# Patient Record
Sex: MALE | Race: WHITE | Employment: OTHER | URBAN - METROPOLITAN AREA
[De-identification: names, ages, dates, MRNs, and addresses within clinical notes are randomized per-mention and may not be internally consistent; named-entity substitution may affect disease eponyms.]

---

## 2017-01-05 ENCOUNTER — TELEPHONE (OUTPATIENT)
Dept: FAMILY MEDICINE | Facility: CLINIC | Age: 62
End: 2017-01-05

## 2017-01-05 NOTE — TELEPHONE ENCOUNTER
Reason for Call:  Other prescription    Detailed comments: Patient is asking for an increase in his Trazodone or another medication that will get him to sleep   [facility]    Phone Number Patient can be reached at: Home number on file [phone] (home)    Best Time: any    Can we leave a detailed message on this number? YES    Call taken on 1/5/2017 at 10:26 AM by Marisel Leonardo

## 2017-01-05 NOTE — TELEPHONE ENCOUNTER
Patient reports he has an appt with [facility] 1-9-17 to review trazodone and tizanidine RXs.  He ran out of tizanidine RX which was helping him go to sleep at night - this RX was originally from the [facility] which he left and now going to [facility].  While on phone with RN, patient realized his tizanidine RX still had one refill.  He states he will get that refilled, continue his current RX regimen and then discuss with [facility] on 1-9-17.  Zara PETERS RN

## 2017-01-06 ENCOUNTER — OFFICE VISIT (OUTPATIENT)
Dept: PSYCHOLOGY | Facility: CLINIC | Age: 62
End: 2017-01-06
Payer: MEDICARE

## 2017-01-06 DIAGNOSIS — F41.1 GENERALIZED ANXIETY DISORDER: ICD-10-CM

## 2017-01-06 DIAGNOSIS — F33.1 MAJOR DEPRESSIVE DISORDER, RECURRENT EPISODE, MODERATE (H): Primary | ICD-10-CM

## 2017-01-06 PROCEDURE — 90832 PSYTX W PT 30 MINUTES: CPT | Performed by: SOCIAL WORKER
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
## 2017-01-13 DIAGNOSIS — R52 PAIN: Primary | ICD-10-CM

## 2017-01-13 RX ORDER — LIDOCAINE 50 MG/G
OINTMENT TOPICAL PRN
Qty: 744.24 G | Refills: 0 | Status: SHIPPED
Start: 2017-01-13 | End: 2018-09-08

## 2017-01-13 NOTE — TELEPHONE ENCOUNTER
Mail order Pharmacy in [place] called stating pt is asking for 5% Lidocaine oint.  This med is not on his med list.  Has had Lidocaine patches in the past.  Advise.  Wil

## 2017-01-17 ENCOUNTER — APPOINTMENT (OUTPATIENT)
Dept: CT IMAGING | Facility: CLINIC | Age: 62
DRG: 871 | End: 2017-01-17
Attending: FAMILY MEDICINE
Payer: MEDICARE

## 2017-01-17 ENCOUNTER — TELEPHONE (OUTPATIENT)
Dept: FAMILY MEDICINE | Facility: CLINIC | Age: 62
End: 2017-01-17

## 2017-01-17 ENCOUNTER — HOSPITAL ENCOUNTER (INPATIENT)
Facility: CLINIC | Age: 62
LOS: 4 days | Discharge: HOME HEALTH | DRG: 871 | End: 2017-01-21
Attending: EMERGENCY MEDICINE | Admitting: FAMILY MEDICINE
Payer: MEDICARE

## 2017-01-17 ENCOUNTER — APPOINTMENT (OUTPATIENT)
Dept: GENERAL RADIOLOGY | Facility: CLINIC | Age: 62
DRG: 871 | End: 2017-01-17
Attending: EMERGENCY MEDICINE
Payer: MEDICARE

## 2017-01-17 ENCOUNTER — APPOINTMENT (OUTPATIENT)
Dept: CT IMAGING | Facility: CLINIC | Age: 62
DRG: 871 | End: 2017-01-17
Attending: EMERGENCY MEDICINE
Payer: MEDICARE

## 2017-01-17 DIAGNOSIS — M48.02 SPINAL STENOSIS IN CERVICAL REGION: Primary | ICD-10-CM

## 2017-01-17 DIAGNOSIS — A41.9 BACTERIAL SEPSIS (H): ICD-10-CM

## 2017-01-17 DIAGNOSIS — J18.9 COMMUNITY ACQUIRED PNEUMONIA: ICD-10-CM

## 2017-01-17 LAB
ALBUMIN SERPL-MCNC: 3.6 G/DL (ref 3.4–5)
ALBUMIN UR-MCNC: 100 MG/DL
ALP SERPL-CCNC: 75 U/L (ref 40–150)
ALT SERPL W P-5'-P-CCNC: 46 U/L (ref 0–70)
AMPHETAMINES UR QL SCN: ABNORMAL
ANION GAP SERPL CALCULATED.3IONS-SCNC: 13 MMOL/L (ref 3–14)
APPEARANCE UR: CLEAR
AST SERPL W P-5'-P-CCNC: 25 U/L (ref 0–45)
BARBITURATES UR QL: ABNORMAL
BASE DEFICIT BLDV-SCNC: 1.8 MMOL/L
BASOPHILS # BLD AUTO: 0 10E9/L (ref 0–0.2)
BASOPHILS NFR BLD AUTO: 0.1 %
BENZODIAZ UR QL: ABNORMAL
BILIRUB SERPL-MCNC: 0.5 MG/DL (ref 0.2–1.3)
BILIRUB UR QL STRIP: NEGATIVE
BUN SERPL-MCNC: 13 MG/DL (ref 7–30)
CALCIUM SERPL-MCNC: 9.6 MG/DL (ref 8.5–10.1)
CANNABINOIDS UR QL SCN: ABNORMAL
CHLORIDE SERPL-SCNC: 109 MMOL/L (ref 94–109)
CO2 SERPL-SCNC: 17 MMOL/L (ref 20–32)
COCAINE UR QL: ABNORMAL
COLOR UR AUTO: YELLOW
CREAT SERPL-MCNC: 0.76 MG/DL (ref 0.66–1.25)
DIFFERENTIAL METHOD BLD: ABNORMAL
EOSINOPHIL # BLD AUTO: 0 10E9/L (ref 0–0.7)
EOSINOPHIL NFR BLD AUTO: 0 %
ERYTHROCYTE [DISTWIDTH] IN BLOOD BY AUTOMATED COUNT: 14.5 % (ref 10–15)
FLUAV+FLUBV AG SPEC QL: NORMAL
FLUAV+FLUBV AG SPEC QL: NORMAL
GFR SERPL CREATININE-BSD FRML MDRD: ABNORMAL ML/MIN/1.7M2
GLUCOSE SERPL-MCNC: 259 MG/DL (ref 70–99)
GLUCOSE UR STRIP-MCNC: 300 MG/DL
HCO3 BLDV-SCNC: 22 MMOL/L (ref 21–28)
HCT VFR BLD AUTO: 48.4 % (ref 40–53)
HGB BLD-MCNC: 16.3 G/DL (ref 13.3–17.7)
HGB UR QL STRIP: ABNORMAL
HYALINE CASTS #/AREA URNS LPF: 2 /LPF (ref 0–2)
IMM GRANULOCYTES # BLD: 0 10E9/L (ref 0–0.4)
IMM GRANULOCYTES NFR BLD: 0.2 %
KETONES UR STRIP-MCNC: 10 MG/DL
LACTATE BLD-SCNC: 2.3 MMOL/L (ref 0.7–2.1)
LACTATE BLD-SCNC: 3.8 MMOL/L (ref 0.7–2.1)
LEUKOCYTE ESTERASE UR QL STRIP: NEGATIVE
LIPASE SERPL-CCNC: 180 U/L (ref 73–393)
LYMPHOCYTES # BLD AUTO: 3.5 10E9/L (ref 0.8–5.3)
LYMPHOCYTES NFR BLD AUTO: 22.9 %
MCH RBC QN AUTO: 31.1 PG (ref 26.5–33)
MCHC RBC AUTO-ENTMCNC: 33.7 G/DL (ref 31.5–36.5)
MCV RBC AUTO: 92 FL (ref 78–100)
MONOCYTES # BLD AUTO: 0.8 10E9/L (ref 0–1.3)
MONOCYTES NFR BLD AUTO: 5.3 %
MUCOUS THREADS #/AREA URNS LPF: PRESENT /LPF
NEUTROPHILS # BLD AUTO: 10.8 10E9/L (ref 1.6–8.3)
NEUTROPHILS NFR BLD AUTO: 71.5 %
NITRATE UR QL: NEGATIVE
NT-PROBNP SERPL-MCNC: 220 PG/ML (ref 0–900)
OPIATES UR QL SCN: ABNORMAL
PCO2 BLDV: 32 MM HG (ref 40–50)
PCP UR QL SCN: ABNORMAL
PH BLDV: 7.45 PH (ref 7.32–7.43)
PH UR STRIP: 6 PH (ref 5–7)
PLATELET # BLD AUTO: 274 10E9/L (ref 150–450)
PO2 BLDV: 33 MM HG (ref 25–47)
POTASSIUM SERPL-SCNC: 3.1 MMOL/L (ref 3.4–5.3)
PROT SERPL-MCNC: 7.9 G/DL (ref 6.8–8.8)
RBC # BLD AUTO: 5.24 10E12/L (ref 4.4–5.9)
RBC #/AREA URNS AUTO: 3 /HPF (ref 0–2)
SODIUM SERPL-SCNC: 139 MMOL/L (ref 133–144)
SP GR UR STRIP: 1.04 (ref 1–1.03)
SPECIMEN SOURCE: NORMAL
TROPONIN I SERPL-MCNC: NORMAL UG/L (ref 0–0.04)
URN SPEC COLLECT METH UR: ABNORMAL
UROBILINOGEN UR STRIP-MCNC: NORMAL MG/DL (ref 0–2)
WBC # BLD AUTO: 15.1 10E9/L (ref 4–11)
WBC #/AREA URNS AUTO: 1 /HPF (ref 0–2)

## 2017-01-17 PROCEDURE — 12000010 ZZH R&B MS INTERMEDIATE OVERFLOW

## 2017-01-17 PROCEDURE — 82803 BLOOD GASES ANY COMBINATION: CPT | Performed by: FAMILY MEDICINE

## 2017-01-17 PROCEDURE — 25000132 ZZH RX MED GY IP 250 OP 250 PS 637: Mod: GY | Performed by: EMERGENCY MEDICINE

## 2017-01-17 PROCEDURE — 74176 CT ABD & PELVIS W/O CONTRAST: CPT

## 2017-01-17 PROCEDURE — 94640 AIRWAY INHALATION TREATMENT: CPT | Mod: 76

## 2017-01-17 PROCEDURE — A9270 NON-COVERED ITEM OR SERVICE: HCPCS | Mod: GY | Performed by: FAMILY MEDICINE

## 2017-01-17 PROCEDURE — 87040 BLOOD CULTURE FOR BACTERIA: CPT | Performed by: EMERGENCY MEDICINE

## 2017-01-17 PROCEDURE — 96372 THER/PROPH/DIAG INJ SC/IM: CPT

## 2017-01-17 PROCEDURE — 84484 ASSAY OF TROPONIN QUANT: CPT | Performed by: FAMILY MEDICINE

## 2017-01-17 PROCEDURE — 81001 URINALYSIS AUTO W/SCOPE: CPT | Performed by: EMERGENCY MEDICINE

## 2017-01-17 PROCEDURE — 25000125 ZZHC RX 250: Performed by: FAMILY MEDICINE

## 2017-01-17 PROCEDURE — 99223 1ST HOSP IP/OBS HIGH 75: CPT | Mod: AI | Performed by: FAMILY MEDICINE

## 2017-01-17 PROCEDURE — 96361 HYDRATE IV INFUSION ADD-ON: CPT

## 2017-01-17 PROCEDURE — 99285 EMERGENCY DEPT VISIT HI MDM: CPT | Mod: 25

## 2017-01-17 PROCEDURE — 40000275 ZZH STATISTIC RCP TIME EA 10 MIN

## 2017-01-17 PROCEDURE — 83690 ASSAY OF LIPASE: CPT | Performed by: EMERGENCY MEDICINE

## 2017-01-17 PROCEDURE — 80053 COMPREHEN METABOLIC PANEL: CPT | Performed by: EMERGENCY MEDICINE

## 2017-01-17 PROCEDURE — 71020 XR CHEST 2 VW: CPT

## 2017-01-17 PROCEDURE — 87804 INFLUENZA ASSAY W/OPTIC: CPT | Performed by: EMERGENCY MEDICINE

## 2017-01-17 PROCEDURE — 96366 THER/PROPH/DIAG IV INF ADDON: CPT

## 2017-01-17 PROCEDURE — A9270 NON-COVERED ITEM OR SERVICE: HCPCS | Mod: GY | Performed by: EMERGENCY MEDICINE

## 2017-01-17 PROCEDURE — 94640 AIRWAY INHALATION TREATMENT: CPT

## 2017-01-17 PROCEDURE — 85025 COMPLETE CBC W/AUTO DIFF WBC: CPT | Performed by: EMERGENCY MEDICINE

## 2017-01-17 PROCEDURE — 83605 ASSAY OF LACTIC ACID: CPT | Performed by: FAMILY MEDICINE

## 2017-01-17 PROCEDURE — 96375 TX/PRO/DX INJ NEW DRUG ADDON: CPT

## 2017-01-17 PROCEDURE — 83605 ASSAY OF LACTIC ACID: CPT | Performed by: EMERGENCY MEDICINE

## 2017-01-17 PROCEDURE — 70450 CT HEAD/BRAIN W/O DYE: CPT

## 2017-01-17 PROCEDURE — 25000132 ZZH RX MED GY IP 250 OP 250 PS 637: Mod: GY | Performed by: FAMILY MEDICINE

## 2017-01-17 PROCEDURE — 83880 ASSAY OF NATRIURETIC PEPTIDE: CPT | Performed by: EMERGENCY MEDICINE

## 2017-01-17 PROCEDURE — 25000125 ZZHC RX 250: Performed by: EMERGENCY MEDICINE

## 2017-01-17 PROCEDURE — 96365 THER/PROPH/DIAG IV INF INIT: CPT

## 2017-01-17 PROCEDURE — 80307 DRUG TEST PRSMV CHEM ANLYZR: CPT | Performed by: EMERGENCY MEDICINE

## 2017-01-17 PROCEDURE — 96367 TX/PROPH/DG ADDL SEQ IV INF: CPT

## 2017-01-17 PROCEDURE — 99285 EMERGENCY DEPT VISIT HI MDM: CPT | Performed by: EMERGENCY MEDICINE

## 2017-01-17 PROCEDURE — 25000128 H RX IP 250 OP 636: Performed by: EMERGENCY MEDICINE

## 2017-01-17 RX ORDER — IPRATROPIUM BROMIDE AND ALBUTEROL SULFATE 2.5; .5 MG/3ML; MG/3ML
SOLUTION RESPIRATORY (INHALATION)
Status: DISPENSED
Start: 2017-01-17 | End: 2017-01-18

## 2017-01-17 RX ORDER — POLYETHYLENE GLYCOL 3350 17 G/17G
17 POWDER, FOR SOLUTION ORAL DAILY PRN
Status: DISCONTINUED | OUTPATIENT
Start: 2017-01-17 | End: 2017-01-21 | Stop reason: HOSPADM

## 2017-01-17 RX ORDER — MORPHINE SULFATE 30 MG/1
30 TABLET, FILM COATED, EXTENDED RELEASE ORAL 3 TIMES DAILY
Status: DISCONTINUED | OUTPATIENT
Start: 2017-01-17 | End: 2017-01-21 | Stop reason: HOSPADM

## 2017-01-17 RX ORDER — IPRATROPIUM BROMIDE AND ALBUTEROL SULFATE 2.5; .5 MG/3ML; MG/3ML
3 SOLUTION RESPIRATORY (INHALATION) ONCE
Status: COMPLETED | OUTPATIENT
Start: 2017-01-17 | End: 2017-01-17

## 2017-01-17 RX ORDER — DULOXETIN HYDROCHLORIDE 30 MG/1
120 CAPSULE, DELAYED RELEASE ORAL DAILY
Status: DISCONTINUED | OUTPATIENT
Start: 2017-01-18 | End: 2017-01-21 | Stop reason: HOSPADM

## 2017-01-17 RX ORDER — CEFTRIAXONE 2 G/1
2 INJECTION, POWDER, FOR SOLUTION INTRAMUSCULAR; INTRAVENOUS ONCE
Status: COMPLETED | OUTPATIENT
Start: 2017-01-17 | End: 2017-01-17

## 2017-01-17 RX ORDER — OLANZAPINE 10 MG/2ML
5 INJECTION, POWDER, FOR SOLUTION INTRAMUSCULAR DAILY PRN
Status: DISCONTINUED | OUTPATIENT
Start: 2017-01-17 | End: 2017-01-21 | Stop reason: HOSPADM

## 2017-01-17 RX ORDER — TRAZODONE HYDROCHLORIDE 100 MG/1
200 TABLET ORAL
Status: DISCONTINUED | OUTPATIENT
Start: 2017-01-17 | End: 2017-01-21 | Stop reason: HOSPADM

## 2017-01-17 RX ORDER — METOCLOPRAMIDE 10 MG/1
10 TABLET ORAL EVERY 6 HOURS PRN
Status: DISCONTINUED | OUTPATIENT
Start: 2017-01-17 | End: 2017-01-21 | Stop reason: HOSPADM

## 2017-01-17 RX ORDER — PREDNISONE 20 MG/1
40 TABLET ORAL DAILY
Status: DISCONTINUED | OUTPATIENT
Start: 2017-01-18 | End: 2017-01-21 | Stop reason: HOSPADM

## 2017-01-17 RX ORDER — LABETALOL HYDROCHLORIDE 5 MG/ML
20 INJECTION, SOLUTION INTRAVENOUS EVERY 4 HOURS PRN
Status: DISCONTINUED | OUTPATIENT
Start: 2017-01-17 | End: 2017-01-21 | Stop reason: HOSPADM

## 2017-01-17 RX ORDER — OXYCODONE AND ACETAMINOPHEN 5; 325 MG/1; MG/1
1 TABLET ORAL EVERY 6 HOURS PRN
Status: DISCONTINUED | OUTPATIENT
Start: 2017-01-17 | End: 2017-01-21 | Stop reason: HOSPADM

## 2017-01-17 RX ORDER — METOCLOPRAMIDE HYDROCHLORIDE 5 MG/ML
10 INJECTION INTRAMUSCULAR; INTRAVENOUS EVERY 6 HOURS PRN
Status: DISCONTINUED | OUTPATIENT
Start: 2017-01-17 | End: 2017-01-21 | Stop reason: HOSPADM

## 2017-01-17 RX ORDER — PREGABALIN 100 MG/1
100 CAPSULE ORAL EVERY MORNING
Status: DISCONTINUED | OUTPATIENT
Start: 2017-01-18 | End: 2017-01-21 | Stop reason: HOSPADM

## 2017-01-17 RX ORDER — ONDANSETRON 2 MG/ML
4 INJECTION INTRAMUSCULAR; INTRAVENOUS EVERY 6 HOURS PRN
Status: DISCONTINUED | OUTPATIENT
Start: 2017-01-17 | End: 2017-01-21 | Stop reason: HOSPADM

## 2017-01-17 RX ORDER — IPRATROPIUM BROMIDE AND ALBUTEROL SULFATE 2.5; .5 MG/3ML; MG/3ML
3 SOLUTION RESPIRATORY (INHALATION)
Status: DISCONTINUED | OUTPATIENT
Start: 2017-01-17 | End: 2017-01-21 | Stop reason: HOSPADM

## 2017-01-17 RX ORDER — MORPHINE SULFATE 30 MG/1
30 TABLET, FILM COATED, EXTENDED RELEASE ORAL ONCE
Status: COMPLETED | OUTPATIENT
Start: 2017-01-17 | End: 2017-01-17

## 2017-01-17 RX ORDER — AZITHROMYCIN 250 MG/1
250 TABLET, FILM COATED ORAL EVERY EVENING
Status: DISCONTINUED | OUTPATIENT
Start: 2017-01-18 | End: 2017-01-21 | Stop reason: HOSPADM

## 2017-01-17 RX ORDER — SODIUM CHLORIDE 9 MG/ML
1000 INJECTION, SOLUTION INTRAVENOUS CONTINUOUS
Status: DISCONTINUED | OUTPATIENT
Start: 2017-01-17 | End: 2017-01-18

## 2017-01-17 RX ORDER — SIMVASTATIN 40 MG
80 TABLET ORAL AT BEDTIME
Status: DISCONTINUED | OUTPATIENT
Start: 2017-01-17 | End: 2017-01-21 | Stop reason: HOSPADM

## 2017-01-17 RX ORDER — PROCHLORPERAZINE MALEATE 5 MG
5-10 TABLET ORAL EVERY 6 HOURS PRN
Status: DISCONTINUED | OUTPATIENT
Start: 2017-01-17 | End: 2017-01-21 | Stop reason: HOSPADM

## 2017-01-17 RX ORDER — POTASSIUM CHLORIDE 1.5 G/1.58G
20 POWDER, FOR SOLUTION ORAL 2 TIMES DAILY
Status: DISCONTINUED | OUTPATIENT
Start: 2017-01-17 | End: 2017-01-17

## 2017-01-17 RX ORDER — PROCHLORPERAZINE 25 MG
25 SUPPOSITORY, RECTAL RECTAL EVERY 12 HOURS PRN
Status: DISCONTINUED | OUTPATIENT
Start: 2017-01-17 | End: 2017-01-21 | Stop reason: HOSPADM

## 2017-01-17 RX ORDER — OSELTAMIVIR PHOSPHATE 75 MG/1
75 CAPSULE ORAL 2 TIMES DAILY
Status: DISCONTINUED | OUTPATIENT
Start: 2017-01-17 | End: 2017-01-21 | Stop reason: HOSPADM

## 2017-01-17 RX ORDER — NALOXONE HYDROCHLORIDE 0.4 MG/ML
.1-.4 INJECTION, SOLUTION INTRAMUSCULAR; INTRAVENOUS; SUBCUTANEOUS
Status: DISCONTINUED | OUTPATIENT
Start: 2017-01-17 | End: 2017-01-21 | Stop reason: HOSPADM

## 2017-01-17 RX ORDER — NAPROXEN 500 MG/1
500 TABLET ORAL
Status: DISCONTINUED | OUTPATIENT
Start: 2017-01-17 | End: 2017-01-21 | Stop reason: HOSPADM

## 2017-01-17 RX ORDER — MORPHINE SULFATE 30 MG/1
30 TABLET, FILM COATED, EXTENDED RELEASE ORAL EVERY 8 HOURS
COMMUNITY
End: 2017-01-17

## 2017-01-17 RX ORDER — HYDROMORPHONE HYDROCHLORIDE 1 MG/ML
.3-.5 INJECTION, SOLUTION INTRAMUSCULAR; INTRAVENOUS; SUBCUTANEOUS
Status: DISPENSED | OUTPATIENT
Start: 2017-01-17 | End: 2017-01-18

## 2017-01-17 RX ORDER — NICOTINE 21 MG/24HR
1 PATCH, TRANSDERMAL 24 HOURS TRANSDERMAL DAILY
Status: DISCONTINUED | OUTPATIENT
Start: 2017-01-17 | End: 2017-01-21 | Stop reason: HOSPADM

## 2017-01-17 RX ORDER — CEFTRIAXONE 2 G/1
2 INJECTION, POWDER, FOR SOLUTION INTRAMUSCULAR; INTRAVENOUS EVERY 24 HOURS
Status: DISCONTINUED | OUTPATIENT
Start: 2017-01-18 | End: 2017-01-21 | Stop reason: HOSPADM

## 2017-01-17 RX ORDER — ONDANSETRON 4 MG/1
4 TABLET, ORALLY DISINTEGRATING ORAL EVERY 6 HOURS PRN
Status: DISCONTINUED | OUTPATIENT
Start: 2017-01-17 | End: 2017-01-21 | Stop reason: HOSPADM

## 2017-01-17 RX ORDER — PREDNISONE 20 MG/1
60 TABLET ORAL ONCE
Status: COMPLETED | OUTPATIENT
Start: 2017-01-17 | End: 2017-01-17

## 2017-01-17 RX ADMIN — SODIUM CHLORIDE 1000 ML: 9 INJECTION, SOLUTION INTRAVENOUS at 18:47

## 2017-01-17 RX ADMIN — HYDROMORPHONE HYDROCHLORIDE 0.5 MG: 1 INJECTION, SOLUTION INTRAMUSCULAR; INTRAVENOUS; SUBCUTANEOUS at 20:39

## 2017-01-17 RX ADMIN — PREDNISONE 60 MG: 20 TABLET ORAL at 14:48

## 2017-01-17 RX ADMIN — OLANZAPINE 5 MG: 10 INJECTION, POWDER, FOR SOLUTION INTRAMUSCULAR at 14:48

## 2017-01-17 RX ADMIN — ENOXAPARIN SODIUM 40 MG: 40 INJECTION SUBCUTANEOUS at 23:04

## 2017-01-17 RX ADMIN — IPRATROPIUM BROMIDE AND ALBUTEROL SULFATE 3 ML: .5; 3 SOLUTION RESPIRATORY (INHALATION) at 20:50

## 2017-01-17 RX ADMIN — SODIUM CHLORIDE, PRESERVATIVE FREE 1000 ML: 5 INJECTION INTRAVENOUS at 23:17

## 2017-01-17 RX ADMIN — SODIUM CHLORIDE 1000 ML: 9 INJECTION, SOLUTION INTRAVENOUS at 18:24

## 2017-01-17 RX ADMIN — SIMVASTATIN 80 MG: 40 TABLET, FILM COATED ORAL at 23:06

## 2017-01-17 RX ADMIN — IPRATROPIUM BROMIDE AND ALBUTEROL SULFATE 3 ML: .5; 3 SOLUTION RESPIRATORY (INHALATION) at 14:40

## 2017-01-17 RX ADMIN — MORPHINE SULFATE 30 MG: 30 TABLET, EXTENDED RELEASE ORAL at 14:48

## 2017-01-17 RX ADMIN — INSULIN HUMAN 5 UNITS: 100 INJECTION, SOLUTION PARENTERAL at 16:57

## 2017-01-17 RX ADMIN — SODIUM CHLORIDE, PRESERVATIVE FREE 1000 ML: 5 INJECTION INTRAVENOUS at 19:50

## 2017-01-17 RX ADMIN — POTASSIUM CHLORIDE 20 MEQ: 1.5 POWDER, FOR SOLUTION ORAL at 18:20

## 2017-01-17 RX ADMIN — TRAZODONE HYDROCHLORIDE 200 MG: 100 TABLET ORAL at 23:06

## 2017-01-17 RX ADMIN — PREGABALIN 150 MG: 100 CAPSULE ORAL at 23:11

## 2017-01-17 RX ADMIN — AZITHROMYCIN MONOHYDRATE 500 MG: 500 INJECTION, POWDER, LYOPHILIZED, FOR SOLUTION INTRAVENOUS at 17:33

## 2017-01-17 RX ADMIN — NICOTINE 1 PATCH: 21 PATCH, EXTENDED RELEASE TRANSDERMAL at 23:04

## 2017-01-17 RX ADMIN — MORPHINE SULFATE 30 MG: 30 TABLET, EXTENDED RELEASE ORAL at 23:06

## 2017-01-17 RX ADMIN — SODIUM CHLORIDE 1000 ML: 9 INJECTION, SOLUTION INTRAVENOUS at 14:48

## 2017-01-17 RX ADMIN — CEFTRIAXONE 2 G: 2 INJECTION, POWDER, FOR SOLUTION INTRAMUSCULAR; INTRAVENOUS at 16:55

## 2017-01-17 RX ADMIN — OSELTAMIVIR PHOSPHATE 75 MG: 75 CAPSULE ORAL at 23:04

## 2017-01-17 RX ADMIN — HYDROMORPHONE HYDROCHLORIDE 0.5 MG: 1 INJECTION, SOLUTION INTRAMUSCULAR; INTRAVENOUS; SUBCUTANEOUS at 18:48

## 2017-01-17 ASSESSMENT — ENCOUNTER SYMPTOMS
COUGH: 1
DIAPHORESIS: 1
CHILLS: 1
DIARRHEA: 1
ABDOMINAL PAIN: 1

## 2017-01-17 ASSESSMENT — ACTIVITIES OF DAILY LIVING (ADL)
FALL_HISTORY_WITHIN_LAST_SIX_MONTHS: NO
DRESS: 0-->INDEPENDENT
DRESS: 0-->INDEPENDENT
EATING: 0-->INDEPENDENT
SWALLOWING: 0-->SWALLOWS FOODS/LIQUIDS WITHOUT DIFFICULTY
SWALLOWING: 0-->SWALLOWS FOODS/LIQUIDS WITHOUT DIFFICULTY
TOILETING: 0-->INDEPENDENT
AMBULATION: 1-->ASSISTIVE EQUIPMENT
COMMUNICATION: 0-->UNDERSTANDS/COMMUNICATES WITHOUT DIFFICULTY
BATHING: 0-->INDEPENDENT
TOILETING: 0-->INDEPENDENT
TRANSFERRING: 0-->INDEPENDENT
COGNITION: 0 - NO COGNITION ISSUES REPORTED
RETIRED_EATING: 0-->INDEPENDENT
AMBULATION: 1-->ASSISTIVE EQUIPMENT
CHANGE_IN_FUNCTIONAL_STATUS_SINCE_ONSET_OF_CURRENT_ILLNESS/INJURY: NO
BATHING: 0-->INDEPENDENT
TRANSFERRING: 0-->INDEPENDENT
RETIRED_COMMUNICATION: 0-->UNDERSTANDS/COMMUNICATES WITHOUT DIFFICULTY

## 2017-01-17 NOTE — TELEPHONE ENCOUNTER
Reason for Call:  Other sick    Detailed comments: Patient's PCA is calling wanting a call back..... Jeffrey has a bad cold,getting worse, chest hurts, fever, chills,coughing up green gunk    Phone Number Patient can be reached at: Home number on file [phone] (home)    Best Time: any    Can we leave a detailed message on this number? YES    Call taken on 1/17/2017 at 12:54 PM by Marisel Leonardo

## 2017-01-17 NOTE — PROGRESS NOTES
BBS are coarse post RX. Requested to stop RX to lie flat. C/O freezing leg. Tolerated fair. Continue to follow.

## 2017-01-17 NOTE — IP AVS SNAPSHOT
MRN:[id number]                      After Visit Summary   1/17/2017    Melquiades Morales    MRN: [id number]           Thank you!     Thank you for choosing [facility] for your care. Our goal is always to provide you with excellent care. Hearing back from our patients is one way we can continue to improve our services. Please take a few minutes to complete the written survey that you may receive in the mail after you visit with us. Thank you!        Patient Information     Date Of Birth          1955        About your hospital stay     You were admitted on:  January 17, 2017 You last received care in the:  [facility]    You were discharged on:  January 21, 2017       Who to Call     For medical emergencies, please call 911.  For non-urgent questions about your medical care, please call your primary care provider or clinic, [phone]          Attending Provider     Provider    Kai Silva MD Dummer, MD Amber Cook John Patrick, MD       Primary Care Provider Office Phone # Fax #    Jignesh Travis [phone]       [facility] [address] [address] [facility] [address]        After Care Instructions     Declined Transitions 30-Day Tel-Assurance       If you would like to enroll in the 30-day Tel-Assurance program, please call [phone].                  Your next 10 appointments already scheduled     Jan 25, 2017  1:20 PM   Office Visit with Jignesh Travis MD   [facility] ([facility]    [address] [address] [facility] [id number] [phone]           Bring a current list of meds and any records pertaining to this visit.  For Physicals, please bring immunization records and any forms needing to be filled out.  Please arrive 10 minutes early to complete paperwork.            Feb 03, 2017  9:00 AM   Return Visit with Kleber Pollack [facility]  "[place])    [address]  [place] [address]   [phone]            Mar 03, 2017  9:00 AM   Return Visit with SHAVONNE Gallego   [facility]    [address]  [place] [address]   [phone]              Further instructions from your care team       Resume your regular meds.  Take levaquin 500 mg daily for seven days. Rx sent to [facility]'s pharmacy.  Follow up with your regular doctor late this next week.  Return to the ER if problems.    Pending Results     Date and Time Order Name Status Description    1/17/2017 1557 Blood culture Preliminary     1/17/2017 1557 Blood culture Preliminary             Statement of Approval     Ordered          01/21/17 0628  I have reviewed and agree with all the recommendations and orders detailed in this document.   EFFECTIVE NOW     Approved and electronically signed by:  Rey Clark MD             Admission Information        Provider Department Dept Phone    1/17/2017 Rey Clark MD, MD [facility] [phone]      Your Vitals Were     Blood Pressure Pulse Temperature    162/85 mmHg 70 97.8  F (36.6  C) (Oral)    Respirations Height Weight    16 1.803 m (5' 11\") 113.6 kg (250 lb 7.1 oz)    BMI (Body Mass Index) Pulse Oximetry       34.95 kg/m2 95%       MyChart Information     apprupthart lets you send messages to your doctor, view your test results, renew your prescriptions, schedule appointments and more. To sign up, go to [email or web address] . Click on \"Log in\" on the left side of the screen, which will take you to the Welcome page. Then click on \"Sign up Now\" on the right side of the page.     You will be asked to enter the access code listed below, as well as some personal information. Please follow the directions to create your username and password.     Your access code is: [email or web address]  Expires: 3/5/2017  9:50 AM     Your access code " will  in 90 days. If you need help or a new code, please call your [place] clinic or [phone].        Care EveryWhere ID     This is your Care EveryWhere ID. This could be used by other organizations to access your [place] medical records  [id number]           Review of your medicines      START taking        Dose / Directions    levofloxacin 500 MG tablet   Commonly known as:  LEVAQUIN   Used for:  Community acquired pneumonia        Dose:  500 mg   Take 1 tablet (500 mg) by mouth daily   Quantity:  7 tablet   Refills:  0         CONTINUE these medicines which may have CHANGED, or have new prescriptions. If we are uncertain of the size of tablets/capsules you have at home, strength may be listed as something that might have changed.        Dose / Directions    * MS CONTIN PO   This may have changed:  Another medication with the same name was added. Make sure you understand how and when to take each.        Dose:  30 mg   Take 30 mg by mouth 3 times daily 15 mg X 2 tabs.  AM, 1200, bedtime   Refills:  0       * morphine 30 MG 12 hr tablet   Commonly known as:  MS CONTIN   This may have changed:  You were already taking a medication with the same name, and this prescription was added. Make sure you understand how and when to take each.   Used for:  Spinal stenosis in cervical region        Dose:  30 mg   Take 1 tablet (30 mg) by mouth 3 times daily   Quantity:  6 tablet   Refills:  0       naproxen 500 MG tablet   Commonly known as:  NAPROSYN   This may have changed:    - when to take this  - reasons to take this   Used for:  Chronic pain syndrome        Dose:  500 mg   Take 1 tablet (500 mg) by mouth 2 times daily (with meals)   Quantity:  180 tablet   Refills:  3       * oxyCODONE-acetaminophen 5-325 MG per tablet   Commonly known as:  PERCOCET   This may have changed:  Another medication with the same name was added. Make sure you understand how and when to take each.        Dose:  1-2 tablet   Take  1-2 tablets by mouth every 6 hours as needed for moderate to severe pain   Quantity:  24 tablet   Refills:  0       * oxyCODONE-acetaminophen 5-325 MG per tablet   Commonly known as:  PERCOCET   This may have changed:  You were already taking a medication with the same name, and this prescription was added. Make sure you understand how and when to take each.   Used for:  Spinal stenosis in cervical region        Dose:  1 tablet   Take 1 tablet by mouth every 6 hours as needed for moderate to severe pain   Quantity:  8 tablet   Refills:  0       simvastatin 80 MG tablet   Commonly known as:  ZOCOR   This may have changed:  when to take this   Used for:  Hyperlipidemia LDL goal <130        Dose:  80 mg   Take 1 tablet (80 mg) by mouth every morning   Quantity:  90 tablet   Refills:  1       * traZODone 50 MG tablet   Commonly known as:  DESYREL   This may have changed:  additional instructions   Used for:  Depression        Dose:  25 mg   Take 0.5 tablets (25 mg) by mouth nightly as needed for sleep   Quantity:  45 tablet   Refills:  3       * traZODone 100 MG tablet   Commonly known as:  DESYREL   This may have changed:  additional instructions   Used for:  Depression        Dose:  200 mg   Take 2 tablets (200 mg) by mouth nightly as needed for sleep   Quantity:  180 tablet   Refills:  3       * Notice:  This list has 6 medication(s) that are the same as other medications prescribed for you. Read the directions carefully, and ask your doctor or other care provider to review them with you.      CONTINUE these medicines which have NOT CHANGED        Dose / Directions    albuterol 108 (90 BASE) MCG/ACT Inhaler   Commonly known as:  albuterol        Dose:  2 puff   Inhale 2 puffs into the lungs every 4 hours as needed for shortness of breath / dyspnea or wheezing   Quantity:  1 Inhaler   Refills:  0       DULoxetine 60 MG EC capsule   Commonly known as:  CYMBALTA   Used for:  Depression        Dose:  120 mg   Take 2  capsules (120 mg) by mouth daily ---Discuss further refills @ appt next week---   Quantity:  180 capsule   Refills:  3       lidocaine 5 % ointment   Commonly known as:  XYLOCAINE   Used for:  Pain        Apply topically as needed for moderate pain Apply 2 grams to affected PRN up to 4x daily. [phone] Pharmacy #.   Quantity:  744.24 g   Refills:  0       * LYRICA PO        Dose:  100 mg   Take 100 mg by mouth every morning   Refills:  0       * LYRICA PO        Dose:  150 mg   Take 150 mg by mouth At Bedtime   Refills:  0       order for DME   Used for:  Other unspecified back disorder, Obese, Lumbosacral radiculitis, COPD (chronic obstructive pulmonary disease) (H)        Semi electric hospital bed with side rails and mattress. Length of bed is for lifetime   Quantity:  1 Device   Refills:  0       * TIZANIDINE HCL PO        Dose:  8 mg   Take 8 mg by mouth 2 times daily Am and 1200   Refills:  0       * TIZANIDINE HCL PO        Dose:  16 mg   Take 16 mg by mouth At Bedtime   Refills:  0       * Notice:  This list has 4 medication(s) that are the same as other medications prescribed for you. Read the directions carefully, and ask your doctor or other care provider to review them with you.         Where to get your medicines      These medications were sent to [facility] - [place], [place] - [address] [place] [id number]     Phone:  [phone]    - levofloxacin 500 MG tablet      Some of these will need a paper prescription and others can be bought over the counter. Ask your nurse if you have questions.     Bring a paper prescription for each of these medications    - morphine 30 MG 12 hr tablet  - oxyCODONE-acetaminophen 5-325 MG per tablet             Protect others around you: Learn how to safely use, store and throw away your medicines at www.disposemymeds.org.             Medication List: This is a list of all your medications and when to take them. Check marks below indicate  your daily home schedule. Keep this list as a reference.      Medications           Morning Afternoon Evening Bedtime As Needed    albuterol 108 (90 BASE) MCG/ACT Inhaler   Commonly known as:  albuterol   Inhale 2 puffs into the lungs every 4 hours as needed for shortness of breath / dyspnea or wheezing                                DULoxetine 60 MG EC capsule   Commonly known as:  CYMBALTA   Take 2 capsules (120 mg) by mouth daily ---Discuss further refills @ appt next week---   Last time this was given:  120 mg on 1/21/2017  8:07 AM                                levofloxacin 500 MG tablet   Commonly known as:  LEVAQUIN   Take 1 tablet (500 mg) by mouth daily                                lidocaine 5 % ointment   Commonly known as:  XYLOCAINE   Apply topically as needed for moderate pain Apply 2 grams to affected PRN up to 4x daily. [phone] Pharmacy #.                                * LYRICA PO   Take 100 mg by mouth every morning   Last time this was given:  100 mg on 1/21/2017  8:05 AM                                * LYRICA PO   Take 150 mg by mouth At Bedtime   Last time this was given:  100 mg on 1/21/2017  8:05 AM                                * MS CONTIN PO   Take 30 mg by mouth 3 times daily 15 mg X 2 tabs.  AM, 1200, bedtime   Last time this was given:  30 mg on 1/21/2017  8:06 AM                                * morphine 30 MG 12 hr tablet   Commonly known as:  MS CONTIN   Take 1 tablet (30 mg) by mouth 3 times daily   Last time this was given:  30 mg on 1/21/2017  8:06 AM                                naproxen 500 MG tablet   Commonly known as:  NAPROSYN   Take 1 tablet (500 mg) by mouth 2 times daily (with meals)   Last time this was given:  500 mg on 1/20/2017  3:28 AM                                order for [facility] bed with side rails and mattress. Length of bed is for lifetime                                * oxyCODONE-acetaminophen 5-325 MG per tablet   Commonly known  as:  PERCOCET   Take 1-2 tablets by mouth every 6 hours as needed for moderate to severe pain   Last time this was given:  1 tablet on 1/21/2017  1:04 AM                                * oxyCODONE-acetaminophen 5-325 MG per tablet   Commonly known as:  PERCOCET   Take 1 tablet by mouth every 6 hours as needed for moderate to severe pain   Last time this was given:  1 tablet on 1/21/2017  1:04 AM                                simvastatin 80 MG tablet   Commonly known as:  ZOCOR   Take 1 tablet (80 mg) by mouth every morning   Last time this was given:  80 mg on 1/20/2017  8:26 PM                                * TIZANIDINE HCL PO   Take 8 mg by mouth 2 times daily Am and 1200   Last time this was given:  8 mg on 1/21/2017  8:10 AM                                * TIZANIDINE HCL PO   Take 16 mg by mouth At Bedtime   Last time this was given:  8 mg on 1/21/2017  8:10 AM                                * traZODone 50 MG tablet   Commonly known as:  DESYREL   Take 0.5 tablets (25 mg) by mouth nightly as needed for sleep   Last time this was given:  25 mg on 1/20/2017  8:37 PM                                * traZODone 100 MG tablet   Commonly known as:  DESYREL   Take 2 tablets (200 mg) by mouth nightly as needed for sleep   Last time this was given:  25 mg on 1/20/2017  8:37 PM                                * Notice:  This list has 10 medication(s) that are the same as other medications prescribed for you. Read the directions carefully, and ask your doctor or other care provider to review them with you.

## 2017-01-17 NOTE — ED PROVIDER NOTES
History     Chief Complaint   Patient presents with     Abdominal Pain     abd pain with diarrhea and uri sx x 2 days     The history is provided by the patient and a caregiver.     Melquiades Morales is a 61 year old male with a history of COPD, hyperlipidemia, hypokalemia, spinal stenosis, and chronic pain who presents with abdominal pain. The patient had a cold over the weekend with URI symptoms for the past several days with a worsening cough productive of green sputum. Chest is tight and wheezy. He also has hot and cold sweats. No fever. The patient developed 5-6 episodes of diarrhea last night and has diffuse abd pain. His caregiver/PCA is assisting with hx. Didn't take meds today and no MS contin today or 2nd dose yesterday. He denies using illicit drugs. The patient is a smoker. He lives alone and has not been around anyone else who is sick. No recent travel. No suspicious bad food intake.      Patient Active Problem List   Diagnosis     BACK DISORDER NOS     TOBACCO USE DISORDER     Major depressive disorder, single episode, severe (H)     Obese     Erectile dysfunction     COPD (chronic obstructive pulmonary disease) (H)     Migraine headache     Hyperlipidemia LDL goal <130     Lumbosacral radiculitis     Health Care Home     Advanced directives, counseling/discussion     Non-specific colitis     Hypokalemia     Anxiety     Dehydration     Chronic maxillary sinusitis     Nasal polyp     Benign neoplasm of colon     Encephalopathy     Chronic pain     Spinal stenosis in cervical region     Inverted papilloma of nasal cavity     Lumbar radiculopathy     CAP (community acquired pneumonia)       No current outpatient prescriptions on file.       Allergies   Allergen Reactions     Abilify [Aripiprazole] Other (See Comments)     Altered metal status.  Was admitted to hospital 3/17/2015.     Asa [Aspirin] GI Disturbance     Upset stomach       Social History   Substance Use Topics     Smoking status: Current  Every Day Smoker -- 0.75 packs/day for 35.0 years     Types: Cigarettes     Smokeless tobacco: Former User      Comment: e ciggs use     Alcohol Use: No     I have reviewed the Medications, Allergies, Past Medical and Surgical History, and Social History in the Epic system.    Review of Systems   Constitutional: Positive for chills and diaphoresis.   Respiratory: Positive for cough.    Gastrointestinal: Positive for abdominal pain and diarrhea.   As mentioned above in the history present illness. All other systems were reviewed and are negative.    Physical Exam   BP: (!) 184/102 mmHg  Heart Rate: 95  Temp: 97.6  F (36.4  C)  Resp: 20  Weight: 113.399 kg (250 lb)  SpO2: 96 %    Physical Exam   Constitutional: He is oriented to person, place, and time. He appears well-developed and well-nourished. He appears distressed.   Smells of marijuana, but denies use.   HENT:   Head: Normocephalic and atraumatic.   Mouth/Throat: Oropharynx is clear and moist. No oropharyngeal exudate.   Eyes: Conjunctivae and EOM are normal. No scleral icterus.   Neck: Normal range of motion. Neck supple. No tracheal deviation present. No thyromegaly present.   Cardiovascular: Normal rate, regular rhythm and normal heart sounds.  Exam reveals no gallop and no friction rub.    No murmur heard.  Pulmonary/Chest: Effort normal. No accessory muscle usage. No tachypnea. No respiratory distress. He has no decreased breath sounds. He has wheezes. He has rhonchi. He has no rales.   Abdominal: Soft. Bowel sounds are normal. He exhibits no distension, no abdominal bruit, no pulsatile midline mass and no mass. There is tenderness (non-localized tenderness). There is no rebound, no guarding and no CVA tenderness.   Musculoskeletal: Normal range of motion. He exhibits no edema or tenderness.   Neurological: He is alert and oriented to person, place, and time. He has normal strength. Gait normal.   Skin: Skin is warm and dry. No rash noted. He is not  diaphoretic. No erythema. No pallor.   Psychiatric: His behavior is normal.   Anxious affect.   Nursing note and vitals reviewed.      ED Course   Procedures          I independently reviewed the X-rays: I feel the patient has a right basilar infiltrate, otherwise are agree with the Radiologist's interpretation.    Results for orders placed or performed during the hospital encounter of 01/17/17   XR Chest 2 Views    Narrative    XR CHEST 2 VW 1/17/2017 4:16 PM    COMPARISON: 5/12/2016    HISTORY: Short of air and cough.      Impression    IMPRESSION: Cardiac silhouette and pulmonary vasculature are within  normal limits. No focal airspace disease, pleural effusion or  pneumothorax.    HONEYNATHAN MÁRQUEZALD   CT Head w/o Contrast    Narrative    CT SCAN OF THE HEAD WITHOUT CONTRAST   1/17/2017 6:40 PM     HISTORY: Agitation.    TECHNIQUE:  Axial images of the head and coronal reformations without  IV contrast material.  Radiation dose for this scan was reduced using  automated exposure control, adjustment of the mA and/or kV according  to patient size, or iterative reconstruction technique.    COMPARISON: 8/25/2015    FINDINGS: Ventricles and subarachnoid spaces are within normal limits.  There is no evidence for intracranial hemorrhage, mass effect, acute  infarct, or skull fracture. Previously seen left-sided paranasal sinus  disease has significantly improved with clearing of the left maxillary  sinus as visualized but there is some residual opacification of the  left ethmoid air cells and dominant left frontal sinus.      Impression    IMPRESSION:  1. No evidence for intracranial hemorrhage or any acute intracranial  process.  2. Interval improvement in left-sided paranasal sinus disease but some  residual sinus disease persists.    RADHA MEDINA MD   Abd/pelvis CT - no contrast - [facility] Protocol    Narrative    CT ABDOMEN AND PELVIS WITHOUT CONTRAST   1/17/2017 7:19 PM     HISTORY: Right-sided abdominal pain.    TECHNIQUE:  No IV contrast material. Radiation dose for this scan was  reduced using automated exposure control, adjustment of the mA and/or  kV according to patient size, or iterative reconstruction technique.    COMPARISON: None    FINDINGS:    Abdomen: Vaguely nodular infiltrates in the posterior and lateral  basal segments of the right lower lobe, consistent with pneumonia of  indeterminate age. Visualized heart appears normal. Diffuse fatty  infiltration of the liver. No focal lesions. Spleen, pancreas,  gallbladder, kidneys and adrenal glands appear normal. No adenopathy,  free air or free fluid. Normal bowel and appendix. No hernia.    Pelvis: Bladder, prostate and seminal vesicles appear normal. Lumbar  degenerative changes.      Impression    IMPRESSION:  1. Right lower lobe infiltrates with nodularity suggesting infectious  disease, although this is not seen on today's chest x-ray.  2. Diffuse fatty infiltration of the liver. This raises the  possibility of steatohepatitis as the etiology of the patient's  symptoms.  3. Otherwise normal CT scan of the abdomen.    DAVI SWAIN MD   Comprehensive metabolic panel   Result Value Ref Range    Sodium 139 133 - 144 mmol/L    Potassium 3.1 (L) 3.4 - 5.3 mmol/L    Chloride 109 94 - 109 mmol/L    Carbon Dioxide 17 (L) 20 - 32 mmol/L    Anion Gap 13 3 - 14 mmol/L    Glucose 259 (H) 70 - 99 mg/dL    Urea Nitrogen 13 7 - 30 mg/dL    Creatinine 0.76 0.66 - 1.25 mg/dL    GFR Estimate >90  Non  GFR Calc   >60 mL/min/1.7m2    GFR Estimate If Black >90   GFR Calc   >60 mL/min/1.7m2    Calcium 9.6 8.5 - 10.1 mg/dL    Bilirubin Total 0.5 0.2 - 1.3 mg/dL    Albumin 3.6 3.4 - 5.0 g/dL    Protein Total 7.9 6.8 - 8.8 g/dL    Alkaline Phosphatase 75 40 - 150 U/L    ALT 46 0 - 70 U/L    AST 25 0 - 45 U/L   CBC with platelets, differential   Result Value Ref Range    WBC 15.1 (H) 4.0 - 11.0 10e9/L    RBC Count 5.24 4.4 - 5.9 10e12/L    Hemoglobin 16.3 13.3 -  17.7 g/dL    Hematocrit 48.4 40.0 - 53.0 %    MCV 92 78 - 100 fl    MCH 31.1 26.5 - 33.0 pg    MCHC 33.7 31.5 - 36.5 g/dL    RDW 14.5 10.0 - 15.0 %    Platelet Count 274 150 - 450 10e9/L    Diff Method Automated Method     % Neutrophils 71.5 %    % Lymphocytes 22.9 %    % Monocytes 5.3 %    % Eosinophils 0.0 %    % Basophils 0.1 %    % Immature Granulocytes 0.2 %    Absolute Neutrophil 10.8 (H) 1.6 - 8.3 10e9/L    Absolute Lymphocytes 3.5 0.8 - 5.3 10e9/L    Absolute Monocytes 0.8 0.0 - 1.3 10e9/L    Absolute Eosinophils 0.0 0.0 - 0.7 10e9/L    Absolute Basophils 0.0 0.0 - 0.2 10e9/L    Abs Immature Granulocytes 0.0 0 - 0.4 10e9/L   Lipase   Result Value Ref Range    Lipase 180 73 - 393 U/L   NT pro BNP   Result Value Ref Range    N-Terminal Pro BNP Inpatient 220 0 - 900 pg/mL   Drug Screen Urine   Result Value Ref Range    Amphetamine Qual Urine  NEG     Negative   Cutoff for a negative amphetamine is 500 ng/mL or less.      Barbiturates Qual Urine  NEG     Negative   Cutoff for a negative barbiturate is 200 ng/mL or less.      Benzodiazepine Qual Urine  NEG     Negative   Cutoff for a negative benzodiazepine is 200 ng/mL or less.      Cannabinoids Qual Urine (A) NEG     Positive   Cutoff for a positive cannabinoid is greater than 50 ng/mL. This is an   unconfirmed screening result to be used for medical purposes only.      Cocaine Qual Urine  NEG     Negative   Cutoff for a negative cocaine is 300 ng/mL or less.      Opiates Qualitative Urine (A) NEG     Positive   Cutoff for a positive opiate is greater than 300 ng/mL. This is an unconfirmed   screening result to be used for medical purposes only.      PCP Qual Urine  NEG     Negative   Cutoff for a negative PCP is 25 ng/mL or less.     UA with Microscopic   Result Value Ref Range    Color Urine Yellow     Appearance Urine Clear     Glucose Urine 300 (A) NEG mg/dL    Bilirubin Urine Negative NEG    Ketones Urine 10 (A) NEG mg/dL    Specific Gravity Urine 1.036  (H) 1.003 - 1.035    Blood Urine Small (A) NEG    pH Urine 6.0 5.0 - 7.0 pH    Protein Albumin Urine 100 (A) NEG mg/dL    Urobilinogen mg/dL Normal 0.0 - 2.0 mg/dL    Nitrite Urine Negative NEG    Leukocyte Esterase Urine Negative NEG    Source Midstream Urine     WBC Urine 1 0 - 2 /HPF    RBC Urine 3 (H) 0 - 2 /HPF    Mucous Urine Present (A) NEG /LPF    Hyaline Casts 2 0 - 2 /LPF   Lactic acid whole blood   Result Value Ref Range    Lactic Acid 3.8 (H) 0.7 - 2.1 mmol/L   Blood gas venous   Result Value Ref Range    Ph Venous 7.45 (H) 7.32 - 7.43 pH    PCO2 Venous 32 (L) 40 - 50 mm Hg    PO2 Venous 33 25 - 47 mm Hg    Bicarbonate Venous 22 21 - 28 mmol/L    Base Deficit Venous 1.8 mmol/L   Troponin I   Result Value Ref Range    Troponin I ES  0.000 - 0.045 ug/L     <0.015  The 99th percentile for upper reference range is 0.045 ug/L.  Troponin values in   the range of 0.045 - 0.120 ug/L may be associated with risks of adverse   clinical events.     Lactic acid whole blood   Result Value Ref Range    Lactic Acid 2.3 (H) 0.7 - 2.1 mmol/L   Influenza A/B antigen   Result Value Ref Range    Influenza A/B Agn Specimen Nasopharyngeal     Influenza A  NEG     Negative   Test results must be correlated with clinical data. If necessary, results   should be confirmed by a molecular assay or viral culture.      Influenza B  NEG     Negative   Test results must be correlated with clinical data. If necessary, results   should be confirmed by a molecular assay or viral culture.          Medications   0.9% sodium chloride infusion (0 mLs Intravenous ED Infusing on Admission/transfer 1/17/17 2012)   OLANZapine (zyPREXA) injection 5 mg (5 mg Intramuscular Given 1/17/17 1448)   DULoxetine (CYMBALTA) EC capsule 120 mg (not administered)   morphine (MS CONTIN) 12 hr tablet 30 mg (not administered)   naproxen (NAPROSYN) tablet 500 mg (not administered)   oxyCODONE-acetaminophen (PERCOCET) 5-325 MG per tablet 1 tablet (not administered)    pregabalin (LYRICA) capsule 100 mg (not administered)   pregabalin (LYRICA) capsule 150 mg (not administered)   simvastatin (ZOCOR) tablet 80 mg (not administered)   tiZANidine (ZANAFLEX) tablet 8 mg (not administered)   tiZANidine (ZANAFLEX) tablet 16 mg (not administered)   traZODone (DESYREL) tablet 200 mg (not administered)   traZODone (DESYREL) half-tab 25 mg (not administered)   naloxone (NARCAN) injection 0.1-0.4 mg (not administered)   enoxaparin (LOVENOX) injection 40 mg (not administered)   HYDROmorphone (PF) (DILAUDID) injection 0.3-0.5 mg (0.5 mg Intravenous Given 1/17/17 2039)   polyethylene glycol (MIRALAX/GLYCOLAX) Packet 17 g (not administered)   metoclopramide (REGLAN) tablet 10 mg (not administered)     Or   metoclopramide (REGLAN) injection 10 mg (not administered)   prochlorperazine (COMPAZINE) injection 5-10 mg (not administered)     Or   prochlorperazine (COMPAZINE) tablet 5-10 mg (not administered)     Or   prochlorperazine (COMPAZINE) Suppository 25 mg (not administered)   ondansetron (ZOFRAN-ODT) ODT tab 4 mg (not administered)     Or   ondansetron (ZOFRAN) injection 4 mg (not administered)   cefTRIAXone (ROCEPHIN) 2 g vial to attach to  ml bag for ADULTS or NS 50 ml bag for PEDS (not administered)   azithromycin (ZITHROMAX) tablet 250 mg (not administered)   oseltamivir (TAMIFLU) capsule 75 mg (not administered)   predniSONE (DELTASONE) tablet 40 mg (not administered)   ipratropium - albuterol 0.5 mg/2.5 mg/3 mL (DUONEB) neb solution 3 mL ( Nebulization Canceled Entry 1/17/17 2200)   nicotine Patch in Place (not administered)   nicotine patch REMOVAL (not administered)   nicotine (NICODERM CQ) 21 MG/24HR 24 hr patch 1 patch (not administered)   labetalol (NORMODYNE/TRANDATE) injection 20 mg (not administered)   ipratropium - albuterol 0.5 mg/2.5 mg/3 mL (DUONEB) neb solution 3 mL (3 mLs Nebulization Given 1/17/17 1440)   0.9% sodium chloride BOLUS (0 mLs Intravenous Stopped 1/17/17  1824)   predniSONE (DELTASONE) tablet 60 mg (60 mg Oral Given 1/17/17 1448)   morphine (MS CONTIN) 12 hr tablet 30 mg (30 mg Oral Given 1/17/17 1448)   cefTRIAXone (ROCEPHIN) 2 g vial to attach to  ml bag for ADULTS or NS 50 ml bag for PEDS (0 g Intravenous Stopped 1/17/17 1733)   0.9% sodium chloride BOLUS (0 mLs Intravenous Stopped 1/17/17 1946)   insulin regular (HumuLIN R/NovoLIN R) injection 5 Units (5 Units Subcutaneous Given 1/17/17 1657)   azithromycin (ZITHROMAX) 500 mg in NaCl 0.9 % 250 mL intermittent infusion (0 mg Intravenous Stopped 1/17/17 1844)       The patient has signs of Severe Sepsis as evidenced by:  1. 2 SIRS criteria, AND  2. Suspected infection, AND   3. Organ dysfunction: Lactic Acid >2    Time severe sepsis present = present on arrival    3 Hour Severe Sepsis Bundle Completion:  1. Initial Lactic Acid Result: 3.8  Recent Labs   Lab Test  01/17/17   1813  01/17/17   1430  12/17/16   1510   LACT  2.3*  3.8*  1.5     2. Blood Cultures before Antibiotics: Yes  3. Broad Spectrum Antibiotics Administered: Yes     Anti-infectives     Ceftriaxone and Azithromycin IV        4. 30 ml/kg ml of IV fluids.    6 Hour Severe Sepsis Bundle Completion:  1. Vasopressors not indicated  2. Repeat Lactic Acid Level: 2.3  Initial lactic acid not >4 and no hypotension.     6:53 PM - Sleeping, awoke easily, abd re-examined and now abd pain is localized to the right mid abd. Will CT abd/pelvis. Pt was seen and eval byDr. Hernandez, Hospitalist who will admit him.    Assessments & Plan (with Medical Decision Making)     I have reviewed the nursing notes.    I have reviewed the findings, diagnosis, plan and need for follow up with the patient.    Current Discharge Medication List          Final diagnoses:   Community acquired pneumonia   Bacterial sepsis (H)     This document serves as a record of the services and decisions personally performed and made by Kai Silva MD. It was created on HIS/HER behalf by  Miladys Queen, a trained medical scribe. The creation of this document is based the provider's statements to the medical scribe.  Miladys Queen 2:26 PM 1/17/2017    Provider:   The information in this document, created by the medical scribe for me, accurately reflects the services I personally performed and the decisions made by me. I have reviewed and approved this document for accuracy prior to leaving the patient care area.  Kai Silva MD 2:26 PM 1/17/2017 1/17/2017   [facility] EMERGENCY DEPARTMENT      Kai Silva MD  01/17/17 3836

## 2017-01-18 LAB
ALBUMIN SERPL-MCNC: 3 G/DL (ref 3.4–5)
ALP SERPL-CCNC: 58 U/L (ref 40–150)
ALT SERPL W P-5'-P-CCNC: 39 U/L (ref 0–70)
ANION GAP SERPL CALCULATED.3IONS-SCNC: 10 MMOL/L (ref 3–14)
AST SERPL W P-5'-P-CCNC: 29 U/L (ref 0–45)
BILIRUB SERPL-MCNC: 0.6 MG/DL (ref 0.2–1.3)
BUN SERPL-MCNC: 8 MG/DL (ref 7–30)
CALCIUM SERPL-MCNC: 7.9 MG/DL (ref 8.5–10.1)
CHLORIDE SERPL-SCNC: 117 MMOL/L (ref 94–109)
CO2 SERPL-SCNC: 24 MMOL/L (ref 20–32)
CREAT SERPL-MCNC: 0.69 MG/DL (ref 0.66–1.25)
ERYTHROCYTE [DISTWIDTH] IN BLOOD BY AUTOMATED COUNT: 15.2 % (ref 10–15)
GFR SERPL CREATININE-BSD FRML MDRD: ABNORMAL ML/MIN/1.7M2
GLUCOSE SERPL-MCNC: 117 MG/DL (ref 70–99)
HCT VFR BLD AUTO: 43.7 % (ref 40–53)
HGB BLD-MCNC: 14.6 G/DL (ref 13.3–17.7)
MCH RBC QN AUTO: 31.3 PG (ref 26.5–33)
MCHC RBC AUTO-ENTMCNC: 33.4 G/DL (ref 31.5–36.5)
MCV RBC AUTO: 94 FL (ref 78–100)
PLATELET # BLD AUTO: 244 10E9/L (ref 150–450)
POTASSIUM SERPL-SCNC: 3.3 MMOL/L (ref 3.4–5.3)
POTASSIUM SERPL-SCNC: 4.3 MMOL/L (ref 3.4–5.3)
PROCALCITONIN SERPL-MCNC: 61.11 NG/ML
PROT SERPL-MCNC: 6.5 G/DL (ref 6.8–8.8)
RBC # BLD AUTO: 4.67 10E12/L (ref 4.4–5.9)
SODIUM SERPL-SCNC: 151 MMOL/L (ref 133–144)
WBC # BLD AUTO: 15.3 10E9/L (ref 4–11)

## 2017-01-18 PROCEDURE — 99233 SBSQ HOSP IP/OBS HIGH 50: CPT | Performed by: FAMILY MEDICINE

## 2017-01-18 PROCEDURE — 40000270 ZZH STATISTIC OXYGEN  O2DAILY TECH TIME

## 2017-01-18 PROCEDURE — 25000132 ZZH RX MED GY IP 250 OP 250 PS 637: Mod: GY | Performed by: FAMILY MEDICINE

## 2017-01-18 PROCEDURE — 84132 ASSAY OF SERUM POTASSIUM: CPT | Performed by: FAMILY MEDICINE

## 2017-01-18 PROCEDURE — 25000128 H RX IP 250 OP 636: Performed by: EMERGENCY MEDICINE

## 2017-01-18 PROCEDURE — A9270 NON-COVERED ITEM OR SERVICE: HCPCS | Mod: GY | Performed by: FAMILY MEDICINE

## 2017-01-18 PROCEDURE — 36415 COLL VENOUS BLD VENIPUNCTURE: CPT | Performed by: FAMILY MEDICINE

## 2017-01-18 PROCEDURE — 85027 COMPLETE CBC AUTOMATED: CPT | Performed by: FAMILY MEDICINE

## 2017-01-18 PROCEDURE — 94640 AIRWAY INHALATION TREATMENT: CPT | Mod: 76

## 2017-01-18 PROCEDURE — 12000010 ZZH R&B MS INTERMEDIATE OVERFLOW

## 2017-01-18 PROCEDURE — 84145 PROCALCITONIN (PCT): CPT | Performed by: FAMILY MEDICINE

## 2017-01-18 PROCEDURE — 80053 COMPREHEN METABOLIC PANEL: CPT | Performed by: FAMILY MEDICINE

## 2017-01-18 PROCEDURE — 40000275 ZZH STATISTIC RCP TIME EA 10 MIN

## 2017-01-18 PROCEDURE — 94640 AIRWAY INHALATION TREATMENT: CPT

## 2017-01-18 PROCEDURE — 25000125 ZZHC RX 250: Performed by: FAMILY MEDICINE

## 2017-01-18 RX ORDER — POTASSIUM CHLORIDE 7.45 MG/ML
10 INJECTION INTRAVENOUS
Status: DISCONTINUED | OUTPATIENT
Start: 2017-01-18 | End: 2017-01-21 | Stop reason: HOSPADM

## 2017-01-18 RX ORDER — POTASSIUM CHLORIDE 1500 MG/1
20-40 TABLET, EXTENDED RELEASE ORAL
Status: DISCONTINUED | OUTPATIENT
Start: 2017-01-18 | End: 2017-01-18 | Stop reason: ALTCHOICE

## 2017-01-18 RX ORDER — POTASSIUM CHLORIDE 1.5 G/1.58G
20-40 POWDER, FOR SOLUTION ORAL
Status: DISCONTINUED | OUTPATIENT
Start: 2017-01-18 | End: 2017-01-21 | Stop reason: HOSPADM

## 2017-01-18 RX ORDER — POTASSIUM CHLORIDE 29.8 MG/ML
20 INJECTION INTRAVENOUS
Status: DISCONTINUED | OUTPATIENT
Start: 2017-01-18 | End: 2017-01-18 | Stop reason: ALTCHOICE

## 2017-01-18 RX ADMIN — OXYCODONE HYDROCHLORIDE AND ACETAMINOPHEN 1 TABLET: 5; 325 TABLET ORAL at 20:17

## 2017-01-18 RX ADMIN — PREGABALIN 100 MG: 100 CAPSULE ORAL at 07:51

## 2017-01-18 RX ADMIN — IPRATROPIUM BROMIDE AND ALBUTEROL SULFATE 3 ML: .5; 3 SOLUTION RESPIRATORY (INHALATION) at 16:10

## 2017-01-18 RX ADMIN — POTASSIUM CHLORIDE 40 MEQ: 1.5 POWDER, FOR SOLUTION ORAL at 15:14

## 2017-01-18 RX ADMIN — OSELTAMIVIR PHOSPHATE 75 MG: 75 CAPSULE ORAL at 20:17

## 2017-01-18 RX ADMIN — AZITHROMYCIN 250 MG: 250 TABLET, FILM COATED ORAL at 17:03

## 2017-01-18 RX ADMIN — DULOXETINE HYDROCHLORIDE 120 MG: 30 CAPSULE, DELAYED RELEASE PELLETS ORAL at 07:50

## 2017-01-18 RX ADMIN — MORPHINE SULFATE 30 MG: 30 TABLET, EXTENDED RELEASE ORAL at 15:14

## 2017-01-18 RX ADMIN — MORPHINE SULFATE 30 MG: 30 TABLET, EXTENDED RELEASE ORAL at 20:17

## 2017-01-18 RX ADMIN — IPRATROPIUM BROMIDE AND ALBUTEROL SULFATE 3 ML: .5; 3 SOLUTION RESPIRATORY (INHALATION) at 20:25

## 2017-01-18 RX ADMIN — SIMVASTATIN 80 MG: 40 TABLET, FILM COATED ORAL at 20:17

## 2017-01-18 RX ADMIN — IPRATROPIUM BROMIDE AND ALBUTEROL SULFATE 3 ML: .5; 3 SOLUTION RESPIRATORY (INHALATION) at 06:37

## 2017-01-18 RX ADMIN — NICOTINE 1 PATCH: 21 PATCH, EXTENDED RELEASE TRANSDERMAL at 07:51

## 2017-01-18 RX ADMIN — HYDROMORPHONE HYDROCHLORIDE 0.5 MG: 1 INJECTION, SOLUTION INTRAMUSCULAR; INTRAVENOUS; SUBCUTANEOUS at 15:39

## 2017-01-18 RX ADMIN — OXYCODONE HYDROCHLORIDE AND ACETAMINOPHEN 1 TABLET: 5; 325 TABLET ORAL at 12:23

## 2017-01-18 RX ADMIN — TIZANIDINE 8 MG: 4 TABLET ORAL at 08:31

## 2017-01-18 RX ADMIN — IPRATROPIUM BROMIDE AND ALBUTEROL SULFATE 3 ML: .5; 3 SOLUTION RESPIRATORY (INHALATION) at 11:47

## 2017-01-18 RX ADMIN — ENOXAPARIN SODIUM 40 MG: 40 INJECTION SUBCUTANEOUS at 20:17

## 2017-01-18 RX ADMIN — TIZANIDINE 16 MG: 4 TABLET ORAL at 21:01

## 2017-01-18 RX ADMIN — CEFTRIAXONE 2 G: 2 INJECTION, POWDER, FOR SOLUTION INTRAMUSCULAR; INTRAVENOUS at 17:03

## 2017-01-18 RX ADMIN — OSELTAMIVIR PHOSPHATE 75 MG: 75 CAPSULE ORAL at 07:51

## 2017-01-18 RX ADMIN — NAPROXEN 500 MG: 500 TABLET ORAL at 17:34

## 2017-01-18 RX ADMIN — PREGABALIN 150 MG: 100 CAPSULE ORAL at 20:17

## 2017-01-18 RX ADMIN — SODIUM CHLORIDE, PRESERVATIVE FREE 1000 ML: 5 INJECTION INTRAVENOUS at 06:29

## 2017-01-18 RX ADMIN — PREDNISONE 40 MG: 20 TABLET ORAL at 07:51

## 2017-01-18 RX ADMIN — POTASSIUM CHLORIDE 20 MEQ: 1.5 POWDER, FOR SOLUTION ORAL at 17:03

## 2017-01-18 RX ADMIN — MORPHINE SULFATE 30 MG: 30 TABLET, EXTENDED RELEASE ORAL at 07:51

## 2017-01-18 ASSESSMENT — PAIN DESCRIPTION - DESCRIPTORS
DESCRIPTORS: ACHING;CONSTANT
DESCRIPTORS: CONSTANT;ACHING

## 2017-01-18 NOTE — PLAN OF CARE
Problem: Pneumonia (Adult)  Goal: Signs and Symptoms of Listed Potential Problems Will be Absent or Manageable (Pneumonia)  Signs and symptoms of listed potential problems will be absent or manageable by discharge/transition of care (reference Pneumonia (Adult) [facility]).pt will be at [facility] respiratory status, resolved N/V, tolerating oral medications and fluids.   Pt was complaining of chronic back pain late in the evening but after he received his usual medications he has been sleeping quietly.  No stools since admission tolerating water and gingerale

## 2017-01-18 NOTE — PROGRESS NOTES
Pt up to chair for lunch. Stayed in chair @ 1.5 hours. Assisted back to bed w/ SBA. Percocet given for c/o back pain

## 2017-01-18 NOTE — H&P
CHIEF COMPLAINT:  Cough, chills, shortness of breath, diarrhea.      HISTORY OF PRESENT ILLNESS:  Melquiades Morales presents with a 2-day history of cold symptoms, initially with nasal congestion, coryza, some sore throat, chills without rigors, and cough.  Cough has been productive at times, other times dry.  He has had increasing shortness of breath.      Today, he has also been more irritable, complains of abdominal pain and diarrhea and anorexia.  He says the diarrhea was 4-6 times today.      PAST MEDICAL HISTORY:   1.  Chronic low back pain.   2.  Chronic pain with narcotic use.  This was recently cut back from 60 mg of MS Contin to 30 mg of MS Contin three times a day, three weeks ago.  He is followed by the [facility].  He was also using one-half of a Percocet two times a day.  He admits he stopped taking the MS Contin two days ago because he was so sick, not because it was running out.   3.  Erectile dysfunction.   4.  History of major depressive disorder.   5.  Chronic obstructive pulmonary disease.   6.  Chronic tobacco abuse.   7.  Hyperlipidemia.   8.  L4 radiculopathy.   9.  History of migraine headaches.   10.  History of hypokalemia.   11.  Anxiety.      MEDICATIONS PRIOR TO ADMISSION:   1.  Cymbalta 120 mg daily.   2.  MS Contin 30 mg t.i.d.   3.  Percocet 1 tablet q. 6 hours p.r.n., though he takes one-half tablet b.i.d.   4.  Lyrica 100 mg a.m., and 150 mg p.m.   5.  Simvastatin 80 mg daily.   6.  Zanaflex 8 mg b.i.d., and 60 mg at bedtime.   7.  Trazodone 225 mg daily.   8.  Albuterol inhaler 2 puffs q. 4 hours p.r.n.   9.  Lidocaine ointment p.r.n.      SOCIAL HISTORY:  He lives alone.  He has a caregiver who comes in and checks on him.  He is on Social Security Disability from his back.  He smokes a pack per day.  No alcohol at all.  He states no street drugs, and that he has not been taking more of his narcotic than prescribed. Wife lives in foster care, has a small dog.  Drives.        FAMILY HISTORY:  He is adopted.      REVIEW OF SYSTEMS:  Somewhat difficult to do because he is quite distressed.  He has chronic low back pain from which he is miserable every day.  Tends to radiate down both legs, but the left greater than the right.  He has had some shaking tremors in the last day or so.  He has had diarrhea six times a day.  Some diffuse abdominal pain, but that is actually better this evening, and he is not really complaining of that as much as he is of his back pain.  He has had some anorexia, as noted.  Cough, some congestion and coryza, as noted.  No skin symptoms.    -sleeps a lot, on some days can't converse with caregiver, can get upset easily  -unsteady but falls rarely, can hardly stand long enough to cook.    Rest of 10-point review of systems is negative.      OBJECTIVE:   GENERAL:  He is awake, alert, seems distressed, seems to be rocking in pain, has a somewhat glazed look in his eyes, a distant stare, but answers me appropriately and is oriented x3.   VITAL SIGNS:  Temperature is 98.3.  Pulse is .  Blood pressure is somewhat hypertensive at 169/120 right now.  Respirations 22.  O2 sat with a low of 88% on room air while he was here; otherwise, it has been in the 90s.   HEENT:  Eyes show pupils equal and reactive, EOMs intact.  TMs are normal.  Nasal mucosa is normal.  Throat is red without exudates.   NECK:  Supple, without masses, nodes or thyromegaly.   CHEST:  Some rhonchi at both bases.  Some upper airway noises, expiratory only.   CARDIOVASCULAR:  Regular rate without murmur that I can hear.  Soft heart sounds.  No JVD or HJR.  No edema.   ABDOMEN:  Slightly obese, soft.  It is soft and nontender at this time without HSM or masses.   GENITOURINARY:  Normal male, descended.   RECTAL:  Deferred.   EXTREMITIES:  Grossly normal at this time.   NEUROLOGIC:  Finger-to-nose testing is symmetric and normal.  Rapid alternating movements and finger tapping are normal.  Strength  and sensation in the upper extremities is normal.  Strength, sensation, and DTRs in the lower extremities are normal, but he does have definite positive straight leg raising, particularly on the left, for pain at about 30 degrees, felt up in the buttock.      IMAGING:  Chest x-ray was reviewed.  It shows possible infiltrate in the right base.  This was not seen by Radiology.      LABS:  White count 15.0, potassium 3.1, carbon dioxide 17, glucose 259, lactic acid 3.8, .      ASSESSMENT:   1.  Sepsis secondary to community-acquired pneumonia:  He does have some tachycardia, lactic acid elevation, and elevated white count.  We will hydrate with 3 liters.  Start him on Rocephin and Zithromax.  Because of the sudden fever and cough at this season, we will start him on Tamiflu empirically for possible influenza-like illness.  CT confirmed the RLL pneumonia.      2.  Hypoxemic respiratory failure secondary to pneumonia:  There may be some chronic obstructive pulmonary disease as well, and therefore we will treat with steroid (he got 60 mg in the ER).     3.  Low back pain, with chronic narcotic use, possible narcotic withdrawal symptoms:  He had been on 60 mg of MS Contin t.i.d. up until three weeks ago, and then was switched to 30 mg t.i.d.  He states he has not taken any for the last two days because he was too sick, and insists that he has not run out or taken more than he was supposed to.  There is some concern about narcotic withdrawal with the general tremors, his mental status being slightly off, having a hard time concentrating, and these shakes.  We will increase his narcotic acutely with Dilaudid, but also get him back on his MS Contin.     4.  Metabolic acidosis:  He has a bicarbonate of 17.  No VBG was done in the ED, and we will get that now.  It appears to be a non-anion gap, with an anion gap of only 13, but he certainly has an elevated lactic acid.  We will recheck VBG and lactic acid in the next  half an hour.     5.  Severe hypertension:  Needs more evaluation at this point.  He is alert and oriented, so at this point I believe this is asymptomatic.  However, because of his underlying narcotic use and his abnormal affect, we will go forward with a CT scan.  We will also use labetalol acutely to get the blood pressure down while we continue further investigations.     6. Suspect narcotic withdrawal syndrome:   -he is moaning, shaky, slightly agitated.  Caregiver thinks she has seen some withdrawal in the past.  He stopped MS contin 2 days ago because he felt sick.  He insists he still has some at home and didn't use more than prescribed but history is questionable.  Will give additional doses of rapid acting dilaudid tonight and see if he improves.       Tobacco abuse:  We will start him on a Nicoderm patch. .      CODE STATUS:  FULL.    .  Prophylaxis:  Enoxaparin.   .  Disposition:  He needs inpatient care. Expect 2-3 days.  Caregiver is Jignesh [phone], [phone]. May need placement per Jignesh, may be unsafe alone.         TOBIN CHRISTIAN MD             D: 2017 18:04   T: 2017 04:01   MT: [id number]      Name:     DIOGO MCCLELLAN   MRN:      [id number]        Account:      [id number]   :      1955           Admitted:     [id number]      Document: [id number]       cc: Jignesh Travis MD

## 2017-01-18 NOTE — PROGRESS NOTES
"[facility]      Subjective:  Coughing  Mild difficulty breathing  Chronic back pain--can't sit or stand for long periods    Review of Systems:  CONSTITUTIONAL:feve4r  I: NEGATIVE for worrisome rashes, moles or lesions  E: NEGATIVE for vision changes or irritation  E/M: NEGATIVE for ear, mouth and throat problems  RESP:wheezy this am  B: NEGATIVE for masses, tenderness or discharge  CV: NEGATIVE for chest pain, palpitations or peripheral edema  GI: NEGATIVE for nausea, abdominal pain, heartburn, or change in bowel habits  : NEGATIVE for frequency, dysuria, or hematuria  MUSCULOSKELETAL:chronic back pain  N: NEGATIVE for weakness, dizziness or paresthesias  E: NEGATIVE for temperature intolerance, skin/hair changes  H: NEGATIVE for bleeding problems  P: NEGATIVE for changes in mood or affect    Physical Exam:  Vitals Were Reviewed    Patient Vitals for the past 16 hrs:   BP Temp Temp src Heart Rate Resp SpO2 Height Weight   01/18/17 0325 156/85 mmHg 98.9  F (37.2  C) Oral 92 18 98 % - -   01/17/17 2343 153/61 mmHg 100.2  F (37.9  C) Oral 90 18 92 % - -   01/17/17 2301 (!) 172/105 mmHg 98.5  F (36.9  C) Oral 84 20 94 % - -   01/17/17 2000 - - - - - 96 % - -   01/17/17 1939 158/73 mmHg 99.2  F (37.3  C) - 89 16 97 % 1.803 m (5' 11\") 113.6 kg (250 lb 7.1 oz)   01/17/17 1910 105/69 mmHg - - 101 18 96 % - -   01/17/17 1800 (!) 159/92 mmHg - - 102 18 97 % - -   01/17/17 1753 - 98.3  F (36.8  C) Oral 97 22 96 % - -   01/17/17 1730 (!) 169/120 mmHg - - 103 22 97 % - -   01/17/17 1711 123/59 mmHg - - 93 16 96 % - -   01/17/17 1545 - 98.5  F (36.9  C) Oral - - 97 % - -         Intake/Output Summary (Last 24 hours) at 01/18/17 0725  Last data filed at 01/18/17 0700   Gross per 24 hour   Intake   4515 ml   Output   1050 ml   Net   3465 ml       GENERAL APPEARANCE: healthy, alert and no distress  EYES: conjunctiva clear, eyes grossly normal  HENT: external ears and nose normal   NECK: supple, no masses or " adenopathy  RESP: wheezes, loose cough  CV: regular rate and rhythm, normal S1 S2, no S3 or S4 and no murmur, click or rub   ABDOMEN: soft, nontender, no HSM or masses and bowel sounds normal  MS: no clubbing, cyanosis;  no edema  SKIN: clear without significant rashes or lesions    Lab:  Recent Labs   Lab Test  01/17/17   1415  12/19/16   1409   NA  139  145*   POTASSIUM  3.1*  3.3*   CHLORIDE  109  111*   CO2  17*  28   ANIONGAP  13  6   GLC  259*  93   BUN  13  9   CR  0.76  0.66   JESSEE  9.6  7.9*     CBC RESULTS:   Recent Labs   Lab Test  01/18/17   0635  01/17/17   1415   WBC  15.3*  15.1*   RBC  4.67  5.24   HGB  14.6  16.3   HCT  43.7  48.4   PLT  244  274       Results for orders placed or performed during the hospital encounter of 01/17/17 (from the past 24 hour(s))   Comprehensive metabolic panel   Result Value Ref Range    Sodium 139 133 - 144 mmol/L    Potassium 3.1 (L) 3.4 - 5.3 mmol/L    Chloride 109 94 - 109 mmol/L    Carbon Dioxide 17 (L) 20 - 32 mmol/L    Anion Gap 13 3 - 14 mmol/L    Glucose 259 (H) 70 - 99 mg/dL    Urea Nitrogen 13 7 - 30 mg/dL    Creatinine 0.76 0.66 - 1.25 mg/dL    GFR Estimate >90  Non  GFR Calc   >60 mL/min/1.7m2    GFR Estimate If Black >90   GFR Calc   >60 mL/min/1.7m2    Calcium 9.6 8.5 - 10.1 mg/dL    Bilirubin Total 0.5 0.2 - 1.3 mg/dL    Albumin 3.6 3.4 - 5.0 g/dL    Protein Total 7.9 6.8 - 8.8 g/dL    Alkaline Phosphatase 75 40 - 150 U/L    ALT 46 0 - 70 U/L    AST 25 0 - 45 U/L   CBC with platelets, differential   Result Value Ref Range    WBC 15.1 (H) 4.0 - 11.0 10e9/L    RBC Count 5.24 4.4 - 5.9 10e12/L    Hemoglobin 16.3 13.3 - 17.7 g/dL    Hematocrit 48.4 40.0 - 53.0 %    MCV 92 78 - 100 fl    MCH 31.1 26.5 - 33.0 pg    MCHC 33.7 31.5 - 36.5 g/dL    RDW 14.5 10.0 - 15.0 %    Platelet Count 274 150 - 450 10e9/L    Diff Method Automated Method     % Neutrophils 71.5 %    % Lymphocytes 22.9 %    % Monocytes 5.3 %    % Eosinophils 0.0 %     % Basophils 0.1 %    % Immature Granulocytes 0.2 %    Absolute Neutrophil 10.8 (H) 1.6 - 8.3 10e9/L    Absolute Lymphocytes 3.5 0.8 - 5.3 10e9/L    Absolute Monocytes 0.8 0.0 - 1.3 10e9/L    Absolute Eosinophils 0.0 0.0 - 0.7 10e9/L    Absolute Basophils 0.0 0.0 - 0.2 10e9/L    Abs Immature Granulocytes 0.0 0 - 0.4 10e9/L   Lipase   Result Value Ref Range    Lipase 180 73 - 393 U/L   NT pro BNP   Result Value Ref Range    N-Terminal Pro BNP Inpatient 220 0 - 900 pg/mL   Lactic acid whole blood   Result Value Ref Range    Lactic Acid 3.8 (H) 0.7 - 2.1 mmol/L   Influenza A/B antigen   Result Value Ref Range    Influenza A/B Agn Specimen Nasopharyngeal     Influenza A  NEG     Negative   Test results must be correlated with clinical data. If necessary, results   should be confirmed by a molecular assay or viral culture.      Influenza B  NEG     Negative   Test results must be correlated with clinical data. If necessary, results   should be confirmed by a molecular assay or viral culture.     XR Chest 2 Views    Narrative    XR CHEST 2 VW 1/17/2017 4:16 PM    COMPARISON: 5/12/2016    HISTORY: Short of air and cough.      Impression    IMPRESSION: Cardiac silhouette and pulmonary vasculature are within  normal limits. No focal airspace disease, pleural effusion or  pneumothorax.    [place]   Drug Screen Urine   Result Value Ref Range    Amphetamine Qual Urine  NEG     Negative   Cutoff for a negative amphetamine is 500 ng/mL or less.      Barbiturates Qual Urine  NEG     Negative   Cutoff for a negative barbiturate is 200 ng/mL or less.      Benzodiazepine Qual Urine  NEG     Negative   Cutoff for a negative benzodiazepine is 200 ng/mL or less.      Cannabinoids Qual Urine (A) NEG     Positive   Cutoff for a positive cannabinoid is greater than 50 ng/mL. This is an   unconfirmed screening result to be used for medical purposes only.      Cocaine Qual Urine  NEG     Negative   Cutoff for a negative cocaine is 300  ng/mL or less.      Opiates Qualitative Urine (A) NEG     Positive   Cutoff for a positive opiate is greater than 300 ng/mL. This is an unconfirmed   screening result to be used for medical purposes only.      PCP Qual Urine  NEG     Negative   Cutoff for a negative PCP is 25 ng/mL or less.     UA with Microscopic   Result Value Ref Range    Color Urine Yellow     Appearance Urine Clear     Glucose Urine 300 (A) NEG mg/dL    Bilirubin Urine Negative NEG    Ketones Urine 10 (A) NEG mg/dL    Specific Gravity Urine 1.036 (H) 1.003 - 1.035    Blood Urine Small (A) NEG    pH Urine 6.0 5.0 - 7.0 pH    Protein Albumin Urine 100 (A) NEG mg/dL    Urobilinogen mg/dL Normal 0.0 - 2.0 mg/dL    Nitrite Urine Negative NEG    Leukocyte Esterase Urine Negative NEG    Source Midstream Urine     WBC Urine 1 0 - 2 /HPF    RBC Urine 3 (H) 0 - 2 /HPF    Mucous Urine Present (A) NEG /LPF    Hyaline Casts 2 0 - 2 /LPF   Blood gas venous   Result Value Ref Range    Ph Venous 7.45 (H) 7.32 - 7.43 pH    PCO2 Venous 32 (L) 40 - 50 mm Hg    PO2 Venous 33 25 - 47 mm Hg    Bicarbonate Venous 22 21 - 28 mmol/L    Base Deficit Venous 1.8 mmol/L   Troponin I   Result Value Ref Range    Troponin I ES  0.000 - 0.045 ug/L     <0.015  The 99th percentile for upper reference range is 0.045 ug/L.  Troponin values in   the range of 0.045 - 0.120 ug/L may be associated with risks of adverse   clinical events.     Lactic acid whole blood   Result Value Ref Range    Lactic Acid 2.3 (H) 0.7 - 2.1 mmol/L   CT Head w/o Contrast    Narrative    CT SCAN OF THE HEAD WITHOUT CONTRAST   1/17/2017 6:40 PM     HISTORY: Agitation.    TECHNIQUE:  Axial images of the head and coronal reformations without  IV contrast material.  Radiation dose for this scan was reduced using  automated exposure control, adjustment of the mA and/or kV according  to patient size, or iterative reconstruction technique.    COMPARISON: 8/25/2015    FINDINGS: Ventricles and subarachnoid spaces  are within normal limits.  There is no evidence for intracranial hemorrhage, mass effect, acute  infarct, or skull fracture. Previously seen left-sided paranasal sinus  disease has significantly improved with clearing of the left maxillary  sinus as visualized but there is some residual opacification of the  left ethmoid air cells and dominant left frontal sinus.      Impression    IMPRESSION:  1. No evidence for intracranial hemorrhage or any acute intracranial  process.  2. Interval improvement in left-sided paranasal sinus disease but some  residual sinus disease persists.    RADHA MEDINA MD   Abd/pelvis CT - no contrast - [facility] Protocol    Narrative    CT ABDOMEN AND PELVIS WITHOUT CONTRAST   1/17/2017 7:19 PM     HISTORY: Right-sided abdominal pain.    TECHNIQUE: No IV contrast material. Radiation dose for this scan was  reduced using automated exposure control, adjustment of the mA and/or  kV according to patient size, or iterative reconstruction technique.    COMPARISON: None    FINDINGS:    Abdomen: Vaguely nodular infiltrates in the posterior and lateral  basal segments of the right lower lobe, consistent with pneumonia of  indeterminate age. Visualized heart appears normal. Diffuse fatty  infiltration of the liver. No focal lesions. Spleen, pancreas,  gallbladder, kidneys and adrenal glands appear normal. No adenopathy,  free air or free fluid. Normal bowel and appendix. No hernia.    Pelvis: Bladder, prostate and seminal vesicles appear normal. Lumbar  degenerative changes.      Impression    IMPRESSION:  1. Right lower lobe infiltrates with nodularity suggesting infectious  disease, although this is not seen on today's chest x-ray.  2. Diffuse fatty infiltration of the liver. This raises the  possibility of steatohepatitis as the etiology of the patient's  symptoms.  3. Otherwise normal CT scan of the abdomen.    DAVI SWAIN MD   CBC with platelets   Result Value Ref Range    WBC 15.3 (H) 4.0 - 11.0  10e9/L    RBC Count 4.67 4.4 - 5.9 10e12/L    Hemoglobin 14.6 13.3 - 17.7 g/dL    Hematocrit 43.7 40.0 - 53.0 %    MCV 94 78 - 100 fl    MCH 31.3 26.5 - 33.0 pg    MCHC 33.4 31.5 - 36.5 g/dL    RDW 15.2 (H) 10.0 - 15.0 %    Platelet Count 244 150 - 450 10e9/L       Assessment and Plan:    Sepsis secondary to right lower lobe community-acquired pneumonia:  He does have some tachycardia, lactic acid elevation, and elevated white count.  We will hydrate with 3 liters.  Start him on Rocephin and Zithromax.  Because of the sudden fever and cough at this season, we will start him on Tamiflu empirically for possible influenza-like illness.  CT confirmed the RLL pneumonia.   January 18, 2017 no lactic ordered this am, last was 2.3, don't feel this mandates repeat, low grade temp, wbc on steroids is 15.3     Hypoxemic respiratory failure secondary to pneumonia:  There may be some chronic obstructive pulmonary disease as well, and therefore we will treat with steroid (he got 60 mg in the ER).   January 18, 2017 two liters nc sat 96%    Low back pain, with chronic narcotic use, possible narcotic withdrawal symptoms:  He had been on 60 mg of MS Contin t.i.d. up until three weeks ago, and then was switched to 30 mg t.i.d.  He states he has not taken any for the last two days because he was too sick, and insists that he has not run out or taken more than he was supposed to.  There is some concern about narcotic withdrawal with the general tremors, his mental status being slightly off, having a hard time concentrating, and these shakes.  We will increase his narcotic acutely with Dilaudid, but also get him back on his MS Contin.     Metabolic acidosis:  He has a bicarbonate of 17.  No VBG was done in the ED, and we will get that now.  It appears to be a non-anion gap, with an anion gap of only 13, but he certainly has an elevated lactic acid.  We will recheck VBG and lactic acid in the next half an hour.  January 18, 2017 co2 has  normalized    Severe hypertension:  Needs more evaluation at this point.  He is alert and oriented, so at this point I believe this is asymptomatic.  However, because of his underlying narcotic use and his abnormal affect, we will go forward with a CT scan.  We will also use labetalol acutely to get the blood pressure down while we continue further investigations.  January 18, 2017 bp better, ct normal except for improved sinus ds.      Suspect narcotic withdrawal syndrome:    -he is moaning, shaky, slightly agitated.  Caregiver thinks she has seen some withdrawal in the past.  He stopped MS contin 2 days ago because he felt sick.  He insists he still has some at home and didn't use more than prescribed but history is questionable.  Will give additional doses of rapid acting dilaudid tonight and see if he improves.   January 18, 2017 back on MS contin , no current sign of withdrawal       Tobacco abuse:  We will start him on a Nicoderm patch. .       CODE STATUS:  FULL.    .  Prophylaxis:  Enoxaparin.      Caregiver is Jignesh [phone], [phone]. May need placement per Jignesh, may be unsafe alone.    Pt is hypernatremic from fluids and appears to be adequately fluid resuscitated-stop iv fluids  Continue atb's , steroids, tamiflu  Social service consult        12:13 PM   procalcitonin 61

## 2017-01-18 NOTE — CONSULTS
Reason for Referral:  Discharge Planning     Diagnosis:   Patient Active Problem List   Diagnosis     BACK DISORDER NOS     TOBACCO USE DISORDER     Major depressive disorder, single episode, severe (H)     Obese     Erectile dysfunction     COPD (chronic obstructive pulmonary disease) (H)     Migraine headache     Hyperlipidemia LDL goal <130     Lumbosacral radiculitis     Health Care Home     Advanced directives, counseling/discussion     Non-specific colitis     Hypokalemia     Anxiety     Dehydration     Chronic maxillary sinusitis     Nasal polyp     Benign neoplasm of colon     Encephalopathy     Chronic pain     Spinal stenosis in cervical region     Inverted papilloma of nasal cavity     Lumbar radiculopathy     CAP (community acquired pneumonia)       Cognitive / Behavioral Status:  awake, alert and oriented    Support System:  Son     Current Living Situation:    Home Setting:  Mobile Home    Living Situation:  Alone                       Housing Concerns: No      Employment / Financial / Insurance Concerns: No Insurance issues identified    Assessment:  Chart reviewed and met with the patient for discharge planning.  Current home services include a PCA 4 days a week for 6 hours a day through [facility] (phone: [phone] fax: [phone]).  Discussed increasing home care services if he is homebound.  Care Transitions will monitor and assist with discharge planning.    Plan:  Home with resumption of PCA services      Anyi Mccullough RN Care Coordinator [phone]

## 2017-01-18 NOTE — PROGRESS NOTES
"Pt requesting pain medication for back pain. Moaning and repeating \"adam, adam, adam\". Pt stated home routine with MS Contin and Percocet use, stating he \"needs something more\" until his next available dose of Percocet. PRN dilaudid given  "

## 2017-01-19 LAB
ALBUMIN SERPL-MCNC: 3 G/DL (ref 3.4–5)
ALP SERPL-CCNC: 50 U/L (ref 40–150)
ALT SERPL W P-5'-P-CCNC: 35 U/L (ref 0–70)
ANION GAP SERPL CALCULATED.3IONS-SCNC: 11 MMOL/L (ref 3–14)
AST SERPL W P-5'-P-CCNC: 28 U/L (ref 0–45)
BILIRUB SERPL-MCNC: 0.4 MG/DL (ref 0.2–1.3)
BUN SERPL-MCNC: 12 MG/DL (ref 7–30)
CALCIUM SERPL-MCNC: 7.9 MG/DL (ref 8.5–10.1)
CHLORIDE SERPL-SCNC: 110 MMOL/L (ref 94–109)
CO2 SERPL-SCNC: 24 MMOL/L (ref 20–32)
CREAT SERPL-MCNC: 0.59 MG/DL (ref 0.66–1.25)
ERYTHROCYTE [DISTWIDTH] IN BLOOD BY AUTOMATED COUNT: 15.5 % (ref 10–15)
GFR SERPL CREATININE-BSD FRML MDRD: ABNORMAL ML/MIN/1.7M2
GLUCOSE SERPL-MCNC: 93 MG/DL (ref 70–99)
HCT VFR BLD AUTO: 40.2 % (ref 40–53)
HGB BLD-MCNC: 13.2 G/DL (ref 13.3–17.7)
HGB BLD-MCNC: 13.8 G/DL (ref 13.3–17.7)
LACTATE BLD-SCNC: 2 MMOL/L (ref 0.7–2.1)
LACTATE BLD-SCNC: 2.8 MMOL/L (ref 0.7–2.1)
MCH RBC QN AUTO: 31.1 PG (ref 26.5–33)
MCHC RBC AUTO-ENTMCNC: 32.8 G/DL (ref 31.5–36.5)
MCV RBC AUTO: 95 FL (ref 78–100)
PLATELET # BLD AUTO: 220 10E9/L (ref 150–450)
POTASSIUM SERPL-SCNC: 3.3 MMOL/L (ref 3.4–5.3)
POTASSIUM SERPL-SCNC: 4.3 MMOL/L (ref 3.4–5.3)
PROT SERPL-MCNC: 6.1 G/DL (ref 6.8–8.8)
RBC # BLD AUTO: 4.25 10E12/L (ref 4.4–5.9)
SODIUM SERPL-SCNC: 145 MMOL/L (ref 133–144)
WBC # BLD AUTO: 13.1 10E9/L (ref 4–11)

## 2017-01-19 PROCEDURE — 80053 COMPREHEN METABOLIC PANEL: CPT | Performed by: FAMILY MEDICINE

## 2017-01-19 PROCEDURE — 12000010 ZZH R&B MS INTERMEDIATE OVERFLOW

## 2017-01-19 PROCEDURE — 84132 ASSAY OF SERUM POTASSIUM: CPT | Performed by: FAMILY MEDICINE

## 2017-01-19 PROCEDURE — 25000125 ZZHC RX 250: Performed by: FAMILY MEDICINE

## 2017-01-19 PROCEDURE — 36415 COLL VENOUS BLD VENIPUNCTURE: CPT | Performed by: FAMILY MEDICINE

## 2017-01-19 PROCEDURE — 25800025 ZZH RX 258: Performed by: FAMILY MEDICINE

## 2017-01-19 PROCEDURE — 94640 AIRWAY INHALATION TREATMENT: CPT

## 2017-01-19 PROCEDURE — 25000128 H RX IP 250 OP 636: Performed by: FAMILY MEDICINE

## 2017-01-19 PROCEDURE — 25000132 ZZH RX MED GY IP 250 OP 250 PS 637: Mod: GY | Performed by: FAMILY MEDICINE

## 2017-01-19 PROCEDURE — 83605 ASSAY OF LACTIC ACID: CPT | Performed by: FAMILY MEDICINE

## 2017-01-19 PROCEDURE — 99233 SBSQ HOSP IP/OBS HIGH 50: CPT | Performed by: FAMILY MEDICINE

## 2017-01-19 PROCEDURE — A9270 NON-COVERED ITEM OR SERVICE: HCPCS | Mod: GY | Performed by: FAMILY MEDICINE

## 2017-01-19 PROCEDURE — 94640 AIRWAY INHALATION TREATMENT: CPT | Mod: 76

## 2017-01-19 PROCEDURE — 85027 COMPLETE CBC AUTOMATED: CPT | Performed by: FAMILY MEDICINE

## 2017-01-19 PROCEDURE — 40000275 ZZH STATISTIC RCP TIME EA 10 MIN

## 2017-01-19 PROCEDURE — 85018 HEMOGLOBIN: CPT | Performed by: FAMILY MEDICINE

## 2017-01-19 RX ORDER — SODIUM CHLORIDE 9 MG/ML
INJECTION, SOLUTION INTRAVENOUS CONTINUOUS
Status: DISCONTINUED | OUTPATIENT
Start: 2017-01-19 | End: 2017-01-20

## 2017-01-19 RX ADMIN — DULOXETINE HYDROCHLORIDE 120 MG: 30 CAPSULE, DELAYED RELEASE PELLETS ORAL at 07:54

## 2017-01-19 RX ADMIN — OXYCODONE HYDROCHLORIDE AND ACETAMINOPHEN 1 TABLET: 5; 325 TABLET ORAL at 11:07

## 2017-01-19 RX ADMIN — SIMVASTATIN 80 MG: 40 TABLET, FILM COATED ORAL at 21:52

## 2017-01-19 RX ADMIN — IPRATROPIUM BROMIDE AND ALBUTEROL SULFATE 3 ML: .5; 3 SOLUTION RESPIRATORY (INHALATION) at 07:52

## 2017-01-19 RX ADMIN — PANTOPRAZOLE SODIUM 40 MG: 40 INJECTION, POWDER, FOR SOLUTION INTRAVENOUS at 13:56

## 2017-01-19 RX ADMIN — SODIUM CHLORIDE, POTASSIUM CHLORIDE, SODIUM LACTATE AND CALCIUM CHLORIDE 1000 ML: 600; 310; 30; 20 INJECTION, SOLUTION INTRAVENOUS at 18:05

## 2017-01-19 RX ADMIN — MORPHINE SULFATE 30 MG: 30 TABLET, EXTENDED RELEASE ORAL at 20:31

## 2017-01-19 RX ADMIN — TIZANIDINE 8 MG: 4 TABLET ORAL at 07:56

## 2017-01-19 RX ADMIN — MORPHINE SULFATE 30 MG: 30 TABLET, EXTENDED RELEASE ORAL at 13:54

## 2017-01-19 RX ADMIN — IPRATROPIUM BROMIDE AND ALBUTEROL SULFATE 3 ML: .5; 3 SOLUTION RESPIRATORY (INHALATION) at 19:36

## 2017-01-19 RX ADMIN — POTASSIUM CHLORIDE 20 MEQ: 1.5 POWDER, FOR SOLUTION ORAL at 10:58

## 2017-01-19 RX ADMIN — CEFTRIAXONE 2 G: 2 INJECTION, POWDER, FOR SOLUTION INTRAMUSCULAR; INTRAVENOUS at 16:43

## 2017-01-19 RX ADMIN — PANTOPRAZOLE SODIUM 40 MG: 40 INJECTION, POWDER, FOR SOLUTION INTRAVENOUS at 20:31

## 2017-01-19 RX ADMIN — Medication 25 MG: at 21:52

## 2017-01-19 RX ADMIN — POTASSIUM CHLORIDE 40 MEQ: 1.5 POWDER, FOR SOLUTION ORAL at 08:40

## 2017-01-19 RX ADMIN — ENOXAPARIN SODIUM 40 MG: 40 INJECTION SUBCUTANEOUS at 20:31

## 2017-01-19 RX ADMIN — PREGABALIN 150 MG: 100 CAPSULE ORAL at 21:52

## 2017-01-19 RX ADMIN — IPRATROPIUM BROMIDE AND ALBUTEROL SULFATE 3 ML: .5; 3 SOLUTION RESPIRATORY (INHALATION) at 15:25

## 2017-01-19 RX ADMIN — OSELTAMIVIR PHOSPHATE 75 MG: 75 CAPSULE ORAL at 20:31

## 2017-01-19 RX ADMIN — OXYCODONE HYDROCHLORIDE AND ACETAMINOPHEN 1 TABLET: 5; 325 TABLET ORAL at 01:38

## 2017-01-19 RX ADMIN — MORPHINE SULFATE 30 MG: 30 TABLET, EXTENDED RELEASE ORAL at 07:55

## 2017-01-19 RX ADMIN — OSELTAMIVIR PHOSPHATE 75 MG: 75 CAPSULE ORAL at 07:55

## 2017-01-19 RX ADMIN — NICOTINE 1 PATCH: 21 PATCH, EXTENDED RELEASE TRANSDERMAL at 07:55

## 2017-01-19 RX ADMIN — TIZANIDINE 16 MG: 4 TABLET ORAL at 20:32

## 2017-01-19 RX ADMIN — OXYCODONE HYDROCHLORIDE AND ACETAMINOPHEN 1 TABLET: 5; 325 TABLET ORAL at 05:08

## 2017-01-19 RX ADMIN — AZITHROMYCIN 250 MG: 250 TABLET, FILM COATED ORAL at 16:42

## 2017-01-19 RX ADMIN — SODIUM CHLORIDE: 9 INJECTION, SOLUTION INTRAVENOUS at 20:46

## 2017-01-19 RX ADMIN — IPRATROPIUM BROMIDE AND ALBUTEROL SULFATE 3 ML: .5; 3 SOLUTION RESPIRATORY (INHALATION) at 12:41

## 2017-01-19 RX ADMIN — PREDNISONE 40 MG: 20 TABLET ORAL at 07:55

## 2017-01-19 RX ADMIN — OXYCODONE HYDROCHLORIDE AND ACETAMINOPHEN 1 TABLET: 5; 325 TABLET ORAL at 16:42

## 2017-01-19 RX ADMIN — TRAZODONE HYDROCHLORIDE 200 MG: 100 TABLET ORAL at 20:32

## 2017-01-19 RX ADMIN — PREGABALIN 100 MG: 100 CAPSULE ORAL at 07:55

## 2017-01-19 ASSESSMENT — PAIN DESCRIPTION - DESCRIPTORS: DESCRIPTORS: ACHING;CONSTANT

## 2017-01-19 NOTE — PROVIDER NOTIFICATION
Patient fired Sepsis protocol. Lactate obtained 2.8 . Results paged to  on call. Awaiting further orders.

## 2017-01-19 NOTE — PROGRESS NOTES
[facility]      Subjective:  Overall feeling better  Cough  Some wheeze  Off oxygen  Long history of tremors    Review of Systems:  C: NEGATIVE for fever, chills, change in weight  I: NEGATIVE for worrisome rashes, moles or lesions  E: NEGATIVE for vision changes or irritation  E/M: NEGATIVE for ear, mouth and throat problems  RESP:above  B: NEGATIVE for masses, tenderness or discharge  CV: NEGATIVE for chest pain, palpitations or peripheral edema  GI: NEGATIVE for nausea, abdominal pain, heartburn, or change in bowel habits  : NEGATIVE for frequency, dysuria, or hematuria  M: NEGATIVE for significant arthralgias or myalgia  N: chronic tremor  E: NEGATIVE for temperature intolerance, skin/hair changes  H: NEGATIVE for bleeding problems  P: NEGATIVE for changes in mood or affect    Physical Exam:  Vitals Were Reviewed    Patient Vitals for the past 16 hrs:   BP Temp Temp src Pulse Heart Rate Resp SpO2   01/19/17 0500 158/88 mmHg - - - 88 20 96 %   01/18/17 2348 128/71 mmHg 97.8  F (36.6  C) Oral - 93 20 96 %   01/18/17 2025 - - - - - - 96 %   01/18/17 2008 (!) 172/93 mmHg 97.8  F (36.6  C) Oral - 95 20 94 %   01/18/17 1830 - - - - - - 95 %   01/18/17 1715 - - - - - - 93 %   01/18/17 1610 - - - - - - 96 %   01/18/17 1527 143/89 mmHg 98.3  F (36.8  C) Oral 90 90 20 90 %         Intake/Output Summary (Last 24 hours) at 01/19/17 0645  Last data filed at 01/19/17 0500   Gross per 24 hour   Intake    875 ml   Output   1050 ml   Net   -175 ml       GENERAL APPEARANCE: healthy, alert and no distress  EYES: conjunctiva clear, eyes grossly normal  HENT: external ears and nose normal   NECK: supple, no masses or adenopathy  RESP: mild wheeze, some scattered rhonchi  CV: regular rate and rhythm, normal S1 S2, no S3 or S4 and no murmur, click or rub   ABDOMEN: soft, nontender, no HSM or masses and bowel sounds normal  MS: no clubbing, cyanosis; no edema  SKIN: clear without significant rashes or  lesions  NEURO: intermittent coarse tremor right arm    Lab:  Recent Labs   Lab Test  01/18/17   1915  01/18/17   0635  01/17/17   1415   NA   --   151*  139   POTASSIUM  4.3  3.3*  3.1*   CHLORIDE   --   117*  109   CO2   --   24  17*   ANIONGAP   --   10  13   GLC   --   117*  259*   BUN   --   8  13   CR   --   0.69  0.76   JESSEE   --   7.9*  9.6     CBC RESULTS:   Recent Labs   Lab Test  01/18/17   0635  01/17/17   1415   WBC  15.3*  15.1*   RBC  4.67  5.24   HGB  14.6  16.3   HCT  43.7  48.4   PLT  244  274       Results for orders placed or performed during the hospital encounter of 01/17/17 (from the past 24 hour(s))   Care Transition RN/SW IP Consult    Narrative    Anyi Mccullough RN     1/18/2017  2:26 PM      Reason for Referral:  Discharge Planning     Diagnosis:   Patient Active Problem List   Diagnosis     BACK DISORDER NOS     TOBACCO USE DISORDER     Major depressive disorder, single episode, severe (H)     Obese     Erectile dysfunction     COPD (chronic obstructive pulmonary disease) (H)     Migraine headache     Hyperlipidemia LDL goal <130     Lumbosacral radiculitis     Health Care Home     Advanced directives, counseling/discussion     Non-specific colitis     Hypokalemia     Anxiety     Dehydration     Chronic maxillary sinusitis     Nasal polyp     Benign neoplasm of colon     Encephalopathy     Chronic pain     Spinal stenosis in cervical region     Inverted papilloma of nasal cavity     Lumbar radiculopathy     CAP (community acquired pneumonia)       Cognitive / Behavioral Status:  awake, alert and oriented    Support System:  Son     Current Living Situation:    Home Setting:  Mobile Home    Living Situation:  Alone                       Housing Concerns: No      Employment / Financial / Insurance Concerns: No Insurance issues   identified    Assessment:  Chart reviewed and met with the patient for   discharge planning.  Current home services include a PCA 4 days a   week for 6 hours a day  through [facility] (phone:   [phone] fax: [phone]).  Discussed increasing home care   services if he is homebound.  Care Transitions will monitor and   assist with discharge planning.    Plan:  Home with resumption of PCA services      Anyi Mccullough, RN Care Coordinator [phone]   Potassium   Result Value Ref Range    Potassium 4.3 3.4 - 5.3 mmol/L       Assessment and Plan:    Sepsis secondary to right lower lobe community-acquired pneumonia:  He does have some tachycardia, lactic acid elevation, and elevated white count.  We will hydrate with 3 liters.  Start him on Rocephin and Zithromax.  Because of the sudden fever and cough at this season, we will start him on Tamiflu empirically for possible influenza-like illness.  CT confirmed the RLL pneumonia.   January 18, 2017 no lactic ordered this am, last was 2.3, don't feel this mandates repeat, low grade temp, wbc on steroids is 15.3   January 19, 2017 procalcitonin 61, afebrile, on room air,  wbc 13.1    Hypoxemic respiratory failure secondary to pneumonia:  There may be some chronic obstructive pulmonary disease as well, and therefore we will treat with steroid (he got 60 mg in the ER).    January 18, 2017 two liters nc sat 96%  January 19, 2017 on room air    Low back pain, with chronic narcotic use, possible narcotic withdrawal symptoms:  He had been on 60 mg of MS Contin t.i.d. up until three weeks ago, and then was switched to 30 mg t.i.d.  He states he has not taken any for the last two days because he was too sick, and insists that he has not run out or taken more than he was supposed to.  There is some concern about narcotic withdrawal with the general tremors, his mental status being slightly off, having a hard time concentrating, and these shakes.  We will increase his narcotic acutely with Dilaudid, but also get him back on his MS Contin.     Metabolic acidosis:  He has a bicarbonate of 17.  No VBG was done in the ED, and we will get  that now.  It appears to be a non-anion gap, with an anion gap of only 13, but he certainly has an elevated lactic acid.  We will recheck VBG and lactic acid in the next half an hour.  January 18, 2017 co2 has normalized    Severe hypertension:  Needs more evaluation at this point.  He is alert and oriented, so at this point I believe this is asymptomatic.  However, because of his underlying narcotic use and his abnormal affect, we will go forward with a CT scan.  We will also use labetalol acutely to get the blood pressure down while we continue further investigations.  January 18, 2017 bp better, ct normal except for improved sinus ds.  January 19, 2017 normal to mild elevation      Suspect narcotic withdrawal syndrome:    -he is moaning, shaky, slightly agitated.  Caregiver thinks she has seen some withdrawal in the past.  He stopped MS contin 2 days ago because he felt sick.  He insists he still has some at home and didn't use more than prescribed but history is questionable.  Will give additional doses of rapid acting dilaudid tonight and see if he improves.    January 18, 2017 back on MS contin , no current sign of withdrawal    Chronic tremor       Tobacco abuse:  We will start him on a Nicoderm patch. .       CODE STATUS:  FULL.    .  Prophylaxis:  Enoxaparin.      Caregiver is Jignesh [phone], [phone]. May need placement per Jignesh, may be unsafe alone.    Continue atb's , steroids, tamiflu  Social service consult  Very high procalcitonin speaks to bacterial etiology and sepsis  ? Two more days in [facility] k  Follow labs      1:00 PM   Black stool reported  Flushed down  guiac any new stool  Stat hgb  protonix iv

## 2017-01-19 NOTE — PLAN OF CARE
Problem: Goal Outcome Summary  Goal: Goal Outcome Summary  Outcome: Improving  Continues on room air with saturation 93%. Receiving nebulizer. Lung sounds coarse with exp wheezing. Shortness of breath with activity including trying to sit up in bed. Continues to be shaky but able to feed self this shift. Receiving pain medications for back. Declined ice pack. Changes own position in bed to help relieve pain. Patient has chronic back pain. Potassium this morning was 3.3. Orally replaced and increased to 4.4. Large watery black/brown stool today. Visualized by patient and nursing assistant only. Dr. Clark notified. Hgb checked and was 13.8. Started on Protonix. Need to guaiac next bowel movements. Patient aware and collection hat now in the toilet. Will continue IV antibiotics as ordered.

## 2017-01-19 NOTE — CONSULTS
Care Management Progress Note:    Reason for Follow up:  Per the discussion in Interdisciplinary Rounds, the patient is not safe to live on his own.  Spoke with Ilda Blanco RN, Clinical Manager at [facility] (phone: [phone] fax: [phone]).  Ilda stated, the patient is safe to live on his own, his PCA is available to assist when needed. He has PCA services 4 days a week for 6 hours a day.  The patient drives.  There are no issues at this time.    Discharge Plan:  Home with resumption of PCA services      Anyi Mccullough RN, Care Coordinator  [phone]

## 2017-01-19 NOTE — PROGRESS NOTES
[facility]    Sepsis Evaluation Progress Note    Date of Service: 01/19/2017    I was called to see Melquiades Morales due to abnormal vital signs triggering the Sepsis SIRS screening alert. He is known to have an infection.     Physical Exam    Vital Signs:  Temp: 97.8  F (36.6  C) Temp src: Oral BP: (!) 169/92 mmHg Pulse: 103 Heart Rate: 88 Resp: 16 SpO2: 93 % O2 Device: None (Room air)      Lab:  LACTIC ACID   Date Value Ref Range Status   01/19/2017 2.8* 0.7 - 2.1 mmol/L Final     Comment:     Consistent with previous result       The patient is at baseline mental status.    The rest of their physical exam is significant for unremarkable .    Assessment and Plan    The SIRS and exam findings are likely due to   sepsis.     ID: The patient is currently on the following antibiotics:  Anti-infectives (Future)    Start     Dose/Rate Route Frequency Ordered Stop    01/18/17 1730  azithromycin (ZITHROMAX) tablet 250 mg      250 mg Oral EVERY EVENING 01/17/17 2029 01/22/17 1729 01/18/17 1700  cefTRIAXone (ROCEPHIN) 2 g vial to attach to  ml bag for ADULTS or NS 50 ml bag for PEDS      2 g  over 30 Minutes Intravenous EVERY 24 HOURS 01/17/17 2029 01/17/17 2030  oseltamivir (TAMIFLU) capsule 75 mg      75 mg Oral 2 TIMES DAILY 01/17/17 2029 01/22/17 1959        Current antibiotic coverage is appropriate for source of infection.    Fluid: Fluid bolus ordered.    Lab: Repeat lactic acid ordered for 2 hours from now.    Disposition: The patient will remain on the current unit. We will continue to monitor this patient closely     Afebrile, doing well overall, giving bolus and then maintenance fluids overnight for mildly elevated lactic but no indication to change antibiotics or of acute worsening at present.     Gregorio Mendoza MD, MD

## 2017-01-20 LAB
ALBUMIN SERPL-MCNC: 2.9 G/DL (ref 3.4–5)
ALP SERPL-CCNC: 48 U/L (ref 40–150)
ALT SERPL W P-5'-P-CCNC: 37 U/L (ref 0–70)
ANION GAP SERPL CALCULATED.3IONS-SCNC: 7 MMOL/L (ref 3–14)
AST SERPL W P-5'-P-CCNC: 29 U/L (ref 0–45)
BILIRUB SERPL-MCNC: 0.4 MG/DL (ref 0.2–1.3)
BUN SERPL-MCNC: 9 MG/DL (ref 7–30)
CALCIUM SERPL-MCNC: 8.1 MG/DL (ref 8.5–10.1)
CHLORIDE SERPL-SCNC: 112 MMOL/L (ref 94–109)
CO2 SERPL-SCNC: 28 MMOL/L (ref 20–32)
CREAT SERPL-MCNC: 0.63 MG/DL (ref 0.66–1.25)
ERYTHROCYTE [DISTWIDTH] IN BLOOD BY AUTOMATED COUNT: 15.2 % (ref 10–15)
GFR SERPL CREATININE-BSD FRML MDRD: ABNORMAL ML/MIN/1.7M2
GLUCOSE SERPL-MCNC: 101 MG/DL (ref 70–99)
HCT VFR BLD AUTO: 38.3 % (ref 40–53)
HGB BLD-MCNC: 12.7 G/DL (ref 13.3–17.7)
MCH RBC QN AUTO: 31.3 PG (ref 26.5–33)
MCHC RBC AUTO-ENTMCNC: 33.2 G/DL (ref 31.5–36.5)
MCV RBC AUTO: 94 FL (ref 78–100)
PLATELET # BLD AUTO: 194 10E9/L (ref 150–450)
POTASSIUM SERPL-SCNC: 3.5 MMOL/L (ref 3.4–5.3)
PROT SERPL-MCNC: 5.8 G/DL (ref 6.8–8.8)
RBC # BLD AUTO: 4.06 10E12/L (ref 4.4–5.9)
SODIUM SERPL-SCNC: 147 MMOL/L (ref 133–144)
WBC # BLD AUTO: 12.6 10E9/L (ref 4–11)

## 2017-01-20 PROCEDURE — 99233 SBSQ HOSP IP/OBS HIGH 50: CPT | Performed by: FAMILY MEDICINE

## 2017-01-20 PROCEDURE — 25000132 ZZH RX MED GY IP 250 OP 250 PS 637: Mod: GY | Performed by: FAMILY MEDICINE

## 2017-01-20 PROCEDURE — 25000125 ZZHC RX 250: Performed by: FAMILY MEDICINE

## 2017-01-20 PROCEDURE — 36415 COLL VENOUS BLD VENIPUNCTURE: CPT | Performed by: FAMILY MEDICINE

## 2017-01-20 PROCEDURE — 27210995 ZZH RX 272: Performed by: FAMILY MEDICINE

## 2017-01-20 PROCEDURE — 85027 COMPLETE CBC AUTOMATED: CPT | Performed by: FAMILY MEDICINE

## 2017-01-20 PROCEDURE — 94640 AIRWAY INHALATION TREATMENT: CPT | Mod: 76

## 2017-01-20 PROCEDURE — 12000010 ZZH R&B MS INTERMEDIATE OVERFLOW

## 2017-01-20 PROCEDURE — 94640 AIRWAY INHALATION TREATMENT: CPT

## 2017-01-20 PROCEDURE — A9270 NON-COVERED ITEM OR SERVICE: HCPCS | Mod: GY | Performed by: FAMILY MEDICINE

## 2017-01-20 PROCEDURE — 80053 COMPREHEN METABOLIC PANEL: CPT | Performed by: FAMILY MEDICINE

## 2017-01-20 PROCEDURE — 40000275 ZZH STATISTIC RCP TIME EA 10 MIN

## 2017-01-20 RX ORDER — SODIUM CHLORIDE 450 MG/100ML
INJECTION, SOLUTION INTRAVENOUS CONTINUOUS
Status: DISCONTINUED | OUTPATIENT
Start: 2017-01-20 | End: 2017-01-21 | Stop reason: HOSPADM

## 2017-01-20 RX ORDER — PANTOPRAZOLE SODIUM 40 MG/1
40 TABLET, DELAYED RELEASE ORAL
Status: DISCONTINUED | OUTPATIENT
Start: 2017-01-20 | End: 2017-01-21 | Stop reason: HOSPADM

## 2017-01-20 RX ADMIN — MORPHINE SULFATE 30 MG: 30 TABLET, EXTENDED RELEASE ORAL at 08:19

## 2017-01-20 RX ADMIN — NAPROXEN 500 MG: 500 TABLET ORAL at 03:28

## 2017-01-20 RX ADMIN — TIZANIDINE 16 MG: 4 TABLET ORAL at 20:28

## 2017-01-20 RX ADMIN — IPRATROPIUM BROMIDE AND ALBUTEROL SULFATE 3 ML: .5; 3 SOLUTION RESPIRATORY (INHALATION) at 20:16

## 2017-01-20 RX ADMIN — TIZANIDINE 8 MG: 4 TABLET ORAL at 13:40

## 2017-01-20 RX ADMIN — IPRATROPIUM BROMIDE AND ALBUTEROL SULFATE 3 ML: .5; 3 SOLUTION RESPIRATORY (INHALATION) at 15:53

## 2017-01-20 RX ADMIN — PREDNISONE 40 MG: 20 TABLET ORAL at 08:33

## 2017-01-20 RX ADMIN — OSELTAMIVIR PHOSPHATE 75 MG: 75 CAPSULE ORAL at 20:13

## 2017-01-20 RX ADMIN — OXYCODONE HYDROCHLORIDE AND ACETAMINOPHEN 1 TABLET: 5; 325 TABLET ORAL at 16:38

## 2017-01-20 RX ADMIN — TRAZODONE HYDROCHLORIDE 200 MG: 100 TABLET ORAL at 20:26

## 2017-01-20 RX ADMIN — IPRATROPIUM BROMIDE AND ALBUTEROL SULFATE 3 ML: .5; 3 SOLUTION RESPIRATORY (INHALATION) at 07:53

## 2017-01-20 RX ADMIN — CEFTRIAXONE 2 G: 2 INJECTION, POWDER, FOR SOLUTION INTRAMUSCULAR; INTRAVENOUS at 16:37

## 2017-01-20 RX ADMIN — OXYCODONE HYDROCHLORIDE AND ACETAMINOPHEN 1 TABLET: 5; 325 TABLET ORAL at 01:43

## 2017-01-20 RX ADMIN — MORPHINE SULFATE 30 MG: 30 TABLET, EXTENDED RELEASE ORAL at 13:36

## 2017-01-20 RX ADMIN — AZITHROMYCIN 250 MG: 250 TABLET, FILM COATED ORAL at 18:00

## 2017-01-20 RX ADMIN — PANTOPRAZOLE SODIUM 40 MG: 40 TABLET, DELAYED RELEASE ORAL at 08:18

## 2017-01-20 RX ADMIN — OSELTAMIVIR PHOSPHATE 75 MG: 75 CAPSULE ORAL at 08:19

## 2017-01-20 RX ADMIN — PREGABALIN 100 MG: 100 CAPSULE ORAL at 08:19

## 2017-01-20 RX ADMIN — IPRATROPIUM BROMIDE AND ALBUTEROL SULFATE 3 ML: .5; 3 SOLUTION RESPIRATORY (INHALATION) at 11:51

## 2017-01-20 RX ADMIN — PREGABALIN 150 MG: 100 CAPSULE ORAL at 20:26

## 2017-01-20 RX ADMIN — TIZANIDINE 8 MG: 4 TABLET ORAL at 08:20

## 2017-01-20 RX ADMIN — SIMVASTATIN 80 MG: 40 TABLET, FILM COATED ORAL at 20:26

## 2017-01-20 RX ADMIN — PANTOPRAZOLE SODIUM 40 MG: 40 TABLET, DELAYED RELEASE ORAL at 16:31

## 2017-01-20 RX ADMIN — SODIUM CHLORIDE: 4.5 INJECTION, SOLUTION INTRAVENOUS at 08:36

## 2017-01-20 RX ADMIN — MORPHINE SULFATE 30 MG: 30 TABLET, EXTENDED RELEASE ORAL at 20:13

## 2017-01-20 RX ADMIN — NICOTINE 1 PATCH: 21 PATCH, EXTENDED RELEASE TRANSDERMAL at 08:19

## 2017-01-20 RX ADMIN — Medication 25 MG: at 20:37

## 2017-01-20 RX ADMIN — DULOXETINE HYDROCHLORIDE 120 MG: 30 CAPSULE, DELAYED RELEASE PELLETS ORAL at 08:18

## 2017-01-20 NOTE — PROVIDER NOTIFICATION
"Patient request percocet for his chronic back pain, pain medication given as ordered.  Was discussing pain control option and times with patient that next available percocet would be in 6 hours.  He became frustrated and upset stating \"at home my bottle says as needed.\" Empathized with patient but reaffirmed while in the hospital we have to follow orders.  He is accepting of this but frustrated, argumentative with RN,  and upset.    "

## 2017-01-20 NOTE — PLAN OF CARE
Problem: Pneumonia (Adult)  Goal: Signs and Symptoms of Listed Potential Problems Will be Absent or Manageable (Pneumonia)  Signs and symptoms of listed potential problems will be absent or manageable by discharge/transition of care (reference Pneumonia (Adult) CPG).pt will be at baesline respiratory status, resolved N/V, tolerating oral medications and fluids.   Outcome: Improving  Patient clear except for e wheezes in left lower lobe (this morning). He tolerates up in room activity but does not ambulate in halls (although encouraged to increase activity level). He has remained on room air with sats in the mid to low 90'S. Continue to evaluate.

## 2017-01-20 NOTE — PLAN OF CARE
Problem: Pneumonia (Adult)  Goal: Signs and Symptoms of Listed Potential Problems Will be Absent or Manageable (Pneumonia)  Signs and symptoms of listed potential problems will be absent or manageable by discharge/transition of care (reference Pneumonia (Adult) CPG).pt will be at baesline respiratory status, resolved N/V, tolerating oral medications and fluids.   Continues to have sob with activity though his sats remain good on RA. Non-productive cough at this time, pt has been sleeping intermittantly complaining of back pain when awake. Pt is able to be independent with positioning and ambulating.Pleasant and cooperative.

## 2017-01-20 NOTE — PROGRESS NOTES
[facility]      Subjective:  Breathing better  Dark stool yesterday--none since, thinks it was from \"imodium\"  Lots of back pain last night -slept poorly  Lactic elevated yesterday--fluid bolus resolved it      Review of Systems:  C: NEGATIVE for fever, chills, change in weight  I: NEGATIVE for worrisome rashes, moles or lesions  E: NEGATIVE for vision changes or irritation  E/M: NEGATIVE for ear, mouth and throat problems  RESP:breathing is better  B: NEGATIVE for masses, tenderness or discharge  CV: NEGATIVE for chest pain, palpitations or peripheral edema  GI: NEGATIVE for nausea, abdominal pain, heartburn, or change in bowel habits  : NEGATIVE for frequency, dysuria, or hematuria  MUSCULOSKELETAL:back pain  N: NEGATIVE for weakness, dizziness or paresthesias  E: NEGATIVE for temperature intolerance, skin/hair changes  H: NEGATIVE for bleeding problems  P: NEGATIVE for changes in mood or affect    Physical Exam:  Vitals Were Reviewed    Patient Vitals for the past 16 hrs:   BP Temp Temp src Pulse Heart Rate Resp SpO2   01/20/17 0146 (!) 177/97 mmHg 97.8  F (36.6  C) Oral - 90 22 96 %   01/20/17 0000 - - - - - - 93 %   01/19/17 2000 175/89 mmHg 97.9  F (36.6  C) Oral - 102 20 93 %   01/19/17 1936 - - - - - - 95 %   01/19/17 1700 - - - - 100 - -   01/19/17 1616 (!) 169/92 mmHg 97.8  F (36.6  C) Oral 103 - 16 93 %   01/19/17 1525 - - - - - - 95 %   01/19/17 1500 - - - - - - 93 %         Intake/Output Summary (Last 24 hours) at 01/20/17 0624  Last data filed at 01/20/17 0400   Gross per 24 hour   Intake   2660 ml   Output   2075 ml   Net    585 ml       GENERAL APPEARANCE: healthy, alert and no distress  EYES: conjunctiva clear, eyes grossly normal  RESP: mild diffuse exp wheezes mostly at lung bases  CV: regular rate and rhythm, normal S1 S2, no S3 or S4 and no murmur, click or rub   ABDOMEN: soft, nontender, no HSM or masses and bowel sounds normal  MS: no clubbing, cyanosis; no " edema  SKIN: clear without significant rashes or lesions    Lab:  Recent Labs   Lab Test  01/19/17   1325  01/19/17   0620   01/18/17   0635   NA   --   145*   --   151*   POTASSIUM  4.3  3.3*   < >  3.3*   CHLORIDE   --   110*   --   117*   CO2   --   24   --   24   ANIONGAP   --   11   --   10   GLC   --   93   --   117*   BUN   --   12   --   8   CR   --   0.59*   --   0.69   JESSEE   --   7.9*   --   7.9*    < > = values in this interval not displayed.     CBC RESULTS:   Recent Labs   Lab Test  01/19/17   1325  01/19/17   0620  01/18/17   0635   WBC   --   13.1*  15.3*   RBC   --   4.25*  4.67   HGB  13.8  13.2*  14.6   HCT   --   40.2  43.7   PLT   --   220  244       Results for orders placed or performed during the hospital encounter of 01/17/17 (from the past 24 hour(s))   Potassium   Result Value Ref Range    Potassium 4.3 3.4 - 5.3 mmol/L   Hemoglobin   Result Value Ref Range    Hemoglobin 13.8 13.3 - 17.7 g/dL   Lactic acid level STAT   Result Value Ref Range    Lactic Acid 2.8 (H) 0.7 - 2.1 mmol/L   Lactic acid whole blood   Result Value Ref Range    Lactic Acid 2.0 0.7 - 2.1 mmol/L       Assessment and Plan:    Sepsis secondary to right lower lobe community-acquired pneumonia:  He does have some tachycardia, lactic acid elevation, and elevated white count.  We will hydrate with 3 liters.  Start him on Rocephin and Zithromax.  Because of the sudden fever and cough at this season, we will start him on Tamiflu empirically for possible influenza-like illness.  CT confirmed the RLL pneumonia.   January 18, 2017 no lactic ordered this am, last was 2.3, don't feel this mandates repeat, low grade temp, wbc on steroids is 15.3    January 19, 2017 procalcitonin 61, afebrile, on room air,  wbc 13.1  January 20, 2017 mildly elevated lactic last evening corrected with fluid bolus, afebrile, wbc 12.6    Hypoxemic respiratory failure secondary to pneumonia:  There may be some chronic obstructive pulmonary disease as  "well, and therefore we will treat with steroid (he got 60 mg in the ER).    January 18, 2017 two liters nc sat 96%  January 20, 2017 on room air    Reports of dark stool  January 20, 2017 \"dark\" stool yesterday, hgb was stable yesterday, protonix started, but no stool kept for guiac, hgb 12.7 after fluid bolus, change to bid oral protonix and stop lovenox       Low back pain, with chronic narcotic use, possible narcotic withdrawal symptoms:  He had been on 60 mg of MS Contin t.i.d. up until three weeks ago, and then was switched to 30 mg t.i.d.  He states he has not taken any for the last two days because he was too sick, and insists that he has not run out or taken more than he was supposed to.  There is some concern about narcotic withdrawal with the general tremors, his mental status being slightly off, having a hard time concentrating, and these shakes.  We will increase his narcotic acutely with Dilaudid, but also get him back on his MS Contin.     Metabolic acidosis:  He has a bicarbonate of 17.  No VBG was done in the ED, and we will get that now.  It appears to be a non-anion gap, with an anion gap of only 13, but he certainly has an elevated lactic acid.  We will recheck VBG and lactic acid in the next half an hour.  January 18, 2017 co2 has normalized    Severe hypertension:  Needs more evaluation at this point.  He is alert and oriented, so at this point I believe this is asymptomatic.  However, because of his underlying narcotic use and his abnormal affect, we will go forward with a CT scan.  We will also use labetalol acutely to get the blood pressure down while we continue further investigations.  January 18, 2017 bp better, ct normal except for improved sinus ds.  January 19, 2017 normal to mild elevation      Suspect narcotic withdrawal syndrome:    -he is moaning, shaky, slightly agitated.  Caregiver thinks she has seen some withdrawal in the past.  He stopped MS contin 2 days ago because he felt " sick.  He insists he still has some at home and didn't use more than prescribed but history is questionable.  Will give additional doses of rapid acting dilaudid tonight and see if he improves.    January 18, 2017 back on MS contin , no current sign of withdrawal    Chronic tremor       Tobacco abuse:  We will start him on a Nicoderm patch. .       CODE STATUS:  FULL.    .  Prophylaxis:  Enoxaparin.      Caregiver is Jignesh [phone], [phone]. May need placement per Jignesh, may be unsafe alone.    Continue atb's , steroids, tamiflu  Social service consult  Very high procalcitonin speaks to bacterial etiology and sepsis  Change to oral protonix  Stop lovenox  Probably home tomorrow  Change maintenance iv to half ns

## 2017-01-21 VITALS
HEIGHT: 71 IN | SYSTOLIC BLOOD PRESSURE: 162 MMHG | RESPIRATION RATE: 16 BRPM | BODY MASS INDEX: 35.06 KG/M2 | WEIGHT: 250.44 LBS | TEMPERATURE: 97.8 F | DIASTOLIC BLOOD PRESSURE: 85 MMHG | OXYGEN SATURATION: 95 % | HEART RATE: 70 BPM

## 2017-01-21 LAB
ALBUMIN SERPL-MCNC: 2.9 G/DL (ref 3.4–5)
ALP SERPL-CCNC: 49 U/L (ref 40–150)
ALT SERPL W P-5'-P-CCNC: 39 U/L (ref 0–70)
ANION GAP SERPL CALCULATED.3IONS-SCNC: 10 MMOL/L (ref 3–14)
AST SERPL W P-5'-P-CCNC: 23 U/L (ref 0–45)
BILIRUB SERPL-MCNC: 0.4 MG/DL (ref 0.2–1.3)
BUN SERPL-MCNC: 8 MG/DL (ref 7–30)
CALCIUM SERPL-MCNC: 8.2 MG/DL (ref 8.5–10.1)
CHLORIDE SERPL-SCNC: 110 MMOL/L (ref 94–109)
CO2 SERPL-SCNC: 24 MMOL/L (ref 20–32)
CREAT SERPL-MCNC: 0.59 MG/DL (ref 0.66–1.25)
ERYTHROCYTE [DISTWIDTH] IN BLOOD BY AUTOMATED COUNT: 15.2 % (ref 10–15)
GFR SERPL CREATININE-BSD FRML MDRD: ABNORMAL ML/MIN/1.7M2
GLUCOSE SERPL-MCNC: 104 MG/DL (ref 70–99)
HCT VFR BLD AUTO: 38.1 % (ref 40–53)
HGB BLD-MCNC: 12.8 G/DL (ref 13.3–17.7)
MCH RBC QN AUTO: 31.7 PG (ref 26.5–33)
MCHC RBC AUTO-ENTMCNC: 33.6 G/DL (ref 31.5–36.5)
MCV RBC AUTO: 94 FL (ref 78–100)
PLATELET # BLD AUTO: 190 10E9/L (ref 150–450)
POTASSIUM SERPL-SCNC: 3.6 MMOL/L (ref 3.4–5.3)
PROT SERPL-MCNC: 6 G/DL (ref 6.8–8.8)
RBC # BLD AUTO: 4.04 10E12/L (ref 4.4–5.9)
SODIUM SERPL-SCNC: 144 MMOL/L (ref 133–144)
WBC # BLD AUTO: 9.9 10E9/L (ref 4–11)

## 2017-01-21 PROCEDURE — 99239 HOSP IP/OBS DSCHRG MGMT >30: CPT | Performed by: FAMILY MEDICINE

## 2017-01-21 PROCEDURE — 25000132 ZZH RX MED GY IP 250 OP 250 PS 637: Mod: GY | Performed by: FAMILY MEDICINE

## 2017-01-21 PROCEDURE — A9270 NON-COVERED ITEM OR SERVICE: HCPCS | Mod: GY | Performed by: FAMILY MEDICINE

## 2017-01-21 PROCEDURE — 80053 COMPREHEN METABOLIC PANEL: CPT | Performed by: FAMILY MEDICINE

## 2017-01-21 PROCEDURE — 36415 COLL VENOUS BLD VENIPUNCTURE: CPT | Performed by: FAMILY MEDICINE

## 2017-01-21 PROCEDURE — 25000125 ZZHC RX 250: Performed by: FAMILY MEDICINE

## 2017-01-21 PROCEDURE — 85027 COMPLETE CBC AUTOMATED: CPT | Performed by: FAMILY MEDICINE

## 2017-01-21 PROCEDURE — 40000275 ZZH STATISTIC RCP TIME EA 10 MIN

## 2017-01-21 RX ORDER — OXYCODONE AND ACETAMINOPHEN 5; 325 MG/1; MG/1
1 TABLET ORAL EVERY 6 HOURS PRN
Qty: 8 TABLET | Refills: 0 | Status: SHIPPED | OUTPATIENT
Start: 2017-01-21 | End: 2018-02-26

## 2017-01-21 RX ORDER — MORPHINE SULFATE 30 MG/1
30 TABLET, FILM COATED, EXTENDED RELEASE ORAL 3 TIMES DAILY
Qty: 6 TABLET | Refills: 0 | Status: SHIPPED | OUTPATIENT
Start: 2017-01-21 | End: 2018-02-26

## 2017-01-21 RX ORDER — LEVOFLOXACIN 500 MG/1
500 TABLET, FILM COATED ORAL DAILY
Qty: 7 TABLET | Refills: 0 | Status: SHIPPED | OUTPATIENT
Start: 2017-01-21 | End: 2017-01-30

## 2017-01-21 RX ADMIN — TIZANIDINE 8 MG: 4 TABLET ORAL at 08:10

## 2017-01-21 RX ADMIN — PREDNISONE 40 MG: 20 TABLET ORAL at 08:06

## 2017-01-21 RX ADMIN — DULOXETINE HYDROCHLORIDE 120 MG: 30 CAPSULE, DELAYED RELEASE PELLETS ORAL at 08:07

## 2017-01-21 RX ADMIN — MORPHINE SULFATE 30 MG: 30 TABLET, EXTENDED RELEASE ORAL at 08:06

## 2017-01-21 RX ADMIN — OSELTAMIVIR PHOSPHATE 75 MG: 75 CAPSULE ORAL at 08:05

## 2017-01-21 RX ADMIN — PREGABALIN 100 MG: 100 CAPSULE ORAL at 08:05

## 2017-01-21 RX ADMIN — PANTOPRAZOLE SODIUM 40 MG: 40 TABLET, DELAYED RELEASE ORAL at 07:10

## 2017-01-21 RX ADMIN — OXYCODONE HYDROCHLORIDE AND ACETAMINOPHEN 1 TABLET: 5; 325 TABLET ORAL at 01:04

## 2017-01-21 RX ADMIN — NICOTINE 1 PATCH: 21 PATCH, EXTENDED RELEASE TRANSDERMAL at 08:07

## 2017-01-21 ASSESSMENT — PAIN DESCRIPTION - DESCRIPTORS: DESCRIPTORS: DISCOMFORT

## 2017-01-21 NOTE — DISCHARGE INSTRUCTIONS
Resume your regular meds.  Take levaquin 500 mg daily for seven days. Rx sent to [facility].  Follow up with your regular doctor late this next week.  Return to the ER if problems.

## 2017-01-21 NOTE — PHARMACY - DISCHARGE MEDICATION RECONCILIATION
Discharge medication review for this patient is complete. Pharmacist assisted with medication reconciliation of discharge medications with prior to admission medications.     The following changes were made to the discharge medication list based on pharmacist review:  Added:  None  Discontinued: None  Changed: None.  Discussed use of levofloxacin in conjunction with Cymbalta and Trazodone with Dr. Clark.  We feel that the other options for treatment are not acceptable for this patient at this time and will continue levofloxacin as prescribed.      Patient's Discharge Medication List  - medications as listed on After Visit Summary (AVS)     Review of your medicines      START taking       Dose / Directions    levofloxacin 500 MG tablet   Commonly known as:  LEVAQUIN   Used for:  Community acquired pneumonia        Dose:  500 mg   Take 1 tablet (500 mg) by mouth daily   Quantity:  7 tablet   Refills:  0         CONTINUE these medicines which may have CHANGED, or have new prescriptions. If we are uncertain of the size of tablets/capsules you have at home, strength may be listed as something that might have changed.       Dose / Directions    * MS CONTIN PO   This may have changed:  Another medication with the same name was added. Make sure you understand how and when to take each.        Dose:  30 mg   Take 30 mg by mouth 3 times daily 15 mg X 2 tabs.  AM, 1200, bedtime   Refills:  0       * morphine 30 MG 12 hr tablet   Commonly known as:  MS CONTIN   This may have changed:  You were already taking a medication with the same name, and this prescription was added. Make sure you understand how and when to take each.   Used for:  Spinal stenosis in cervical region        Dose:  30 mg   Take 1 tablet (30 mg) by mouth 3 times daily   Quantity:  6 tablet   Refills:  0       naproxen 500 MG tablet   Commonly known as:  NAPROSYN   This may have changed:    - when to take this  - reasons to take this   Used for:  Chronic pain  syndrome        Dose:  500 mg   Take 1 tablet (500 mg) by mouth 2 times daily (with meals)   Quantity:  180 tablet   Refills:  3       * oxyCODONE-acetaminophen 5-325 MG per tablet   Commonly known as:  PERCOCET   This may have changed:  Another medication with the same name was added. Make sure you understand how and when to take each.        Dose:  1-2 tablet   Take 1-2 tablets by mouth every 6 hours as needed for moderate to severe pain   Quantity:  24 tablet   Refills:  0       * oxyCODONE-acetaminophen 5-325 MG per tablet   Commonly known as:  PERCOCET   This may have changed:  You were already taking a medication with the same name, and this prescription was added. Make sure you understand how and when to take each.   Used for:  Spinal stenosis in cervical region        Dose:  1 tablet   Take 1 tablet by mouth every 6 hours as needed for moderate to severe pain   Quantity:  8 tablet   Refills:  0       simvastatin 80 MG tablet   Commonly known as:  ZOCOR   This may have changed:  when to take this   Used for:  Hyperlipidemia LDL goal <130        Dose:  80 mg   Take 1 tablet (80 mg) by mouth every morning   Quantity:  90 tablet   Refills:  1       * traZODone 50 MG tablet   Commonly known as:  DESYREL   This may have changed:  additional instructions   Used for:  Depression        Dose:  25 mg   Take 0.5 tablets (25 mg) by mouth nightly as needed for sleep   Quantity:  45 tablet   Refills:  3       * traZODone 100 MG tablet   Commonly known as:  DESYREL   This may have changed:  additional instructions   Used for:  Depression        Dose:  200 mg   Take 2 tablets (200 mg) by mouth nightly as needed for sleep   Quantity:  180 tablet   Refills:  3       * Notice:  This list has 6 medication(s) that are the same as other medications prescribed for you. Read the directions carefully, and ask your doctor or other care provider to review them with you.      CONTINUE these medicines which have NOT CHANGED       Dose /  Directions    albuterol 108 (90 BASE) MCG/ACT Inhaler   Commonly known as:  albuterol        Dose:  2 puff   Inhale 2 puffs into the lungs every 4 hours as needed for shortness of breath / dyspnea or wheezing   Quantity:  1 Inhaler   Refills:  0       DULoxetine 60 MG EC capsule   Commonly known as:  CYMBALTA   Used for:  Depression        Dose:  120 mg   Take 2 capsules (120 mg) by mouth daily ---Discuss further refills @ appt next week---   Quantity:  180 capsule   Refills:  3       lidocaine 5 % ointment   Commonly known as:  XYLOCAINE   Used for:  Pain        Apply topically as needed for moderate pain Apply 2 grams to affected PRN up to 4x daily. [phone] Pharmacy #.   Quantity:  744.24 g   Refills:  0       * LYRICA PO        Dose:  100 mg   Take 100 mg by mouth every morning   Refills:  0       * LYRICA PO        Dose:  150 mg   Take 150 mg by mouth At Bedtime   Refills:  0       order for DME   Used for:  Other unspecified back disorder, Obese, Lumbosacral radiculitis, COPD (chronic obstructive pulmonary disease) (H)        Semi electric hospital bed with side rails and mattress. Length of bed is for lifetime   Quantity:  1 Device   Refills:  0       * TIZANIDINE HCL PO        Dose:  8 mg   Take 8 mg by mouth 2 times daily Am and 1200   Refills:  0       * TIZANIDINE HCL PO        Dose:  16 mg   Take 16 mg by mouth At Bedtime   Refills:  0       * Notice:  This list has 4 medication(s) that are the same as other medications prescribed for you. Read the directions carefully, and ask your doctor or other care provider to review them with you.         Where to get your medicines      These medications were sent to [facility] - [place], [place] - [address]  [address] [address]     Phone:  [phone]    - levofloxacin 500 MG tablet      Some of these will need a paper prescription and others can be bought over the counter. Ask your nurse if you have questions.     Bring a paper  prescription for each of these medications    - morphine 30 MG 12 hr tablet  - oxyCODONE-acetaminophen 5-325 MG per tablet

## 2017-01-21 NOTE — PROGRESS NOTES
"Melquiades feels ready to leave, ate 100 % of breakfast. We talked about smoking cessation, pt states\" I will try\". Lungs are clear, no wheezing. Refused Nebulizer. Pain is diminished. Has a rare non productive cough. VSS, afebrile. Pt has prescriptions and is aware of upcoming appointment.  "

## 2017-01-21 NOTE — PROGRESS NOTES
Patient refused receiving any more additional fluid via IV until he is seen by a doctor due to the cost of the IV fluids and his concern over the cost. He believes he will be discharged today and doesn't want extra charges until he talks with the MD.    Patient was advised because of his elevated lactic acid and infection it was best to continue to fluids. He expressed understanding but still is refusing any addition IV fluids at this time.

## 2017-01-21 NOTE — DISCHARGE SUMMARY
HISTORY OF PRESENT ILLNESS:  Melquiades Morales is a 61-year-old male with chronic low back pain, chronic narcotic usage, COPD and tobacco use.  He is followed by [facility] who prescribes his chronic narcotics.  He became sick 2 days prior to admission, initially with head cold symptoms with nasal congestion, coryza, sore throat and chills.  He became unable to take his narcotics.  He developed a productive cough and had increasing shortness of breath.  He developed some diarrhea and anorexia and had some loose bowel movements and was brought to the emergency room for evaluation.  Upon admission, the patient was evaluated and found to have a white count of 15,000.  His CO2 was 17, lactic acid was elevated at 3.8.  His glucose was 259.  He had a right basilar infiltrate.  It was thought that he had sepsis related to community-acquired pneumonia.  He was hydrated vigorously and put on Rocephin and Zithromax.  He had acute hypoxic respiratory failure and needed oxygen.      It was thought that he probably has some narcotic withdrawal since he had missed narcotic dosing.  He was quite tremulous.  He does have a chronic tremor but I believe it was somewhat exacerbated.      He had some initial metabolic acidosis related to his illness.  Initially he was quite hypertensive.  Because of the hypertension, a CT of his head was done which was normal.  His hypertension was treated with IV labetalol.      Eventually the patient's procalcitonin came back at 61 which is very high and I think this is somewhat suggestive of sepsis.  The patient slowly improved.  His oxygen need went away.  His blood pressure improved, his breathing improved.  His general status improved.      He did have 1 reported dark stool.  This was flushed down the toilet and I was unable to see you or test.  I did put him on Protonix briefly, but I am not convinced that this was significant.      In terms of his back pain and he is fairly debilitated  from this is narcotics were restarted.  He apparently has a PCA.  Social Service was involved and ultimately determined that he probably could go back to his previous living situation.      He initially had a lot of shakiness and tremor.  He has some chronic tremor.  This improved over time since his narcotics were restarted.      His blood sugar was elevated initially, but then normalized and his fasting blood sugars were running about 104 at the time of discharge, so I do not see any sign of ongoing problems.  I assume this was a reaction due to sepsis.      ASSESSMENT:   1.  Sepsis due to community-acquired pneumonia in the right lower lobe.   2.  Acute hypoxic respiratory failure secondary to pneumonia.   3.  Chronic low back pain with chronic narcotic use -- some narcotic withdrawal symptoms related to not taking narcotics.   4.  Metabolic acidosis, resolved.   5.  Severe hypertension initially.   6.  Initial hyperglycemia with subsequent normal blood sugars.      PLAN:  The patient is going to discharge on his regular meds plus Levaquin 500 mg daily for 7 days, Avastin to see his primary doctor with 1 week.     Discharge Medication List as of 1/21/2017  9:46 AM      START taking these medications    Details   levofloxacin (LEVAQUIN) 500 MG tablet Take 1 tablet (500 mg) by mouth daily, Disp-7 tablet, R-0, E-Prescribe         CONTINUE these medications which have CHANGED    Details   !! morphine (MS CONTIN) 30 MG 12 hr tablet Take 1 tablet (30 mg) by mouth 3 times daily, Disp-6 tablet, R-0, Local Print      !! oxyCODONE-acetaminophen (PERCOCET) 5-325 MG per tablet Take 1 tablet by mouth every 6 hours as needed for moderate to severe pain, Disp-8 tablet, R-0, Local Print       !! - Potential duplicate medications found. Please discuss with provider.      CONTINUE these medications which have NOT CHANGED    Details   !! Morphine Sulfate (MS CONTIN PO) Take 30 mg by mouth 3 times daily 15 mg X 2 tabs.  AM, 1200,  bedtime, Historical      !! Pregabalin (LYRICA PO) Take 150 mg by mouth At Bedtime, Historical      simvastatin (ZOCOR) 80 MG tablet Take 1 tablet (80 mg) by mouth every morning, Disp-90 tablet, R-1, E-Prescribe      !! oxyCODONE-acetaminophen (PERCOCET) 5-325 MG per tablet Take 1-2 tablets by mouth every 6 hours as needed for moderate to severe pain, Disp-24 tablet, R-0, Local Print      albuterol (ALBUTEROL) 108 (90 BASE) MCG/ACT inhaler Inhale 2 puffs into the lungs every 4 hours as needed for shortness of breath / dyspnea or wheezing, Disp-1 Inhaler, R-0, E-Prescribe      DULoxetine (CYMBALTA) 60 MG capsule Take 2 capsules (120 mg) by mouth daily ---Discuss further refills @ appt next week---, Disp-180 capsule, R-3, E-Prescribe      !! traZODone (DESYREL) 50 MG tablet Take 0.5 tablets (25 mg) by mouth nightly as needed for sleep, Disp-45 tablet, R-3, E-Prescribe      !! traZODone (DESYREL) 100 MG tablet Take 2 tablets (200 mg) by mouth nightly as needed for sleep, Disp-180 tablet, R-3, E-Prescribe      !! Pregabalin (LYRICA PO) Take 100 mg by mouth every morning , Historical      !! TIZANIDINE HCL PO Take 8 mg by mouth 2 times daily Am and 1200, Historical      !! TIZANIDINE HCL PO Take 16 mg by mouth At Bedtime, Historical      ORDER FOR [facility] bed with side rails and mattress. Length of bed is for lifetimeDisp-1 Device, R-0, Normal      lidocaine (XYLOCAINE) 5 % ointment Apply topically as needed for moderate pain Apply 2 grams to affected PRN up to 4x daily.  [phone] Pharmacy #.Disp-744.24 g, R-0Fax      naproxen (NAPROSYN) 500 MG tablet Take 1 tablet (500 mg) by mouth 2 times daily (with meals), Disp-180 tablet, R-3, E-Prescribe       !! - Potential duplicate medications found. Please discuss with provider.        Unresulted Labs Ordered in the Past 30 Days of this Admission     No orders found from 11/19/2016 to 1/18/2017.                Greater than 30 minutes was spent on this.          ADALBERTO EUBANKS MD             D: 2017 07:04   T: 2017 07:56   MT: [id number]      Name:     DIOGO MCCLELLAN   MRN:      [id number]        Account:        [id number]   :      1955           Admit Date:     [id number]                                  Discharge Date:       Document: [id number]

## 2017-01-21 NOTE — PROGRESS NOTES
[facility]ist Service      Subjective:  Feels much better  No sob    Review of Systems:  C: NEGATIVE for fever, chills, change in weight  E/M: NEGATIVE for ear, mouth and throat problems  R: NEGATIVE for significant cough or SOB  CV: NEGATIVE for chest pain, palpitations or peripheral edema    Physical Exam:  Vitals Were Reviewed    Patient Vitals for the past 16 hrs:   BP Temp Temp src Pulse Resp SpO2   01/21/17 0622 (!) 179/97 mmHg 97.8  F (36.6  C) Oral 70 18 95 %   01/21/17 0103 160/88 mmHg - - - - -   01/21/17 0100 160/88 mmHg 97.7  F (36.5  C) Oral 70 20 94 %   01/20/17 2030 178/82 mmHg 97.5  F (36.4  C) Oral 86 - -   01/20/17 2016 - - - - - 95 %   01/20/17 1900 - - - - - 94 %   01/20/17 1800 - - - - - 94 %   01/20/17 1730 - - - - - 95 %   01/20/17 1700 - - - - - 92 %   01/20/17 1553 - - - - - 92 %   01/20/17 1540 167/80 mmHg 98  F (36.7  C) Oral 78 20 91 %         Intake/Output Summary (Last 24 hours) at 01/21/17 0623  Last data filed at 01/21/17 0600   Gross per 24 hour   Intake   4320 ml   Output   3400 ml   Net    920 ml       GENERAL APPEARANCE: healthy, alert and no distress  EYES: conjunctiva clear, eyes grossly normal  RESP: lungs clear to auscultation - no rales, rhonchi or wheezes  CV: regular rate and rhythm, normal S1 S2, no S3 or S4 and no murmur, click or rub   ABDOMEN: soft, nontender, no HSM or masses and bowel sounds normal  MS: no clubbing, cyanosis; no edema  SKIN: clear without significant rashes or lesions    Lab:  Recent Labs   Lab Test  01/20/17   0620  01/19/17   1325  01/19/17   0620   NA  147*   --   145*   POTASSIUM  3.5  4.3  3.3*   CHLORIDE  112*   --   110*   CO2  28   --   24   ANIONGAP  7   --   11   GLC  101*   --   93   BUN  9   --   12   CR  0.63*   --   0.59*   JESSEE  8.1*   --   7.9*     CBC RESULTS:   Recent Labs   Lab Test  01/20/17   0620  01/19/17   1325  01/19/17   0620   WBC  12.6*   --   13.1*   RBC  4.06*   --   4.25*   HGB  12.7*  13.8  13.2*   HCT   "38.3*   --   40.2   PLT  194   --   220       No results found for this or any previous visit (from the past 24 hour(s)).    Assessment and Plan:    Sepsis secondary to right lower lobe community-acquired pneumonia:  He does have some tachycardia, lactic acid elevation, and elevated white count.  We will hydrate with 3 liters.  Start him on Rocephin and Zithromax.  Because of the sudden fever and cough at this season, we will start him on Tamiflu empirically for possible influenza-like illness.  CT confirmed the RLL pneumonia.   January 18, 2017 no lactic ordered this am, last was 2.3, don't feel this mandates repeat, low grade temp, wbc on steroids is 15.3    January 19, 2017 procalcitonin 61, afebrile, on room air,  wbc 13.1  January 20, 2017 mildly elevated lactic last evening corrected with fluid bolus, afebrile, wbc 12.6  January 21, 2017 afebrile    Hypoxemic respiratory failure secondary to pneumonia:  There may be some chronic obstructive pulmonary disease as well, and therefore we will treat with steroid (he got 60 mg in the ER).    January 18, 2017 two liters nc sat 96%  January 20, 2017 on room air    Reports of dark stool  January 20, 2017 \"dark\" stool yesterday, hgb was stable yesterday, protonix started, but no stool kept for guiac, hgb 12.7 after fluid bolus, change to bid oral protonix and stop lovenox   January 21, 2017 no real proof this was bloody       Low back pain, with chronic narcotic use, possible narcotic withdrawal symptoms:  He had been on 60 mg of MS Contin t.i.d. up until three weeks ago, and then was switched to 30 mg t.i.d.  He states he has not taken any for the last two days because he was too sick, and insists that he has not run out or taken more than he was supposed to.  There is some concern about narcotic withdrawal with the general tremors, his mental status being slightly off, having a hard time concentrating, and these shakes.  We will increase his narcotic acutely with " Dilaudid, but also get him back on his MS Contin.     Metabolic acidosis:  He has a bicarbonate of 17.  No VBG was done in the ED, and we will get that now.  It appears to be a non-anion gap, with an anion gap of only 13, but he certainly has an elevated lactic acid.  We will recheck VBG and lactic acid in the next half an hour.  January 18, 2017 co2 has normalized    Severe hypertension:  Needs more evaluation at this point.  He is alert and oriented, so at this point I believe this is asymptomatic.  However, because of his underlying narcotic use and his abnormal affect, we will go forward with a CT scan.  We will also use labetalol acutely to get the blood pressure down while we continue further investigations.  January 18, 2017 bp better, ct normal except for improved sinus ds.  January 19, 2017 normal to mild elevation      Suspect narcotic withdrawal syndrome:    -he is moaning, shaky, slightly agitated.  Caregiver thinks she has seen some withdrawal in the past.  He stopped MS contin 2 days ago because he felt sick.  He insists he still has some at home and didn't use more than prescribed but history is questionable.  Will give additional doses of rapid acting dilaudid tonight and see if he improves.    January 18, 2017 back on MS contin , no current sign of withdrawal    Chronic tremor       Tobacco abuse:  We will start him on a Nicoderm patch. .       CODE STATUS:  FULL.    .  Prophylaxis:  Enoxaparin.      Caregiver is Jignesh [phone], [phone].   Pt per social service eval thought to be appropriate for dc to home  Will dc

## 2017-01-23 ENCOUNTER — CARE COORDINATION (OUTPATIENT)
Dept: CARE COORDINATION | Facility: CLINIC | Age: 62
End: 2017-01-23

## 2017-01-23 LAB
BACTERIA SPEC CULT: NORMAL
BACTERIA SPEC CULT: NORMAL
Lab: NORMAL
Lab: NORMAL
MICRO REPORT STATUS: NORMAL
MICRO REPORT STATUS: NORMAL
SPECIMEN SOURCE: NORMAL
SPECIMEN SOURCE: NORMAL

## 2017-01-23 NOTE — Clinical Note
[facility]  [address]  [facility] [address]  Phone: [phone]    January 23, 2017      Melquiades Morales  [address] [place] [address]    Dear Melquiades,  I am the Clinic Care Coordinator that works with your primary care provider's clinic. I wanted to thank you for spending the time to talk with me.  Below is a description of what Clinic Care Coordination is and how I can further assist you.     The Clinic Care Coordinator role is a Registered Nurse and/or  who understands the health care system. The goal of Clinic Care Coordination is to help you manage your health and improve access to the [facility] in the most efficient manner.  The Registered Nurse can assist you in meeting your health care goals by providing education, coordinating services, and strengthening the communication among your providers. The  can assist you with financial, behavioral, psychosocial, and chemical dependency and counseling/psychiatric resources.    Please feel free to keep this letter and contact information to contact me at [phone], [phone] with any further questions or concerns that may arise. We at [facility] are focused on providing you with the highest-quality healthcare experience possible and that all starts with you.       Sincerely,     Girish Navarrete RN  Clinic Care Coordinator    Enclosed: I have enclosed a copy of a 24 Hour Access Plan. This has helpful phone numbers for you to call when needed. Please keep this in an easy to access place to use as needed.

## 2017-01-23 NOTE — Clinical Note
Health Care Home - Access Care Plan    About Me  Patient Name:  Melquiades Mcclellan    YOB: 1955  Age:                            61 year old   [place] MRN:         [id number] Telephone Information:     Home Phone [phone]   Mobile [phone]       Address:    [address] [place] [address] Email address:  No e-mail address on record      Emergency Contact(s)  Name Relationship Lgl Grd Work Phone Home Phone Mobile Phone   1. TABATHA HARDWICK Other    [phone]   2. STEFANIE MCCLELLAN* Spouse   [phone]    3. SHAN MCPHERSON Son   [phone]    4. AMRIT MCPHERSON Son   [phone]             Health Maintenance:      My Access Plan  Medical Emergency 911   Questions or concerns during clinic hours Primary Clinic Line, I will call the clinic directly: Primary Clinic: [facility]- [phone]   24 Hour Appointment Line [phone] or  [phone])  (toll free)   24 Hour Nurse Line [phone] (toll free)   Questions or concerns outside clinic hours 24 Hour Appointment Line, I will call the after-hours on-call line:   [facility] [phone] or [phone] ([phone]) (toll-free)   Preferred Urgent Care Preferred Urgent Care: [facility], [phone]   Preferred Hospital Preferred Hospital: [place], [place]  [phone]   Preferred Pharmacy [facility] - [address]     Behavioral Health Crisis Line [facility], [phone] or 911     My Care Team Members  Patient Care Team       Relationship Specialty Notifications Start End    Tabatha Travis MD PCP - General   3/16/06     Phone: [phone] Fax: [phone]         [facility] [address] [facility] [address]    Hubert Byrd MD Referring Physician ENT  9/17/15     Comment:  REFERRED TO ENT FOR NASAL POLYPS    Phone: [phone] Pager: [phone] Fax: [phone]        XXX  RESIGNED XXX [address] [facility] [address]    Jessie Smith MD MD Otolaryngology  9/17/15     Phone: [phone] Fax: [phone]          [facility] [address] [facility] [address] [facility] [address]        My Medical and Care Information  Problem List   Patient Active Problem List   Diagnosis     BACK DISORDER NOS     TOBACCO USE DISORDER     Major depressive disorder, single episode, severe (H)     Obese     Erectile dysfunction     COPD (chronic obstructive pulmonary disease) (H)     Migraine headache     Hyperlipidemia LDL goal <130     Lumbosacral radiculitis     Health Care Home     Advanced directives, counseling/discussion     Non-specific colitis     Hypokalemia     Anxiety     Dehydration     Chronic maxillary sinusitis     Nasal polyp     Benign neoplasm of colon     Encephalopathy     Chronic pain     Spinal stenosis in cervical region     Inverted papilloma of nasal cavity     Lumbar radiculopathy     CAP (community acquired pneumonia)      Current Medications and Allergies:  See printed Medication Report

## 2017-01-23 NOTE — PROGRESS NOTES
Clinic Care Coordination Contact  OUTREACH    Referral Information:  Referral Source: IP/TCU Report  Reason for Contact: post hospital     Clinical Concerns:  Current Medical Concerns: Patient reports watery stools since discharge to home.  Patient taking imodium currently. Patient describes stool as watery and black last bout this morning. Patient had follow up appointment with PCP scheduled, but will need to reschedule due to appointment with pain clinic same day. No other concerns at this time.   Current Behavioral Concerns: none    Education Provided to patient: increased fluid intake   Clinical Pathway Name: None    Medication Management:  Patient reported no medication questions or concerns at this time. Patient is adherent.        Functional Status:     Equipment Currently Used at Home: cane, straight, wheelchair        Psychosocial:  Current living arrangement:: I live in a private home     Plan: 1) Patient will continue to follow treatment plan as directed and follow up with PCP with concerns ongoing.   2) Care Coordination to remain available for future needs. Follow up planned for later this week.    Girish Navarrete RN  Clinic Care Coordinator  [phone] or [phone]

## 2017-01-25 ENCOUNTER — TELEPHONE (OUTPATIENT)
Dept: FAMILY MEDICINE | Facility: CLINIC | Age: 62
End: 2017-01-25

## 2017-01-25 NOTE — TELEPHONE ENCOUNTER
"Reason for Call:  Other prescription    Detailed comments: Pt just left the \"Pain Clinic\" and they took him off of the Zanaflex, so pt is worried he won't be able to sleep, so he wants Dr. Travis to prescribe another med for him to help him sleep - Cj's Phamracy    Phone Number Patient can be reached at: Home number on file [phone] (home)    Best Time: any    Can we leave a detailed message on this number? YES    Call taken on 1/25/2017 at 3:23 PM by Emily Padgett      "

## 2017-01-25 NOTE — TELEPHONE ENCOUNTER
Spoke with pt and informed him that Dr Travis would address his question Monday.  Pt will wait until Monday.KPavelRWOODY

## 2017-01-30 ENCOUNTER — TELEPHONE (OUTPATIENT)
Dept: FAMILY MEDICINE | Facility: CLINIC | Age: 62
End: 2017-01-30

## 2017-01-30 ENCOUNTER — OFFICE VISIT (OUTPATIENT)
Dept: FAMILY MEDICINE | Facility: CLINIC | Age: 62
End: 2017-01-30
Payer: MEDICARE

## 2017-01-30 VITALS
SYSTOLIC BLOOD PRESSURE: 136 MMHG | HEART RATE: 98 BPM | WEIGHT: 250 LBS | DIASTOLIC BLOOD PRESSURE: 90 MMHG | HEIGHT: 71 IN | TEMPERATURE: 98.8 F | BODY MASS INDEX: 35 KG/M2 | RESPIRATION RATE: 18 BRPM | OXYGEN SATURATION: 91 %

## 2017-01-30 DIAGNOSIS — J18.9 CAP (COMMUNITY ACQUIRED PNEUMONIA): Primary | ICD-10-CM

## 2017-01-30 DIAGNOSIS — Z13.9 SCREENING FOR CONDITION: ICD-10-CM

## 2017-01-30 PROCEDURE — 36415 COLL VENOUS BLD VENIPUNCTURE: CPT | Performed by: FAMILY MEDICINE

## 2017-01-30 PROCEDURE — 86704 HEP B CORE ANTIBODY TOTAL: CPT | Performed by: FAMILY MEDICINE

## 2017-01-30 PROCEDURE — 99214 OFFICE O/P EST MOD 30 MIN: CPT | Performed by: FAMILY MEDICINE

## 2017-01-30 NOTE — PROGRESS NOTES
SUBJECTIVE:                                                    Melquiades Morales is a 61 year old male who presents to clinic today for the following health issues:          Hospital Follow-up Visit:    Hospital/Nursing Home/IP Rehab Facility: [facility]  Date of Admission: 1/18/17  Date of Discharge: 1/21/17  Reason(s) for Admission: Pneumonia             Problems taking medications regularly:  None       Medication changes since discharge: None       Problems adhering to non-medication therapy:  None    Summary of hospitalization:  [facility] discharge summary reviewed  Diagnostic Tests/Treatments reviewed.  Follow up needed: none  Other Healthcare Providers Involved in Patient s Care:         None  Update since discharge: improved.     Post Discharge Medication Reconciliation: discharge medications reconciled, continue medications without change.  Plan of care communicated with patient     Coding guidelines for this visit:  Type of Medical   Decision Making Face-to-Face Visit       within 7 Days of discharge Face-to-Face Visit        within 14 days of discharge   Moderate Complexity 77997 [id number]   High Complexity 32166 [id number]                Problem list and histories reviewed & adjusted, as indicated.  Additional history:     OBJECTIVE:  LUNGS: clear to auscultation, normal breath sounds  CV: RRR without murmur  ABD: BS+, soft, nontender, no masses, no hepatosplenomegaly  EXTREMITIES: without joint tenderness, swelling or erythema.  No muscle tenderness or abnormality.  SKIN: No rashes or abnormalities  NEURO:non focal exam    ASSESSMENT:     CAP (community acquired pneumonia)  Screening for condition    PLAN:  Orders Placed This Encounter     Hepatitis B core antibody

## 2017-01-30 NOTE — MR AVS SNAPSHOT
After Visit Summary   1/30/2017    Melquiades Morales    MRN: [id number]           Patient Information     Date Of Birth          1955        Visit Information        Provider Department      1/30/2017 7:00 AM Jignesh Travis MD [facility]        Today's Diagnoses     CAP (community acquired pneumonia)    -  1     Screening for condition           Care Instructions          Thank you for choosing [facility].  You may be receiving a survey in the mail from [facility] regarding your visit today.  Please take a few minutes to complete and return the survey to let us know how we are doing.      Our Clinic hours are:  Mondays    7:20 am - 7 pm  Tues -  Fri  7:20 am - 5 pm    Clinic Phone: [phone]    The clinic lab opens at 7:30 am Mon - Fri and appointments are required.    [facility]  Ph. [phone]  Monday-Thursday 8 am - 7pm  Tues/Wed/Fri 8 am - 5:30 pm               Follow-ups after your visit        Your next 10 appointments already scheduled     Feb 03, 2017  9:00 AM   Return Visit with Kleber Pollack [facility] ([address] [id number] [phone]            Mar 03, 2017  9:00 AM   Return Visit with Kleber Pollack[address] [address] [phone]              Who to contact     If you have questions or need follow up information about today's clinic visit or your schedule please contact [facility] directly at [phone].  Normal or non-critical lab and imaging results will be communicated to you by MyChart, letter or phone within 4 business days after the clinic has received the results. If you do not hear from us within 7 days, please contact the clinic through MyChart or phone. If you have a critical or abnormal  "lab result, we will notify you by phone as soon as possible.  Submit refill requests through [employer] or call your pharmacy and they will forward the refill request to us. Please allow 3 business days for your refill to be completed.          Additional Information About Your Visit        [employer] Information     [employer] lets you send messages to your doctor, view your test results, renew your prescriptions, schedule appointments and more. To sign up, go to [email or web address] . Click on \"Log in\" on the left side of the screen, which will take you to the Welcome page. Then click on \"Sign up Now\" on the right side of the page.     You will be asked to enter the access code listed below, as well as some personal information. Please follow the directions to create your username and password.     Your access code is: [email or web address]  Expires: 3/5/2017  9:50 AM     Your access code will  in 90 days. If you need help or a new code, please call your [facility] clinic or [phone].        Care EveryWhere ID     This is your Care EveryWhere ID. This could be used by other organizations to access your [facility] medical records  [id number]        Your Vitals Were     Pulse Temperature Respirations Height BMI (Body Mass Index) Pulse Oximetry    98 98.8  F (37.1  C) 18 5' 11\" (1.803 m) 34.88 kg/m2 91%       Blood Pressure from Last 3 Encounters:   17 136/90   17 162/85   16 171/97    Weight from Last 3 Encounters:   17 250 lb (113.399 kg)   17 250 lb 7.1 oz (113.6 kg)   16 251 lb 5.2 oz (114 kg)              We Performed the Following     Hepatitis B core antibody          Today's Medication Changes          These changes are accurate as of: 17  8:55 AM.  If you have any questions, ask your nurse or doctor.               These medicines have changed or have updated prescriptions.        Dose/Directions    naproxen 500 MG tablet   Commonly known as:  NAPROSYN   This may have " changed:    - when to take this  - reasons to take this   Used for:  Chronic pain syndrome        Dose:  500 mg   Take 1 tablet (500 mg) by mouth 2 times daily (with meals)   Quantity:  180 tablet   Refills:  3       simvastatin 80 MG tablet   Commonly known as:  ZOCOR   This may have changed:  when to take this   Used for:  Hyperlipidemia LDL goal <130        Dose:  80 mg   Take 1 tablet (80 mg) by mouth every morning   Quantity:  90 tablet   Refills:  1       * traZODone 50 MG tablet   Commonly known as:  DESYREL   This may have changed:  additional instructions   Used for:  Depression        Dose:  25 mg   Take 0.5 tablets (25 mg) by mouth nightly as needed for sleep   Quantity:  45 tablet   Refills:  3       * traZODone 100 MG tablet   Commonly known as:  DESYREL   This may have changed:  additional instructions   Used for:  Depression        Dose:  200 mg   Take 2 tablets (200 mg) by mouth nightly as needed for sleep   Quantity:  180 tablet   Refills:  3       * Notice:  This list has 2 medication(s) that are the same as other medications prescribed for you. Read the directions carefully, and ask your doctor or other care provider to review them with you.             Primary Care Provider Office Phone # Fax #    Jignesh Travis [phone]       [facility] [id number] [place] [address]        Thank you!     Thank you for choosing [facility]  for your care. Our goal is always to provide you with excellent care. Hearing back from our patients is one way we can continue to improve our services. Please take a few minutes to complete the written survey that you may receive in the mail after your visit with us. Thank you!             Your Updated Medication List - Protect others around you: Learn how to safely use, store and throw away your medicines at www.disposemymeds.org.          This list is accurate as of: 1/30/17  8:55 AM.  Always use your most recent  med list.                   Brand Name Dispense Instructions for use    albuterol 108 (90 BASE) MCG/ACT Inhaler    albuterol    1 Inhaler    Inhale 2 puffs into the lungs every 4 hours as needed for shortness of breath / dyspnea or wheezing       DULoxetine 60 MG EC capsule    CYMBALTA    180 capsule    Take 2 capsules (120 mg) by mouth daily ---Discuss further refills @ appt next week---       lidocaine 5 % ointment    XYLOCAINE    744.24 g    Apply topically as needed for moderate pain Apply 2 grams to affected PRN up to 4x daily. [phone] Pharmacy #.       * LYRICA PO      Take 100 mg by mouth every morning       * LYRICA PO      Take 150 mg by mouth At Bedtime       * MS CONTIN PO      Take 30 mg by mouth 3 times daily 15 mg X 2 tabs.  AM, 1200, bedtime       * morphine 30 MG 12 hr tablet    MS CONTIN    6 tablet    Take 1 tablet (30 mg) by mouth 3 times daily       naproxen 500 MG tablet    NAPROSYN    180 tablet    Take 1 tablet (500 mg) by mouth 2 times daily (with meals)       order for DME     1 Device    Semi electric hospital bed with side rails and mattress. Length of bed is for lifetime       * oxyCODONE-acetaminophen 5-325 MG per tablet    PERCOCET    24 tablet    Take 1-2 tablets by mouth every 6 hours as needed for moderate to severe pain       * oxyCODONE-acetaminophen 5-325 MG per tablet    PERCOCET    8 tablet    Take 1 tablet by mouth every 6 hours as needed for moderate to severe pain       simvastatin 80 MG tablet    ZOCOR    90 tablet    Take 1 tablet (80 mg) by mouth every morning       * traZODone 50 MG tablet    DESYREL    45 tablet    Take 0.5 tablets (25 mg) by mouth nightly as needed for sleep       * traZODone 100 MG tablet    DESYREL    180 tablet    Take 2 tablets (200 mg) by mouth nightly as needed for sleep       * Notice:  This list has 8 medication(s) that are the same as other medications prescribed for you. Read the directions carefully, and ask your doctor or other care  provider to review them with you.

## 2017-01-30 NOTE — Clinical Note
[facility][note 3]  [address]  [facility] [address]  [phone]        January 30, 2017    Regarding:  Melquiades Morales  [address] [address] [address]              To Whom It May Concern;    Melquiades Morales requires a , his scooter and first class seating for air travel due to health conditions.      Sincerely,                Jignesh Travis MD

## 2017-01-30 NOTE — NURSING NOTE
"Initial /90 mmHg  Pulse 98  Temp(Src) 98.8  F (37.1  C)  Resp 18  Ht 5' 11\" (1.803 m)  Wt 250 lb (113.399 kg)  BMI 34.88 kg/m2  SpO2 91% Estimated body mass index is 34.88 kg/(m^2) as calculated from the following:    Height as of this encounter: 5' 11\" (1.803 m).    Weight as of this encounter: 250 lb (113.399 kg). .    Chief Complaint   Patient presents with     Hospital F/U     Joan Cabello CMA    "

## 2017-01-30 NOTE — PATIENT INSTRUCTIONS
Thank you for choosing [facility].  You may be receiving a survey in the mail from [facility] regarding your visit today.  Please take a few minutes to complete and return the survey to let us know how we are doing.      Our Clinic hours are:  Mondays    7:20 am - 7 pm  Tues -  Fri  7:20 am - 5 pm    Clinic Phone: [phone]    The clinic lab opens at 7:30 am Mon - Fri and appointments are required.    [facility]. [phone]  Monday-Thursday 8 am - 7pm  Tues/Wed/Fri 8 am - 5:30 pm

## 2017-01-31 LAB — HBV CORE AB SERPL QL IA: NONREACTIVE

## 2017-01-31 ASSESSMENT — PATIENT HEALTH QUESTIONNAIRE - PHQ9: SUM OF ALL RESPONSES TO PHQ QUESTIONS 1-9: 8

## 2017-02-02 NOTE — PROGRESS NOTES
Clinic Care Coordination Contact  OUTREACH    Referral Information:  Referral Source: IP/TCU Report  Reason for Contact: follow up outreach    Clinical Concerns:  Current Medical Concerns: Patient reports no more watery stools, but now has not had a BM for a few days. No abdominal pain reported. Patient reports this pattern is not unusual for him. Patient reports he is feeling down today, but it is something he just has to deal with. No other concerns reported at this time.          Plan: 1) Patient will continue to follow treatment plan as directed and follow up with PCP with concerns ongoing.   2) Care Coordination to remain available for future needs. Follow up planned for 2 weeks.    Girish Navarrete RN  Clinic Care Coordinator  [phone] or [phone]

## 2017-02-03 ENCOUNTER — OFFICE VISIT (OUTPATIENT)
Dept: PSYCHOLOGY | Facility: CLINIC | Age: 62
End: 2017-02-03
Payer: MEDICARE

## 2017-02-03 DIAGNOSIS — F33.1 MAJOR DEPRESSIVE DISORDER, RECURRENT EPISODE, MODERATE (H): Primary | ICD-10-CM

## 2017-02-03 PROCEDURE — 90834 PSYTX W PT 45 MINUTES: CPT | Performed by: SOCIAL WORKER

## 2017-02-03 ASSESSMENT — ANXIETY QUESTIONNAIRES
2. NOT BEING ABLE TO STOP OR CONTROL WORRYING: MORE THAN HALF THE DAYS
5. BEING SO RESTLESS THAT IT IS HARD TO SIT STILL: SEVERAL DAYS
IF YOU CHECKED OFF ANY PROBLEMS ON THIS QUESTIONNAIRE, HOW DIFFICULT HAVE THESE PROBLEMS MADE IT FOR YOU TO DO YOUR WORK, TAKE CARE OF THINGS AT HOME, OR GET ALONG WITH OTHER PEOPLE: VERY DIFFICULT
3. WORRYING TOO MUCH ABOUT DIFFERENT THINGS: MORE THAN HALF THE DAYS
6. BECOMING EASILY ANNOYED OR IRRITABLE: MORE THAN HALF THE DAYS
GAD7 TOTAL SCORE: 11
7. FEELING AFRAID AS IF SOMETHING AWFUL MIGHT HAPPEN: NOT AT ALL
1. FEELING NERVOUS, ANXIOUS, OR ON EDGE: MORE THAN HALF THE DAYS

## 2017-02-03 ASSESSMENT — PATIENT HEALTH QUESTIONNAIRE - PHQ9: 5. POOR APPETITE OR OVEREATING: MORE THAN HALF THE DAYS
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
## 2017-02-04 ASSESSMENT — ANXIETY QUESTIONNAIRES: GAD7 TOTAL SCORE: 11

## 2017-02-04 ASSESSMENT — PATIENT HEALTH QUESTIONNAIRE - PHQ9: SUM OF ALL RESPONSES TO PHQ QUESTIONS 1-9: 16

## 2017-02-09 ENCOUNTER — TELEPHONE (OUTPATIENT)
Dept: FAMILY MEDICINE | Facility: CLINIC | Age: 62
End: 2017-02-09

## 2017-02-09 NOTE — TELEPHONE ENCOUNTER
Reason for Call:  Form, our goal is to have forms completed with 72 hours, however, some forms may require a visit or additional information.    Type of letter, form or note:  worker's compensation    Who is the form from?: [employer] (if other please explain)    Where did the form come from: form was faxed in    What clinic location was the form placed at?: [facility]    Where the form was placed: [facility]    What number is listed as a contact on the form?: [phone]      Additional comments:  called prior to faxing form - They DO know that Dr. Travis will not address form until he returns to clinic 02/17/17    Call taken on 2/9/2017 at 2:06 PM by Emily Padgett

## 2017-02-22 ENCOUNTER — DOCUMENTATION ONLY (OUTPATIENT)
Dept: FAMILY MEDICINE | Facility: CLINIC | Age: 62
End: 2017-02-22

## 2017-02-22 PROCEDURE — 99080 SPECIAL REPORTS OR FORMS: CPT | Performed by: FAMILY MEDICINE
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
## 2017-03-03 ENCOUNTER — OFFICE VISIT (OUTPATIENT)
Dept: PSYCHOLOGY | Facility: CLINIC | Age: 62
End: 2017-03-03
Payer: MEDICARE

## 2017-03-03 DIAGNOSIS — F33.1 MAJOR DEPRESSIVE DISORDER, RECURRENT EPISODE, MODERATE (H): Primary | ICD-10-CM

## 2017-03-03 DIAGNOSIS — F41.1 GENERALIZED ANXIETY DISORDER: ICD-10-CM

## 2017-03-03 PROCEDURE — 90834 PSYTX W PT 45 MINUTES: CPT | Performed by: SOCIAL WORKER

## 2017-03-03 ASSESSMENT — ANXIETY QUESTIONNAIRES
1. FEELING NERVOUS, ANXIOUS, OR ON EDGE: SEVERAL DAYS
2. NOT BEING ABLE TO STOP OR CONTROL WORRYING: MORE THAN HALF THE DAYS
6. BECOMING EASILY ANNOYED OR IRRITABLE: MORE THAN HALF THE DAYS
7. FEELING AFRAID AS IF SOMETHING AWFUL MIGHT HAPPEN: NOT AT ALL
GAD7 TOTAL SCORE: 10
3. WORRYING TOO MUCH ABOUT DIFFERENT THINGS: MORE THAN HALF THE DAYS
5. BEING SO RESTLESS THAT IT IS HARD TO SIT STILL: SEVERAL DAYS

## 2017-03-03 ASSESSMENT — PATIENT HEALTH QUESTIONNAIRE - PHQ9: 5. POOR APPETITE OR OVEREATING: MORE THAN HALF THE DAYS
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
## 2017-03-03 NOTE — MR AVS SNAPSHOT
"                  MRN:[id number]                      After Visit Summary   3/3/2017    Melquiades Morales    MRN: [id number]           Visit Information        Provider Department      3/3/2017 9:00 AM Kleber Pollack, [facility]      Your next 10 appointments already scheduled     Mar 10, 2017  9:00 AM CST   Return Visit with Kleber Pollack [facility] ([facility])    [address] [place] [address]   [phone]            Mar 24, 2017  9:00 AM CDT   Return Visit with Kleber Pollack [facility] ([facility])    [address] [address]   [phone]            2017  9:00 AM CDT   Return Visit with Kleber Pollack [facility] ([facility])    [address] [place] [address]   [phone]              MyChart Information     [employer] lets you send messages to your doctor, view your test results, renew your prescriptions, schedule appointments and more. To sign up, go to [email or web address] . Click on \"Log in\" on the left side of the screen, which will take you to the Welcome page. Then click on \"Sign up Now\" on the right side of the page.     You will be asked to enter the access code listed below, as well as some personal information. Please follow the directions to create your username and password.     Your access code is: [email or web address]  Expires: 3/5/2017  9:50 AM     Your access code will  in 90 days. If you need help or a new code, please call your [place] clinic or [phone].        Care EveryWhere ID     This is your Care EveryWhere ID. This could be used by other organizations to access your [place] medical records  [id number]        "

## 2017-03-04 ASSESSMENT — PATIENT HEALTH QUESTIONNAIRE - PHQ9: SUM OF ALL RESPONSES TO PHQ QUESTIONS 1-9: 15

## 2017-03-04 ASSESSMENT — ANXIETY QUESTIONNAIRES: GAD7 TOTAL SCORE: 10

## 2017-03-10 ENCOUNTER — OFFICE VISIT (OUTPATIENT)
Dept: PSYCHOLOGY | Facility: CLINIC | Age: 62
End: 2017-03-10
Payer: MEDICARE

## 2017-03-10 DIAGNOSIS — F33.1 MAJOR DEPRESSIVE DISORDER, RECURRENT EPISODE, MODERATE (H): Primary | ICD-10-CM

## 2017-03-10 DIAGNOSIS — F41.1 GENERALIZED ANXIETY DISORDER: ICD-10-CM

## 2017-03-10 PROCEDURE — 90834 PSYTX W PT 45 MINUTES: CPT | Performed by: SOCIAL WORKER

## 2017-03-10 NOTE — MR AVS SNAPSHOT
"                  MRN:[id number]                      After Visit Summary   3/10/2017    Melquiades Morales    MRN: [id number]           Visit Information        Provider Department      3/10/2017 9:00 AM Kleber Pollack, [facility]      Your next 10 appointments already scheduled     Mar 24, 2017  9:00 AM CDT   Return Visit with Kleber Pollack [facility] ([facility])    [address] [place] [address]   [phone]            2017  9:00 AM CDT   Return Visit with Kleber Pollack [facility] ([facility])    [address] [place] [address]   [phone]            2017  9:00 AM CDT   Return Visit with Kleber Pollack [facility] ([address] [address]   [phone]              MyChart Information     [employer] lets you send messages to your doctor, view your test results, renew your prescriptions, schedule appointments and more. To sign up, go to [email or web address] . Click on \"Log in\" on the left side of the screen, which will take you to the Welcome page. Then click on \"Sign up Now\" on the right side of the page.     You will be asked to enter the access code listed below, as well as some personal information. Please follow the directions to create your username and password.     Your access code is: [email or web address]  Expires: 2017  9:53 AM     Your access code will  in 90 days. If you need help or a new code, please call your [place] clinic or [phone].        Care EveryWhere ID     This is your Care EveryWhere ID. This could be used by other organizations to access your [place] medical records  [id number]        "
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
## 2017-03-24 ENCOUNTER — OFFICE VISIT (OUTPATIENT)
Dept: PSYCHOLOGY | Facility: CLINIC | Age: 62
End: 2017-03-24
Payer: MEDICARE

## 2017-03-24 DIAGNOSIS — F33.1 MAJOR DEPRESSIVE DISORDER, RECURRENT EPISODE, MODERATE (H): Primary | ICD-10-CM

## 2017-03-24 DIAGNOSIS — F41.1 GENERALIZED ANXIETY DISORDER: ICD-10-CM

## 2017-03-24 PROCEDURE — 90834 PSYTX W PT 45 MINUTES: CPT | Performed by: SOCIAL WORKER

## 2017-03-24 ASSESSMENT — ANXIETY QUESTIONNAIRES
GAD7 TOTAL SCORE: 10
5. BEING SO RESTLESS THAT IT IS HARD TO SIT STILL: SEVERAL DAYS
1. FEELING NERVOUS, ANXIOUS, OR ON EDGE: MORE THAN HALF THE DAYS
6. BECOMING EASILY ANNOYED OR IRRITABLE: MORE THAN HALF THE DAYS
3. WORRYING TOO MUCH ABOUT DIFFERENT THINGS: MORE THAN HALF THE DAYS
2. NOT BEING ABLE TO STOP OR CONTROL WORRYING: MORE THAN HALF THE DAYS
7. FEELING AFRAID AS IF SOMETHING AWFUL MIGHT HAPPEN: NOT AT ALL

## 2017-03-24 ASSESSMENT — PATIENT HEALTH QUESTIONNAIRE - PHQ9: 5. POOR APPETITE OR OVEREATING: SEVERAL DAYS
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
## 2017-03-24 NOTE — MR AVS SNAPSHOT
"                  MRN:[id number]                      After Visit Summary   3/24/2017    Melquiades Morales    MRN: [id number]           Visit Information        Provider Department      3/24/2017 9:00 AM Kleber Pollack, [facility] Generic      Your next 10 appointments already scheduled     2017  9:00 AM CDT   Return Visit with Kleber Pollack [facility] ([facility])    [address] [place] [address]   [phone]            2017  9:00 AM CDT   Return Visit with Kleber Pollack [facility] ([facility])    [address] [place] [address]   [phone]            May 12, 2017  9:00 AM CDT   Return Visit with Kleber Pollack [facility] ([address] [address]   [phone]              MyChart Information     [employer] lets you send messages to your doctor, view your test results, renew your prescriptions, schedule appointments and more. To sign up, go to [email or web address] . Click on \"Log in\" on the left side of the screen, which will take you to the Welcome page. Then click on \"Sign up Now\" on the right side of the page.     You will be asked to enter the access code listed below, as well as some personal information. Please follow the directions to create your username and password.     Your access code is: [email or web address]  Expires: 2017 10:53 AM     Your access code will  in 90 days. If you need help or a new code, please call your [place] clinic or [phone].        Care EveryWhere ID     This is your Care EveryWhere ID. This could be used by other organizations to access your [place] medical records  [id number]        "

## 2017-03-25 ASSESSMENT — ANXIETY QUESTIONNAIRES: GAD7 TOTAL SCORE: 10

## 2017-03-25 ASSESSMENT — PATIENT HEALTH QUESTIONNAIRE - PHQ9: SUM OF ALL RESPONSES TO PHQ QUESTIONS 1-9: 16

## 2017-04-07 ENCOUNTER — OFFICE VISIT (OUTPATIENT)
Dept: PSYCHOLOGY | Facility: CLINIC | Age: 62
End: 2017-04-07
Payer: MEDICARE

## 2017-04-07 DIAGNOSIS — F33.1 MAJOR DEPRESSIVE DISORDER, RECURRENT EPISODE, MODERATE (H): Primary | ICD-10-CM

## 2017-04-07 DIAGNOSIS — F41.1 GENERALIZED ANXIETY DISORDER: ICD-10-CM

## 2017-04-07 PROCEDURE — 90834 PSYTX W PT 45 MINUTES: CPT | Performed by: SOCIAL WORKER

## 2017-04-07 ASSESSMENT — ANXIETY QUESTIONNAIRES
6. BECOMING EASILY ANNOYED OR IRRITABLE: MORE THAN HALF THE DAYS
7. FEELING AFRAID AS IF SOMETHING AWFUL MIGHT HAPPEN: NOT AT ALL
1. FEELING NERVOUS, ANXIOUS, OR ON EDGE: SEVERAL DAYS
2. NOT BEING ABLE TO STOP OR CONTROL WORRYING: MORE THAN HALF THE DAYS
GAD7 TOTAL SCORE: 9
5. BEING SO RESTLESS THAT IT IS HARD TO SIT STILL: SEVERAL DAYS
3. WORRYING TOO MUCH ABOUT DIFFERENT THINGS: MORE THAN HALF THE DAYS

## 2017-04-07 ASSESSMENT — PATIENT HEALTH QUESTIONNAIRE - PHQ9: 5. POOR APPETITE OR OVEREATING: SEVERAL DAYS
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
## 2017-04-07 NOTE — MR AVS SNAPSHOT
"                  MRN:[id number]                      After Visit Summary   2017    Melquiades Morales    MRN: [id number]           Visit Information        Provider Department      2017 9:00 AM Kleber Pollack, [facility] Generic      Your next 10 appointments already scheduled     2017  7:00 AM CDT   [facility]ignesh Travis [facility]    [address]  [facility] [address]   [phone]            2017  9:00 AM CDT   Return Visit with Kleber Pollack [facility] ([facility])    [address] [address]   [phone]            May 12, 2017  9:00 AM CDT   Return Visit with Kleber Pollack [facility] ([facility])    [address] [address]   [phone]            May 26, 2017  9:00 AM CDT   Return Visit with Kleber Pollack [facility] ([facility])    [address] [address]   [phone]              MyChart Information     [employer] lets you send messages to your doctor, view your test results, renew your prescriptions, schedule appointments and more. To sign up, go to [email or web address] . Click on \"Log in\" on the left side of the screen, which will take you to the Welcome page. Then click on \"Sign up Now\" on the right side of the page.     You will be asked to enter the access code listed below, as well as some personal information. Please follow the directions to create your username and password.     Your access code is: [email or web address]  Expires: 2017 10:53 AM     Your access code will  in 90 days. If you need help or a new code, please call your [place] clinic or [phone].        Care EveryWhere ID     This is your Care " EveryWhere ID. This could be used by other organizations to access your [place] medical records  [id number]
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
## 2017-04-08 ASSESSMENT — ANXIETY QUESTIONNAIRES: GAD7 TOTAL SCORE: 9

## 2017-04-08 ASSESSMENT — PATIENT HEALTH QUESTIONNAIRE - PHQ9: SUM OF ALL RESPONSES TO PHQ QUESTIONS 1-9: 14

## 2017-04-12 ENCOUNTER — OFFICE VISIT (OUTPATIENT)
Dept: FAMILY MEDICINE | Facility: CLINIC | Age: 62
End: 2017-04-12
Payer: MEDICARE

## 2017-04-12 VITALS
WEIGHT: 250 LBS | DIASTOLIC BLOOD PRESSURE: 91 MMHG | RESPIRATION RATE: 18 BRPM | HEART RATE: 77 BPM | TEMPERATURE: 97.8 F | HEIGHT: 71 IN | BODY MASS INDEX: 35 KG/M2 | OXYGEN SATURATION: 90 % | SYSTOLIC BLOOD PRESSURE: 177 MMHG

## 2017-04-12 DIAGNOSIS — Z00.00 ROUTINE HISTORY AND PHYSICAL EXAMINATION OF ADULT: Primary | ICD-10-CM

## 2017-04-12 DIAGNOSIS — J44.9 CHRONIC OBSTRUCTIVE PULMONARY DISEASE, UNSPECIFIED COPD TYPE (H): Chronic | ICD-10-CM

## 2017-04-12 DIAGNOSIS — E78.5 HYPERLIPIDEMIA LDL GOAL <130: ICD-10-CM

## 2017-04-12 DIAGNOSIS — F32.2 MAJOR DEPRESSIVE DISORDER, SINGLE EPISODE, SEVERE WITHOUT PSYCHOTIC FEATURES (H): Chronic | ICD-10-CM

## 2017-04-12 DIAGNOSIS — G89.4 CHRONIC PAIN SYNDROME: Chronic | ICD-10-CM

## 2017-04-12 DIAGNOSIS — F32.A DEPRESSION, UNSPECIFIED DEPRESSION TYPE: ICD-10-CM

## 2017-04-12 LAB
ANION GAP SERPL CALCULATED.3IONS-SCNC: 8 MMOL/L (ref 3–14)
BUN SERPL-MCNC: 11 MG/DL (ref 7–30)
CALCIUM SERPL-MCNC: 9.6 MG/DL (ref 8.5–10.1)
CHLORIDE SERPL-SCNC: 108 MMOL/L (ref 94–109)
CHOLEST SERPL-MCNC: 138 MG/DL
CO2 SERPL-SCNC: 30 MMOL/L (ref 20–32)
CREAT SERPL-MCNC: 0.7 MG/DL (ref 0.66–1.25)
GFR SERPL CREATININE-BSD FRML MDRD: ABNORMAL ML/MIN/1.7M2
GLUCOSE SERPL-MCNC: 115 MG/DL (ref 70–99)
HCV AB SERPL QL IA: NORMAL
HDLC SERPL-MCNC: 38 MG/DL
LDLC SERPL CALC-MCNC: 49 MG/DL
NONHDLC SERPL-MCNC: 100 MG/DL
POTASSIUM SERPL-SCNC: 3.8 MMOL/L (ref 3.4–5.3)
SODIUM SERPL-SCNC: 146 MMOL/L (ref 133–144)
TRIGL SERPL-MCNC: 255 MG/DL

## 2017-04-12 PROCEDURE — 80061 LIPID PANEL: CPT | Performed by: FAMILY MEDICINE

## 2017-04-12 PROCEDURE — 99396 PREV VISIT EST AGE 40-64: CPT | Performed by: FAMILY MEDICINE

## 2017-04-12 PROCEDURE — 80048 BASIC METABOLIC PNL TOTAL CA: CPT | Performed by: FAMILY MEDICINE

## 2017-04-12 PROCEDURE — 36415 COLL VENOUS BLD VENIPUNCTURE: CPT | Performed by: FAMILY MEDICINE

## 2017-04-12 PROCEDURE — 86803 HEPATITIS C AB TEST: CPT | Performed by: FAMILY MEDICINE

## 2017-04-12 RX ORDER — DULOXETIN HYDROCHLORIDE 60 MG/1
120 CAPSULE, DELAYED RELEASE ORAL DAILY
Qty: 180 CAPSULE | Refills: 3 | Status: SHIPPED | OUTPATIENT
Start: 2017-04-12 | End: 2017-10-02

## 2017-04-12 RX ORDER — TRAZODONE HYDROCHLORIDE 100 MG/1
200 TABLET ORAL
Qty: 180 TABLET | Refills: 3 | Status: SHIPPED | OUTPATIENT
Start: 2017-04-12 | End: 2018-04-02

## 2017-04-12 RX ORDER — TRAZODONE HYDROCHLORIDE 50 MG/1
25 TABLET, FILM COATED ORAL
Qty: 90 TABLET | Refills: 3 | Status: SHIPPED | OUTPATIENT
Start: 2017-04-12 | End: 2018-05-03

## 2017-04-12 RX ORDER — SIMVASTATIN 80 MG
80 TABLET ORAL EVERY MORNING
Qty: 90 TABLET | Refills: 3 | Status: SHIPPED | OUTPATIENT
Start: 2017-04-12 | End: 2018-04-02

## 2017-04-12 RX ORDER — NAPROXEN 500 MG/1
500 TABLET ORAL
Qty: 60 TABLET | Refills: 3 | Status: SHIPPED | OUTPATIENT
Start: 2017-04-12 | End: 2019-02-19

## 2017-04-12 NOTE — PROGRESS NOTES
SUBJECTIVE:     CC: Melquiades Morales is an 62 year old male who presents for preventative health visit.     Healthy Habits:    Do you get at least three servings of calcium containing foods daily (dairy, green leafy vegetables, etc.)? yes    Amount of exercise or daily activities, outside of work:  no    Problems taking medications regularly No    Medication side effects: No    Have you had an eye exam in the past two years? no    Do you see a dentist twice per year? no    Do you have sleep apnea, excessive snoring or daytime drowsiness?no        Hyperlipidemia Follow-Up      Rate your low fat/cholesterol diet?: good    Taking statin?  Yes, no muscle aches from statin    Other lipid medications/supplements?:  none       Today's PHQ-2 Score:   PHQ-2 ( 1999 [employer]) 4/15/2016 12/19/2014   Q1: Little interest or pleasure in doing things 1 1   Q2: Feeling down, depressed or hopeless 2 1   PHQ-2 Score 3 2       Abuse: Current or Past(Physical, Sexual or Emotional)- No  Do you feel safe in your environment - Yes    Social History   Substance Use Topics     Smoking status: Current Every Day Smoker     Packs/day: 0.75     Years: 35.00     Types: Cigarettes     Smokeless tobacco: Former User     Alcohol use No     The patient does not drink >3 drinks per day nor >7 drinks per week.    Last PSA:   PSA   Date Value Ref Range Status   07/13/2009 0.64 0 - 4 ug/L Final       Recent Labs   Lab Test  04/13/16   0844  12/29/14   0808  03/08/13   0810   CHOL  153  129  185   HDL  28*  32*  32*   LDL  Cannot estimate LDL when triglyceride exceeds 400 mg/dL  84  50  94   TRIG  409*  235*  296*   CHOLHDLRATIO   --   4.0  6.0*   NHDL  125   --    --        Reviewed orders with patient. Reviewed health maintenance and updated orders accordingly - Yes    Reviewed and updated as needed this visit by clinical staff  Tobacco  Allergies  Meds  Med Hx  Surg Hx  Fam Hx  Soc Hx        Reviewed and updated as needed this visit by  "Provider            ROS:  C: NEGATIVE for fever, chills, change in weight  I: NEGATIVE for worrisome rashes, moles or lesions  E: NEGATIVE for vision changes or irritation  ENT: NEGATIVE for ear, mouth and throat problems  R: NEGATIVE for significant cough or SOB  CV: NEGATIVE for chest pain, palpitations or peripheral edema  GI: NEGATIVE for nausea, abdominal pain, heartburn, or change in bowel habits   male: negative for dysuria, hematuria, decreased urinary stream, erectile dysfunction, urethral discharge  M: NEGATIVE for significant arthralgias or myalgia  N: NEGATIVE for weakness, dizziness or paresthesias  P: NEGATIVE for changes in mood or affect    Problem list, Medication list, Allergies, and Medical/Social/Surgical histories reviewed in [facility] and updated as appropriate.  OBJECTIVE:     BP (!) 177/91  Pulse 77  Temp 97.8  F (36.6  C)  Resp 18  Ht 5' 11\" (1.803 m)  Wt 250 lb (113.4 kg)  BMI 34.87 kg/m2  EXAM:  GENERAL: healthy, alert and no distress  EYES: Eyes grossly normal to inspection, PERRL and conjunctivae and sclerae normal  HENT: ear canals and TM's normal, nose and mouth without ulcers or lesions  NECK: no adenopathy, no asymmetry, masses, or scars and thyroid normal to palpation  RESP: lungs clear to auscultation - no rales, rhonchi or wheezes  CV: regular rate and rhythm, normal S1 S2, no S3 or S4, no murmur, click or rub, no peripheral edema and peripheral pulses strong  ABDOMEN: soft, nontender, no hepatosplenomegaly, no masses and bowel sounds normal  MS: no gross musculoskeletal defects noted, no edema  SKIN: no suspicious lesions or rashes  NEURO: weakness of LE, sensory exam grossly normal and mentation intact  PSYCH: mentation appears normal, affect normal/bright    ASSESSMENT/PLAN:     Melquiades was seen today for physical.    Diagnoses and all orders for this visit:    Routine history and physical examination of adult  -     Basic metabolic panel  -     Hepatitis C " "antibody    Depression, unspecified depression type  -     DULoxetine (CYMBALTA) 60 MG EC capsule; Take 2 capsules (120 mg) by mouth daily  -     traZODone (DESYREL) 50 MG tablet; Take 0.5 tablets (25 mg) by mouth nightly as needed for sleep Take along with the 100 mg tablets for total dose of 225 mg  -     traZODone (DESYREL) 100 MG tablet; Take 2 tablets (200 mg) by mouth nightly as needed for sleep Take along with the 25 mg tablet for total dose of 225 mg    Chronic pain syndrome  -     naproxen (NAPROSYN) 500 MG tablet; Take 1 tablet (500 mg) by mouth at onset of headache    Hyperlipidemia LDL goal <130  -     simvastatin (ZOCOR) 80 MG tablet; Take 1 tablet (80 mg) by mouth every morning  -     Lipid panel reflex to direct LDL    Chronic obstructive pulmonary disease, unspecified COPD type (H)    Major depressive disorder, single episode, severe without psychotic features (H)    Other orders  -     DEPRESSION ACTION PLAN (DAP)        COUNSELING:  Reviewed preventive health counseling, as reflected in patient instructions       Regular exercise       Healthy diet/nutrition         reports that he has been smoking Cigarettes.  He has a 26.25 pack-year smoking history. He has quit using smokeless tobacco.  Tobacco Cessation Action Plan: Information offered: Patient not interested at this time  Estimated body mass index is 34.87 kg/(m^2) as calculated from the following:    Height as of this encounter: 5' 11\" (1.803 m).    Weight as of this encounter: 250 lb (113.4 kg).   Weight management plan: Discussed healthy diet and exercise guidelines and patient will follow up in 12 months in clinic to re-evaluate.     Orders Placed This Encounter     DEPRESSION ACTION PLAN (DAP)     Lipid panel reflex to direct LDL     Basic metabolic panel     Hepatitis C antibody     DULoxetine (CYMBALTA) 60 MG EC capsule     traZODone (DESYREL) 50 MG tablet     traZODone (DESYREL) 100 MG tablet     naproxen (NAPROSYN) 500 MG tablet     " simvastatin (ZOCOR) 80 MG tablet     We discussed blood pressure. It really is only elevated when he is up and around traveling because his pain level goes up. Whenever he checks it at home and is within normal limits so I have recommended he continue to monitor this at home.    Counseling Resources:  ATP IV Guidelines  Pooled Cohorts Equation Calculator  FRAX Risk Assessment  ICSI Preventive Guidelines  Dietary Guidelines for Americans, 2010  USDA's MyPlate  ASA Prophylaxis  Lung CA Screening    Jignesh Travis MD  [facility]

## 2017-04-12 NOTE — LETTER
My Depression Action Plan  Name: Melquiades Morales   Date of Birth 1955  Date: 4/12/2017    My doctor: Jignesh Travis   My clinic: [facility]  [address]  [facility] [address]  [phone]          GREEN    ZONE   Good Control    What it looks like:     Things are going generally well. You have normal up s and down s. You may even feel depressed from time to time, but bad moods usually last less than a day.   What you need to do:  1. Continue to care for yourself (see self care plan)  2. Check your depression survival kit and update it as needed  3. Follow your physician s recommendations including any medication.  4. Do not stop taking medication unless you consult with your physician first.           YELLOW         ZONE Getting Worse    What it looks like:     Depression is starting to interfere with your life.     It may be hard to get out of bed; you may be starting to isolate yourself from others.    Symptoms of depression are starting to last most all day and this has happened for several days.     You may have suicidal thoughts but they are not constant.   What you need to do:     1. Call your care team, your response to treatment will improve if you keep your care team informed of your progress. Yellow periods are signs an adjustment may need to be made.     2. Continue your self-care, even if you have to fake it!    3. Talk to someone in your support network    4. Open up your depression survival kit           RED    ZONE Medical Alert - Get Help    What it looks like:     Depression is seriously interfering with your life.     You may experience these or other symptoms: You can t get out of bed most days, can t work or engage in other necessary activities, you have trouble taking care of basic hygiene, or basic responsibilities, thoughts of suicide or death that will not go away, self-injurious behavior.     What you need to do:  1. Call your care team  and request a same-day appointment. If they are not available (weekends or after hours) call your local crisis line, emergency room or 911.      Electronically signed by: Krupa Cabello, April 12, 2017    Depression Self Care Plan / Survival Kit    Self-Care for Depression  Here s the deal. Your body and mind are really not as separate as most people think.  What you do and think affects how you feel and how you feel influences what you do and think. This means if you do things that people who feel good do, it will help you feel better.  Sometimes this is all it takes.  There is also a place for medication and therapy depending on how severe your depression is, so be sure to consult with your medical provider and/ or Behavioral Health Consultant if your symptoms are worsening or not improving.     In order to better manage my stress, I will:    Exercise  Get some form of exercise, every day. This will help reduce pain and release endorphins, the  feel good  chemicals in your brain. This is almost as good as taking antidepressants!  This is not the same as joining a gym and then never going! (they count on that by the way ) It can be as simple as just going for a walk or doing some gardening, anything that will get you moving.      Hygiene   Maintain good hygiene (Get out of bed in the morning, Make your bed, Brush your teeth, Take a shower, and Get dressed like you were going to work, even if you are unemployed).  If your clothes don't fit try to get ones that do.    Diet  I will strive to eat foods that are good for me, drink plenty of water, and avoid excessive sugar, caffeine, alcohol, and other mood-altering substances.  Some foods that are helpful in depression are: complex carbohydrates, B vitamins, flaxseed, fish or fish oil, fresh fruits and vegetables.    Psychotherapy  I agree to participate in Individual Therapy (if recommended).    Medication  If prescribed medications, I agree to take them.  Missing doses  can result in serious side effects.  I understand that drinking alcohol, or other illicit drug use, may cause potential side effects.  I will not stop my medication abruptly without first discussing it with my provider.    Staying Connected With Others  I will stay in touch with my friends, family members, and my primary care provider/team.    Use your imagination  Be creative.  We all have a creative side; it doesn t matter if it s oil painting, sand castles, or mud pies! This will also kick up the endorphins.    Witness Beauty  (AKA stop and smell the roses) Take a look outside, even in mid-winter. Notice colors, textures. Watch the squirrels and birds.     Service to others  Be of service to others.  There is always someone else in need.  By helping others we can  get out of ourselves  and remember the really important things.  This also provides opportunities for practicing all the other parts of the program.    Humor  Laugh and be silly!  Adjust your TV habits for less news and crime-drama and more comedy.    Control your stress  Try breathing deep, massage therapy, biofeedback, and meditation. Find time to relax each day.     My support system    Clinic Contact:  Phone number:    Contact 1:  Phone number:    Contact 2:  Phone number:    [employer]/:  Phone number:    Therapist:  Phone number:    Local crisis center:    Phone number:    Other community support:  Phone number:

## 2017-04-12 NOTE — LETTER
[facility]  [address]  [place] [address]  Phone: [phone]    April 14, 2017    Melquiades Morales  [address] [place] [address]          Dear Melquiades,    The results of your recent lab tests were within normal/acceptable limits. Enclosed is a copy of these results.  If you have any further questions or problems, please contact our office.  Results for orders placed or performed in visit on 04/12/17   Lipid panel reflex to direct LDL   Result Value Ref Range    Cholesterol 138 <200 mg/dL    Triglycerides 255 (H) <150 mg/dL    HDL Cholesterol 38 (L) >39 mg/dL    LDL Cholesterol Calculated 49 <100 mg/dL    Non HDL Cholesterol 100 <130 mg/dL   Basic metabolic panel   Result Value Ref Range    Sodium 146 (H) 133 - 144 mmol/L    Potassium 3.8 3.4 - 5.3 mmol/L    Chloride 108 94 - 109 mmol/L    Carbon Dioxide 30 20 - 32 mmol/L    Anion Gap 8 3 - 14 mmol/L    Glucose 115 (H) 70 - 99 mg/dL    Urea Nitrogen 11 7 - 30 mg/dL    Creatinine 0.70 0.66 - 1.25 mg/dL    GFR Estimate >90  Non  GFR Calc   >60 mL/min/1.7m2    GFR Estimate If Black >90   GFR Calc   >60 mL/min/1.7m2    Calcium 9.6 8.5 - 10.1 mg/dL   Hepatitis C antibody   Result Value Ref Range    Hepatitis C Antibody  NR     Nonreactive   Assay performance characteristics have not been established for newborns,   infants, and children       Sincerely,      Jignesh Travis MD/ llc

## 2017-04-12 NOTE — NURSING NOTE
"Chief Complaint   Patient presents with     Physical       Initial BP (!) 177/91  Pulse 77  Temp 97.8  F (36.6  C)  Resp 18  Ht 5' 11\" (1.803 m)  Wt 250 lb (113.4 kg)  BMI 34.87 kg/m2 Estimated body mass index is 34.87 kg/(m^2) as calculated from the following:    Height as of this encounter: 5' 11\" (1.803 m).    Weight as of this encounter: 250 lb (113.4 kg).  Medication Reconciliation: complete   Joan Cabello CMA      "

## 2017-04-12 NOTE — MR AVS SNAPSHOT
After Visit Summary   4/12/2017    Melquiades Morales    MRN: [id number]           Patient Information     Date Of Birth          1955        Visit Information        Provider Department      4/12/2017 7:00 AM Jignesh Travis MD [facility]        Today's Diagnoses     Routine history and physical examination of adult    -  1    Depression, unspecified depression type        Chronic pain syndrome        Hyperlipidemia LDL goal <130        Chronic obstructive pulmonary disease, unspecified COPD type (H)        Major depressive disorder, single episode, severe without psychotic features (H)          Care Instructions      Preventive Health Recommendations  Male Ages 50 - 64    Yearly exam:             See your health care provider every year in order to  o   Review health changes.   o   Discuss preventive care.    o   Review your medicines if your doctor has prescribed any.     Have a cholesterol test every 5 years, or more frequently if you are at risk for high cholesterol/heart disease.     Have a diabetes test (fasting glucose) every three years. If you are at risk for diabetes, you should have this test more often.     Have a colonoscopy at age 50, or have a yearly FIT test (stool test). These exams will check for colon cancer.      Talk with your health care provider about whether or not a prostate cancer screening test (PSA) is right for you.    You should be tested each year for STDs (sexually transmitted diseases), if you re at risk.     Shots: Get a flu shot each year. Get a tetanus shot every 10 years.     Nutrition:    Eat at least 5 servings of fruits and vegetables daily.     Eat whole-grain bread, whole-wheat pasta and brown rice instead of white grains and rice.     Talk to your provider about Calcium and Vitamin D.     Lifestyle    Exercise for at least 150 minutes a week (30 minutes a day, 5 days a week). This will help you control your weight and prevent disease.  "    Limit alcohol to one drink per day.     No smoking.     Wear sunscreen to prevent skin cancer.     See your dentist every six months for an exam and cleaning.     See your eye doctor every 1 to 2 years.          Follow-ups after your visit        Your next 10 appointments already scheduled     Apr 28, 2017  9:00 AM CDT   Return Visit with Kleber Pollack, [facility] ([facility])    [address] [place] [address]   [phone]            May 12, 2017  9:00 AM CDT   Return Visit with Kleber Pollack, [facility] ([facility])    [address] [place] [address]   [phone]            May 26, 2017  9:00 AM CDT   Return Visit with Kleber Pollack, [facility] ([address] [address]   [phone]              Who to contact     If you have questions or need follow up information about today's clinic visit or your schedule please contact [facility] directly at [phone].  Normal or non-critical lab and imaging results will be communicated to you by MyChart, letter or phone within 4 business days after the clinic has received the results. If you do not hear from us within 7 days, please contact the clinic through [employer] or phone. If you have a critical or abnormal lab result, we will notify you by phone as soon as possible.  Submit refill requests through [employer] or call your pharmacy and they will forward the refill request to us. Please allow 3 business days for your refill to be completed.          Additional Information About Your Visit        [employer] Information     [employer] lets you send messages to your doctor, view your test results, renew your prescriptions, schedule appointments and more. To sign up, go to [email or web address] . Click on \"Log in\" " "on the left side of the screen, which will take you to the Welcome page. Then click on \"Sign up Now\" on the right side of the page.     You will be asked to enter the access code listed below, as well as some personal information. Please follow the directions to create your username and password.     Your access code is: [email or web address]  Expires: 2017 10:53 AM     Your access code will  in 90 days. If you need help or a new code, please call your [facility] or [phone].        Care EveryWhere ID     This is your Care EveryWhere ID. This could be used by other organizations to access your [place] medical records  [id number]        Your Vitals Were     Pulse Temperature Respirations Height Pulse Oximetry BMI (Body Mass Index)    77 97.8  F (36.6  C) 18 5' 11\" (1.803 m) 90% 34.87 kg/m2       Blood Pressure from Last 3 Encounters:   17 (!) 177/91   17 136/90   17 162/85    Weight from Last 3 Encounters:   17 250 lb (113.4 kg)   17 250 lb (113.4 kg)   17 250 lb 7.1 oz (113.6 kg)              We Performed the Following     Basic metabolic panel     DEPRESSION ACTION PLAN (DAP)     Hepatitis C antibody     Lipid panel reflex to direct LDL          Today's Medication Changes          These changes are accurate as of: 17  8:12 AM.  If you have any questions, ask your nurse or doctor.               These medicines have changed or have updated prescriptions.        Dose/Directions    DULoxetine 60 MG EC capsule   Commonly known as:  CYMBALTA   This may have changed:  additional instructions   Used for:  Depression, unspecified depression type   Changed by:  Jignesh Travis MD        Dose:  120 mg   Take 2 capsules (120 mg) by mouth daily   Quantity:  180 capsule   Refills:  3       simvastatin 80 MG tablet   Commonly known as:  ZOCOR   This may have changed:  when to take this   Used for:  Hyperlipidemia LDL goal <130        Dose:  80 mg   Take 1 tablet (80 mg) by " mouth every morning   Quantity:  90 tablet   Refills:  3       * traZODone 50 MG tablet   Commonly known as:  DESYREL   This may have changed:  additional instructions   Used for:  Depression, unspecified depression type   Changed by:  Jignesh Travis MD        Dose:  25 mg   Take 0.5 tablets (25 mg) by mouth nightly as needed for sleep Take along with the 100 mg tablets for total dose of 225 mg   Quantity:  90 tablet   Refills:  3       * traZODone 100 MG tablet   Commonly known as:  DESYREL   This may have changed:  additional instructions   Used for:  Depression, unspecified depression type   Changed by:  Jignesh Travis MD        Dose:  200 mg   Take 2 tablets (200 mg) by mouth nightly as needed for sleep Take along with the 25 mg tablet for total dose of 225 mg   Quantity:  180 tablet   Refills:  3       * Notice:  This list has 2 medication(s) that are the same as other medications prescribed for you. Read the directions carefully, and ask your doctor or other care provider to review them with you.         Where to get your medicines      These medications were sent to [address] [address]     Phone:  [phone]     DULoxetine 60 MG EC capsule    naproxen 500 MG tablet    simvastatin 80 MG tablet    traZODone 100 MG tablet    traZODone 50 MG tablet                Primary Care Provider Office Phone # Fax #    Jignesh Travis [phone]       [facility] [id number] [place] [address]        Thank you!     Thank you for choosing [facility]  for your care. Our goal is always to provide you with excellent care. Hearing back from our patients is one way we can continue to improve our services. Please take a few minutes to complete the written survey that you may receive in the mail after your visit with us. Thank you!             Your Updated Medication List - Protect others around you:  Learn how to safely use, store and throw away your medicines at www.disposemymeds.org.          This list is accurate as of: 4/12/17  8:12 AM.  Always use your most recent med list.                   Brand Name Dispense Instructions for use    albuterol 108 (90 BASE) MCG/ACT Inhaler    albuterol    1 Inhaler    Inhale 2 puffs into the lungs every 4 hours as needed for shortness of breath / dyspnea or wheezing       DULoxetine 60 MG EC capsule    CYMBALTA    180 capsule    Take 2 capsules (120 mg) by mouth daily       lidocaine 5 % ointment    XYLOCAINE    744.24 g    Apply topically as needed for moderate pain Apply 2 grams to affected PRN up to 4x daily. [phone] Pharmacy #.       * LYRICA PO      Take 100 mg by mouth every morning       * LYRICA PO      Take 150 mg by mouth At Bedtime       * MS CONTIN PO      Take 30 mg by mouth 3 times daily 15 mg X 2 tabs.  AM, 1200, bedtime       * morphine 30 MG 12 hr tablet    MS CONTIN    6 tablet    Take 1 tablet (30 mg) by mouth 3 times daily       naproxen 500 MG tablet    NAPROSYN    60 tablet    Take 1 tablet (500 mg) by mouth at onset of headache       order for DME     1 Device    Semi electric hospital bed with side rails and mattress. Length of bed is for lifetime       * oxyCODONE-acetaminophen 5-325 MG per tablet    PERCOCET    24 tablet    Take 1-2 tablets by mouth every 6 hours as needed for moderate to severe pain       * oxyCODONE-acetaminophen 5-325 MG per tablet    PERCOCET    8 tablet    Take 1 tablet by mouth every 6 hours as needed for moderate to severe pain       simvastatin 80 MG tablet    ZOCOR    90 tablet    Take 1 tablet (80 mg) by mouth every morning       * traZODone 50 MG tablet    DESYREL    90 tablet    Take 0.5 tablets (25 mg) by mouth nightly as needed for sleep Take along with the 100 mg tablets for total dose of 225 mg       * traZODone 100 MG tablet    DESYREL    180 tablet    Take 2 tablets (200 mg) by mouth nightly as needed for  sleep Take along with the 25 mg tablet for total dose of 225 mg       * Notice:  This list has 8 medication(s) that are the same as other medications prescribed for you. Read the directions carefully, and ask your doctor or other care provider to review them with you.

## 2017-04-13 ASSESSMENT — PATIENT HEALTH QUESTIONNAIRE - PHQ9: SUM OF ALL RESPONSES TO PHQ QUESTIONS 1-9: 12

## 2017-04-20 ENCOUNTER — MEDICAL CORRESPONDENCE (OUTPATIENT)
Dept: HEALTH INFORMATION MANAGEMENT | Facility: CLINIC | Age: 62
End: 2017-04-20

## 2017-04-28 ENCOUNTER — OFFICE VISIT (OUTPATIENT)
Dept: PSYCHOLOGY | Facility: CLINIC | Age: 62
End: 2017-04-28
Payer: MEDICARE

## 2017-04-28 DIAGNOSIS — F33.1 MAJOR DEPRESSIVE DISORDER, RECURRENT EPISODE, MODERATE (H): Primary | ICD-10-CM

## 2017-04-28 DIAGNOSIS — F41.1 GENERALIZED ANXIETY DISORDER: ICD-10-CM

## 2017-04-28 PROCEDURE — 90834 PSYTX W PT 45 MINUTES: CPT | Performed by: SOCIAL WORKER

## 2017-04-28 ASSESSMENT — ANXIETY QUESTIONNAIRES
1. FEELING NERVOUS, ANXIOUS, OR ON EDGE: MORE THAN HALF THE DAYS
7. FEELING AFRAID AS IF SOMETHING AWFUL MIGHT HAPPEN: MORE THAN HALF THE DAYS
3. WORRYING TOO MUCH ABOUT DIFFERENT THINGS: MORE THAN HALF THE DAYS
6. BECOMING EASILY ANNOYED OR IRRITABLE: MORE THAN HALF THE DAYS
5. BEING SO RESTLESS THAT IT IS HARD TO SIT STILL: SEVERAL DAYS
GAD7 TOTAL SCORE: 13
2. NOT BEING ABLE TO STOP OR CONTROL WORRYING: MORE THAN HALF THE DAYS

## 2017-04-28 ASSESSMENT — PATIENT HEALTH QUESTIONNAIRE - PHQ9: 5. POOR APPETITE OR OVEREATING: MORE THAN HALF THE DAYS

## 2017-04-28 NOTE — Clinical Note
Worsening scores-unable to see wife as much due to lack of funds. Still waiting to hear when workman's comp meeting to take place. Supposed to fly his to [place].
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
## 2017-04-28 NOTE — MR AVS SNAPSHOT
"                  MRN:[id number]                      After Visit Summary   2017    Melquiades Morales    MRN: [id number]           Visit Information        Provider Department      2017 9:00 AM Kleber Pollack, [facility] Generic      Your next 10 appointments already scheduled     May 12, 2017  9:00 AM CDT   Return Visit with Kleber Pollack [facility] ([facility])    [address] [place] [address]   [phone]            May 26, 2017  9:00 AM CDT   Return Visit with Kleber Pollack [facility] ([facility])    [address] [address]   [phone]            2017  9:00 AM CDT   Return Visit with Kleber Pollack [facility] ([address] [address]   [phone]              MyChart Information     [employer] lets you send messages to your doctor, view your test results, renew your prescriptions, schedule appointments and more. To sign up, go to [email or web address] . Click on \"Log in\" on the left side of the screen, which will take you to the Welcome page. Then click on \"Sign up Now\" on the right side of the page.     You will be asked to enter the access code listed below, as well as some personal information. Please follow the directions to create your username and password.     Your access code is: [email or web address]  Expires: 2017 10:53 AM     Your access code will  in 90 days. If you need help or a new code, please call your [place] clinic or [phone].        Care EveryWhere ID     This is your Care EveryWhere ID. This could be used by other organizations to access your [place] medical records  [id number]        "

## 2017-04-29 ASSESSMENT — ANXIETY QUESTIONNAIRES: GAD7 TOTAL SCORE: 13

## 2017-04-29 ASSESSMENT — PATIENT HEALTH QUESTIONNAIRE - PHQ9: SUM OF ALL RESPONSES TO PHQ QUESTIONS 1-9: 15

## 2017-05-12 ENCOUNTER — OFFICE VISIT (OUTPATIENT)
Dept: PSYCHOLOGY | Facility: CLINIC | Age: 62
End: 2017-05-12
Payer: MEDICARE

## 2017-05-12 DIAGNOSIS — F41.1 GENERALIZED ANXIETY DISORDER: ICD-10-CM

## 2017-05-12 DIAGNOSIS — F33.1 MAJOR DEPRESSIVE DISORDER, RECURRENT EPISODE, MODERATE (H): Primary | ICD-10-CM

## 2017-05-12 PROCEDURE — 90834 PSYTX W PT 45 MINUTES: CPT | Performed by: SOCIAL WORKER

## 2017-05-12 ASSESSMENT — ANXIETY QUESTIONNAIRES
2. NOT BEING ABLE TO STOP OR CONTROL WORRYING: MORE THAN HALF THE DAYS
1. FEELING NERVOUS, ANXIOUS, OR ON EDGE: MORE THAN HALF THE DAYS
5. BEING SO RESTLESS THAT IT IS HARD TO SIT STILL: SEVERAL DAYS
6. BECOMING EASILY ANNOYED OR IRRITABLE: MORE THAN HALF THE DAYS
GAD7 TOTAL SCORE: 11
7. FEELING AFRAID AS IF SOMETHING AWFUL MIGHT HAPPEN: SEVERAL DAYS
3. WORRYING TOO MUCH ABOUT DIFFERENT THINGS: MORE THAN HALF THE DAYS

## 2017-05-12 ASSESSMENT — PATIENT HEALTH QUESTIONNAIRE - PHQ9: 5. POOR APPETITE OR OVEREATING: SEVERAL DAYS

## 2017-05-12 NOTE — MR AVS SNAPSHOT
"                  MRN:[id number]                      After Visit Summary   2017    Melquiades Morales    MRN: [id number]           Visit Information        Provider Department      2017 9:00 AM Kleber Pollack, [facility] Generic      Your next 10 appointments already scheduled     May 26, 2017  9:00 AM CDT   Return Visit with Kleber Pollack [facility] ([facility])    [address] [place] [address]   [phone]            2017  9:00 AM CDT   Return Visit with Kleber Pollack [facility] ([facility])    [address] [address]   [phone]            2017  9:00 AM CDT   Return Visit with Kleber Pollack [facility] ([address] [address]   [phone]              MyChart Information     [employer] lets you send messages to your doctor, view your test results, renew your prescriptions, schedule appointments and more. To sign up, go to [email or web address] . Click on \"Log in\" on the left side of the screen, which will take you to the Welcome page. Then click on \"Sign up Now\" on the right side of the page.     You will be asked to enter the access code listed below, as well as some personal information. Please follow the directions to create your username and password.     Your access code is: [email or web address]  Expires: 2017 10:53 AM     Your access code will  in 90 days. If you need help or a new code, please call your [place] clinic or [phone].        Care EveryWhere ID     This is your Care EveryWhere ID. This could be used by other organizations to access your [place] medical records  [id number]        "

## 2017-05-13 ASSESSMENT — PATIENT HEALTH QUESTIONNAIRE - PHQ9: SUM OF ALL RESPONSES TO PHQ QUESTIONS 1-9: 15

## 2017-05-13 ASSESSMENT — ANXIETY QUESTIONNAIRES: GAD7 TOTAL SCORE: 11

## 2017-06-09 ENCOUNTER — OFFICE VISIT (OUTPATIENT)
Dept: PSYCHOLOGY | Facility: CLINIC | Age: 62
End: 2017-06-09
Payer: MEDICARE

## 2017-06-09 DIAGNOSIS — F33.1 MAJOR DEPRESSIVE DISORDER, RECURRENT EPISODE, MODERATE (H): Primary | ICD-10-CM

## 2017-06-09 DIAGNOSIS — F41.1 GENERALIZED ANXIETY DISORDER: ICD-10-CM

## 2017-06-09 PROCEDURE — 90834 PSYTX W PT 45 MINUTES: CPT | Performed by: SOCIAL WORKER

## 2017-06-09 ASSESSMENT — ANXIETY QUESTIONNAIRES
6. BECOMING EASILY ANNOYED OR IRRITABLE: MORE THAN HALF THE DAYS
7. FEELING AFRAID AS IF SOMETHING AWFUL MIGHT HAPPEN: NOT AT ALL
5. BEING SO RESTLESS THAT IT IS HARD TO SIT STILL: SEVERAL DAYS
3. WORRYING TOO MUCH ABOUT DIFFERENT THINGS: MORE THAN HALF THE DAYS
2. NOT BEING ABLE TO STOP OR CONTROL WORRYING: MORE THAN HALF THE DAYS
1. FEELING NERVOUS, ANXIOUS, OR ON EDGE: MORE THAN HALF THE DAYS
GAD7 TOTAL SCORE: 11

## 2017-06-09 ASSESSMENT — PATIENT HEALTH QUESTIONNAIRE - PHQ9: 5. POOR APPETITE OR OVEREATING: MORE THAN HALF THE DAYS

## 2017-06-09 NOTE — MR AVS SNAPSHOT
"                  MRN:[id number]                      After Visit Summary   2017    Melquiades Morales    MRN: [id number]           Visit Information        Provider Department      2017 9:00 AM Kleber Pollack, [facility] Generic      Your next 10 appointments already scheduled     2017  9:00 AM CDT   Return Visit with Kleber Pollack [facility] ([facility])    [address] [address]   [phone]            2017  9:00 AM CDT   Return Visit with Kleber Pollack [facility] ([facility])    [address] [address]   [phone]            Aug 04, 2017  9:00 AM CDT   Return Visit with Kleber Pollack [facility] ([address] [address]   [phone]              MyChart Information     [employer] lets you send messages to your doctor, view your test results, renew your prescriptions, schedule appointments and more. To sign up, go to [email or web address] . Click on \"Log in\" on the left side of the screen, which will take you to the Welcome page. Then click on \"Sign up Now\" on the right side of the page.     You will be asked to enter the access code listed below, as well as some personal information. Please follow the directions to create your username and password.     Your access code is: [email or web address]  Expires: 2017  9:41 AM     Your access code will  in 90 days. If you need help or a new code, please call your [place] clinic or [phone].        Care EveryWhere ID     This is your Care EveryWhere ID. This could be used by other organizations to access your [place] medical records  [id number]        "
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
## 2017-06-10 ASSESSMENT — PATIENT HEALTH QUESTIONNAIRE - PHQ9: SUM OF ALL RESPONSES TO PHQ QUESTIONS 1-9: 15

## 2017-06-10 ASSESSMENT — ANXIETY QUESTIONNAIRES: GAD7 TOTAL SCORE: 11

## 2017-06-23 ENCOUNTER — OFFICE VISIT (OUTPATIENT)
Dept: PSYCHOLOGY | Facility: CLINIC | Age: 62
End: 2017-06-23
Payer: MEDICARE

## 2017-06-23 DIAGNOSIS — F33.1 MAJOR DEPRESSIVE DISORDER, RECURRENT EPISODE, MODERATE (H): Primary | ICD-10-CM

## 2017-06-23 DIAGNOSIS — F41.1 GENERALIZED ANXIETY DISORDER: ICD-10-CM

## 2017-06-23 PROCEDURE — 90834 PSYTX W PT 45 MINUTES: CPT | Performed by: SOCIAL WORKER

## 2017-06-23 ASSESSMENT — ANXIETY QUESTIONNAIRES
6. BECOMING EASILY ANNOYED OR IRRITABLE: MORE THAN HALF THE DAYS
7. FEELING AFRAID AS IF SOMETHING AWFUL MIGHT HAPPEN: NOT AT ALL
3. WORRYING TOO MUCH ABOUT DIFFERENT THINGS: MORE THAN HALF THE DAYS
1. FEELING NERVOUS, ANXIOUS, OR ON EDGE: MORE THAN HALF THE DAYS
2. NOT BEING ABLE TO STOP OR CONTROL WORRYING: MORE THAN HALF THE DAYS
5. BEING SO RESTLESS THAT IT IS HARD TO SIT STILL: SEVERAL DAYS
GAD7 TOTAL SCORE: 10

## 2017-06-23 ASSESSMENT — PATIENT HEALTH QUESTIONNAIRE - PHQ9: 5. POOR APPETITE OR OVEREATING: SEVERAL DAYS

## 2017-06-23 NOTE — MR AVS SNAPSHOT
"                  MRN:[id number]                      After Visit Summary   2017    Melquiades Morales    MRN: [id number]           Visit Information        Provider Department      2017 9:00 AM Kleber Pollack, [facility] Generic      Your next 10 appointments already scheduled     2017  9:00 AM CDT   Return Visit with Kleber Pollack [facility] ([facility])    [address] [address]   [phone]            Aug 04, 2017  9:00 AM CDT   Return Visit with Kleber Pollack [facility] ([facility])    [address] [address]   [phone]            Aug 18, 2017  9:00 AM CDT   Return Visit with Kleber Pollack [facility] ([address] [address]   [phone]              MyChart Information     [employer] lets you send messages to your doctor, view your test results, renew your prescriptions, schedule appointments and more. To sign up, go to [email or web address] . Click on \"Log in\" on the left side of the screen, which will take you to the Welcome page. Then click on \"Sign up Now\" on the right side of the page.     You will be asked to enter the access code listed below, as well as some personal information. Please follow the directions to create your username and password.     Your access code is: [email or web address]  Expires: 2017  9:41 AM     Your access code will  in 90 days. If you need help or a new code, please call your [place] clinic or [phone].        Care EveryWhere ID     This is your Care EveryWhere ID. This could be used by other organizations to access your [place] medical records  [id number]        Equal Access to Services     AQUILES WALSH AH: winnie Gilman " marie ixe waxay idiin hayaan adeeg kharash la'aan ah. So [facility] [phone].    ATENCIÓN: Si habla español, tiene a tucker disposición servicios gratuitos de asistencia lingüística. Llame al [phone].    We comply with applicable federal civil rights laws and [place] laws. We do not discriminate on the basis of race, color, national origin, age, disability sex, sexual orientation or gender identity.
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
## 2017-06-24 ASSESSMENT — PATIENT HEALTH QUESTIONNAIRE - PHQ9: SUM OF ALL RESPONSES TO PHQ QUESTIONS 1-9: 15

## 2017-06-24 ASSESSMENT — ANXIETY QUESTIONNAIRES: GAD7 TOTAL SCORE: 10

## 2017-07-21 ENCOUNTER — OFFICE VISIT (OUTPATIENT)
Dept: PSYCHOLOGY | Facility: CLINIC | Age: 62
End: 2017-07-21
Payer: MEDICARE

## 2017-07-21 DIAGNOSIS — F43.10 POSTTRAUMATIC STRESS DISORDER: ICD-10-CM

## 2017-07-21 DIAGNOSIS — F41.1 GENERALIZED ANXIETY DISORDER: ICD-10-CM

## 2017-07-21 DIAGNOSIS — F33.1 MAJOR DEPRESSIVE DISORDER, RECURRENT EPISODE, MODERATE (H): Primary | ICD-10-CM

## 2017-07-21 PROCEDURE — 90834 PSYTX W PT 45 MINUTES: CPT | Performed by: SOCIAL WORKER

## 2017-07-21 ASSESSMENT — ANXIETY QUESTIONNAIRES
6. BECOMING EASILY ANNOYED OR IRRITABLE: MORE THAN HALF THE DAYS
3. WORRYING TOO MUCH ABOUT DIFFERENT THINGS: MORE THAN HALF THE DAYS
5. BEING SO RESTLESS THAT IT IS HARD TO SIT STILL: SEVERAL DAYS
7. FEELING AFRAID AS IF SOMETHING AWFUL MIGHT HAPPEN: NOT AT ALL
1. FEELING NERVOUS, ANXIOUS, OR ON EDGE: MORE THAN HALF THE DAYS
2. NOT BEING ABLE TO STOP OR CONTROL WORRYING: MORE THAN HALF THE DAYS
GAD7 TOTAL SCORE: 10

## 2017-07-21 ASSESSMENT — PATIENT HEALTH QUESTIONNAIRE - PHQ9: 5. POOR APPETITE OR OVEREATING: SEVERAL DAYS
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
## 2017-07-21 NOTE — MR AVS SNAPSHOT
"                  MRN:[id number]                      After Visit Summary   2017    Melquiades Morales    MRN: [id number]           Visit Information        Provider Department      2017 9:00 AM Kleber Pollack, [facility] Generic      Your next 10 appointments already scheduled     Aug 18, 2017  9:00 AM CDT   Return Visit with Kleber Pollack [facility] ([facility])    [address] [place] [address]   [phone]            Aug 25, 2017  9:00 AM CDT   Return Visit with Kleber Pollack [facility] ([facility])    [address] [address]   [phone]            Sep 01, 2017  9:00 AM CDT   Return Visit with Kleber Pollack [facility] ([address] [address]   [phone]              MyChart Information     [employer] lets you send messages to your doctor, view your test results, renew your prescriptions, schedule appointments and more. To sign up, go to [email or web address] . Click on \"Log in\" on the left side of the screen, which will take you to the Welcome page. Then click on \"Sign up Now\" on the right side of the page.     You will be asked to enter the access code listed below, as well as some personal information. Please follow the directions to create your username and password.     Your access code is: [email or web address]  Expires: 2017  9:41 AM     Your access code will  in 90 days. If you need help or a new code, please call your [place] clinic or [phone].        Care EveryWhere ID     This is your Care EveryWhere ID. This could be used by other organizations to access your [facility] medical records  [id number]        Equal Access to Services     AQUILES WALSH AH: winnie Gilman " marie xie waxay idiin hayaan adeeg kharash la'aan ah. So [facility] [phone].    ATENCIÓN: Si habla español, tiene a tucker disposición servicios gratuitos de asistencia lingüística. Llame al [phone].    We comply with applicable federal civil rights laws and [place] laws. We do not discriminate on the basis of race, color, national origin, age, disability sex, sexual orientation or gender identity.

## 2017-07-21 NOTE — Clinical Note
Jeffrey reported today that when he is reading that by the second line and half way through the sentence the letters become numbers. Told him to talk to you about this.

## 2017-07-22 ASSESSMENT — ANXIETY QUESTIONNAIRES: GAD7 TOTAL SCORE: 10

## 2017-07-22 ASSESSMENT — PATIENT HEALTH QUESTIONNAIRE - PHQ9: SUM OF ALL RESPONSES TO PHQ QUESTIONS 1-9: 17

## 2017-08-11 ENCOUNTER — TELEPHONE (OUTPATIENT)
Dept: FAMILY MEDICINE | Facility: CLINIC | Age: 62
End: 2017-08-11

## 2017-08-11 DIAGNOSIS — G89.4 CHRONIC PAIN SYNDROME: Primary | Chronic | ICD-10-CM

## 2017-08-11 DIAGNOSIS — F32.2 MAJOR DEPRESSIVE DISORDER, SINGLE EPISODE, SEVERE (H): Chronic | ICD-10-CM

## 2017-08-11 DIAGNOSIS — F41.9 ANXIETY: Chronic | ICD-10-CM

## 2017-08-14 ENCOUNTER — CARE COORDINATION (OUTPATIENT)
Dept: CARE COORDINATION | Facility: CLINIC | Age: 62
End: 2017-08-14

## 2017-08-14 NOTE — PROGRESS NOTES
Clinic Care Coordination Contact  [facility]/Voicemail    Referral Source: PCP  Clinical Data: Care Coordinator Outreach  Outreach attempted x 1.  Left message on voicemail with call back information and requested return call.  Plan: Care Coordinator will mail out care coordination introduction letter with care coordinator contact information and explanation of care coordination services. Care Coordinator will try to reach patient again in 1-2 business days.  Girish Navarrete RN  Clinic Care Coordinator  [phone] or [phone]          Call to Patient's county mental health worker. Patient looking to be able to use medical marijuana. Closest Certification Clinic is in [place] and would require $250 visit fee.  Care Coordinator will continue to try to reach Patient to follow up.

## 2017-08-15 NOTE — PROGRESS NOTES
Clinic Care Coordination Contact  OUTREACH    Referral Information:  Referral Source: PCP  Reason for Contact: follow up.     Clinical Concerns:  Current Medical Concerns: Patient reports that he has the paperwork and is working through the process to get medical marijuana. Patient reported that he had a person in home to help with getting things in order.     Education Provided to patient: Encouraged follow up appointment with PCP.        Plan: 1) Patient will continue to follow treatment plan as directed and follow up with PCP with concerns ongoing.   2) Care Coordination to remain available for future needs. Follow up planned for 2 weeks    Girish Navarrete RN  Clinic Care Coordinator  [phone] or [phone]

## 2017-08-18 ENCOUNTER — OFFICE VISIT (OUTPATIENT)
Dept: PSYCHOLOGY | Facility: CLINIC | Age: 62
End: 2017-08-18
Payer: MEDICARE

## 2017-08-18 DIAGNOSIS — F41.1 GENERALIZED ANXIETY DISORDER: ICD-10-CM

## 2017-08-18 DIAGNOSIS — F33.1 MAJOR DEPRESSIVE DISORDER, RECURRENT EPISODE, MODERATE (H): Primary | ICD-10-CM

## 2017-08-18 PROCEDURE — 90834 PSYTX W PT 45 MINUTES: CPT | Performed by: SOCIAL WORKER

## 2017-08-18 ASSESSMENT — ANXIETY QUESTIONNAIRES
7. FEELING AFRAID AS IF SOMETHING AWFUL MIGHT HAPPEN: NOT AT ALL
5. BEING SO RESTLESS THAT IT IS HARD TO SIT STILL: NOT AT ALL
GAD7 TOTAL SCORE: 9
3. WORRYING TOO MUCH ABOUT DIFFERENT THINGS: MORE THAN HALF THE DAYS
6. BECOMING EASILY ANNOYED OR IRRITABLE: SEVERAL DAYS
2. NOT BEING ABLE TO STOP OR CONTROL WORRYING: MORE THAN HALF THE DAYS
1. FEELING NERVOUS, ANXIOUS, OR ON EDGE: MORE THAN HALF THE DAYS

## 2017-08-18 ASSESSMENT — PATIENT HEALTH QUESTIONNAIRE - PHQ9
5. POOR APPETITE OR OVEREATING: MORE THAN HALF THE DAYS
SUM OF ALL RESPONSES TO PHQ QUESTIONS 1-9: 16

## 2017-08-18 NOTE — MR AVS SNAPSHOT
"                  MRN:[id number]                      After Visit Summary   2017    Melquiades Morales    MRN: [id number]           Visit Information        Provider Department      2017 9:00 AM Kleber Pollack, [facility] Generic      Your next 10 appointments already scheduled     Aug 25, 2017  9:00 AM CDT   Return Visit with Kleber Pollack [facility] ([facility])    [address] [place] [address]   [phone]            Sep 01, 2017  9:00 AM CDT   Return Visit with Kleber Pollack [facility] ([address] [address]   [phone]            Sep 29, 2017  9:00 AM CDT   Return Visit with Kleber Pollack [facility] ([address] [address]   [phone]              MyChart Information     [employer] lets you send messages to your doctor, view your test results, renew your prescriptions, schedule appointments and more. To sign up, go to [email or web address] . Click on \"Log in\" on the left side of the screen, which will take you to the Welcome page. Then click on \"Sign up Now\" on the right side of the page.     You will be asked to enter the access code listed below, as well as some personal information. Please follow the directions to create your username and password.     Your access code is: [email or web address]  Expires: 2017  9:41 AM     Your access code will  in 90 days. If you need help or a new code, please call your [facility] clinic or [phone].        Care EveryWhere ID     This is your Care EveryWhere ID. This could be used by other organizations to access your [facility] medical records  [id number]        Equal Access to Services     AQUILES WALSH AH: winnie Gilman " marie xie waxay idiin hayaan adeeg kharash la'aan ah. So [facility] [phone].    ATENCIÓN: Si habla español, tiene a tucker disposición servicios gratuitos de asistencia lingüística. Llame al [phone].    We comply with applicable federal civil rights laws and [place] laws. We do not discriminate on the basis of race, color, national origin, age, disability sex, sexual orientation or gender identity.
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
## 2017-08-19 ASSESSMENT — ANXIETY QUESTIONNAIRES: GAD7 TOTAL SCORE: 9

## 2017-08-25 ENCOUNTER — OFFICE VISIT (OUTPATIENT)
Dept: PSYCHOLOGY | Facility: CLINIC | Age: 62
End: 2017-08-25
Payer: MEDICARE

## 2017-08-25 DIAGNOSIS — F33.1 MAJOR DEPRESSIVE DISORDER, RECURRENT EPISODE, MODERATE (H): Primary | ICD-10-CM

## 2017-08-25 DIAGNOSIS — F41.1 GENERALIZED ANXIETY DISORDER: ICD-10-CM

## 2017-08-25 PROCEDURE — 90834 PSYTX W PT 45 MINUTES: CPT | Performed by: SOCIAL WORKER

## 2017-08-25 NOTE — MR AVS SNAPSHOT
"                  MRN:[id number]                      After Visit Summary   2017    Melquiades Morales    MRN: [id number]           Visit Information        Provider Department      2017 9:00 AM Kleber Pollack, [facility] Generic      Your next 10 appointments already scheduled     Sep 01, 2017  9:00 AM CDT   Return Visit with Kleber Pollack [facility] ([facility])    [address] [place] [address]   [phone]            Sep 29, 2017  9:00 AM CDT   Return Visit with Kleber Pollack [facility] ([facility])    [address] [address]   [phone]            Oct 20, 2017  9:00 AM CDT   Return Visit with Kleber Pollack [facility] ([address] [address]   [phone]              MyChart Information     [employer] lets you send messages to your doctor, view your test results, renew your prescriptions, schedule appointments and more. To sign up, go to [email or web address] . Click on \"Log in\" on the left side of the screen, which will take you to the Welcome page. Then click on \"Sign up Now\" on the right side of the page.     You will be asked to enter the access code listed below, as well as some personal information. Please follow the directions to create your username and password.     Your access code is: [email or web address]  Expires: 2017  9:41 AM     Your access code will  in 90 days. If you need help or a new code, please call your [place] clinic or [phone].        Care EveryWhere ID     This is your Care EveryWhere ID. This could be used by other organizations to access your [place] medical records  [id number]        Equal Access to Services     AQUILES WALSH AH: winnie Gilman " marie xie waxay idiin hayaan adeeg kharash la'aan ah. So [facility] [phone].    ATENCIÓN: Si habla español, tiene a tucker disposición servicios gratuitos de asistencia lingüística. Llame al [phone].    We comply with applicable federal civil rights laws and [place] laws. We do not discriminate on the basis of race, color, national origin, age, disability sex, sexual orientation or gender identity.

## 2017-09-01 ENCOUNTER — OFFICE VISIT (OUTPATIENT)
Dept: PSYCHOLOGY | Facility: CLINIC | Age: 62
End: 2017-09-01
Payer: MEDICARE

## 2017-09-01 ENCOUNTER — TELEPHONE (OUTPATIENT)
Dept: FAMILY MEDICINE | Facility: CLINIC | Age: 62
End: 2017-09-01

## 2017-09-01 DIAGNOSIS — F41.1 GENERALIZED ANXIETY DISORDER: ICD-10-CM

## 2017-09-01 DIAGNOSIS — F33.1 MAJOR DEPRESSIVE DISORDER, RECURRENT EPISODE, MODERATE (H): Primary | ICD-10-CM

## 2017-09-01 PROCEDURE — 90834 PSYTX W PT 45 MINUTES: CPT | Performed by: SOCIAL WORKER

## 2017-09-01 ASSESSMENT — ANXIETY QUESTIONNAIRES
6. BECOMING EASILY ANNOYED OR IRRITABLE: MORE THAN HALF THE DAYS
2. NOT BEING ABLE TO STOP OR CONTROL WORRYING: MORE THAN HALF THE DAYS
GAD7 TOTAL SCORE: 12
7. FEELING AFRAID AS IF SOMETHING AWFUL MIGHT HAPPEN: NOT AT ALL
3. WORRYING TOO MUCH ABOUT DIFFERENT THINGS: MORE THAN HALF THE DAYS
5. BEING SO RESTLESS THAT IT IS HARD TO SIT STILL: MORE THAN HALF THE DAYS
1. FEELING NERVOUS, ANXIOUS, OR ON EDGE: MORE THAN HALF THE DAYS

## 2017-09-01 ASSESSMENT — PATIENT HEALTH QUESTIONNAIRE - PHQ9
SUM OF ALL RESPONSES TO PHQ QUESTIONS 1-9: 16
5. POOR APPETITE OR OVEREATING: MORE THAN HALF THE DAYS

## 2017-09-01 NOTE — MR AVS SNAPSHOT
"                  MRN:[id number]                      After Visit Summary   2017    Melquiades Morales    MRN: [id number]           Visit Information        Provider Department      2017 9:00 AM Kleber Pollack, [facility] Generic      Your next 10 appointments already scheduled     Sep 29, 2017  9:00 AM CDT   Return Visit with Kleber Pollack [facility] ([facility])    [address] [place] [address]   [phone]            Oct 20, 2017  9:00 AM CDT   Return Visit with Kleber Pollack [facility] ([facility])    [address] [address]   [phone]            2017  9:00 AM CDT   Return Visit with Kleber Pollack [facility] ([address] [address]   [phone]              MyChart Information     [employer] lets you send messages to your doctor, view your test results, renew your prescriptions, schedule appointments and more. To sign up, go to [email or web address] . Click on \"Log in\" on the left side of the screen, which will take you to the Welcome page. Then click on \"Sign up Now\" on the right side of the page.     You will be asked to enter the access code listed below, as well as some personal information. Please follow the directions to create your username and password.     Your access code is: [email or web address]  Expires: 2017  9:41 AM     Your access code will  in 90 days. If you need help or a new code, please call your [place] clinic or [phone].        Care EveryWhere ID     This is your Care EveryWhere ID. This could be used by other organizations to access your [place] medical records  [id number]        Equal Access to Services     AQUILES WALSH AH: winnie Gilman " marie xie waxay idiin hayaan adeeg kharash la'aan ah. So [facility] [phone].    ATENCIÓN: Si habla español, tiene a tucker disposición servicios gratuitos de asistencia lingüística. Llame al [phone].    We comply with applicable federal civil rights laws and [place] laws. We do not discriminate on the basis of race, color, national origin, age, disability sex, sexual orientation or gender identity.
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
## 2017-09-02 ASSESSMENT — ANXIETY QUESTIONNAIRES: GAD7 TOTAL SCORE: 12

## 2017-09-29 ENCOUNTER — OFFICE VISIT (OUTPATIENT)
Dept: PSYCHOLOGY | Facility: CLINIC | Age: 62
End: 2017-09-29
Payer: MEDICARE

## 2017-09-29 DIAGNOSIS — F33.1 MAJOR DEPRESSIVE DISORDER, RECURRENT EPISODE, MODERATE (H): Primary | ICD-10-CM

## 2017-09-29 DIAGNOSIS — F41.1 GENERALIZED ANXIETY DISORDER: ICD-10-CM

## 2017-09-29 PROCEDURE — 90834 PSYTX W PT 45 MINUTES: CPT | Performed by: SOCIAL WORKER

## 2017-09-29 ASSESSMENT — ANXIETY QUESTIONNAIRES
GAD7 TOTAL SCORE: 14
3. WORRYING TOO MUCH ABOUT DIFFERENT THINGS: MORE THAN HALF THE DAYS
6. BECOMING EASILY ANNOYED OR IRRITABLE: NEARLY EVERY DAY
5. BEING SO RESTLESS THAT IT IS HARD TO SIT STILL: MORE THAN HALF THE DAYS
2. NOT BEING ABLE TO STOP OR CONTROL WORRYING: MORE THAN HALF THE DAYS
7. FEELING AFRAID AS IF SOMETHING AWFUL MIGHT HAPPEN: SEVERAL DAYS
1. FEELING NERVOUS, ANXIOUS, OR ON EDGE: MORE THAN HALF THE DAYS

## 2017-09-29 ASSESSMENT — PATIENT HEALTH QUESTIONNAIRE - PHQ9
5. POOR APPETITE OR OVEREATING: MORE THAN HALF THE DAYS
SUM OF ALL RESPONSES TO PHQ QUESTIONS 1-9: 20

## 2017-09-29 NOTE — MR AVS SNAPSHOT
"                  MRN:[id number]                      After Visit Summary   2017    Melquiades Morales    MRN: [id number]           Visit Information        Provider Department      2017 9:00 AM Kleber Pollack, [facility]      Your next 10 appointments already scheduled     Oct 20, 2017  9:00 AM CDT   Return Visit with Kleber Pollack [facility] ([facility])    [address] [place] [id number]   [phone]            2017  9:00 AM CDT   Return Visit with Kleber Pollack [facility] ([facility])    [address] [place] [address]   [phone]            2017  9:00 AM CST   Return Visit with Kleber Pollack [facility] ([address] [address]   [phone]              MyChart Information     [employer] lets you send messages to your doctor, view your test results, renew your prescriptions, schedule appointments and more. To sign up, go to [email or web address] . Click on \"Log in\" on the left side of the screen, which will take you to the Welcome page. Then click on \"Sign up Now\" on the right side of the page.     You will be asked to enter the access code listed below, as well as some personal information. Please follow the directions to create your username and password.     Your access code is: [email or web address]  Expires: 2017 10:01 AM     Your access code will  in 90 days. If you need help or a new code, please call your [facility] clinic or [phone].        Care EveryWhere ID     This is your Care EveryWhere ID. This could be used by other organizations to access your [facility] medical records  [id number]        Equal Access to Services     AQUILES WALSH AH: Shiva Naqvi, " winnie xie, rowanta roslyn parker, river castañeda ah. So [facility] [phone].    ATENCIÓN: Si habla español, tiene a tucker disposición servicios gratuitos de asistencia lingüística. Llame al [phone].    We comply with applicable federal civil rights laws and [place] laws. We do not discriminate on the basis of race, color, national origin, age, disability sex, sexual orientation or gender identity.

## 2017-09-29 NOTE — Clinical Note
Financial stress. Workman's comp meeting next month. Reports having suicidal thoughts this morning and not sure why. Denies plan or intent of harming self. Reported feeling his safety plan is adequate.
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
## 2017-09-30 ASSESSMENT — ANXIETY QUESTIONNAIRES: GAD7 TOTAL SCORE: 14

## 2017-10-02 ENCOUNTER — TELEPHONE (OUTPATIENT)
Dept: PSYCHOLOGY | Facility: CLINIC | Age: 62
End: 2017-10-02

## 2017-10-02 ENCOUNTER — TELEPHONE (OUTPATIENT)
Dept: FAMILY MEDICINE | Facility: CLINIC | Age: 62
End: 2017-10-02

## 2017-10-02 DIAGNOSIS — Z53.9 ERRONEOUS ENCOUNTER--DISREGARD: Primary | ICD-10-CM

## 2017-10-02 DIAGNOSIS — F32.A DEPRESSION, UNSPECIFIED DEPRESSION TYPE: ICD-10-CM

## 2017-10-02 RX ORDER — DULOXETIN HYDROCHLORIDE 60 MG/1
120 CAPSULE, DELAYED RELEASE ORAL DAILY
Qty: 180 CAPSULE | Refills: 1 | Status: SHIPPED | OUTPATIENT
Start: 2017-10-02 | End: 2018-04-02

## 2017-10-02 NOTE — TELEPHONE ENCOUNTER
Reason for Call:  Medication or medication refill:    Do you use a [facility]?  Name of the pharmacy and phone number for the current request:  [facility] [place] [phone]    Name of the medication requested: Cymbalta    Last Written Prescription Date: 4/12/2017  Last Fill Quantity: 180, # refills: 3  [place] [facility] [place] [facility] prescribing provider: 4/12/2017   Next 5 appointments (look out 90 days)     Oct 20, 2017  9:00 AM CDT   Return Visit with Kleber Pollack [facility] ([facility])    [address] [place] [id number]   [phone]            Nov 03, 2017  9:00 AM CDT   Return Visit with Kleber Pollack [facility] ([facility])    [address] [place] [address]   [phone]            Nov 17, 2017  9:00 AM CST   Return Visit with Kleber Pollack [facility] ([facility])    [address] [place] [address]   [phone]                   BP Readings from Last 3 Encounters:   04/12/17 (!) 177/91   01/30/17 136/90   01/21/17 162/85     Pulse: (for [facility])  Creatinine   Date Value Ref Range Status   04/12/2017 0.70 0.66 - 1.25 mg/dL Final   ]    Last PHQ-9 score on record=   PHQ-9 SCORE 9/29/2017   Total Score -   Total Score 20     Pt has been out of meds for 2 days now.    Can we leave a detailed message on this number? YES    Phone number patient can be reached at: Home number on file [phone] (home)    Best Time: any    Call taken on 10/2/2017 at 1:40 PM by Izzy Rios

## 2017-10-02 NOTE — TELEPHONE ENCOUNTER
Prescription approved per [facility].  Remaining Celexa refills sent to [facility] (from [facility])

## 2017-10-03 ENCOUNTER — TELEPHONE (OUTPATIENT)
Dept: PSYCHOLOGY | Facility: CLINIC | Age: 62
End: 2017-10-03

## 2017-10-04 ENCOUNTER — TELEPHONE (OUTPATIENT)
Dept: FAMILY MEDICINE | Facility: CLINIC | Age: 62
End: 2017-10-04

## 2017-10-04 ASSESSMENT — PATIENT HEALTH QUESTIONNAIRE - PHQ9: SUM OF ALL RESPONSES TO PHQ QUESTIONS 1-9: 12

## 2017-10-04 NOTE — TELEPHONE ENCOUNTER
Panel Management Review      Patient has the following on his problem list:     Depression / Dysthymia review    Measure:  Needs PHQ-9 score of 4 or less during index window.  Administer PHQ-9 and if score is 5 or more, send encounter to provider for next steps.    5   7 month window range:     PHQ-9 SCORE 8/18/2017 9/1/2017 9/29/2017   Total Score - - -   Total Score 16 16 20       If PHQ-9 recheck is 5 or more, route to provider for next steps.    Patient is due for:  PHQ9        Composite cancer screening  Chart review shows that this patient is due/due soon for the following None  Summary:    Patient is due/failing the following:   PHQ9    Action needed:   Patient needs to do PHQ9.    Type of outreach:    Phone, spoke to patient.  PHQ done will send results to Dr Travis     Questions for provider review:    None                                                                                                                                    Joan Cabello CMA       Chart routed to Provider .

## 2017-10-06 ENCOUNTER — TELEPHONE (OUTPATIENT)
Dept: PSYCHOLOGY | Facility: CLINIC | Age: 62
End: 2017-10-06

## 2017-10-07 ENCOUNTER — TELEPHONE (OUTPATIENT)
Dept: PSYCHOLOGY | Facility: CLINIC | Age: 62
End: 2017-10-07

## 2017-10-07 ENCOUNTER — FCC EXTENDED DOCUMENTATION (OUTPATIENT)
Dept: PSYCHOLOGY | Facility: CLINIC | Age: 62
End: 2017-10-07

## 2017-10-07 NOTE — TELEPHONE ENCOUNTER
Reason for Call:  Other call back    Detailed comments: patient is calling stating his pneumonia is back, can I get some medicine. He is sometimes SOA and has a lot of phlem, he cant get up. He would like a Z-anastacio. He uses [facility] in [place].    Phone Number Patient can be reached at: Home number on file [phone] (home)    Best Time: any    Can we leave a detailed message on this number? YES   Libby Peterson  [facility] [place] [facility]      Call taken on 9/1/2017 at 3:51 PM by Libby Peterson      
S-(situation): Patient would like an antibiotic for the weekend for possible pneumonia.    B-(background): Patient's reports he gets this every year.     A-(assessment): Patient has productive cough, shortness of air.  Patient states it gets worse as the day goes on.  Patient reports it has been going on for about one week  Patient denies any fevers.    R-(recommendations): Advised patient to go to ER/UC for treatment.  Patient was advised we did not have any openings at this time. Patient would need to be evaluated for further treatment. Patient agrees and understands.      Milly MCGOVERN RN    
5/5 normal strength.
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
## 2017-10-10 ENCOUNTER — OFFICE VISIT (OUTPATIENT)
Dept: FAMILY MEDICINE | Facility: CLINIC | Age: 62
End: 2017-10-10
Payer: MEDICARE

## 2017-10-10 VITALS
HEIGHT: 71 IN | BODY MASS INDEX: 35.7 KG/M2 | DIASTOLIC BLOOD PRESSURE: 83 MMHG | SYSTOLIC BLOOD PRESSURE: 133 MMHG | TEMPERATURE: 97.7 F | OXYGEN SATURATION: 90 % | HEART RATE: 74 BPM | RESPIRATION RATE: 18 BRPM | WEIGHT: 255 LBS

## 2017-10-10 DIAGNOSIS — J40 BRONCHITIS: ICD-10-CM

## 2017-10-10 DIAGNOSIS — F17.200 TOBACCO USE DISORDER: ICD-10-CM

## 2017-10-10 DIAGNOSIS — Z23 NEED FOR PROPHYLACTIC VACCINATION AND INOCULATION AGAINST INFLUENZA: Primary | ICD-10-CM

## 2017-10-10 PROCEDURE — 99214 OFFICE O/P EST MOD 30 MIN: CPT | Mod: 25 | Performed by: FAMILY MEDICINE

## 2017-10-10 PROCEDURE — G0008 ADMIN INFLUENZA VIRUS VAC: HCPCS | Performed by: FAMILY MEDICINE

## 2017-10-10 PROCEDURE — 90686 IIV4 VACC NO PRSV 0.5 ML IM: CPT | Performed by: FAMILY MEDICINE

## 2017-10-10 RX ORDER — AZITHROMYCIN 250 MG/1
TABLET, FILM COATED ORAL
Qty: 6 TABLET | Refills: 0 | Status: SHIPPED | OUTPATIENT
Start: 2017-10-10 | End: 2018-01-30

## 2017-10-10 RX ORDER — ALBUTEROL SULFATE 90 UG/1
2 AEROSOL, METERED RESPIRATORY (INHALATION) EVERY 4 HOURS PRN
Qty: 1 INHALER | Refills: 11 | Status: SHIPPED | OUTPATIENT
Start: 2017-10-10 | End: 2019-08-16

## 2017-10-10 NOTE — PATIENT INSTRUCTIONS
Thank you for choosing [facility].  You may be receiving a survey in the mail from [facility] regarding your visit today.  Please take a few minutes to complete and return the survey to let us know how we are doing.      Our Clinic hours are:  Mondays    7:20 am - 7 pm  Tues -  Fri  7:20 am - 5 pm    Clinic Phone: [phone]    The clinic lab opens at 7:30 am Mon - Fri and appointments are required.    [facility]. [phone]  Monday-Thursday 8 am - 7pm  Tues/Wed/Fri 8 am - 5:30 pm

## 2017-10-10 NOTE — PROGRESS NOTES
"  SUBJECTIVE:   Melquiades Morales is a 62 year old male who presents to clinic today for the following health issues:      Chief Complaint   Patient presents with     Breathing Problem             Problem list and histories reviewed & adjusted, as indicated.  Additional history: as documented        Reviewed and updated as needed this visit by clinical staff  Tobacco  Allergies  Meds       Reviewed and updated as needed this visit by Provider      OBJECTIVE:  /83  Pulse 74  Temp 97.7  F (36.5  C)  Resp 18  Ht 5' 11\" (1.803 m)  Wt 255 lb (115.7 kg)  SpO2 90%  BMI 35.57 kg/m2  LUNGS: Diffuse rhonchi without rales or wheezes  CV: RRR without murmur  ABD: BS+, soft, nontender, no masses, no hepatosplenomegaly  EXTREMITIES: without joint tenderness, swelling or erythema.  No muscle tenderness or abnormality.  SKIN: No rashes or abnormalities  NEURO:non focal exam    ASSESSMENT:     Bronchitis  Need for prophylactic vaccination and inoculation against influenza  Tobacco use disorder    PLAN:  I counseled him on quitting smoking something we've talked about in the past and we discussed in detail the possibility of worsening COPD and recurring infections.    Orders Placed This Encounter     Vaccine Administration, Initial [28546]     FLU VAC, SPLIT VIRUS IM > 3 YO (QUADRIVALENT) [97570]     azithromycin (ZITHROMAX) 250 MG tablet     albuterol (PROAIR HFA) 108 (90 BASE) MCG/ACT Inhaler     30 minutes face to face patient time, 25 minutes counseling regarding the above problems        Injectable Influenza Immunization Documentation    1.  Is the person to be vaccinated sick today?   No    2. Does the person to be vaccinated have an allergy to a component   of the vaccine?   No    3. Has the person to be vaccinated ever had a serious reaction   to influenza vaccine in the past?   No    4. Has the person to be vaccinated ever had Guillain-Barré syndrome?   No    Form completed by Joan Cabello CMA           "

## 2017-10-10 NOTE — NURSING NOTE
"Chief Complaint   Patient presents with     Breathing Problem       Initial /77  Pulse 74  Temp 97.7  F (36.5  C)  Resp 18  Ht 5' 11\" (1.803 m)  Wt 255 lb (115.7 kg)  SpO2 90%  BMI 35.57 kg/m2 Estimated body mass index is 35.57 kg/(m^2) as calculated from the following:    Height as of this encounter: 5' 11\" (1.803 m).    Weight as of this encounter: 255 lb (115.7 kg).  Medication Reconciliation: complete   Joan Cabello CMA      "

## 2017-10-10 NOTE — MR AVS SNAPSHOT
After Visit Summary   10/10/2017    Melquiades Morales    MRN: [id number]           Patient Information     Date Of Birth          1955        Visit Information        Provider Department      10/10/2017 9:40 AM Jignesh Travis MD [facility]        Today's Diagnoses     Need for prophylactic vaccination and inoculation against influenza    -  1    Bronchitis        Tobacco use disorder          Care Instructions          Thank you for choosing [facility].  You may be receiving a survey in the mail from [facility] regarding your visit today.  Please take a few minutes to complete and return the survey to let us know how we are doing.      Our Clinic hours are:  Mondays    7:20 am - 7 pm  Tues -  Fri  7:20 am - 5 pm    Clinic Phone: [phone]    The clinic lab opens at 7:30 am Mon - Fri and appointments are required.    [facility]. [phone]  Monday-Thursday 8 am - 7pm  Tues/Wed/Fri 8 am - 5:30 pm                 Follow-ups after your visit        Your next 10 appointments already scheduled     Oct 20, 2017  9:00 AM CDT   Return Visit with Kleber Pollack [address] [address]   [phone]            Nov 03, 2017  9:00 AM CDT   Return Visit with Kleber Pollack [facility] ([address] [address]   [phone]            Nov 17, 2017  9:00 AM CST   Return Visit with Kleber Pollack [facility] ([address] [address]   [phone]              Who to contact     If you have questions or need follow up information about today's clinic visit or your schedule please contact [facility] directly at [phone].  Normal or  "non-critical lab and imaging results will be communicated to you by MyChart, letter or phone within 4 business days after the clinic has received the results. If you do not hear from us within 7 days, please contact the clinic through B-Ob[employer] or phone. If you have a critical or abnormal lab result, we will notify you by phone as soon as possible.  Submit refill requests through Ve[employer]ium or call your pharmacy and they will forward the refill request to us. Please allow 3 business days for your refill to be completed.          Additional Information About Your Visit        Verenium Information     [employer]erenium lets you send messages to your doctor, view your test results, renew your prescriptions, schedule appointments and more. To sign up, go to [email or web address] . Click on \"Log in\" on the left side of the screen, which will take you to the Welcome page. Then click on \"Sign up Now\" on the right side of the page.     You will be asked to enter the access code listed below, as well as some personal information. Please follow the directions to create your username and password.     Your access code is: [email or web address]  Expires: 2017 10:01 AM     Your access code will  in 90 days. If you need help or a new code, please call your [facility] clinic or [phone].        Care EveryWhere ID     This is your Care EveryWhere ID. This could be used by other organizations to access your [facility] medical records  [id number]        Your Vitals Were     Pulse Temperature Respirations Height Pulse Oximetry BMI (Body Mass Index)    74 97.7  F (36.5  C) 18 5' 11\" (1.803 m) 90% 35.57 kg/m2       Blood Pressure from Last 3 Encounters:   10/10/17 133/83   17 (!) 177/91   17 136/90    Weight from Last 3 Encounters:   10/10/17 255 lb (115.7 kg)   17 250 lb (113.4 kg)   17 250 lb (113.4 kg)              We Performed the Following     FLU VAC, SPLIT VIRUS IM > 3 YO (QUADRIVALENT) [84932]     Vaccine " Administration, Initial [21080]          Today's Medication Changes          These changes are accurate as of: 10/10/17 11:12 AM.  If you have any questions, ask your nurse or doctor.               Start taking these medicines.        Dose/Directions    azithromycin 250 MG tablet   Commonly known as:  ZITHROMAX   Used for:  Bronchitis   Started by:  Jignesh Travis MD        2 pills on day 1 then one pill daily   Quantity:  6 tablet   Refills:  0            Where to get your medicines      These medications were sent to [facility] [place] - [address]  [address] [address]    Hours:  [facility] Phone:  [phone]     albuterol 108 (90 BASE) MCG/ACT Inhaler    azithromycin 250 MG tablet                Primary Care Provider Office Phone # Fax #    Jignesh Travis [phone] [address] [facility] [address]        Equal Access to Services     [facility] AH: Hadii aad ku hadasho Soomaali, waaxda luqadaha, qaybta kaalmada adeegyada, waxay idiin hayaan tracie castañeda . So [facility] [phone].    ATENCIÓN: Si habla español, tiene a tucker disposición servicios gratuitos de asistencia lingüística. Llame al [phone].    We comply with applicable federal civil rights laws and [place] laws. We do not discriminate on the basis of race, color, national origin, age, disability, sex, sexual orientation, or gender identity.            Thank you!     Thank you for choosing [facility]  for your care. Our goal is always to provide you with excellent care. Hearing back from our patients is one way we can continue to improve our services. Please take a few minutes to complete the written survey that you may receive in the mail after your visit with us. Thank you!             Your Updated Medication List - Protect others around you: Learn how to safely use, store and throw away your medicines at  www.disposemymeds.org.          This list is accurate as of: 10/10/17 11:12 AM.  Always use your most recent med list.                   Brand Name Dispense Instructions for use Diagnosis    albuterol 108 (90 BASE) MCG/ACT Inhaler    PROAIR HFA    1 Inhaler    Inhale 2 puffs into the lungs every 4 hours as needed for shortness of breath / dyspnea or wheezing    Bronchitis       azithromycin 250 MG tablet    ZITHROMAX    6 tablet    2 pills on day 1 then one pill daily    Bronchitis       DULoxetine 60 MG EC capsule    CYMBALTA    180 capsule    Take 2 capsules (120 mg) by mouth daily    Depression, unspecified depression type       lidocaine 5 % ointment    XYLOCAINE    744.24 g    Apply topically as needed for moderate pain Apply 2 grams to affected PRN up to 4x daily. [phone] Pharmacy #.    Pain       * LYRICA PO      Take 100 mg by mouth every morning        * LYRICA PO      Take 150 mg by mouth At Bedtime        * MS CONTIN PO      Take 30 mg by mouth 3 times daily 15 mg X 2 tabs.  AM, 1200, bedtime        * morphine 30 MG 12 hr tablet    MS CONTIN    6 tablet    Take 1 tablet (30 mg) by mouth 3 times daily    Spinal stenosis in cervical region       naproxen 500 MG tablet    NAPROSYN    60 tablet    Take 1 tablet (500 mg) by mouth at onset of headache    Chronic pain syndrome       order for DME     1 Device    Semi electric hospital bed with side rails and mattress. Length of bed is for lifetime    Other unspecified back disorder, Obese, Lumbosacral radiculitis, COPD (chronic obstructive pulmonary disease) (H)       * oxyCODONE-acetaminophen 5-325 MG per tablet    PERCOCET    24 tablet    Take 1-2 tablets by mouth every 6 hours as needed for moderate to severe pain        * oxyCODONE-acetaminophen 5-325 MG per tablet    PERCOCET    8 tablet    Take 1 tablet by mouth every 6 hours as needed for moderate to severe pain    Spinal stenosis in cervical region       simvastatin 80 MG tablet    ZOCOR    90  tablet    Take 1 tablet (80 mg) by mouth every morning    Hyperlipidemia LDL goal <130       * traZODone 50 MG tablet    DESYREL    90 tablet    Take 0.5 tablets (25 mg) by mouth nightly as needed for sleep Take along with the 100 mg tablets for total dose of 225 mg    Depression, unspecified depression type       * traZODone 100 MG tablet    DESYREL    180 tablet    Take 2 tablets (200 mg) by mouth nightly as needed for sleep Take along with the 25 mg tablet for total dose of 225 mg    Depression, unspecified depression type       * Notice:  This list has 8 medication(s) that are the same as other medications prescribed for you. Read the directions carefully, and ask your doctor or other care provider to review them with you.

## 2017-10-20 ENCOUNTER — OFFICE VISIT (OUTPATIENT)
Dept: PSYCHOLOGY | Facility: CLINIC | Age: 62
End: 2017-10-20
Payer: MEDICARE

## 2017-10-20 DIAGNOSIS — F33.1 MAJOR DEPRESSIVE DISORDER, RECURRENT EPISODE, MODERATE (H): Primary | ICD-10-CM

## 2017-10-20 DIAGNOSIS — F41.1 GENERALIZED ANXIETY DISORDER: ICD-10-CM

## 2017-10-20 PROCEDURE — 90834 PSYTX W PT 45 MINUTES: CPT | Performed by: SOCIAL WORKER

## 2017-10-20 ASSESSMENT — ANXIETY QUESTIONNAIRES
3. WORRYING TOO MUCH ABOUT DIFFERENT THINGS: MORE THAN HALF THE DAYS
2. NOT BEING ABLE TO STOP OR CONTROL WORRYING: MORE THAN HALF THE DAYS
6. BECOMING EASILY ANNOYED OR IRRITABLE: MORE THAN HALF THE DAYS
1. FEELING NERVOUS, ANXIOUS, OR ON EDGE: SEVERAL DAYS
7. FEELING AFRAID AS IF SOMETHING AWFUL MIGHT HAPPEN: NOT AT ALL
5. BEING SO RESTLESS THAT IT IS HARD TO SIT STILL: SEVERAL DAYS
GAD7 TOTAL SCORE: 10

## 2017-10-20 ASSESSMENT — PATIENT HEALTH QUESTIONNAIRE - PHQ9
SUM OF ALL RESPONSES TO PHQ QUESTIONS 1-9: 14
5. POOR APPETITE OR OVEREATING: MORE THAN HALF THE DAYS
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
## 2017-10-20 NOTE — MR AVS SNAPSHOT
"                  MRN:[id number]                      After Visit Summary   10/20/2017    Melquiades Morales    MRN: [id number]           Visit Information        Provider Department      10/20/2017 9:00 AM Kleber Pollack, [facility]      Your next 10 appointments already scheduled     2017  9:00 AM CDT   Return Visit with Kleber Pollack [facility])    [address] [place] [address]   [phone]            2017  9:00 AM CST   Return Visit with Kleber Pollack [facility] ([address] [address]   [phone]            Dec 01, 2017  9:00 AM CST   Return Visit with Kleber Pollack [facility] ([address] [address]   [phone]              MyChart Information     [facility] lets you send messages to your doctor, view your test results, renew your prescriptions, schedule appointments and more. To sign up, go to [email or web address] . Click on \"Log in\" on the left side of the screen, which will take you to the Welcome page. Then click on \"Sign up Now\" on the right side of the page.     You will be asked to enter the access code listed below, as well as some personal information. Please follow the directions to create your username and password.     Your access code is: [email or web address]  Expires: 2017 10:01 AM     Your access code will  in 90 days. If you need help or a new code, please call your [place] clinic or [phone].        Care EveryWhere ID     This is your Care EveryWhere ID. This could be used by other organizations to access your [place] medical records  [id number]        Equal Access to Services     AQUILES WALSH AH: Shiva Naqvi, " winnie xie, rowanta roslyn parker, river castañeda ah. So [facility] [phone].    ATENCIÓN: Si habla español, tiene a tucker disposición servicios gratuitos de asistencia lingüística. Llame al [phone].    We comply with applicable federal civil rights laws and [place] laws. We do not discriminate on the basis of race, color, national origin, age, disability, sex, sexual orientation, or gender identity.

## 2017-10-21 ASSESSMENT — ANXIETY QUESTIONNAIRES: GAD7 TOTAL SCORE: 10

## 2017-11-03 ENCOUNTER — OFFICE VISIT (OUTPATIENT)
Dept: PSYCHOLOGY | Facility: CLINIC | Age: 62
End: 2017-11-03
Payer: MEDICARE

## 2017-11-03 DIAGNOSIS — F41.1 GENERALIZED ANXIETY DISORDER: ICD-10-CM

## 2017-11-03 DIAGNOSIS — F33.1 MAJOR DEPRESSIVE DISORDER, RECURRENT EPISODE, MODERATE (H): Primary | ICD-10-CM

## 2017-11-03 DIAGNOSIS — F43.10 POSTTRAUMATIC STRESS DISORDER: ICD-10-CM

## 2017-11-03 PROCEDURE — 90834 PSYTX W PT 45 MINUTES: CPT | Performed by: SOCIAL WORKER

## 2017-11-03 ASSESSMENT — ANXIETY QUESTIONNAIRES
7. FEELING AFRAID AS IF SOMETHING AWFUL MIGHT HAPPEN: NOT AT ALL
5. BEING SO RESTLESS THAT IT IS HARD TO SIT STILL: MORE THAN HALF THE DAYS
3. WORRYING TOO MUCH ABOUT DIFFERENT THINGS: MORE THAN HALF THE DAYS
1. FEELING NERVOUS, ANXIOUS, OR ON EDGE: MORE THAN HALF THE DAYS
GAD7 TOTAL SCORE: 11
2. NOT BEING ABLE TO STOP OR CONTROL WORRYING: MORE THAN HALF THE DAYS
6. BECOMING EASILY ANNOYED OR IRRITABLE: MORE THAN HALF THE DAYS

## 2017-11-03 ASSESSMENT — PATIENT HEALTH QUESTIONNAIRE - PHQ9
5. POOR APPETITE OR OVEREATING: SEVERAL DAYS
SUM OF ALL RESPONSES TO PHQ QUESTIONS 1-9: 15
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
## 2017-11-03 NOTE — MR AVS SNAPSHOT
"                  MRN:[id number]                      After Visit Summary   11/3/2017    Melquiades Morales    MRN: [id number]           Visit Information        Provider Department      11/3/2017 9:00 AM Kleber Pollack, [facility] Generic      Your next 10 appointments already scheduled     2017  9:00 AM CST   Return Visit with Kleber Pollack [facility] ([facility])    [address] [address]   [phone]            Dec 01, 2017  9:00 AM CST   Return Visit with Kleber Pollack [facility] ([facility])    [address] [address]   [phone]            Dec 15, 2017  9:00 AM CST   Return Visit with Kleber Pollack [facility] ([facility])    [address] [place] [address]   [phone]              MyChart Information     [employer] lets you send messages to your doctor, view your test results, renew your prescriptions, schedule appointments and more. To sign up, go to [email or web address] . Click on \"Log in\" on the left side of the screen, which will take you to the Welcome page. Then click on \"Sign up Now\" on the right side of the page.     You will be asked to enter the access code listed below, as well as some personal information. Please follow the directions to create your username and password.     Your access code is: [email or web address]  Expires: 2017 10:01 AM     Your access code will  in 90 days. If you need help or a new code, please call your [place] clinic or [phone].        Care EveryWhere ID     This is your Care EveryWhere ID. This could be used by other organizations to access your [place] medical records  [id number]        Equal Access to Services     AQUILES WALSH AH: Shiva Naqvi, " winnie xie, rowanta roslyn parker, river castañeda ah. So [facility] [phone].    ATENCIÓN: Si habla español, tiene a tucker disposición servicios gratuitos de asistencia lingüística. Llame al [phone].    We comply with applicable federal civil rights laws and [place] laws. We do not discriminate on the basis of race, color, national origin, age, disability, sex, sexual orientation, or gender identity.

## 2017-11-04 ASSESSMENT — ANXIETY QUESTIONNAIRES: GAD7 TOTAL SCORE: 11

## 2017-11-17 ENCOUNTER — OFFICE VISIT (OUTPATIENT)
Dept: PSYCHOLOGY | Facility: CLINIC | Age: 62
End: 2017-11-17
Payer: MEDICARE

## 2017-11-17 DIAGNOSIS — F33.1 MAJOR DEPRESSIVE DISORDER, RECURRENT EPISODE, MODERATE (H): Primary | ICD-10-CM

## 2017-11-17 DIAGNOSIS — F41.1 GENERALIZED ANXIETY DISORDER: ICD-10-CM

## 2017-11-17 PROCEDURE — 90834 PSYTX W PT 45 MINUTES: CPT | Performed by: SOCIAL WORKER

## 2017-11-17 ASSESSMENT — ANXIETY QUESTIONNAIRES
1. FEELING NERVOUS, ANXIOUS, OR ON EDGE: MORE THAN HALF THE DAYS
6. BECOMING EASILY ANNOYED OR IRRITABLE: MORE THAN HALF THE DAYS
5. BEING SO RESTLESS THAT IT IS HARD TO SIT STILL: SEVERAL DAYS
2. NOT BEING ABLE TO STOP OR CONTROL WORRYING: MORE THAN HALF THE DAYS
GAD7 TOTAL SCORE: 11
7. FEELING AFRAID AS IF SOMETHING AWFUL MIGHT HAPPEN: NOT AT ALL
3. WORRYING TOO MUCH ABOUT DIFFERENT THINGS: MORE THAN HALF THE DAYS

## 2017-11-17 ASSESSMENT — PATIENT HEALTH QUESTIONNAIRE - PHQ9
5. POOR APPETITE OR OVEREATING: MORE THAN HALF THE DAYS
SUM OF ALL RESPONSES TO PHQ QUESTIONS 1-9: 15

## 2017-11-17 NOTE — MR AVS SNAPSHOT
"                  MRN:[id number]                      After Visit Summary   2017    Melquiades Morales    MRN: [id number]           Visit Information        Provider Department      2017 9:00 AM Kleber Pollack [facility] Generic      Your next 10 appointments already scheduled     Dec 01, 2017  9:00 AM CST   Return Visit with Kleber S [facility] ([facility])    [address] [address]   [phone]            Dec 22, 2017  9:00 AM CST   Return Visit with Kleber NELSON [facility] ([facility]    [address] [address]   [phone]              MyChart Information     [employer] lets you send messages to your doctor, view your test results, renew your prescriptions, schedule appointments and more. To sign up, go to [email or web address] . Click on \"Log in\" on the left side of the screen, which will take you to the Welcome page. Then click on \"Sign up Now\" on the right side of the page.     You will be asked to enter the access code listed below, as well as some personal information. Please follow the directions to create your username and password.     Your access code is: [email or web address]  Expires: 2017  9:01 AM     Your access code will  in 90 days. If you need help or a new code, please call your [place] clinic or [phone].        Care EveryWhere ID     This is your Care EveryWhere ID. This could be used by other organizations to access your [place] medical records  [id number]        Equal Access to Services     [facility] HospitalILDEFONSO : Hadii aad eugene hadyordan Sojannet, waaxda luqadaha, qaybta kaalmada adechuckyada, river [place]. So [facility] [phone].    ATENCIÓN: Si habla español, tiene a tucker disposición servicios gratuitos de asistencia lingüística. Llame al " [phone].    We comply with applicable federal civil rights laws and [place] laws. We do not discriminate on the basis of race, color, national origin, age, disability, sex, sexual orientation, or gender identity.
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
## 2017-11-18 ASSESSMENT — ANXIETY QUESTIONNAIRES: GAD7 TOTAL SCORE: 11

## 2017-12-22 ENCOUNTER — OFFICE VISIT (OUTPATIENT)
Dept: PSYCHOLOGY | Facility: CLINIC | Age: 62
End: 2017-12-22
Payer: MEDICARE

## 2017-12-22 DIAGNOSIS — F33.1 MAJOR DEPRESSIVE DISORDER, RECURRENT EPISODE, MODERATE (H): Primary | ICD-10-CM

## 2017-12-22 DIAGNOSIS — F41.1 GENERALIZED ANXIETY DISORDER: ICD-10-CM

## 2017-12-22 PROCEDURE — 90834 PSYTX W PT 45 MINUTES: CPT | Performed by: SOCIAL WORKER

## 2017-12-22 ASSESSMENT — ANXIETY QUESTIONNAIRES
2. NOT BEING ABLE TO STOP OR CONTROL WORRYING: MORE THAN HALF THE DAYS
3. WORRYING TOO MUCH ABOUT DIFFERENT THINGS: MORE THAN HALF THE DAYS
1. FEELING NERVOUS, ANXIOUS, OR ON EDGE: MORE THAN HALF THE DAYS
7. FEELING AFRAID AS IF SOMETHING AWFUL MIGHT HAPPEN: NOT AT ALL
5. BEING SO RESTLESS THAT IT IS HARD TO SIT STILL: SEVERAL DAYS
6. BECOMING EASILY ANNOYED OR IRRITABLE: MORE THAN HALF THE DAYS
GAD7 TOTAL SCORE: 10

## 2017-12-22 ASSESSMENT — PATIENT HEALTH QUESTIONNAIRE - PHQ9
SUM OF ALL RESPONSES TO PHQ QUESTIONS 1-9: 16
5. POOR APPETITE OR OVEREATING: SEVERAL DAYS

## 2017-12-22 NOTE — MR AVS SNAPSHOT
"                  MRN:[id number]                      After Visit Summary   2017    Melquiades Morales    MRN: [id number]           Visit Information        Provider Department      2017 9:00 AM Kleber Pollack, [facility] Generic      Your next 10 appointments already scheduled     2018  9:00 AM CST   Return Visit with Kleber Pollack [facility] ([facility])    [address] [place] [address]   [phone]            2018  9:00 AM CST   Return Visit with Kleber Pollack [facility] ([facility])    [address] [address]   [phone]            2018  9:00 AM CST   Return Visit with Kleber Pollack [facility] ([facility])    [address] [place] [address]   [phone]              MyChart Information     [employer] lets you send messages to your doctor, view your test results, renew your prescriptions, schedule appointments and more. To sign up, go to [email or web address] . Click on \"Log in\" on the left side of the screen, which will take you to the Welcome page. Then click on \"Sign up Now\" on the right side of the page.     You will be asked to enter the access code listed below, as well as some personal information. Please follow the directions to create your username and password.     Your access code is: [email or web address]  Expires: 2017  9:01 AM     Your access code will  in 90 days. If you need help or a new code, please call your [place] clinic or [phone].        Care EveryWhere ID     This is your Care EveryWhere ID. This could be used by other organizations to access your [place] medical records  [id number]        Equal Access to Services     AQUILES WALSH AH: Shiva Naqvi, " winnie xie, rwoanta roslyn parker, river castañeda ah. So [facility] [phone].    ATENCIÓN: Si habla español, tiene a tucker disposición servicios gratuitos de asistencia lingüística. Llame al [phone].    We comply with applicable federal civil rights laws and [place] laws. We do not discriminate on the basis of race, color, national origin, age, disability, sex, sexual orientation, or gender identity.

## 2017-12-22 NOTE — Clinical Note
Stable scores. 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 Hasn't heard back about workman comp.  Hope you are have a wonderful holiday season!
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
## 2017-12-23 ASSESSMENT — ANXIETY QUESTIONNAIRES: GAD7 TOTAL SCORE: 10

## 2017-12-27 ENCOUNTER — MEDICAL CORRESPONDENCE (OUTPATIENT)
Dept: HEALTH INFORMATION MANAGEMENT | Facility: CLINIC | Age: 62
End: 2017-12-27

## 2018-01-03 ENCOUNTER — TELEPHONE (OUTPATIENT)
Dept: FAMILY MEDICINE | Facility: CLINIC | Age: 63
End: 2018-01-03

## 2018-01-03 ASSESSMENT — PATIENT HEALTH QUESTIONNAIRE - PHQ9: SUM OF ALL RESPONSES TO PHQ QUESTIONS 1-9: 13

## 2018-01-03 NOTE — TELEPHONE ENCOUNTER
Panel Management Review      Patient has the following on his problem list:     Depression / Dysthymia review    Measure:  Needs PHQ-9 score of 4 or less during index window.  Administer PHQ-9 and if score is 5 or more, send encounter to provider for next steps.    5   7 month window range:     PHQ-9 SCORE 11/3/2017 11/17/2017 12/22/2017   Total Score - - -   Total Score 15 15 16       If PHQ-9 recheck is 5 or more, route to provider for next steps.    Patient is due for:  PHQ9        Composite cancer screening  Chart review shows that this patient is due/due soon for the following None  Summary:    Patient is due/failing the following:   PHQ9    Action needed:   Patient needs to do PHQ9.    Type of outreach:    Phone, spoke to patient.  did PHQ 9    Questions for provider review:    None                                                                                                                                    Joan Cabello CMA         Chart routed to  None  .

## 2018-01-19 ENCOUNTER — OFFICE VISIT (OUTPATIENT)
Dept: PSYCHOLOGY | Facility: CLINIC | Age: 63
End: 2018-01-19
Payer: MEDICARE

## 2018-01-19 DIAGNOSIS — F33.1 MAJOR DEPRESSIVE DISORDER, RECURRENT EPISODE, MODERATE (H): Primary | ICD-10-CM

## 2018-01-19 DIAGNOSIS — F41.1 GENERALIZED ANXIETY DISORDER: ICD-10-CM

## 2018-01-19 PROCEDURE — 90834 PSYTX W PT 45 MINUTES: CPT | Performed by: SOCIAL WORKER

## 2018-01-19 ASSESSMENT — ANXIETY QUESTIONNAIRES
5. BEING SO RESTLESS THAT IT IS HARD TO SIT STILL: SEVERAL DAYS
7. FEELING AFRAID AS IF SOMETHING AWFUL MIGHT HAPPEN: NOT AT ALL
GAD7 TOTAL SCORE: 10
3. WORRYING TOO MUCH ABOUT DIFFERENT THINGS: MORE THAN HALF THE DAYS
1. FEELING NERVOUS, ANXIOUS, OR ON EDGE: MORE THAN HALF THE DAYS
2. NOT BEING ABLE TO STOP OR CONTROL WORRYING: MORE THAN HALF THE DAYS
6. BECOMING EASILY ANNOYED OR IRRITABLE: MORE THAN HALF THE DAYS

## 2018-01-19 ASSESSMENT — PATIENT HEALTH QUESTIONNAIRE - PHQ9
SUM OF ALL RESPONSES TO PHQ QUESTIONS 1-9: 15
5. POOR APPETITE OR OVEREATING: SEVERAL DAYS
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
## 2018-01-19 NOTE — MR AVS SNAPSHOT
"                  MRN:[id number]                      After Visit Summary   2018    Melquiades Morales    MRN: [id number]           Visit Information        Provider Department      2018 9:00 AM Kleber Pollack [facility]      Your next 10 appointments already scheduled     2018  9:00 AM CST   Return Visit with Kleber S Jaki [facility] ([facility])    [address] [place] [id number]   [phone]            Mar 02, 2018  9:00 AM CST   Return Visit with Kleber S Jaki [facility] ([facility]    [address] [address]   [phone]              MyChart Information     [employer] lets you send messages to your doctor, view your test results, renew your prescriptions, schedule appointments and more. To sign up, go to [email or web address] . Click on \"Log in\" on the left side of the screen, which will take you to the Welcome page. Then click on \"Sign up Now\" on the right side of the page.     You will be asked to enter the access code listed below, as well as some personal information. Please follow the directions to create your username and password.     Your access code is: [email or web address]  Expires: 2018 10:27 AM     Your access code will  in 90 days. If you need help or a new code, please call your [facility] clinic or [phone].        Care EveryWhere ID     This is your Care EveryWhere ID. This could be used by other organizations to access your [facility] medical records  [id number]        Equal Access to Services     [facility]: Hadii aad eugene hadmargoto Soguillaumeali, waaxda luqadaha, qaybta kaalmada adeegyada, river castañeda . So [facility] [phone].    ATENCIÓN: Si habla español, tiene a tucker disposición servicios gratuitos de asistencia lingüística. Llame al " [phone].    We comply with applicable federal civil rights laws and [place] laws. We do not discriminate on the basis of race, color, national origin, age, disability, sex, sexual orientation, or gender identity.

## 2018-01-23 ASSESSMENT — ANXIETY QUESTIONNAIRES: GAD7 TOTAL SCORE: 10

## 2018-01-30 ENCOUNTER — OFFICE VISIT (OUTPATIENT)
Dept: FAMILY MEDICINE | Facility: CLINIC | Age: 63
End: 2018-01-30
Payer: MEDICARE

## 2018-01-30 VITALS
SYSTOLIC BLOOD PRESSURE: 150 MMHG | DIASTOLIC BLOOD PRESSURE: 86 MMHG | OXYGEN SATURATION: 94 % | BODY MASS INDEX: 34.31 KG/M2 | WEIGHT: 246 LBS | TEMPERATURE: 97.7 F | RESPIRATION RATE: 20 BRPM

## 2018-01-30 DIAGNOSIS — L72.9 SKIN CYST: Primary | ICD-10-CM

## 2018-01-30 PROCEDURE — 99213 OFFICE O/P EST LOW 20 MIN: CPT | Performed by: FAMILY MEDICINE

## 2018-01-30 NOTE — PROGRESS NOTES
SUBJECTIVE:   Melquiades Morales is a 62 year old male who presents to clinic today for the following health issues:    Chief Complaint   Patient presents with     Headache     Derm Problem     patient has some spots on the back of his neck he would like to have checked today he has had the skin spots for about 12 years .       Headache  Onset: about 1-2 mo     Description:   Location: back of head both sides    Character: sharp pain  Frequency:  All the time   Duration:  All the time     Intensity: moderate    Progression of Symptoms:  worsening    Accompanying Signs & Symptoms:  Stiff neck: YES  Neck or upper back pain: YES  Fever: no  Sinus pressure: no  Nausea or vomiting: no  Dizziness: no  Numbness: no  Weakness: no  Visual changes: no    History:   Head trauma: no  Family history of migraines: no  Previous tests for headaches: YES  Neurologist evaluations: YES  Able to do daily activities: no  Wake with a headaches: YES  Do headaches wake you up: no  Daily pain medication use: YES  Work/school stressors/changes: no    Precipitating factors:   Does light make it worse: no  Does sound make it worse: no    Alleviating factors:  Does sleep help: no    Therapies Tried and outcome: Naproxyn (Aleve)-  Some relief         Problem list and histories reviewed & adjusted, as indicated.  Additional history: as documented        Reviewed and updated as needed this visit by clinical staff  Tobacco  Allergies  Meds  Med Hx  Surg Hx  Fam Hx  Soc Hx      Reviewed and updated as needed this visit by Provider      Further history obtained, clarified or corrected by physician:  He has had a mass on the back of his neck for many years but has been growing lately and causing pain and he thinks contributing to his headaches.    OBJECTIVE:  /86  Temp 97.7  F (36.5  C)  Resp 20  Wt 246 lb (111.6 kg)  SpO2 94%  BMI 34.31 kg/m2  LUNGS: clear to auscultation, normal breath sounds  CV: RRR without murmur  ABD: BS+,  soft, nontender, no masses, no hepatosplenomegaly  EXTREMITIES: without joint tenderness, swelling or erythema.  No muscle tenderness or abnormality.  SKIN: No rashes or abnormalities, there is a soft tissue mass in the back of the neck feels like a skin cyst.  NEURO:non focal exam    ASSESSMENT:  Skin cyst    PLAN:  Orders Placed This Encounter     DERMATOLOGY REFERRAL

## 2018-01-30 NOTE — NURSING NOTE
"Chief Complaint   Patient presents with     Headache     Derm Problem     patient has some spots on the back of his neck he would like to have checked today he has had the skin spots for about 12 years .       Initial BP (!) 162/93 (BP Location: Right arm, Patient Position: Chair, Cuff Size: Adult Large)  Temp 97.7  F (36.5  C)  Resp 20  Wt 246 lb (111.6 kg)  SpO2 94%  BMI 34.31 kg/m2 Estimated body mass index is 34.31 kg/(m^2) as calculated from the following:    Height as of 10/10/17: 5' 11\" (1.803 m).    Weight as of this encounter: 246 lb (111.6 kg).  Medication Reconciliation: complete   Joan Cabello CMA      "

## 2018-01-30 NOTE — PATIENT INSTRUCTIONS
Thank you for choosing [facility].  You may be receiving a survey in the mail from [facility] regarding your visit today.  Please take a few minutes to complete and return the survey to let us know how we are doing.      Our Clinic hours are:  Mondays    7:20 am - 7 pm  Tues -  Fri  7:20 am - 5 pm    Clinic Phone: [phone]    The clinic lab opens at 7:30 am Mon - Fri and appointments are required.    [facility]. [phone]  Monday-Thursday 8 am - 7pm  Tues/Wed/Fri 8 am - 5:30 pm

## 2018-01-30 NOTE — MR AVS SNAPSHOT
After Visit Summary   1/30/2018    Melquiades Morales    MRN: [id number]           Patient Information     Date Of Birth          1955        Visit Information        Provider Department      1/30/2018 8:00 AM Jignesh Travis MD [facility]        Today's Diagnoses     Skin cyst    -  1      Care Instructions          Thank you for choosing [facility].  You may be receiving a survey in the mail from [facility] regarding your visit today.  Please take a few minutes to complete and return the survey to let us know how we are doing.      Our Clinic hours are:  Mondays    7:20 am - 7 pm  Tues -  Fri  7:20 am - 5 pm    Clinic Phone: [phone]    The clinic lab opens at 7:30 am Mon - Fri and appointments are required.    [facility]. [phone]  Monday-Thursday 8 am - 7pm  Tues/Wed/Fri 8 am - 5:30 pm                 Follow-ups after your visit        Additional Services     DERMATOLOGY REFERRAL       Your provider has referred you to: [facility]: [facility] [phone]   [email or web address]    Please be aware that coverage of these services is subject to the terms and limitations of your health insurance plan.  Call member services at your health plan with any benefit or coverage questions.      Please bring the following with you to your appointment:    (1) Any X-Rays, CTs or MRIs which have been performed.  Contact the facility where they were done to arrange for  prior to your scheduled appointment.    (2) List of current medications  (3) This referral request   (4) Any documents/labs given to you for this referral                  Your next 10 appointments already scheduled     Feb 02, 2018  9:00 AM CST   Return Visit with SHAVONNE Gallego   [facility]    [address]  [facility] [address] [phone]            Mar  "2018  9:00 AM CST   Return Visit with Kleber Pollack [facility]    [address]  [facility] [address] [phone]              Who to contact     If you have questions or need follow up information about today's clinic visit or your schedule please contact [facility] directly at [phone].  Normal or non-critical lab and imaging results will be communicated to you by [employer], letter or phone within 4 business days after the clinic has received the results. If you do not hear from us within 7 days, please contact the clinic through [employer] or phone. If you have a critical or abnormal lab result, we will notify you by phone as soon as possible.  Submit refill requests through [employer] or call your pharmacy and they will forward the refill request to us. Please allow 3 business days for your refill to be completed.          Additional Information About Your Visit        [employer]ENR[employer] Information     [employer] lets you send messages to your doctor, view your test results, renew your prescriptions, schedule appointments and more. To sign up, go to [email or web address] . Click on \"Log in\" on the left side of the screen, which will take you to the Welcome page. Then click on \"Sign up Now\" on the right side of the page.     You will be asked to enter the access code listed below, as well as some personal information. Please follow the directions to create your username and password.     Your access code is: [email or web address]  Expires: 2018 10:27 AM     Your access code will  in 90 days. If you need help or a new code, please call your [place] clinic or [phone].        Care EveryWhere ID     This is your Care EveryWhere ID. This could be used by other organizations to access your [place] medical records  [id number]        Your Vitals Were     Temperature Respirations Pulse Oximetry BMI (Body Mass Index)    "       97.7  F (36.5  C) 20 94% 34.31 kg/m2         Blood Pressure from Last 3 Encounters:   01/30/18 150/86   10/10/17 133/83   04/12/17 (!) 177/91    Weight from Last 3 Encounters:   01/30/18 246 lb (111.6 kg)   10/10/17 255 lb (115.7 kg)   04/12/17 250 lb (113.4 kg)              We Performed the Following     DERMATOLOGY REFERRAL          Today's Medication Changes          These changes are accurate as of 1/30/18  8:48 AM.  If you have any questions, ask your nurse or doctor.               Stop taking these medicines if you haven't already. Please contact your care team if you have questions.     azithromycin 250 MG tablet   Commonly known as:  ZITHROMAX   Stopped by:  Jignesh Travis MD                    Primary Care Provider Office Phone # Fax #    Jignesh Travis [phone] [address] [address]        Equal Access to Services     [facility]: Hadii aad ku hadasho Soomaali, waaxda luqadaha, qaybta kaalmada adeegyada, waxay idiin hayaan adeeg cassidyarakike la'shelli . So [facility] [phone].    ATENCIÓN: Si habla español, tiene a tucker disposición servicios gratuitos de asistencia lingüística. Llame al [phone].    We comply with applicable federal civil rights laws and [place] laws. We do not discriminate on the basis of race, color, national origin, age, disability, sex, sexual orientation, or gender identity.            Thank you!     Thank you for choosing [facility]  for your care. Our goal is always to provide you with excellent care. Hearing back from our patients is one way we can continue to improve our services. Please take a few minutes to complete the written survey that you may receive in the mail after your visit with us. Thank you!             Your Updated Medication List - Protect others around you: Learn how to safely use, store and throw away your medicines at www.disposemymeds.org.          This list is accurate as of 1/30/18  8:48 AM.   Always use your most recent med list.                   Brand Name Dispense Instructions for use Diagnosis    albuterol 108 (90 BASE) MCG/ACT Inhaler    PROAIR HFA    1 Inhaler    Inhale 2 puffs into the lungs every 4 hours as needed for shortness of breath / dyspnea or wheezing    Bronchitis       DULoxetine 60 MG EC capsule    CYMBALTA    180 capsule    Take 2 capsules (120 mg) by mouth daily    Depression, unspecified depression type       lidocaine 5 % ointment    XYLOCAINE    744.24 g    Apply topically as needed for moderate pain Apply 2 grams to affected PRN up to 4x daily. [phone] Pharmacy #.    Pain       * LYRICA PO      Take 100 mg by mouth every morning        * LYRICA PO      Take 150 mg by mouth At Bedtime        * MS CONTIN PO      Take 30 mg by mouth 3 times daily 15 mg X 2 tabs.  AM, 1200, bedtime        * morphine 30 MG 12 hr tablet    MS CONTIN    6 tablet    Take 1 tablet (30 mg) by mouth 3 times daily    Spinal stenosis in cervical region       naproxen 500 MG tablet    NAPROSYN    60 tablet    Take 1 tablet (500 mg) by mouth at onset of headache    Chronic pain syndrome       order for [facility]     1 Device    Semi electric hospital bed with side rails and mattress. Length of bed is for lifetime    Other unspecified back disorder, Obese, Lumbosacral radiculitis, COPD (chronic obstructive pulmonary disease) (H)       * oxyCODONE-acetaminophen 5-325 MG per tablet    PERCOCET    24 tablet    Take 1-2 tablets by mouth every 6 hours as needed for moderate to severe pain        * oxyCODONE-acetaminophen 5-325 MG per tablet    PERCOCET    8 tablet    Take 1 tablet by mouth every 6 hours as needed for moderate to severe pain    Spinal stenosis in cervical region       simvastatin 80 MG tablet    ZOCOR    90 tablet    Take 1 tablet (80 mg) by mouth every morning    Hyperlipidemia LDL goal <130       * traZODone 50 MG tablet    DESYREL    90 tablet    Take 0.5 tablets (25 mg) by mouth nightly as needed  for sleep Take along with the 100 mg tablets for total dose of 225 mg    Depression, unspecified depression type       * traZODone 100 MG tablet    DESYREL    180 tablet    Take 2 tablets (200 mg) by mouth nightly as needed for sleep Take along with the 25 mg tablet for total dose of 225 mg    Depression, unspecified depression type       * Notice:  This list has 8 medication(s) that are the same as other medications prescribed for you. Read the directions carefully, and ask your doctor or other care provider to review them with you.

## 2018-01-31 ASSESSMENT — PATIENT HEALTH QUESTIONNAIRE - PHQ9: SUM OF ALL RESPONSES TO PHQ QUESTIONS 1-9: 12

## 2018-02-06 ENCOUNTER — TELEPHONE (OUTPATIENT)
Dept: FAMILY MEDICINE | Facility: CLINIC | Age: 63
End: 2018-02-06

## 2018-02-06 NOTE — TELEPHONE ENCOUNTER
SOHAIL Wood-    The pain clinic called ([phone]) Savannah Roman.  She has just seen our patient Melquiades.  She feels he needs head CT.  Tongue deviation, BLAIR.  Savannah does note the cyst on the back of his neck also.  Jyoti LOAIZA RN

## 2018-02-09 ENCOUNTER — TELEPHONE (OUTPATIENT)
Dept: FAMILY MEDICINE | Facility: CLINIC | Age: 63
End: 2018-02-09

## 2018-02-09 DIAGNOSIS — F32.2 MAJOR DEPRESSIVE DISORDER, SINGLE EPISODE, SEVERE (H): Chronic | ICD-10-CM

## 2018-02-09 DIAGNOSIS — G89.4 CHRONIC PAIN SYNDROME: Primary | Chronic | ICD-10-CM

## 2018-02-09 NOTE — TELEPHONE ENCOUNTER
Reason for Call:  Orders for home health aid    Detailed comments: Orlando from [employer] is calling to see if Dr. Travis is willing to order a home health  Aid for this patient twice a week for long term. You can call Orlando.  Phone Number Patient can be reached at: Other phone number:  [phone]    Best Time: any    Can we leave a detailed message on this number? YES   Libby Peterson  [facility] [place] [facility]      Call taken on 2/9/2018 at 10:54 AM by Libby Peterson

## 2018-02-09 NOTE — TELEPHONE ENCOUNTER
Orlando notified. He will find a service that will [place] He will call us with the service so we can do a referral at that time.    Sharon Poon RN

## 2018-02-09 NOTE — TELEPHONE ENCOUNTER
For what diagnosis?  I do not see any obvious health issues on the problem list that would warrant this.  Is [facility] at home health care agency?  Are they currently providing services for him?

## 2018-02-09 NOTE — TELEPHONE ENCOUNTER
Writer contacted the the [place] services and they are currently helping the patient with homemaker services, which is: they help him with shopping, cleaning, preparing meals. They also help with community involvement for him.  They are using the major depressive and chronic pain diagnosis.  They would like an PCA for 2 x  Week for PCA to assist with ADL's.  Patient is eligable for those services.     Thank you  Milly MCGOVERN RN

## 2018-02-12 ENCOUNTER — HOSPITAL ENCOUNTER (OUTPATIENT)
Dept: CT IMAGING | Facility: CLINIC | Age: 63
Discharge: HOME | End: 2018-02-12
Attending: NURSE PRACTITIONER | Admitting: NURSE PRACTITIONER
Payer: MEDICARE

## 2018-02-12 DIAGNOSIS — K14.8 TONGUE DEVIATION: ICD-10-CM

## 2018-02-12 DIAGNOSIS — R51.9 HEADACHE: ICD-10-CM

## 2018-02-12 PROCEDURE — 70450 CT HEAD/BRAIN W/O DYE: CPT

## 2018-02-15 ENCOUNTER — OFFICE VISIT (OUTPATIENT)
Dept: DERMATOLOGY | Facility: CLINIC | Age: 63
End: 2018-02-15
Payer: MEDICARE

## 2018-02-15 VITALS — SYSTOLIC BLOOD PRESSURE: 147 MMHG | HEART RATE: 89 BPM | DIASTOLIC BLOOD PRESSURE: 85 MMHG | OXYGEN SATURATION: 92 %

## 2018-02-15 DIAGNOSIS — L72.9 SUBCUTANEOUS CYST: Primary | ICD-10-CM

## 2018-02-15 PROCEDURE — 99213 OFFICE O/P EST LOW 20 MIN: CPT | Performed by: PHYSICIAN ASSISTANT

## 2018-02-15 NOTE — NURSING NOTE
Chief Complaint   Patient presents with     Derm Problem     cyst of neck       Vitals:    02/15/18 0945   BP: 147/85   BP Location: Left arm   Patient Position: Sitting   Cuff Size: Adult Large   Pulse: 89   SpO2: 92%     Wt Readings from Last 1 Encounters:   01/30/18 111.6 kg (246 lb)         Elda Adair LPN.................2/15/2018

## 2018-02-15 NOTE — PROGRESS NOTES
HPI:   Melquiades Morales is a 62 year old male who presents for evaluation of a cyst on the neck  chief complaint  Location: back of the neck   Condition present for:  Many years.   Previous treatments include: Had area excised previously, but area grew back. Is painful and he thinks it may be causing headaches.     Review Of Systems  Eyes: negative  Ears/Nose/Throat: negative  Respiratory: No shortness of breath, dyspnea on exertion, cough, or hemoptysis  Cardiovascular: negative  Gastrointestinal: negative  Genitourinary: negative  Musculoskeletal: negative  Neurologic: negative  Psychiatric: negative        PHYSICAL EXAM:      Skin exam performed as follows: Type 2 skin. Mood appropriate  Alert and Oriented X 3. Well developed, well nourished in no distress.  General appearance: Normal  Head including face: Normal  Eyes: conjunctiva and lids: Normal  Mouth: Lips, teeth, gums: Normal  Neck: Normal  Chest-breast/axillae: Normal  Back: Normal  Spleen and liver: Normal  Cardiovascular: Exam of peripheral vascular system by observation for swelling, varicosities, edema: Normal  Genitalia: groin, buttocks: Normal  Extremities: digits/nails (clubbing): Normal  Eccrine and Apocrine glands: Normal  Right upper extremity: Normal  Left upper extremity: Normal  Right lower extremity: Normal  Left lower extremity: Normal  Skin: Scalp and body hair: See below    1. 4.5 x 4.5 cm smooth mobile nodule on the mid posterior neck    ASSESSMENT/PLAN:     1. Cyst vs other on the mid posterior neck - advised. Bothersome to patient and painful; he would like the area removed. Due to the location of the cyst, recommend an US to r/o any communication with the spinal cord. Order placed. If no communication seen, then advised to schedule excision with Dr Aguilar.         Follow-up: pending US/excision/PRN sooner  CC:   Scribed By: Savannah Michaels, MS, PA-C

## 2018-02-15 NOTE — MR AVS SNAPSHOT
"              After Visit Summary   2/15/2018    Melquiades Morales    MRN: [id number]           Patient Information     Date Of Birth          1955        Visit Information        Provider Department      2/15/2018 10:00 AM Savannah Michaels PA-C [facility]        Today's Diagnoses     Subcutaneous cyst    -  1       Follow-ups after your visit        Your next 10 appointments already scheduled     Mar 02, 2018  9:00 AM CST   Return Visit with SHAVONNE Gallego   [facility]    [address]  [facility] [id number] [phone]              Future tests that were ordered for you today     Open Future Orders        Priority Expected Expires Ordered    US Head Neck Soft Tissue Routine  2/15/2019 2/15/2018            Who to contact     If you have questions or need follow up information about today's clinic visit or your schedule please contact [facility] directly at [phone].  Normal or non-critical lab and imaging results will be communicated to you by MyChart, letter or phone within 4 business days after the clinic has received the results. If you do not hear from us within 7 days, please contact the clinic through [employer] or phone. If you have a critical or abnormal lab result, we will notify you by phone as soon as possible.  Submit refill requests through [employer] or call your pharmacy and they will forward the refill request to us. Please allow 3 business days for your refill to be completed.          Additional Information About Your Visit        [employer] Information     [employer] lets you send messages to your doctor, view your test results, renew your prescriptions, schedule appointments and more. To sign up, go to [email or web address] . Click on \"Log in\" on the left side of the screen, which will take you to the Welcome page. Then click on \"Sign up Now\" on the right side of the page.     You will " be asked to enter the access code listed below, as well as some personal information. Please follow the directions to create your username and password.     Your access code is: [email or web address]  Expires: 2018 10:27 AM     Your access code will  in 90 days. If you need help or a new code, please call your [place] clinic or [phone].        Care EveryWhere ID     This is your Care EveryWhere ID. This could be used by other organizations to access your [place] medical records  [id number]        Your Vitals Were     Pulse Pulse Oximetry                89 92%           Blood Pressure from Last 3 Encounters:   02/15/18 147/85   18 150/86   10/10/17 133/83    Weight from Last 3 Encounters:   18 111.6 kg (246 lb)   10/10/17 115.7 kg (255 lb)   17 113.4 kg (250 lb)               Primary Care Provider Office Phone # Fax #    Jignesh Travis [phone] [address] [facility] [address]        Equal Access to Services     [facility] : Hadii aad ku hadasho Soomaali, waaxda luqadaha, qaybta kaalmada adeegyada, river castañeda . So [facility] [phone].    ATENCIÓN: Si habla español, tiene a tucker disposición servicios gratuitos de asistencia lingüística. [facility] [phone].    We comply with applicable federal civil rights laws and [place] laws. We do not discriminate on the basis of race, color, national origin, age, disability, sex, sexual orientation, or gender identity.            Thank you!     Thank you for choosing [facility]  for your care. Our goal is always to provide you with excellent care. Hearing back from our patients is one way we can continue to improve our services. Please take a few minutes to complete the written survey that you may receive in the mail after your visit with us. Thank you!             Your Updated Medication List - Protect others around you: Learn how to safely use, store and throw away your  medicines at www.disposemymeds.org.          This list is accurate as of 2/15/18 10:22 AM.  Always use your most recent med list.                   Brand Name Dispense Instructions for use Diagnosis    albuterol 108 (90 BASE) MCG/ACT Inhaler    PROAIR HFA    1 Inhaler    Inhale 2 puffs into the lungs every 4 hours as needed for shortness of breath / dyspnea or wheezing    Bronchitis       DULoxetine 60 MG EC capsule    CYMBALTA    180 capsule    Take 2 capsules (120 mg) by mouth daily    Depression, unspecified depression type       lidocaine 5 % ointment    XYLOCAINE    744.24 g    Apply topically as needed for moderate pain Apply 2 grams to affected PRN up to 4x daily. [phone] Pharmacy #.    Pain       * LYRICA PO      Take 100 mg by mouth every morning        * LYRICA PO      Take 150 mg by mouth At Bedtime        * MS CONTIN PO      Take 30 mg by mouth 3 times daily 15 mg X 2 tabs.  AM, 1200, bedtime        * morphine 30 MG 12 hr tablet    MS CONTIN    6 tablet    Take 1 tablet (30 mg) by mouth 3 times daily    Spinal stenosis in cervical region       naproxen 500 MG tablet    NAPROSYN    60 tablet    Take 1 tablet (500 mg) by mouth at onset of headache    Chronic pain syndrome       order for DME     1 Device    Semi electric hospital bed with side rails and mattress. Length of bed is for lifetime    Other unspecified back disorder, Obese, Lumbosacral radiculitis, COPD (chronic obstructive pulmonary disease) (H)       * oxyCODONE-acetaminophen 5-325 MG per tablet    PERCOCET    24 tablet    Take 1-2 tablets by mouth every 6 hours as needed for moderate to severe pain        * oxyCODONE-acetaminophen 5-325 MG per tablet    PERCOCET    8 tablet    Take 1 tablet by mouth every 6 hours as needed for moderate to severe pain    Spinal stenosis in cervical region       simvastatin 80 MG tablet    ZOCOR    90 tablet    Take 1 tablet (80 mg) by mouth every morning    Hyperlipidemia LDL goal <130       * traZODone  50 MG tablet    DESYREL    90 tablet    Take 0.5 tablets (25 mg) by mouth nightly as needed for sleep Take along with the 100 mg tablets for total dose of 225 mg    Depression, unspecified depression type       * traZODone 100 MG tablet    DESYREL    180 tablet    Take 2 tablets (200 mg) by mouth nightly as needed for sleep Take along with the 25 mg tablet for total dose of 225 mg    Depression, unspecified depression type       * Notice:  This list has 8 medication(s) that are the same as other medications prescribed for you. Read the directions carefully, and ask your doctor or other care provider to review them with you.

## 2018-02-15 NOTE — LETTER
2/15/2018         RE: Melquiades Morales  [address]  [place] [address]        Dear Colleague,    Thank you for referring your patient, Melquiades Morales, to the [facility]. Please see a copy of my visit note below.    HPI:   Melquiades Morales is a 62 year old male who presents for evaluation of a cyst on the neck  chief complaint  Location: back of the neck   Condition present for:  Many years.   Previous treatments include: Had area excised previously, but area grew back. Is painful and he thinks it may be causing headaches.     Review Of Systems  Eyes: negative  Ears/Nose/Throat: negative  Respiratory: No shortness of breath, dyspnea on exertion, cough, or hemoptysis  Cardiovascular: negative  Gastrointestinal: negative  Genitourinary: negative  Musculoskeletal: negative  Neurologic: negative  Psychiatric: negative        PHYSICAL EXAM:      Skin exam performed as follows: Type 2 skin. Mood appropriate  Alert and Oriented X 3. Well developed, well nourished in no distress.  General appearance: Normal  Head including face: Normal  Eyes: conjunctiva and lids: Normal  Mouth: Lips, teeth, gums: Normal  Neck: Normal  Chest-breast/axillae: Normal  Back: Normal  Spleen and liver: Normal  Cardiovascular: Exam of peripheral vascular system by observation for swelling, varicosities, edema: Normal  Genitalia: groin, buttocks: Normal  Extremities: digits/nails (clubbing): Normal  Eccrine and Apocrine glands: Normal  Right upper extremity: Normal  Left upper extremity: Normal  Right lower extremity: Normal  Left lower extremity: Normal  Skin: Scalp and body hair: See below    1. 4.5 x 4.5 cm smooth mobile nodule on the mid posterior neck    ASSESSMENT/PLAN:     1. Cyst vs other on the mid posterior neck - advised. Bothersome to patient and painful; he would like the area removed. Due to the location of the cyst, recommend an US to r/o any communication with the spinal cord. Order placed. If no  communication seen, then advised to schedule excision with Dr Aguilar.         Follow-up: pending US/excision/PRN sooner  CC:   Scribed By: Savannah Michaels, MS, PAEDIE      Again, thank you for allowing me to participate in the care of your patient.        Sincerely,        Savannah Michaels PA-C

## 2018-02-20 ENCOUNTER — TELEPHONE (OUTPATIENT)
Dept: FAMILY MEDICINE | Facility: CLINIC | Age: 63
End: 2018-02-20

## 2018-02-20 ENCOUNTER — MEDICAL CORRESPONDENCE (OUTPATIENT)
Dept: FAMILY MEDICINE | Facility: CLINIC | Age: 63
End: 2018-02-20

## 2018-02-20 NOTE — TELEPHONE ENCOUNTER
Orlando called back he states that he is having problems finding agencies to staff to help with patient needs.      3 different ones  [facility]-- orlando was told that only taking referrals from [facility]-- unsure if would take from clinic-- possible retry?  [place]  [facility]    Orlando, met with [employer], he reports patient is in need of a new power scooter-explained to orlando patient would need to set up appt and have [employer] [employer] fax forms to clinic. Patient has been going to physical therapy and he requesting for Bio[employer]--susan ALBERTO  Station

## 2018-02-20 NOTE — TELEPHONE ENCOUNTER
Spoke with Orlando, pts Care Coordinator.  No Home Care HHA available @ this time he had checked with the 3 Companies listed below.  Pt will schedule appt to discuss Power Scooter and Biofreeze.  Gave Orlando # for [facility] to check on their availability.  KpavleRN

## 2018-02-20 NOTE — TELEPHONE ENCOUNTER
Reason for Call:  Form, our goal is to have forms completed with 72 hours, however, some forms may require a visit or additional information.    Type of letter, form or note:  medical    Who is the form from?: [facility]     Where did the form come from: form was faxed in    What clinic location was the form placed at?: [facility]    Where the form was placed: 's Box    What number is listed as a contact on the form?: [phone]       Additional comments: form placed in forms box    Call taken on 2/20/2018 at 10:24 AM by Izzy Rios

## 2018-02-26 ENCOUNTER — OFFICE VISIT (OUTPATIENT)
Dept: FAMILY MEDICINE | Facility: CLINIC | Age: 63
End: 2018-02-26

## 2018-02-26 VITALS
DIASTOLIC BLOOD PRESSURE: 82 MMHG | RESPIRATION RATE: 18 BRPM | HEIGHT: 71 IN | WEIGHT: 244 LBS | BODY MASS INDEX: 34.16 KG/M2 | OXYGEN SATURATION: 91 % | SYSTOLIC BLOOD PRESSURE: 149 MMHG | TEMPERATURE: 98.3 F

## 2018-02-26 DIAGNOSIS — G89.4 CHRONIC PAIN SYNDROME: Primary | Chronic | ICD-10-CM

## 2018-02-26 DIAGNOSIS — M54.17 LUMBOSACRAL RADICULITIS: Chronic | ICD-10-CM

## 2018-02-26 PROCEDURE — 99213 OFFICE O/P EST LOW 20 MIN: CPT | Performed by: FAMILY MEDICINE

## 2018-02-26 NOTE — MR AVS SNAPSHOT
After Visit Summary   2/26/2018    Melquiades Morales    MRN: [id number]           Patient Information     Date Of Birth          1955        Visit Information        Provider Department      2/26/2018 7:00 AM Jignesh Travis MD [facility]        Today's Diagnoses     Chronic pain syndrome    -  1    Lumbosacral radiculitis          Care Instructions          Thank you for choosing [facility].  You may be receiving a survey in the mail from [facility] regarding your visit today.  Please take a few minutes to complete and return the survey to let us know how we are doing.      Our Clinic hours are:  Mondays    7:20 am - 7 pm  Tues -  Fri  7:20 am - 5 pm    Clinic Phone: [phone]    The clinic lab opens at 7:30 am Mon - Fri and appointments are required.    [facility]. [phone]  Monday-Thursday 8 am - 7pm  Tues/Wed/Fri 8 am - 5:30 pm                 Follow-ups after your visit        Your next 10 appointments already scheduled     Feb 27, 2018  8:20 AM CST   (Arrive by 8:05 AM)   US HEAD NECK SOFT TISSUE with [address] [place] [address] Ultrasound ([facility])    [address] [facility] [address] [phone]           Please bring a list of your medicines (including vitamins, minerals and over-the-counter drugs). Also, tell your doctor about any allergies you may have. Wear comfortable clothes and leave your valuables at home.  You do not need to do anything special to prepare for your exam.  Please call the Imaging Department at your exam site with any questions.            Mar 02, 2018  9:00 AM CST   Return Visit with SHAVONNE Gallego   [facility]    [address] [facility] [address]  [facility] [address] [phone]              Who to contact     If you have questions or need follow up information about today's clinic visit or your schedule please  "contact [facility] directly at [phone].  Normal or non-critical lab and imaging results will be communicated to you by MyChart, letter or phone within 4 business days after the clinic has received the results. If you do not hear from us within 7 days, please contact the clinic through MyChart or phone. If you have a critical or abnormal lab result, we will notify you by phone as soon as possible.  Submit refill requests through [employer] or call your pharmacy and they will forward the refill request to us. Please allow 3 business days for your refill to be completed.          Additional Information About Your Visit        Health [email or web address] Information     [employer] lets you send messages to your doctor, view your test results, renew your prescriptions, schedule appointments and more. To sign up, go to [email or web address] . Click on \"Log in\" on the left side of the screen, which will take you to the Welcome page. Then click on \"Sign up Now\" on the right side of the page.     You will be asked to enter the access code listed below, as well as some personal information. Please follow the directions to create your username and password.     Your access code is: [email or web address]  Expires: 2018 10:27 AM     Your access code will  in 90 days. If you need help or a new code, please call your [place] clinic or [phone].        Care EveryWhere ID     This is your Care EveryWhere ID. This could be used by other organizations to access your [place] medical records  [id number]        Your Vitals Were     Temperature Respirations Height Pulse Oximetry BMI (Body Mass Index)       98.3  F (36.8  C) 18 5' 11\" (1.803 m) 91% 34.03 kg/m2        Blood Pressure from Last 3 Encounters:   18 149/82   02/15/18 147/85   18 150/86    Weight from Last 3 Encounters:   18 244 lb (110.7 kg)   18 246 lb (111.6 kg)   10/10/17 255 lb (115.7 kg)              Today, you had the following     No " orders found for display         Today's Medication Changes          These changes are accurate as of 2/26/18  7:49 AM.  If you have any questions, ask your nurse or doctor.               Start taking these medicines.        Dose/Directions    order for DME   Used for:  Chronic pain syndrome, Lumbosacral radiculitis   Started by:  Jignesh Travis MD        Equipment being ordered: Wheelchair. Electric, including adjustable back rest sitting to resting, leg elevation adjustment, approved for outdoor use   Quantity:  1 Units   Refills:  0         These medicines have changed or have updated prescriptions.        Dose/Directions    MS CONTIN PO   This may have changed:  Another medication with the same name was removed. Continue taking this medication, and follow the directions you see here.   Changed by:  Jignesh Travis MD        Dose:  30 mg   Take 30 mg by mouth 3 times daily 15 mg X 2 tabs.  AM, 1200, bedtime   Refills:  0       oxyCODONE-acetaminophen 5-325 MG per tablet   Commonly known as:  PERCOCET   This may have changed:  Another medication with the same name was removed. Continue taking this medication, and follow the directions you see here.   Changed by:  Jignesh Travis MD        Dose:  1-2 tablet   Take 1-2 tablets by mouth every 6 hours as needed for moderate to severe pain   Quantity:  24 tablet   Refills:  0            Where to get your medicines      Some of these will need a paper prescription and others can be bought over the counter.  Ask your nurse if you have questions.     Bring a paper prescription for each of these medications     order for DME                Primary Care Provider Office Phone # Fax #    Jignesh Travis [phone]       [address] [facility] [address]        Equal Access to Services     [facility]: Shiva segovia Sojannet, waantonioda luqadaha, qaybta kaalriver seay . So [facility]  [phone].    ATENCIÓN: Si priscila beatty, tiene a tucker disposición servicios gratuitos de asistencia lingüística. Krystina danielson [phone].    We comply with applicable federal civil rights laws and [place] laws. We do not discriminate on the basis of race, color, national origin, age, disability, sex, sexual orientation, or gender identity.            Thank you!     Thank you for choosing [facility]  for your care. Our goal is always to provide you with excellent care. Hearing back from our patients is one way we can continue to improve our services. Please take a few minutes to complete the written survey that you may receive in the mail after your visit with us. Thank you!             Your Updated Medication List - Protect others around you: Learn how to safely use, store and throw away your medicines at www.disposemymeds.org.          This list is accurate as of 2/26/18  7:49 AM.  Always use your most recent med list.                   Brand Name Dispense Instructions for use Diagnosis    albuterol 108 (90 BASE) MCG/ACT Inhaler    PROAIR HFA    1 Inhaler    Inhale 2 puffs into the lungs every 4 hours as needed for shortness of breath / dyspnea or wheezing    Bronchitis       DULoxetine 60 MG EC capsule    CYMBALTA    180 capsule    Take 2 capsules (120 mg) by mouth daily    Depression, unspecified depression type       lidocaine 5 % ointment    XYLOCAINE    744.24 g    Apply topically as needed for moderate pain Apply 2 grams to affected PRN up to 4x daily. [phone] Pharmacy #.    Pain       * LYRICA PO      Take 100 mg by mouth every morning        * LYRICA PO      Take 150 mg by mouth At Bedtime        MS CONTIN PO      Take 30 mg by mouth 3 times daily 15 mg X 2 tabs.  AM, 1200, bedtime        naproxen 500 MG tablet    NAPROSYN    60 tablet    Take 1 tablet (500 mg) by mouth at onset of headache    Chronic pain syndrome       order for DME     1 Device    Semi electric hospital bed with  side rails and mattress. Length of bed is for lifetime    Other unspecified back disorder, Obese, Lumbosacral radiculitis, COPD (chronic obstructive pulmonary disease) (H)       order for DME     1 Units    Equipment being ordered: Wheelchair. Electric, including adjustable back rest sitting to resting, leg elevation adjustment, approved for outdoor use    Chronic pain syndrome, Lumbosacral radiculitis       oxyCODONE-acetaminophen 5-325 MG per tablet    PERCOCET    24 tablet    Take 1-2 tablets by mouth every 6 hours as needed for moderate to severe pain        simvastatin 80 MG tablet    ZOCOR    90 tablet    Take 1 tablet (80 mg) by mouth every morning    Hyperlipidemia LDL goal <130       * traZODone 50 MG tablet    DESYREL    90 tablet    Take 0.5 tablets (25 mg) by mouth nightly as needed for sleep Take along with the 100 mg tablets for total dose of 225 mg    Depression, unspecified depression type       * traZODone 100 MG tablet    DESYREL    180 tablet    Take 2 tablets (200 mg) by mouth nightly as needed for sleep Take along with the 25 mg tablet for total dose of 225 mg    Depression, unspecified depression type       * Notice:  This list has 4 medication(s) that are the same as other medications prescribed for you. Read the directions carefully, and ask your doctor or other care provider to review them with you.

## 2018-02-26 NOTE — PATIENT INSTRUCTIONS
Thank you for choosing [facility].  You may be receiving a survey in the mail from [facility] regarding your visit today.  Please take a few minutes to complete and return the survey to let us know how we are doing.      Our Clinic hours are:  Mondays    7:20 am - 7 pm  Tues -  Fri  7:20 am - 5 pm    Clinic Phone: [phone]    The clinic lab opens at 7:30 am Mon - Fri and appointments are required.    [facility]. [phone]  Monday-Thursday 8 am - 7pm  Tues/Wed/Fri 8 am - 5:30 pm

## 2018-02-26 NOTE — PROGRESS NOTES
"  SUBJECTIVE:   Melquiades Morales is a 62 year old male who presents to clinic today for the following health issues:      Chief Complaint   Patient presents with     Forms     patient will need a letter for a new wheel chair              Problem list and histories reviewed & adjusted, as indicated.  Additional history:         Reviewed and updated as needed this visit by clinical staff  Tobacco  Allergies  Meds       Reviewed and updated as needed this visit by Provider       OBJECTIVE:  /82  Temp 98.3  F (36.8  C)  Resp 18  Ht 5' 11\" (1.803 m)  Wt 244 lb (110.7 kg)  SpO2 91%  BMI 34.03 kg/m2  LUNGS: clear to auscultation, normal breath sounds  CV: RRR without murmur  ABD: BS+, soft, nontender, no masses, no hepatosplenomegaly  EXTREMITIES: without joint tenderness, swelling or erythema.  No muscle tenderness or abnormality.  SKIN: No rashes or abnormalities  NEURO:non focal exam    ASSESSMENT:     Chronic pain syndrome  Lumbosacral radiculitis    PLAN:  His electric wheelchair is broken down and he needs prescription for documentation for a new one.  He clearly needs a motorized wheelchair to get around as he is unable to ambulate more than just a few feet because of his significant back discomfort and disease.    Orders Placed This Encounter     order for DME       "

## 2018-02-26 NOTE — NURSING NOTE
"Chief Complaint   Patient presents with     Forms     patient will need a letter for a new wheel chair        Initial BP (!) 156/94 (BP Location: Right arm, Patient Position: Chair, Cuff Size: Adult Large)  Temp 98.3  F (36.8  C)  Resp 18  Ht 5' 11\" (1.803 m)  Wt 244 lb (110.7 kg)  SpO2 90%  BMI 34.03 kg/m2 Estimated body mass index is 34.03 kg/(m^2) as calculated from the following:    Height as of this encounter: 5' 11\" (1.803 m).    Weight as of this encounter: 244 lb (110.7 kg).  Medication Reconciliation: complete   Joan Cabello CMA      "

## 2018-02-27 ENCOUNTER — HOSPITAL ENCOUNTER (OUTPATIENT)
Dept: ULTRASOUND IMAGING | Facility: CLINIC | Age: 63
Discharge: HOME | End: 2018-02-27
Attending: PHYSICIAN ASSISTANT | Admitting: PHYSICIAN ASSISTANT
Payer: MEDICARE

## 2018-02-27 DIAGNOSIS — L72.9 SUBCUTANEOUS CYST: ICD-10-CM

## 2018-02-27 PROCEDURE — 76536 US EXAM OF HEAD AND NECK: CPT | Mod: AT,57

## 2018-03-02 ENCOUNTER — OFFICE VISIT (OUTPATIENT)
Dept: PSYCHOLOGY | Facility: CLINIC | Age: 63
End: 2018-03-02
Payer: MEDICARE

## 2018-03-02 DIAGNOSIS — F41.1 GENERALIZED ANXIETY DISORDER: ICD-10-CM

## 2018-03-02 DIAGNOSIS — F43.10 POSTTRAUMATIC STRESS DISORDER: ICD-10-CM

## 2018-03-02 DIAGNOSIS — F33.1 MAJOR DEPRESSIVE DISORDER, RECURRENT EPISODE, MODERATE (H): Primary | ICD-10-CM

## 2018-03-02 PROCEDURE — 90834 PSYTX W PT 45 MINUTES: CPT | Performed by: SOCIAL WORKER

## 2018-03-02 ASSESSMENT — ANXIETY QUESTIONNAIRES
7. FEELING AFRAID AS IF SOMETHING AWFUL MIGHT HAPPEN: NOT AT ALL
GAD7 TOTAL SCORE: 11
1. FEELING NERVOUS, ANXIOUS, OR ON EDGE: SEVERAL DAYS
3. WORRYING TOO MUCH ABOUT DIFFERENT THINGS: NEARLY EVERY DAY
2. NOT BEING ABLE TO STOP OR CONTROL WORRYING: SEVERAL DAYS
6. BECOMING EASILY ANNOYED OR IRRITABLE: NEARLY EVERY DAY
5. BEING SO RESTLESS THAT IT IS HARD TO SIT STILL: SEVERAL DAYS

## 2018-03-02 ASSESSMENT — PATIENT HEALTH QUESTIONNAIRE - PHQ9: 5. POOR APPETITE OR OVEREATING: MORE THAN HALF THE DAYS
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
## 2018-03-02 NOTE — MR AVS SNAPSHOT
"                  MRN:[id number]                      After Visit Summary   3/2/2018    Melquiades Morales    MRN: [id number]           Visit Information        Provider Department      3/2/2018 9:00 AM Kleber Pollack, [facility]      Your next 10 appointments already scheduled     Mar 16, 2018  9:00 AM CDT   Return Visit with Kleber Pollack [facility] ([facility])    [address] [address]   [phone]            2018 10:30 AM CDT   New Visit with Bravo Aguilar MD   [facility]    [address] [facility] [address]   [phone]            2018  9:00 AM CDT   Return Visit with Kleber Pollack [facility] ([facility])    [address] [address]   [phone]            2018  9:00 AM CDT   Return Visit with Kleber Pollack [facility] ([facility])    [address] [place] [address]   [phone]              MyChart Information     [employer] lets you send messages to your doctor, view your test results, renew your prescriptions, schedule appointments and more. To sign up, go to [email or web address] . Click on \"Log in\" on the left side of the screen, which will take you to the Welcome page. Then click on \"Sign up Now\" on the right side of the page.     You will be asked to enter the access code listed below, as well as some personal information. Please follow the directions to create your username and password.     Your access code is: [email or web address]  Expires: 2018 10:27 AM     Your access code will  in 90 days. If you need help or a new code, please call your [facility] or [phone].        Care EveryWhere ID     This is your Care " EveryWhere ID. This could be used by other organizations to access your [place] medical records  [id number]        Equal Access to Services     AQUILES WALSH : Shiva Naqvi, winnie xie, marie parker, river [place]. So [facility] [phone].    ATENCIÓN: Si habla español, tiene a tucker disposición servicios gratuitos de asistencia lingüística. Llame al [phone].    We comply with applicable federal civil rights laws and [place] laws. We do not discriminate on the basis of race, color, national origin, age, disability, sex, sexual orientation, or gender identity.

## 2018-03-02 NOTE — Clinical Note
His motorized wheel chair no longer working; may be getting new one soon. Since turning 63 recently he has been having a premonition that either something good or bad is just around the corner.

## 2018-03-03 ASSESSMENT — ANXIETY QUESTIONNAIRES: GAD7 TOTAL SCORE: 11

## 2018-03-03 ASSESSMENT — PATIENT HEALTH QUESTIONNAIRE - PHQ9: SUM OF ALL RESPONSES TO PHQ QUESTIONS 1-9: 18

## 2018-03-07 ENCOUNTER — TELEPHONE (OUTPATIENT)
Dept: FAMILY MEDICINE | Facility: CLINIC | Age: 63
End: 2018-03-07

## 2018-03-07 DIAGNOSIS — R26.2 UNABLE TO AMBULATE: ICD-10-CM

## 2018-03-07 DIAGNOSIS — E66.9 OBESE: Chronic | ICD-10-CM

## 2018-03-07 DIAGNOSIS — G89.4 CHRONIC PAIN SYNDROME: Primary | Chronic | ICD-10-CM

## 2018-03-07 DIAGNOSIS — G93.40 ENCEPHALOPATHY: ICD-10-CM

## 2018-03-07 DIAGNOSIS — M54.16 LUMBAR RADICULOPATHY: ICD-10-CM

## 2018-03-07 DIAGNOSIS — M54.17 LUMBOSACRAL RADICULITIS: Chronic | ICD-10-CM

## 2018-03-07 DIAGNOSIS — J44.9 CHRONIC OBSTRUCTIVE PULMONARY DISEASE, UNSPECIFIED COPD TYPE (H): Chronic | ICD-10-CM

## 2018-03-07 DIAGNOSIS — M48.02 SPINAL STENOSIS IN CERVICAL REGION: Chronic | ICD-10-CM

## 2018-03-07 NOTE — TELEPHONE ENCOUNTER
Reason for Call:  Other prescription    Detailed comments: [facility]  is calling for a Rx for a lift chair and a hospital bed and mattress.  He will need chart notes too (printed).  This can be faxed to [phone].      Phone Number Patient can be reached at: Other phone number:  [place] ()  [phone]    Best Time: any    Can we leave a detailed message on this number? YES    Call taken on 3/7/2018 at 10:43 AM by Izzy Rios

## 2018-04-02 DIAGNOSIS — F32.A DEPRESSION, UNSPECIFIED DEPRESSION TYPE: ICD-10-CM

## 2018-04-02 DIAGNOSIS — E78.5 HYPERLIPIDEMIA LDL GOAL <130: ICD-10-CM

## 2018-04-02 RX ORDER — DULOXETIN HYDROCHLORIDE 60 MG/1
120 CAPSULE, DELAYED RELEASE ORAL DAILY
Qty: 180 CAPSULE | Refills: 1 | Status: SHIPPED | OUTPATIENT
Start: 2018-04-02 | End: 2018-10-08

## 2018-04-02 RX ORDER — SIMVASTATIN 80 MG
80 TABLET ORAL EVERY MORNING
Qty: 90 TABLET | Refills: 3 | Status: SHIPPED | OUTPATIENT
Start: 2018-04-02 | End: 2019-04-26

## 2018-04-02 RX ORDER — TRAZODONE HYDROCHLORIDE 100 MG/1
200 TABLET ORAL
Qty: 180 TABLET | Refills: 3 | Status: SHIPPED | OUTPATIENT
Start: 2018-04-02 | End: 2019-04-26

## 2018-04-02 NOTE — TELEPHONE ENCOUNTER
"Requested Prescriptions   Pending Prescriptions Disp Refills     traZODone (DESYREL) 100 MG tablet  Last Written Prescription Date:  04/12/2017  Last Fill Quantity: 180,  # refills: 3   Last office visit: 2/26/2018 with prescribing provider:  roxanna   Future Office Visit:   Next 5 appointments (look out 90 days)     Apr 06, 2018  9:00 AM CDT   Return Visit with Kleber Pollack [facility] ([address] [address]   [phone]            Apr 20, 2018  9:00 AM CDT   [facility] [address]   [phone]                  180 tablet 3     Sig: Take 2 tablets (200 mg) by mouth nightly as needed for sleep Take along with the 25 mg tablet for total dose of 225 mg    Serotonin Modulators Passed    4/2/2018  1:20 PM       Passed - Recent (12 mo) or future (30 days) visit within the authorizing provider's specialty    Patient had office visit in the last 12 months or has a visit in the next 30 days with authorizing provider or within the authorizing provider's specialty.  See \"Patient Info\" tab in inbasket, or \"Choose Columns\" in Meds & Orders section of the refill encounter.           Passed - Patient is age 18 or older        simvastatin (ZOCOR) 80 MG tablet  Last Written Prescription Date:  04/12/2017  Last Fill Quantity: 90,  # refills: 3   Last office visit: 2/26/2018 with prescribing provider:  roxanna   Future Office Visit:   Next 5 appointments (look out 90 days)     Apr 06, 2018  9:00 AM CDT   Return Visit with Kleber Pollack [facility] ([address] [address]   [phone]            Apr 20, 2018  9:00 AM CDT   Return Visit with Kleber Pollack [facility]" "[address] [address]   [phone]                  90 tablet 3     Sig: Take 1 tablet (80 mg) by mouth every morning    Statins Protocol Passed    4/2/2018  1:20 PM       Passed - LDL on file in past 12 months    Recent Labs   Lab Test  04/12/17   0745   LDL  49            Passed - No abnormal creatine kinase in past 12 months    Recent Labs   Lab Test  09/06/15   1635   CKT  77               Passed - Recent (12 mo) or future (30 days) visit within the authorizing provider's specialty    Patient had office visit in the last 12 months or has a visit in the next 30 days with authorizing provider or within the authorizing provider's specialty.  See \"Patient Info\" tab in inbasket, or \"Choose Columns\" in Meds & Orders section of the refill encounter.           Passed - Patient is age 18 or older        DULoxetine (CYMBALTA) 60 MG EC capsule  Last Written Prescription Date:  10/02/2017  Last Fill Quantity: 180,  # refills: 1   Last office visit: 2/26/2018 with prescribing provider:  roxanna   Future Office Visit:   Next 5 appointments (look out 90 days)     Apr 06, 2018  9:00 AM CDT   Return Visit with Kleber Pollack, [address] [address]   [phone]            Apr 20, 2018  9:00 AM CDT   Return Visit with Kleber Pollack, [address] [address]   [phone]                  180 capsule 1     Sig: Take 2 capsules (120 mg) by mouth daily    Serotonin-Norepinephrine Reuptake Inhibitors  Failed    4/2/2018  1:20 PM       Failed - Blood pressure under 140/90 in past 12 months    BP Readings from Last 3 Encounters:   02/26/18 149/82   02/15/18 147/85   01/30/18 150/86                Failed - PHQ-9 score of less than 5 in past 6 months    Please " "review last PHQ-9 score.   PHQ-9 SCORE 1/19/2018 1/30/2018 3/2/2018   Total Score - - -   Total Score 15 12 18     EVGENY-7 SCORE 12/22/2017 1/19/2018 3/2/2018   Total Score - - -   Total Score 10 10 11              Passed - Patient is age 18 or older       Passed - Recent (6 mo) or future (30 days) visit within the authorizing provider's specialty    Patient had office visit in the last 6 months or has a visit in the next 30 days with authorizing provider or within the authorizing provider's specialty.  See \"Patient Info\" tab in inbasket, or \"Choose Columns\" in Meds & Orders section of the refill encounter.              "

## 2018-04-02 NOTE — TELEPHONE ENCOUNTER
**This refill requires provider completion and is not appropriate for RN review per RN refill guidelines.**  PH-Q9 needs to be less than 5 to approve medication on RN Refill protocol pt's score is 18.  Sharon Poon RN

## 2018-04-04 ENCOUNTER — OFFICE VISIT (OUTPATIENT)
Dept: DERMATOLOGY | Facility: CLINIC | Age: 63
End: 2018-04-04
Payer: MEDICARE

## 2018-04-04 VITALS — SYSTOLIC BLOOD PRESSURE: 148 MMHG | HEART RATE: 92 BPM | OXYGEN SATURATION: 92 % | DIASTOLIC BLOOD PRESSURE: 84 MMHG

## 2018-04-04 DIAGNOSIS — L72.0 EIC (EPIDERMAL INCLUSION CYST): Primary | ICD-10-CM

## 2018-04-04 PROCEDURE — 11426 EXC H-F-NK-SP B9+MARG >4 CM: CPT | Mod: 51 | Performed by: DERMATOLOGY

## 2018-04-04 PROCEDURE — 13132 CMPLX RPR F/C/C/M/N/AX/G/H/F: CPT | Performed by: DERMATOLOGY

## 2018-04-04 PROCEDURE — 88305 TISSUE EXAM BY PATHOLOGIST: CPT | Performed by: DERMATOLOGY

## 2018-04-04 NOTE — PATIENT INSTRUCTIONS
Sutured Wound Care     [facility]: [phone]    [facility]: [phone]          ? No strenuous activity for 48 hours. Resume moderate activity in 48 hours. No heavy exercising until you are seen for follow up in one week.     ? Take Tylenol as needed for discomfort.                         ? Do not drink alcoholic beverages for 48 hours.     ? Keep the pressure bandage in place for 24 hours. If the bandage becomes blood tinged or loose, reinforce it with gauze and tape.        (Refer to the reverse side of this page for management of bleeding).    ? Remove pressure bandage in 24 hours     ? Leave the flat bandage in place until your follow up appointment.    ? Keep the bandage dry. Wash around it carefully.    ? If the tape becomes soiled or starts to come off, reinforce it with additional paper tape.    ? Do not smoke for 3 weeks; smoking is detrimental to wound healing.    ? It is normal to have swelling and bruising around the surgical site. The bruising will fade in approximately 10-14 days. Elevate the area to reduce swelling.    ? Numbness, itchiness and sensitivity to temperature changes can occur after surgery and may take up to 18 months to normalize.      POSSIBLE COMPLICATIONS    BLEEDIN. Leave the bandage in place.  2. Use tightly rolled up gauze or a cloth to apply direct pressure over the bandage for 20   minutes.  3. Reapply pressure for an additional 20 minutes if necessary  4. Call the office or go to the nearest emergency room if pressure fails to stop the bleeding.  5. Use additional gauze and tape to maintain pressure once the bleeding has stopped.        PAIN:    1. Post operative pain should slowly get better, never worse.  2. A severe increase in pain may indicate a problem. Call the office if this occurs.    In case of emergency phone:Dr Aguilar [phone]

## 2018-04-04 NOTE — NURSING NOTE
"Initial /84  Pulse 92  SpO2 92% Estimated body mass index is 34.03 kg/(m^2) as calculated from the following:    Height as of 2/26/18: 1.803 m (5' 11\").    Weight as of 2/26/18: 110.7 kg (244 lb). .      "
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
## 2018-04-04 NOTE — MR AVS SNAPSHOT
After Visit Summary   2018    Melquiades Morales    MRN: [id number]           Patient Information     Date Of Birth          1955        Visit Information        Provider Department      2018 10:30 AM Bravo Aguilar MD [facility]        Care Instructions      Sutured Wound Care     [facility]: [phone]    [facility]: [phone]          ? No strenuous activity for 48 hours. Resume moderate activity in 48 hours. No heavy exercising until you are seen for follow up in one week.     ? Take Tylenol as needed for discomfort.                         ? Do not drink alcoholic beverages for 48 hours.     ? Keep the pressure bandage in place for 24 hours. If the bandage becomes blood tinged or loose, reinforce it with gauze and tape.        (Refer to the reverse side of this page for management of bleeding).    ? Remove pressure bandage in 24 hours     ? Leave the flat bandage in place until your follow up appointment.    ? Keep the bandage dry. Wash around it carefully.    ? If the tape becomes soiled or starts to come off, reinforce it with additional paper tape.    ? Do not smoke for 3 weeks; smoking is detrimental to wound healing.    ? It is normal to have swelling and bruising around the surgical site. The bruising will fade in approximately 10-14 days. Elevate the area to reduce swelling.    ? Numbness, itchiness and sensitivity to temperature changes can occur after surgery and may take up to 18 months to normalize.      POSSIBLE COMPLICATIONS    BLEEDIN. Leave the bandage in place.  2. Use tightly rolled up gauze or a cloth to apply direct pressure over the bandage for 20   minutes.  3. Reapply pressure for an additional 20 minutes if necessary  4. Call the office or go to the nearest emergency room if pressure fails to stop the bleeding.  5. Use additional gauze and tape to maintain pressure once the bleeding has  "stopped.        PAIN:    1. Post operative pain should slowly get better, never worse.  2. A severe increase in pain may indicate a problem. Call the office if this occurs.    In case of emergency phone:Dr Aguilar [phone]              Follow-ups after your visit        Your next 10 appointments already scheduled     Apr 06, 2018  9:00 AM CDT   Return Visit with Kleber Pollack, [facility] ([facility])    [address]  [facility] [address] [phone]            Apr 20, 2018  9:00 AM CDT   Return Visit with Kleber Pollack, [facility] ([facility]    [address]  [facility] [address] [phone]              Who to contact     If you have questions or need follow up information about today's clinic visit or your schedule please contact [facility] directly at [phone].  Normal or non-critical lab and imaging results will be communicated to you by opendorsehart, letter or phone within 4 business days after the clinic has received the results. If you do not hear from us within 7 days, please contact the clinic through [employer] or phone. If you have a critical or abnormal lab result, we will notify you by phone as soon as possible.  Submit refill requests through [employer] or call your pharmacy and they will forward the refill request to us. Please allow 3 business days for your refill to be completed.          Additional Information About Your Visit        [employer] Information     [employer] lets you send messages to your doctor, view your test results, renew your prescriptions, schedule appointments and more. To sign up, go to [email or web address] . Click on \"Log in\" on the left side of the screen, which will take you to the Welcome page. Then click on \"Sign up Now\" on the right side of the page.     You will be asked to enter the access code listed below, as well " as some personal information. Please follow the directions to create your username and password.     Your access code is: [email or web address]  Expires: 2018 11:27 AM     Your access code will  in 90 days. If you need help or a new code, please call your [place] clinic or [phone].        Care EveryWhere ID     This is your Care EveryWhere ID. This could be used by other organizations to access your [place] medical records  [id number]        Your Vitals Were     Pulse Pulse Oximetry                92 92%           Blood Pressure from Last 3 Encounters:   18 148/84   18 149/82   02/15/18 147/85    Weight from Last 3 Encounters:   18 110.7 kg (244 lb)   18 111.6 kg (246 lb)   10/10/17 115.7 kg (255 lb)              Today, you had the following     No orders found for display       Primary Care Provider Office Phone # Fax #    Jignesh Travsi [phone] [address] [address] [address]        Equal Access to Services     [facility]DEFONSO : Hadii aad ku hadasho Soguilalumeali, waaxda luqadaha, qaybta kaalmada adekaz, river castañeda . So [facility] [phone].    ATENCIÓN: Si habla español, tiene a tucker disposición servicios gratuitos de asistencia lingüística. [facility] [phone].    We comply with applicable federal civil rights laws and [place] laws. We do not discriminate on the basis of race, color, national origin, age, disability, sex, sexual orientation, or gender identity.            Thank you!     Thank you for choosing [facility]  for your care. Our goal is always to provide you with excellent care. Hearing back from our patients is one way we can continue to improve our services. Please take a few minutes to complete the written survey that you may receive in the mail after your visit with us. Thank you!             Your Updated Medication List - Protect others around you: Learn how to safely use, store and throw  away your medicines at www.disposemymeds.org.          This list is accurate as of 4/4/18 11:14 AM.  Always use your most recent med list.                   Brand Name Dispense Instructions for use Diagnosis    albuterol 108 (90 BASE) MCG/ACT Inhaler    PROAIR HFA    1 Inhaler    Inhale 2 puffs into the lungs every 4 hours as needed for shortness of breath / dyspnea or wheezing    Bronchitis       DULoxetine 60 MG EC capsule    CYMBALTA    180 capsule    Take 2 capsules (120 mg) by mouth daily    Depression, unspecified depression type       lidocaine 5 % ointment    XYLOCAINE    744.24 g    Apply topically as needed for moderate pain Apply 2 grams to affected PRN up to 4x daily. [phone] Pharmacy #.    Pain       * LYRICA PO      Take 100 mg by mouth every morning        * LYRICA PO      Take 150 mg by mouth At Bedtime        MS CONTIN PO      Take 30 mg by mouth 3 times daily 15 mg X 2 tabs.  AM, 1200, bedtime        naproxen 500 MG tablet    NAPROSYN    60 tablet    Take 1 tablet (500 mg) by mouth at onset of headache    Chronic pain syndrome       order for DME     1 Device    Semi electric hospital bed with side rails and mattress. Length of bed is for lifetime    Other unspecified back disorder, Obese, Lumbosacral radiculitis, COPD (chronic obstructive pulmonary disease) (H)       * order for DME     1 Units    Equipment being ordered: Wheelchair. Electric, including adjustable back rest sitting to resting, leg elevation adjustment, approved for outdoor use    Chronic pain syndrome, Lumbosacral radiculitis       * order for DME     1 each    Equipment being ordered: Hospital Bed and mattress.    Chronic pain syndrome, Lumbosacral radiculitis, Chronic obstructive pulmonary disease, unspecified COPD type (H), Obese, Lumbar radiculopathy, Encephalopathy, Spinal stenosis in cervical region       * order for DME     1 each    Equipment being ordered: Lift Chair    Chronic pain syndrome, Lumbosacral radiculitis,  Chronic obstructive pulmonary disease, unspecified COPD type (H), Obese, Lumbar radiculopathy, Encephalopathy, Spinal stenosis in cervical region, Unable to ambulate       oxyCODONE-acetaminophen 5-325 MG per tablet    PERCOCET    24 tablet    Take 1-2 tablets by mouth every 6 hours as needed for moderate to severe pain        simvastatin 80 MG tablet    ZOCOR    90 tablet    Take 1 tablet (80 mg) by mouth every morning    Hyperlipidemia LDL goal <130       * traZODone 50 MG tablet    DESYREL    90 tablet    Take 0.5 tablets (25 mg) by mouth nightly as needed for sleep Take along with the 100 mg tablets for total dose of 225 mg    Depression, unspecified depression type       * traZODone 100 MG tablet    DESYREL    180 tablet    Take 2 tablets (200 mg) by mouth nightly as needed for sleep Take along with the 25 mg tablet for total dose of 225 mg    Depression, unspecified depression type       * Notice:  This list has 7 medication(s) that are the same as other medications prescribed for you. Read the directions carefully, and ask your doctor or other care provider to review them with you.

## 2018-04-06 ENCOUNTER — OFFICE VISIT (OUTPATIENT)
Dept: PSYCHOLOGY | Facility: CLINIC | Age: 63
End: 2018-04-06
Payer: MEDICARE

## 2018-04-06 DIAGNOSIS — F43.10 POSTTRAUMATIC STRESS DISORDER: ICD-10-CM

## 2018-04-06 DIAGNOSIS — F33.1 MAJOR DEPRESSIVE DISORDER, RECURRENT EPISODE, MODERATE (H): Primary | ICD-10-CM

## 2018-04-06 DIAGNOSIS — F41.1 GENERALIZED ANXIETY DISORDER: ICD-10-CM

## 2018-04-06 PROCEDURE — 90834 PSYTX W PT 45 MINUTES: CPT | Performed by: SOCIAL WORKER

## 2018-04-06 ASSESSMENT — ANXIETY QUESTIONNAIRES
1. FEELING NERVOUS, ANXIOUS, OR ON EDGE: MORE THAN HALF THE DAYS
6. BECOMING EASILY ANNOYED OR IRRITABLE: SEVERAL DAYS
GAD7 TOTAL SCORE: 8
3. WORRYING TOO MUCH ABOUT DIFFERENT THINGS: MORE THAN HALF THE DAYS
7. FEELING AFRAID AS IF SOMETHING AWFUL MIGHT HAPPEN: NOT AT ALL
2. NOT BEING ABLE TO STOP OR CONTROL WORRYING: MORE THAN HALF THE DAYS
5. BEING SO RESTLESS THAT IT IS HARD TO SIT STILL: SEVERAL DAYS

## 2018-04-06 ASSESSMENT — PATIENT HEALTH QUESTIONNAIRE - PHQ9: 5. POOR APPETITE OR OVEREATING: NOT AT ALL
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
## 2018-04-06 NOTE — MR AVS SNAPSHOT
"                  MRN:[id number]                      After Visit Summary   2018    Melquiades Morales    MRN: [id number]           Visit Information        Provider Department      2018 9:00 AM Kleber Pollack, [facility] Generic      Your next 10 appointments already scheduled     2018  9:00 AM CDT   Nurse Only with [facility]   [facility]    [address] [facility] [facility] [address]   [phone]            2018  9:00 AM CDT   Return Visit with Kleber Pollack [facility] ([facility])    [address] [address]   [phone]            May 11, 2018  9:00 AM CDT   Return Visit with Kleber Pollack [facility] ([facility])    [address]  [facility] [address]   [phone]            May 25, 2018  9:00 AM CDT   Return Visit with Kleber Pollack [facility] ([facility])    [address]  [facility] [address]   [phone]              MyChart Information     [employer] lets you send messages to your doctor, view your test results, renew your prescriptions, schedule appointments and more. To sign up, go to [email or web address] . Click on \"Log in\" on the left side of the screen, which will take you to the Welcome page. Then click on \"Sign up Now\" on the right side of the page.     You will be asked to enter the access code listed below, as well as some personal information. Please follow the directions to create your username and password.     Your access code is: [email or web address]  Expires: 2018 11:27 AM     Your access code will  in 90 days. If you need help or a new code, please call your [place] clinic or [phone].        Care EveryWhere ID     This is your Care EveryWhere " ID. This could be used by other organizations to access your [place] medical records  [id number]        Equal Access to Services     AQUILES WALSH : Shiva Naqvi, winnie xie, river black. So [facility] [phone].    ATENCIÓN: Si habla español, tiene a tucker disposición servicios gratuitos de asistencia lingüística. Llame al [phone].    We comply with applicable federal civil rights laws and [place] laws. We do not discriminate on the basis of race, color, national origin, age, disability, sex, sexual orientation, or gender identity.

## 2018-04-07 ASSESSMENT — PATIENT HEALTH QUESTIONNAIRE - PHQ9: SUM OF ALL RESPONSES TO PHQ QUESTIONS 1-9: 14

## 2018-04-07 ASSESSMENT — ANXIETY QUESTIONNAIRES: GAD7 TOTAL SCORE: 8

## 2018-04-08 LAB — COPATH REPORT: NORMAL

## 2018-04-12 ENCOUNTER — ALLIED HEALTH/NURSE VISIT (OUTPATIENT)
Dept: DERMATOLOGY | Facility: CLINIC | Age: 63
End: 2018-04-12
Payer: MEDICARE

## 2018-04-12 DIAGNOSIS — Z48.01 ENCOUNTER FOR CHANGE OR REMOVAL OF SURGICAL WOUND DRESSING: Primary | ICD-10-CM

## 2018-04-12 PROCEDURE — 99207 ZZC NO CHARGE NURSE ONLY: CPT

## 2018-04-12 NOTE — PROGRESS NOTES
Pt returned to clinic for post surgery 1 week follow up bandage change. Pt has no complaints, denies pain. Bandage removed from posterior neck, area cleansed with normal saline. Site is healing and wound edges approximating well. Reapplied new steri strips and paper tape.    Advised to watch for signs/sx of infection; spreading redness, drainage, odor, fever. Call or report promptly to clinic. Pt given written instructions and informed to rtc as needed. Patient verbalized understanding.     Mitzy Fermin, CMA

## 2018-04-12 NOTE — MR AVS SNAPSHOT
After Visit Summary   4/12/2018    Melquiades Morales    MRN: [id number]           Patient Information     Date Of Birth          1955        Visit Information        Provider Department      4/12/2018 9:00 [facility]        Care Instructions    WOUND CARE INSTRUCTIONS  for  ONE WEEK AFTER SURGERY          1) Leave flat bandage on your skin for one week after today s bandage change.  2) In one week when you remove the bandage, you may resume your regular skin care routine, including washing with mild soap and water, applying moisturizer, make-up and sunscreen.    3) If there are any open or bleeding areas at the incision/graft site you should begin to cover the area with a bandage daily as follows:    1) Clean and dry the area with plain tap water using a Q-tip or sterile gauze pad.  2) Apply Polysporin or Bacitracin ointment to the open area.  3) Cover the wound with a band-aid or a sterile non-stick gauze pad and micropore paper tape.         SIGNS OF INFECTION  - If you notice any of these signs of infection, call your doctor right away: expanding redness around the wound.  - Yellow or greenish-colored pus or cloudy wound drainage.    - Red streaking spreading from the wound.  - Increased swelling, tenderness, or pain around the wound.   - Fever.    Please remember that yellow and clear drainage from a wound can be normal and related to normal wound healing.  Isolated drainage from a wound without a combination of the above features does not indicate infection.       *Once the bandages are removed, the scar will be red and firm (especially in the lip/chin area). This is normal and will fade in time. It might take 6-12 months for this to happen.     *Massaging the area will help the scar soften and fade quicker. Begin to massage the area one month after the bandages have been removed. To massage apply pressure directly and firmly over the scar with the fingertips  and move in a circular motion. Massage the area for a few minutes several times a day. Continue to massage the site for several months.    *Approximately 6-8 weeks after surgery it is not uncommon to see the formation of  tender pimple-like  bump along the scar. This is normal. As the scar continues to mature and the stitches underneath the skin begin to dissolve, this might occur. Do not pick or squeeze, this will resolve on it s own. Should one break open producing a small amount of drainage, apply Polysporin or Bacitracin ointment a few times a day until the wound is completely healed.    *Numbness in the surgical area is expected. It might take 12-18 months for the feeling to return to normal. During this time sensations of itchiness, tingling and occasional sharp pains might be noted. These feelings are normal and will subside once the nerves have completely healed.         IN CASE OF EMERGENCY: Dr Aguilar [phone]     If you were seen in [place] call: [phone]    If you were seen in [place] call: [phone]              Follow-ups after your visit        Your next 10 appointments already scheduled     Apr 20, 2018  9:00 AM CDT   Return Visit with Kleber Pollack, [facility] ([address] [address]   [phone]            May 11, 2018  9:00 AM CDT   Return Visit with Kleber Pollack [facility] ([facility])    [address] [address]   [phone]            May 25, 2018  9:00 AM CDT   Return Visit with Kleber Pollack [facility] ([address] [address]   [phone]              Who to contact     If you have questions or need follow up information about today's clinic visit or your schedule please contact [facility]  "[place] directly at [phone].  Normal or non-critical lab and imaging results will be communicated to you by MyChart, letter or phone within 4 business days after the clinic has received the results. If you do not hear from us within 7 days, please contact the clinic through MyChart or phone. If you have a critical or abnormal lab result, we will notify you by phone as soon as possible.  Submit refill requests through [email or web address] or call your pharmacy and they will forward the refill request to us. Please allow 3 business days for your refill to be completed.          Additional Information About Your Visit        [employer] Information     [email or web address] lets you send messages to your doctor, view your test results, renew your prescriptions, schedule appointments and more. To sign up, go to [email or web address] . Click on \"Log in\" on the left side of the screen, which will take you to the Welcome page. Then click on \"Sign up Now\" on the right side of the page.     You will be asked to enter the access code listed below, as well as some personal information. Please follow the directions to create your username and password.     Your access code is: [email or web address]  Expires: 2018 11:27 AM     Your access code will  in 90 days. If you need help or a new code, please call your [place] clinic or [phone].        Care EveryWhere ID     This is your Care EveryWhere ID. This could be used by other organizations to access your [place] medical records  [id number]         Blood Pressure from Last 3 Encounters:   18 148/84   18 149/82   02/15/18 147/85    Weight from Last 3 Encounters:   18 110.7 kg (244 lb)   18 111.6 kg (246 lb)   10/10/17 115.7 kg (255 lb)              Today, you had the following     No orders found for display       Primary Care Provider Office Phone # Fax #    Jignesh Travis [phone] [address] [facility] [address]        Equal Access to " Services     [facility]: Hadii nicolas knight juliette Alfaroali, waaxda luqadaha, qaybta kaalmada adebenitaheather, river benjaminharriskike castañeda . So [facility] [phone].    ATENCIÓN: Si priscila beatty, tiene a tucker disposición servicios gratuitos de asistencia lingüística. Kimame al [phone].    We comply with applicable federal civil rights laws and [place] laws. We do not discriminate on the basis of race, color, national origin, age, disability, sex, sexual orientation, or gender identity.            Thank you!     Thank you for choosing [facility]  for your care. Our goal is always to provide you with excellent care. Hearing back from our patients is one way we can continue to improve our services. Please take a few minutes to complete the written survey that you may receive in the mail after your visit with us. Thank you!             Your Updated Medication List - Protect others around you: Learn how to safely use, store and throw away your medicines at www.disposemymeds.org.          This list is accurate as of 4/12/18  9:02 AM.  Always use your most recent med list.                   Brand Name Dispense Instructions for use Diagnosis    albuterol 108 (90 Base) MCG/ACT Inhaler    PROAIR HFA    1 Inhaler    Inhale 2 puffs into the lungs every 4 hours as needed for shortness of breath / dyspnea or wheezing    Bronchitis       DULoxetine 60 MG EC capsule    CYMBALTA    180 capsule    Take 2 capsules (120 mg) by mouth daily    Depression, unspecified depression type       lidocaine 5 % ointment    XYLOCAINE    744.24 g    Apply topically as needed for moderate pain Apply 2 grams to affected PRN up to 4x daily. [phone] Pharmacy #.    Pain       * LYRICA PO      Take 100 mg by mouth every morning        * LYRICA PO      Take 150 mg by mouth At Bedtime        MS CONTIN PO      Take 30 mg by mouth 3 times daily 15 mg X 2 tabs.  AM, 1200, bedtime        naproxen 500 MG tablet    NAPROSYN    60 tablet     Take 1 tablet (500 mg) by mouth at onset of headache    Chronic pain syndrome       order for DME     1 Device    Semi electric hospital bed with side rails and mattress. Length of bed is for lifetime    Other unspecified back disorder, Obese, Lumbosacral radiculitis, COPD (chronic obstructive pulmonary disease) (H)       * order for DME     1 Units    Equipment being ordered: Wheelchair. Electric, including adjustable back rest sitting to resting, leg elevation adjustment, approved for outdoor use    Chronic pain syndrome, Lumbosacral radiculitis       * order for DME     1 each    Equipment being ordered: Hospital Bed and mattress.    Chronic pain syndrome, Lumbosacral radiculitis, Chronic obstructive pulmonary disease, unspecified COPD type (H), Obese, Lumbar radiculopathy, Encephalopathy, Spinal stenosis in cervical region       * order for DME     1 each    Equipment being ordered: Lift Chair    Chronic pain syndrome, Lumbosacral radiculitis, Chronic obstructive pulmonary disease, unspecified COPD type (H), Obese, Lumbar radiculopathy, Encephalopathy, Spinal stenosis in cervical region, Unable to ambulate       oxyCODONE-acetaminophen 5-325 MG per tablet    PERCOCET    24 tablet    Take 1-2 tablets by mouth every 6 hours as needed for moderate to severe pain        simvastatin 80 MG tablet    ZOCOR    90 tablet    Take 1 tablet (80 mg) by mouth every morning    Hyperlipidemia LDL goal <130       * traZODone 50 MG tablet    DESYREL    90 tablet    Take 0.5 tablets (25 mg) by mouth nightly as needed for sleep Take along with the 100 mg tablets for total dose of 225 mg    Depression, unspecified depression type       * traZODone 100 MG tablet    DESYREL    180 tablet    Take 2 tablets (200 mg) by mouth nightly as needed for sleep Take along with the 25 mg tablet for total dose of 225 mg    Depression, unspecified depression type       * Notice:  This list has 7 medication(s) that are the same as other  medications prescribed for you. Read the directions carefully, and ask your doctor or other care provider to review them with you.

## 2018-04-12 NOTE — LETTER
To Whom It May Concern:          Melquiades Morales, had a procedure done on April, 4th 2018. Please excuse him from his water therapy until April, 18th 2018.           Sincerely,        Ami Kraus PA-C/PARTH

## 2018-04-12 NOTE — PATIENT INSTRUCTIONS

## 2018-04-20 ENCOUNTER — OFFICE VISIT (OUTPATIENT)
Dept: PSYCHOLOGY | Facility: CLINIC | Age: 63
End: 2018-04-20
Payer: MEDICARE

## 2018-04-20 DIAGNOSIS — F41.1 GENERALIZED ANXIETY DISORDER: ICD-10-CM

## 2018-04-20 DIAGNOSIS — F33.1 MAJOR DEPRESSIVE DISORDER, RECURRENT EPISODE, MODERATE (H): Primary | ICD-10-CM

## 2018-04-20 PROCEDURE — 90834 PSYTX W PT 45 MINUTES: CPT | Performed by: SOCIAL WORKER

## 2018-04-20 ASSESSMENT — ANXIETY QUESTIONNAIRES
3. WORRYING TOO MUCH ABOUT DIFFERENT THINGS: MORE THAN HALF THE DAYS
7. FEELING AFRAID AS IF SOMETHING AWFUL MIGHT HAPPEN: NOT AT ALL
5. BEING SO RESTLESS THAT IT IS HARD TO SIT STILL: SEVERAL DAYS
2. NOT BEING ABLE TO STOP OR CONTROL WORRYING: MORE THAN HALF THE DAYS
GAD7 TOTAL SCORE: 10
6. BECOMING EASILY ANNOYED OR IRRITABLE: MORE THAN HALF THE DAYS
1. FEELING NERVOUS, ANXIOUS, OR ON EDGE: MORE THAN HALF THE DAYS

## 2018-04-20 ASSESSMENT — PATIENT HEALTH QUESTIONNAIRE - PHQ9: 5. POOR APPETITE OR OVEREATING: SEVERAL DAYS
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
## 2018-04-20 NOTE — MR AVS SNAPSHOT
"                  MRN:[id number]                      After Visit Summary   2018    Melquiades Morales    MRN: [id number]           Visit Information        Provider Department      2018 9:00 AM Kleber Pollack, [facility] Generic      Your next 10 appointments already scheduled     May 11, 2018  9:00 AM CDT   Return Visit with Kleber Pollack [facility]    [address] [address]   [phone]            May 25, 2018  9:00 AM CDT   Return Visit with Kleber Pollack, [facility] ([facility])    [address] [address]   [phone]            2018  9:00 AM CDT   Return Visit with Kleber Pollack, [facility])    [address] [address]   [phone]            2018  9:00 AM CDT   Return Visit with Kleber Pollack, [facility])    [address] [address]   [phone]              MyChart Information     [employer] lets you send messages to your doctor, view your test results, renew your prescriptions, schedule appointments and more. To sign up, go to [email or web address] . Click on \"Log in\" on the left side of the screen, which will take you to the Welcome page. Then click on \"Sign up Now\" on the right side of the page.     You will be asked to enter the access code listed below, as well as some personal information. Please follow the directions to create your username and password.     Your access code is: [email or web address]  Expires: 2018  9:47 AM     Your access code will  in 90 days. If you need help or a new code, please call your [facility] or [phone].        Care EveryWhere ID  "    This is your Care EveryWhere ID. This could be used by other organizations to access your [place] medical records  [id number]        Equal Access to Services     AQUILES WALSH : Shiva Naqvi, winnie xie, marie parker, river pennington. So [facility] [phone].    ATENCIÓN: Si habla español, tiene a tucker disposición servicios gratuitos de asistencia lingüística. Llame al [phone].    We comply with applicable federal civil rights laws and [place] laws. We do not discriminate on the basis of race, color, national origin, age, disability, sex, sexual orientation, or gender identity.

## 2018-04-21 ASSESSMENT — ANXIETY QUESTIONNAIRES: GAD7 TOTAL SCORE: 10

## 2018-04-21 ASSESSMENT — PATIENT HEALTH QUESTIONNAIRE - PHQ9: SUM OF ALL RESPONSES TO PHQ QUESTIONS 1-9: 16

## 2018-05-03 DIAGNOSIS — F32.A DEPRESSION, UNSPECIFIED DEPRESSION TYPE: ICD-10-CM

## 2018-05-03 NOTE — TELEPHONE ENCOUNTER
Pt received a refill on his Trazodone 100 mgs but is needing the Trazodone 50 mg order as well.  Advise.  KparubinaRN

## 2018-05-04 RX ORDER — TRAZODONE HYDROCHLORIDE 50 MG/1
25 TABLET, FILM COATED ORAL
Qty: 90 TABLET | Refills: 3 | Status: SHIPPED | OUTPATIENT
Start: 2018-05-04 | End: 2018-12-17

## 2018-05-11 ENCOUNTER — OFFICE VISIT (OUTPATIENT)
Dept: PSYCHOLOGY | Facility: CLINIC | Age: 63
End: 2018-05-11
Payer: MEDICARE

## 2018-05-11 DIAGNOSIS — F33.1 MAJOR DEPRESSIVE DISORDER, RECURRENT EPISODE, MODERATE (H): Primary | ICD-10-CM

## 2018-05-11 DIAGNOSIS — F41.1 GENERALIZED ANXIETY DISORDER: ICD-10-CM

## 2018-05-11 PROCEDURE — 90834 PSYTX W PT 45 MINUTES: CPT | Performed by: SOCIAL WORKER

## 2018-05-11 ASSESSMENT — ANXIETY QUESTIONNAIRES
3. WORRYING TOO MUCH ABOUT DIFFERENT THINGS: MORE THAN HALF THE DAYS
GAD7 TOTAL SCORE: 12
2. NOT BEING ABLE TO STOP OR CONTROL WORRYING: MORE THAN HALF THE DAYS
1. FEELING NERVOUS, ANXIOUS, OR ON EDGE: MORE THAN HALF THE DAYS
7. FEELING AFRAID AS IF SOMETHING AWFUL MIGHT HAPPEN: NOT AT ALL
5. BEING SO RESTLESS THAT IT IS HARD TO SIT STILL: MORE THAN HALF THE DAYS
6. BECOMING EASILY ANNOYED OR IRRITABLE: MORE THAN HALF THE DAYS

## 2018-05-11 ASSESSMENT — PATIENT HEALTH QUESTIONNAIRE - PHQ9: 5. POOR APPETITE OR OVEREATING: MORE THAN HALF THE DAYS

## 2018-05-11 NOTE — MR AVS SNAPSHOT
"                  MRN:[id number]                      After Visit Summary   2018    Melquiades Morales    MRN: [id number]           Visit Information        Provider Department      2018 9:00 AM Kleber Pollack, [facility] Generic      Your next 10 appointments already scheduled     May 25, 2018  9:00 AM CDT   Return Visit with Kleber Pollack [facility] ([facility]    [address] [place] [address]   [phone]            2018  9:00 AM CDT   Return Visit with Kleber Pollack [facility] ([facility])    [address] [place] [address]   [phone]            2018  9:00 AM CDT   Return Visit with Kleber Pollack [facility] ([address] [address]   [phone]              MyChart Information     [employer] lets you send messages to your doctor, view your test results, renew your prescriptions, schedule appointments and more. To sign up, go to [email or web address] . Click on \"Log in\" on the left side of the screen, which will take you to the Welcome page. Then click on \"Sign up Now\" on the right side of the page.     You will be asked to enter the access code listed below, as well as some personal information. Please follow the directions to create your username and password.     Your access code is: [email or web address]  Expires: 2018  9:47 AM     Your access code will  in 90 days. If you need help or a new code, please call your [place] clinic or [phone].        Care EveryWhere ID     This is your Care EveryWhere ID. This could be used by other organizations to access your [place] medical records  [id number]        Equal Access to Services     AQUILES WALSH AH: Hadii winnie Lazaro " marie xie waxay idiin hayaan adeeg kharash la'aan ah. So [facility] [phone].    ATENCIÓN: Si habla español, tiene a tucker disposición servicios gratuitos de asistencia lingüística. Llame al [phone].    We comply with applicable federal civil rights laws and [place] laws. We do not discriminate on the basis of race, color, national origin, age, disability, sex, sexual orientation, or gender identity.
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
## 2018-05-11 NOTE — Clinical Note
He seems to be ok with idea of he and wife getting a divorce. She possibly has the paperwork for it.

## 2018-05-12 ASSESSMENT — ANXIETY QUESTIONNAIRES: GAD7 TOTAL SCORE: 12

## 2018-05-12 ASSESSMENT — PATIENT HEALTH QUESTIONNAIRE - PHQ9: SUM OF ALL RESPONSES TO PHQ QUESTIONS 1-9: 16

## 2018-05-25 ENCOUNTER — OFFICE VISIT (OUTPATIENT)
Dept: PSYCHOLOGY | Facility: CLINIC | Age: 63
End: 2018-05-25
Payer: MEDICARE

## 2018-05-25 DIAGNOSIS — F41.1 GENERALIZED ANXIETY DISORDER: ICD-10-CM

## 2018-05-25 DIAGNOSIS — F33.1 MAJOR DEPRESSIVE DISORDER, RECURRENT EPISODE, MODERATE (H): Primary | ICD-10-CM

## 2018-05-25 PROCEDURE — 90834 PSYTX W PT 45 MINUTES: CPT | Performed by: SOCIAL WORKER

## 2018-05-25 ASSESSMENT — ANXIETY QUESTIONNAIRES
5. BEING SO RESTLESS THAT IT IS HARD TO SIT STILL: SEVERAL DAYS
3. WORRYING TOO MUCH ABOUT DIFFERENT THINGS: MORE THAN HALF THE DAYS
2. NOT BEING ABLE TO STOP OR CONTROL WORRYING: MORE THAN HALF THE DAYS
GAD7 TOTAL SCORE: 10
6. BECOMING EASILY ANNOYED OR IRRITABLE: MORE THAN HALF THE DAYS
7. FEELING AFRAID AS IF SOMETHING AWFUL MIGHT HAPPEN: NOT AT ALL
1. FEELING NERVOUS, ANXIOUS, OR ON EDGE: MORE THAN HALF THE DAYS

## 2018-05-25 ASSESSMENT — PATIENT HEALTH QUESTIONNAIRE - PHQ9: 5. POOR APPETITE OR OVEREATING: SEVERAL DAYS

## 2018-05-25 NOTE — MR AVS SNAPSHOT
"                  MRN:[id number]                      After Visit Summary   2018    Melquiades Morales    MRN: [id number]           Visit Information        Provider Department      2018 9:00 AM Kleber Pollack, [facility] Generic      Your next 10 appointments already scheduled     2018  9:00 AM CDT   Return Visit with Kleber Pollack [facility] ([facility])    [address] [address]   [phone]            2018  9:00 AM CDT   Return Visit with Kleber Pollack, [facility] ([facility])    [address] [address]   [phone]            2018  9:00 AM CDT   Return Visit with Kleber Pollack, [facility])    [address] [address]   [phone]            2018  9:00 AM CDT   Return Visit with Kleber Pollack, [facility] ([facility])    [address] [address]   [phone]              MyChart Information     [employer] lets you send messages to your doctor, view your test results, renew your prescriptions, schedule appointments and more. To sign up, go to [email or web address] . Click on \"Log in\" on the left side of the screen, which will take you to the Welcome page. Then click on \"Sign up Now\" on the right side of the page.     You will be asked to enter the access code listed below, as well as some personal information. Please follow the directions to create your username and password.     Your access code is: [email or web address]  Expires: 2018  9:47 AM     Your access code will  in 90 days. If you need help or a new code, please call your [facility] or [phone].        Care EveryWhere ID  "    This is your Care EveryWhere ID. This could be used by other organizations to access your [place] medical records  [id number]        Equal Access to Services     AQUILES WALSH : Shiva Naqvi, winnie xie, marie parker, river pennington. So [facility] [phone].    ATENCIÓN: Si habla español, tiene a tucker disposición servicios gratuitos de asistencia lingüística. Llame al [phone].    We comply with applicable federal civil rights laws and [place] laws. We do not discriminate on the basis of race, color, national origin, age, disability, sex, sexual orientation, or gender identity.
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
## 2018-05-26 ASSESSMENT — ANXIETY QUESTIONNAIRES: GAD7 TOTAL SCORE: 10

## 2018-05-26 ASSESSMENT — PATIENT HEALTH QUESTIONNAIRE - PHQ9: SUM OF ALL RESPONSES TO PHQ QUESTIONS 1-9: 14

## 2018-06-05 ENCOUNTER — TELEPHONE (OUTPATIENT)
Dept: FAMILY MEDICINE | Facility: CLINIC | Age: 63
End: 2018-06-05

## 2018-06-05 DIAGNOSIS — E78.5 HYPERLIPIDEMIA LDL GOAL <130: ICD-10-CM

## 2018-06-05 DIAGNOSIS — F32.A DEPRESSION, UNSPECIFIED DEPRESSION TYPE: ICD-10-CM

## 2018-06-05 RX ORDER — TRAZODONE HYDROCHLORIDE 100 MG/1
200 TABLET ORAL
Qty: 180 TABLET | Refills: 3 | Status: CANCELLED | OUTPATIENT
Start: 2018-06-05

## 2018-06-05 RX ORDER — TRAZODONE HYDROCHLORIDE 50 MG/1
25 TABLET, FILM COATED ORAL
Qty: 90 TABLET | Refills: 3 | Status: CANCELLED | OUTPATIENT
Start: 2018-06-05

## 2018-06-05 RX ORDER — SIMVASTATIN 80 MG
80 TABLET ORAL EVERY MORNING
Qty: 90 TABLET | Refills: 3 | Status: CANCELLED | OUTPATIENT
Start: 2018-06-05

## 2018-06-05 RX ORDER — DULOXETIN HYDROCHLORIDE 60 MG/1
120 CAPSULE, DELAYED RELEASE ORAL DAILY
Qty: 180 CAPSULE | Refills: 1 | Status: CANCELLED | OUTPATIENT
Start: 2018-06-05

## 2018-06-05 NOTE — TELEPHONE ENCOUNTER
Writer contacted the pharmacy and the patient does have all refills available for pickup.  Patient was notified.    Milly MCGOVERN RN

## 2018-06-05 NOTE — TELEPHONE ENCOUNTER
Reason for Call:  Medication or medication refill:    Do you use a [facility]?  Name of the pharmacy and phone number for the current request:  [facility] [phone]    Name of the medication requested: He is needing Simvastatin, Cymbalta and Trazodone 50 mg and 100 mg.     Other request: He is out of his simvastatin and cymbalta and almost out of trazodone. He is wondering if we can send these in today.    Can we leave a detailed message on this number? YES    Phone number patient can be reached at: Home number on file [phone] (home)    Best Time: any    Call taken on 6/5/2018 at 4:46 PM by Sierra Blankenship

## 2018-06-08 ENCOUNTER — OFFICE VISIT (OUTPATIENT)
Dept: PSYCHOLOGY | Facility: CLINIC | Age: 63
End: 2018-06-08
Payer: MEDICARE

## 2018-06-08 DIAGNOSIS — F33.1 MAJOR DEPRESSIVE DISORDER, RECURRENT EPISODE, MODERATE (H): Primary | ICD-10-CM

## 2018-06-08 DIAGNOSIS — F41.1 GENERALIZED ANXIETY DISORDER: ICD-10-CM

## 2018-06-08 PROCEDURE — 90834 PSYTX W PT 45 MINUTES: CPT | Performed by: SOCIAL WORKER

## 2018-06-08 ASSESSMENT — ANXIETY QUESTIONNAIRES
2. NOT BEING ABLE TO STOP OR CONTROL WORRYING: MORE THAN HALF THE DAYS
6. BECOMING EASILY ANNOYED OR IRRITABLE: MORE THAN HALF THE DAYS
7. FEELING AFRAID AS IF SOMETHING AWFUL MIGHT HAPPEN: NOT AT ALL
3. WORRYING TOO MUCH ABOUT DIFFERENT THINGS: MORE THAN HALF THE DAYS
1. FEELING NERVOUS, ANXIOUS, OR ON EDGE: SEVERAL DAYS
5. BEING SO RESTLESS THAT IT IS HARD TO SIT STILL: SEVERAL DAYS
GAD7 TOTAL SCORE: 9

## 2018-06-08 ASSESSMENT — PATIENT HEALTH QUESTIONNAIRE - PHQ9: 5. POOR APPETITE OR OVEREATING: SEVERAL DAYS
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
## 2018-06-08 NOTE — MR AVS SNAPSHOT
"                  MRN:[id number]                      After Visit Summary   6/8/2018    Melquiades Morales    MRN: [id number]           Visit Information        Provider Department      6/8/2018 9:00 AM Kleber Pollack, [facility]      Your next 10 appointments already scheduled     Jun 29, 2018  9:00 AM CDT   Return Visit with Kleber Pollack [facility]    [address]  [facility] [address]   [phone]            Jul 13, 2018  9:00 AM CDT   Return Visit with Kleber Pollack, [facility] ([facility])    [address]  [facility] [address]   [phone]            Jul 27, 2018  9:00 AM CDT   Return Visit with Kleber Pollack, [facility] ([facility])    [address]  [facility] [address]   [phone]            Aug 10, 2018  9:00 AM CDT   Return Visit with Kleber Pollack, [facility] ([facility])    [address]  [facility] [address]   [phone]            Aug 24, 2018  9:00 AM CDT   Return Visit with Kleber Pollack, [facility] ([facility])    [address]  [facility] [address]   [phone]              MyChart Information     [facility] lets you send messages to your doctor, view your test results, renew your prescriptions, schedule appointments and more. To sign up, go to [email or web address] . Click on \"Log in\" on the left side of the screen, which will take you to the Welcome page. Then click on \"Sign up Now\" on the right side of the page.     You will be asked to enter the access code listed below, as well as some personal information. Please follow the directions to create your username and password.   "   Your access code is: [email or web address]  Expires: 2018  9:47 AM     Your access code will  in 90 days. If you need help or a new code, please call your [facility] clinic or [phone].        Care EveryWhere ID     This is your Care EveryWhere ID. This could be used by other organizations to access your [facility] medical records  [id number]        Equal Access to Services     AQUILES WALSH : Hadii nicolas segovia Sojannet, waaxda lupatt, qaybta kaalmada adekaz, [place]. So [facility] [phone].    ATENCIÓN: Si habla jaspalañol, tiene a tucker disposición servicios gratuitos de asistencia lingüística. Llame al [phone].    We comply with applicable federal civil rights laws and [place] laws. We do not discriminate on the basis of race, color, national origin, age, disability, sex, sexual orientation, or gender identity.

## 2018-06-09 ASSESSMENT — ANXIETY QUESTIONNAIRES: GAD7 TOTAL SCORE: 9

## 2018-06-09 ASSESSMENT — PATIENT HEALTH QUESTIONNAIRE - PHQ9: SUM OF ALL RESPONSES TO PHQ QUESTIONS 1-9: 14

## 2018-06-29 ENCOUNTER — OFFICE VISIT (OUTPATIENT)
Dept: PSYCHOLOGY | Facility: CLINIC | Age: 63
End: 2018-06-29
Payer: MEDICARE

## 2018-06-29 DIAGNOSIS — F43.10 POSTTRAUMATIC STRESS DISORDER: ICD-10-CM

## 2018-06-29 DIAGNOSIS — F33.1 MAJOR DEPRESSIVE DISORDER, RECURRENT EPISODE, MODERATE (H): Primary | ICD-10-CM

## 2018-06-29 DIAGNOSIS — F41.1 GENERALIZED ANXIETY DISORDER: ICD-10-CM

## 2018-06-29 PROCEDURE — 90834 PSYTX W PT 45 MINUTES: CPT | Performed by: SOCIAL WORKER

## 2018-06-29 ASSESSMENT — ANXIETY QUESTIONNAIRES
GAD7 TOTAL SCORE: 10
1. FEELING NERVOUS, ANXIOUS, OR ON EDGE: MORE THAN HALF THE DAYS
7. FEELING AFRAID AS IF SOMETHING AWFUL MIGHT HAPPEN: NOT AT ALL
2. NOT BEING ABLE TO STOP OR CONTROL WORRYING: MORE THAN HALF THE DAYS
3. WORRYING TOO MUCH ABOUT DIFFERENT THINGS: MORE THAN HALF THE DAYS
5. BEING SO RESTLESS THAT IT IS HARD TO SIT STILL: SEVERAL DAYS
6. BECOMING EASILY ANNOYED OR IRRITABLE: MORE THAN HALF THE DAYS

## 2018-06-29 ASSESSMENT — PATIENT HEALTH QUESTIONNAIRE - PHQ9: 5. POOR APPETITE OR OVEREATING: SEVERAL DAYS
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
## 2018-06-29 NOTE — MR AVS SNAPSHOT
"                  MRN:[id number]                      After Visit Summary   6/29/2018    Melquiades Morales    MRN: [id number]           Visit Information        Provider Department      6/29/2018 9:00 AM Kleber Pollack, [facility] Generic      Your next 10 appointments already scheduled     Jul 13, 2018  9:00 AM CDT   Return Visit with Kleber Pollack [facility] ([facility])    [address]  [facility] [address]   [phone]            Jul 27, 2018  9:00 AM CDT   Return Visit with Kleber Pollack, [facility] ([facility])    [address]  [facility] [address]   [phone]            Aug 10, 2018  9:00 AM CDT   Return Visit with Kleber Pollack, [facility] ([facility])    [address]  [facility] [address]   [phone]            Aug 24, 2018  9:00 AM CDT   Return Visit with Kleber Pollack, [facility] ([facility])    [address]  [facility] [address]   [phone]            Sep 07, 2018  9:00 AM CDT   Return Visit with Kleber Pollack, [facility] ([facility])    [address]  [facility] [address]   [phone]              MyChart Information     Solidarium lets you send messages to your doctor, view your test results, renew your prescriptions, schedule appointments and more. To sign up, go to [email or web address] . Click on \"Log in\" on the left side of the screen, which will take you to the Welcome page. Then click on \"Sign up Now\" on the right side of the page.     You will be asked to enter the access code listed below, as well as some personal information. Please follow the directions to create your username and password.   "   Your access code is: [email or web address]  Expires: 2018  9:47 AM     Your access code will  in 90 days. If you need help or a new code, please call your [place] clinic or [phone].        Care EveryWhere ID     This is your Care EveryWhere ID. This could be used by other organizations to access your [place] medical records  [id number]        Equal Access to Services     AQUILES WALSH : Hadii nicolas segovia Sojannet, waaxda lupatt, qaybta kaalmada adekaz, river pennington. So [facility] [phone].    ATENCIÓN: Si habla jaspalañol, tiene a tucker disposición servicios gratuitos de asistencia lingüística. Llame al [phone].    We comply with applicable federal civil rights laws and [place] laws. We do not discriminate on the basis of race, color, national origin, age, disability, sex, sexual orientation, or gender identity.

## 2018-06-30 ASSESSMENT — PATIENT HEALTH QUESTIONNAIRE - PHQ9: SUM OF ALL RESPONSES TO PHQ QUESTIONS 1-9: 15

## 2018-06-30 ASSESSMENT — ANXIETY QUESTIONNAIRES: GAD7 TOTAL SCORE: 10

## 2018-07-06 ENCOUNTER — TELEPHONE (OUTPATIENT)
Dept: FAMILY MEDICINE | Facility: CLINIC | Age: 63
End: 2018-07-06

## 2018-07-06 DIAGNOSIS — F32.A DEPRESSION, UNSPECIFIED DEPRESSION TYPE: ICD-10-CM

## 2018-07-06 RX ORDER — TRAZODONE HYDROCHLORIDE 50 MG/1
TABLET, FILM COATED ORAL
Qty: 15 TABLET | OUTPATIENT
Start: 2018-07-06

## 2018-07-09 NOTE — TELEPHONE ENCOUNTER
Reason for Call:  Prescription Issue    Detailed comments: Pt called and is upset that we refused his Trazodone refill - both doses.  [facility] should have refills on both doses of Trzodone.  Pt would guillermo us to call pharmacy and make sure they have Rxs so he can get a refill asap.    Phone Number Patient can be reached at: Home number on file [phone] (home)    Best Time: any    Can we leave a detailed message on this number? YES    Call taken on 7/9/2018 at 4:02 PM by Emily Padgett

## 2018-07-27 ENCOUNTER — OFFICE VISIT (OUTPATIENT)
Dept: PSYCHOLOGY | Facility: CLINIC | Age: 63
End: 2018-07-27
Payer: MEDICARE

## 2018-07-27 DIAGNOSIS — F33.1 MAJOR DEPRESSIVE DISORDER, RECURRENT EPISODE, MODERATE (H): Primary | ICD-10-CM

## 2018-07-27 DIAGNOSIS — F41.1 GENERALIZED ANXIETY DISORDER: ICD-10-CM

## 2018-07-27 PROCEDURE — 90834 PSYTX W PT 45 MINUTES: CPT | Performed by: SOCIAL WORKER

## 2018-07-27 ASSESSMENT — ANXIETY QUESTIONNAIRES
2. NOT BEING ABLE TO STOP OR CONTROL WORRYING: MORE THAN HALF THE DAYS
6. BECOMING EASILY ANNOYED OR IRRITABLE: MORE THAN HALF THE DAYS
7. FEELING AFRAID AS IF SOMETHING AWFUL MIGHT HAPPEN: NOT AT ALL
5. BEING SO RESTLESS THAT IT IS HARD TO SIT STILL: SEVERAL DAYS
GAD7 TOTAL SCORE: 10
1. FEELING NERVOUS, ANXIOUS, OR ON EDGE: MORE THAN HALF THE DAYS
3. WORRYING TOO MUCH ABOUT DIFFERENT THINGS: MORE THAN HALF THE DAYS

## 2018-07-27 ASSESSMENT — PATIENT HEALTH QUESTIONNAIRE - PHQ9: 5. POOR APPETITE OR OVEREATING: SEVERAL DAYS

## 2018-07-27 NOTE — Clinical Note
Jeffrey told me about a buzzing sensation/sound in the top of his head that began about a week ago. He said he wakes up with it and it lasts about 3 hours.

## 2018-07-27 NOTE — MR AVS SNAPSHOT
"                  MRN:[id number]                      After Visit Summary   2018    Melquiades Morales    MRN: [id number]           Visit Information        Provider Department      2018 9:00 AM Kleber Pollack, [facility] Generic      Your next 10 appointments already scheduled     Aug 10, 2018  9:00 AM CDT   Return Visit with Kleber Pollack [facility] ([facility])    [address] [address]   [phone]            Aug 24, 2018  9:00 AM CDT   Return Visit with Kleber Pollack, [facility] ([facility])    [address] [address]   [phone]            Sep 07, 2018  9:00 AM CDT   Return Visit with Kleber Pollack, [facility] ([facility])    [address] [address]   [phone]            Sep 21, 2018  9:00 AM CDT   Return Visit with Kleber Pollack, [facility] ([facility])    [address] [address]   [phone]              MyChart Information     [employer] lets you send messages to your doctor, view your test results, renew your prescriptions, schedule appointments and more. To sign up, go to [email or web address] . Click on \"Log in\" on the left side of the screen, which will take you to the Welcome page. Then click on \"Sign up Now\" on the right side of the page.     You will be asked to enter the access code listed below, as well as some personal information. Please follow the directions to create your username and password.     Your access code is: [email or web address]  Expires: 10/25/2018  9:51 AM     Your access code will  in 90 days. If you need help or a new code, please call your [facility]3] or [phone].        Care EveryWhere ID  "    This is your Care EveryWhere ID. This could be used by other organizations to access your [place] medical records  [id number]        Equal Access to Services     AQUILES WALSH : Shiva Naqvi, winnie xie, marie parker, river pennington. So [facility] [phone].    ATENCIÓN: Si habla español, tiene a tucker disposición servicios gratuitos de asistencia lingüística. Llame al [phone].    We comply with applicable federal civil rights laws and [place] laws. We do not discriminate on the basis of race, color, national origin, age, disability, sex, sexual orientation, or gender identity.
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
## 2018-07-28 ASSESSMENT — PATIENT HEALTH QUESTIONNAIRE - PHQ9: SUM OF ALL RESPONSES TO PHQ QUESTIONS 1-9: 13

## 2018-07-28 ASSESSMENT — ANXIETY QUESTIONNAIRES: GAD7 TOTAL SCORE: 10

## 2018-08-07 ENCOUNTER — TELEPHONE (OUTPATIENT)
Dept: FAMILY MEDICINE | Facility: CLINIC | Age: 63
End: 2018-08-07

## 2018-08-07 NOTE — TELEPHONE ENCOUNTER
Per fax received from [facility] - Duloxetine is not covered by patient's Insurance Company  Dr. Travis - Please choose:  1.  Change medication that is not covered to a different medication and send new prescription to patient's pharmacy?  2.  Patient will need to pay for the non-covered medication out-of-pocket?   3.  Try for Prior Authorization with Insurance Company to get medication covered?       Key# [id number] - Cover my meds

## 2018-08-07 NOTE — TELEPHONE ENCOUNTER
Patient called back to notify us that he contacted the pharmacy and they have a refill ready for him to .  Emily López on 8/7/2018 at 2:25 PM

## 2018-08-09 NOTE — TELEPHONE ENCOUNTER
Received 2nd request from pharmacy via fax today regarding PA for patient's cymbalta med.  Cymbalta RX dated 4-2-18 is signed by Dr. Travis.  RN spoke with pharmacy.  Patient just picked up #90.  Pharmacy states that this med is paid under workers comp insurance so this med was covered.  Pharmacy reporting this is an error.  Zara PETERS RN

## 2018-08-10 ENCOUNTER — OFFICE VISIT (OUTPATIENT)
Dept: PSYCHOLOGY | Facility: CLINIC | Age: 63
End: 2018-08-10
Payer: MEDICARE

## 2018-08-10 DIAGNOSIS — F33.1 MAJOR DEPRESSIVE DISORDER, RECURRENT EPISODE, MODERATE (H): Primary | ICD-10-CM

## 2018-08-10 DIAGNOSIS — F41.1 GENERALIZED ANXIETY DISORDER: ICD-10-CM

## 2018-08-10 PROCEDURE — 90834 PSYTX W PT 45 MINUTES: CPT | Performed by: SOCIAL WORKER

## 2018-08-10 ASSESSMENT — ANXIETY QUESTIONNAIRES
GAD7 TOTAL SCORE: 10
2. NOT BEING ABLE TO STOP OR CONTROL WORRYING: MORE THAN HALF THE DAYS
5. BEING SO RESTLESS THAT IT IS HARD TO SIT STILL: SEVERAL DAYS
1. FEELING NERVOUS, ANXIOUS, OR ON EDGE: MORE THAN HALF THE DAYS
6. BECOMING EASILY ANNOYED OR IRRITABLE: MORE THAN HALF THE DAYS
7. FEELING AFRAID AS IF SOMETHING AWFUL MIGHT HAPPEN: NOT AT ALL
3. WORRYING TOO MUCH ABOUT DIFFERENT THINGS: MORE THAN HALF THE DAYS

## 2018-08-10 ASSESSMENT — PATIENT HEALTH QUESTIONNAIRE - PHQ9: 5. POOR APPETITE OR OVEREATING: SEVERAL DAYS

## 2018-08-10 NOTE — MR AVS SNAPSHOT
"                  MRN:[id number]                      After Visit Summary   8/10/2018    Melquiades Morales    MRN: [id number]           Visit Information        Provider Department      8/10/2018 9:00 AM Kleber Pollack, [facility] Generic      Your next 10 appointments already scheduled     Aug 24, 2018  9:00 AM CDT   Return Visit with Kleber Pollack [facility] ([facility])    [address]  [facility] [address]   [phone]            Sep 07, 2018  9:00 AM CDT   Return Visit with Kleber Pollack, [facility] ([facility])    [address]  [facility] [address]   [phone]            Sep 21, 2018  9:00 AM CDT   Return Visit with Kleber Pollack, [facility] ([facility])    [address]  [facility] [address]   [phone]            Oct 05, 2018  9:00 AM CDT   Return Visit with Kleber Pollack, [facility] ([facility])    [address]  [facility] [address]   [phone]            Oct 26, 2018  9:00 AM CDT   Return Visit with Kleber Pollack, [facility] ([facility])    [address]  [facility] [address]   [phone]              MyChart Information     [email or web address] lets you send messages to your doctor, view your test results, renew your prescriptions, schedule appointments and more. To sign up, go to [email or web address] . Click on \"Log in\" on the left side of the screen, which will take you to the Welcome page. Then click on \"Sign up Now\" on the right side of the page.     You will be asked to enter the access code listed below, as well as some personal information. Please follow the directions to create your username and password.   "   Your access code is: [email or web address]  Expires: 10/25/2018  9:51 AM     Your access code will  in 90 days. If you need help or a new code, please call your [place] clinic or [phone].        Care EveryWhere ID     This is your Care EveryWhere ID. This could be used by other organizations to access your [place] medical records  [id number]        Equal Access to Services     AQUILES WALSH : Hadii nicolas Naqvi, wachey xie, marie readalmaheather parker, rivre pennington. So [facility] [phone].    ATENCIÓN: Si habla español, tiene a tucker disposición servicios gratuitos de asistencia lingüística. Llame al [phone].    We comply with applicable federal civil rights laws and [place] laws. We do not discriminate on the basis of race, color, national origin, age, disability, sex, sexual orientation, or gender identity.
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
## 2018-08-11 ASSESSMENT — ANXIETY QUESTIONNAIRES: GAD7 TOTAL SCORE: 10

## 2018-08-11 ASSESSMENT — PATIENT HEALTH QUESTIONNAIRE - PHQ9: SUM OF ALL RESPONSES TO PHQ QUESTIONS 1-9: 13

## 2018-08-24 ENCOUNTER — OFFICE VISIT (OUTPATIENT)
Dept: PSYCHOLOGY | Facility: CLINIC | Age: 63
End: 2018-08-24
Payer: MEDICARE

## 2018-08-24 DIAGNOSIS — F33.1 MAJOR DEPRESSIVE DISORDER, RECURRENT EPISODE, MODERATE (H): Primary | ICD-10-CM

## 2018-08-24 DIAGNOSIS — F41.1 GENERALIZED ANXIETY DISORDER: ICD-10-CM

## 2018-08-24 PROCEDURE — 90834 PSYTX W PT 45 MINUTES: CPT | Performed by: SOCIAL WORKER

## 2018-08-24 ASSESSMENT — ANXIETY QUESTIONNAIRES
7. FEELING AFRAID AS IF SOMETHING AWFUL MIGHT HAPPEN: NOT AT ALL
5. BEING SO RESTLESS THAT IT IS HARD TO SIT STILL: SEVERAL DAYS
2. NOT BEING ABLE TO STOP OR CONTROL WORRYING: MORE THAN HALF THE DAYS
6. BECOMING EASILY ANNOYED OR IRRITABLE: NEARLY EVERY DAY
3. WORRYING TOO MUCH ABOUT DIFFERENT THINGS: MORE THAN HALF THE DAYS
1. FEELING NERVOUS, ANXIOUS, OR ON EDGE: MORE THAN HALF THE DAYS
GAD7 TOTAL SCORE: 12

## 2018-08-24 ASSESSMENT — PATIENT HEALTH QUESTIONNAIRE - PHQ9: 5. POOR APPETITE OR OVEREATING: MORE THAN HALF THE DAYS

## 2018-08-24 NOTE — MR AVS SNAPSHOT
"                  MRN:[id number]                      After Visit Summary   8/24/2018    Melquiades Morales    MRN: [id number]           Visit Information        Provider Department      8/24/2018 9:00 AM Kleber Pollack, [facility]      Your next 10 appointments already scheduled     Sep 07, 2018  9:00 AM CDT   Return Visit with Kleber Pollack [facility] ([facility])    [address]  [facility] [address]   [phone]            Sep 21, 2018  9:00 AM CDT   Return Visit with Kleber Pollack, [facility] ([facility])    [address]  [facility] [address]   [phone]            Oct 05, 2018  9:00 AM CDT   Return Visit with Kleber Pollack, [facility] ([facility])    [address]  [facility] [address]   [phone]            Oct 26, 2018  9:00 AM CDT   Return Visit with Kleber Pollack, [facility] ([facility])    [address]  [facility] [address]   [phone]            Nov 09, 2018  9:00 AM CST   Return Visit with Kleber Pollack, [facility] ([facility])    [address]  [facility] [address]   [phone]              MyChart Information     [employer] lets you send messages to your doctor, view your test results, renew your prescriptions, schedule appointments and more. To sign up, go to [email or web address] . Click on \"Log in\" on the left side of the screen, which will take you to the Welcome page. Then click on \"Sign up Now\" on the right side of the page.     You will be asked to enter the access code listed below, as well as some personal information. Please follow the directions to create your username and password.   "   Your access code is: [email or web address]  Expires: 10/25/2018  9:51 AM     Your access code will  in 90 days. If you need help or a new code, please call your [facility] clinic or [phone].        Care EveryWhere ID     This is your Care EveryWhere ID. This could be used by other organizations to access your [facility] medical records  [id number]        Equal Access to Services     AQUILES WALSH : Hadii nicolas Naqvi, wachey xie, marie readalmaheather parker, river pennington. So [facility] [phone].    ATENCIÓN: Si habla español, tiene a tucker disposición servicios gratuitos de asistencia lingüística. Llame al [phone].    We comply with applicable federal civil rights laws and [place] laws. We do not discriminate on the basis of race, color, national origin, age, disability, sex, sexual orientation, or gender identity.
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
## 2018-08-25 ASSESSMENT — ANXIETY QUESTIONNAIRES: GAD7 TOTAL SCORE: 12

## 2018-08-25 ASSESSMENT — PATIENT HEALTH QUESTIONNAIRE - PHQ9: SUM OF ALL RESPONSES TO PHQ QUESTIONS 1-9: 18

## 2018-08-27 ENCOUNTER — TELEPHONE (OUTPATIENT)
Dept: FAMILY MEDICINE | Facility: CLINIC | Age: 63
End: 2018-08-27

## 2018-08-27 NOTE — TELEPHONE ENCOUNTER
Patient is calling stating that approx 1:30 am he started with diarrhea and has gone about 8 times. Took 1 immodium, some pepto bismol. Just went a few min ago. Feeling weak.  Libby Peterson  [facility]  [facility]

## 2018-08-27 NOTE — TELEPHONE ENCOUNTER
Pt started diarrhea today @ 1 AM.  Stools are watery with some cramping.  No fever.  No N/V.  Pt will do gut rest, no foods.  Will start with small sips of water and increase as tolerated.  If tolerating water advance to gateraide and then soda crackers.  Pt to f/u if not improvement over the next 24 hours.  KpavelRN

## 2018-09-07 ENCOUNTER — APPOINTMENT (OUTPATIENT)
Dept: CT IMAGING | Facility: CLINIC | Age: 63
End: 2018-09-07
Attending: EMERGENCY MEDICINE
Payer: MEDICARE

## 2018-09-07 ENCOUNTER — HOSPITAL ENCOUNTER (EMERGENCY)
Facility: CLINIC | Age: 63
Discharge: HOME | End: 2018-09-07
Attending: EMERGENCY MEDICINE | Admitting: EMERGENCY MEDICINE
Payer: MEDICARE

## 2018-09-07 VITALS
OXYGEN SATURATION: 96 % | DIASTOLIC BLOOD PRESSURE: 105 MMHG | TEMPERATURE: 98.1 F | RESPIRATION RATE: 20 BRPM | SYSTOLIC BLOOD PRESSURE: 166 MMHG

## 2018-09-07 DIAGNOSIS — G89.4 CHRONIC PAIN SYNDROME: Chronic | ICD-10-CM

## 2018-09-07 DIAGNOSIS — F11.93 NARCOTIC WITHDRAWAL (H): ICD-10-CM

## 2018-09-07 LAB
ALBUMIN SERPL-MCNC: 3.7 G/DL (ref 3.4–5)
ALP SERPL-CCNC: 73 U/L (ref 40–150)
ALT SERPL W P-5'-P-CCNC: 25 U/L (ref 0–70)
ANION GAP SERPL CALCULATED.3IONS-SCNC: 3 MMOL/L (ref 3–14)
APAP SERPL-MCNC: <2 MG/L (ref 10–20)
AST SERPL W P-5'-P-CCNC: 17 U/L (ref 0–45)
BASOPHILS # BLD AUTO: 0 10E9/L (ref 0–0.2)
BASOPHILS NFR BLD AUTO: 0.2 %
BILIRUB SERPL-MCNC: 0.6 MG/DL (ref 0.2–1.3)
BUN SERPL-MCNC: 7 MG/DL (ref 7–30)
CALCIUM SERPL-MCNC: 9.8 MG/DL (ref 8.5–10.1)
CHLORIDE SERPL-SCNC: 111 MMOL/L (ref 94–109)
CO2 SERPL-SCNC: 30 MMOL/L (ref 20–32)
CREAT SERPL-MCNC: 0.73 MG/DL (ref 0.66–1.25)
DIFFERENTIAL METHOD BLD: ABNORMAL
EOSINOPHIL # BLD AUTO: 0.1 10E9/L (ref 0–0.7)
EOSINOPHIL NFR BLD AUTO: 0.7 %
ERYTHROCYTE [DISTWIDTH] IN BLOOD BY AUTOMATED COUNT: 13.6 % (ref 10–15)
GFR SERPL CREATININE-BSD FRML MDRD: >90 ML/MIN/1.7M2
GLUCOSE SERPL-MCNC: 112 MG/DL (ref 70–99)
HCT VFR BLD AUTO: 50.6 % (ref 40–53)
HGB BLD-MCNC: 17.2 G/DL (ref 13.3–17.7)
IMM GRANULOCYTES # BLD: 0.1 10E9/L (ref 0–0.4)
IMM GRANULOCYTES NFR BLD: 0.4 %
LIPASE SERPL-CCNC: 113 U/L (ref 73–393)
LYMPHOCYTES # BLD AUTO: 3.3 10E9/L (ref 0.8–5.3)
LYMPHOCYTES NFR BLD AUTO: 24.1 %
MCH RBC QN AUTO: 32.3 PG (ref 26.5–33)
MCHC RBC AUTO-ENTMCNC: 34 G/DL (ref 31.5–36.5)
MCV RBC AUTO: 95 FL (ref 78–100)
MONOCYTES # BLD AUTO: 0.7 10E9/L (ref 0–1.3)
MONOCYTES NFR BLD AUTO: 5 %
NEUTROPHILS # BLD AUTO: 9.4 10E9/L (ref 1.6–8.3)
NEUTROPHILS NFR BLD AUTO: 69.6 %
NRBC # BLD AUTO: 0 10*3/UL
NRBC BLD AUTO-RTO: 0 /100
PLATELET # BLD AUTO: 314 10E9/L (ref 150–450)
POTASSIUM SERPL-SCNC: 3.7 MMOL/L (ref 3.4–5.3)
PROT SERPL-MCNC: 7.6 G/DL (ref 6.8–8.8)
RBC # BLD AUTO: 5.32 10E12/L (ref 4.4–5.9)
SODIUM SERPL-SCNC: 144 MMOL/L (ref 133–144)
WBC # BLD AUTO: 13.5 10E9/L (ref 4–11)

## 2018-09-07 PROCEDURE — 25000128 H RX IP 250 OP 636: Performed by: EMERGENCY MEDICINE

## 2018-09-07 PROCEDURE — 99284 EMERGENCY DEPT VISIT MOD MDM: CPT | Mod: Z6 | Performed by: EMERGENCY MEDICINE

## 2018-09-07 PROCEDURE — 25000125 ZZHC RX 250: Performed by: EMERGENCY MEDICINE

## 2018-09-07 PROCEDURE — 85025 COMPLETE CBC W/AUTO DIFF WBC: CPT | Performed by: EMERGENCY MEDICINE

## 2018-09-07 PROCEDURE — 83690 ASSAY OF LIPASE: CPT | Performed by: EMERGENCY MEDICINE

## 2018-09-07 PROCEDURE — 80053 COMPREHEN METABOLIC PANEL: CPT | Performed by: EMERGENCY MEDICINE

## 2018-09-07 PROCEDURE — 80329 ANALGESICS NON-OPIOID 1 OR 2: CPT | Performed by: EMERGENCY MEDICINE

## 2018-09-07 PROCEDURE — 74177 CT ABD & PELVIS W/CONTRAST: CPT

## 2018-09-07 PROCEDURE — 99285 EMERGENCY DEPT VISIT HI MDM: CPT | Performed by: EMERGENCY MEDICINE

## 2018-09-07 RX ORDER — PHENOL 1.4 %
10 AEROSOL, SPRAY (ML) MUCOUS MEMBRANE
COMMUNITY
End: 2018-09-14

## 2018-09-07 RX ORDER — OXYCODONE HYDROCHLORIDE 10 MG/1
10 TABLET ORAL
Status: ON HOLD | COMMUNITY
End: 2019-09-27

## 2018-09-07 RX ORDER — IOPAMIDOL 755 MG/ML
100 INJECTION, SOLUTION INTRAVASCULAR ONCE
Status: COMPLETED | OUTPATIENT
Start: 2018-09-07 | End: 2018-09-07

## 2018-09-07 RX ADMIN — SODIUM CHLORIDE 70 ML: 9 INJECTION, SOLUTION INTRAVENOUS at 18:26

## 2018-09-07 RX ADMIN — IOPAMIDOL 100 ML: 755 INJECTION, SOLUTION INTRAVENOUS at 18:26

## 2018-09-07 ASSESSMENT — ENCOUNTER SYMPTOMS
SHORTNESS OF BREATH: 0
CHILLS: 1
WEAKNESS: 1
WHEEZING: 0
APPETITE CHANGE: 1
TREMORS: 1
DIAPHORESIS: 1

## 2018-09-07 NOTE — ED AVS SNAPSHOT
[facility]    [address] [facility] [address]    Phone:  [phone]    Fax:  [phone]                                       Melquiades Morales   MRN: [id number]    Department:  [facility]   Date of Visit:  9/7/2018           After Visit Summary Signature Page     I have received my discharge instructions, and my questions have been answered. I have discussed any challenges I see with this plan with the nurse or doctor.    ..........................................................................................................................................  Patient/Patient Representative Signature      ..........................................................................................................................................  Patient Representative Print Name and Relationship to Patient    ..................................................               ................................................  Date                                            Time    ..........................................................................................................................................  Reviewed by Signature/Title    ...................................................              ..............................................  Date                                                            Time          22EPIC Rev 08/18

## 2018-09-07 NOTE — ED TRIAGE NOTES
Patient arrives via EMS with complaints of abdominal pain, EMS states that he has chronic back pain, and has a pain contract, is on oxy, trazadone and MS contin. EMS states that he had a bottle of oxycodone at home that was filled on the 5th of September and there were 56 tabs prescribed and now there are only 4 left. Patient is most likely constipated. VSS

## 2018-09-07 NOTE — ED AVS SNAPSHOT
[facility]    [address]    [facility] [address]    Phone:  [phone]    Fax:  [phone]                                       Melquiades Morales   MRN: [id number]    Department:  [facility]   Date of Visit:  9/7/2018           Patient Information     Date Of Birth          1955        Your diagnoses for this visit were:     Chronic pain syndrome     Narcotic withdrawal (H)        You were seen by Jaziel Roman DO.      Follow-up Information     Follow up with Jignesh Travis MD.    Specialty:  Family Practice    Contact information:    [address]  [facility] [address]  [phone]          Discharge Instructions       No acute medical process to account for your chills, sweats, abdominal pain other than narcotic withdrawal.  Complete blood count, comprehensive metabolic blood work and CT imaging of the abdomen was all normal.  With having consumed your monthly prescription for pain management over the last 2 days she will have to contact your chronic pain specialist to discuss if they are willing to refill your medication.    Your next 10 appointments already scheduled     Sep 21, 2018  9:00 AM CDT   Return Visit with Kleber Pollack, [facility] ([facility])    [address] [place] [address]   [phone]            Oct 05, 2018  9:00 AM CDT   Return Visit with Kleber Pollack [facility] ([facility])    [address] [place] [address]   [phone]            Oct 26, 2018  9:00 AM CDT   Return Visit with Kleber Pollack [facility] ([facility])    [address] [place] [address]   [phone]            Nov 09, 2018  9:00 AM CST   Return Visit with Kleber Pollack [facility])    [address]  [facility] [address] [phone]              24 Hour Appointment Hotline       To make an appointment at any [facility], call [phone] ([phone]). If you don't have a family doctor or clinic, we will help you find one. [facility] clinics are conveniently located to serve the needs of you and your family.             Review of your medicines      Our records show that you are taking the medicines listed below. If these are incorrect, please call your family doctor or clinic.        Dose / Directions Last dose taken    albuterol 108 (90 Base) MCG/ACT inhaler   Commonly known as:  PROAIR HFA   Dose:  2 puff   Quantity:  1 Inhaler        Inhale 2 puffs into the lungs every 4 hours as needed for shortness of breath / dyspnea or wheezing   Refills:  11        DULoxetine 60 MG EC capsule   Commonly known as:  CYMBALTA   Dose:  120 mg   Quantity:  180 capsule        Take 2 capsules (120 mg) by mouth daily   Refills:  1        lidocaine 5 % ointment   Commonly known as:  XYLOCAINE   Quantity:  744.24 g        Apply topically as needed for moderate pain Apply 2 grams to affected PRN up to 4x daily. [phone] Pharmacy #.   Refills:  0        LYRICA PO   Dose:  150 mg        Take 150 mg by mouth 2 times daily   Refills:  0        Melatonin 10 MG Tabs tablet   Dose:  10 mg        Take 10 mg by mouth nightly as needed for sleep   Refills:  0        MS CONTIN PO   Dose:  30 mg        Take 30 mg by mouth 3 times daily 15 mg X 2 tabs.  AM, 1200, bedtime   Refills:  0        naproxen 500 MG tablet   Commonly known as:  NAPROSYN   Dose:  500 mg   Quantity:  60 tablet        Take 1 tablet (500 mg) by mouth at onset of headache   Refills:  3        order for DME   Quantity:  1 Device        Semi electric hospital bed with side rails and mattress. Length of bed is for lifetime   Refills:  0        * order for DME   Quantity:  1 Units        Equipment  being ordered: Wheelchair. Electric, including adjustable back rest sitting to resting, leg elevation adjustment, approved for outdoor use   Refills:  0        * order for DME   Quantity:  1 each        Equipment being ordered: Hospital Bed and mattress.   Refills:  0        * order for DME   Quantity:  1 each        Equipment being ordered: Lift Chair   Refills:  0        oxyCODONE IR 10 MG tablet   Commonly known as:  ROXICODONE   Dose:  10 mg        Take 10 mg by mouth Take 1 tablet every 4-6 hours. Max 2 tablets daily   Refills:  0        simvastatin 80 MG tablet   Commonly known as:  ZOCOR   Dose:  80 mg   Quantity:  90 tablet        Take 1 tablet (80 mg) by mouth every morning   Refills:  3        * traZODone 100 MG tablet   Commonly known as:  DESYREL   Dose:  200 mg   Quantity:  180 tablet        Take 2 tablets (200 mg) by mouth nightly as needed for sleep Take along with the 25 mg tablet for total dose of 225 mg   Refills:  3        * traZODone 50 MG tablet   Commonly known as:  DESYREL   Dose:  25 mg   Quantity:  90 tablet        Take 0.5 tablets (25 mg) by mouth nightly as needed for sleep Take along with the 100 mg tablets for total dose of 225 mg   Refills:  3        * Notice:  This list has 5 medication(s) that are the same as other medications prescribed for you. Read the directions carefully, and ask your doctor or other care provider to review them with you.            Information about OPIOIDS     PRESCRIPTION OPIOIDS: WHAT YOU NEED TO KNOW   We gave you an opioid (narcotic) pain medicine. It is important to manage your pain, but opioids are not always the best choice. You should first try all the other options your care team gave you. Take this medicine for as short a time (and as few doses) as possible.    Some activities can increase your pain, such as bandage changes or therapy sessions. It may help to take your pain medicine 30 to 60 minutes before these activities. Reduce your stress by  getting enough sleep, working on hobbies you enjoy and practicing relaxation or meditation. Talk to your care team about ways to manage your pain beyond prescription opioids.    These medicines have risks:    DO NOT drive when on new or higher doses of pain medicine. These medicines can affect your alertness and reaction times, and you could be arrested for driving under the influence (DUI). If you need to use opioids long-term, talk to your care team about driving.    DO NOT operate heavy machinery    DO NOT do any other dangerous activities while taking these medicines.    DO NOT drink any alcohol while taking these medicines.     If the opioid prescribed includes acetaminophen, DO NOT take with any other medicines that contain acetaminophen. Read all labels carefully. Look for the word  acetaminophen  or  Tylenol.  Ask your pharmacist if you have questions or are unsure.    You can get addicted to pain medicines, especially if you have a history of addiction (chemical, alcohol or substance dependence). Talk to your care team about ways to reduce this risk.    All opioids tend to cause constipation. Drink plenty of water and eat foods that have a lot of fiber, such as fruits, vegetables, prune juice, apple juice and high-fiber cereal. Take a laxative (Miralax, milk of magnesia, Colace, Senna) if you don t move your bowels at least every other day. Other side effects include upset stomach, sleepiness, dizziness, throwing up, tolerance (needing more of the medicine to have the same effect), physical dependence and slowed breathing.    Store your pills in a secure place, locked if possible. We will not replace any lost or stolen medicine. If you don t finish your medicine, please throw away (dispose) as directed by your pharmacist. The [place] [employer] has more information about safe disposal: https://www.pca.[facility].[place].us/living-green/managing-unwanted-medications        Procedures and tests  performed during your visit     Acetaminophen level    CBC with platelets differential    CT Abdomen Pelvis w Contrast    Comprehensive metabolic panel    Give 20 ounces of water 15 minutes before CT of abdomen    Lipase      Orders Needing Specimen Collection     Ordered          09/07/18 1717  UA reflex to Microscopic - STAT, [facility]: STAT, Needs to be Collected     Scheduled Task Status   09/07/18 1718 Collect UA reflex to Microscopic Open   Order Class:  PCU Collect                09/07/18 1717  Drug abuse screen urine - STAT, [facility]: STAT, Needs to be Collected     Scheduled Task Status   09/07/18 1718 Collect Drug abuse screen urine Open   Order Class:  PCU Collect                  Pending Results     No orders found from 9/5/2018 to 9/8/2018.            Pending Culture Results     No orders found from 9/5/2018 to 9/8/2018.            Pending Results Instructions     If you had any lab results that were not finalized at the time of your Discharge, you can call the ED Lab Result RN at [phone]. You will be contacted by this team for any positive Lab results or changes in treatment. The nurses are available 7 days a week from 10A to 6:30P.  You can leave a message 24 hours per day and they will return your call.        Test Results From Your Hospital Stay        9/7/2018  5:05 PM      Component Results     Component Value Ref Range & Units Status    WBC 13.5 (H) 4.0 - 11.0 10e9/L Final    RBC Count 5.32 4.4 - 5.9 10e12/L Final    Hemoglobin 17.2 13.3 - 17.7 g/dL Final    Hematocrit 50.6 40.0 - 53.0 % Final    MCV 95 78 - 100 fl Final    MCH 32.3 26.5 - 33.0 pg Final    MCHC 34.0 31.5 - 36.5 g/dL Final    RDW 13.6 10.0 - 15.0 % Final    Platelet Count 314 150 - 450 10e9/L Final    Diff Method Automated Method  Final    % Neutrophils 69.6 % Final    % Lymphocytes 24.1 % Final    % Monocytes 5.0 % Final    % Eosinophils 0.7 % Final    % Basophils 0.2 % Final    % Immature Granulocytes 0.4 % Final    Nucleated RBCs 0  0 /100 Final    Absolute Neutrophil 9.4 (H) 1.6 - 8.3 10e9/L Final    Absolute Lymphocytes 3.3 0.8 - 5.3 10e9/L Final    Absolute Monocytes 0.7 0.0 - 1.3 10e9/L Final    Absolute Eosinophils 0.1 0.0 - 0.7 10e9/L Final    Absolute Basophils 0.0 0.0 - 0.2 10e9/L Final    Abs Immature Granulocytes 0.1 0 - 0.4 10e9/L Final    Absolute Nucleated RBC 0.0  Final         9/7/2018  5:28 PM      Component Results     Component Value Ref Range & Units Status    Sodium 144 133 - 144 mmol/L Final    Potassium 3.7 3.4 - 5.3 mmol/L Final    Chloride 111 (H) 94 - 109 mmol/L Final    Carbon Dioxide 30 20 - 32 mmol/L Final    Anion Gap 3 3 - 14 mmol/L Final    Glucose 112 (H) 70 - 99 mg/dL Final    Urea Nitrogen 7 7 - 30 mg/dL Final    Creatinine 0.73 0.66 - 1.25 mg/dL Final    GFR Estimate >90 >60 mL/min/1.7m2 Final    Non  GFR Calc    GFR Estimate If Black >90 >60 mL/min/1.7m2 Final    African American GFR Calc    Calcium 9.8 8.5 - 10.1 mg/dL Final    Bilirubin Total 0.6 0.2 - 1.3 mg/dL Final    Albumin 3.7 3.4 - 5.0 g/dL Final    Protein Total 7.6 6.8 - 8.8 g/dL Final    Alkaline Phosphatase 73 40 - 150 U/L Final    ALT 25 0 - 70 U/L Final    AST 17 0 - 45 U/L Final         9/7/2018  5:26 PM      Component Results     Component Value Ref Range & Units Status    Lipase 113 73 - 393 U/L Final         9/7/2018  6:50 PM      Narrative     CT ABDOMEN PELVIS W CONTRAST 9/7/2018 6:39 PM    TECHNIQUE: Images from diaphragm to pubic symphysis 100 mL Isovue -370  IV contrast  Radiation dose for this scan was reduced using automated exposure  control, adjustment of the mA and/or kV according to patient size, or  iterative reconstruction technique.    HISTORY: Generalized abdominal pain    COMPARISON: 1/17/2017 CT abdomen pelvis    FINDINGS:   Abdomen and Pelvis: Minimal lingular opacity, linear on coronal  imaging favoring atelectasis normal-appearing liver, gallbladder,  spleen, pancreas, and adrenal glands. Low-attenuation  "subcentimeter  bilateral renal lesions, too small to definitely characterize but  would be compatible with small benign cysts and no specific imaging  evaluation or follow-up is recommended. No periaortic aortic or pelvic  adenopathy. No free fluid. Diffuse mild bladder wall thickening may be  underdistention artifact. No free fluid. No acute appearing bowel  abnormality. Normal appendix. Mild degenerative changes in the spine.        Impression     IMPRESSION: No acute abnormality or specific cause for pain  demonstrated.                 FRANSICO MANNING MD         2018  6:44 PM      Component Results     Component Value Ref Range & Units Status    Acetaminophen Level <2 mg/L Final    Therapeutic range: 10-20 mg/L                Thank you for choosing [facility]       Thank you for choosing [facility] for your care. Our goal is always to provide you with excellent care. Hearing back from our patients is one way we can continue to improve our services. Please take a few minutes to complete the written survey that you may receive in the mail after you visit with us. Thank you!        [employer] Information     [employer] lets you send messages to your doctor, view your test results, renew your prescriptions, schedule appointments and more. To sign up, go to [email or web address] . Click on \"Log in\" on the left side of the screen, which will take you to the Welcome page. Then click on \"Sign up Now\" on the right side of the page.     You will be asked to enter the access code listed below, as well as some personal information. Please follow the directions to create your username and password.     Your access code is: [email or web address]  Expires: 10/25/2018  9:51 AM     Your access code will  in 90 days. If you need help or a new code, please call your [facility] clinic or [phone].        Care EveryWhere ID     This is your Care EveryWhere ID. This could be used by other organizations to access your [facility] medical " records  [id number]        Equal Access to Services     AQUILES WALSH : Shiva Naqvi, winnie xie, river black. So [facility] [phone].    ATENCIÓN: Si habla español, tiene a tucker disposición servicios gratuitos de asistencia lingüística. Llame al [phone].    We comply with applicable federal civil rights laws and [place] laws. We do not discriminate on the basis of race, color, national origin, age, disability, sex, sexual orientation, or gender identity.            After Visit Summary       This is your record. Keep this with you and show to your community pharmacist(s) and doctor(s) at your next visit.

## 2018-09-07 NOTE — ED NOTES
Pt starting to drink water for CT. MD notified of sepsis firing. MD assessed pt's status and does not want any further orders.

## 2018-09-08 ENCOUNTER — APPOINTMENT (OUTPATIENT)
Dept: GENERAL RADIOLOGY | Facility: CLINIC | Age: 63
DRG: 177 | End: 2018-09-08
Attending: EMERGENCY MEDICINE
Payer: MEDICARE

## 2018-09-08 ENCOUNTER — HOSPITAL ENCOUNTER (INPATIENT)
Facility: CLINIC | Age: 63
LOS: 2 days | Discharge: HOME | DRG: 177 | End: 2018-09-10
Attending: FAMILY MEDICINE | Admitting: FAMILY MEDICINE
Payer: MEDICARE

## 2018-09-08 DIAGNOSIS — Z56.0 UNEMPLOYED: ICD-10-CM

## 2018-09-08 DIAGNOSIS — J44.0 CHRONIC OBSTRUCTIVE PULMONARY DISEASE WITH ACUTE LOWER RESPIRATORY INFECTION (H): ICD-10-CM

## 2018-09-08 DIAGNOSIS — E87.6 HYPOKALEMIA: Primary | ICD-10-CM

## 2018-09-08 DIAGNOSIS — J69.0 ASPIRATION PNEUMONIA OF RIGHT LOWER LOBE, UNSPECIFIED ASPIRATION PNEUMONIA TYPE (H): ICD-10-CM

## 2018-09-08 DIAGNOSIS — R40.4 TRANSIENT ALTERATION OF AWARENESS: ICD-10-CM

## 2018-09-08 DIAGNOSIS — G89.4 CHRONIC PAIN SYNDROME: Chronic | ICD-10-CM

## 2018-09-08 DIAGNOSIS — F11.10: ICD-10-CM

## 2018-09-08 DIAGNOSIS — F17.200 TOBACCO USE DISORDER: ICD-10-CM

## 2018-09-08 PROBLEM — J18.9 CAP (COMMUNITY ACQUIRED PNEUMONIA): Status: RESOLVED | Noted: 2017-01-17 | Resolved: 2018-09-08

## 2018-09-08 LAB
ALBUMIN SERPL-MCNC: 3.4 G/DL (ref 3.4–5)
ALBUMIN UR-MCNC: 30 MG/DL
ALP SERPL-CCNC: 62 U/L (ref 40–150)
ALT SERPL W P-5'-P-CCNC: 23 U/L (ref 0–70)
AMPHETAMINES UR QL SCN: NEGATIVE
ANION GAP SERPL CALCULATED.3IONS-SCNC: 5 MMOL/L (ref 3–14)
APPEARANCE UR: ABNORMAL
AST SERPL W P-5'-P-CCNC: 35 U/L (ref 0–45)
BARBITURATES UR QL: NEGATIVE
BASE EXCESS BLDV CALC-SCNC: 4.2 MMOL/L
BASOPHILS # BLD AUTO: 0 10E9/L (ref 0–0.2)
BASOPHILS NFR BLD AUTO: 0.3 %
BENZODIAZ UR QL: NEGATIVE
BILIRUB SERPL-MCNC: 0.5 MG/DL (ref 0.2–1.3)
BILIRUB UR QL STRIP: NEGATIVE
BUN SERPL-MCNC: 9 MG/DL (ref 7–30)
CALCIUM SERPL-MCNC: 8.5 MG/DL (ref 8.5–10.1)
CANNABINOIDS UR QL SCN: POSITIVE
CHLORIDE SERPL-SCNC: 113 MMOL/L (ref 94–109)
CO2 SERPL-SCNC: 29 MMOL/L (ref 20–32)
COCAINE UR QL: NEGATIVE
COLOR UR AUTO: ABNORMAL
CREAT SERPL-MCNC: 0.71 MG/DL (ref 0.66–1.25)
CRP SERPL-MCNC: 4.3 MG/L (ref 0–8)
DIFFERENTIAL METHOD BLD: ABNORMAL
EOSINOPHIL # BLD AUTO: 0 10E9/L (ref 0–0.7)
EOSINOPHIL NFR BLD AUTO: 0.2 %
ERYTHROCYTE [DISTWIDTH] IN BLOOD BY AUTOMATED COUNT: 14 % (ref 10–15)
GFR SERPL CREATININE-BSD FRML MDRD: >90 ML/MIN/1.7M2
GLUCOSE SERPL-MCNC: 94 MG/DL (ref 70–99)
GLUCOSE UR STRIP-MCNC: NEGATIVE MG/DL
HCO3 BLDV-SCNC: 30 MMOL/L (ref 21–28)
HCT VFR BLD AUTO: 50.5 % (ref 40–53)
HGB BLD-MCNC: 17 G/DL (ref 13.3–17.7)
HGB UR QL STRIP: NEGATIVE
IMM GRANULOCYTES # BLD: 0.1 10E9/L (ref 0–0.4)
IMM GRANULOCYTES NFR BLD: 0.3 %
KETONES UR STRIP-MCNC: 5 MG/DL
LACTATE BLD-SCNC: 1 MMOL/L (ref 0.7–2)
LACTATE BLD-SCNC: 2.4 MMOL/L (ref 0.7–2)
LEUKOCYTE ESTERASE UR QL STRIP: NEGATIVE
LYMPHOCYTES # BLD AUTO: 4.6 10E9/L (ref 0.8–5.3)
LYMPHOCYTES NFR BLD AUTO: 29.9 %
MCH RBC QN AUTO: 32.6 PG (ref 26.5–33)
MCHC RBC AUTO-ENTMCNC: 33.7 G/DL (ref 31.5–36.5)
MCV RBC AUTO: 97 FL (ref 78–100)
MONOCYTES # BLD AUTO: 1.3 10E9/L (ref 0–1.3)
MONOCYTES NFR BLD AUTO: 8.4 %
MRSA DNA SPEC QL NAA+PROBE: NEGATIVE
MUCOUS THREADS #/AREA URNS LPF: PRESENT /LPF
NEUTROPHILS # BLD AUTO: 9.3 10E9/L (ref 1.6–8.3)
NEUTROPHILS NFR BLD AUTO: 60.9 %
NITRATE UR QL: NEGATIVE
NRBC # BLD AUTO: 0 10*3/UL
NRBC BLD AUTO-RTO: 0 /100
O2/TOTAL GAS SETTING VFR VENT: 32 %
OPIATES UR QL SCN: POSITIVE
PCO2 BLDV: 47 MM HG (ref 40–50)
PCP UR QL SCN: NEGATIVE
PH BLDV: 7.41 PH (ref 7.32–7.43)
PH UR STRIP: 5 PH (ref 5–7)
PLATELET # BLD AUTO: 282 10E9/L (ref 150–450)
PO2 BLDV: 50 MM HG (ref 25–47)
POTASSIUM SERPL-SCNC: 3.2 MMOL/L (ref 3.4–5.3)
PROCALCITONIN SERPL-MCNC: <0.05 NG/ML
PROT SERPL-MCNC: 6.5 G/DL (ref 6.8–8.8)
RBC # BLD AUTO: 5.22 10E12/L (ref 4.4–5.9)
RBC #/AREA URNS AUTO: 2 /HPF (ref 0–2)
SODIUM SERPL-SCNC: 147 MMOL/L (ref 133–144)
SOURCE: ABNORMAL
SP GR UR STRIP: 1.03 (ref 1–1.03)
SPECIMEN SOURCE: NORMAL
SQUAMOUS #/AREA URNS AUTO: <1 /HPF (ref 0–1)
TROPONIN I SERPL-MCNC: <0.015 UG/L (ref 0–0.04)
UROBILINOGEN UR STRIP-MCNC: 2 MG/DL (ref 0–2)
WBC # BLD AUTO: 15.2 10E9/L (ref 4–11)
WBC #/AREA URNS AUTO: 3 /HPF (ref 0–5)

## 2018-09-08 PROCEDURE — 96365 THER/PROPH/DIAG IV INF INIT: CPT

## 2018-09-08 PROCEDURE — 25000128 H RX IP 250 OP 636: Performed by: EMERGENCY MEDICINE

## 2018-09-08 PROCEDURE — 25000132 ZZH RX MED GY IP 250 OP 250 PS 637: Mod: GY | Performed by: PHYSICIAN ASSISTANT

## 2018-09-08 PROCEDURE — A9270 NON-COVERED ITEM OR SERVICE: HCPCS | Mod: GY | Performed by: PHYSICIAN ASSISTANT

## 2018-09-08 PROCEDURE — 80053 COMPREHEN METABOLIC PANEL: CPT | Performed by: EMERGENCY MEDICINE

## 2018-09-08 PROCEDURE — A9270 NON-COVERED ITEM OR SERVICE: HCPCS | Mod: GY | Performed by: FAMILY MEDICINE

## 2018-09-08 PROCEDURE — 99285 EMERGENCY DEPT VISIT HI MDM: CPT | Mod: 25 | Performed by: EMERGENCY MEDICINE

## 2018-09-08 PROCEDURE — 96361 HYDRATE IV INFUSION ADD-ON: CPT

## 2018-09-08 PROCEDURE — 80307 DRUG TEST PRSMV CHEM ANLYZR: CPT | Performed by: EMERGENCY MEDICINE

## 2018-09-08 PROCEDURE — 99223 1ST HOSP IP/OBS HIGH 75: CPT | Mod: AI | Performed by: PHYSICIAN ASSISTANT

## 2018-09-08 PROCEDURE — 12000011 ZZH R&B MS OVERFLOW

## 2018-09-08 PROCEDURE — 84145 PROCALCITONIN (PCT): CPT | Performed by: PHYSICIAN ASSISTANT

## 2018-09-08 PROCEDURE — 84484 ASSAY OF TROPONIN QUANT: CPT | Performed by: EMERGENCY MEDICINE

## 2018-09-08 PROCEDURE — 25000132 ZZH RX MED GY IP 250 OP 250 PS 637: Mod: GY | Performed by: FAMILY MEDICINE

## 2018-09-08 PROCEDURE — 87641 MR-STAPH DNA AMP PROBE: CPT | Performed by: FAMILY MEDICINE

## 2018-09-08 PROCEDURE — 93005 ELECTROCARDIOGRAM TRACING: CPT

## 2018-09-08 PROCEDURE — 87040 BLOOD CULTURE FOR BACTERIA: CPT | Performed by: EMERGENCY MEDICINE

## 2018-09-08 PROCEDURE — 99285 EMERGENCY DEPT VISIT HI MDM: CPT | Mod: 25

## 2018-09-08 PROCEDURE — 81001 URINALYSIS AUTO W/SCOPE: CPT | Performed by: EMERGENCY MEDICINE

## 2018-09-08 PROCEDURE — 82803 BLOOD GASES ANY COMBINATION: CPT | Performed by: EMERGENCY MEDICINE

## 2018-09-08 PROCEDURE — 87640 STAPH A DNA AMP PROBE: CPT | Performed by: FAMILY MEDICINE

## 2018-09-08 PROCEDURE — 83605 ASSAY OF LACTIC ACID: CPT | Performed by: EMERGENCY MEDICINE

## 2018-09-08 PROCEDURE — 36415 COLL VENOUS BLD VENIPUNCTURE: CPT | Performed by: EMERGENCY MEDICINE

## 2018-09-08 PROCEDURE — 71045 X-RAY EXAM CHEST 1 VIEW: CPT

## 2018-09-08 PROCEDURE — 25000128 H RX IP 250 OP 636: Performed by: PHYSICIAN ASSISTANT

## 2018-09-08 PROCEDURE — 85025 COMPLETE CBC W/AUTO DIFF WBC: CPT | Performed by: EMERGENCY MEDICINE

## 2018-09-08 PROCEDURE — 86140 C-REACTIVE PROTEIN: CPT | Performed by: EMERGENCY MEDICINE

## 2018-09-08 RX ORDER — AMOXICILLIN 250 MG
1 CAPSULE ORAL 2 TIMES DAILY PRN
Status: DISCONTINUED | OUTPATIENT
Start: 2018-09-08 | End: 2018-09-10 | Stop reason: HOSPADM

## 2018-09-08 RX ORDER — LORAZEPAM 1 MG/1
1 TABLET ORAL EVERY 4 HOURS PRN
Status: CANCELLED | OUTPATIENT
Start: 2018-09-08

## 2018-09-08 RX ORDER — FAMOTIDINE 20 MG/1
20 TABLET, FILM COATED ORAL 2 TIMES DAILY
Status: DISCONTINUED | OUTPATIENT
Start: 2018-09-08 | End: 2018-09-08 | Stop reason: CLARIF

## 2018-09-08 RX ORDER — CEFTRIAXONE SODIUM 2 G/50ML
2 INJECTION, SOLUTION INTRAVENOUS EVERY 24 HOURS
Status: DISCONTINUED | OUTPATIENT
Start: 2018-09-08 | End: 2018-09-08

## 2018-09-08 RX ORDER — OXYCODONE HYDROCHLORIDE 5 MG/1
10 TABLET ORAL EVERY 4 HOURS PRN
Status: DISCONTINUED | OUTPATIENT
Start: 2018-09-08 | End: 2018-09-09

## 2018-09-08 RX ORDER — SODIUM CHLORIDE 9 MG/ML
1000 INJECTION, SOLUTION INTRAVENOUS CONTINUOUS
Status: DISCONTINUED | OUTPATIENT
Start: 2018-09-08 | End: 2018-09-08

## 2018-09-08 RX ORDER — ONDANSETRON 2 MG/ML
4 INJECTION INTRAMUSCULAR; INTRAVENOUS EVERY 6 HOURS PRN
Status: DISCONTINUED | OUTPATIENT
Start: 2018-09-08 | End: 2018-09-10 | Stop reason: HOSPADM

## 2018-09-08 RX ORDER — ALBUTEROL SULFATE 0.83 MG/ML
3 SOLUTION RESPIRATORY (INHALATION)
Status: DISCONTINUED | OUTPATIENT
Start: 2018-09-08 | End: 2018-09-10 | Stop reason: HOSPADM

## 2018-09-08 RX ORDER — LORAZEPAM 2 MG/ML
1 INJECTION INTRAMUSCULAR EVERY 4 HOURS PRN
Status: CANCELLED | OUTPATIENT
Start: 2018-09-08

## 2018-09-08 RX ORDER — MORPHINE SULFATE 30 MG/1
30 TABLET, FILM COATED, EXTENDED RELEASE ORAL 3 TIMES DAILY
Status: DISCONTINUED | OUTPATIENT
Start: 2018-09-08 | End: 2018-09-10 | Stop reason: HOSPADM

## 2018-09-08 RX ORDER — AMOXICILLIN 250 MG
2 CAPSULE ORAL 2 TIMES DAILY PRN
Status: DISCONTINUED | OUTPATIENT
Start: 2018-09-08 | End: 2018-09-10 | Stop reason: HOSPADM

## 2018-09-08 RX ORDER — NALOXONE HYDROCHLORIDE 0.4 MG/ML
.1-.4 INJECTION, SOLUTION INTRAMUSCULAR; INTRAVENOUS; SUBCUTANEOUS
Status: DISCONTINUED | OUTPATIENT
Start: 2018-09-08 | End: 2018-09-08

## 2018-09-08 RX ORDER — HYDROMORPHONE HYDROCHLORIDE 1 MG/ML
0.5 INJECTION, SOLUTION INTRAMUSCULAR; INTRAVENOUS; SUBCUTANEOUS
Status: DISCONTINUED | OUTPATIENT
Start: 2018-09-08 | End: 2018-09-09

## 2018-09-08 RX ORDER — NALOXONE HYDROCHLORIDE 0.4 MG/ML
.1-.4 INJECTION, SOLUTION INTRAMUSCULAR; INTRAVENOUS; SUBCUTANEOUS
Status: DISCONTINUED | OUTPATIENT
Start: 2018-09-08 | End: 2018-09-10 | Stop reason: HOSPADM

## 2018-09-08 RX ORDER — NAPROXEN 250 MG/1
500 TABLET ORAL
Status: DISCONTINUED | OUTPATIENT
Start: 2018-09-08 | End: 2018-09-10 | Stop reason: HOSPADM

## 2018-09-08 RX ORDER — POLYETHYLENE GLYCOL 3350 17 G/17G
17 POWDER, FOR SOLUTION ORAL DAILY PRN
Status: DISCONTINUED | OUTPATIENT
Start: 2018-09-08 | End: 2018-09-10 | Stop reason: HOSPADM

## 2018-09-08 RX ORDER — PROCHLORPERAZINE 25 MG
25 SUPPOSITORY, RECTAL RECTAL EVERY 12 HOURS PRN
Status: DISCONTINUED | OUTPATIENT
Start: 2018-09-08 | End: 2018-09-10 | Stop reason: HOSPADM

## 2018-09-08 RX ORDER — ONDANSETRON 2 MG/ML
4 INJECTION INTRAMUSCULAR; INTRAVENOUS EVERY 6 HOURS PRN
Status: DISCONTINUED | OUTPATIENT
Start: 2018-09-08 | End: 2018-09-08

## 2018-09-08 RX ORDER — ACETAMINOPHEN 325 MG/1
650 TABLET ORAL EVERY 4 HOURS PRN
Status: DISCONTINUED | OUTPATIENT
Start: 2018-09-08 | End: 2018-09-08

## 2018-09-08 RX ORDER — ONDANSETRON 4 MG/1
4 TABLET, ORALLY DISINTEGRATING ORAL EVERY 6 HOURS PRN
Status: DISCONTINUED | OUTPATIENT
Start: 2018-09-08 | End: 2018-09-08

## 2018-09-08 RX ORDER — SODIUM CHLORIDE, SODIUM LACTATE, POTASSIUM CHLORIDE, CALCIUM CHLORIDE 600; 310; 30; 20 MG/100ML; MG/100ML; MG/100ML; MG/100ML
INJECTION, SOLUTION INTRAVENOUS CONTINUOUS
Status: DISCONTINUED | OUTPATIENT
Start: 2018-09-08 | End: 2018-09-09

## 2018-09-08 RX ORDER — SIMVASTATIN 40 MG
80 TABLET ORAL AT BEDTIME
Status: DISCONTINUED | OUTPATIENT
Start: 2018-09-08 | End: 2018-09-10 | Stop reason: HOSPADM

## 2018-09-08 RX ORDER — PROCHLORPERAZINE MALEATE 10 MG
10 TABLET ORAL EVERY 6 HOURS PRN
Status: DISCONTINUED | OUTPATIENT
Start: 2018-09-08 | End: 2018-09-10 | Stop reason: HOSPADM

## 2018-09-08 RX ORDER — ONDANSETRON 4 MG/1
4 TABLET, ORALLY DISINTEGRATING ORAL EVERY 6 HOURS PRN
Status: DISCONTINUED | OUTPATIENT
Start: 2018-09-08 | End: 2018-09-10 | Stop reason: HOSPADM

## 2018-09-08 RX ORDER — ACETAMINOPHEN 325 MG/1
650 TABLET ORAL EVERY 4 HOURS PRN
Status: DISCONTINUED | OUTPATIENT
Start: 2018-09-08 | End: 2018-09-10 | Stop reason: HOSPADM

## 2018-09-08 RX ORDER — DULOXETIN HYDROCHLORIDE 30 MG/1
120 CAPSULE, DELAYED RELEASE ORAL DAILY
Status: DISCONTINUED | OUTPATIENT
Start: 2018-09-09 | End: 2018-09-10 | Stop reason: HOSPADM

## 2018-09-08 RX ADMIN — SIMVASTATIN 80 MG: 40 TABLET, FILM COATED ORAL at 21:12

## 2018-09-08 RX ADMIN — TAZOBACTAM SODIUM AND PIPERACILLIN SODIUM 4.5 G: 500; 4 INJECTION, SOLUTION INTRAVENOUS at 14:46

## 2018-09-08 RX ADMIN — PREGABALIN 150 MG: 100 CAPSULE ORAL at 18:53

## 2018-09-08 RX ADMIN — SODIUM CHLORIDE, POTASSIUM CHLORIDE, SODIUM LACTATE AND CALCIUM CHLORIDE: 600; 310; 30; 20 INJECTION, SOLUTION INTRAVENOUS at 16:09

## 2018-09-08 RX ADMIN — SODIUM CHLORIDE 1000 ML: 9 INJECTION, SOLUTION INTRAVENOUS at 12:38

## 2018-09-08 RX ADMIN — MORPHINE SULFATE 30 MG: 30 TABLET, EXTENDED RELEASE ORAL at 21:12

## 2018-09-08 RX ADMIN — TAZOBACTAM SODIUM AND PIPERACILLIN SODIUM 4.5 G: 500; 4 INJECTION, SOLUTION INTRAVENOUS at 20:18

## 2018-09-08 RX ADMIN — SODIUM CHLORIDE, POTASSIUM CHLORIDE, SODIUM LACTATE AND CALCIUM CHLORIDE 1000 ML: 600; 310; 30; 20 INJECTION, SOLUTION INTRAVENOUS at 13:30

## 2018-09-08 RX ADMIN — OXYCODONE HYDROCHLORIDE 10 MG: 5 TABLET ORAL at 18:52

## 2018-09-08 RX ADMIN — RANITIDINE 150 MG: 150 TABLET ORAL at 18:52

## 2018-09-08 RX ADMIN — SODIUM CHLORIDE, POTASSIUM CHLORIDE, SODIUM LACTATE AND CALCIUM CHLORIDE 1000 ML: 600; 310; 30; 20 INJECTION, SOLUTION INTRAVENOUS at 14:17

## 2018-09-08 SDOH — ECONOMIC STABILITY - INCOME SECURITY: UNEMPLOYMENT, UNSPECIFIED: Z56.0

## 2018-09-08 ASSESSMENT — ENCOUNTER SYMPTOMS
SHORTNESS OF BREATH: 1
MYALGIAS: 1
ABDOMINAL PAIN: 1
NERVOUS/ANXIOUS: 1
AGITATION: 1
CONFUSION: 1
FATIGUE: 1
DIZZINESS: 1
WEAKNESS: 1
ACTIVITY CHANGE: 1
SINUS PAIN: 1
DECREASED CONCENTRATION: 1
RHINORRHEA: 1
DIAPHORESIS: 1
HEADACHES: 1
SLEEP DISTURBANCE: 1
DIARRHEA: 1
FEVER: 1
ARTHRALGIAS: 1
TREMORS: 1
APPETITE CHANGE: 1
CHILLS: 1
LIGHT-HEADEDNESS: 1
VOMITING: 1
PHOTOPHOBIA: 1
SEIZURES: 0
HALLUCINATIONS: 0
NAUSEA: 1
SINUS PRESSURE: 1

## 2018-09-08 ASSESSMENT — ACTIVITIES OF DAILY LIVING (ADL): ADLS_ACUITY_SCORE: 13
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
## 2018-09-08 NOTE — ED TRIAGE NOTES
Patient arrives by ambulance for the 2nd day in a row, states today that he has abdominal pain and back pain and is short of breath. Had a workup yesterday for abdominal pain. He states that he did not take any of his prescribed pain medications today. Patient is on morphine and oxycodone for chronic back pain and sees a pain clinic and has a pain contract and is apparently being weaned off of the narcotics.
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
## 2018-09-08 NOTE — ED NOTES
sats dropped to 78% on room air, patient placed of 02, and placed on end tidal monitoring. Patient states that he has not had any pain meds at all today.

## 2018-09-08 NOTE — DISCHARGE INSTRUCTIONS
No acute medical process to account for your chills, sweats, abdominal pain other than narcotic withdrawal.  Complete blood count, comprehensive metabolic blood work and CT imaging of the abdomen was all normal.  With having consumed your monthly prescription for pain management over the last 2 days she will have to contact your chronic pain specialist to discuss if they are willing to refill your medication.

## 2018-09-08 NOTE — PROGRESS NOTES
Patient admitted to ICU as med/surg overflow. Patient presented to the E D with change of  LOC.Patient reportedly on chronic pain meds for back pain ,and had almost  run out of a recent prescription of oxycodone. Patient presents to ICU very lethargic.,wakes to repeated voice and commands. Able to stand with two staff to void. Patient falls risk at this time.Alarms on.Skin checked by two staff. No problems identified.

## 2018-09-08 NOTE — PROGRESS NOTES
This writer has reviewed admission/initial skin assessment and Jeremiah assessment and agrees with documentation and assessment findings.

## 2018-09-08 NOTE — IP AVS SNAPSHOT
[facility]    [address] [place] [address]    Phone:  [phone]    Fax:  [phone]                                       After Visit Summary   9/8/2018    Melquiades Morales    MRN: [id number]           After Visit Summary Signature Page     I have received my discharge instructions, and my questions have been answered. I have discussed any challenges I see with this plan with the nurse or doctor.    ..........................................................................................................................................  Patient/Patient Representative Signature      ..........................................................................................................................................  Patient Representative Print Name and Relationship to Patient    ..................................................               ................................................  Date                                            Time    ..........................................................................................................................................  Reviewed by Signature/Title    ...................................................              ..............................................  Date                                                            Time          22EPIC Rev 08/18

## 2018-09-08 NOTE — IP AVS SNAPSHOT
MRN:[id number]                      After Visit Summary   9/8/2018    Melquiades Morales    MRN: [id number]           Thank you!     Thank you for choosing [facility] for your care. Our goal is always to provide you with excellent care. Hearing back from our patients is one way we can continue to improve our services. Please take a few minutes to complete the written survey that you may receive in the mail after you visit with us. Thank you!        Patient Information     Date Of Birth          1955        Designated Caregiver       Most Recent Value    Caregiver    Will someone help with your care after discharge? yes    Name of designated caregiver Michoacano Morales-Son    Phone number of caregiver [phone]    Caregiver address n/a      About your hospital stay     You were admitted on:  September 8, 2018 You last received care in the:  [facility]    You were discharged on:  September 10, 2018       Who to Call     For medical emergencies, please call 911.  For non-urgent questions about your medical care, please call your primary care provider or clinic, [phone]          Attending Provider     Provider Specialty    Ramsey Leiva MD [facility]    Jaziel Roman DO Emergency Medicine    Rey Clark MD [facility]    Pastor Rodriguez MD Internal Medicine       Primary Care Provider Office Phone # Fax #    Jignesh Travis [phone]      After Care Instructions     Activity       Your activity upon discharge: activity as tolerated            Diet       Follow this diet upon discharge: Orders Placed This Encounter      Advance Diet as Tolerated: Regular Diet Adult                  Follow-up Appointments     Follow-up and recommended labs and tests        Follow up with primary care provider, Jignesh Travis, within 1-2 weeks, for hospital follow- up.                  Your next 10 appointments already scheduled     Sep 17, 2018 10:00  AM CDT   Office Visit with Jignesh Travis MD   [facility] ([facility])    [address]  [facility] [id number] [phone]           Bring a current list of meds and any records pertaining to this visit. For Physicals, please bring immunization records and any forms needing to be filled out. Please arrive 10 minutes early to complete paperwork.            Sep 21, 2018  9:00 AM CDT   Return Visit with [facility] ([facility])    [address] [place] [id number]   [phone]            Oct 05, 2018  9:00 AM CDT   Return Visit with Kleber Pollack [facility] ([facility])    [address] [place] [id number]   [phone]            Oct 26, 2018  9:00 AM CDT   Return Visit with Kleber Pollack [facility] ([facility])    [address] [place] [address]   [phone]            Nov 09, 2018  9:00 AM CST   Return Visit with Kleber Pollack [facility] ([address] [address]   [phone]              Pending Results     Date and Time Order Name Status Description    9/8/2018 1726 EKG 12-lead, tracing only In process     9/8/2018 1345 Blood culture Preliminary     9/8/2018 1300 Blood culture Preliminary             Statement of Approval     Ordered          09/10/18 1351  I have reviewed and agree with all the recommendations and orders detailed in this document.  EFFECTIVE NOW     Approved and electronically signed by:  Pastor Rodriguez MD             Admission Information     Date & Time Provider Department Dept. Phone    9/8/2018 Pastor Rodriguez MD [facility] Medical Surgical [phone]      Your Vitals Were     Blood Pressure Pulse Temperature  "Respirations Height Weight    162/73 65 98.1  F (36.7  C) (Oral) 18 1.829 m (6') 111.3 kg (245 lb 6 oz)    Pulse Oximetry BMI (Body Mass Index)                94% 33.28 kg/m2          [email or web address] Information     [employer] lets you send messages to your doctor, view your test results, renew your prescriptions, schedule appointments and more. To sign up, go to [email or web address] . Click on \"Log in\" on the left side of the screen, which will take you to the Welcome page. Then click on \"Sign up Now\" on the right side of the page.     You will be asked to enter the access code listed below, as well as some personal information. Please follow the directions to create your username and password.     Your access code is: [email or web address]  Expires: 10/25/2018  9:51 AM     Your access code will  in 90 days. If you need help or a new code, please call your [facility] clinic or [phone].        Care EveryWhere ID     This is your Care EveryWhere ID. This could be used by other organizations to access your [facility] medical records  [id number]        Equal Access to Services     AQUILES WALSH AH: Shiva Naqvi, winnie xie, qajustin kaalmada elena, [place]. So [facility] [phone].    ATENCIÓN: Si habla español, tiene a tucker disposición servicios gratuitos de asistencia lingüística. Llame al [phone].    We comply with applicable federal civil rights laws and [place] laws. We do not discriminate on the basis of race, color, national origin, age, disability, sex, sexual orientation, or gender identity.               Review of your medicines      START taking        Dose / Directions    amoxicillin-clavulanate 875-125 MG per tablet   Commonly known as:  AUGMENTIN        Dose:  1 tablet   Take 1 tablet by mouth 2 times daily for 7 days   Quantity:  14 tablet   Refills:  0       levofloxacin 500 MG tablet   Commonly known as:  LEVAQUIN        Dose:  500 mg   Take 1 tablet " (500 mg) by mouth daily for 7 days   Quantity:  7 tablet   Refills:  0       potassium chloride SA 20 MEQ CR tablet   Commonly known as:  KLOR-CON   Used for:  Hypokalemia        Dose:  40 mEq   Take 2 tablets (40 mEq) by mouth daily for 5 days   Quantity:  10 tablet   Refills:  0         CONTINUE these medicines which have NOT CHANGED        Dose / Directions    albuterol 108 (90 Base) MCG/ACT inhaler   Commonly known as:  PROAIR HFA   Used for:  Bronchitis        Dose:  2 puff   Inhale 2 puffs into the lungs every 4 hours as needed for shortness of breath / dyspnea or wheezing   Quantity:  1 Inhaler   Refills:  11       DULoxetine 60 MG EC capsule   Commonly known as:  CYMBALTA   Used for:  Depression, unspecified depression type        Dose:  120 mg   Take 2 capsules (120 mg) by mouth daily   Quantity:  180 capsule   Refills:  1       LYRICA PO        Dose:  150 mg   Take 150 mg by mouth 2 times daily   Refills:  0       Melatonin 10 MG Tabs tablet        Dose:  10 mg   Take 10 mg by mouth nightly as needed for sleep   Refills:  0       MS CONTIN PO        Dose:  30 mg   Take 30 mg by mouth 3 times daily 15 mg X 2 tabs.  AM, 1200, bedtime   Refills:  0       naproxen 500 MG tablet   Commonly known as:  NAPROSYN        Dose:  500 mg   Take 1 tablet (500 mg) by mouth at onset of headache   Quantity:  60 tablet   Refills:  3       order for DME   Used for:  Other unspecified back disorder, Obese, Lumbosacral radiculitis, COPD (chronic obstructive pulmonary disease) (H)        Semi electric hospital bed with side rails and mattress. Length of bed is for lifetime   Quantity:  1 Device   Refills:  0       * order for DME   Used for:  Lumbosacral radiculitis        Equipment being ordered: Wheelchair. Electric, including adjustable back rest sitting to resting, leg elevation adjustment, approved for outdoor use   Quantity:  1 Units   Refills:  0       * order for DME   Used for:  Lumbosacral radiculitis, Chronic  obstructive pulmonary disease, unspecified COPD type (H), Obese, Lumbar radiculopathy, Encephalopathy, Spinal stenosis in cervical region        Equipment being ordered: Hospital Bed and mattress.   Quantity:  1 each   Refills:  0       * order for DME   Used for:  Lumbosacral radiculitis, Chronic obstructive pulmonary disease, unspecified COPD type (H), Obese, Lumbar radiculopathy, Encephalopathy, Spinal stenosis in cervical region, Unable to ambulate        Equipment being ordered: Lift Chair   Quantity:  1 each   Refills:  0       oxyCODONE IR 10 MG tablet   Commonly known as:  ROXICODONE        Dose:  10 mg   Take 10 mg by mouth Take 1 tablet every 4-6 hours. Max 2 tablets daily   Refills:  0       simvastatin 80 MG tablet   Commonly known as:  ZOCOR   Used for:  Hyperlipidemia LDL goal <130        Dose:  80 mg   Take 1 tablet (80 mg) by mouth every morning   Quantity:  90 tablet   Refills:  3       * traZODone 100 MG tablet   Commonly known as:  DESYREL   Used for:  Depression, unspecified depression type        Dose:  200 mg   Take 2 tablets (200 mg) by mouth nightly as needed for sleep Take along with the 25 mg tablet for total dose of 225 mg   Quantity:  180 tablet   Refills:  3       * traZODone 50 MG tablet   Commonly known as:  DESYREL   Used for:  Depression, unspecified depression type        Dose:  25 mg   Take 0.5 tablets (25 mg) by mouth nightly as needed for sleep Take along with the 100 mg tablets for total dose of 225 mg   Quantity:  90 tablet   Refills:  3       * Notice:  This list has 5 medication(s) that are the same as other medications prescribed for you. Read the directions carefully, and ask your doctor or other care provider to review them with you.         Where to get your medicines      These medications were sent to [facility] - - [place] - [id number] [address]    Hours:  [facility] Phone:   [phone]     amoxicillin-clavulanate 875-125 MG per tablet    levofloxacin 500 MG tablet    potassium chloride SA 20 MEQ CR tablet                Protect others around you: Learn how to safely use, store and throw away your medicines at www.disposemymeds.org.        ANTIBIOTIC INSTRUCTION     You've Been Prescribed an Antibiotic - Now What?  Your healthcare team thinks that you or your loved one might have an infection. Some infections can be treated with antibiotics, which are powerful, life-saving drugs. Like all medications, antibiotics have side effects and should only be used when necessary. There are some important things you should know about your antibiotic treatment.      Your healthcare team may run tests before you start taking an antibiotic.    Your team may take samples (e.g., from your blood, urine or other areas) to run tests to look for bacteria. These test can be important to determine if you need an antibiotic at all and, if you do, which antibiotic will work best.      Within a few days, your healthcare team might change or even stop your antibiotic.    Your team may start you on an antibiotic while they are working to find out what is making you sick.    Your team might change your antibiotic because test results show that a different antibiotic would be better to treat your infection.    In some cases, once your team has more information, they learn that you do not need an antibiotic at all. They may find out that you don't have an infection, or that the antibiotic you're taking won't work against your infection. For example, an infection caused by a virus can't be treated with antibiotics. Staying on an antibiotic when you don't need it is more likely to be harmful than helpful.      You may experience side effects from your antibiotic.    Like all medications, antibiotics have side effects. Some of these can be serious.    Let you healthcare team know if you have any known allergies when  you are admitted to the hospital.    One significant side effect of nearly all antibiotics is the risk of severe and sometimes deadly diarrhea caused by Clostridium difficile (C. Difficile). This occurs when a person takes antibiotics because some good germs are destroyed. Antibiotic use allows C. diificile to take over, putting patients at high risk for this serious infection.    As a patient or caregiver, it is important to understand your or your loved one's antibiotic treatment. It is especially important for caregivers to speak up when patients can't speak for themselves. Here are some important questions to ask your healthcare team.    What infection is this antibiotic treating and how do you know I have that infection?    What side effects might occur from this antibiotic?    How long will I need to take this antibiotic?    Is it safe to take this antibiotic with other medications or supplements (e.g., vitamins) that I am taking?     Are there any special directions I need to know about taking this antibiotic? For example, should I take it with food?    How will I be monitored to know whether my infection is responding to the antibiotic?    What tests may help to make sure the right antibiotic is prescribed for me?      Information provided by:  www.cdc.gov/getsmart  U.S. Department of Health and Human Services  Centers for disease Control and Prevention  National Center for Emerging and Zoonotic Infectious Diseases  Division of Healthcare Quality Promotion             Medication List: This is a list of all your medications and when to take them. Check marks below indicate your daily home schedule. Keep this list as a reference.      Medications           Morning Afternoon Evening Bedtime As Needed    albuterol 108 (90 Base) MCG/ACT inhaler   Commonly known as:  PROAIR HFA   Inhale 2 puffs into the lungs every 4 hours as needed for shortness of breath / dyspnea or wheezing                                    amoxicillin-clavulanate 875-125 MG per tablet   Commonly known as:  AUGMENTIN   Take 1 tablet by mouth 2 times daily for 7 days                                      DULoxetine 60 MG EC capsule   Commonly known as:  CYMBALTA   Take 2 capsules (120 mg) by mouth daily   Last time this was given:  120 mg on 9/10/2018  9:21 AM                                   levofloxacin 500 MG tablet   Commonly known as:  LEVAQUIN   Take 1 tablet (500 mg) by mouth daily for 7 days                                   LYRICA PO   Take 150 mg by mouth 2 times daily   Last time this was given:  150 mg on 9/10/2018  9:22 AM                                      Melatonin 10 MG Tabs tablet   Take 10 mg by mouth nightly as needed for sleep                                   MS CONTIN PO   Take 30 mg by mouth 3 times daily 15 mg X 2 tabs.  AM, 1200, bedtime   Last time this was given:  30 mg on 9/10/2018 12:10 PM                                         naproxen 500 MG tablet   Commonly known as:  NAPROSYN   Take 1 tablet (500 mg) by mouth at onset of headache                                   order for DME   Semi electric hospital bed with side rails and mattress. Length of bed is for lifetime                                * order for DME   Equipment being ordered: Wheelchair. Electric, including adjustable back rest sitting to resting, leg elevation adjustment, approved for outdoor use                                * order for DME   Equipment being ordered: Hospital Bed and mattress.                                * order for DME   Equipment being ordered: Lift Chair                                oxyCODONE IR 10 MG tablet   Commonly known as:  ROXICODONE   Take 10 mg by mouth Take 1 tablet every 4-6 hours. Max 2 tablets daily   Last time this was given:  5 mg on 9/10/2018 11:29 AM                                potassium chloride SA 20 MEQ CR tablet   Commonly known as:  KLOR-CON   Take 2 tablets (40 mEq) by mouth daily for 5 days   Last time  this was given:  20 mEq on 9/10/2018  1:38 PM                                   simvastatin 80 MG tablet   Commonly known as:  ZOCOR   Take 1 tablet (80 mg) by mouth every morning   Last time this was given:  80 mg on 9/9/2018  7:54 PM                                   * traZODone 100 MG tablet   Commonly known as:  DESYREL   Take 2 tablets (200 mg) by mouth nightly as needed for sleep Take along with the 25 mg tablet for total dose of 225 mg   Last time this was given:  225 mg on 9/9/2018 10:43 PM                                * traZODone 50 MG tablet   Commonly known as:  DESYREL   Take 0.5 tablets (25 mg) by mouth nightly as needed for sleep Take along with the 100 mg tablets for total dose of 225 mg   Last time this was given:  225 mg on 9/9/2018 10:43 PM                                   * Notice:  This list has 5 medication(s) that are the same as other medications prescribed for you. Read the directions carefully, and ask your doctor or other care provider to review them with you.
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
## 2018-09-09 LAB
ALBUMIN SERPL-MCNC: 2.7 G/DL (ref 3.4–5)
ALP SERPL-CCNC: 50 U/L (ref 40–150)
ALT SERPL W P-5'-P-CCNC: 22 U/L (ref 0–70)
ANION GAP SERPL CALCULATED.3IONS-SCNC: 4 MMOL/L (ref 3–14)
AST SERPL W P-5'-P-CCNC: 47 U/L (ref 0–45)
BILIRUB DIRECT SERPL-MCNC: 0.2 MG/DL (ref 0–0.2)
BILIRUB SERPL-MCNC: 0.8 MG/DL (ref 0.2–1.3)
BUN SERPL-MCNC: 8 MG/DL (ref 7–30)
CALCIUM SERPL-MCNC: 8 MG/DL (ref 8.5–10.1)
CHLORIDE SERPL-SCNC: 111 MMOL/L (ref 94–109)
CO2 SERPL-SCNC: 32 MMOL/L (ref 20–32)
CREAT SERPL-MCNC: 0.84 MG/DL (ref 0.66–1.25)
ERYTHROCYTE [DISTWIDTH] IN BLOOD BY AUTOMATED COUNT: 14.4 % (ref 10–15)
GFR SERPL CREATININE-BSD FRML MDRD: ABNORMAL ML/MIN/1.7M2
GLUCOSE SERPL-MCNC: 92 MG/DL (ref 70–99)
HCT VFR BLD AUTO: 42.1 % (ref 40–53)
HGB BLD-MCNC: 13.7 G/DL (ref 13.3–17.7)
MCH RBC QN AUTO: 32 PG (ref 26.5–33)
MCHC RBC AUTO-ENTMCNC: 32.5 G/DL (ref 31.5–36.5)
MCV RBC AUTO: 98 FL (ref 78–100)
PLATELET # BLD AUTO: 211 10E9/L (ref 150–450)
POTASSIUM SERPL-SCNC: 3.5 MMOL/L (ref 3.4–5.3)
PROT SERPL-MCNC: 5.5 G/DL (ref 6.8–8.8)
RBC # BLD AUTO: 4.28 10E12/L (ref 4.4–5.9)
SODIUM SERPL-SCNC: 147 MMOL/L (ref 133–144)
WBC # BLD AUTO: 9.6 10E9/L (ref 4–11)

## 2018-09-09 PROCEDURE — 80076 HEPATIC FUNCTION PANEL: CPT | Performed by: FAMILY MEDICINE

## 2018-09-09 PROCEDURE — 25000128 H RX IP 250 OP 636: Performed by: FAMILY MEDICINE

## 2018-09-09 PROCEDURE — 25000132 ZZH RX MED GY IP 250 OP 250 PS 637: Mod: GY | Performed by: PHYSICIAN ASSISTANT

## 2018-09-09 PROCEDURE — 25000128 H RX IP 250 OP 636: Performed by: EMERGENCY MEDICINE

## 2018-09-09 PROCEDURE — 12000000 ZZH R&B MED SURG/OB

## 2018-09-09 PROCEDURE — 85027 COMPLETE CBC AUTOMATED: CPT | Performed by: FAMILY MEDICINE

## 2018-09-09 PROCEDURE — 99233 SBSQ HOSP IP/OBS HIGH 50: CPT | Performed by: FAMILY MEDICINE

## 2018-09-09 PROCEDURE — 36415 COLL VENOUS BLD VENIPUNCTURE: CPT | Performed by: FAMILY MEDICINE

## 2018-09-09 PROCEDURE — A9270 NON-COVERED ITEM OR SERVICE: HCPCS | Mod: GY | Performed by: FAMILY MEDICINE

## 2018-09-09 PROCEDURE — A9270 NON-COVERED ITEM OR SERVICE: HCPCS | Mod: GY | Performed by: PHYSICIAN ASSISTANT

## 2018-09-09 PROCEDURE — 25000132 ZZH RX MED GY IP 250 OP 250 PS 637: Mod: GY | Performed by: FAMILY MEDICINE

## 2018-09-09 PROCEDURE — 80048 BASIC METABOLIC PNL TOTAL CA: CPT | Performed by: FAMILY MEDICINE

## 2018-09-09 RX ORDER — POTASSIUM CL/LIDO/0.9 % NACL 10MEQ/0.1L
10 INTRAVENOUS SOLUTION, PIGGYBACK (ML) INTRAVENOUS
Status: DISCONTINUED | OUTPATIENT
Start: 2018-09-09 | End: 2018-09-10 | Stop reason: HOSPADM

## 2018-09-09 RX ORDER — POTASSIUM CHLORIDE 1500 MG/1
20-40 TABLET, EXTENDED RELEASE ORAL
Status: DISCONTINUED | OUTPATIENT
Start: 2018-09-09 | End: 2018-09-10 | Stop reason: HOSPADM

## 2018-09-09 RX ORDER — POTASSIUM CHLORIDE 29.8 MG/ML
20 INJECTION INTRAVENOUS
Status: DISCONTINUED | OUTPATIENT
Start: 2018-09-09 | End: 2018-09-09

## 2018-09-09 RX ORDER — SODIUM CHLORIDE, SODIUM LACTATE, POTASSIUM CHLORIDE, CALCIUM CHLORIDE 600; 310; 30; 20 MG/100ML; MG/100ML; MG/100ML; MG/100ML
INJECTION, SOLUTION INTRAVENOUS CONTINUOUS
Status: DISCONTINUED | OUTPATIENT
Start: 2018-09-09 | End: 2018-09-09

## 2018-09-09 RX ORDER — NICOTINE 21 MG/24HR
1 PATCH, TRANSDERMAL 24 HOURS TRANSDERMAL DAILY
Status: DISCONTINUED | OUTPATIENT
Start: 2018-09-10 | End: 2018-09-09

## 2018-09-09 RX ORDER — POTASSIUM CHLORIDE 7.45 MG/ML
10 INJECTION INTRAVENOUS
Status: DISCONTINUED | OUTPATIENT
Start: 2018-09-09 | End: 2018-09-10 | Stop reason: HOSPADM

## 2018-09-09 RX ORDER — POTASSIUM CHLORIDE 1.5 G/1.58G
20-40 POWDER, FOR SOLUTION ORAL
Status: DISCONTINUED | OUTPATIENT
Start: 2018-09-09 | End: 2018-09-10 | Stop reason: HOSPADM

## 2018-09-09 RX ORDER — NICOTINE 21 MG/24HR
1 PATCH, TRANSDERMAL 24 HOURS TRANSDERMAL DAILY
Status: DISCONTINUED | OUTPATIENT
Start: 2018-09-09 | End: 2018-09-10 | Stop reason: HOSPADM

## 2018-09-09 RX ORDER — SENNOSIDES 8.6 MG
2 TABLET ORAL 2 TIMES DAILY PRN
Status: DISCONTINUED | OUTPATIENT
Start: 2018-09-09 | End: 2018-09-10 | Stop reason: HOSPADM

## 2018-09-09 RX ORDER — OXYCODONE HYDROCHLORIDE 5 MG/1
5 TABLET ORAL EVERY 6 HOURS PRN
Status: DISCONTINUED | OUTPATIENT
Start: 2018-09-09 | End: 2018-09-10 | Stop reason: HOSPADM

## 2018-09-09 RX ADMIN — MORPHINE SULFATE 30 MG: 30 TABLET, EXTENDED RELEASE ORAL at 19:54

## 2018-09-09 RX ADMIN — OXYCODONE HYDROCHLORIDE 10 MG: 5 TABLET ORAL at 00:52

## 2018-09-09 RX ADMIN — OXYCODONE HYDROCHLORIDE 5 MG: 5 TABLET ORAL at 11:15

## 2018-09-09 RX ADMIN — SODIUM CHLORIDE, POTASSIUM CHLORIDE, SODIUM LACTATE AND CALCIUM CHLORIDE: 600; 310; 30; 20 INJECTION, SOLUTION INTRAVENOUS at 06:35

## 2018-09-09 RX ADMIN — MORPHINE SULFATE 30 MG: 30 TABLET, EXTENDED RELEASE ORAL at 15:10

## 2018-09-09 RX ADMIN — TAZOBACTAM SODIUM AND PIPERACILLIN SODIUM 4.5 G: 500; 4 INJECTION, SOLUTION INTRAVENOUS at 19:57

## 2018-09-09 RX ADMIN — RANITIDINE 150 MG: 150 TABLET ORAL at 19:53

## 2018-09-09 RX ADMIN — TAZOBACTAM SODIUM AND PIPERACILLIN SODIUM 4.5 G: 500; 4 INJECTION, SOLUTION INTRAVENOUS at 08:07

## 2018-09-09 RX ADMIN — Medication 225 MG: at 22:43

## 2018-09-09 RX ADMIN — PREGABALIN 150 MG: 100 CAPSULE ORAL at 19:53

## 2018-09-09 RX ADMIN — RANITIDINE 150 MG: 150 TABLET ORAL at 07:49

## 2018-09-09 RX ADMIN — TAZOBACTAM SODIUM AND PIPERACILLIN SODIUM 4.5 G: 500; 4 INJECTION, SOLUTION INTRAVENOUS at 02:24

## 2018-09-09 RX ADMIN — SENNOSIDES AND DOCUSATE SODIUM 2 TABLET: 8.6; 5 TABLET ORAL at 11:15

## 2018-09-09 RX ADMIN — ACETAMINOPHEN 650 MG: 325 TABLET, FILM COATED ORAL at 11:15

## 2018-09-09 RX ADMIN — TAZOBACTAM SODIUM AND PIPERACILLIN SODIUM 4.5 G: 500; 4 INJECTION, SOLUTION INTRAVENOUS at 15:12

## 2018-09-09 RX ADMIN — MORPHINE SULFATE 30 MG: 30 TABLET, EXTENDED RELEASE ORAL at 07:49

## 2018-09-09 RX ADMIN — NICOTINE 1 PATCH: 21 PATCH, EXTENDED RELEASE TRANSDERMAL at 23:33

## 2018-09-09 RX ADMIN — SIMVASTATIN 80 MG: 40 TABLET, FILM COATED ORAL at 19:54

## 2018-09-09 RX ADMIN — OXYCODONE HYDROCHLORIDE 5 MG: 5 TABLET ORAL at 22:43

## 2018-09-09 RX ADMIN — PREGABALIN 150 MG: 100 CAPSULE ORAL at 07:50

## 2018-09-09 RX ADMIN — DULOXETINE HYDROCHLORIDE 120 MG: 30 CAPSULE, DELAYED RELEASE PELLETS ORAL at 07:50

## 2018-09-09 ASSESSMENT — PAIN DESCRIPTION - DESCRIPTORS
DESCRIPTORS: ACHING
DESCRIPTORS: ACHING;CONSTANT;DISCOMFORT
DESCRIPTORS: ACHING;CONSTANT;DISCOMFORT

## 2018-09-09 ASSESSMENT — ACTIVITIES OF DAILY LIVING (ADL)
ADLS_ACUITY_SCORE: 13
ADLS_ACUITY_SCORE: 12
ADLS_ACUITY_SCORE: 13
ADLS_ACUITY_SCORE: 13
ADLS_ACUITY_SCORE: 12
ADLS_ACUITY_SCORE: 14

## 2018-09-09 NOTE — PROGRESS NOTES
Patient up and ambulatory in room this morning. To bathroom with  stand by assist. Patient mentation: clear this am, oriented. C /O moderate low back pain. States is continuous. Slept most of morning. No c/o nausea this am, ate sm amount for brkfst.

## 2018-09-09 NOTE — PROGRESS NOTES
[facility]      Subjective:  Apparently more alert per nurses  Complains of back  Pain  Reports he is better  Reports that he did have vomiting and possible choing episode sometime prior to admit    Review of Systems:  CONSTITUTIONAL: more alert per RN's  INTEGUMENTARY/SKIN: NEGATIVE for worrisome rashes, moles or lesions  EYES: NEGATIVE for vision changes or irritation  ENT/MOUTH: NEGATIVE for ear, mouth and throat problems  RESP: cough, no sob  BREAST: NEGATIVE for masses, tenderness or discharge  CV: NEGATIVE for chest pain, palpitations or peripheral edema  GI: some vague diffuse abd pain-apparently better  : NEGATIVE for frequency, dysuria, or hematuria  MUSCULOSKELETAL: back pain  NEURO: NEGATIVE for weakness, dizziness or paresthesias  ENDOCRINE: NEGATIVE for temperature intolerance, skin/hair changes  HEME: NEGATIVE for bleeding problems      Physical Exam:  Vitals Were Reviewed    Patient Vitals for the past 16 hrs:   BP Temp Temp src Heart Rate Resp SpO2 Height Weight   09/09/18 0000 129/77 - - - - - - -   09/08/18 2023 128/72 - - - - - - -   09/08/18 1700 149/79 98.8  F (37.1  C) Oral 98 20 - 1.829 m (6') 110.5 kg (243 lb 9.7 oz)   09/08/18 1645 145/86 - - - - 97 % - -   09/08/18 1630 133/75 - - - - 96 % - -   09/08/18 1615 - - - - - 98 % - -   09/08/18 1600 131/85 - - - - 96 % - -   09/08/18 1545 119/85 - - - - 95 % - -   09/08/18 1530 139/87 - - - - 94 % - -   09/08/18 1515 144/83 - - - - - - -   09/08/18 1500 147/81 - - - - 93 % - -   09/08/18 1445 - - - - - 92 % - -   09/08/18 1430 - - - - - 94 % - -         Intake/Output Summary (Last 24 hours) at 09/09/18 0617  Last data filed at 09/09/18 0600   Gross per 24 hour   Intake          1344.58 ml   Output              300 ml   Net          1044.58 ml       GENERAL APPEARANCE:  Alert, sleepy, moans a lot when being examined  EYES: conjunctiva clear, eyes grossly normal  RESP: lungs clear to auscultation - no rales, rhonchi or  wheezes  CV: regular rate and rhythm, normal S1 S2, no S3 or S4 and no murmur, click or rub   ABDOMEN: soft, nontender, no HSM or masses and bowel sounds normal  MS: tender diffusely lumbar area  SKIN: clear without significant rashes or lesions  NEURO: Normal strength and tone, sensory exam grossly normal, mentation intact and speech normal    Lab:  Recent Labs   Lab Test  09/08/18   1330  09/07/18   1630   NA  147*  144   POTASSIUM  3.2*  3.7   CHLORIDE  113*  111*   CO2  29  30   ANIONGAP  5  3   GLC  94  112*   BUN  9  7   CR  0.71  0.73   JESSEE  8.5  9.8     CBC RESULTS:   Recent Labs   Lab Test  09/08/18   1234  09/07/18   1630   WBC  15.2*  13.5*   RBC  5.22  5.32   HGB  17.0  17.2   HCT  50.5  50.6   PLT  282  314       Results for orders placed or performed during the hospital encounter of 09/08/18 (from the past 24 hour(s))   CBC with platelets differential   Result Value Ref Range    WBC 15.2 (H) 4.0 - 11.0 10e9/L    RBC Count 5.22 4.4 - 5.9 10e12/L    Hemoglobin 17.0 13.3 - 17.7 g/dL    Hematocrit 50.5 40.0 - 53.0 %    MCV 97 78 - 100 fl    MCH 32.6 26.5 - 33.0 pg    MCHC 33.7 31.5 - 36.5 g/dL    RDW 14.0 10.0 - 15.0 %    Platelet Count 282 150 - 450 10e9/L    Diff Method Automated Method     % Neutrophils 60.9 %    % Lymphocytes 29.9 %    % Monocytes 8.4 %    % Eosinophils 0.2 %    % Basophils 0.3 %    % Immature Granulocytes 0.3 %    Nucleated RBCs 0 0 /100    Absolute Neutrophil 9.3 (H) 1.6 - 8.3 10e9/L    Absolute Lymphocytes 4.6 0.8 - 5.3 10e9/L    Absolute Monocytes 1.3 0.0 - 1.3 10e9/L    Absolute Eosinophils 0.0 0.0 - 0.7 10e9/L    Absolute Basophils 0.0 0.0 - 0.2 10e9/L    Abs Immature Granulocytes 0.1 0 - 0.4 10e9/L    Absolute Nucleated RBC 0.0    Lactic acid whole blood   Result Value Ref Range    Lactic Acid 2.4 (H) 0.7 - 2.0 mmol/L   Blood gas venous   Result Value Ref Range    Ph Venous 7.41 7.32 - 7.43 pH    PCO2 Venous 47 40 - 50 mm Hg    PO2 Venous 50 (H) 25 - 47 mm Hg    Bicarbonate  Venous 30 (H) 21 - 28 mmol/L    Base Excess Venous 4.2 mmol/L    FIO2 32    Blood culture   Result Value Ref Range    Specimen Description Blood Left Hand     Special Requests Aerobic and anaerobic bottles received     Culture Micro No growth after 7 hours    CRP Inflammation   Result Value Ref Range    CRP Inflammation 4.3 0.0 - 8.0 mg/L   Comprehensive metabolic panel   Result Value Ref Range    Sodium 147 (H) 133 - 144 mmol/L    Potassium 3.2 (L) 3.4 - 5.3 mmol/L    Chloride 113 (H) 94 - 109 mmol/L    Carbon Dioxide 29 20 - 32 mmol/L    Anion Gap 5 3 - 14 mmol/L    Glucose 94 70 - 99 mg/dL    Urea Nitrogen 9 7 - 30 mg/dL    Creatinine 0.71 0.66 - 1.25 mg/dL    GFR Estimate >90 >60 mL/min/1.7m2    GFR Estimate If Black >90 >60 mL/min/1.7m2    Calcium 8.5 8.5 - 10.1 mg/dL    Bilirubin Total 0.5 0.2 - 1.3 mg/dL    Albumin 3.4 3.4 - 5.0 g/dL    Protein Total 6.5 (L) 6.8 - 8.8 g/dL    Alkaline Phosphatase 62 40 - 150 U/L    ALT 23 0 - 70 U/L    AST 35 0 - 45 U/L   Troponin I   Result Value Ref Range    Troponin I ES <0.015 0.000 - 0.045 ug/L   Blood culture   Result Value Ref Range    Specimen Description Blood Left Hand     Special Requests Aerobic and anaerobic bottles received     Culture Micro No growth after 7 hours    XR Chest Port 1 View    Narrative    PORTABLE CHEST ONE VIEW  9/8/2018 2:18 PM     HISTORY: Septic workup.    COMPARISON: 1/17/2017.      Impression    IMPRESSION: Patchy opacity at the right lung base particularly along  the lateral aspect concerning for pneumonia. There is likely a small  right pleural effusion. Hazy left basilar opacity could represent  infiltrate or pulmonary edema. No significant pneumothorax visualized.  Cardiac silhouette borderline enlarged.    KATHERIN THOMPSON MD   Drug abuse screen urine   Result Value Ref Range    Amphetamine Qual Urine Negative NEG^Negative    Barbiturates Qual Urine Negative NEG^Negative    Benzodiazepine Qual Urine Negative NEG^Negative    Cannabinoids  Qual Urine Positive (A) NEG^Negative    Cocaine Qual Urine Negative NEG^Negative    Opiates Qualitative Urine Positive (A) NEG^Negative    PCP Qual Urine Negative NEG^Negative   UA with Microscopic   Result Value Ref Range    Color Urine Suze     Appearance Urine Slightly Cloudy     Glucose Urine Negative NEG^Negative mg/dL    Bilirubin Urine Negative NEG^Negative    Ketones Urine 5 (A) NEG^Negative mg/dL    Specific Gravity Urine 1.030 1.003 - 1.035    Blood Urine Negative NEG^Negative    pH Urine 5.0 5.0 - 7.0 pH    Protein Albumin Urine 30 (A) NEG^Negative mg/dL    Urobilinogen mg/dL 2.0 0.0 - 2.0 mg/dL    Nitrite Urine Negative NEG^Negative    Leukocyte Esterase Urine Negative NEG^Negative    Source Midstream Urine     WBC Urine 3 0 - 5 /HPF    RBC Urine 2 0 - 2 /HPF    Squamous Epithelial /HPF Urine <1 0 - 1 /HPF    Mucous Urine Present (A) NEG^Negative /LPF   Lactic acid whole blood   Result Value Ref Range    Lactic Acid 1.0 0.7 - 2.0 mmol/L   Procalcitonin   Result Value Ref Range    Procalcitonin <0.05 ng/ml   Methicillin Resistant Staph Aureus PCR   Result Value Ref Range    Specimen Description Nares     Methicillin Resist/Sens S. aureus PCR Negative NEG^Negative       Assessment and Plan:    Melquiades Morales is a 63 year old male with past medical history of chronic back pain, opioid dependence, depression, insomnia and COPD who presents with multiple complaints.     Pneumonia, suspect aspiration with sepsis (note significant right sided Pneumonia treated here 1/18)   Upon admission-Afebrile.WBC 15.2, normal VBG, elevated lactic, mild hypernatremia/hyperchloremia and hypokalemia. CRP normal. HR 100s and intermittent hypoxemia to 87%. CXR shows opacity in the right lung base concerning for pneumonia with likely right pleural effusion. Suspect aspiration pneumonia in the setting of withdrawal syndrome given location of opacity, lung bases appeared largely clear on CT abdomen 9/7/18. HPI difficult as  "patient answers yes to all review of systems and is unable to give further details on symptoms, suspect due to narcotic withdrawal as below.  Currently on zosyn. WBC is down to 9.6.       Acute Encephalopathy/ Narcotic Withdrawal Syndrome  Restless, moaning, pan-positive review of systems. \"I don't feel good\". Mild tachycardia, restlessness, abdominal pain. Patient on chronic narcotics as below. Recently filled new 28 day prescription of narcotics and per ED note yesterday had taken all but 4 in one bottle. Prescription monitoring program reviewed, receives monthly supply of oxycodone and morphine ER as below from Yin Womack NP. Suspect narcotic withdrawal syndrome in the setting of recent increase in use. Unsure of when he last took his prescription. Suspect current mental status change is associate with withdrawal syndrome. Drug screen positive for opiates and cannabis for which he uses medicinally. Patient with admission in 2017 with similar presentation, concern for withdrawal at that time, improved with re-starting home regimen and increased IV narcotics x 1 night.   Currently on  morphine ER 30 mg TID and  as needed oxycodone 5mg Q 6 hours-typically allowed oxycodone 10 mg q 6 hrs prn twice daily       Chronic back pain, Lumbosacral Radiculopathy/ Opioid dependence  Long standing back pain. Prescription monitoring program reviewed, receives monthly supply of oxycodone and morphine ER from Yin Womack NP through the [facility]. Morphine ER prescribed as 30 mg three time daily. Oxycodone 10 mg prescribed as every 4-6 hours max of 2 tablets daily. Also prescribed pregabalin two times daily. He regularly see a counselor, per last visit notes that he is taking Percocet 15 mg and Morphine was increased to 60 mg TID however, these changes were not appreciated on the prescription monitoring report. Patient unable to confirm dosing schedule or amounts recently taken.  - continue  morphine ER 30 mg TID  - " "continue home pregabalin  - continue as needed oxycodone 5 mg Q 6 hours prn     Abdominal Pain-? Withdrawal related  Reporting abdominal pain, \"I just don't feel well\". Unable to describe any further. Abdomen soft, reports diffuse tenderness without localization. No rebound or guarding. CT abdomen/pelvis on 9/7/2018 without acute findings. Suspect associated with withdrawal.     Back pain  Chronic back pain for years. Reporting back pain in ED. On exam, reports diffuse tenderness with palpation. Unable to describe any localization. Aside from moaning, no reaction physical reaction to examination. States it is similar to the back pain he has been dealing with for years. Suspect baseline chronic back pain with possible increase in symptoms with withdrawal. Low suspicion for infectious etiology with negative CRP. Recent CT is negative.     Insomnia   trazodone 225 mg as needed for sleep and  as needed for sleep     Depression   Cymbalta 120 mg     Hyperlipidemia   simvastatin     COPD  Noted in history, no PFTs on file.  - nebulizers as above     FEN: lock       DVT Prophylaxis: Low Risk/Ambulatory with no VTE prophylaxis indicated  Code Status: Full Code     Plan- looks like he had narcotic overusage then possible withdrawal. I suspect he aspirated at some point. He had a right sided pneumonia in the past also.  Continue zosyn. He typically get oxycodone 10 mg q 4-6 prn (max two doses per day). Will allow oxycodone 5 q 6 hrs prn. Stop iv fluids. Probably 1-2 more days in patient.  "

## 2018-09-09 NOTE — PROGRESS NOTES
Patient more alert this shift. Reports pain in bach which has been chronic for some time. Scheduled and PRN pain medication given.

## 2018-09-10 VITALS
TEMPERATURE: 98.1 F | SYSTOLIC BLOOD PRESSURE: 162 MMHG | HEART RATE: 65 BPM | RESPIRATION RATE: 18 BRPM | HEIGHT: 72 IN | WEIGHT: 245.37 LBS | DIASTOLIC BLOOD PRESSURE: 73 MMHG | BODY MASS INDEX: 33.23 KG/M2 | OXYGEN SATURATION: 94 %

## 2018-09-10 LAB
ANION GAP SERPL CALCULATED.3IONS-SCNC: 4 MMOL/L (ref 3–14)
BUN SERPL-MCNC: 8 MG/DL (ref 7–30)
CALCIUM SERPL-MCNC: 8.4 MG/DL (ref 8.5–10.1)
CHLORIDE SERPL-SCNC: 108 MMOL/L (ref 94–109)
CO2 SERPL-SCNC: 31 MMOL/L (ref 20–32)
CREAT SERPL-MCNC: 0.74 MG/DL (ref 0.66–1.25)
ERYTHROCYTE [DISTWIDTH] IN BLOOD BY AUTOMATED COUNT: 13.6 % (ref 10–15)
GFR SERPL CREATININE-BSD FRML MDRD: >90 ML/MIN/1.7M2
GLUCOSE SERPL-MCNC: 90 MG/DL (ref 70–99)
HCT VFR BLD AUTO: 43.2 % (ref 40–53)
HGB BLD-MCNC: 14.4 G/DL (ref 13.3–17.7)
MCH RBC QN AUTO: 32 PG (ref 26.5–33)
MCHC RBC AUTO-ENTMCNC: 33.3 G/DL (ref 31.5–36.5)
MCV RBC AUTO: 96 FL (ref 78–100)
PLATELET # BLD AUTO: 211 10E9/L (ref 150–450)
POTASSIUM SERPL-SCNC: 3.2 MMOL/L (ref 3.4–5.3)
RBC # BLD AUTO: 4.5 10E12/L (ref 4.4–5.9)
SODIUM SERPL-SCNC: 143 MMOL/L (ref 133–144)
WBC # BLD AUTO: 9 10E9/L (ref 4–11)

## 2018-09-10 PROCEDURE — 80048 BASIC METABOLIC PNL TOTAL CA: CPT | Performed by: FAMILY MEDICINE

## 2018-09-10 PROCEDURE — 25000132 ZZH RX MED GY IP 250 OP 250 PS 637: Mod: GY | Performed by: FAMILY MEDICINE

## 2018-09-10 PROCEDURE — 99239 HOSP IP/OBS DSCHRG MGMT >30: CPT | Performed by: INTERNAL MEDICINE

## 2018-09-10 PROCEDURE — 85027 COMPLETE CBC AUTOMATED: CPT | Performed by: FAMILY MEDICINE

## 2018-09-10 PROCEDURE — 36415 COLL VENOUS BLD VENIPUNCTURE: CPT | Performed by: FAMILY MEDICINE

## 2018-09-10 PROCEDURE — 40000275 ZZH STATISTIC RCP TIME EA 10 MIN

## 2018-09-10 PROCEDURE — 25000128 H RX IP 250 OP 636: Performed by: EMERGENCY MEDICINE

## 2018-09-10 PROCEDURE — A9270 NON-COVERED ITEM OR SERVICE: HCPCS | Mod: GY | Performed by: FAMILY MEDICINE

## 2018-09-10 PROCEDURE — A9270 NON-COVERED ITEM OR SERVICE: HCPCS | Mod: GY | Performed by: PHYSICIAN ASSISTANT

## 2018-09-10 PROCEDURE — 25000132 ZZH RX MED GY IP 250 OP 250 PS 637: Mod: GY | Performed by: PHYSICIAN ASSISTANT

## 2018-09-10 RX ORDER — LEVOFLOXACIN 500 MG/1
500 TABLET, FILM COATED ORAL DAILY
Qty: 7 TABLET | Refills: 0 | Status: SHIPPED | OUTPATIENT
Start: 2018-09-10 | End: 2018-09-17

## 2018-09-10 RX ORDER — POTASSIUM CHLORIDE 1500 MG/1
40 TABLET, EXTENDED RELEASE ORAL DAILY
Qty: 10 TABLET | Refills: 0 | Status: SHIPPED | OUTPATIENT
Start: 2018-09-10 | End: 2018-09-15

## 2018-09-10 RX ADMIN — POTASSIUM CHLORIDE 40 MEQ: 1500 TABLET, EXTENDED RELEASE ORAL at 11:29

## 2018-09-10 RX ADMIN — DULOXETINE HYDROCHLORIDE 120 MG: 30 CAPSULE, DELAYED RELEASE PELLETS ORAL at 09:21

## 2018-09-10 RX ADMIN — RANITIDINE 150 MG: 150 TABLET ORAL at 09:21

## 2018-09-10 RX ADMIN — OXYCODONE HYDROCHLORIDE 5 MG: 5 TABLET ORAL at 05:02

## 2018-09-10 RX ADMIN — NICOTINE 1 PATCH: 21 PATCH, EXTENDED RELEASE TRANSDERMAL at 09:21

## 2018-09-10 RX ADMIN — OXYCODONE HYDROCHLORIDE 5 MG: 5 TABLET ORAL at 11:29

## 2018-09-10 RX ADMIN — MORPHINE SULFATE 30 MG: 30 TABLET, EXTENDED RELEASE ORAL at 06:15

## 2018-09-10 RX ADMIN — POTASSIUM CHLORIDE 20 MEQ: 1500 TABLET, EXTENDED RELEASE ORAL at 13:38

## 2018-09-10 RX ADMIN — MORPHINE SULFATE 30 MG: 30 TABLET, EXTENDED RELEASE ORAL at 12:10

## 2018-09-10 RX ADMIN — PREGABALIN 150 MG: 100 CAPSULE ORAL at 09:22

## 2018-09-10 RX ADMIN — TAZOBACTAM SODIUM AND PIPERACILLIN SODIUM 4.5 G: 500; 4 INJECTION, SOLUTION INTRAVENOUS at 02:42

## 2018-09-10 RX ADMIN — TAZOBACTAM SODIUM AND PIPERACILLIN SODIUM 4.5 G: 500; 4 INJECTION, SOLUTION INTRAVENOUS at 09:30

## 2018-09-10 ASSESSMENT — ACTIVITIES OF DAILY LIVING (ADL)
ADLS_ACUITY_SCORE: 11
ADLS_ACUITY_SCORE: 12

## 2018-09-10 NOTE — PROGRESS NOTES
[facility] DISCHARGE NOTE    Patient discharged to home at 4:27 PM via wheel chair. Accompanied by other:friend and staff. Discharge instructions reviewed with patient, opportunity offered to ask questions. Prescriptions - None ordered for discharge. All belongings sent with patient.    Waleska Carrera

## 2018-09-10 NOTE — PROGRESS NOTES
Skin affirmation note    Admitting nurse completed full skin assessment, Jeremiah score and Jeremiah interventions. This writer agrees with the initial skin assessment findings.    JUS GOLDBERG

## 2018-09-10 NOTE — PLAN OF CARE
Problem: Patient Care Overview  Goal: Plan of Care/Patient Progress Review  Outcome: Improving  Patient continues to c/o low back pain; medicated with oxycodone every 6 hrs as ordered.  Patient denies shortenss of air, room air saturations stable.  Up with stand by assist to bathroom.

## 2018-09-10 NOTE — PROGRESS NOTES
Per Dr. Rodriguez, patient does not have to have potassium rechecked at 1730 prior to discharge.  Patient may discharge home on some oral potassium.  His ride was supposed to be here at 1545, he is waiting in his room.  Home medications reviewed and returned to patient per policy.

## 2018-09-10 NOTE — PHARMACY - DISCHARGE MEDICATION RECONCILIATION
Discharge medication review for this patient is complete. Pharmacist assisted with medication reconciliation of discharge medications with prior to admission medications.     The following changes were made to the discharge medication list based on pharmacist review:  Added:  none  Discontinued: none  Changed: none      Patient's Discharge Medication List  - medications as listed on After Visit Summary (AVS)     Review of your medicines      START taking       Dose / Directions    amoxicillin-clavulanate 875-125 MG per tablet   Commonly known as:  AUGMENTIN        Dose:  1 tablet   Take 1 tablet by mouth 2 times daily for 7 days   Quantity:  14 tablet   Refills:  0       levofloxacin 500 MG tablet   Commonly known as:  LEVAQUIN        Dose:  500 mg   Take 1 tablet (500 mg) by mouth daily for 7 days   Quantity:  7 tablet   Refills:  0       potassium chloride SA 20 MEQ CR tablet   Commonly known as:  KLOR-CON   Used for:  Hypokalemia        Dose:  40 mEq   Take 2 tablets (40 mEq) by mouth daily for 5 days   Quantity:  10 tablet   Refills:  0         CONTINUE these medicines which have NOT CHANGED       Dose / Directions    albuterol 108 (90 Base) MCG/ACT inhaler   Commonly known as:  PROAIR HFA   Used for:  Bronchitis        Dose:  2 puff   Inhale 2 puffs into the lungs every 4 hours as needed for shortness of breath / dyspnea or wheezing   Quantity:  1 Inhaler   Refills:  11       DULoxetine 60 MG EC capsule   Commonly known as:  CYMBALTA   Used for:  Depression, unspecified depression type        Dose:  120 mg   Take 2 capsules (120 mg) by mouth daily   Quantity:  180 capsule   Refills:  1       LYRICA PO        Dose:  150 mg   Take 150 mg by mouth 2 times daily   Refills:  0       Melatonin 10 MG Tabs tablet        Dose:  10 mg   Take 10 mg by mouth nightly as needed for sleep   Refills:  0       MS CONTIN PO        Dose:  30 mg   Take 30 mg by mouth 3 times daily 15 mg X 2 tabs.  AM, 1200, bedtime   Refills:  0        naproxen 500 MG tablet   Commonly known as:  NAPROSYN        Dose:  500 mg   Take 1 tablet (500 mg) by mouth at onset of headache   Quantity:  60 tablet   Refills:  3       order for DME   Used for:  Other unspecified back disorder, Obese, Lumbosacral radiculitis, COPD (chronic obstructive pulmonary disease) (H)        Semi electric hospital bed with side rails and mattress. Length of bed is for lifetime   Quantity:  1 Device   Refills:  0       * order for DME   Used for:  Lumbosacral radiculitis        Equipment being ordered: Wheelchair. Electric, including adjustable back rest sitting to resting, leg elevation adjustment, approved for outdoor use   Quantity:  1 Units   Refills:  0       * order for DME   Used for:  Lumbosacral radiculitis, Chronic obstructive pulmonary disease, unspecified COPD type (H), Obese, Lumbar radiculopathy, Encephalopathy, Spinal stenosis in cervical region        Equipment being ordered: Hospital Bed and mattress.   Quantity:  1 each   Refills:  0       * order for DME   Used for:  Lumbosacral radiculitis, Chronic obstructive pulmonary disease, unspecified COPD type (H), Obese, Lumbar radiculopathy, Encephalopathy, Spinal stenosis in cervical region, Unable to ambulate        Equipment being ordered: Lift Chair   Quantity:  1 each   Refills:  0       oxyCODONE IR 10 MG tablet   Commonly known as:  ROXICODONE        Dose:  10 mg   Take 10 mg by mouth Take 1 tablet every 4-6 hours. Max 2 tablets daily   Refills:  0       simvastatin 80 MG tablet   Commonly known as:  ZOCOR   Used for:  Hyperlipidemia LDL goal <130        Dose:  80 mg   Take 1 tablet (80 mg) by mouth every morning   Quantity:  90 tablet   Refills:  3       * traZODone 100 MG tablet   Commonly known as:  DESYREL   Used for:  Depression, unspecified depression type        Dose:  200 mg   Take 2 tablets (200 mg) by mouth nightly as needed for sleep Take along with the 25 mg tablet for total dose of 225 mg   Quantity:   180 tablet   Refills:  3       * traZODone 50 MG tablet   Commonly known as:  DESYREL   Used for:  Depression, unspecified depression type        Dose:  25 mg   Take 0.5 tablets (25 mg) by mouth nightly as needed for sleep Take along with the 100 mg tablets for total dose of 225 mg   Quantity:  90 tablet   Refills:  3       * Notice:  This list has 5 medication(s) that are the same as other medications prescribed for you. Read the directions carefully, and ask your doctor or other care provider to review them with you.         Where to get your medicines      These medications were sent to [facility] - - [place] - [address] [address]    Hours:  [facility] Phone:  [phone]      amoxicillin-clavulanate 875-125 MG per tablet     levofloxacin 500 MG tablet     potassium chloride SA 20 MEQ CR tablet           Darby Roman, PharmD

## 2018-09-10 NOTE — DISCHARGE SUMMARY
"[facility] Discharge Summary    Melquiades Morales MRN# [id number]   Age: 63 year old YOB: 1955     Date of Admission:  9/8/2018  Date of Discharge::  9/10/2018  Admitting Physician:  Rey Clark MD  Discharge Physician:  Pastor Rodriguez MD  Primary Physician: Jignesh Travis  Transferring Facility: N/A     Home clinic: [facility]          Admission Diagnoses:   Tobacco use disorder [F17.200]  Transient alteration of awareness [R40.4]  Chronic pain syndrome [G89.4]  Unemployed [Z56.0]  Chronic obstructive pulmonary disease with acute lower respiratory infection (H) [J44.0]  Narcotic abuse, continuous [F11.10]  Aspiration pneumonia of right lower lobe, unspecified aspiration pneumonia type (H) [J69.0]          Discharge Diagnosis:   Principle diagnosis: narcotic withdrawal/ pneumonia  Secondary diagnoses:  Principal Problem:    Aspiration pneumonia (H)  Active Problems:    Chronic pain    Major depressive disorder, single episode, severe (H)    COPD (chronic obstructive pulmonary disease) (H)    Hyperlipidemia LDL goal <130    Lumbar radiculopathy         Brief History of Presenting Illness:   As per admit hx     Patient reports that he has had chills and did not feel good starting around noon today. Continues to say \"I didn't feel good at all\" and \"I felt sick\". Unable to give any details surrounding specific questions. When asked what temperature he had on the thermometer he states \"I felt crappy\". When asked what the sputum looked like that he reported coughing up he states \"I was sick\". When asked what is bothering him the most currently he states \"I don't know, I felt sickness and I don't feel well all over\".      When asked review of systems, patient answers affirmatively to all questions including fever, chills, myalgias, lightheadedness, dizziness, cough, congestion, rhinorrhea, sore throat, shortness of breath, palpitations, chest pain, abdominal pain, nausea, " diarrhea, changes in urination (dysuria, changes in frequency/urgency), rash or wounds. No emesis.      Seen in the ED 9/7/2018 due to severe abdominal pain with intermittent cramping, shaking, feeling cold and diaphoresis. CT abdomen was negative at that time. Oxycodone was filled on 9/5/2018 for 56 tablets and it was noted that only 4 tablet remained. He admitted that most of the tablets were consumed on the first day. There was concern for narcotic withdrawal. He was discharged home.       No results found for this or any previous visit (from the past 24 hour(s)).         Hospital Course:   Pneumonia, suspect aspiration with sepsis (note significant right sided Pneumonia treated here 1/18)   Upon admission-Afebrile.WBC 15.2, normal VBG, elevated lactic,HR 100s and intermittent hypoxemia to 87%. CXR shows opacity in the right lung base concerning for pneumonia with likely right pleural effusion. Suspect aspiration pneumonia in the setting of withdrawal syndrome given location of opacity, lung bases appeared largely clear on CT abdomen 9/7/18.   CAP cannot be ruled out as  HPI difficult as patient answered yes to all review of systems and was unable to give further details on symptoms. Has been on zosyn. WBC improved. Has remained afebrile with good o2 sats  -will discharge on augmentin/ levaquin x 7 days.    SEPSIS  Ruled out          Acute Encephalopathy/ Narcotic Withdrawal Syndrome  Resolved.    Currently on  morphine ER 30 mg TID and  as needed oxycodone 5mg Q 6 hours-typically allowed oxycodone 10 mg q 6 hrs prn twice daily         Chronic back pain, Lumbosacral Radiculopathy/ Opioid dependence  Long standing back pain. Prescription monitoring program reviewed, receives monthly supply of oxycodone and morphine ER from Yin Womack NP through the [facility]. Morphine ER prescribed as 30 mg three time daily. Oxycodone 10 mg prescribed as every 4-6 hours max of 2 tablets daily. Also prescribed  pregabalin two times daily. Apparently also has medical marijuana prescribed.   Continue home regimen. F/u OP.       Abdominal Pain-? Withdrawal related  Resolved. Tolerating regular diet.. CT abdomen/pelvis on 9/7/2018 without acute findings. Suspect associated with withdrawal.      Insomnia  Continue meds      Depression   cont Cymbalta 120 mg      Hyperlipidemia   cont simvastatin      COPD  Noted in history, no PFTs on file.              Procedures:   No procedures performed during this admission         Allergies:      Allergies   Allergen Reactions     Abilify [Aripiprazole] Other (See Comments)     Altered metal status.  Was admitted to hospital 3/17/2015.     Asa [Aspirin] GI Disturbance     Upset stomach     Robitussin Cough-Cold D Other (See Comments)     Bad dreams about killing people              Medications Prior to Admission:     Prescriptions Prior to Admission   Medication Sig Dispense Refill Last Dose     DULoxetine (CYMBALTA) 60 MG EC capsule Take 2 capsules (120 mg) by mouth daily 180 capsule 1 9/7/2018 at Unknown time     Melatonin 10 MG TABS tablet Take 10 mg by mouth nightly as needed for sleep   9/7/2018 at hs     Morphine Sulfate (MS CONTIN PO) Take 30 mg by mouth 3 times daily 15 mg X 2 tabs.  AM, 1200, bedtime   9/7/2018 at Unknown time     oxyCODONE IR (ROXICODONE) 10 MG tablet Take 10 mg by mouth Take 1 tablet every 4-6 hours. Max 2 tablets daily   9/7/2018 at Unknown time     Pregabalin (LYRICA PO) Take 150 mg by mouth 2 times daily    9/7/2018 at Unknown time     simvastatin (ZOCOR) 80 MG tablet Take 1 tablet (80 mg) by mouth every morning 90 tablet 3 9/7/2018 at Unknown time     traZODone (DESYREL) 100 MG tablet Take 2 tablets (200 mg) by mouth nightly as needed for sleep Take along with the 25 mg tablet for total dose of 225 mg 180 tablet 3 9/7/2018 at hs     traZODone (DESYREL) 50 MG tablet Take 0.5 tablets (25 mg) by mouth nightly as needed for sleep Take along with the 100 mg  tablets for total dose of 225 mg 90 tablet 3 9/7/2018 at hs     albuterol (PROAIR HFA) 108 (90 BASE) MCG/ACT Inhaler Inhale 2 puffs into the lungs every 4 hours as needed for shortness of breath / dyspnea or wheezing 1 Inhaler 11 More than a month at Unknown time     naproxen (NAPROSYN) 500 MG tablet Take 1 tablet (500 mg) by mouth at onset of headache 60 tablet 3 Unknown at Unknown time     order for DME Equipment being ordered: Hospital Bed and mattress. 1 each 0 Taking     order for DME Equipment being ordered: Lift Chair 1 each 0 Taking     order for DME Equipment being ordered: Wheelchair. Electric, including adjustable back rest sitting to resting, leg elevation adjustment, approved for outdoor use 1 Units 0 Taking     ORDER FOR DME Semi electric hospital bed with side rails and mattress. Length of bed is for lifetime 1 Device 0 Taking             Discharge Medications:     Current Discharge Medication List      START taking these medications    Details   amoxicillin-clavulanate (AUGMENTIN) 875-125 MG per tablet Take 1 tablet by mouth 2 times daily for 7 days  Qty: 14 tablet, Refills: 0    Associated Diagnoses: Aspiration pneumonia of right lower lobe, unspecified aspiration pneumonia type (H)      levofloxacin (LEVAQUIN) 500 MG tablet Take 1 tablet (500 mg) by mouth daily for 7 days  Qty: 7 tablet, Refills: 0    Associated Diagnoses: Aspiration pneumonia of right lower lobe, unspecified aspiration pneumonia type (H)      potassium chloride SA (KLOR-CON) 20 MEQ CR tablet Take 2 tablets (40 mEq) by mouth daily for 5 days  Qty: 10 tablet, Refills: 0    Associated Diagnoses: Hypokalemia         CONTINUE these medications which have NOT CHANGED    Details   DULoxetine (CYMBALTA) 60 MG EC capsule Take 2 capsules (120 mg) by mouth daily  Qty: 180 capsule, Refills: 1    Associated Diagnoses: Depression, unspecified depression type      Melatonin 10 MG TABS tablet Take 10 mg by mouth nightly as needed for sleep       Morphine Sulfate (MS CONTIN PO) Take 30 mg by mouth 3 times daily 15 mg X 2 tabs.  AM, 1200, bedtime      oxyCODONE IR (ROXICODONE) 10 MG tablet Take 10 mg by mouth Take 1 tablet every 4-6 hours. Max 2 tablets daily      Pregabalin (LYRICA PO) Take 150 mg by mouth 2 times daily       simvastatin (ZOCOR) 80 MG tablet Take 1 tablet (80 mg) by mouth every morning  Qty: 90 tablet, Refills: 3    Associated Diagnoses: Hyperlipidemia LDL goal <130      !! traZODone (DESYREL) 100 MG tablet Take 2 tablets (200 mg) by mouth nightly as needed for sleep Take along with the 25 mg tablet for total dose of 225 mg  Qty: 180 tablet, Refills: 3    Associated Diagnoses: Depression, unspecified depression type      !! traZODone (DESYREL) 50 MG tablet Take 0.5 tablets (25 mg) by mouth nightly as needed for sleep Take along with the 100 mg tablets for total dose of 225 mg  Qty: 90 tablet, Refills: 3    Associated Diagnoses: Depression, unspecified depression type      albuterol (PROAIR HFA) 108 (90 BASE) MCG/ACT Inhaler Inhale 2 puffs into the lungs every 4 hours as needed for shortness of breath / dyspnea or wheezing  Qty: 1 Inhaler, Refills: 11    Associated Diagnoses: Bronchitis      naproxen (NAPROSYN) 500 MG tablet Take 1 tablet (500 mg) by mouth at onset of headache  Qty: 60 tablet, Refills: 3    Associated Diagnoses: Chronic pain syndrome      !! order for DME Equipment being ordered: Hospital Bed and mattress.  Qty: 1 each, Refills: 0    Associated Diagnoses: Chronic pain syndrome; Lumbosacral radiculitis; Chronic obstructive pulmonary disease, unspecified COPD type (H); Obese; Lumbar radiculopathy; Encephalopathy; Spinal stenosis in cervical region      !! order for DME Equipment being ordered: Lift Chair  Qty: 1 each, Refills: 0    Associated Diagnoses: Chronic pain syndrome; Lumbosacral radiculitis; Chronic obstructive pulmonary disease, unspecified COPD type (H); Obese; Lumbar radiculopathy; Encephalopathy; Spinal stenosis  in cervical region; Unable to ambulate      !! order for DME Equipment being ordered: Wheelchair. Electric, including adjustable back rest sitting to resting, leg elevation adjustment, approved for outdoor use  Qty: 1 Units, Refills: 0    Associated Diagnoses: Chronic pain syndrome; Lumbosacral radiculitis      !! ORDER FOR DME Semi electric hospital bed with side rails and mattress. Length of bed is for lifetime  Qty: 1 Device, Refills: 0    Associated Diagnoses: Other unspecified back disorder; Obese; Lumbosacral radiculitis; COPD (chronic obstructive pulmonary disease) (H)       !! - Potential duplicate medications found. Please discuss with provider.                Consultations:   No consultations were requested during this admission            Discharge Exam:   Blood pressure 162/73, pulse 65, temperature 98.1  F (36.7  C), temperature source Oral, resp. rate 18, height 1.829 m (6'), weight 111.3 kg (245 lb 6 oz), SpO2 94 %.  GENERAL APPEARANCE: healthy, alert and no distress  EYES: conjunctiva clear, eyes grossly normal  HENT: external ears and nose normal   NECK: supple, no masses or adenopathy  RESP: lungs clear to auscultation - no rales, rhonchi or wheezes  CV: regular rate and rhythm, normal S1 S2, no S3 or S4 and no murmur, click or rub   ABDOMEN: soft, nontender, no HSM or masses and bowel sounds normal  MS: no clubbing, cyanosis; no edema  SKIN: clear without significant rashes or lesions  NEURO: Normal strength and tone, sensory exam grossly normal, mentation intact and speech normal    Unresulted Labs Ordered in the Past 30 Days of this Admission     Date and Time Order Name Status Description    9/8/2018 1345 Blood culture Preliminary     9/8/2018 1300 Blood culture Preliminary           No results found for this or any previous visit (from the past 24 hour(s)).         Pending Tests at Discharge:   None         Discharge Instructions and Follow-Up:   Discharge diet: Regular   Discharge activity:  Activity as tolerated   Discharge follow-up: Follow up with primary care provider in 1-2 weeks           Discharge Disposition:   Discharged to home      Attestation:  I have reviewed today's vital signs, notes, medications, labs and imaging.    Time Spent on this Encounter   I, Pastor Rodriguez, personally saw the patient today and spent greater than 30 minutes discharging this patient.    Pastor Rodriguez MD

## 2018-09-10 NOTE — PROGRESS NOTES
[facility] TRANSPORT NOTE  Data:   Reason for Transport:  Bed availability    Melquiades Morales was transported to 2308 via wheel chair at 2230.  Patient was accompanied by Nursing Assistant. Equipment used for transport: None. Family was aware of reason for transport: yes    Action:  Report: received from Romaine    Response:  Patient's condition stable up on transfer.     Double RN skin check completed by Kai Kowalski

## 2018-09-14 ENCOUNTER — HOSPITAL ENCOUNTER (EMERGENCY)
Facility: CLINIC | Age: 63
Discharge: HOME | End: 2018-09-14
Attending: EMERGENCY MEDICINE | Admitting: EMERGENCY MEDICINE
Payer: MEDICARE

## 2018-09-14 ENCOUNTER — TELEPHONE (OUTPATIENT)
Dept: FAMILY MEDICINE | Facility: CLINIC | Age: 63
End: 2018-09-14

## 2018-09-14 VITALS
DIASTOLIC BLOOD PRESSURE: 94 MMHG | RESPIRATION RATE: 18 BRPM | OXYGEN SATURATION: 92 % | TEMPERATURE: 99.4 F | SYSTOLIC BLOOD PRESSURE: 140 MMHG

## 2018-09-14 DIAGNOSIS — E86.0 DEHYDRATION: ICD-10-CM

## 2018-09-14 DIAGNOSIS — R19.7 DIARRHEA, UNSPECIFIED TYPE: ICD-10-CM

## 2018-09-14 LAB
ALBUMIN SERPL-MCNC: 3.3 G/DL (ref 3.4–5)
ALP SERPL-CCNC: 59 U/L (ref 40–150)
ALT SERPL W P-5'-P-CCNC: 34 U/L (ref 0–70)
ANION GAP SERPL CALCULATED.3IONS-SCNC: 6 MMOL/L (ref 3–14)
AST SERPL W P-5'-P-CCNC: 19 U/L (ref 0–45)
BACTERIA SPEC CULT: NO GROWTH
BACTERIA SPEC CULT: NO GROWTH
BASOPHILS # BLD AUTO: 0.1 10E9/L (ref 0–0.2)
BASOPHILS NFR BLD AUTO: 0.4 %
BILIRUB SERPL-MCNC: 0.4 MG/DL (ref 0.2–1.3)
BUN SERPL-MCNC: 4 MG/DL (ref 7–30)
CALCIUM SERPL-MCNC: 9.2 MG/DL (ref 8.5–10.1)
CHLORIDE SERPL-SCNC: 109 MMOL/L (ref 94–109)
CO2 SERPL-SCNC: 28 MMOL/L (ref 20–32)
CREAT SERPL-MCNC: 0.7 MG/DL (ref 0.66–1.25)
DIFFERENTIAL METHOD BLD: ABNORMAL
EOSINOPHIL # BLD AUTO: 0.3 10E9/L (ref 0–0.7)
EOSINOPHIL NFR BLD AUTO: 2.6 %
ERYTHROCYTE [DISTWIDTH] IN BLOOD BY AUTOMATED COUNT: 14.1 % (ref 10–15)
GFR SERPL CREATININE-BSD FRML MDRD: >90 ML/MIN/1.7M2
GLUCOSE SERPL-MCNC: 93 MG/DL (ref 70–99)
HCT VFR BLD AUTO: 49.4 % (ref 40–53)
HGB BLD-MCNC: 16.6 G/DL (ref 13.3–17.7)
IMM GRANULOCYTES # BLD: 0 10E9/L (ref 0–0.4)
IMM GRANULOCYTES NFR BLD: 0.3 %
LIPASE SERPL-CCNC: 208 U/L (ref 73–393)
LYMPHOCYTES # BLD AUTO: 5.6 10E9/L (ref 0.8–5.3)
LYMPHOCYTES NFR BLD AUTO: 41.9 %
Lab: NORMAL
Lab: NORMAL
MCH RBC QN AUTO: 32.5 PG (ref 26.5–33)
MCHC RBC AUTO-ENTMCNC: 33.6 G/DL (ref 31.5–36.5)
MCV RBC AUTO: 97 FL (ref 78–100)
MONOCYTES # BLD AUTO: 1 10E9/L (ref 0–1.3)
MONOCYTES NFR BLD AUTO: 7.5 %
NEUTROPHILS # BLD AUTO: 6.3 10E9/L (ref 1.6–8.3)
NEUTROPHILS NFR BLD AUTO: 47.3 %
NRBC # BLD AUTO: 0 10*3/UL
NRBC BLD AUTO-RTO: 0 /100
PLATELET # BLD AUTO: 250 10E9/L (ref 150–450)
POTASSIUM SERPL-SCNC: 4.2 MMOL/L (ref 3.4–5.3)
PROT SERPL-MCNC: 6.8 G/DL (ref 6.8–8.8)
RBC # BLD AUTO: 5.1 10E12/L (ref 4.4–5.9)
SODIUM SERPL-SCNC: 143 MMOL/L (ref 133–144)
SPECIMEN SOURCE: NORMAL
SPECIMEN SOURCE: NORMAL
WBC # BLD AUTO: 13.3 10E9/L (ref 4–11)

## 2018-09-14 PROCEDURE — 99283 EMERGENCY DEPT VISIT LOW MDM: CPT | Mod: 25 | Performed by: EMERGENCY MEDICINE

## 2018-09-14 PROCEDURE — 99284 EMERGENCY DEPT VISIT MOD MDM: CPT | Mod: Z6 | Performed by: EMERGENCY MEDICINE

## 2018-09-14 PROCEDURE — 85025 COMPLETE CBC W/AUTO DIFF WBC: CPT | Performed by: EMERGENCY MEDICINE

## 2018-09-14 PROCEDURE — 83690 ASSAY OF LIPASE: CPT | Performed by: EMERGENCY MEDICINE

## 2018-09-14 PROCEDURE — 96360 HYDRATION IV INFUSION INIT: CPT | Performed by: EMERGENCY MEDICINE

## 2018-09-14 PROCEDURE — 80053 COMPREHEN METABOLIC PANEL: CPT | Performed by: EMERGENCY MEDICINE

## 2018-09-14 PROCEDURE — 25000128 H RX IP 250 OP 636: Performed by: EMERGENCY MEDICINE

## 2018-09-14 PROCEDURE — 96361 HYDRATE IV INFUSION ADD-ON: CPT | Performed by: EMERGENCY MEDICINE

## 2018-09-14 RX ORDER — SODIUM CHLORIDE 9 MG/ML
1000 INJECTION, SOLUTION INTRAVENOUS CONTINUOUS
Status: DISCONTINUED | OUTPATIENT
Start: 2018-09-14 | End: 2018-09-14 | Stop reason: HOSPADM

## 2018-09-14 RX ADMIN — SODIUM CHLORIDE 1000 ML: 9 INJECTION, SOLUTION INTRAVENOUS at 15:16

## 2018-09-14 NOTE — ED AVS SNAPSHOT
[facility] Emergency Department    [address] [address]    Phone:  [phone]    Fax:  [phone]                                       Melquiades Morales   MRN: [id number]    Department:  [facility] Emergency Department   Date of Visit:  9/14/2018           After Visit Summary Signature Page     I have received my discharge instructions, and my questions have been answered. I have discussed any challenges I see with this plan with the nurse or doctor.    ..........................................................................................................................................  Patient/Patient Representative Signature      ..........................................................................................................................................  Patient Representative Print Name and Relationship to Patient    ..................................................               ................................................  Date                                   Time    ..........................................................................................................................................  Reviewed by Signature/Title    ...................................................              ..............................................  Date                                               Time          22EPIC Rev 08/18

## 2018-09-14 NOTE — DISCHARGE INSTRUCTIONS
Coping with Diarrhea  What is diarrhea?  If you have loose, watery bowel movements at least three times a day, you have diarrhea. You may also have gas and stomach cramps.  Emotional upset, infection and bad reactions to food may make diarrhea worse.  How is it treated?  Severe diarrhea can be treated with medicine, such as Imodium and Lomotil. These slow the digestive tract and reduce the amount of fluid lost in bowel movements. Your care team can tell you if medicine is right for you.  What else can I do to treat or prevent diarrhea?    Avoid:  ? Fried, fatty, greasy, spicy or very sweet foods  ? Caffeine and alcohol  ? High-fiber foods such as whole grains, raw vegetables, unpeeled fresh fruits, dried fruits, dried beans and peas, nuts, seeds and popcorn  ? Chewing gum and sugar-free candies (these often contain a sugar alcohol that may increase diarrhea)  ? Gas-forming foods such as broccoli, cauliflower, brussels sprouts and carbonated (fizzy) drinks.    Eat smaller amounts of food, but eat more often. Serve foods cold or at room temperature.    Drink six to eight 8-ounce glasses of fluid each day, such as:  ? Fruit juice, watered-down (half water)  ? Broth or gelatin  ? Popsicles  ? Sports drinks or flat decaf soda pop (leave it open for at least 10 minutes before drinking).    Limit milk products to two low-fat servings daily.    Increase your potassium with watered-down orange juice, sports drinks, potatoes without skin, bananas or tomato products.    Increase your sodium with soups and broths, sports drinks, crackers and pretzels.    Rest and avoid stress.    Weigh yourself daily. Keep a record of your weight and how often you pass loose stools.    Take warm baths to keep yourself clean. Tell your doctor if your rectum is red, painful or swollen.    Think about buying a skin barrier at the drug store. This can help protect the skin around your rectum from irritation and skin breakdown. Use it after each  bowel movement.    If diarrhea is severe, have only clear liquids (liquids that you can see through) until it stops. Once it stops, slowly return to your normal diet.  When should I call my care team?  Call your care team if:    You follow the steps listed here, but your diarrhea does not improve within 24 hours.    You have more than six loose stools in one day.    You have sudden, severe belly pain.    You have been unable to eat for over two days.    You have fever or dizziness along with diarrhea.    You notice blood in your stool.  Food group Recommended foods Foods to avoid   Meats, eggs and cheese Broiled or baked lean meat, fish, chicken or turkey (no skin). Eggs, well-cooked. Beans. Chicken or turkey with the skin.   Breads and starches Breads, rolls and pastas made from white flour. Instant white rice, refined cereals (Cream of Wheat, Cream of Rice, Cornflakes, Rice Krispies), pancakes, waffles, muffins, cornbread, jared crackers. Whole grain breads and cereals, bran, Shredded Wheat, wild rice, granola.   Fruits and vegetables Canned, frozen or peeled fresh fruits such as bananas or applesauce. Tomato paste, tomato sauce, tomato puree, cooked vegetables (acorn squash, asparagus, beets, carrots, celery, green beans, mushrooms, baked potato without skin, peeled zucchini). Unpeeled fruits, melons, grapefruit juice, all vegetables not listed on the left.   Milk products Skim or 1% milk, low-fat yogurt, low-fat cheese. Whole or 2% milk, high-fat ice cream, cream.   Drinks Sports drinks, watered-down fruit juices. (No caffeine.) Prune juice, caffeine.   Desserts Cookies, cake, gelatin, sherbet, fruit pies (avoid pies made with unpeeled fruit). Nuts, coconut, chocolate, licorice.   Other Broth, butter, margarine, mayonnaise, salad dressing, vegetable oil. Hot sauce, pepper, chili powder, taco seasoning.    Comments:  __________________________________________  __________________________________________  __________________________________________  __________________________________________  __________________________________________  __________________________________________  __________________________________________  __________________________________________  __________________________________________  __________________________________________  For informational purposes only. Not to replace the advice of your health care provider.  Copyright   2006 [facility]. All rights reserved. [employer] 138794 - REV 12/15.      Treating Diarrhea    Diarrhea happens when you have loose, watery, or frequent bowel movements. It is a common problem with many causes. Most cases of diarrhea clear up on their own. But certain cases may need treatment. Be sure to see your healthcare provider if your symptoms do not improve within a few days.  Getting relief  Treatment of diarrhea depends on its cause. Diarrhea caused by bacterial or parasite infection is often treated with antibiotics. Diarrhea caused by other factors, such as a stomach virus, often improves with simple home treatment. The tips below may also help relieve your symptoms.    Drink plenty of fluids. This helps prevent too much fluid loss (dehydration). Water, clear soups, and electrolyte solutions are good choices. Avoid alcohol, coffee, tea, and milk. These can irritate your intestines and make symptoms worse.    Suck on ice chips if drinking makes you queasy.    Return to your normal diet slowly. You may want to eat bland foods at first, such as rice and toast. Also, you may need to avoid certain foods for a while, such as dairy products. These can make symptoms worse. Ask your healthcare provider if there are any other foods you should avoid.    If you were prescribed antibiotics, take them as directed.    Do not take anti-diarrhea medicines without  asking your healthcare provider first.  Call your healthcare provider   Call your healthcare provider if you have any of the following:     A fever of 100.4 F (38.0 C) or higher, or as directed by your healthcare provider    Severe pain    Worsening diarrhea or diarrhea for more than 2 days    Bloody vomit or stool    Signs of dehydration (dizziness, dry mouth and tongue, rapid pulse, dark urine)  Date Last Reviewed: 7/1/2016 2000-2017 [employer]. [address] [place] [address]. All rights reserved. This information is not intended as a substitute for professional medical care. Always follow your healthcare professional's instructions.

## 2018-09-14 NOTE — ED NOTES
Discharge instructions reviewed in detail, and sent home with stool sample supplies.  Pt verbalized understanding.  No further questions or concerns.
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
## 2018-09-14 NOTE — TELEPHONE ENCOUNTER
Reason for Call:  Other call back    Detailed comments: Pt was in hospital for pneumonia. Since then he can't keep anything in his system for greater than 12 hours.The he has black, tarry diarrhea. He is still on antibiotics and potassium. Wife googled symptoms and told him it sounds like crohns    Phone Number Patient can be reached at: Home number on file [phone] (home)    Best Time: any    Can we leave a detailed message on this number?     Call taken on 9/14/2018 at 10:11 AM by Sharon Galloway

## 2018-09-14 NOTE — ED NOTES
Pt presents to the ED with complaints of LLQ abdominal pain that wraps from the rectum around the left flank to the low left quad that he describes as a numbing pain before having a bowel movement for 1 year. Pt has had pain like this before. Pt has not been nauseated and has not vomited. Last BM today, which brought about relief of his symptoms. No change in bowel or bladder. Denies blood in urine or stool; however he has had 2 black/dark stools over the past few days.

## 2018-09-14 NOTE — ED AVS SNAPSHOT
[facility] Emergency Department    [address]    [facility] [address]    Phone:  [phone]    Fax:  [phone]                                       Melquiades Morales   MRN: [id number]    Department:  [facility] Emergency Department   Date of Visit:  9/14/2018           Patient Information     Date Of Birth          1955        Your diagnoses for this visit were:     Diarrhea, unspecified type     Dehydration        You were seen by Kai Silva MD.      Follow-up Information     Follow up with Jignesh Travis MD In 3 days.    Specialty:  Family Practice    Contact information:    [address]  [facility] [id number] [phone]          Discharge Instructions       Coping with Diarrhea  What is diarrhea?  If you have loose, watery bowel movements at least three times a day, you have diarrhea. You may also have gas and stomach cramps.  Emotional upset, infection and bad reactions to food may make diarrhea worse.  How is it treated?  Severe diarrhea can be treated with medicine, such as Imodium and Lomotil. These slow the digestive tract and reduce the amount of fluid lost in bowel movements. Your care team can tell you if medicine is right for you.  What else can I do to treat or prevent diarrhea?    Avoid:  ? Fried, fatty, greasy, spicy or very sweet foods  ? Caffeine and alcohol  ? High-fiber foods such as whole grains, raw vegetables, unpeeled fresh fruits, dried fruits, dried beans and peas, nuts, seeds and popcorn  ? Chewing gum and sugar-free candies (these often contain a sugar alcohol that may increase diarrhea)  ? Gas-forming foods such as broccoli, cauliflower, brussels sprouts and carbonated (fizzy) drinks.    Eat smaller amounts of food, but eat more often. Serve foods cold or at room temperature.    Drink six to eight 8-ounce glasses of fluid each day, such as:  ? Fruit juice, watered-down (half water)  ? Broth or gelatin  ? Popsicles  ? Sports drinks or flat decaf  soda pop (leave it open for at least 10 minutes before drinking).    Limit milk products to two low-fat servings daily.    Increase your potassium with watered-down orange juice, sports drinks, potatoes without skin, bananas or tomato products.    Increase your sodium with soups and broths, sports drinks, crackers and pretzels.    Rest and avoid stress.    Weigh yourself daily. Keep a record of your weight and how often you pass loose stools.    Take warm baths to keep yourself clean. Tell your doctor if your rectum is red, painful or swollen.    Think about buying a skin barrier at the drug store. This can help protect the skin around your rectum from irritation and skin breakdown. Use it after each bowel movement.    If diarrhea is severe, have only clear liquids (liquids that you can see through) until it stops. Once it stops, slowly return to your normal diet.  When should I call my care team?  Call your care team if:    You follow the steps listed here, but your diarrhea does not improve within 24 hours.    You have more than six loose stools in one day.    You have sudden, severe belly pain.    You have been unable to eat for over two days.    You have fever or dizziness along with diarrhea.    You notice blood in your stool.  Food group Recommended foods Foods to avoid   Meats, eggs and cheese Broiled or baked lean meat, fish, chicken or turkey (no skin). Eggs, well-cooked. Beans. Chicken or turkey with the skin.   Breads and starches Breads, rolls and pastas made from white flour. Instant white rice, refined cereals (Cream of Wheat, Cream of Rice, Cornflakes, Rice Krispies), pancakes, waffles, muffins, cornbread, jared crackers. Whole grain breads and cereals, bran, Shredded Wheat, wild rice, granola.   Fruits and vegetables Canned, frozen or peeled fresh fruits such as bananas or applesauce. Tomato paste, tomato sauce, tomato puree, cooked vegetables (acorn squash, asparagus, beets, carrots, celery, green  beans, mushrooms, baked potato without skin, peeled zucchini). Unpeeled fruits, melons, grapefruit juice, all vegetables not listed on the left.   Milk products Skim or 1% milk, low-fat yogurt, low-fat cheese. Whole or 2% milk, high-fat ice cream, cream.   Drinks Sports drinks, watered-down fruit juices. (No caffeine.) Prune juice, caffeine.   Desserts Cookies, cake, gelatin, sherbet, fruit pies (avoid pies made with unpeeled fruit). Nuts, coconut, chocolate, licorice.   Other Broth, butter, margarine, mayonnaise, salad dressing, vegetable oil. Hot sauce, pepper, chili powder, taco seasoning.   Comments:  __________________________________________  __________________________________________  __________________________________________  __________________________________________  __________________________________________  __________________________________________  __________________________________________  __________________________________________  __________________________________________  __________________________________________  For informational purposes only. Not to replace the advice of your health care provider.  Copyright   2006 [facility]. All rights reserved. [employer] 792469 - REV 12/15.      Treating Diarrhea    Diarrhea happens when you have loose, watery, or frequent bowel movements. It is a common problem with many causes. Most cases of diarrhea clear up on their own. But certain cases may need treatment. Be sure to see your healthcare provider if your symptoms do not improve within a few days.  Getting relief  Treatment of diarrhea depends on its cause. Diarrhea caused by bacterial or parasite infection is often treated with antibiotics. Diarrhea caused by other factors, such as a stomach virus, often improves with simple home treatment. The tips below may also help relieve your symptoms.    Drink plenty of fluids. This helps prevent too much fluid loss (dehydration). Water, clear  soups, and electrolyte solutions are good choices. Avoid alcohol, coffee, tea, and milk. These can irritate your intestines and make symptoms worse.    Suck on ice chips if drinking makes you queasy.    Return to your normal diet slowly. You may want to eat bland foods at first, such as rice and toast. Also, you may need to avoid certain foods for a while, such as dairy products. These can make symptoms worse. Ask your healthcare provider if there are any other foods you should avoid.    If you were prescribed antibiotics, take them as directed.    Do not take anti-diarrhea medicines without asking your healthcare provider first.  Call your healthcare provider   Call your healthcare provider if you have any of the following:     A fever of 100.4 F (38.0 C) or higher, or as directed by your healthcare provider    Severe pain    Worsening diarrhea or diarrhea for more than 2 days    Bloody vomit or stool    Signs of dehydration (dizziness, dry mouth and tongue, rapid pulse, dark urine)  Date Last Reviewed: 7/1/2016 2000-2017 [employer]. [address] [place] [address]. All rights reserved. This information is not intended as a substitute for professional medical care. Always follow your healthcare professional's instructions.          Your next 10 appointments already scheduled     Sep 17, 2018 10:00 AM CDT   Office Visit with Jignesh Travis MD   [facility] ([facility])    [address] [place] [address] [phone]           Bring a current list of meds and any records pertaining to this visit. For Physicals, please bring immunization records and any forms needing to be filled out. Please arrive 10 minutes early to complete paperwork.            Sep 21, 2018  9:00 AM CDT   [facility] Kleber Poll[facility]    [address]  [facility] [address]    [phone]            Oct 05, 2018  9:00 AM CDT   Return Visit with Kleber Pollack [facility] ([facility])    [address] [address]  [facility] [address]   [phone]            Oct 26, 2018  9:00 AM CDT   Return Visit with Kleber Pollack [facility] ([facility])    [address] [address]   [phone]            Nov 09, 2018  9:00 AM CST   Return Visit with Kleber Pollack [facility] ([facility])    [address] [address]   [phone]              24 Hour Appointment Hotline       To make an appointment at any [facility], call [phone] ([phone]). If you don't have a family doctor or clinic, we will help you find one. [facility] are conveniently located to serve the needs of you and your family.          ED Discharge Orders     Clostridium difficile toxin B PCR       Stool Specimen            Enteric Bacteria and Virus Panel by MARIANGEL Stool           Ova and Parasite Exam Routine                    Review of your medicines      Our records show that you are taking the medicines listed below. If these are incorrect, please call your family doctor or clinic.        Dose / Directions Last dose taken    albuterol 108 (90 Base) MCG/ACT inhaler   Commonly known as:  PROAIR HFA   Dose:  2 puff   Quantity:  1 Inhaler        Inhale 2 puffs into the lungs every 4 hours as needed for shortness of breath / dyspnea or wheezing   Refills:  11        amoxicillin-clavulanate 875-125 MG per tablet   Commonly known as:  AUGMENTIN   Dose:  1 tablet   Quantity:  14 tablet        Take 1 tablet by mouth 2 times daily for 7 days   Refills:  0        DULoxetine 60 MG EC capsule   Commonly known as:  CYMBALTA   Dose:  120 mg   Quantity:  180 capsule        Take 2 capsules (120 mg) by mouth  daily   Refills:  1        levofloxacin 500 MG tablet   Commonly known as:  LEVAQUIN   Dose:  500 mg   Quantity:  7 tablet        Take 1 tablet (500 mg) by mouth daily for 7 days   Refills:  0        LYRICA PO   Dose:  150 mg        Take 150 mg by mouth 2 times daily   Refills:  0        MS CONTIN PO   Dose:  30 mg        Take 30 mg by mouth 3 times daily 15 mg X 2 tabs.  AM, 1200, bedtime   Refills:  0        naproxen 500 MG tablet   Commonly known as:  NAPROSYN   Dose:  500 mg   Quantity:  60 tablet        Take 1 tablet (500 mg) by mouth at onset of headache   Refills:  3        order for DME   Quantity:  1 Device        Semi electric hospital bed with side rails and mattress. Length of bed is for lifetime   Refills:  0        * order for DME   Quantity:  1 Units        Equipment being ordered: Wheelchair. Electric, including adjustable back rest sitting to resting, leg elevation adjustment, approved for outdoor use   Refills:  0        * order for DME   Quantity:  1 each        Equipment being ordered: Hospital Bed and mattress.   Refills:  0        * order for DME   Quantity:  1 each        Equipment being ordered: Lift Chair   Refills:  0        oxyCODONE IR 10 MG tablet   Commonly known as:  ROXICODONE   Dose:  10 mg        Take 10 mg by mouth Take 1 tablet every 4-6 hours. Max 2 tablets daily   Refills:  0        potassium chloride SA 20 MEQ CR tablet   Commonly known as:  KLOR-CON   Dose:  40 mEq   Quantity:  10 tablet        Take 2 tablets (40 mEq) by mouth daily for 5 days   Refills:  0        simvastatin 80 MG tablet   Commonly known as:  ZOCOR   Dose:  80 mg   Quantity:  90 tablet        Take 1 tablet (80 mg) by mouth every morning   Refills:  3        * traZODone 100 MG tablet   Commonly known as:  DESYREL   Dose:  200 mg   Quantity:  180 tablet        Take 2 tablets (200 mg) by mouth nightly as needed for sleep Take along with the 25 mg tablet for total dose of 225 mg   Refills:  3        * traZODone  50 MG tablet   Commonly known as:  DESYREL   Dose:  25 mg   Quantity:  90 tablet        Take 0.5 tablets (25 mg) by mouth nightly as needed for sleep Take along with the 100 mg tablets for total dose of 225 mg   Refills:  3        * Notice:  This list has 5 medication(s) that are the same as other medications prescribed for you. Read the directions carefully, and ask your doctor or other care provider to review them with you.            Procedures and tests performed during your visit     CBC with platelets, differential    Comprehensive metabolic panel    Lipase      Orders Needing Specimen Collection     None      Pending Results     Date and Time Order Name Status Description    9/14/2018 1451 CBC with platelets, differential In process             Pending Culture Results     No orders found from 9/12/2018 to 9/15/2018.            Pending Results Instructions     If you had any lab results that were not finalized at the time of your Discharge, you can call the ED Lab Result RN at [phone]. You will be contacted by this team for any positive Lab results or changes in treatment. The nurses are available 7 days a week from 10A to 6:30P.  You can leave a message 24 hours per day and they will return your call.        Test Results From Your Hospital Stay        9/14/2018  3:42 PM      Component Results     Component Value Ref Range & Units Status    WBC 13.3 (H) 4.0 - 11.0 10e9/L Final    RBC Count 5.10 4.4 - 5.9 10e12/L Final    Hemoglobin 16.6 13.3 - 17.7 g/dL Final    Hematocrit 49.4 40.0 - 53.0 % Final    MCV 97 78 - 100 fl Final    MCH 32.5 26.5 - 33.0 pg Final    MCHC 33.6 31.5 - 36.5 g/dL Final    RDW 14.1 10.0 - 15.0 % Final    Platelet Count 250 150 - 450 10e9/L Final    Diff Method PENDING  Incomplete         9/14/2018  3:52 PM      Component Results     Component Value Ref Range & Units Status    Sodium 143 133 - 144 mmol/L Final    Potassium 4.2 3.4 - 5.3 mmol/L Final    Chloride 109 94 - 109 mmol/L  "Final    Carbon Dioxide 28 20 - 32 mmol/L Final    Anion Gap 6 3 - 14 mmol/L Final    Glucose 93 70 - 99 mg/dL Final    Urea Nitrogen 4 (L) 7 - 30 mg/dL Final    Creatinine 0.70 0.66 - 1.25 mg/dL Final    GFR Estimate >90 >60 mL/min/1.7m2 Final    Non  GFR Calc    GFR Estimate If Black >90 >60 mL/min/1.7m2 Final    African American GFR Calc    Calcium 9.2 8.5 - 10.1 mg/dL Final    Bilirubin Total 0.4 0.2 - 1.3 mg/dL Final    Albumin 3.3 (L) 3.4 - 5.0 g/dL Final    Protein Total 6.8 6.8 - 8.8 g/dL Final    Alkaline Phosphatase 59 40 - 150 U/L Final    ALT 34 0 - 70 U/L Final    AST 19 0 - 45 U/L Final         2018  3:50 PM      Component Results     Component Value Ref Range & Units Status    Lipase 208 73 - 393 U/L Final                Thank you for choosing [facility]       Thank you for choosing [facility] for your care. Our goal is always to provide you with excellent care. Hearing back from our patients is one way we can continue to improve our services. Please take a few minutes to complete the written survey that you may receive in the mail after you visit with us. Thank you!        [employer] Information     [facility] lets you send messages to your doctor, view your test results, renew your prescriptions, schedule appointments and more. To sign up, go to [email or web address] . Click on \"Log in\" on the left side of the screen, which will take you to the Welcome page. Then click on \"Sign up Now\" on the right side of the page.     You will be asked to enter the access code listed below, as well as some personal information. Please follow the directions to create your username and password.     Your access code is: [email or web address]  Expires: 10/25/2018  9:51 AM     Your access code will  in 90 days. If you need help or a new code, please call your [facility] clinic or [phone].        Care EveryWhere ID     This is your Care EveryWhere ID. This could be used by other organizations to access " your [facility] medical records  [id number]        Equal Access to Services     AQUILES WALSH : Hadii nicolas Naqvi, winnie xie, river black. So [facility] [phone].    ATENCIÓN: Si habla español, tiene a tucker disposición servicios gratuitos de asistencia lingüística. Llame al [phone].    We comply with applicable federal civil rights laws and [place] laws. We do not discriminate on the basis of race, color, national origin, age, disability, sex, sexual orientation, or gender identity.            After Visit Summary       This is your record. Keep this with you and show to your community pharmacist(s) and doctor(s) at your next visit.

## 2018-09-14 NOTE — TELEPHONE ENCOUNTER
S-(situation): Patient states diarrhea since he left the hospital.    B-(background): Patient states he was in the hospital for pneumonia.    A-(assessment): Patient states he only has had about 2 episodes of diarrhea.  Patient has not been eating well. Patient states he has black tarry stools.  Patient is not sure if he has fevers but does have th chills and sweating at times.  Patient reports abdominal pain but he is also on morphine so it is hard to tell how much and after he has abdominal pain.        R-(recommendations): Advised patient to be seen in the ER with his recent illness and symptoms he is having now. Patient agrees with the plan and will find a ride to the ER.    Milly MCGOVERN RN

## 2018-09-17 ENCOUNTER — OFFICE VISIT (OUTPATIENT)
Dept: FAMILY MEDICINE | Facility: CLINIC | Age: 63
End: 2018-09-17
Payer: MEDICARE

## 2018-09-17 VITALS
SYSTOLIC BLOOD PRESSURE: 140 MMHG | RESPIRATION RATE: 12 BRPM | TEMPERATURE: 96.6 F | HEART RATE: 114 BPM | BODY MASS INDEX: 32.91 KG/M2 | OXYGEN SATURATION: 96 % | HEIGHT: 72 IN | DIASTOLIC BLOOD PRESSURE: 90 MMHG | WEIGHT: 243 LBS

## 2018-09-17 DIAGNOSIS — J69.0 ASPIRATION PNEUMONIA, UNSPECIFIED ASPIRATION PNEUMONIA TYPE, UNSPECIFIED LATERALITY, UNSPECIFIED PART OF LUNG (H): ICD-10-CM

## 2018-09-17 DIAGNOSIS — R19.7 DIARRHEA, UNSPECIFIED TYPE: Primary | ICD-10-CM

## 2018-09-17 PROCEDURE — 99213 OFFICE O/P EST LOW 20 MIN: CPT | Performed by: FAMILY MEDICINE

## 2018-09-17 ASSESSMENT — ANXIETY QUESTIONNAIRES
5. BEING SO RESTLESS THAT IT IS HARD TO SIT STILL: MORE THAN HALF THE DAYS
2. NOT BEING ABLE TO STOP OR CONTROL WORRYING: MORE THAN HALF THE DAYS
6. BECOMING EASILY ANNOYED OR IRRITABLE: NOT AT ALL
3. WORRYING TOO MUCH ABOUT DIFFERENT THINGS: MORE THAN HALF THE DAYS
GAD7 TOTAL SCORE: 10
1. FEELING NERVOUS, ANXIOUS, OR ON EDGE: MORE THAN HALF THE DAYS
7. FEELING AFRAID AS IF SOMETHING AWFUL MIGHT HAPPEN: NOT AT ALL

## 2018-09-17 ASSESSMENT — PATIENT HEALTH QUESTIONNAIRE - PHQ9: 5. POOR APPETITE OR OVEREATING: MORE THAN HALF THE DAYS

## 2018-09-17 NOTE — MR AVS SNAPSHOT
After Visit Summary   9/17/2018    Melquiades Morales    MRN: [id number]           Patient Information     Date Of Birth          1955        Visit Information        Provider Department      9/17/2018 10:00 AM Jignesh Travis MD [facility]        Today's Diagnoses     Diarrhea, unspecified type    -  1    Aspiration pneumonia, unspecified aspiration pneumonia type, unspecified laterality, unspecified part of lung (H)           Follow-ups after your visit        Your next 10 appointments already scheduled     Sep 21, 2018  9:00 AM CDT   Return Visit with Kleber Pollack, [facility] ([facility])    [address] [place] [address]   [phone]            Oct 05, 2018  9:00 AM CDT   Return Visit with Kleber Pollack [facility] ([facility])    [address] [place] [address]   [phone]            Oct 26, 2018  9:00 AM CDT   Return Visit with Kleber Pollack, [facility] ([facility])    [address] [place] [address]   [phone]            Nov 09, 2018  9:00 AM CST   Return Visit with Kleber Pollack [facility] ([facility])    [address] [place] [phone]   [phone]              Who to contact     If you have questions or need follow up information about today's clinic visit or your schedule please contact [facility] directly at [phone].  Normal or non-critical lab and imaging results will be communicated to you by MyChart, letter or phone within 4 business days after the clinic has received the results. If you do not hear from us within 7 days, please contact the clinic through MyChart or phone. If you have a critical or abnormal lab result, we will notify you by  phone as soon as possible.  Submit refill requests through [employer] or call your pharmacy and they will forward the refill request to us. Please allow 3 business days for your refill to be completed.          Additional Information About Your Visit        Care EveryWhere ID     This is your Care EveryWhere ID. This could be used by other organizations to access your [place] medical records  [id number]        Your Vitals Were     Pulse Temperature Respirations Height Pulse Oximetry BMI (Body Mass Index)    114 96.6  F (35.9  C) (Tympanic) 12 6' (1.829 m) 96% 32.96 kg/m2       Blood Pressure from Last 3 Encounters:   09/17/18 140/90   09/14/18 (!) 140/94   09/10/18 162/73    Weight from Last 3 Encounters:   09/17/18 243 lb (110.2 kg)   09/10/18 245 lb 6 oz (111.3 kg)   02/26/18 244 lb (110.7 kg)              Today, you had the following     No orders found for display         Today's Medication Changes          These changes are accurate as of 9/17/18 10:48 AM.  If you have any questions, ask your nurse or doctor.               These medicines have changed or have updated prescriptions.        Dose/Directions    simvastatin 80 MG tablet   Commonly known as:  ZOCOR   This may have changed:  when to take this   Used for:  Hyperlipidemia LDL goal <130        Dose:  80 mg   Take 1 tablet (80 mg) by mouth every morning   Quantity:  90 tablet   Refills:  3       * traZODone 100 MG tablet   Commonly known as:  DESYREL   This may have changed:  Another medication with the same name was changed. Make sure you understand how and when to take each.   Used for:  Depression, unspecified depression type        Dose:  200 mg   Take 2 tablets (200 mg) by mouth nightly as needed for sleep Take along with the 25 mg tablet for total dose of 225 mg   Quantity:  180 tablet   Refills:  3       * traZODone 50 MG tablet   Commonly known as:  DESYREL   This may have changed:  additional instructions   Used for:  Depression, unspecified  depression type        Dose:  25 mg   Take 0.5 tablets (25 mg) by mouth nightly as needed for sleep Take along with the 100 mg tablets for total dose of 225 mg   Quantity:  90 tablet   Refills:  3       * Notice:  This list has 2 medication(s) that are the same as other medications prescribed for you. Read the directions carefully, and ask your doctor or other care provider to review them with you.             Primary Care Provider Office Phone # Fax #    Jignesh Travis [phone] [address] [facility] [address]        Equal Access to Services     [facility]: Hadii nicolas ku hadasho Soomaali, waaxda luqadaha, qaybta kaalmada adekaz, river castañeda . So [facility] [phone].    ATENCIÓN: Si habla español, tiene a tucker disposición servicios gratuitos de asistencia lingüística. [facility] [phone].    We comply with applicable federal civil rights laws and [place] laws. We do not discriminate on the basis of race, color, national origin, age, disability, sex, sexual orientation, or gender identity.            Thank you!     Thank you for choosing [facility]  for your care. Our goal is always to provide you with excellent care. Hearing back from our patients is one way we can continue to improve our services. Please take a few minutes to complete the written survey that you may receive in the mail after your visit with us. Thank you!             Your Updated Medication List - Protect others around you: Learn how to safely use, store and throw away your medicines at www.disposemymeds.org.          This list is accurate as of 9/17/18 10:48 AM.  Always use your most recent med list.                   Brand Name Dispense Instructions for use Diagnosis    albuterol 108 (90 Base) MCG/ACT inhaler    PROAIR HFA    1 Inhaler    Inhale 2 puffs into the lungs every 4 hours as needed for shortness of breath / dyspnea or wheezing    Bronchitis        amoxicillin-clavulanate 875-125 MG per tablet    AUGMENTIN    14 tablet    Take 1 tablet by mouth 2 times daily for 7 days    Aspiration pneumonia of right lower lobe, unspecified aspiration pneumonia type (H)       DULoxetine 60 MG EC capsule    CYMBALTA    180 capsule    Take 2 capsules (120 mg) by mouth daily    Depression, unspecified depression type       levofloxacin 500 MG tablet    LEVAQUIN    7 tablet    Take 1 tablet (500 mg) by mouth daily for 7 days    Aspiration pneumonia of right lower lobe, unspecified aspiration pneumonia type (H)       LYRICA PO      Take 150 mg by mouth 2 times daily        MS CONTIN PO      Take 30 mg by mouth 3 times daily 15 mg X 2 tabs.  AM, 1200, bedtime        naproxen 500 MG tablet    NAPROSYN    60 tablet    Take 1 tablet (500 mg) by mouth at onset of headache    Chronic pain syndrome       order for DME     1 Device    Semi electric hospital bed with side rails and mattress. Length of bed is for lifetime    Other unspecified back disorder, Obese, Lumbosacral radiculitis, COPD (chronic obstructive pulmonary disease) (H)       * order for DME     1 Units    Equipment being ordered: Wheelchair. Electric, including adjustable back rest sitting to resting, leg elevation adjustment, approved for outdoor use    Chronic pain syndrome, Lumbosacral radiculitis       * order for DME     1 each    Equipment being ordered: Hospital Bed and mattress.    Chronic pain syndrome, Lumbosacral radiculitis, Chronic obstructive pulmonary disease, unspecified COPD type (H), Obese, Lumbar radiculopathy, Encephalopathy, Spinal stenosis in cervical region       * order for DME     1 each    Equipment being ordered: Lift Chair    Chronic pain syndrome, Lumbosacral radiculitis, Chronic obstructive pulmonary disease, unspecified COPD type (H), Obese, Lumbar radiculopathy, Encephalopathy, Spinal stenosis in cervical region, Unable to ambulate       oxyCODONE IR 10 MG tablet    ROXICODONE     Take 10 mg  by mouth Take 1 tablet every 4-6 hours. Max 2 tablets daily        simvastatin 80 MG tablet    ZOCOR    90 tablet    Take 1 tablet (80 mg) by mouth every morning    Hyperlipidemia LDL goal <130       * traZODone 100 MG tablet    DESYREL    180 tablet    Take 2 tablets (200 mg) by mouth nightly as needed for sleep Take along with the 25 mg tablet for total dose of 225 mg    Depression, unspecified depression type       * traZODone 50 MG tablet    DESYREL    90 tablet    Take 0.5 tablets (25 mg) by mouth nightly as needed for sleep Take along with the 100 mg tablets for total dose of 225 mg    Depression, unspecified depression type       * Notice:  This list has 5 medication(s) that are the same as other medications prescribed for you. Read the directions carefully, and ask your doctor or other care provider to review them with you.

## 2018-09-17 NOTE — PROGRESS NOTES
SUBJECTIVE:   Melquiades Morales is a 63 year old male who presents to clinic today for the following health issues:          Hospital Follow-up Visit:    Hospital/Nursing Home/IP Rehab Facility: [facility]  Date of Admission: 09/14  Date of Discharge: 9/14  Reason(s) for Admission: diarrhea             Problems taking medications regularly:  None       Medication changes since discharge: amoxicillan       Problems adhering to non-medication therapy:  None    Summary of hospitalization:  [facility] reviewed  Diagnostic Tests/Treatments reviewed.  Follow up needed: none  Other Healthcare Providers Involved in Patient s Care:         None  Update since discharge: improved.     Post Discharge Medication Reconciliation: .  Plan of care communicated with      Coding guidelines for this visit:  Type of Medical   Decision Making Face-to-Face Visit       within 7 Days of discharge Face-to-Face Visit        within 14 days of discharge   Moderate Complexity [id number]   High Complexity [id number]                  Problem list and histories reviewed & adjusted, as indicated.  Additional history:         Reviewed and updated as needed this visit by clinical staff  Tobacco  Allergies  Meds  Med Hx  Surg Hx  Fam Hx  Soc Hx      Reviewed and updated as needed this visit by Provider       OBJECTIVE:  /90 (BP Location: Right arm, Patient Position: Chair, Cuff Size: Adult Large)  Pulse 114  Temp 96.6  F (35.9  C) (Tympanic)  Resp 12  Ht 6' (1.829 m)  Wt 243 lb (110.2 kg)  SpO2 96%  BMI 32.96 kg/m2  LUNGS: clear to auscultation, normal breath sounds  CV: RRR without murmur  ABD: BS+, soft, nontender, no masses, no hepatosplenomegaly  EXTREMITIES: without joint tenderness, swelling or erythema.  No muscle tenderness or abnormality.  SKIN: No rashes or abnormalities  NEURO:non focal exam    ASSESSMENT:     Diarrhea, unspecified type  Aspiration pneumonia, unspecified aspiration  pneumonia type, unspecified laterality, unspecified part of lung (H)    PLAN:  Colonoscopy

## 2018-09-18 ASSESSMENT — ANXIETY QUESTIONNAIRES: GAD7 TOTAL SCORE: 10

## 2018-09-18 ASSESSMENT — PATIENT HEALTH QUESTIONNAIRE - PHQ9: SUM OF ALL RESPONSES TO PHQ QUESTIONS 1-9: 9

## 2018-09-20 ENCOUNTER — TELEPHONE (OUTPATIENT)
Dept: FAMILY MEDICINE | Facility: CLINIC | Age: 63
End: 2018-09-20

## 2018-09-20 DIAGNOSIS — Z12.11 SPECIAL SCREENING FOR MALIGNANT NEOPLASMS, COLON: Primary | ICD-10-CM

## 2018-09-20 NOTE — TELEPHONE ENCOUNTER
Reason for Call:  Patient has called and scheduled colonoscopy    Detailed comments: please place orders     Phone Number Patient can be reached at: Home number on file [phone] (home)    Best Time: NA    Can we leave a detailed message on this number? Not Applicable    Call taken on 9/20/2018 at 9:06 AM by Herlinda Acosta

## 2018-09-21 ENCOUNTER — OFFICE VISIT (OUTPATIENT)
Dept: PSYCHOLOGY | Facility: CLINIC | Age: 63
End: 2018-09-21
Payer: MEDICARE

## 2018-09-21 DIAGNOSIS — F33.1 MAJOR DEPRESSIVE DISORDER, RECURRENT EPISODE, MODERATE (H): Primary | ICD-10-CM

## 2018-09-21 DIAGNOSIS — F41.1 GENERALIZED ANXIETY DISORDER: ICD-10-CM

## 2018-09-21 PROCEDURE — 90834 PSYTX W PT 45 MINUTES: CPT | Performed by: SOCIAL WORKER

## 2018-09-21 ASSESSMENT — ANXIETY QUESTIONNAIRES
1. FEELING NERVOUS, ANXIOUS, OR ON EDGE: MORE THAN HALF THE DAYS
GAD7 TOTAL SCORE: 11
7. FEELING AFRAID AS IF SOMETHING AWFUL MIGHT HAPPEN: NOT AT ALL
2. NOT BEING ABLE TO STOP OR CONTROL WORRYING: MORE THAN HALF THE DAYS
3. WORRYING TOO MUCH ABOUT DIFFERENT THINGS: MORE THAN HALF THE DAYS
6. BECOMING EASILY ANNOYED OR IRRITABLE: MORE THAN HALF THE DAYS
5. BEING SO RESTLESS THAT IT IS HARD TO SIT STILL: SEVERAL DAYS

## 2018-09-21 ASSESSMENT — PATIENT HEALTH QUESTIONNAIRE - PHQ9: 5. POOR APPETITE OR OVEREATING: MORE THAN HALF THE DAYS

## 2018-09-21 NOTE — MR AVS SNAPSHOT
MRN:[id number]                      After Visit Summary   9/21/2018    Melquiades Morales    MRN: [id number]           Visit Information        Provider Department      9/21/2018 9:00 AM Kleber Pollack [facility] Generic      Your next 10 appointments already scheduled     Oct 05, 2018  9:00 AM CDT   Return Visit with Kleber Pollack [facility] ([facility])    [address] [address]   [phone]            Oct 26, 2018  9:00 AM CDT   Return Visit with Kleber Pollack [facility] ([facility])    [address] [address]   [phone]            Nov 01, 2018   Procedure with Donte Ahuja MD   [facility] ([facility])    [address] [place] [address]   [phone]           The medical center is located at [address] [place] [address]. (between [address] in [place], [place] north of [place]).            Nov 09, 2018  9:00 AM CST   Return Visit with Kleber Pollack [facility] ([facility])    [address] [address]   [phone]            Nov 23, 2018  9:00 AM CST   Return Visit with Kleber Pollack [facility] ([facility])    [address] [place] [address]   [phone]            Dec 07, 2018  9:00 AM CST   Return Visit with Kleber Pollack [facility] ([facility])    [address] [address]   [phone]              Care EveryWhere ID     This is your Care EveryWhere ID. This could be used by other organizations to access your [place] medical records  [id number]        Equal Access to Services     AQUILES WALSH AH:  Hadii nicolas Naqvi, waantonioda ludamariadaha, qaaspenta kaalmada elena, river [place]. So [facility] [phone].    ATENCIÓN: Si habla español, tiene a tucker disposición servicios gratuitos de asistencia lingüística. Llame al [phone].    We comply with applicable federal civil rights laws and [place] laws. We do not discriminate on the basis of race, color, national origin, age, disability, sex, sexual orientation, or gender identity.
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
## 2018-09-22 ASSESSMENT — PATIENT HEALTH QUESTIONNAIRE - PHQ9: SUM OF ALL RESPONSES TO PHQ QUESTIONS 1-9: 14

## 2018-09-22 ASSESSMENT — ANXIETY QUESTIONNAIRES: GAD7 TOTAL SCORE: 11

## 2018-09-26 ENCOUNTER — TELEPHONE (OUTPATIENT)
Dept: FAMILY MEDICINE | Facility: CLINIC | Age: 63
End: 2018-09-26

## 2018-09-26 NOTE — TELEPHONE ENCOUNTER
Reason for call:  Patient reporting a symptom    Symptom or request: pt states he is having directly in the heart pain, the muscle itself, it is not the chest.    Duration (how long have symptoms been present): x3 days     Have you been treated for this before? No    Additional comments: was horsing around with his son and wonders if his heart is bruised    Phone Number patient can be reached at:  Home number on file [phone] (home)    Best Time:  any    Can we leave a detailed message on this number:  YES    Call taken on 9/26/2018 at 10:18 AM by Emily López
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
## 2018-09-27 ENCOUNTER — TELEPHONE (OUTPATIENT)
Dept: FAMILY MEDICINE | Facility: CLINIC | Age: 63
End: 2018-09-27

## 2018-09-27 NOTE — TELEPHONE ENCOUNTER
Form needs completion/signature for  - [facility] with back   Paperwork placed in Dr. Deal box.    [facility]  Clinic Station  Flex

## 2018-09-28 ENCOUNTER — MEDICAL CORRESPONDENCE (OUTPATIENT)
Dept: HEALTH INFORMATION MANAGEMENT | Facility: CLINIC | Age: 63
End: 2018-09-28

## 2018-10-05 ENCOUNTER — OFFICE VISIT (OUTPATIENT)
Dept: PSYCHOLOGY | Facility: CLINIC | Age: 63
End: 2018-10-05
Payer: MEDICARE

## 2018-10-05 DIAGNOSIS — F33.1 MAJOR DEPRESSIVE DISORDER, RECURRENT EPISODE, MODERATE (H): Primary | ICD-10-CM

## 2018-10-05 DIAGNOSIS — F41.1 GENERALIZED ANXIETY DISORDER: ICD-10-CM

## 2018-10-05 PROCEDURE — 90834 PSYTX W PT 45 MINUTES: CPT | Performed by: SOCIAL WORKER

## 2018-10-05 ASSESSMENT — PATIENT HEALTH QUESTIONNAIRE - PHQ9: 5. POOR APPETITE OR OVEREATING: MORE THAN HALF THE DAYS

## 2018-10-05 ASSESSMENT — ANXIETY QUESTIONNAIRES
7. FEELING AFRAID AS IF SOMETHING AWFUL MIGHT HAPPEN: NOT AT ALL
1. FEELING NERVOUS, ANXIOUS, OR ON EDGE: MORE THAN HALF THE DAYS
5. BEING SO RESTLESS THAT IT IS HARD TO SIT STILL: SEVERAL DAYS
GAD7 TOTAL SCORE: 11
3. WORRYING TOO MUCH ABOUT DIFFERENT THINGS: MORE THAN HALF THE DAYS
2. NOT BEING ABLE TO STOP OR CONTROL WORRYING: MORE THAN HALF THE DAYS
6. BECOMING EASILY ANNOYED OR IRRITABLE: MORE THAN HALF THE DAYS

## 2018-10-05 NOTE — MR AVS SNAPSHOT
MRN:[id number]                      After Visit Summary   10/5/2018    Melquiades Morales    MRN: [id number]           Visit Information        Provider Department      10/5/2018 9:00 AM Kleber Pollack [facility] Generic      Your next 10 appointments already scheduled     Oct 26, 2018  9:00 AM CDT   Return Visit with Kleber Pollack [facility] ([facility])    [address] [place] [address]   [phone]            Nov 01, 2018   Procedure with Donte Ahuja MD   [facility] ([facility])    [address] [place] [address]   [phone]           The medical center is located at [address] [place] [address]. (between [address] in [place], four miles north of [place]).            Nov 09, 2018  9:00 AM CST   Return Visit with Kleber Pollack [facility] ([facility])    [address] [place] [address]   [phone]            Nov 16, 2018  9:00 AM CST   Return Visit with Kleber Pollack [facility] ([facility])    [address] [place] [address]   [phone]            Dec 07, 2018  9:00 AM CST   Return Visit with Kleber Pollack [facility] ([facility])    [address] [place] [address]   [phone]              Care EveryWhere ID     This is your Care EveryWhere ID. This could be used by other organizations to access your [place] medical records  [id number]        Equal Access to Services     AQUILES WALSH AH: Hadii nicolas Naqvi, waaxda luqadaha, qaybta kaalmada adechuckyada, river [facility] [phone].    ATENCIÓN: Si habla español, tiene a tucker disposición servicios gratuitos de  asistencia lingüística. Krystina al [phone].    We comply with applicable federal civil rights laws and [place] laws. We do not discriminate on the basis of race, color, national origin, age, disability, sex, sexual orientation, or gender identity.
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
## 2018-10-06 ASSESSMENT — PATIENT HEALTH QUESTIONNAIRE - PHQ9: SUM OF ALL RESPONSES TO PHQ QUESTIONS 1-9: 14

## 2018-10-06 ASSESSMENT — ANXIETY QUESTIONNAIRES: GAD7 TOTAL SCORE: 11

## 2018-10-08 DIAGNOSIS — F32.A DEPRESSION, UNSPECIFIED DEPRESSION TYPE: ICD-10-CM

## 2018-10-08 RX ORDER — DULOXETIN HYDROCHLORIDE 60 MG/1
CAPSULE, DELAYED RELEASE ORAL
Qty: 60 CAPSULE | Refills: 3 | Status: SHIPPED | OUTPATIENT
Start: 2018-10-08 | End: 2019-02-09

## 2018-10-08 NOTE — TELEPHONE ENCOUNTER
Requested Prescriptions   Pending Prescriptions Disp Refills     DULoxetine (CYMBALTA) 60 MG EC capsule [Pharmacy Med Name: DULOXETINE HCL 60 MG CAPSULE DR]  Last Written Prescription Date:  04/02/18  Last Fill Quantity: 180,  # refills: 1   Last office visit: 9/17/2018 with prescribing provider:  09/17/18   Future Office Visit:   Next 5 appointments (look out 90 days)     Oct 26, 2018  9:00 AM CDT   Return Visit with Kleber Pollack, [facility] ([address] [address]   [phone]            Nov 09, 2018  9:00 AM CST   [facility] [address]   [phone]            Nov 16, 2018  9:00 AM CST   Return Visit with Kleber Pollack [facility] ([address] [address]   [phone]            Dec 07, 2018  9:00 AM CST   Return Visit with Kleber Pollack [facility] ([address] [address]   [phone]               60 capsule      Sig: TAKE 2 CAPSULES BY MOUTH DAILY    Serotonin-Norepinephrine Reuptake Inhibitors  Failed    10/8/2018  8:00 AM       Failed - Blood pressure under 140/90 in past 12 months    BP Readings from Last 3 Encounters:   09/17/18 140/90   09/14/18 (!) 140/94   09/10/18 162/73          Failed - PHQ-9 score of less than 5 in past 6 months    Please review last PHQ-9 score.   PHQ-9 SCORE 9/17/2018 9/21/2018 10/5/2018   Total Score - - -   Total Score 9 14 14          Passed - Patient is age 18 or older       Passed - Recent (6 mo) or future (30 days) visit within the authorizing provider's specialty    Patient had office visit in the last 6 months or has a visit in the next 30  "days with authorizing provider or within the authorizing provider's specialty.  See \"Patient Info\" tab in inbasket, or \"Choose Columns\" in Meds & Orders section of the refill encounter.              "

## 2018-10-13 ENCOUNTER — HOSPITAL ENCOUNTER (EMERGENCY)
Facility: CLINIC | Age: 63
Discharge: HOME | End: 2018-10-13
Attending: EMERGENCY MEDICINE | Admitting: EMERGENCY MEDICINE
Payer: MEDICARE

## 2018-10-13 ENCOUNTER — APPOINTMENT (OUTPATIENT)
Dept: CT IMAGING | Facility: CLINIC | Age: 63
End: 2018-10-13
Attending: EMERGENCY MEDICINE
Payer: MEDICARE

## 2018-10-13 ENCOUNTER — NURSE TRIAGE (OUTPATIENT)
Dept: NURSING | Facility: CLINIC | Age: 63
End: 2018-10-13

## 2018-10-13 VITALS
OXYGEN SATURATION: 92 % | BODY MASS INDEX: 33.09 KG/M2 | WEIGHT: 244 LBS | DIASTOLIC BLOOD PRESSURE: 81 MMHG | TEMPERATURE: 98 F | HEART RATE: 86 BPM | SYSTOLIC BLOOD PRESSURE: 146 MMHG | RESPIRATION RATE: 20 BRPM

## 2018-10-13 DIAGNOSIS — R05.9 COUGH: ICD-10-CM

## 2018-10-13 DIAGNOSIS — R06.02 SOB (SHORTNESS OF BREATH): ICD-10-CM

## 2018-10-13 DIAGNOSIS — G89.4 CHRONIC PAIN SYNDROME: ICD-10-CM

## 2018-10-13 DIAGNOSIS — F11.20 UNCOMPLICATED OPIOID DEPENDENCE (H): ICD-10-CM

## 2018-10-13 LAB
ANION GAP SERPL CALCULATED.3IONS-SCNC: 5 MMOL/L (ref 3–14)
BASE EXCESS BLDV CALC-SCNC: 5.1 MMOL/L
BASOPHILS # BLD AUTO: 0 10E9/L (ref 0–0.2)
BASOPHILS NFR BLD AUTO: 0.4 %
BUN SERPL-MCNC: 5 MG/DL (ref 7–30)
CALCIUM SERPL-MCNC: 8.3 MG/DL (ref 8.5–10.1)
CHLORIDE SERPL-SCNC: 106 MMOL/L (ref 94–109)
CO2 SERPL-SCNC: 31 MMOL/L (ref 20–32)
CREAT SERPL-MCNC: 0.66 MG/DL (ref 0.66–1.25)
DIFFERENTIAL METHOD BLD: NORMAL
EOSINOPHIL # BLD AUTO: 0.1 10E9/L (ref 0–0.7)
EOSINOPHIL NFR BLD AUTO: 2.9 %
ERYTHROCYTE [DISTWIDTH] IN BLOOD BY AUTOMATED COUNT: 14.7 % (ref 10–15)
GFR SERPL CREATININE-BSD FRML MDRD: >90 ML/MIN/1.7M2
GLUCOSE SERPL-MCNC: 148 MG/DL (ref 70–99)
GRAM STN SPEC: ABNORMAL
HCO3 BLDV-SCNC: 32 MMOL/L (ref 21–28)
HCT VFR BLD AUTO: 49.9 % (ref 40–53)
HGB BLD-MCNC: 16.6 G/DL (ref 13.3–17.7)
IMM GRANULOCYTES # BLD: 0 10E9/L (ref 0–0.4)
IMM GRANULOCYTES NFR BLD: 0 %
LACTATE BLD-SCNC: 1.9 MMOL/L (ref 0.7–2)
LYMPHOCYTES # BLD AUTO: 1.9 10E9/L (ref 0.8–5.3)
LYMPHOCYTES NFR BLD AUTO: 39.6 %
Lab: ABNORMAL
MCH RBC QN AUTO: 32.5 PG (ref 26.5–33)
MCHC RBC AUTO-ENTMCNC: 33.3 G/DL (ref 31.5–36.5)
MCV RBC AUTO: 98 FL (ref 78–100)
MONOCYTES # BLD AUTO: 0.3 10E9/L (ref 0–1.3)
MONOCYTES NFR BLD AUTO: 7 %
NEUTROPHILS # BLD AUTO: 2.4 10E9/L (ref 1.6–8.3)
NEUTROPHILS NFR BLD AUTO: 50.1 %
NRBC # BLD AUTO: 0 10*3/UL
NRBC BLD AUTO-RTO: 0 /100
O2/TOTAL GAS SETTING VFR VENT: ABNORMAL %
PCO2 BLDV: 55 MM HG (ref 40–50)
PH BLDV: 7.38 PH (ref 7.32–7.43)
PLATELET # BLD AUTO: 206 10E9/L (ref 150–450)
PO2 BLDV: 38 MM HG (ref 25–47)
POTASSIUM SERPL-SCNC: 3.7 MMOL/L (ref 3.4–5.3)
RBC # BLD AUTO: 5.1 10E12/L (ref 4.4–5.9)
SODIUM SERPL-SCNC: 142 MMOL/L (ref 133–144)
SPECIMEN SOURCE: ABNORMAL
WBC # BLD AUTO: 4.9 10E9/L (ref 4–11)

## 2018-10-13 PROCEDURE — 99284 EMERGENCY DEPT VISIT MOD MDM: CPT | Mod: Z6 | Performed by: EMERGENCY MEDICINE

## 2018-10-13 PROCEDURE — 83605 ASSAY OF LACTIC ACID: CPT | Performed by: EMERGENCY MEDICINE

## 2018-10-13 PROCEDURE — 82803 BLOOD GASES ANY COMBINATION: CPT | Performed by: EMERGENCY MEDICINE

## 2018-10-13 PROCEDURE — 71250 CT THORAX DX C-: CPT

## 2018-10-13 PROCEDURE — 85025 COMPLETE CBC W/AUTO DIFF WBC: CPT | Performed by: EMERGENCY MEDICINE

## 2018-10-13 PROCEDURE — 80048 BASIC METABOLIC PNL TOTAL CA: CPT | Performed by: EMERGENCY MEDICINE

## 2018-10-13 PROCEDURE — 87205 SMEAR GRAM STAIN: CPT | Performed by: EMERGENCY MEDICINE

## 2018-10-13 PROCEDURE — 99284 EMERGENCY DEPT VISIT MOD MDM: CPT | Mod: 25

## 2018-10-13 PROCEDURE — 87106 FUNGI IDENTIFICATION YEAST: CPT | Performed by: EMERGENCY MEDICINE

## 2018-10-13 PROCEDURE — 87070 CULTURE OTHR SPECIMN AEROBIC: CPT | Mod: XS | Performed by: EMERGENCY MEDICINE

## 2018-10-13 PROCEDURE — 40000274 ZZH STATISTIC RCP CONSULT EA 30 MIN

## 2018-10-13 PROCEDURE — 87040 BLOOD CULTURE FOR BACTERIA: CPT | Performed by: EMERGENCY MEDICINE

## 2018-10-13 RX ORDER — MELATONIN 10 MG
1 CAPSULE ORAL AT BEDTIME
Refills: 0 | Status: ON HOLD | COMMUNITY
Start: 2018-10-03 | End: 2020-03-13

## 2018-10-13 RX ORDER — LEVOFLOXACIN 750 MG/1
750 TABLET, FILM COATED ORAL DAILY
Qty: 5 TABLET | Refills: 0 | Status: SHIPPED | OUTPATIENT
Start: 2018-10-13 | End: 2018-12-17

## 2018-10-13 ASSESSMENT — ENCOUNTER SYMPTOMS
MUSCULOSKELETAL NEGATIVE: 1
FEVER: 1
COUGH: 1
ENDOCRINE NEGATIVE: 1
CARDIOVASCULAR NEGATIVE: 1
EYES NEGATIVE: 1
HEMATOLOGIC/LYMPHATIC NEGATIVE: 1
GASTROINTESTINAL NEGATIVE: 1
DIZZINESS: 1
PSYCHIATRIC NEGATIVE: 1
ALLERGIC/IMMUNOLOGIC NEGATIVE: 1

## 2018-10-13 NOTE — ED AVS SNAPSHOT
[facility] Emergency Department    [address] [address]    Phone:  [phone]    Fax:  [phone]                                       Melquiades Morales   MRN: [id number]    Department:  [facility] Emergency Department   Date of Visit:  10/13/2018           Patient Information     Date Of Birth          1955        Your diagnoses for this visit were:     Cough     SOB (shortness of breath) Likely multifactorial with underlying history of COPD with persistent nicotine dependence    Chronic pain syndrome     Uncomplicated opioid dependence (H)        You were seen by Umberto Knapp MD.      Follow-up Information     Follow up with [facility] Emergency Department.    Specialty:  EMERGENCY MEDICINE    Why:  The exact cause of your cough and symptoms is not clear.  It may be related to underlying history of COPD, ongoing smoking.  You have requested antibiotics given your history of aspiration pneumonia and hospitalization    Contact information:    [address] [place] [id number] [phone]    Additional information:    The medical center is located at   [address] [place] [address] (between [facility] and   [address], four miles north   [facility]).        Follow up with [facility] Emergency Department In 3 days.    Specialty:  EMERGENCY MEDICINE    Why:  It is always helpful and beneficial to reduce the amount of smoking or to quit if possible.  Follow-up with your doctor about your symptoms if not better in 3 days    Contact information:    [address] [place] [phone]    Additional information:    The medical center is located at   [address] [place] [address] (between [facility] and   [address], four miles north   [facility]).        Follow up with [facility] Emergency Department.    Specialty:  EMERGENCY MEDICINE    Why:  Medication refills including trazodone can be managed by her primary care  provider.  Please be sure to  your prescription from [facility]    Contact information:    [address] [facility] [address] [phone]    Additional information:    The medical center is located at   [address] [place] [address] (between [address]   [address] in [place], four miles north   of [place]).      Discharge References/Attachments     CHRONIC OBSTRUCTIVE PULMONARY DISEASE (COPD), DISCHARGE INSTRUCTIONS (ENGLISH)    LEVOFLOXACIN ORAL TABLET (ENGLISH)      Your next 10 appointments already scheduled     Oct 26, 2018  9:00 AM CDT   Return Visit with Kleber [facility]ack [facility])    [address] [place] [address]   [phone]            Nov 01, 2018   Procedure with Donte Ahuja MD   [facility] ([facility])    [address] [place] [address]   [phone]           The medical center is located at [address] [place] [address] (between [address] in [place], four miles north [facility]).            Nov 09, 2018  9:00 AM CST   Return Visit with Kleber Pollack [facility] ([facility])    [address] [place] [address]   [phone]            Nov 16, 2018  9:00 AM CST   Return Visit with Kleber Pollack [facility])    [address] [place] [address]   [phone]            Dec 07, 2018  9:00 AM CST   Return Visit with Kleber Pollack [facility])    [address] [place] [address]   [phone]              24 Hour Appointment Hotline       To make an appointment at any [place] clinic, call [phone] ([phone]). If you don't have a family doctor or clinic, we will help you find one. [facility] are  conveniently located to serve the needs of you and your family.             Review of your medicines      START taking        Dose / Directions Last dose taken    levofloxacin 750 MG tablet   Commonly known as:  LEVAQUIN   Dose:  750 mg   Quantity:  5 tablet        Take 1 tablet (750 mg) by mouth daily   Refills:  0          Our records show that you are taking the medicines listed below. If these are incorrect, please call your family doctor or clinic.        Dose / Directions Last dose taken    albuterol 108 (90 Base) MCG/ACT inhaler   Commonly known as:  PROAIR HFA   Dose:  2 puff   Quantity:  1 Inhaler        Inhale 2 puffs into the lungs every 4 hours as needed for shortness of breath / dyspnea or wheezing   Refills:  11        DULoxetine 60 MG EC capsule   Commonly known as:  CYMBALTA   Quantity:  60 capsule        TAKE 2 CAPSULES BY MOUTH DAILY   Refills:  3        LYRICA PO   Dose:  150 mg        Take 150 mg by mouth 2 times daily   Refills:  0        Melatonin 10 MG Caps   Dose:  1 capsule        Take 1 capsule by mouth At Bedtime   Refills:  0        MS CONTIN PO   Dose:  30 mg        Take 30 mg by mouth 3 times daily 15 mg X 2 tabs.  AM, 1200, bedtime   Refills:  0        naproxen 500 MG tablet   Commonly known as:  NAPROSYN   Dose:  500 mg   Quantity:  60 tablet        Take 1 tablet (500 mg) by mouth at onset of headache   Refills:  3        order for DME   Quantity:  1 Device        Semi electric hospital bed with side rails and mattress. Length of bed is for lifetime   Refills:  0        * order for DME   Quantity:  1 Units        Equipment being ordered: Wheelchair. Electric, including adjustable back rest sitting to resting, leg elevation adjustment, approved for outdoor use   Refills:  0        * order for DME   Quantity:  1 each        Equipment being ordered: Hospital Bed and mattress.   Refills:  0        * order for DME   Quantity:  1 each        Equipment being ordered: Lift Chair   Refills:  0         oxyCODONE IR 10 MG tablet   Commonly known as:  ROXICODONE   Dose:  10 mg        Take 10 mg by mouth Take 1 tablet every 4-6 hours. Max 2 tablets daily   Refills:  0        simvastatin 80 MG tablet   Commonly known as:  ZOCOR   Dose:  80 mg   Quantity:  90 tablet        Take 1 tablet (80 mg) by mouth every morning   Refills:  3        * traZODone 100 MG tablet   Commonly known as:  DESYREL   Dose:  200 mg   Quantity:  180 tablet        Take 2 tablets (200 mg) by mouth nightly as needed for sleep Take along with the 25 mg tablet for total dose of 225 mg   Refills:  3        * traZODone 50 MG tablet   Commonly known as:  DESYREL   Dose:  25 mg   Quantity:  90 tablet        Take 0.5 tablets (25 mg) by mouth nightly as needed for sleep Take along with the 100 mg tablets for total dose of 225 mg   Refills:  3        * Notice:  This list has 5 medication(s) that are the same as other medications prescribed for you. Read the directions carefully, and ask your doctor or other care provider to review them with you.            Prescriptions were sent or printed at these locations (1 Prescription)                   [facility] [place], [place] - [address]   [address] [facility] [address]    Telephone:  [phone]   Fax:  [phone]   Hours:                  E-Prescribed (1 of 1)         levofloxacin (LEVAQUIN) 750 MG tablet                Procedures and tests performed during your visit     Procedure/Test Number of Times Performed    Basic metabolic panel 1    Blood culture 2    Blood gas venous 1    CBC with platelets differential 1    Chest CT w/o contrast 1    Lactic acid whole blood 1    Sputum Culture Aerobic Bacterial 1      Orders Needing Specimen Collection     None      Pending Results     Date and Time Order Name Status Description    10/13/2018 1242 Sputum Culture Aerobic Bacterial In process     10/13/2018 1241 Blood culture In process     10/13/2018 1241 Blood culture In  process             Pending Culture Results     Date and Time Order Name Status Description    10/13/2018 1242 Sputum Culture Aerobic Bacterial In process     10/13/2018 1241 Blood culture In process     10/13/2018 1241 Blood culture In process             Pending Results Instructions     If you had any lab results that were not finalized at the time of your Discharge, you can call the ED Lab Result RN at [phone]. You will be contacted by this team for any positive Lab results or changes in treatment. The nurses are available 7 days a week from 10A to 6:30P.  You can leave a message 24 hours per day and they will return your call.        Test Results From Your Hospital Stay        10/13/2018  1:15 PM      Component Results     Component Value Ref Range & Units Status    WBC 4.9 4.0 - 11.0 10e9/L Final    RBC Count 5.10 4.4 - 5.9 10e12/L Final    Hemoglobin 16.6 13.3 - 17.7 g/dL Final    Hematocrit 49.9 40.0 - 53.0 % Final    MCV 98 78 - 100 fl Final    MCH 32.5 26.5 - 33.0 pg Final    MCHC 33.3 31.5 - 36.5 g/dL Final    RDW 14.7 10.0 - 15.0 % Final    Platelet Count 206 150 - 450 10e9/L Final    Diff Method Automated Method  Final    % Neutrophils 50.1 % Final    % Lymphocytes 39.6 % Final    % Monocytes 7.0 % Final    % Eosinophils 2.9 % Final    % Basophils 0.4 % Final    % Immature Granulocytes 0.0 % Final    Nucleated RBCs 0 0 /100 Final    Absolute Neutrophil 2.4 1.6 - 8.3 10e9/L Final    Absolute Lymphocytes 1.9 0.8 - 5.3 10e9/L Final    Absolute Monocytes 0.3 0.0 - 1.3 10e9/L Final    Absolute Eosinophils 0.1 0.0 - 0.7 10e9/L Final    Absolute Basophils 0.0 0.0 - 0.2 10e9/L Final    Abs Immature Granulocytes 0.0 0 - 0.4 10e9/L Final    Absolute Nucleated RBC 0.0  Final         10/13/2018  1:29 PM      Component Results     Component Value Ref Range & Units Status    Sodium 142 133 - 144 mmol/L Final    Potassium 3.7 3.4 - 5.3 mmol/L Final    Chloride 106 94 - 109 mmol/L Final    Carbon Dioxide 31 20 - 32  mmol/L Final    Anion Gap 5 3 - 14 mmol/L Final    Glucose 148 (H) 70 - 99 mg/dL Final    Urea Nitrogen 5 (L) 7 - 30 mg/dL Final    Creatinine 0.66 0.66 - 1.25 mg/dL Final    GFR Estimate >90 >60 mL/min/1.7m2 Final    Non  GFR Calc    GFR Estimate If Black >90 >60 mL/min/1.7m2 Final    African American GFR Calc    Calcium 8.3 (L) 8.5 - 10.1 mg/dL Final         10/13/2018  1:14 PM      Component Results     Component Value Ref Range & Units Status    Lactic Acid 1.9 0.7 - 2.0 mmol/L Final         10/13/2018  1:14 PM      Component Results     Component Value Ref Range & Units Status    Ph Venous 7.38 7.32 - 7.43 pH Final    PCO2 Venous 55 (H) 40 - 50 mm Hg Final    PO2 Venous 38 25 - 47 mm Hg Final    Bicarbonate Venous 32 (H) 21 - 28 mmol/L Final    Base Excess Venous 5.1 mmol/L Final    Abnormal Result, Ref range: -7.7 to 1.9    FIO2 ROOM AIR  Final         10/13/2018  3:46 PM      Narrative     CT CHEST WITHOUT CONTRAST 10/13/2018 2:16 PM     HISTORY: History of chronic pain, chronic opiate use and dependence,  COPD, recent hospitalization for aspiration pneumonia in the right  lower lobe. Persistent cough and fever evaluate for empyema versus  other acute process.     COMPARISON: December 8, 2016    TECHNIQUE: Volumetric helical acquisition of CT images of the chest  from the clavicles to the kidneys were acquired without IV contrast.  Radiation dose for this scan was reduced using automated exposure  control, adjustment of the mA and/or kV according to patient size, or  iterative reconstruction technique.    FINDINGS:  Stable minimal fibrosis on the left. No acute infiltrates.  Stable minimal fibrosis posteriorly on the right. Stable small  mediastinal lymph nodes. Subcutaneous nodule minimally increased in  size on the right compared to previous. No pneumothorax. No displaced  rib fracture. No pleural or pericardial effusion. There are minimal  atherosclerotic changes of the visualized aorta and  its branches.  There is no evidence of aortic aneurysm. There are minimal coronary  vascular calcifications consistent with coronary artery disease. No  acute findings in the visualized upper abdomen.        Impression     IMPRESSION: No acute intrathoracic process demonstrated.    ADALBERTO PARRA MD         10/13/2018  1:11 PM         10/13/2018  1:34 PM         10/13/2018  1:10 PM                Thank you for choosing [facility]       Thank you for choosing [facility] for your care. Our goal is always to provide you with excellent care. Hearing back from our patients is one way we can continue to improve our services. Please take a few minutes to complete the written survey that you may receive in the mail after you visit with us. Thank you!        Care EveryWhere ID     This is your Care EveryWhere ID. This could be used by other organizations to access your [facility] medical records  [id number]        Equal Access to Services     AQUILES WALSH : Shiva Naqvi, winnie xie, marie parker, river [place]ning[place]. So [facility] [phone].    ATENCIÓN: Si habla español, tiene a tucker disposición servicios gratuitos de asistencia lingüística. Llame al [phone].    We comply with applicable federal civil rights laws and [place] laws. We do not discriminate on the basis of race, color, national origin, age, disability, sex, sexual orientation, or gender identity.            After Visit Summary       This is your record. Keep this with you and show to your community pharmacist(s) and doctor(s) at your next visit.
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
## 2018-10-13 NOTE — TELEPHONE ENCOUNTER
"Patient calling stating \"my pneumonia is back\". Patient states he had the chills for two day. Has a cough and \"yellowish phlem\". Denies difficulty of breathing. Has not checked temperature but reporting feeling \"feverish with chills\". Advised patient to be seen within 4 hours. Caller verbalized understanding. Denies further questions.      Leo Dominguez RN  [facility]     Reason for Disposition    [1] Fever > 101 F (38.3 C) AND [2] age > 60    Additional Information    Negative: Severe difficulty breathing (e.g., struggling for each breath, speaks in single words)    Negative: Sounds like a life-threatening emergency to the triager    Negative: Runny nose is caused by pollen or other allergies    Negative: Cough is main symptom    Negative: Severe sore throat    Negative: Fever > 104 F (40 C)    Negative: [1] Difficulty breathing AND [2] not severe AND [3] not from stuffy nose (e.g., not relieved by cleaning out the nose)    Negative: Patient sounds very sick or weak to the triager    Protocols used: COMMON COLD-ADULT-AH      "

## 2018-10-13 NOTE — ED AVS SNAPSHOT
[facility] Emergency Department    [address] [facility] [address]    Phone:  [phone]    Fax:  [phone]                                       Melquiades Morales   MRN: [id number]    Department:  [facility] Emergency Department   Date of Visit:  10/13/2018           After Visit Summary Signature Page     I have received my discharge instructions, and my questions have been answered. I have discussed any challenges I see with this plan with the nurse or doctor.    ..........................................................................................................................................  Patient/Patient Representative Signature      ..........................................................................................................................................  Patient Representative Print Name and Relationship to Patient    ..................................................               ................................................  Date                                   Time    ..........................................................................................................................................  Reviewed by Signature/Title    ...................................................              ..............................................  Date                                               Time          22EPIC Rev 08/18

## 2018-10-13 NOTE — ED NOTES
Pt states was diagnosed with pneumonia 10-29 and was improving however now worsening cough with fever on/off . Pt has expiratory wheezes. sats low 90's

## 2018-10-15 ENCOUNTER — TELEPHONE (OUTPATIENT)
Dept: FAMILY MEDICINE | Facility: CLINIC | Age: 63
End: 2018-10-15

## 2018-10-15 LAB
BACTERIA SPEC CULT: ABNORMAL
SPECIMEN SOURCE: ABNORMAL

## 2018-10-15 NOTE — TELEPHONE ENCOUNTER
Reason for call:  Patient reporting a symptom    Symptom or request: Pt's spouse Milly calling.  She states that pt was seen in ER over weekend for a URI and he is needing Dr. Travis to prescribe a nebulizer machine for pt.  She is asking that the Rx be sent to [facility], but I do not think that they carry neb machines?  Please call Milly and advise.      Duration (how long have symptoms been present): ongoing    Have you been treated for this before? Yes    Additional comments:     Phone Number patient can be reached at:  Cell number on file:    Telephone Information:    [phone]     Best Time:  any    Can we leave a detailed message on this number:  YES    Call taken on 10/15/2018 at 8:56 AM by Emily Padgett

## 2018-10-15 NOTE — TELEPHONE ENCOUNTER
Pt was seen in the ER on 10/13/18 and was given Levaquin for cough.  Pt has COPD.  Pts wife calling for pt, he doesn't have a phone, requesting a Neb machine and medication.  Explained pt needs to be seen for this to be ordered.  She will try to reach him, they don't live together, to schedule an appt with  to evaluate.  Juan

## 2018-10-16 ENCOUNTER — OFFICE VISIT (OUTPATIENT)
Dept: FAMILY MEDICINE | Facility: CLINIC | Age: 63
End: 2018-10-16
Payer: MEDICARE

## 2018-10-16 VITALS
WEIGHT: 244 LBS | HEIGHT: 72 IN | RESPIRATION RATE: 18 BRPM | DIASTOLIC BLOOD PRESSURE: 80 MMHG | TEMPERATURE: 98.7 F | SYSTOLIC BLOOD PRESSURE: 130 MMHG | HEART RATE: 100 BPM | OXYGEN SATURATION: 95 % | BODY MASS INDEX: 33.05 KG/M2

## 2018-10-16 DIAGNOSIS — B37.1: Primary | ICD-10-CM

## 2018-10-16 PROCEDURE — 99213 OFFICE O/P EST LOW 20 MIN: CPT | Performed by: FAMILY MEDICINE

## 2018-10-16 NOTE — PROGRESS NOTES
"  SUBJECTIVE:   Melquiades Morales is a 63 year old male who presents to clinic today for the following health issues:      ENT Symptoms             Symptoms: cc Present Absent Comment   Fever/Chills  x     Fatigue x x     Muscle Aches  x     Eye Irritation   xx    Sneezing  x     Nasal Noe/Drg   x    Sinus Pressure/Pain   x    Loss of smell   x    Dental pain   x    Sore Throat   x    Swollen Glands   x    Ear Pain/Fullness   x    Cough x x     Wheeze x x     Chest Pain x x     Shortness of breath x x     Rash       Other         Symptom duration:  10/8/18   Symptom severity:  cough and SOB    Treatments tried:  Antibiotic    Contacts:  smoker              Problem list and histories reviewed & adjusted, as indicated.  Additional history:         Reviewed and updated as needed this visit by clinical staff  Tobacco  Allergies  Meds       Reviewed and updated as needed this visit by Provider      Further history obtained, clarified or corrected by physician:  He has had antibiotic treatment but then went back to the emergency room for continued symptoms.  His symptoms are shortness of breath with exertion and mildly productive cough but no fever.  In the ER they did blood cultures and sputum cultures.  The blood cultures are negative but the sputum cultures grew Candida glabrata and Candida tropicalis.    OBJECTIVE:  /80  Pulse 100  Temp 98.7  F (37.1  C)  Resp 18  Ht 5' 11.5\" (1.816 m)  Wt 244 lb (110.7 kg)  SpO2 95%  BMI 33.56 kg/m2  LUNGS: clear to auscultation, normal breath sounds  CV: RRR without murmur  ABD: BS+, soft, nontender, no masses, no hepatosplenomegaly  EXTREMITIES: without joint tenderness, swelling or erythema.  No muscle tenderness or abnormality.  SKIN: No rashes or abnormalities  NEURO:non focal exam    ASSESSMENT:  Candidal pneumonia (H)    PLAN:  I have given him a referral to [facility] infectious disease, he will call to get set up for an appointment which I " told him he needs to have been fairly soon, likely in the next 2 weeks.    Orders Placed This Encounter     DEPRESSION ACTION PLAN (DAP)     INFECTIOUS DISEASE REFERRAL

## 2018-10-16 NOTE — MR AVS SNAPSHOT
After Visit Summary   10/16/2018    Melquiades Morales    MRN: [id number]           Patient Information     Date Of Birth          1955        Visit Information        Provider Department      10/16/2018 11:20 AM Jignesh Travis MD [facility]        Today's Diagnoses     Candidal pneumonia (H)    -  1      Care Instructions          Thank you for choosing [facility].  You may be receiving a survey in the mail from [facility] regarding your visit today.  Please take a few minutes to complete and return the survey to let us know how we are doing.      Our Clinic hours are:  Mondays    7:20 am - 7 pm  Tues -  Fri  7:20 am - 5 pm    Clinic Phone: [phone]    The clinic lab opens at 7:30 am Mon - Fri and appointments are required.    [facility]. [phone]  Monday  8 am - 7pm  Tues - Fri 8 am - 5:30 pm                 Follow-ups after your visit        Additional Services     INFECTIOUS DISEASE REFERRAL       Your provider [facility] you [facility]  [phone]    Please be aware that coverage of these services is subject to the terms and limitations of your health insurance plan.  Call member services at your health plan with any benefit or coverage questions.      Please bring the following with you to your appointment:    (1) Any X-Rays, CTs or MRIs which have been performed.  Contact the facility where they were done to arrange for  prior to your scheduled appointment.    (2) List of current medications   (3) This referral request   (4) Any documents/labs given to you for this referral                  Your next 10 appointments already scheduled     Oct 26, 2018  9:00 AM CDT   Return Visit with SHAVONNE Gallego   [facility] ([facility])    [address]  [place] [address]   [phone]            Nov 01, 2018   Procedure with  Donte Ahuja MD   [facility] ([facility])    [address] [address]   [phone]           The medical center is located at [address] (between [address] in [place], [place] north of [place]).            Nov 09, 2018  9:00 AM CST   Return Visit with Kleber Pollack [facility] ([facility])    [address] [address]   [phone]            Nov 16, 2018  9:00 AM CST   Return Visit with Kleber Pollack [facility] ([facility])    [address] [address]   [phone]            Dec 07, 2018  9:00 AM CST   Return Visit with Kleber Pollack, [facility] ([facility])    [address] [id number] [phone]              Who to contact     If you have questions or need follow up information about today's clinic visit or your schedule please contact [facility] directly at [phone].  Normal or non-critical lab and imaging results will be communicated to you by MyChart, letter or phone within 4 business days after the clinic has received the results. If you do not hear from us within 7 days, please contact the clinic through MyChart or phone. If you have a critical or abnormal lab result, we will notify you by phone as soon as possible.  Submit refill requests through [employer] or call your pharmacy and they will forward the refill request to us. Please allow 3 business days for your refill to be completed.          Additional Information About Your Visit        Care EveryWhere ID     This is your Care EveryWhere ID. This could be used by other organizations to access your [place] medical records  [id number]        Your Vitals Were     Pulse Temperature Respirations Height Pulse Oximetry  "BMI (Body Mass Index)    100 98.7  F (37.1  C) 18 5' 11.5\" (1.816 m) 95% 33.56 kg/m2       Blood Pressure from Last 3 Encounters:   10/16/18 130/80   10/13/18 146/81   09/17/18 140/90    Weight from Last 3 Encounters:   10/16/18 244 lb (110.7 kg)   10/13/18 244 lb (110.7 kg)   09/17/18 243 lb (110.2 kg)              We Performed the Following     DEPRESSION ACTION PLAN (DAP)     INFECTIOUS DISEASE REFERRAL          Today's Medication Changes          These changes are accurate as of 10/16/18 11:36 AM.  If you have any questions, ask your nurse or doctor.               These medicines have changed or have updated prescriptions.        Dose/Directions    simvastatin 80 MG tablet   Commonly known as:  ZOCOR   This may have changed:  when to take this   Used for:  Hyperlipidemia LDL goal <130        Dose:  80 mg   Take 1 tablet (80 mg) by mouth every morning   Quantity:  90 tablet   Refills:  3       * traZODone 100 MG tablet   Commonly known as:  DESYREL   This may have changed:  Another medication with the same name was changed. Make sure you understand how and when to take each.   Used for:  Depression, unspecified depression type        Dose:  200 mg   Take 2 tablets (200 mg) by mouth nightly as needed for sleep Take along with the 25 mg tablet for total dose of 225 mg   Quantity:  180 tablet   Refills:  3       * traZODone 50 MG tablet   Commonly known as:  DESYREL   This may have changed:  additional instructions   Used for:  Depression, unspecified depression type        Dose:  25 mg   Take 0.5 tablets (25 mg) by mouth nightly as needed for sleep Take along with the 100 mg tablets for total dose of 225 mg   Quantity:  90 tablet   Refills:  3       * Notice:  This list has 2 medication(s) that are the same as other medications prescribed for you. Read the directions carefully, and ask your doctor or other care provider to review them with you.             Primary Care Provider Office Phone # Fax #    Jignesh KUMARI " MD [facility] [phone]       [address]  [facility] [address]        Equal Access to Services     AQUILES WALSH : Hadii aad ku hadmargotvalerie Angelinaali, vinodda frederickmicheleha, marie jacekronda parker, river beckmanparmjit gorge. So [facility] [phone].    ATENCIÓN: Si habla español, tiene a tucker disposición servicios gratuitos de asistencia lingüística. Llame al [phone].    We comply with applicable federal civil rights laws and [place] laws. We do not discriminate on the basis of race, color, national origin, age, disability, sex, sexual orientation, or gender identity.            Thank you!     Thank you for choosing [facility] [place]  for your care. Our goal is always to provide you with excellent care. Hearing back from our patients is one way we can continue to improve our services. Please take a few minutes to complete the written survey that you may receive in the mail after your visit with us. Thank you!             Your Updated Medication List - Protect others around you: Learn how to safely use, store and throw away your medicines at www.disposemymeds.org.          This list is accurate as of 10/16/18 11:36 AM.  Always use your most recent med list.                   Brand Name Dispense Instructions for use Diagnosis    albuterol 108 (90 Base) MCG/ACT inhaler    PROAIR HFA    1 Inhaler    Inhale 2 puffs into the lungs every 4 hours as needed for shortness of breath / dyspnea or wheezing    Bronchitis       DULoxetine 60 MG EC capsule    CYMBALTA    60 capsule    TAKE 2 CAPSULES BY MOUTH DAILY    Depression, unspecified depression type       levofloxacin 750 MG tablet    LEVAQUIN    5 tablet    Take 1 tablet (750 mg) by mouth daily        LYRICA PO      Take 150 mg by mouth 2 times daily        Melatonin 10 MG Caps      Take 1 capsule by mouth At Bedtime        MS CONTIN PO      Take 30 mg by mouth 3 times daily 15 mg X 2 tabs.  AM, 1200, bedtime        naproxen 500 MG  tablet    NAPROSYN    60 tablet    Take 1 tablet (500 mg) by mouth at onset of headache    Chronic pain syndrome       order for DME     1 Device    Semi electric hospital bed with side rails and mattress. Length of bed is for lifetime    Other unspecified back disorder, Obese, Lumbosacral radiculitis, COPD (chronic obstructive pulmonary disease) (H)       * order for DME     1 Units    Equipment being ordered: Wheelchair. Electric, including adjustable back rest sitting to resting, leg elevation adjustment, approved for outdoor use    Chronic pain syndrome, Lumbosacral radiculitis       * order for DME     1 each    Equipment being ordered: Hospital Bed and mattress.    Chronic pain syndrome, Lumbosacral radiculitis, Chronic obstructive pulmonary disease, unspecified COPD type (H), Obese, Lumbar radiculopathy, Encephalopathy, Spinal stenosis in cervical region       * order for DME     1 each    Equipment being ordered: Lift Chair    Chronic pain syndrome, Lumbosacral radiculitis, Chronic obstructive pulmonary disease, unspecified COPD type (H), Obese, Lumbar radiculopathy, Encephalopathy, Spinal stenosis in cervical region, Unable to ambulate       oxyCODONE IR 10 MG tablet    ROXICODONE     Take 10 mg by mouth Take 1 tablet every 4-6 hours. Max 2 tablets daily        simvastatin 80 MG tablet    ZOCOR    90 tablet    Take 1 tablet (80 mg) by mouth every morning    Hyperlipidemia LDL goal <130       * traZODone 100 MG tablet    DESYREL    180 tablet    Take 2 tablets (200 mg) by mouth nightly as needed for sleep Take along with the 25 mg tablet for total dose of 225 mg    Depression, unspecified depression type       * traZODone 50 MG tablet    DESYREL    90 tablet    Take 0.5 tablets (25 mg) by mouth nightly as needed for sleep Take along with the 100 mg tablets for total dose of 225 mg    Depression, unspecified depression type       * Notice:  This list has 5 medication(s) that are the same as other medications  prescribed for you. Read the directions carefully, and ask your doctor or other care provider to review them with you.

## 2018-10-16 NOTE — LETTER
My Depression Action Plan  Name: Melquiades Morales   Date of Birth 1955  Date: 10/16/2018    My doctor: Jignesh Travis   My clinic: [facility]  [address]  [facility] [address]  [phone]          GREEN    ZONE   Good Control    What it looks like:     Things are going generally well. You have normal up s and down s. You may even feel depressed from time to time, but bad moods usually last less than a day.   What you need to do:  1. Continue to care for yourself (see self care plan)  2. Check your depression survival kit and update it as needed  3. Follow your physician s recommendations including any medication.  4. Do not stop taking medication unless you consult with your physician first.           YELLOW         ZONE Getting Worse    What it looks like:     Depression is starting to interfere with your life.     It may be hard to get out of bed; you may be starting to isolate yourself from others.    Symptoms of depression are starting to last most all day and this has happened for several days.     You may have suicidal thoughts but they are not constant.   What you need to do:     1. Call your care team, your response to treatment will improve if you keep your care team informed of your progress. Yellow periods are signs an adjustment may need to be made.     2. Continue your self-care, even if you have to fake it!    3. Talk to someone in your support network    4. Open up your depression survival kit           RED    ZONE Medical Alert - Get Help    What it looks like:     Depression is seriously interfering with your life.     You may experience these or other symptoms: You can t get out of bed most days, can t work or engage in other necessary activities, you have trouble taking care of basic hygiene, or basic responsibilities, thoughts of suicide or death that will not go away, self-injurious behavior.     What you need to do:  1. Call your care team  and request a same-day appointment. If they are not available (weekends or after hours) call your local crisis line, emergency room or 911.            Depression Self Care Plan / Survival Kit    Self-Care for Depression  Here s the deal. Your body and mind are really not as separate as most people think.  What you do and think affects how you feel and how you feel influences what you do and think. This means if you do things that people who feel good do, it will help you feel better.  Sometimes this is all it takes.  There is also a place for medication and therapy depending on how severe your depression is, so be sure to consult with your medical provider and/ or Behavioral Health Consultant if your symptoms are worsening or not improving.     In order to better manage my stress, I will:    Exercise  Get some form of exercise, every day. This will help reduce pain and release endorphins, the  feel good  chemicals in your brain. This is almost as good as taking antidepressants!  This is not the same as joining a gym and then never going! (they count on that by the way ) It can be as simple as just going for a walk or doing some gardening, anything that will get you moving.      Hygiene   Maintain good hygiene (Get out of bed in the morning, Make your bed, Brush your teeth, Take a shower, and Get dressed like you were going to work, even if you are unemployed).  If your clothes don't fit try to get ones that do.    Diet  I will strive to eat foods that are good for me, drink plenty of water, and avoid excessive sugar, caffeine, alcohol, and other mood-altering substances.  Some foods that are helpful in depression are: complex carbohydrates, B vitamins, flaxseed, fish or fish oil, fresh fruits and vegetables.    Psychotherapy  I agree to participate in Individual Therapy (if recommended).    Medication  If prescribed medications, I agree to take them.  Missing doses can result in serious side effects.  I understand  that drinking alcohol, or other illicit drug use, may cause potential side effects.  I will not stop my medication abruptly without first discussing it with my provider.    Staying Connected With Others  I will stay in touch with my friends, family members, and my primary care provider/team.    Use your imagination  Be creative.  We all have a creative side; it doesn t matter if it s oil painting, sand castles, or mud pies! This will also kick up the endorphins.    Witness Beauty  (AKA stop and smell the roses) Take a look outside, even in mid-winter. Notice colors, textures. Watch the squirrels and birds.     Service to others  Be of service to others.  There is always someone else in need.  By helping others we can  get out of ourselves  and remember the really important things.  This also provides opportunities for practicing all the other parts of the program.    Humor  Laugh and be silly!  Adjust your TV habits for less news and crime-drama and more comedy.    Control your stress  Try breathing deep, massage therapy, biofeedback, and meditation. Find time to relax each day.     My support system    Clinic Contact:  Phone number:    Contact 1:  Phone number:    Contact 2:  Phone number:    Orthodox/:  Phone number:    Therapist:  Phone number:    Local crisis center:    Phone number:    Other community support:  Phone number:

## 2018-10-16 NOTE — PATIENT INSTRUCTIONS
Thank you for choosing [facility].  You may be receiving a survey in the mail from [facility] regarding your visit today.  Please take a few minutes to complete and return the survey to let us know how we are doing.      Our Clinic hours are:  Mondays    7:20 am - 7 pm  Tues - Fri  7:20 am - 5 pm    Clinic Phone: [phone]    The clinic lab opens at 7:30 am Mon - Fri and appointments are required.    [facility]. [phone]  Monday  8 am - 7pm  Tues - Fri 8 am - 5:30 pm

## 2018-10-19 LAB
BACTERIA SPEC CULT: NO GROWTH
BACTERIA SPEC CULT: NO GROWTH
Lab: NORMAL
Lab: NORMAL
SPECIMEN SOURCE: NORMAL
SPECIMEN SOURCE: NORMAL

## 2018-10-24 ENCOUNTER — TELEPHONE (OUTPATIENT)
Dept: PEDIATRICS | Facility: CLINIC | Age: 63
End: 2018-10-24

## 2018-10-24 ENCOUNTER — HOSPITAL ENCOUNTER (EMERGENCY)
Facility: CLINIC | Age: 63
Discharge: HOME | End: 2018-10-24
Attending: STUDENT IN AN ORGANIZED HEALTH CARE EDUCATION/TRAINING PROGRAM | Admitting: STUDENT IN AN ORGANIZED HEALTH CARE EDUCATION/TRAINING PROGRAM
Payer: MEDICARE

## 2018-10-24 VITALS
DIASTOLIC BLOOD PRESSURE: 94 MMHG | TEMPERATURE: 98.7 F | RESPIRATION RATE: 20 BRPM | HEART RATE: 85 BPM | OXYGEN SATURATION: 92 % | SYSTOLIC BLOOD PRESSURE: 133 MMHG

## 2018-10-24 DIAGNOSIS — R19.7 DIARRHEA, UNSPECIFIED TYPE: ICD-10-CM

## 2018-10-24 LAB
ALBUMIN SERPL-MCNC: 3.5 G/DL (ref 3.4–5)
ALP SERPL-CCNC: 62 U/L (ref 40–150)
ALT SERPL W P-5'-P-CCNC: 24 U/L (ref 0–70)
ANION GAP SERPL CALCULATED.3IONS-SCNC: 7 MMOL/L (ref 3–14)
AST SERPL W P-5'-P-CCNC: 20 U/L (ref 0–45)
BASOPHILS # BLD AUTO: 0 10E9/L (ref 0–0.2)
BASOPHILS NFR BLD AUTO: 0.3 %
BILIRUB SERPL-MCNC: 0.8 MG/DL (ref 0.2–1.3)
BUN SERPL-MCNC: 6 MG/DL (ref 7–30)
CALCIUM SERPL-MCNC: 8.7 MG/DL (ref 8.5–10.1)
CHLORIDE SERPL-SCNC: 110 MMOL/L (ref 94–109)
CO2 SERPL-SCNC: 29 MMOL/L (ref 20–32)
CREAT SERPL-MCNC: 0.68 MG/DL (ref 0.66–1.25)
DIFFERENTIAL METHOD BLD: ABNORMAL
EOSINOPHIL # BLD AUTO: 0.1 10E9/L (ref 0–0.7)
EOSINOPHIL NFR BLD AUTO: 0.7 %
ERYTHROCYTE [DISTWIDTH] IN BLOOD BY AUTOMATED COUNT: 14.5 % (ref 10–15)
GFR SERPL CREATININE-BSD FRML MDRD: >90 ML/MIN/1.7M2
GLUCOSE SERPL-MCNC: 107 MG/DL (ref 70–99)
HCT VFR BLD AUTO: 46.9 % (ref 40–53)
HGB BLD-MCNC: 15.9 G/DL (ref 13.3–17.7)
IMM GRANULOCYTES # BLD: 0.1 10E9/L (ref 0–0.4)
IMM GRANULOCYTES NFR BLD: 0.3 %
LIPASE SERPL-CCNC: 392 U/L (ref 73–393)
LYMPHOCYTES # BLD AUTO: 3.6 10E9/L (ref 0.8–5.3)
LYMPHOCYTES NFR BLD AUTO: 23.8 %
MCH RBC QN AUTO: 32.8 PG (ref 26.5–33)
MCHC RBC AUTO-ENTMCNC: 33.9 G/DL (ref 31.5–36.5)
MCV RBC AUTO: 97 FL (ref 78–100)
MONOCYTES # BLD AUTO: 1 10E9/L (ref 0–1.3)
MONOCYTES NFR BLD AUTO: 6.6 %
NEUTROPHILS # BLD AUTO: 10.3 10E9/L (ref 1.6–8.3)
NEUTROPHILS NFR BLD AUTO: 68.3 %
NRBC # BLD AUTO: 0 10*3/UL
NRBC BLD AUTO-RTO: 0 /100
PLATELET # BLD AUTO: 248 10E9/L (ref 150–450)
POTASSIUM SERPL-SCNC: 3.7 MMOL/L (ref 3.4–5.3)
PROT SERPL-MCNC: 7.2 G/DL (ref 6.8–8.8)
RBC # BLD AUTO: 4.85 10E12/L (ref 4.4–5.9)
SODIUM SERPL-SCNC: 146 MMOL/L (ref 133–144)
WBC # BLD AUTO: 15 10E9/L (ref 4–11)

## 2018-10-24 PROCEDURE — 83690 ASSAY OF LIPASE: CPT | Performed by: STUDENT IN AN ORGANIZED HEALTH CARE EDUCATION/TRAINING PROGRAM

## 2018-10-24 PROCEDURE — 99283 EMERGENCY DEPT VISIT LOW MDM: CPT | Mod: Z6 | Performed by: STUDENT IN AN ORGANIZED HEALTH CARE EDUCATION/TRAINING PROGRAM

## 2018-10-24 PROCEDURE — 85025 COMPLETE CBC W/AUTO DIFF WBC: CPT | Performed by: STUDENT IN AN ORGANIZED HEALTH CARE EDUCATION/TRAINING PROGRAM

## 2018-10-24 PROCEDURE — 80053 COMPREHEN METABOLIC PANEL: CPT | Performed by: STUDENT IN AN ORGANIZED HEALTH CARE EDUCATION/TRAINING PROGRAM

## 2018-10-24 PROCEDURE — 99283 EMERGENCY DEPT VISIT LOW MDM: CPT | Performed by: STUDENT IN AN ORGANIZED HEALTH CARE EDUCATION/TRAINING PROGRAM

## 2018-10-24 NOTE — DISCHARGE INSTRUCTIONS
Diarrhea with Uncertain Cause (Adult)    Diarrhea is when stools are loose and watery. This can be caused by:    Viral infections    Bacterial infections    Food poisoning    Parasites    Irritable bowel syndrome (IBS)    Inflammatory bowel diseases such as ulcerative colitis, Crohn's disease, and celiac disease    Food intolerance, such as to lactose, the sugar found in milk and milk products    Reaction to medicines like antibiotics, laxatives, cancer drugs, and antacids  Along with diarrhea, you may also have:    Abdominal pain and cramping    Nausea and vomiting    Loss of bowel control    Fever and chills    Bloody stools  In some cases, antibiotics may help to treat diarrhea. You may have a stool sample test. This is done to see what is causing your diarrhea, and if antibiotics will help treat it. The results of a stool sample test may take up to 2 days. The healthcare provider may not give you antibiotics until he or she has the stool test results.  Diarrhea can cause dehydration. This is the loss of too much water and other fluids from the body. When this occurs, body fluid must be replaced. This can be done with oral rehydration solutions. Oral rehydration solutions are available at drugstores and grocery stores without a prescription.  Home care  Follow all instructions given by your healthcare provider. Rest at home for the next 24 hours, or until you feel better. Avoid caffeine, tobacco, and alcohol. These can make diarrhea, cramping, and pain worse.  If taking medicines:    Don t take over-the-counter diarrhea or nausea medicines unless your healthcare provider tells you to.    You may use acetaminophen or NSAID medicines like ibuprofen or naproxen to reduce pain and fever. Don t use these if you have chronic liver or kidney disease, or ever had a stomach ulcer or gastrointestinal bleeding. Don't use NSAID medicines if you are already taking one for another condition (like arthritis) or are on daily  aspirin therapy (such as for heart disease or after a stroke). Talk with your healthcare provider first.    If antibiotics were prescribed, be sure you take them until they are finished. Don t stop taking them even when you feel better. Antibiotics must be taken as a full course.  To prevent the spread of illness:    Remember that washing with soap and water and using alcohol-based  is the best way to prevent the spread of infection.    Clean the toilet after each use.    Wash your hands before eating.    Wash your hands before and after preparing food. Keep in mind that people with diarrhea or vomiting should not prepare food for others.    Wash your hands after using cutting boards, countertops, and knives that have been in contact with raw foods.    Wash and then peel fruits and vegetables.    Keep uncooked meats away from cooked and ready-to-eat foods.    Use a food thermometer when cooking. Cook poultry to at least 165 F (74 C). Cook ground meat (beef, veal, pork, lamb) to at least 160 F (71 C). Cook fresh beef, veal, lamb, and pork to at least 145 F (63 C).    Don t eat raw or undercooked eggs (poached or karla side up), poultry, meat, or unpasteurized milk and juices.  Food and drinks  The main goal while treating vomiting or diarrhea is to prevent dehydration. This is done by taking small amounts of liquids often.    Keep in mind that liquids are more important than food right now.    Drink only small amounts of liquids at a time.    Don t force yourself to eat, especially if you are having cramping, vomiting, or diarrhea. Don t eat large amounts at a time, even if you are hungry.    If you eat, avoid fatty, greasy, spicy, or fried foods.    Don t eat dairy foods or drink milk if you have diarrhea. These can make diarrhea worse.  During the first 24 hours you can try:    Oral rehydration solutions. Do not use sports drinks. They have too much sugar and not enough electrolytes.    Soft drinks without  caffeine    Ginger ale    Water (plain or flavored)    Decaf tea or coffee    Clear broth, consommé, or bouillon    Gelatin, popsicles, or frozen fruit juice bars  The second 24 hours, if you are feeling better, you can add:    Hot cereal, plain toast, bread, rolls, or crackers    Plain noodles, rice, mashed potatoes, chicken noodle soup, or rice soup    Unsweetened canned fruit (no pineapple)    Bananas  As you recover:    Limit fat intake to less than 15 grams per day. Don t eat margarine, butter, oils, mayonnaise, sauces, gravies, fried foods, peanut butter, meat, poultry, or fish.    Limit fiber. Don t eat raw or cooked vegetables, fresh fruits except bananas, or bran cereals.    Limit caffeine and chocolate.    Limit dairy.    Don t use spices or seasonings except salt.    Go back to your normal diet over time, as you feel better and your symptoms improve.    If the symptoms come back, go back to a simple diet or clear liquids.  Follow-up care  Follow up with your healthcare provider, or as advised. If a stool sample was taken or cultures were done, call the healthcare provider for the results as instructed.  Call 911  Call 911 if you have any of these symptoms:    Trouble breathing    Confusion    Extreme drowsiness or trouble walking    Loss of consciousness    Rapid heart rate    Chest pain    Stiff neck    Seizure  When to seek medical advice  Call your healthcare provider right away if any of these occur:    Abdominal pain that gets worse    Constant lower right abdominal pain    Continued vomiting and inability to keep liquids down    Diarrhea more than 5 times a day    Blood in vomit or stool    Dark urine or no urine for 8 hours, dry mouth and tongue, tiredness, weakness, or dizziness    Drowsiness    New rash    You don t get better in 2 to 3 days    Fever of 100.4 F (38 C) or higher, or as directed by your healthcare provider  Date Last Reviewed: 1/3/2016    9349-6249 [employer] [address]  [place] [address]. All rights reserved. This information is not intended as a substitute for professional medical care. Always follow your healthcare professional's instructions.

## 2018-10-24 NOTE — TELEPHONE ENCOUNTER
"S-(situation): severe diarrhea, fever, dizziness, lightheaded, feels faint, abdominal pain    B-(background): ongoing diarrhea over the past month    A-(assessment): patient called and reporting severe diarrhea over the past month, sounds very ill on the phone, says no blood in the stool, says it would clear up and then start all over again and when asked how often the patient did not clarify, says is dizzy, lightheaded, feels like fainting, experiencing \"light\" abdominal pain and some swelling, says has fever, chills. Sounds very weak on the phone.    R-(recommendations): Advised to be seen in the ER now, patient says can not get someone to drive him till 3pm but has a emergency button to call an ambulance, patient says he will get dressed and have an ambulance come to take him in to ER for an evaluation now.    AUNDREA Schaeffer          "
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
## 2018-10-24 NOTE — ED NOTES
Discharge instructions reviewed with pt states understanding. Sent with stool collection kit. Pt's son coming to get pt. Pt wishes to wait in ED room for ride.

## 2018-10-24 NOTE — TELEPHONE ENCOUNTER
Patient called stating he has had diarrhea 3 days, fever-did not know-, chills and slight abdominal, [facility]ERTO  Station

## 2018-10-24 NOTE — ED AVS SNAPSHOT
[facility]    [address]    [facility] [address]    Phone:  [phone]    Fax:  [phone]                                       Melquiades Morales   MRN: [id number]    Department:  [facility]   Date of Visit:  10/24/2018           Patient Information     Date Of Birth          1955        Your diagnoses for this visit were:     Diarrhea, unspecified type        You were seen by Valeriano Yoder DO.      Follow-up Information     Follow up with Jignesh Travis MD. Schedule an appointment as soon as possible for a visit in 3 days.    Specialty:  Family Practice    Why:  Followup for reevaluation if symptoms persist.    Contact information:    [address]  [facility] [id number] [phone]          Discharge Instructions         Diarrhea with Uncertain Cause (Adult)    Diarrhea is when stools are loose and watery. This can be caused by:    Viral infections    Bacterial infections    Food poisoning    Parasites    Irritable bowel syndrome (IBS)    Inflammatory bowel diseases such as ulcerative colitis, Crohn's disease, and celiac disease    Food intolerance, such as to lactose, the sugar found in milk and milk products    Reaction to medicines like antibiotics, laxatives, cancer drugs, and antacids  Along with diarrhea, you may also have:    Abdominal pain and cramping    Nausea and vomiting    Loss of bowel control    Fever and chills    Bloody stools  In some cases, antibiotics may help to treat diarrhea. You may have a stool sample test. This is done to see what is causing your diarrhea, and if antibiotics will help treat it. The results of a stool sample test may take up to 2 days. The healthcare provider may not give you antibiotics until he or she has the stool test results.  Diarrhea can cause dehydration. This is the loss of too much water and other fluids from the body. When this occurs, body fluid must be replaced. This can be done  with oral rehydration solutions. Oral rehydration solutions are available at drugstores and grocery stores without a prescription.  Home care  Follow all instructions given by your healthcare provider. Rest at home for the next 24 hours, or until you feel better. Avoid caffeine, tobacco, and alcohol. These can make diarrhea, cramping, and pain worse.  If taking medicines:    Don t take over-the-counter diarrhea or nausea medicines unless your healthcare provider tells you to.    You may use acetaminophen or NSAID medicines like ibuprofen or naproxen to reduce pain and fever. Don t use these if you have chronic liver or kidney disease, or ever had a stomach ulcer or gastrointestinal bleeding. Don't use NSAID medicines if you are already taking one for another condition (like arthritis) or are on daily aspirin therapy (such as for heart disease or after a stroke). Talk with your healthcare provider first.    If antibiotics were prescribed, be sure you take them until they are finished. Don t stop taking them even when you feel better. Antibiotics must be taken as a full course.  To prevent the spread of illness:    Remember that washing with soap and water and using alcohol-based  is the best way to prevent the spread of infection.    Clean the toilet after each use.    Wash your hands before eating.    Wash your hands before and after preparing food. Keep in mind that people with diarrhea or vomiting should not prepare food for others.    Wash your hands after using cutting boards, countertops, and knives that have been in contact with raw foods.    Wash and then peel fruits and vegetables.    Keep uncooked meats away from cooked and ready-to-eat foods.    Use a food thermometer when cooking. Cook poultry to at least 165 F (74 C). Cook ground meat (beef, veal, pork, lamb) to at least 160 F (71 C). Cook fresh beef, veal, lamb, and pork to at least 145 F (63 C).    Don t eat raw or undercooked eggs (poached or  karla side up), poultry, meat, or unpasteurized milk and juices.  Food and drinks  The main goal while treating vomiting or diarrhea is to prevent dehydration. This is done by taking small amounts of liquids often.    Keep in mind that liquids are more important than food right now.    Drink only small amounts of liquids at a time.    Don t force yourself to eat, especially if you are having cramping, vomiting, or diarrhea. Don t eat large amounts at a time, even if you are hungry.    If you eat, avoid fatty, greasy, spicy, or fried foods.    Don t eat dairy foods or drink milk if you have diarrhea. These can make diarrhea worse.  During the first 24 hours you can try:    Oral rehydration solutions. Do not use sports drinks. They have too much sugar and not enough electrolytes.    Soft drinks without caffeine    Ginger ale    Water (plain or flavored)    Decaf tea or coffee    Clear broth, consommé, or bouillon    Gelatin, popsicles, or frozen fruit juice bars  The second 24 hours, if you are feeling better, you can add:    Hot cereal, plain toast, bread, rolls, or crackers    Plain noodles, rice, mashed potatoes, chicken noodle soup, or rice soup    Unsweetened canned fruit (no pineapple)    Bananas  As you recover:    Limit fat intake to less than 15 grams per day. Don t eat margarine, butter, oils, mayonnaise, sauces, gravies, fried foods, peanut butter, meat, poultry, or fish.    Limit fiber. Don t eat raw or cooked vegetables, fresh fruits except bananas, or bran cereals.    Limit caffeine and chocolate.    Limit dairy.    Don t use spices or seasonings except salt.    Go back to your normal diet over time, as you feel better and your symptoms improve.    If the symptoms come back, go back to a simple diet or clear liquids.  Follow-up care  Follow up with your healthcare provider, or as advised. If a stool sample was taken or cultures were done, call the healthcare provider for the results as instructed.  Call  911  Call 911 if you have any of these symptoms:    Trouble breathing    Confusion    Extreme drowsiness or trouble walking    Loss of consciousness    Rapid heart rate    Chest pain    Stiff neck    Seizure  When to seek medical advice  Call your healthcare provider right away if any of these occur:    Abdominal pain that gets worse    Constant lower right abdominal pain    Continued vomiting and inability to keep liquids down    Diarrhea more than 5 times a day    Blood in vomit or stool    Dark urine or no urine for 8 hours, dry mouth and tongue, tiredness, weakness, or dizziness    Drowsiness    New rash    You don t get better in 2 to 3 days    Fever of 100.4 F (38 C) or higher, or as directed by your healthcare provider  Date Last Reviewed: 1/3/2016    0491-1152 [employer]. [address] [place] [address], [place] [address]. All rights reserved. This information is not intended as a substitute for professional medical care. Always follow your healthcare professional's instructions.            Discharge References/Attachments     STOOL CULTURE (ENGLISH)      Your next 10 appointments already scheduled     Oct 26, 2018  9:00 AM CDT   Return Visit with SHAVONNE Gallego   [facility] ([address] [address]   [phone]            Nov 01, 2018   Procedure with Donte Ahuja MD   [facility] ([facility])    [address] [place] [address]   [phone]           The medical center is located at [address] [place] [address]. (between [address] in [place], [place] miles north of [place]).            Nov 09, 2018  9:00 AM CST   Return Visit with SHAVONNE Gallego   [facility] ([address] [address]   [phone]            Nov 16, 2018  9:00 AM CST   Return Visit with Kleber Pollack  [facility] ([facility])    [address]  [facility] [address]   [phone]            Dec 07, 2018  9:00 AM CST   Return Visit with Kleber Pollack, [facility] ([facility])    [address]  [facility] [address]   [phone]              24 Hour Appointment Hotline       To make an appointment at any [facility] clinic, call [phone] ([phone]). If you don't have a family doctor or clinic, we will help you find one. [facility] clinics are conveniently located to serve the needs of you and your family.          ED Discharge Orders     Clostridium difficile toxin B PCR           Enteric Bacteria and Virus Panel by MARIANGEL Stool           Ova and Parasite Exam Routine                    Review of your medicines      Our records show that you are taking the medicines listed below. If these are incorrect, please call your family doctor or clinic.        Dose / Directions Last dose taken    albuterol 108 (90 Base) MCG/ACT inhaler   Commonly known as:  PROAIR HFA   Dose:  2 puff   Quantity:  1 Inhaler        Inhale 2 puffs into the lungs every 4 hours as needed for shortness of breath / dyspnea or wheezing   Refills:  11        DULoxetine 60 MG EC capsule   Commonly known as:  CYMBALTA   Quantity:  60 capsule        TAKE 2 CAPSULES BY MOUTH DAILY   Refills:  3        levofloxacin 750 MG tablet   Commonly known as:  LEVAQUIN   Dose:  750 mg   Quantity:  5 tablet        Take 1 tablet (750 mg) by mouth daily   Refills:  0        LYRICA PO   Dose:  150 mg        Take 150 mg by mouth 2 times daily   Refills:  0        Melatonin 10 MG Caps   Dose:  1 capsule        Take 1 capsule by mouth At Bedtime   Refills:  0        MS CONTIN PO   Dose:  30 mg        Take 30 mg by mouth 3 times daily 15 mg X 2 tabs.  AM, 1200, bedtime   Refills:  0        naproxen 500 MG tablet   Commonly known as:   NAPROSYN   Dose:  500 mg   Quantity:  60 tablet        Take 1 tablet (500 mg) by mouth at onset of headache   Refills:  3        order for DME   Quantity:  1 Device        Semi electric hospital bed with side rails and mattress. Length of bed is for lifetime   Refills:  0        * order for DME   Quantity:  1 Units        Equipment being ordered: Wheelchair. Electric, including adjustable back rest sitting to resting, leg elevation adjustment, approved for outdoor use   Refills:  0        * order for DME   Quantity:  1 each        Equipment being ordered: Hospital Bed and mattress.   Refills:  0        * order for DME   Quantity:  1 each        Equipment being ordered: Lift Chair   Refills:  0        oxyCODONE IR 10 MG tablet   Commonly known as:  ROXICODONE   Dose:  10 mg        Take 10 mg by mouth Take 1 tablet every 4-6 hours. Max 2 tablets daily   Refills:  0        simvastatin 80 MG tablet   Commonly known as:  ZOCOR   Dose:  80 mg   Quantity:  90 tablet        Take 1 tablet (80 mg) by mouth every morning   Refills:  3        * traZODone 100 MG tablet   Commonly known as:  DESYREL   Dose:  200 mg   Quantity:  180 tablet        Take 2 tablets (200 mg) by mouth nightly as needed for sleep Take along with the 25 mg tablet for total dose of 225 mg   Refills:  3        * traZODone 50 MG tablet   Commonly known as:  DESYREL   Dose:  25 mg   Quantity:  90 tablet        Take 0.5 tablets (25 mg) by mouth nightly as needed for sleep Take along with the 100 mg tablets for total dose of 225 mg   Refills:  3        * Notice:  This list has 5 medication(s) that are the same as other medications prescribed for you. Read the directions carefully, and ask your doctor or other care provider to review them with you.            Procedures and tests performed during your visit     CBC with platelets differential    Comprehensive metabolic panel    Lipase      Orders Needing Specimen Collection     Ordered          10/24/18 5944   Enteric Bacteria and Virus Panel by MARIANGEL Stool - STAT, [facility]: STAT, Status: Sent     Scheduled Task Status   10/24/18 1344 [employer] [facility] Reminder: Open   Order Class:  PCU Collect                10/24/18 1344  Ova and Parasite Exam Routine - STAT, Prio: STAT, Status: Sent     Scheduled Task Status   10/24/18 1344 [employer] [facility] Reminder: Open   Order Class:  PCU Collect                10/24/18 1357  Clostridium difficile toxin B PCR - STAT, Prio: STAT, Status: Sent     Scheduled Task Status   10/24/18 1358 [employer] Reminder: Open   Order Class:  PCU Collect                  Pending Results     No orders found from 10/22/2018 to 10/25/2018.            Pending Culture Results     No orders found from 10/22/2018 to 10/25/2018.            Pending Results Instructions     If you had any lab results that were not finalized at the time of your Discharge, you can call the ED Lab Result RN at [phone]. You will be contacted by this team for any positive Lab results or changes in treatment. The nurses are available 7 days a week from 10A to 6:30P.  You can leave a message 24 hours per day and they will return your call.        Test Results From Your Hospital Stay        10/24/2018  2:05 PM      Component Results     Component Value Ref Range & Units Status    WBC 15.0 (H) 4.0 - 11.0 10e9/L Final    RBC Count 4.85 4.4 - 5.9 10e12/L Final    Hemoglobin 15.9 13.3 - 17.7 g/dL Final    Hematocrit 46.9 40.0 - 53.0 % Final    MCV 97 78 - 100 fl Final    MCH 32.8 26.5 - 33.0 pg Final    MCHC 33.9 31.5 - 36.5 g/dL Final    RDW 14.5 10.0 - 15.0 % Final    Platelet Count 248 150 - 450 10e9/L Final    Diff Method Automated Method  Final    % Neutrophils 68.3 % Final    % Lymphocytes 23.8 % Final    % Monocytes 6.6 % Final    % Eosinophils 0.7 % Final    % Basophils 0.3 % Final    % Immature Granulocytes 0.3 % Final    Nucleated RBCs 0 0 /100 Final    Absolute Neutrophil 10.3 (H) 1.6 - 8.3 10e9/L Final    Absolute Lymphocytes 3.6 0.8 -  5.3 10e9/L Final    Absolute Monocytes 1.0 0.0 - 1.3 10e9/L Final    Absolute Eosinophils 0.1 0.0 - 0.7 10e9/L Final    Absolute Basophils 0.0 0.0 - 0.2 10e9/L Final    Abs Immature Granulocytes 0.1 0 - 0.4 10e9/L Final    Absolute Nucleated RBC 0.0  Final         10/24/2018  2:23 PM      Component Results     Component Value Ref Range & Units Status    Sodium 146 (H) 133 - 144 mmol/L Final    Potassium 3.7 3.4 - 5.3 mmol/L Final    Chloride 110 (H) 94 - 109 mmol/L Final    Carbon Dioxide 29 20 - 32 mmol/L Final    Anion Gap 7 3 - 14 mmol/L Final    Glucose 107 (H) 70 - 99 mg/dL Final    Urea Nitrogen 6 (L) 7 - 30 mg/dL Final    Creatinine 0.68 0.66 - 1.25 mg/dL Final    GFR Estimate >90 >60 mL/min/1.7m2 Final    Non  GFR Calc    GFR Estimate If Black >90 >60 mL/min/1.7m2 Final    African American GFR Calc    Calcium 8.7 8.5 - 10.1 mg/dL Final    Bilirubin Total 0.8 0.2 - 1.3 mg/dL Final    Albumin 3.5 3.4 - 5.0 g/dL Final    Protein Total 7.2 6.8 - 8.8 g/dL Final    Alkaline Phosphatase 62 40 - 150 U/L Final    ALT 24 0 - 70 U/L Final    AST 20 0 - 45 U/L Final         10/24/2018  2:21 PM      Component Results     Component Value Ref Range & Units Status    Lipase 392 73 - 393 U/L Final                Thank you for choosing [facility]       Thank you for choosing [facility] for your care. Our goal is always to provide you with excellent care. Hearing back from our patients is one way we can continue to improve our services. Please take a few minutes to complete the written survey that you may receive in the mail after you visit with us. Thank you!        Care EveryWhere ID     This is your Care EveryWhere ID. This could be used by other organizations to access your [facility] medical records  [id number]        Equal Access to Services     AQUILES WALSH : Shiva Naqvi, winnie xie, qaybta kaalmada river parker. [facility]  [phone].    ATENCIÓN: Si habla español, tiene a tucker disposición servicios gratuitos de asistencia lingüística. Llame al [phone].    We comply with applicable federal civil rights laws and [place] laws. We do not discriminate on the basis of race, color, national origin, age, disability, sex, sexual orientation, or gender identity.            After Visit Summary       This is your record. Keep this with you and show to your community pharmacist(s) and doctor(s) at your next visit.

## 2018-10-24 NOTE — ED AVS SNAPSHOT
[facility]    [address] [address]    Phone:  [phone]    Fax:  [phone]                                       Melquiades Morales   MRN: [id number]    Department:  [facility]   Date of Visit:  10/24/2018           After Visit Summary Signature Page     I have received my discharge instructions, and my questions have been answered. I have discussed any challenges I see with this plan with the nurse or doctor.    ..........................................................................................................................................  Patient/Patient Representative Signature      ..........................................................................................................................................  Patient Representative Print Name and Relationship to Patient    ..................................................               ................................................  Date                                   Time    ..........................................................................................................................................  Reviewed by Signature/Title    ...................................................              ..............................................  Date                                               Time          22EPIC Rev 08/18

## 2018-10-24 NOTE — ED NOTES
Pt here from home with complaints of several days of diarrhea. The patient currently has a fungal pneumonia, for which he is on levaquin. The patient has had incontinent stools over the past 2 days.

## 2018-10-25 NOTE — PLAN OF CARE
Problem: Pneumonia (Adult)  Goal: Signs and Symptoms of Listed Potential Problems Will be Absent or Manageable (Pneumonia)  Signs and symptoms of listed potential problems will be absent or manageable by discharge/transition of care (reference Pneumonia (Adult) CPG).pt will be at baesline respiratory status, resolved N/V, tolerating oral medications and fluids.   Did put 2L NC as pt  did have some ocassional sats at 86-88% at times on RA. No new complaints.         no

## 2018-10-26 ENCOUNTER — OFFICE VISIT (OUTPATIENT)
Dept: PSYCHOLOGY | Facility: CLINIC | Age: 63
End: 2018-10-26
Payer: MEDICARE

## 2018-10-26 DIAGNOSIS — F43.10 POSTTRAUMATIC STRESS DISORDER: ICD-10-CM

## 2018-10-26 DIAGNOSIS — F33.1 MAJOR DEPRESSIVE DISORDER, RECURRENT EPISODE, MODERATE (H): Primary | ICD-10-CM

## 2018-10-26 DIAGNOSIS — F41.1 GENERALIZED ANXIETY DISORDER: ICD-10-CM

## 2018-10-26 PROCEDURE — 90834 PSYTX W PT 45 MINUTES: CPT | Performed by: SOCIAL WORKER

## 2018-10-26 ASSESSMENT — ANXIETY QUESTIONNAIRES
7. FEELING AFRAID AS IF SOMETHING AWFUL MIGHT HAPPEN: NOT AT ALL
1. FEELING NERVOUS, ANXIOUS, OR ON EDGE: MORE THAN HALF THE DAYS
6. BECOMING EASILY ANNOYED OR IRRITABLE: NEARLY EVERY DAY
3. WORRYING TOO MUCH ABOUT DIFFERENT THINGS: MORE THAN HALF THE DAYS
5. BEING SO RESTLESS THAT IT IS HARD TO SIT STILL: MORE THAN HALF THE DAYS
GAD7 TOTAL SCORE: 14
2. NOT BEING ABLE TO STOP OR CONTROL WORRYING: MORE THAN HALF THE DAYS

## 2018-10-26 ASSESSMENT — PATIENT HEALTH QUESTIONNAIRE - PHQ9
SUM OF ALL RESPONSES TO PHQ QUESTIONS 1-9: 15
5. POOR APPETITE OR OVEREATING: NEARLY EVERY DAY

## 2018-10-26 NOTE — MR AVS SNAPSHOT
MRN:[id number]                      After Visit Summary   10/26/2018    Melquiades Morales    MRN: [id number]           Visit Information        Provider Department      10/26/2018 9:00 AM Kleber Pollack [facility] Generic      Your next 10 appointments already scheduled     Nov 01, 2018   Procedure with Donte Ahuja MD   [facility] ([facility])    [address] [address]   [phone]           The medical center is located at [address]. (between [address] in [place], four miles north of [place]).            Nov 09, 2018  9:00 AM CST   Return Visit with Kleber Pollack [facility] ([facility])    [address] [place] [address]   [phone]            Nov 16, 2018  9:00 AM CST   Return Visit with Kleber Pollack [facility] ([facility])    [address] [place] [address]   [phone]            Dec 07, 2018  9:00 AM CST   Return Visit with Kleber Pollack [facility] ([facility])    [address] [place] [address]   [phone]            Dec 21, 2018  9:00 AM CST   Return Visit with Kleber Pollack [facility] ([facility])    [address] [place] [address]   [phone]              Care EveryWhere ID     This is your Care EveryWhere ID. This could be used by other organizations to access your [facility] medical records  [id number]        Equal Access to Services     AQUILES WALSH : Hadii nicolas Naqvi, waaxda luqadaha, qaybta kaalmada adechuckyaheather, [place]. So [facility] [phone].    ATENCIÓN: Si habla español, tiene a tucker disposición servicios gratuitos de  asistencia lingüística. Krystina al [phone].    We comply with applicable federal civil rights laws and [place] laws. We do not discriminate on the basis of race, color, national origin, age, disability, sex, sexual orientation, or gender identity.

## 2018-10-26 NOTE — Clinical Note
Will add ptsd based on impact of past trauma on his current functioning/experience. He reports increased irritabiltiy and may call you about medication regimen. I think he has a lot of emotional pain and is feeling it as anger.
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
## 2018-10-27 ASSESSMENT — ANXIETY QUESTIONNAIRES: GAD7 TOTAL SCORE: 14

## 2018-10-31 ENCOUNTER — ANESTHESIA EVENT (OUTPATIENT)
Dept: GASTROENTEROLOGY | Facility: CLINIC | Age: 63
End: 2018-10-31
Payer: MEDICARE

## 2018-10-31 ASSESSMENT — ENCOUNTER SYMPTOMS: SEIZURES: 1

## 2018-10-31 ASSESSMENT — COPD QUESTIONNAIRES: COPD: 1

## 2018-10-31 NOTE — ANESTHESIA PREPROCEDURE EVALUATION
Anesthesia Evaluation     .             ROS/MED HX    ENT/Pulmonary:     (+)COPD, , recent URI . .    Neurologic:     (+)migraines, seizures other neuro     Cardiovascular:     (+) Dyslipidemia, ----. : . . . :. .       METS/Exercise Tolerance:     Hematologic:         Musculoskeletal:         GI/Hepatic:         Renal/Genitourinary:         Endo:     (+) Obesity, .      Psychiatric:     (+) psychiatric history depression      Infectious Disease:         Malignancy:         Other:    (+) H/O Chronic Pain,                   Physical Exam  Normal systems: cardiovascular, pulmonary and dental    Airway   Mallampati: II  TM distance: >3 FB  Neck ROM: full    Dental     Cardiovascular       Pulmonary                     Anesthesia Plan      History & Physical Review  History and physical reviewed and following examination; no interval change.    ASA Status:  3 .    NPO Status:  > 6 hours    Plan for MAC Reason for MAC:  Deep or markedly invasive procedure (G8)         Postoperative Care      Consents  Anesthetic plan, risks, benefits and alternatives discussed with:  Patient..                          .

## 2018-11-01 ENCOUNTER — HOSPITAL ENCOUNTER (OUTPATIENT)
Facility: CLINIC | Age: 63
Discharge: HOME | End: 2018-11-01
Attending: SURGERY | Admitting: SURGERY
Payer: MEDICARE

## 2018-11-01 ENCOUNTER — ANESTHESIA (OUTPATIENT)
Dept: GASTROENTEROLOGY | Facility: CLINIC | Age: 63
End: 2018-11-01
Payer: MEDICARE

## 2018-11-01 ENCOUNTER — SURGERY (OUTPATIENT)
Age: 63
End: 2018-11-01

## 2018-11-01 VITALS
BODY MASS INDEX: 33.05 KG/M2 | RESPIRATION RATE: 15 BRPM | OXYGEN SATURATION: 97 % | HEIGHT: 72 IN | DIASTOLIC BLOOD PRESSURE: 86 MMHG | WEIGHT: 244 LBS | TEMPERATURE: 97.8 F | SYSTOLIC BLOOD PRESSURE: 154 MMHG

## 2018-11-01 LAB — COLONOSCOPY: NORMAL

## 2018-11-01 PROCEDURE — 88305 TISSUE EXAM BY PATHOLOGIST: CPT | Performed by: SURGERY

## 2018-11-01 PROCEDURE — 45380 COLONOSCOPY AND BIOPSY: CPT | Performed by: SURGERY

## 2018-11-01 PROCEDURE — 45384 COLONOSCOPY W/LESION REMOVAL: CPT | Mod: 59 | Performed by: SURGERY

## 2018-11-01 PROCEDURE — 45385 COLONOSCOPY W/LESION REMOVAL: CPT | Mod: PT | Performed by: SURGERY

## 2018-11-01 PROCEDURE — 45384 COLONOSCOPY W/LESION REMOVAL: CPT | Mod: PT,XU

## 2018-11-01 PROCEDURE — 45385 COLONOSCOPY W/LESION REMOVAL: CPT | Performed by: SURGERY

## 2018-11-01 PROCEDURE — 25000128 H RX IP 250 OP 636: Performed by: NURSE ANESTHETIST, CERTIFIED REGISTERED

## 2018-11-01 PROCEDURE — 25000125 ZZHC RX 250: Performed by: SURGERY

## 2018-11-01 PROCEDURE — 88305 TISSUE EXAM BY PATHOLOGIST: CPT | Mod: 26 | Performed by: SURGERY

## 2018-11-01 PROCEDURE — 25000128 H RX IP 250 OP 636: Performed by: SURGERY

## 2018-11-01 PROCEDURE — 37000008 ZZH ANESTHESIA TECHNICAL FEE, 1ST 30 MIN: Performed by: SURGERY

## 2018-11-01 RX ORDER — PROPOFOL 10 MG/ML
INJECTION, EMULSION INTRAVENOUS PRN
Status: DISCONTINUED | OUTPATIENT
Start: 2018-11-01 | End: 2018-11-01

## 2018-11-01 RX ORDER — PROPOFOL 10 MG/ML
INJECTION, EMULSION INTRAVENOUS CONTINUOUS PRN
Status: DISCONTINUED | OUTPATIENT
Start: 2018-11-01 | End: 2018-11-01

## 2018-11-01 RX ORDER — ONDANSETRON 2 MG/ML
4 INJECTION INTRAMUSCULAR; INTRAVENOUS
Status: DISCONTINUED | OUTPATIENT
Start: 2018-11-01 | End: 2018-11-01 | Stop reason: HOSPADM

## 2018-11-01 RX ORDER — SODIUM CHLORIDE, SODIUM LACTATE, POTASSIUM CHLORIDE, CALCIUM CHLORIDE 600; 310; 30; 20 MG/100ML; MG/100ML; MG/100ML; MG/100ML
INJECTION, SOLUTION INTRAVENOUS CONTINUOUS
Status: DISCONTINUED | OUTPATIENT
Start: 2018-11-01 | End: 2018-11-01 | Stop reason: HOSPADM

## 2018-11-01 RX ORDER — LIDOCAINE 40 MG/G
CREAM TOPICAL
Status: DISCONTINUED | OUTPATIENT
Start: 2018-11-01 | End: 2018-11-01 | Stop reason: HOSPADM

## 2018-11-01 RX ADMIN — LIDOCAINE HYDROCHLORIDE 1 ML: 10 INJECTION, SOLUTION EPIDURAL; INFILTRATION; INTRACAUDAL; PERINEURAL at 06:39

## 2018-11-01 RX ADMIN — PROPOFOL 50 MG: 10 INJECTION, EMULSION INTRAVENOUS at 07:29

## 2018-11-01 RX ADMIN — PROPOFOL 200 MCG/KG/MIN: 10 INJECTION, EMULSION INTRAVENOUS at 07:29

## 2018-11-01 RX ADMIN — SODIUM CHLORIDE, POTASSIUM CHLORIDE, SODIUM LACTATE AND CALCIUM CHLORIDE: 600; 310; 30; 20 INJECTION, SOLUTION INTRAVENOUS at 06:38

## 2018-11-01 NOTE — ANESTHESIA CARE TRANSFER NOTE
Patient: Melquiades Morales    Procedure(s):  colonoscopy    Diagnosis: 5 yr follow up and diarrhea    screening  Diagnosis Additional Information: No value filed.    Anesthesia Type:   MAC     Note:    Patient transferred to:Phase II  Handoff Report: Identifed the Patient, Identified the Reponsible Provider, Reviewed the pertinent medical history, Discussed the surgical course, Reviewed Intra-OP anesthesia mangement and issues during anesthesia, Set expectations for post-procedure period and Allowed opportunity for questions and acknowledgement of understanding      Vitals: (Last set prior to Anesthesia Care Transfer)    CRNA VITALS  11/1/2018 0720 - 11/1/2018 0751      11/1/2018             Pulse: 83    SpO2: 91 %                Electronically Signed By: Kvng Rehman CRNA, KONSTANTIN LIMON  November 1, 2018  7:51 AM
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
## 2018-11-01 NOTE — ANESTHESIA POSTPROCEDURE EVALUATION
Patient: Melquiades Morales    Procedure(s):  COMBINED COLONOSCOPY, SINGLE OR MULTIPLE BIOPSY/POLYPECTOMY BY BIOPSY    Diagnosis:5 yr follow up and diarrhea    screening  Diagnosis Additional Information: No value filed.    Anesthesia Type:  MAC    Note:  Anesthesia Post Evaluation    Patient location during evaluation: Bedside  Patient participation: Able to fully participate in evaluation  Level of consciousness: awake and alert  Pain management: adequate  Airway patency: patent  Cardiovascular status: acceptable  Respiratory status: acceptable  Hydration status: acceptable  PONV: none     Anesthetic complications: None          Last vitals:  Vitals:    11/01/18 0618   BP: 125/66   Resp: 22   Temp: 36.6  C (97.8  F)   SpO2: 97%         Electronically Signed By: Kvng Rehman CRNA, APRN BRA[place]  November 1, 2018  7:51 AM

## 2018-11-06 LAB — COPATH REPORT: NORMAL

## 2018-11-07 PROBLEM — D12.6 ADENOMATOUS COLON POLYP: Status: ACTIVE | Noted: 2018-11-07

## 2018-11-09 ENCOUNTER — OFFICE VISIT (OUTPATIENT)
Dept: PSYCHOLOGY | Facility: CLINIC | Age: 63
End: 2018-11-09
Payer: MEDICARE

## 2018-11-09 DIAGNOSIS — F43.10 POSTTRAUMATIC STRESS DISORDER: ICD-10-CM

## 2018-11-09 DIAGNOSIS — F33.1 MAJOR DEPRESSIVE DISORDER, RECURRENT EPISODE, MODERATE (H): Primary | ICD-10-CM

## 2018-11-09 DIAGNOSIS — F41.1 GENERALIZED ANXIETY DISORDER: ICD-10-CM

## 2018-11-09 PROCEDURE — 90834 PSYTX W PT 45 MINUTES: CPT | Performed by: SOCIAL WORKER

## 2018-11-09 NOTE — Clinical Note
Today he said he will not complete anymore phq/shailesh's. We'll see maybe by next visit he will have changed his mind. He looked better today despite some physical pain; improved mood.
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
## 2018-11-09 NOTE — MR AVS SNAPSHOT
MRN:[id number]                      After Visit Summary   11/9/2018    Melquiades Morales    MRN: [id number]           Visit Information        Provider Department      11/9/2018 9:00 AM Kleber Pollack, [facility]      Your next 10 appointments already scheduled     Nov 16, 2018  9:00 AM CST   Return Visit with Kleber Pollack [facility] ([facility])    [address] [address]   [phone]            Dec 07, 2018  9:00 AM CST   [facility]    [address] [facility] [address]  [facility] [address]   [phone]            Dec 21, 2018  9:00 AM CST   Return Visit with Kleber Pollack [facility] ([facility])    [address] [address]   [phone]            Jan 04, 2019  9:00 AM CST   Return Visit with Kleber Pollack [facility] ([facility])    [address] [facility] [address]  [facility] [address]   [phone]              Care EveryWhere ID     This is your Care EveryWhere ID. This could be used by other organizations to access your [place] medical records  [id number]        Equal Access to Services     AQUILES WALSH AH: Hadii nicolas knight hadasho Soguillaumeali, waaxda luqadaha, qaybta kaalmada tracieyaheather, [place]. So [facility] [phone].    ATENCIÓN: Si habla español, tiene a tucker disposición servicios gratuitos de asistencia lingüística. Krystina al [phone].    We comply with applicable federal civil rights laws and [place] laws. We do not discriminate on the basis of race, color, national origin, age, disability, sex, sexual orientation, or gender identity.

## 2018-12-07 ENCOUNTER — OFFICE VISIT (OUTPATIENT)
Dept: PSYCHOLOGY | Facility: CLINIC | Age: 63
End: 2018-12-07
Payer: MEDICARE

## 2018-12-07 DIAGNOSIS — F43.10 POSTTRAUMATIC STRESS DISORDER: ICD-10-CM

## 2018-12-07 DIAGNOSIS — F33.1 MAJOR DEPRESSIVE DISORDER, RECURRENT EPISODE, MODERATE (H): Primary | ICD-10-CM

## 2018-12-07 DIAGNOSIS — F41.1 GENERALIZED ANXIETY DISORDER: ICD-10-CM

## 2018-12-07 PROCEDURE — 90834 PSYTX W PT 45 MINUTES: CPT | Performed by: SOCIAL WORKER

## 2018-12-07 ASSESSMENT — ANXIETY QUESTIONNAIRES
2. NOT BEING ABLE TO STOP OR CONTROL WORRYING: MORE THAN HALF THE DAYS
7. FEELING AFRAID AS IF SOMETHING AWFUL MIGHT HAPPEN: NOT AT ALL
IF YOU CHECKED OFF ANY PROBLEMS ON THIS QUESTIONNAIRE, HOW DIFFICULT HAVE THESE PROBLEMS MADE IT FOR YOU TO DO YOUR WORK, TAKE CARE OF THINGS AT HOME, OR GET ALONG WITH OTHER PEOPLE: VERY DIFFICULT
5. BEING SO RESTLESS THAT IT IS HARD TO SIT STILL: SEVERAL DAYS
GAD7 TOTAL SCORE: 11
1. FEELING NERVOUS, ANXIOUS, OR ON EDGE: MORE THAN HALF THE DAYS
6. BECOMING EASILY ANNOYED OR IRRITABLE: NEARLY EVERY DAY
3. WORRYING TOO MUCH ABOUT DIFFERENT THINGS: MORE THAN HALF THE DAYS

## 2018-12-07 ASSESSMENT — PATIENT HEALTH QUESTIONNAIRE - PHQ9
5. POOR APPETITE OR OVEREATING: SEVERAL DAYS
SUM OF ALL RESPONSES TO PHQ QUESTIONS 1-9: 15
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
## 2018-12-07 NOTE — Clinical Note
Working towards using emdr for chronic pain. He's now open to it. Workman's comp to make decision soon.

## 2018-12-07 NOTE — MR AVS SNAPSHOT
MRN:[id number]                      After Visit Summary   12/7/2018    Melquiades Morales    MRN: [id number]           Visit Information        Provider Department      12/7/2018 9:00 AM Kleber Pollack, [facility]      Your next 10 appointments already scheduled     Dec 21, 2018  9:00 AM CST   Return Visit with Kleber Pollack [facility] ([facility])    [address]  [facility] [address]   [phone]            Jan 04, 2019  9:00 AM CST   Return Visit with Kleber Pollack [facility] ([facility])    [address]  [facility] [facility] [address]   [phone]            Jan 18, 2019  9:00 AM CST   Return Visit with Kleber Pollack [facility] ([facility])    [address]  [place] [facility] [address]   [phone]            Feb 08, 2019  9:00 AM CST   Return Visit with Kleber Pollack [facility] ([facility])    [address]  [place] [facility] [address]   [phone]            Mar 01, 2019  9:00 AM CST   Return Visit with Kleber Pollack [facility] ([facility])    [address]  [facility] [address]   [phone]              Care EveryWhere ID     This is your Care EveryWhere ID. This could be used by other organizations to access your [facility] medical records  [id number]        Equal Access to Services     PRISCILLA WALSH : Hadii aad ku hadasho Soomaali, waaxda luqadaha, qaybta kaalmada adeegyada, [place]. So [facility] [phone].    ATENCIÓN: Si habla español, tiene a tucker disposición servicios gratuitos de asistencia lingüística. Llame al [phone].    We comply with applicable federal civil rights  laws and [place] laws. We do not discriminate on the basis of race, color, national origin, age, disability, sex, sexual orientation, or gender identity.

## 2018-12-08 ASSESSMENT — ANXIETY QUESTIONNAIRES: GAD7 TOTAL SCORE: 11

## 2018-12-17 ENCOUNTER — OFFICE VISIT (OUTPATIENT)
Dept: FAMILY MEDICINE | Facility: CLINIC | Age: 63
End: 2018-12-17

## 2018-12-17 VITALS
HEART RATE: 84 BPM | WEIGHT: 244 LBS | OXYGEN SATURATION: 93 % | BODY MASS INDEX: 33.05 KG/M2 | RESPIRATION RATE: 18 BRPM | SYSTOLIC BLOOD PRESSURE: 138 MMHG | DIASTOLIC BLOOD PRESSURE: 80 MMHG | HEIGHT: 72 IN

## 2018-12-17 DIAGNOSIS — G89.4 CHRONIC PAIN SYNDROME: Primary | Chronic | ICD-10-CM

## 2018-12-17 DIAGNOSIS — F32.A DEPRESSION, UNSPECIFIED DEPRESSION TYPE: ICD-10-CM

## 2018-12-17 PROCEDURE — 99213 OFFICE O/P EST LOW 20 MIN: CPT | Performed by: FAMILY MEDICINE

## 2018-12-17 RX ORDER — TRAZODONE HYDROCHLORIDE 50 MG/1
50 TABLET, FILM COATED ORAL
Qty: 90 TABLET | Refills: 3 | Status: SHIPPED | OUTPATIENT
Start: 2018-12-17 | End: 2019-04-26

## 2018-12-17 ASSESSMENT — MIFFLIN-ST. JEOR: SCORE: 1941.84

## 2018-12-17 NOTE — PROGRESS NOTES
"  SUBJECTIVE:   Melquiades Morales is a 63 year old male who presents to clinic today for the following health issues:      Chief Complaint   Patient presents with     Work Comp     recheck back pain from 7/17/16  patient would like a letter regaurding his son taking care of his personal needs at home .             Problem list and histories reviewed & adjusted, as indicated.  Additional history:         Reviewed and updated as needed this visit by clinical staff  Tobacco  Allergies       Reviewed and updated as needed this visit by Provider      Further history obtained, clarified or corrected by physician:  He needs a letter indicating that he needs daily assistance for his safety and health care and currently that he is getting that from his son.  Apparently there is some difference of opinion on whether his son should be allowed to live with him at his current trailer [place] and he just wants a letter stating that he is providing support for him.  He continues to have chronic back pain and he is scheduled to see an interventional pain specialist to consider radiofrequency ablation.    OBJECTIVE:  /80 (BP Location: Right arm, Patient Position: Chair, Cuff Size: Adult Large)   Pulse 84   Resp 18   Ht 1.816 m (5' 11.5\")   Wt 110.7 kg (244 lb)   SpO2 93%   BMI 33.56 kg/m    LUNGS: clear to auscultation, normal breath sounds  CV: RRR without murmur  ABD: BS+, soft, nontender, no masses, no hepatosplenomegaly    ASSESSMENT:     Depression, unspecified depression type  Chronic pain syndrome    PLAN:  He is given a letter stating that he has chronic health issues and does need assistance on a daily basis.    Orders Placed This Encounter     traZODone (DESYREL) 50 MG tablet       "

## 2018-12-17 NOTE — PATIENT INSTRUCTIONS
Thank you for choosing [facility].  You may be receiving a survey in the mail from [facility] regarding your visit today.  Please take a few minutes to complete and return the survey to let us know how we are doing.      Our Clinic hours are:  Mondays    7:20 am - 7 pm  Tues - Fri  7:20 am - 5 pm    Clinic Phone: [phone]    The clinic lab opens at 7:30 am Mon - Fri and appointments are required.    [facility]. [phone]  Monday  8 am - 7pm  Tues - Fri 8 am - 5:30 pm

## 2018-12-17 NOTE — LETTER
[facility]  [address]  [facility] [address]  [phone]        December 17, 2018    Regarding:  Melquiades Morales  [address]  [place] [address]              To Whom It May Concern;    Melquiades Morales is a long term patient at the [facility]. He has significant healthcare needs which require assistance on a daily basis for continued safety and stable health. Currently this assistance is provided by his son, Michoacano Huerta, and Jeffrey does not have another option for assistance at this time.        Sincerely,              Jignesh Travis MD

## 2018-12-21 ENCOUNTER — OFFICE VISIT (OUTPATIENT)
Dept: PSYCHOLOGY | Facility: CLINIC | Age: 63
End: 2018-12-21
Payer: MEDICARE

## 2018-12-21 DIAGNOSIS — F33.1 MAJOR DEPRESSIVE DISORDER, RECURRENT EPISODE, MODERATE (H): Primary | ICD-10-CM

## 2018-12-21 DIAGNOSIS — F41.1 GENERALIZED ANXIETY DISORDER: ICD-10-CM

## 2018-12-21 DIAGNOSIS — F43.10 POSTTRAUMATIC STRESS DISORDER: ICD-10-CM

## 2018-12-21 PROCEDURE — 90834 PSYTX W PT 45 MINUTES: CPT | Performed by: SOCIAL WORKER
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
## 2019-01-04 ENCOUNTER — OFFICE VISIT (OUTPATIENT)
Dept: PSYCHOLOGY | Facility: CLINIC | Age: 64
End: 2019-01-04
Payer: MEDICARE

## 2019-01-04 DIAGNOSIS — F43.10 POSTTRAUMATIC STRESS DISORDER: Primary | ICD-10-CM

## 2019-01-04 DIAGNOSIS — F41.1 GENERALIZED ANXIETY DISORDER: ICD-10-CM

## 2019-01-04 DIAGNOSIS — F33.1 MAJOR DEPRESSIVE DISORDER, RECURRENT EPISODE, MODERATE (H): ICD-10-CM

## 2019-01-04 PROCEDURE — 90834 PSYTX W PT 45 MINUTES: CPT | Performed by: SOCIAL WORKER

## 2019-01-04 ASSESSMENT — PATIENT HEALTH QUESTIONNAIRE - PHQ9
5. POOR APPETITE OR OVEREATING: SEVERAL DAYS
SUM OF ALL RESPONSES TO PHQ QUESTIONS 1-9: 14

## 2019-01-04 ASSESSMENT — ANXIETY QUESTIONNAIRES
1. FEELING NERVOUS, ANXIOUS, OR ON EDGE: MORE THAN HALF THE DAYS
GAD7 TOTAL SCORE: 10
7. FEELING AFRAID AS IF SOMETHING AWFUL MIGHT HAPPEN: NOT AT ALL
IF YOU CHECKED OFF ANY PROBLEMS ON THIS QUESTIONNAIRE, HOW DIFFICULT HAVE THESE PROBLEMS MADE IT FOR YOU TO DO YOUR WORK, TAKE CARE OF THINGS AT HOME, OR GET ALONG WITH OTHER PEOPLE: VERY DIFFICULT
5. BEING SO RESTLESS THAT IT IS HARD TO SIT STILL: SEVERAL DAYS
2. NOT BEING ABLE TO STOP OR CONTROL WORRYING: MORE THAN HALF THE DAYS
3. WORRYING TOO MUCH ABOUT DIFFERENT THINGS: MORE THAN HALF THE DAYS
6. BECOMING EASILY ANNOYED OR IRRITABLE: MORE THAN HALF THE DAYS

## 2019-01-04 NOTE — Clinical Note
Jeffrey and I are contemplating trying emdr for one of his issues. Since he has elevated blood pressure I should consult with you whether or not it might be contraindicated. My thoughts are that we could start with an issue that is not highly emotionally charged. What do you think?

## 2019-01-05 ASSESSMENT — ANXIETY QUESTIONNAIRES: GAD7 TOTAL SCORE: 10

## 2019-02-09 DIAGNOSIS — F32.A DEPRESSION, UNSPECIFIED DEPRESSION TYPE: ICD-10-CM

## 2019-02-11 NOTE — TELEPHONE ENCOUNTER
LM to call clinic/RN concerning Duloxetine refill.  Last written 10/8/18.  Last seen 12/17/18.  Last PHQ-9 was 14 on 1/4/19.  Wil

## 2019-02-13 RX ORDER — DULOXETIN HYDROCHLORIDE 60 MG/1
CAPSULE, DELAYED RELEASE ORAL
Qty: 60 CAPSULE | Refills: 3 | Status: SHIPPED | OUTPATIENT
Start: 2019-02-13 | End: 2019-06-15

## 2019-02-13 ASSESSMENT — PATIENT HEALTH QUESTIONNAIRE - PHQ9: SUM OF ALL RESPONSES TO PHQ QUESTIONS 1-9: 13

## 2019-02-13 NOTE — TELEPHONE ENCOUNTER
"Requested Prescriptions   Pending Prescriptions Disp Refills     DULoxetine (CYMBALTA) 60 MG capsule [Pharmacy Med Name: DULOXETINE HCL 60 MG CAPSULE DR] 60 capsule 3     Sig: TAKE 2 CAPSULES BY MOUTH DAILY    Serotonin-Norepinephrine Reuptake Inhibitors  Failed - 2/11/2019 10:06 AM       Failed - PHQ-9 score of less than 5 in past 6 months    Please review last PHQ-9 score.          Passed - Blood pressure under 140/90 in past 12 months    BP Readings from Last 3 Encounters:   12/17/18 138/80   11/01/18 154/86   10/24/18 (!) 133/94                Passed - Medication is active on med list       Passed - Patient is age 18 or older       Passed - Recent (6 mo) or future (30 days) visit within the authorizing provider's specialty    Patient had office visit in the last 6 months or has a visit in the next 30 days with authorizing provider or within the authorizing provider's specialty.  See \"Patient Info\" tab in inbasket, or \"Choose Columns\" in Meds & Orders section of the refill encounter.            We do need PHQ9 again.     I called him, did Phq9 on the phone.   PHQ-9 score:    PHQ-9 SCORE 2/13/2019   PHQ-9 Total Score -   PHQ-9 Total Score 13         Dr Travis,     I advised I can refill for one month and to have him come in to see Dr Travis about depression, per protocol. \"I just talked to him about this and my meds in December when I was in and he didn't want to change anything. You can ask him, if he wants to see me I will come in, otherwise, I think he wants to leave my meds the same. \"    I did not send one month refill in per protocol, because he thinks Dr Travis will refill longer and didn't want to see him again now?     Jojo Garcia RNC          "

## 2019-02-19 DIAGNOSIS — G89.4 CHRONIC PAIN SYNDROME: Chronic | ICD-10-CM

## 2019-02-19 RX ORDER — NAPROXEN 500 MG/1
TABLET ORAL
Qty: 60 TABLET | Refills: 3 | Status: SHIPPED | OUTPATIENT
Start: 2019-02-19 | End: 2019-07-12

## 2019-02-19 NOTE — TELEPHONE ENCOUNTER
"Requested Prescriptions   Pending Prescriptions Disp Refills     naproxen (NAPROSYN) 500 MG tablet [Pharmacy Med Name: NAPROXEN 500 MG TABLET]  Last Written Prescription Date:  4/12/2017  Last Fill Quantity: 60,  # refills: 3   Last office visit: 12/17/2018 with prescribing provider: Thaddeus  Future Office Visit:   Next 5 appointments (look out 90 days)    Mar 01, 2019  9:00 AM CST  Return Visit with Kleber Pollack, [address] [address]  [phone]   Mar 15, 2019  9:00 AM CDT  Return Visit with Kleber Pollack, [address] [address]  [phone]          60 tablet 3     Sig: TAKE 1 TABLET BY MOUTH AT ONSET OF HEADACHE    NSAID Medications Passed - 2/19/2019  2:03 PM       Passed - Blood pressure under 140/90 in past 12 months    BP Readings from Last 3 Encounters:   12/17/18 138/80   11/01/18 154/86   10/24/18 (!) 133/94                Passed - Normal ALT on file in past 12 months    Recent Labs   Lab Test 10/24/18  1355   ALT 24            Passed - Normal AST on file in past 12 months    Recent Labs   Lab Test 10/24/18  1355   AST 20            Passed - Recent (12 mo) or future (30 days) visit within the authorizing provider's specialty    Patient had office visit in the last 12 months or has a visit in the next 30 days with authorizing provider or within the authorizing provider's specialty.  See \"Patient Info\" tab in inbasket, or \"Choose Columns\" in Meds & Orders section of the refill encounter.             Passed - Patient is age 6-64 years       Passed - Normal CBC on file in past 12 months    Recent Labs   Lab Test 10/24/18  1355   WBC 15.0*   RBC 4.85   HGB 15.9   HCT 46.9                   Passed - Medication is active on med list       Passed - Normal serum creatinine on file in past 12 months "    Recent Labs   Lab Test 10/24/18  1355   CR 0.68

## 2019-02-20 ENCOUNTER — TELEPHONE (OUTPATIENT)
Dept: FAMILY MEDICINE | Facility: CLINIC | Age: 64
End: 2019-02-20

## 2019-02-20 NOTE — TELEPHONE ENCOUNTER
Incoming Records for Narcotic Treatment decision for this patients Injury.Placed on Dr. Travis desk.  [facility]  Clinic Station  Flex

## 2019-03-15 ENCOUNTER — OFFICE VISIT (OUTPATIENT)
Dept: PSYCHOLOGY | Facility: CLINIC | Age: 64
End: 2019-03-15
Payer: MEDICARE

## 2019-03-15 DIAGNOSIS — F33.1 MAJOR DEPRESSIVE DISORDER, RECURRENT EPISODE, MODERATE (H): ICD-10-CM

## 2019-03-15 DIAGNOSIS — F43.10 POSTTRAUMATIC STRESS DISORDER: Primary | ICD-10-CM

## 2019-03-15 DIAGNOSIS — F41.1 GENERALIZED ANXIETY DISORDER: ICD-10-CM

## 2019-03-15 PROCEDURE — 90834 PSYTX W PT 45 MINUTES: CPT | Performed by: SOCIAL WORKER

## 2019-03-15 ASSESSMENT — PATIENT HEALTH QUESTIONNAIRE - PHQ9
5. POOR APPETITE OR OVEREATING: NOT AT ALL
SUM OF ALL RESPONSES TO PHQ QUESTIONS 1-9: 13

## 2019-03-15 ASSESSMENT — ANXIETY QUESTIONNAIRES
5. BEING SO RESTLESS THAT IT IS HARD TO SIT STILL: MORE THAN HALF THE DAYS
1. FEELING NERVOUS, ANXIOUS, OR ON EDGE: MORE THAN HALF THE DAYS
GAD7 TOTAL SCORE: 12
2. NOT BEING ABLE TO STOP OR CONTROL WORRYING: NEARLY EVERY DAY
7. FEELING AFRAID AS IF SOMETHING AWFUL MIGHT HAPPEN: NOT AT ALL
3. WORRYING TOO MUCH ABOUT DIFFERENT THINGS: MORE THAN HALF THE DAYS
6. BECOMING EASILY ANNOYED OR IRRITABLE: NEARLY EVERY DAY
IF YOU CHECKED OFF ANY PROBLEMS ON THIS QUESTIONNAIRE, HOW DIFFICULT HAVE THESE PROBLEMS MADE IT FOR YOU TO DO YOUR WORK, TAKE CARE OF THINGS AT HOME, OR GET ALONG WITH OTHER PEOPLE: SOMEWHAT DIFFICULT
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
## 2019-03-16 ASSESSMENT — ANXIETY QUESTIONNAIRES: GAD7 TOTAL SCORE: 12

## 2019-03-29 ENCOUNTER — OFFICE VISIT (OUTPATIENT)
Dept: PSYCHOLOGY | Facility: CLINIC | Age: 64
End: 2019-03-29
Payer: MEDICARE

## 2019-03-29 DIAGNOSIS — F43.10 POSTTRAUMATIC STRESS DISORDER: ICD-10-CM

## 2019-03-29 DIAGNOSIS — F41.1 GENERALIZED ANXIETY DISORDER: ICD-10-CM

## 2019-03-29 DIAGNOSIS — F33.1 MAJOR DEPRESSIVE DISORDER, RECURRENT EPISODE, MODERATE (H): Primary | ICD-10-CM

## 2019-03-29 PROCEDURE — 90834 PSYTX W PT 45 MINUTES: CPT | Performed by: SOCIAL WORKER

## 2019-03-29 ASSESSMENT — ANXIETY QUESTIONNAIRES
GAD7 TOTAL SCORE: 14
1. FEELING NERVOUS, ANXIOUS, OR ON EDGE: NEARLY EVERY DAY
7. FEELING AFRAID AS IF SOMETHING AWFUL MIGHT HAPPEN: MORE THAN HALF THE DAYS
3. WORRYING TOO MUCH ABOUT DIFFERENT THINGS: MORE THAN HALF THE DAYS
5. BEING SO RESTLESS THAT IT IS HARD TO SIT STILL: SEVERAL DAYS
6. BECOMING EASILY ANNOYED OR IRRITABLE: NEARLY EVERY DAY
IF YOU CHECKED OFF ANY PROBLEMS ON THIS QUESTIONNAIRE, HOW DIFFICULT HAVE THESE PROBLEMS MADE IT FOR YOU TO DO YOUR WORK, TAKE CARE OF THINGS AT HOME, OR GET ALONG WITH OTHER PEOPLE: VERY DIFFICULT
2. NOT BEING ABLE TO STOP OR CONTROL WORRYING: MORE THAN HALF THE DAYS

## 2019-03-29 ASSESSMENT — PATIENT HEALTH QUESTIONNAIRE - PHQ9
5. POOR APPETITE OR OVEREATING: SEVERAL DAYS
SUM OF ALL RESPONSES TO PHQ QUESTIONS 1-9: 14
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
## 2019-03-30 ASSESSMENT — ANXIETY QUESTIONNAIRES: GAD7 TOTAL SCORE: 14

## 2019-04-08 ENCOUNTER — MEDICAL CORRESPONDENCE (OUTPATIENT)
Dept: HEALTH INFORMATION MANAGEMENT | Facility: CLINIC | Age: 64
End: 2019-04-08

## 2019-04-26 ENCOUNTER — OFFICE VISIT (OUTPATIENT)
Dept: PSYCHOLOGY | Facility: CLINIC | Age: 64
End: 2019-04-26
Payer: MEDICARE

## 2019-04-26 DIAGNOSIS — F41.1 GENERALIZED ANXIETY DISORDER: ICD-10-CM

## 2019-04-26 DIAGNOSIS — E78.5 HYPERLIPIDEMIA LDL GOAL <130: ICD-10-CM

## 2019-04-26 DIAGNOSIS — F43.10 POSTTRAUMATIC STRESS DISORDER: Primary | ICD-10-CM

## 2019-04-26 DIAGNOSIS — F33.1 MAJOR DEPRESSIVE DISORDER, RECURRENT EPISODE, MODERATE (H): ICD-10-CM

## 2019-04-26 DIAGNOSIS — F32.A DEPRESSION, UNSPECIFIED DEPRESSION TYPE: ICD-10-CM

## 2019-04-26 PROCEDURE — 90834 PSYTX W PT 45 MINUTES: CPT | Performed by: SOCIAL WORKER

## 2019-04-26 RX ORDER — TRAZODONE HYDROCHLORIDE 100 MG/1
TABLET ORAL
Qty: 180 TABLET | Refills: 1 | Status: SHIPPED | OUTPATIENT
Start: 2019-04-26 | End: 2019-10-23

## 2019-04-26 RX ORDER — SIMVASTATIN 80 MG
80 TABLET ORAL DAILY
Qty: 30 TABLET | Refills: 0 | Status: SHIPPED | OUTPATIENT
Start: 2019-04-26 | End: 2019-05-17

## 2019-04-26 RX ORDER — TRAZODONE HYDROCHLORIDE 50 MG/1
50 TABLET, FILM COATED ORAL
Qty: 90 TABLET | Refills: 1 | Status: SHIPPED | OUTPATIENT
Start: 2019-04-26 | End: 2019-12-05

## 2019-04-26 ASSESSMENT — ANXIETY QUESTIONNAIRES
IF YOU CHECKED OFF ANY PROBLEMS ON THIS QUESTIONNAIRE, HOW DIFFICULT HAVE THESE PROBLEMS MADE IT FOR YOU TO DO YOUR WORK, TAKE CARE OF THINGS AT HOME, OR GET ALONG WITH OTHER PEOPLE: SOMEWHAT DIFFICULT
6. BECOMING EASILY ANNOYED OR IRRITABLE: MORE THAN HALF THE DAYS
2. NOT BEING ABLE TO STOP OR CONTROL WORRYING: MORE THAN HALF THE DAYS
5. BEING SO RESTLESS THAT IT IS HARD TO SIT STILL: SEVERAL DAYS
GAD7 TOTAL SCORE: 10
1. FEELING NERVOUS, ANXIOUS, OR ON EDGE: MORE THAN HALF THE DAYS
7. FEELING AFRAID AS IF SOMETHING AWFUL MIGHT HAPPEN: NOT AT ALL
3. WORRYING TOO MUCH ABOUT DIFFERENT THINGS: MORE THAN HALF THE DAYS

## 2019-04-26 ASSESSMENT — PATIENT HEALTH QUESTIONNAIRE - PHQ9
SUM OF ALL RESPONSES TO PHQ QUESTIONS 1-9: 18
5. POOR APPETITE OR OVEREATING: SEVERAL DAYS

## 2019-04-26 NOTE — TELEPHONE ENCOUNTER
Patient notified and understands plan. 30 day supply of simvastatin ordered  He is due for fasting labs. He states he will call back and come in for labs.  Chasidy CASTILLO RN

## 2019-04-26 NOTE — TELEPHONE ENCOUNTER
"Requested Prescriptions   Pending Prescriptions Disp Refills     traZODone (DESYREL) 100 MG tablet [Pharmacy Med Name: TRAZODONE  MG TABLET] 60 tablet      Sig: TAKE 2 TABLETS BY MOUTH AT BEDTIME AS NEEDED FOR SLEEP       Serotonin Modulators Passed - 4/26/2019 12:53 PM        Passed - Recent (12 mo) or future (30 days) visit within the authorizing provider's specialty     Patient had office visit in the last 12 months or has a visit in the next 30 days with authorizing provider or within the authorizing provider's specialty.  See \"Patient Info\" tab in inbasket, or \"Choose Columns\" in Meds & Orders section of the refill encounter.              Passed - Medication is active on med list        Passed - Patient is age 18 or older        simvastatin (ZOCOR) 80 MG tablet [Pharmacy Med Name: SIMVASTATIN 80 MG TABLET] 30 tablet      Sig: TAKE 1 TABLET BY MOUTH DAILY       Statins Protocol Failed - 4/26/2019 12:53 PM        Failed - LDL on file in past 12 months     Recent Labs   Lab Test 04/12/17  0745   LDL 49             Passed - No abnormal creatine kinase in past 12 months     Recent Labs   Lab Test 09/06/15  1635   CKT 77                Passed - Recent (12 mo) or future (30 days) visit within the authorizing provider's specialty     Patient had office visit in the last 12 months or has a visit in the next 30 days with authorizing provider or within the authorizing provider's specialty.  See \"Patient Info\" tab in inbasket, or \"Choose Columns\" in Meds & Orders section of the refill encounter.              Passed - Medication is active on med list        Passed - Patient is age 18 or older          "
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
## 2019-04-27 ASSESSMENT — ANXIETY QUESTIONNAIRES: GAD7 TOTAL SCORE: 10

## 2019-05-17 DIAGNOSIS — E78.5 HYPERLIPIDEMIA LDL GOAL <130: ICD-10-CM

## 2019-05-17 NOTE — TELEPHONE ENCOUNTER
"Requested Prescriptions   Pending Prescriptions Disp Refills     simvastatin (ZOCOR) 80 MG tablet [Pharmacy Med Name: SIMVASTATIN 80 MG TABLET] 15 tablet      Sig: Take 1 tablet (80 mg) by mouth daily DUE FOR LAB WORK FOR FURTHER REFILLS       Statins Protocol Failed - 5/17/2019  8:00 AM        Failed - LDL on file in past 12 months     Recent Labs   Lab Test 04/12/17  0745   LDL 49             Passed - No abnormal creatine kinase in past 12 months     Recent Labs   Lab Test 09/06/15  1635   CKT 77                Passed - Recent (12 mo) or future (30 days) visit within the authorizing provider's specialty     Patient had office visit in the last 12 months or has a visit in the next 30 days with authorizing provider or within the authorizing provider's specialty.  See \"Patient Info\" tab in inbasket, or \"Choose Columns\" in Meds & Orders section of the refill encounter.              Passed - Medication is active on med list        Passed - Patient is age 18 or older        Last Written Prescription Date:  4/26/19  Last Fill Quantity: 30,  # refills: 0   Last office visit: 12/17/2018 with prescribing provider:  Thaddeus   Future Office Visit:   Next 5 appointments (look out 90 days)    May 24, 2019  9:00 AM CDT  Return Visit with Kleber Pollack, [address] [address]  [phone]   Jun 21, 2019  9:00 AM CDT  Return Visit with Kleber Pollack, [address] [address]  [phone]           "

## 2019-05-17 NOTE — TELEPHONE ENCOUNTER
Patient was given 2 week refill 4-26-19 and reminded he is due for labs.  He is reminded of this again today.  He states he has 1 week of meds left and will complete the fasting labs.  RN will postpone this until mid next week to see if he has completed.  Zara PETERS RN

## 2019-05-20 DIAGNOSIS — E78.5 HYPERLIPIDEMIA LDL GOAL <130: ICD-10-CM

## 2019-05-20 LAB
CHOLEST SERPL-MCNC: 121 MG/DL
HDLC SERPL-MCNC: 31 MG/DL
LDLC SERPL CALC-MCNC: 49 MG/DL
NONHDLC SERPL-MCNC: 90 MG/DL
TRIGL SERPL-MCNC: 203 MG/DL

## 2019-05-20 PROCEDURE — 36415 COLL VENOUS BLD VENIPUNCTURE: CPT | Performed by: FAMILY MEDICINE

## 2019-05-20 PROCEDURE — 80061 LIPID PANEL: CPT | Performed by: FAMILY MEDICINE

## 2019-05-22 RX ORDER — SIMVASTATIN 80 MG
80 TABLET ORAL DAILY
Qty: 90 TABLET | Refills: 3 | Status: SHIPPED | OUTPATIENT
Start: 2019-05-22 | End: 2019-08-16

## 2019-05-22 NOTE — TELEPHONE ENCOUNTER
Recent Labs   Lab Test 05/20/19  1004 04/12/17  0745  12/29/14  0808 03/08/13  0810   CHOL 121 138   < > 129 185   HDL 31* 38*   < > 32* 32*   LDL 49 49   < > 50 94   TRIG 203* 255*   < > 235* 296*   CHOLHDLRATIO  --   --   --  4.0 6.0*    < > = values in this interval not displayed.     Provider covering Dr Travis, patient had labs 5/20 that haven't been resulted yet.   Ok to continue same simvastatin 80 mg daily? There is a dose warning on this preventing RN from refilling, thanks,       Jojo Garcia RNC

## 2019-05-24 ENCOUNTER — OFFICE VISIT (OUTPATIENT)
Dept: PSYCHOLOGY | Facility: CLINIC | Age: 64
End: 2019-05-24
Payer: MEDICARE

## 2019-05-24 DIAGNOSIS — F41.1 GENERALIZED ANXIETY DISORDER: ICD-10-CM

## 2019-05-24 DIAGNOSIS — F43.10 POSTTRAUMATIC STRESS DISORDER: ICD-10-CM

## 2019-05-24 DIAGNOSIS — F33.1 MAJOR DEPRESSIVE DISORDER, RECURRENT EPISODE, MODERATE (H): Primary | ICD-10-CM

## 2019-05-24 PROCEDURE — 90834 PSYTX W PT 45 MINUTES: CPT | Performed by: SOCIAL WORKER

## 2019-05-24 ASSESSMENT — ANXIETY QUESTIONNAIRES
GAD7 TOTAL SCORE: 11
6. BECOMING EASILY ANNOYED OR IRRITABLE: MORE THAN HALF THE DAYS
5. BEING SO RESTLESS THAT IT IS HARD TO SIT STILL: SEVERAL DAYS
3. WORRYING TOO MUCH ABOUT DIFFERENT THINGS: MORE THAN HALF THE DAYS
IF YOU CHECKED OFF ANY PROBLEMS ON THIS QUESTIONNAIRE, HOW DIFFICULT HAVE THESE PROBLEMS MADE IT FOR YOU TO DO YOUR WORK, TAKE CARE OF THINGS AT HOME, OR GET ALONG WITH OTHER PEOPLE: VERY DIFFICULT
7. FEELING AFRAID AS IF SOMETHING AWFUL MIGHT HAPPEN: NOT AT ALL
1. FEELING NERVOUS, ANXIOUS, OR ON EDGE: MORE THAN HALF THE DAYS
2. NOT BEING ABLE TO STOP OR CONTROL WORRYING: MORE THAN HALF THE DAYS
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
## 2019-05-25 ASSESSMENT — ANXIETY QUESTIONNAIRES: GAD7 TOTAL SCORE: 11

## 2019-06-13 ENCOUNTER — TELEPHONE (OUTPATIENT)
Dept: FAMILY MEDICINE | Facility: CLINIC | Age: 64
End: 2019-06-13

## 2019-06-13 NOTE — TELEPHONE ENCOUNTER
Form needs completion/signature for [facility]  [employer]   - Paperwork placed in forms box.    Libby Peterson  [facility]  Flex

## 2019-06-15 DIAGNOSIS — F32.A DEPRESSION, UNSPECIFIED DEPRESSION TYPE: ICD-10-CM

## 2019-06-17 RX ORDER — DULOXETIN HYDROCHLORIDE 60 MG/1
CAPSULE, DELAYED RELEASE ORAL
Qty: 60 CAPSULE | Refills: 0 | Status: SHIPPED | OUTPATIENT
Start: 2019-06-17 | End: 2019-07-08

## 2019-06-17 NOTE — TELEPHONE ENCOUNTER
"Requested Prescriptions   Pending Prescriptions Disp Refills     DULoxetine (CYMBALTA) 60 MG capsule [Pharmacy Med Name: DULOXETINE  Last Written Prescription Date:  02/13/19  Last Fill Quantity: 60,  # refills: 3   Last office visit: 12/17/2018 with prescribing provider:  Jignesh Travis   Future Office Visit:   Next 5 appointments (look out 90 days)    Jun 21, 2019  9:00 AM CDT  Return Visit with Kleber Pollack, [facility] [address] [address]  [phone]   Jul 19, 2019  9:00 AM CDT  Return Visit with Kleber Pollack [facility] ([facility] [address] [address]  [phone]          HCL 60 MG CAPSULE DR] 60 capsule 3     Sig: TAKE 2 CAPSULES BY MOUTH DAILY       Serotonin-Norepinephrine Reuptake Inhibitors  Failed - 6/15/2019  8:00 AM        Failed - PHQ-9 score of less than 5 in past 6 months     Please review last PHQ-9 score.           Passed - Blood pressure under 140/90 in past 12 months     BP Readings from Last 3 Encounters:   12/17/18 138/80   11/01/18 154/86   10/24/18 (!) 133/94           Passed - Medication is active on med list        Passed - Patient is age 18 or older        Passed - Recent (6 mo) or future (30 days) visit within the authorizing provider's specialty     Patient had office visit in the last 6 months or has a visit in the next 30 days with authorizing provider or within the authorizing provider's specialty.  See \"Patient Info\" tab in inbasket, or \"Choose Columns\" in Meds & Orders section of the refill encounter.              "

## 2019-06-21 ENCOUNTER — OFFICE VISIT (OUTPATIENT)
Dept: PSYCHOLOGY | Facility: CLINIC | Age: 64
End: 2019-06-21
Payer: MEDICARE

## 2019-06-21 DIAGNOSIS — F33.1 MAJOR DEPRESSIVE DISORDER, RECURRENT EPISODE, MODERATE (H): ICD-10-CM

## 2019-06-21 DIAGNOSIS — F43.10 POSTTRAUMATIC STRESS DISORDER: Primary | ICD-10-CM

## 2019-06-21 DIAGNOSIS — F41.1 GENERALIZED ANXIETY DISORDER: ICD-10-CM

## 2019-06-21 PROCEDURE — 90834 PSYTX W PT 45 MINUTES: CPT | Performed by: SOCIAL WORKER

## 2019-06-21 ASSESSMENT — ANXIETY QUESTIONNAIRES
IF YOU CHECKED OFF ANY PROBLEMS ON THIS QUESTIONNAIRE, HOW DIFFICULT HAVE THESE PROBLEMS MADE IT FOR YOU TO DO YOUR WORK, TAKE CARE OF THINGS AT HOME, OR GET ALONG WITH OTHER PEOPLE: SOMEWHAT DIFFICULT
5. BEING SO RESTLESS THAT IT IS HARD TO SIT STILL: SEVERAL DAYS
GAD7 TOTAL SCORE: 11
2. NOT BEING ABLE TO STOP OR CONTROL WORRYING: MORE THAN HALF THE DAYS
6. BECOMING EASILY ANNOYED OR IRRITABLE: NEARLY EVERY DAY
7. FEELING AFRAID AS IF SOMETHING AWFUL MIGHT HAPPEN: NOT AT ALL
1. FEELING NERVOUS, ANXIOUS, OR ON EDGE: MORE THAN HALF THE DAYS
3. WORRYING TOO MUCH ABOUT DIFFERENT THINGS: MORE THAN HALF THE DAYS

## 2019-06-21 ASSESSMENT — PATIENT HEALTH QUESTIONNAIRE - PHQ9
5. POOR APPETITE OR OVEREATING: SEVERAL DAYS
SUM OF ALL RESPONSES TO PHQ QUESTIONS 1-9: 15
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
## 2019-06-22 ASSESSMENT — ANXIETY QUESTIONNAIRES: GAD7 TOTAL SCORE: 11

## 2019-07-08 ENCOUNTER — TELEPHONE (OUTPATIENT)
Dept: FAMILY MEDICINE | Facility: CLINIC | Age: 64
End: 2019-07-08

## 2019-07-08 DIAGNOSIS — F32.A DEPRESSION, UNSPECIFIED DEPRESSION TYPE: ICD-10-CM

## 2019-07-08 NOTE — TELEPHONE ENCOUNTER
Requested Prescriptions   Pending Prescriptions Disp Refills     DULoxetine (CYMBALTA) 60 MG capsule [Pharmacy Med Name: DULOXETINE HCL 60 MG CAPSULE DR] 30 capsule      Sig: TAKE 2 CAPSULES BY MOUTH DAILY  Last Written Prescription Date:  6/17/2019  Last Fill Quantity: 60,  # refills: 0   Last office visit: 12/17/2018 with prescribing provider:  Thaddeus  Future Office Visit:   Next 5 appointments (look out 90 days)    Jul 09, 2019 10:20 AM CDT  SHORT with Jignesh Travis MD  [facility] ([facility] [address] [facility] [address]  [phone]   Jul 19, 2019  9:00 AM CDT  Return Visit with Kleber Pollack [facility] ([address] [address]  [phone]   Aug 02, 2019  9:00 AM CDT  Return Visit with Kleber Pollack [facility] ([facility]) [address] [place] [address]  [phone]   Aug 23, 2019  9:00 AM CDT  Return Visit with Kleber Pollack [facility] ([facility]) [address] [place] [address]  [phone]   Sep 13, 2019  9:00 AM CDT  Return Visit with Kleber Pollack [facility] ([address] [address]  [phone]                Serotonin-Norepinephrine Reuptake Inhibitors  Failed - 7/8/2019  8:00 AM        Failed - PHQ-9 score of less than 5 in past 6 months     Please review last PHQ-9 score.     PHQ-9 SCORE 4/26/2019 5/24/2019 6/21/2019   PHQ-9 Total Score - - -   PHQ-9 Total Score 18 16 15     EVGENY-7 SCORE 4/26/2019 5/24/2019 6/21/2019   Total Score - - -   Total Score 10 11 11             Passed - Blood pressure under 140/90 in past 12 months     BP Readings from Last 3 Encounters:   12/17/18 138/80   11/01/18 154/86   10/24/18 (!)  "133/94                 Passed - Medication is active on med list        Passed - Patient is age 18 or older        Passed - Recent (6 mo) or future (30 days) visit within the authorizing provider's specialty     Patient had office visit in the last 6 months or has a visit in the next 30 days with authorizing provider or within the authorizing provider's specialty.  See \"Patient Info\" tab in inbasket, or \"Choose Columns\" in Meds & Orders section of the refill encounter.              "

## 2019-07-09 NOTE — TELEPHONE ENCOUNTER
LM to call clinic/RN to schedule an appt and address refill.  Last seen 12/17/18.  Last written 6/17/19 #60 + 0 refills.  Wil

## 2019-07-10 RX ORDER — DULOXETIN HYDROCHLORIDE 60 MG/1
CAPSULE, DELAYED RELEASE ORAL
Qty: 60 CAPSULE | Refills: 0 | Status: SHIPPED | OUTPATIENT
Start: 2019-07-10 | End: 2019-07-31

## 2019-07-11 ENCOUNTER — TELEPHONE (OUTPATIENT)
Dept: FAMILY MEDICINE | Facility: CLINIC | Age: 64
End: 2019-07-11

## 2019-07-11 DIAGNOSIS — G89.4 CHRONIC PAIN SYNDROME: Chronic | ICD-10-CM

## 2019-07-11 NOTE — TELEPHONE ENCOUNTER
Per fax received from ESTELLA Espinoza - Naproxen is not covered by patient's Insurance Company  Dr. Travis - Please choose:  1.  Change medication that is not covered to a different medication and send new prescription to patient's pharmacy?  2.  Patient will need to pay for the non-covered medication out-of-pocket?   3.  Try for Prior Authorization with Insurance Company to get medication covered?        P.A. Phone #:[phone]       P.A.  ID#:  [id number]  [facility]  Flex

## 2019-07-12 RX ORDER — NAPROXEN 250 MG/1
TABLET ORAL
Qty: 60 TABLET | Refills: 3 | Status: SHIPPED | OUTPATIENT
Start: 2019-07-12 | End: 2021-03-22

## 2019-07-12 NOTE — TELEPHONE ENCOUNTER
Dr. Travis,  Patient's [place] will not cover Naproxen 500 mg.  It appears they will cover 220 mg.    NSAIDS section updated 4- Preferred  Nonpreferred    CELECOXIB (ORAL)   MELOXICAM TABLET (ORAL)   NAPROXEN TABLET (ORAL)   FLURIBIPROFEN (ORAL)   INDOMETHACIN CAPSULE (ORAL)   DICLOFENAC SOCIUM (ORAL)   DICLOFENAC SR (ORAL)   SULINDAC (ORAL)   NAPROXEN EC (ORAL)   NABUMETONE (ORAL)   KETOPROFEN (ORAL)   KETOROLAC (ORAL)   NAPROXEN SODIUM (ORAL)  CELEBREX (ORAL)   DICLOFENAC GEL   DUEXIS (ORAL)   FENOPROFEN (ORAL)   FLECTOR (TOPICAL)   MOBIC TABLET (ORAL)   MEFENAMIC ACID (ORAL)   OXAPROZIN (ORAL)   VIMOVO (ORAL)   ZIPSOR (ORAL)   ZORVOLEX (ORAL      [email or web address]

## 2019-07-23 ENCOUNTER — TELEPHONE (OUTPATIENT)
Dept: FAMILY MEDICINE | Facility: CLINIC | Age: 64
End: 2019-07-23

## 2019-07-23 NOTE — TELEPHONE ENCOUNTER
**When finished: Please make a copy for station  and a copy to be sent to scanning. Send originals with patient.     Date received: July 23, 2019   Doctor:Jignesh Travis MD  Daytime phone number:   :     n: Mail back to patient  y: Mail or fax to destination requesting form  n: Patient to     Other notes:

## 2019-07-31 DIAGNOSIS — F32.A DEPRESSION, UNSPECIFIED DEPRESSION TYPE: ICD-10-CM

## 2019-07-31 NOTE — TELEPHONE ENCOUNTER
"S:  Refill request for duloxetine    B:  LOV 12/17/18  Duloxetine last written 7/10/19 for 30 day supply  From 7/8/19 refill request r/t duloxetine  \"Patient was scheduled.  Patient was given lore fill.   Milly MCGOVERN RN\"  Patient no showed 7/9/19 appt with Dr Travis  Patient is currently scheduled 8/16/19 with Dr Travis    A:  Fails [facility] refill protocol per recent PHQ9  PHQ-9 and GAD7 Scores 6/21/2019   PHQ-9 Total Score 15   EVGENY-7 Total Score 11     Patient due for visit  Patient no showed last visit with authorizing provider    R:  Routed to Dr Travis:  Can patient have fill of medication as pended?    Nilesh Tirado RN    "

## 2019-07-31 NOTE — TELEPHONE ENCOUNTER
"Requested Prescriptions   Pending Prescriptions Disp Refills     DULoxetine (CYMBALTA) 60 MG capsule [Pharmacy Med Name: DULOXETINE HCL 60 MG CAPSULE DR] 30 capsule      Sig: TAKE 2 CAPSULES BY MOUTH EVERY DAY       Serotonin-Norepinephrine Reuptake Inhibitors  Failed - 7/31/2019  8:00 AM        Failed - PHQ-9 score of less than 5 in past 6 months     Please review last PHQ-9 score.           Passed - Blood pressure under 140/90 in past 12 months     BP Readings from Last 3 Encounters:   12/17/18 138/80   11/01/18 154/86   10/24/18 (!) 133/94                 Passed - Medication is active on med list        Passed - Patient is age 18 or older        Passed - Recent (6 mo) or future (30 days) visit within the authorizing provider's specialty     Patient had office visit in the last 6 months or has a visit in the next 30 days with authorizing provider or within the authorizing provider's specialty.  See \"Patient Info\" tab in inbasket, or \"Choose Columns\" in Meds & Orders section of the refill encounter.            Last Written Prescription Date:  7/10/19  Last Fill Quantity: 60,  # refills: 0   Last office visit: 12/17/2018 with prescribing provider:  Thaddeus   Future Office Visit:   Next 5 appointments (look out 90 days)    Aug 02, 2019  9:00 AM CDT  Return Visit with Kleber Pollack [facility] ([address] [address]  [phone]   Aug 16, 2019 10:20 AM CDT  SHORT with Jignesh Travis MD  [facility] ([facility]) [address] [facility] [address]  [phone]   Aug 23, 2019  9:00 AM CDT  Return Visit with Kleber Pollack [address] [address]  [phone]   Sep 13, 2019  9:00 AM CDT  Return Visit with Kleber Pollack [facility] ([facility] [place]) [address]  [facility] [address]  [phone]   Sep 27, 2019  9:00 AM CDT  Return Visit with SHAVONNE Gallego  [facility] ([facility]) [address]  [facility] [address]  [phone]

## 2019-08-02 ENCOUNTER — OFFICE VISIT (OUTPATIENT)
Dept: PSYCHOLOGY | Facility: CLINIC | Age: 64
End: 2019-08-02
Payer: MEDICARE

## 2019-08-02 DIAGNOSIS — F41.1 GENERALIZED ANXIETY DISORDER: ICD-10-CM

## 2019-08-02 DIAGNOSIS — F33.1 MAJOR DEPRESSIVE DISORDER, RECURRENT EPISODE, MODERATE (H): Primary | ICD-10-CM

## 2019-08-02 DIAGNOSIS — F43.10 POSTTRAUMATIC STRESS DISORDER: ICD-10-CM

## 2019-08-02 PROCEDURE — 90834 PSYTX W PT 45 MINUTES: CPT | Performed by: SOCIAL WORKER

## 2019-08-02 RX ORDER — DULOXETIN HYDROCHLORIDE 60 MG/1
CAPSULE, DELAYED RELEASE ORAL
Qty: 60 CAPSULE | Refills: 0 | Status: SHIPPED | OUTPATIENT
Start: 2019-08-02 | End: 2019-08-16

## 2019-08-02 ASSESSMENT — ANXIETY QUESTIONNAIRES
6. BECOMING EASILY ANNOYED OR IRRITABLE: MORE THAN HALF THE DAYS
5. BEING SO RESTLESS THAT IT IS HARD TO SIT STILL: MORE THAN HALF THE DAYS
3. WORRYING TOO MUCH ABOUT DIFFERENT THINGS: MORE THAN HALF THE DAYS
2. NOT BEING ABLE TO STOP OR CONTROL WORRYING: MORE THAN HALF THE DAYS
IF YOU CHECKED OFF ANY PROBLEMS ON THIS QUESTIONNAIRE, HOW DIFFICULT HAVE THESE PROBLEMS MADE IT FOR YOU TO DO YOUR WORK, TAKE CARE OF THINGS AT HOME, OR GET ALONG WITH OTHER PEOPLE: VERY DIFFICULT
1. FEELING NERVOUS, ANXIOUS, OR ON EDGE: MORE THAN HALF THE DAYS
GAD7 TOTAL SCORE: 12
7. FEELING AFRAID AS IF SOMETHING AWFUL MIGHT HAPPEN: NOT AT ALL
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
## 2019-08-03 ASSESSMENT — ANXIETY QUESTIONNAIRES: GAD7 TOTAL SCORE: 12

## 2019-08-16 ENCOUNTER — OFFICE VISIT (OUTPATIENT)
Dept: FAMILY MEDICINE | Facility: CLINIC | Age: 64
End: 2019-08-16
Payer: MEDICARE

## 2019-08-16 VITALS
HEART RATE: 102 BPM | TEMPERATURE: 98.7 F | OXYGEN SATURATION: 92 % | SYSTOLIC BLOOD PRESSURE: 130 MMHG | DIASTOLIC BLOOD PRESSURE: 82 MMHG | RESPIRATION RATE: 16 BRPM

## 2019-08-16 DIAGNOSIS — G89.4 CHRONIC PAIN SYNDROME: Chronic | ICD-10-CM

## 2019-08-16 DIAGNOSIS — F32.A DEPRESSION, UNSPECIFIED DEPRESSION TYPE: ICD-10-CM

## 2019-08-16 DIAGNOSIS — M54.17 LUMBOSACRAL RADICULITIS: Chronic | ICD-10-CM

## 2019-08-16 DIAGNOSIS — M54.16 LUMBAR RADICULOPATHY: Primary | ICD-10-CM

## 2019-08-16 DIAGNOSIS — J40 BRONCHITIS: ICD-10-CM

## 2019-08-16 DIAGNOSIS — E78.5 HYPERLIPIDEMIA LDL GOAL <130: ICD-10-CM

## 2019-08-16 PROCEDURE — 99214 OFFICE O/P EST MOD 30 MIN: CPT | Performed by: FAMILY MEDICINE

## 2019-08-16 RX ORDER — ALBUTEROL SULFATE 90 UG/1
2 AEROSOL, METERED RESPIRATORY (INHALATION) EVERY 4 HOURS PRN
Qty: 1 INHALER | Refills: 11 | Status: SHIPPED | OUTPATIENT
Start: 2019-08-16 | End: 2020-08-24

## 2019-08-16 RX ORDER — SIMVASTATIN 80 MG
80 TABLET ORAL DAILY
Qty: 90 TABLET | Refills: 3 | Status: SHIPPED | OUTPATIENT
Start: 2019-08-16 | End: 2020-08-14

## 2019-08-16 RX ORDER — DULOXETIN HYDROCHLORIDE 60 MG/1
CAPSULE, DELAYED RELEASE ORAL
Qty: 180 CAPSULE | Refills: 3 | Status: SHIPPED | OUTPATIENT
Start: 2019-08-16 | End: 2020-08-18

## 2019-08-16 NOTE — PROGRESS NOTES
Subjective     Melquiades Morales is a 64 year old male who presents to clinic today for the following health issues:    HPI   Chief Complaint   Patient presents with     Recheck Medication     forms               Reviewed and updated as needed this visit by Provider         Review of Systems         Objective    /82 (Cuff Size: Adult Regular)   Pulse 102   Temp 98.7  F (37.1  C) (Tympanic)   Resp 16   SpO2 92%   There is no height or weight on file to calculate BMI.  Physical Exam               Further history obtained, clarified or corrected by physician:  He comes in for examination today.  He continues to have trouble with low back pain and chronic pain.  This is unchanging.  He needs wheelchair assistance frequently though not 100% of the time.  He does need a motorized wheelchair for getting around because of his low back pain.    OBJECTIVE:  /82 (Cuff Size: Adult Regular)   Pulse 102   Temp 98.7  F (37.1  C) (Tympanic)   Resp 16   SpO2 92%   LUNGS: clear to auscultation, normal breath sounds  CV: RRR without murmur  ABD: BS+, soft, nontender, no masses, no hepatosplenomegaly  EXTREMITIES: without joint tenderness, swelling or erythema.  No muscle tenderness or abnormality.  SKIN: No rashes or abnormalities  NEURO:non focal exam    ASSESSMENT:     Lumbosacral radiculitis  Lumbar radiculopathy  Chronic pain syndrome  Depression, unspecified depression type  Hyperlipidemia LDL goal <130  Bronchitis    PLAN:  Orders Placed This Encounter     DULoxetine (CYMBALTA) 60 MG capsule     simvastatin (ZOCOR) 80 MG tablet     albuterol (PROAIR HFA) 108 (90 Base) MCG/ACT inhaler         Documentation of Face-to-Face and Certification for Home Health Services     Patient: Melquiades Morales   YOB: 1955  MR Number: [id number]  Today's Date: 8/16/2019    I certify that patient: Melquiades Morales is under my care and that I, or a nurse practitioner or physician's assistant working with me, had  a face-to-face encounter that meets the physician face-to-face encounter requirements with this patient on: 8/16/2019.    This encounter with the patient was in whole, or in part, for the following medical condition, which is the primary reason for wheelchair need: Chronic low back pain and radiculitis    I certify that, based on my findings, the following services are medically necessary home health services: Motorized wheelchair.    My clinical findings support the need for the above services because: He is unable to ambulate without significant assistance    Physician/Provider to provide follow up care: Jignesh Travis    Responsible Medicare certified PECOS Physician: Jignesh Travis MD    Physician Signature: See electronic signature associated with these discharge orders.  Date: 8/16/2019

## 2019-08-16 NOTE — TELEPHONE ENCOUNTER
Forms filled out and faxed to [facility] at [phone].  Libby Peterson  [facility] [place] [facility]

## 2019-08-16 NOTE — PATIENT INSTRUCTIONS
Our Clinic hours are:  Mondays    7:20 am - 7 pm  Tues -  Fri  7:20 am - 5 pm    Clinic Phone: [phone]    The clinic lab opens at 7:30 am Mon - Fri and appointments are required.    [facility]. [phone]  Monday  8 am - 7pm  Tues - Fri 8 am - 5:30 pm

## 2019-08-19 ENCOUNTER — TELEPHONE (OUTPATIENT)
Dept: FAMILY MEDICINE | Facility: CLINIC | Age: 64
End: 2019-08-19

## 2019-08-19 NOTE — TELEPHONE ENCOUNTER
"I called [facility], they can resubmit with the other names on the Rx, \"sorry, our system sent you that request automatically, we will handle it, thanks, \"  Jojo Garcia RNC    "

## 2019-08-19 NOTE — TELEPHONE ENCOUNTER
Per fax received from [facility]/Cover My Meds - Albuterol Inhaler is not covered by patient's Insurance Company  Dr. Travis - Please choose:  1.  Change medication that is not covered to a different medication and send new prescription to patient's pharmacy?  2.  Patient will need to pay for the non-covered medication out-of-pocket?   3.  Try for Prior Authorization with Insurance Company to get medication covered?       Key# [id number]

## 2019-09-13 ENCOUNTER — OFFICE VISIT (OUTPATIENT)
Dept: PSYCHOLOGY | Facility: CLINIC | Age: 64
End: 2019-09-13
Payer: MEDICARE

## 2019-09-13 DIAGNOSIS — F33.1 MAJOR DEPRESSIVE DISORDER, RECURRENT EPISODE, MODERATE (H): ICD-10-CM

## 2019-09-13 DIAGNOSIS — F43.10 POSTTRAUMATIC STRESS DISORDER: Primary | ICD-10-CM

## 2019-09-13 DIAGNOSIS — F41.1 GENERALIZED ANXIETY DISORDER: ICD-10-CM

## 2019-09-13 PROCEDURE — 90834 PSYTX W PT 45 MINUTES: CPT | Performed by: SOCIAL WORKER

## 2019-09-13 ASSESSMENT — ANXIETY QUESTIONNAIRES
2. NOT BEING ABLE TO STOP OR CONTROL WORRYING: NEARLY EVERY DAY
3. WORRYING TOO MUCH ABOUT DIFFERENT THINGS: NEARLY EVERY DAY
1. FEELING NERVOUS, ANXIOUS, OR ON EDGE: NEARLY EVERY DAY
6. BECOMING EASILY ANNOYED OR IRRITABLE: NEARLY EVERY DAY
7. FEELING AFRAID AS IF SOMETHING AWFUL MIGHT HAPPEN: NOT AT ALL
IF YOU CHECKED OFF ANY PROBLEMS ON THIS QUESTIONNAIRE, HOW DIFFICULT HAVE THESE PROBLEMS MADE IT FOR YOU TO DO YOUR WORK, TAKE CARE OF THINGS AT HOME, OR GET ALONG WITH OTHER PEOPLE: VERY DIFFICULT
GAD7 TOTAL SCORE: 17
5. BEING SO RESTLESS THAT IT IS HARD TO SIT STILL: MORE THAN HALF THE DAYS

## 2019-09-13 ASSESSMENT — PATIENT HEALTH QUESTIONNAIRE - PHQ9
SUM OF ALL RESPONSES TO PHQ QUESTIONS 1-9: 18
5. POOR APPETITE OR OVEREATING: NEARLY EVERY DAY

## 2019-09-13 NOTE — Clinical Note
He said he has been very shaky last few days. He noticed by the time our session was ending that it had stopped for the most part. He meets with his  soon; hoping to be able to see his PCA the normal amount of hours.
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
## 2019-09-14 ASSESSMENT — ANXIETY QUESTIONNAIRES: GAD7 TOTAL SCORE: 17

## 2019-09-26 ENCOUNTER — APPOINTMENT (OUTPATIENT)
Dept: GENERAL RADIOLOGY | Facility: CLINIC | Age: 64
DRG: 871 | End: 2019-09-26
Attending: EMERGENCY MEDICINE
Payer: MEDICARE

## 2019-09-26 ENCOUNTER — HOSPITAL ENCOUNTER (INPATIENT)
Facility: CLINIC | Age: 64
LOS: 2 days | Discharge: HOME | DRG: 871 | End: 2019-09-28
Attending: EMERGENCY MEDICINE | Admitting: INTERNAL MEDICINE
Payer: MEDICARE

## 2019-09-26 DIAGNOSIS — J18.9 PNEUMONIA OF LEFT LUNG DUE TO INFECTIOUS ORGANISM, UNSPECIFIED PART OF LUNG: ICD-10-CM

## 2019-09-26 DIAGNOSIS — F17.210 CIGARETTE SMOKER: ICD-10-CM

## 2019-09-26 DIAGNOSIS — A41.9 SEPSIS, DUE TO UNSPECIFIED ORGANISM: ICD-10-CM

## 2019-09-26 DIAGNOSIS — J44.1 CHRONIC OBSTRUCTIVE PULMONARY DISEASE WITH ACUTE EXACERBATION (H): Primary | Chronic | ICD-10-CM

## 2019-09-26 LAB
ALBUMIN SERPL-MCNC: 3.8 G/DL (ref 3.4–5)
ALBUMIN UR-MCNC: NEGATIVE MG/DL
ALP SERPL-CCNC: 85 U/L (ref 40–150)
ALT SERPL W P-5'-P-CCNC: 16 U/L (ref 0–70)
ANION GAP SERPL CALCULATED.3IONS-SCNC: 6 MMOL/L (ref 3–14)
APPEARANCE UR: CLEAR
AST SERPL W P-5'-P-CCNC: 13 U/L (ref 0–45)
BASOPHILS # BLD AUTO: 0 10E9/L (ref 0–0.2)
BASOPHILS NFR BLD AUTO: 0.3 %
BILIRUB SERPL-MCNC: 0.6 MG/DL (ref 0.2–1.3)
BILIRUB UR QL STRIP: NEGATIVE
BUN SERPL-MCNC: 7 MG/DL (ref 7–30)
CALCIUM SERPL-MCNC: 9.8 MG/DL (ref 8.5–10.1)
CHLORIDE SERPL-SCNC: 105 MMOL/L (ref 94–109)
CO2 SERPL-SCNC: 33 MMOL/L (ref 20–32)
COLOR UR AUTO: YELLOW
CREAT SERPL-MCNC: 0.72 MG/DL (ref 0.66–1.25)
DIFFERENTIAL METHOD BLD: ABNORMAL
EOSINOPHIL # BLD AUTO: 0 10E9/L (ref 0–0.7)
EOSINOPHIL NFR BLD AUTO: 0.3 %
ERYTHROCYTE [DISTWIDTH] IN BLOOD BY AUTOMATED COUNT: 13.5 % (ref 10–15)
GFR SERPL CREATININE-BSD FRML MDRD: >90 ML/MIN/{1.73_M2}
GLUCOSE SERPL-MCNC: 117 MG/DL (ref 70–99)
GLUCOSE UR STRIP-MCNC: NEGATIVE MG/DL
HCT VFR BLD AUTO: 54.3 % (ref 40–53)
HGB BLD-MCNC: 17.7 G/DL (ref 13.3–17.7)
HGB UR QL STRIP: NEGATIVE
IMM GRANULOCYTES # BLD: 0 10E9/L (ref 0–0.4)
IMM GRANULOCYTES NFR BLD: 0.3 %
KETONES UR STRIP-MCNC: 80 MG/DL
LACTATE BLD-SCNC: 1.1 MMOL/L (ref 0.7–2)
LACTATE BLD-SCNC: 2.3 MMOL/L (ref 0.7–2)
LEUKOCYTE ESTERASE UR QL STRIP: NEGATIVE
LIPASE SERPL-CCNC: 78 U/L (ref 73–393)
LYMPHOCYTES # BLD AUTO: 2.7 10E9/L (ref 0.8–5.3)
LYMPHOCYTES NFR BLD AUTO: 20.9 %
MCH RBC QN AUTO: 32.1 PG (ref 26.5–33)
MCHC RBC AUTO-ENTMCNC: 32.6 G/DL (ref 31.5–36.5)
MCV RBC AUTO: 98 FL (ref 78–100)
MONOCYTES # BLD AUTO: 1.1 10E9/L (ref 0–1.3)
MONOCYTES NFR BLD AUTO: 8.3 %
NEUTROPHILS # BLD AUTO: 9.1 10E9/L (ref 1.6–8.3)
NEUTROPHILS NFR BLD AUTO: 69.9 %
NITRATE UR QL: NEGATIVE
NRBC # BLD AUTO: 0 10*3/UL
NRBC BLD AUTO-RTO: 0 /100
PH UR STRIP: 8 PH (ref 5–7)
PLATELET # BLD AUTO: 274 10E9/L (ref 150–450)
POTASSIUM SERPL-SCNC: 4 MMOL/L (ref 3.4–5.3)
PROT SERPL-MCNC: 8.4 G/DL (ref 6.8–8.8)
RBC # BLD AUTO: 5.52 10E12/L (ref 4.4–5.9)
SODIUM SERPL-SCNC: 144 MMOL/L (ref 133–144)
SOURCE: ABNORMAL
SP GR UR STRIP: 1.02 (ref 1–1.03)
UROBILINOGEN UR STRIP-MCNC: 2 MG/DL (ref 0–2)
WBC # BLD AUTO: 13 10E9/L (ref 4–11)

## 2019-09-26 PROCEDURE — 99223 1ST HOSP IP/OBS HIGH 75: CPT | Mod: AI | Performed by: INTERNAL MEDICINE

## 2019-09-26 PROCEDURE — 25000128 H RX IP 250 OP 636: Performed by: INTERNAL MEDICINE

## 2019-09-26 PROCEDURE — 81003 URINALYSIS AUTO W/O SCOPE: CPT | Performed by: EMERGENCY MEDICINE

## 2019-09-26 PROCEDURE — 96366 THER/PROPH/DIAG IV INF ADDON: CPT | Performed by: EMERGENCY MEDICINE

## 2019-09-26 PROCEDURE — 25000132 ZZH RX MED GY IP 250 OP 250 PS 637: Mod: GY | Performed by: INTERNAL MEDICINE

## 2019-09-26 PROCEDURE — 99285 EMERGENCY DEPT VISIT HI MDM: CPT | Mod: 25 | Performed by: EMERGENCY MEDICINE

## 2019-09-26 PROCEDURE — 80053 COMPREHEN METABOLIC PANEL: CPT | Performed by: EMERGENCY MEDICINE

## 2019-09-26 PROCEDURE — 96368 THER/DIAG CONCURRENT INF: CPT | Performed by: EMERGENCY MEDICINE

## 2019-09-26 PROCEDURE — 87040 BLOOD CULTURE FOR BACTERIA: CPT | Performed by: EMERGENCY MEDICINE

## 2019-09-26 PROCEDURE — 99285 EMERGENCY DEPT VISIT HI MDM: CPT | Mod: Z6 | Performed by: EMERGENCY MEDICINE

## 2019-09-26 PROCEDURE — 25800030 ZZH RX IP 258 OP 636: Performed by: EMERGENCY MEDICINE

## 2019-09-26 PROCEDURE — 96368 THER/DIAG CONCURRENT INF: CPT

## 2019-09-26 PROCEDURE — 25800030 ZZH RX IP 258 OP 636: Performed by: INTERNAL MEDICINE

## 2019-09-26 PROCEDURE — 96366 THER/PROPH/DIAG IV INF ADDON: CPT

## 2019-09-26 PROCEDURE — 25000131 ZZH RX MED GY IP 250 OP 636 PS 637: Mod: GY | Performed by: INTERNAL MEDICINE

## 2019-09-26 PROCEDURE — 83690 ASSAY OF LIPASE: CPT | Performed by: EMERGENCY MEDICINE

## 2019-09-26 PROCEDURE — 40000275 ZZH STATISTIC RCP TIME EA 10 MIN

## 2019-09-26 PROCEDURE — 94640 AIRWAY INHALATION TREATMENT: CPT

## 2019-09-26 PROCEDURE — 25000125 ZZHC RX 250: Performed by: INTERNAL MEDICINE

## 2019-09-26 PROCEDURE — 94640 AIRWAY INHALATION TREATMENT: CPT | Mod: 76

## 2019-09-26 PROCEDURE — 85025 COMPLETE CBC W/AUTO DIFF WBC: CPT | Performed by: EMERGENCY MEDICINE

## 2019-09-26 PROCEDURE — 96361 HYDRATE IV INFUSION ADD-ON: CPT | Performed by: EMERGENCY MEDICINE

## 2019-09-26 PROCEDURE — 99207 ZZC CDG-MDM COMPONENT: MEETS MODERATE - UP CODED: CPT | Performed by: INTERNAL MEDICINE

## 2019-09-26 PROCEDURE — 96365 THER/PROPH/DIAG IV INF INIT: CPT | Performed by: EMERGENCY MEDICINE

## 2019-09-26 PROCEDURE — 12000000 ZZH R&B MED SURG/OB

## 2019-09-26 PROCEDURE — 25000132 ZZH RX MED GY IP 250 OP 250 PS 637: Mod: GY | Performed by: EMERGENCY MEDICINE

## 2019-09-26 PROCEDURE — 96361 HYDRATE IV INFUSION ADD-ON: CPT

## 2019-09-26 PROCEDURE — 71046 X-RAY EXAM CHEST 2 VIEWS: CPT

## 2019-09-26 PROCEDURE — 83605 ASSAY OF LACTIC ACID: CPT | Performed by: EMERGENCY MEDICINE

## 2019-09-26 PROCEDURE — 40000274 ZZH STATISTIC RCP CONSULT EA 30 MIN

## 2019-09-26 PROCEDURE — 96365 THER/PROPH/DIAG IV INF INIT: CPT

## 2019-09-26 PROCEDURE — 25000128 H RX IP 250 OP 636: Performed by: EMERGENCY MEDICINE

## 2019-09-26 PROCEDURE — 25000125 ZZHC RX 250: Performed by: EMERGENCY MEDICINE

## 2019-09-26 RX ORDER — IPRATROPIUM BROMIDE AND ALBUTEROL SULFATE 2.5; .5 MG/3ML; MG/3ML
3 SOLUTION RESPIRATORY (INHALATION)
Status: DISCONTINUED | OUTPATIENT
Start: 2019-09-26 | End: 2019-09-27

## 2019-09-26 RX ORDER — SODIUM CHLORIDE 9 MG/ML
INJECTION, SOLUTION INTRAVENOUS CONTINUOUS
Status: DISCONTINUED | OUTPATIENT
Start: 2019-09-26 | End: 2019-09-27

## 2019-09-26 RX ORDER — IPRATROPIUM BROMIDE AND ALBUTEROL SULFATE 2.5; .5 MG/3ML; MG/3ML
3 SOLUTION RESPIRATORY (INHALATION) ONCE
Status: COMPLETED | OUTPATIENT
Start: 2019-09-26 | End: 2019-09-26

## 2019-09-26 RX ORDER — MORPHINE SULFATE 30 MG/1
30 TABLET, FILM COATED, EXTENDED RELEASE ORAL EVERY 12 HOURS SCHEDULED
Status: DISCONTINUED | OUTPATIENT
Start: 2019-09-26 | End: 2019-09-28 | Stop reason: HOSPADM

## 2019-09-26 RX ORDER — ACETAMINOPHEN 325 MG/1
650 TABLET ORAL EVERY 4 HOURS PRN
Status: DISCONTINUED | OUTPATIENT
Start: 2019-09-26 | End: 2019-09-28 | Stop reason: HOSPADM

## 2019-09-26 RX ORDER — NAPROXEN 250 MG/1
250 TABLET ORAL 2 TIMES DAILY PRN
Status: DISCONTINUED | OUTPATIENT
Start: 2019-09-26 | End: 2019-09-28 | Stop reason: HOSPADM

## 2019-09-26 RX ORDER — HYDROMORPHONE HYDROCHLORIDE 1 MG/ML
0.5 INJECTION, SOLUTION INTRAMUSCULAR; INTRAVENOUS; SUBCUTANEOUS
Status: DISCONTINUED | OUTPATIENT
Start: 2019-09-26 | End: 2019-09-28 | Stop reason: HOSPADM

## 2019-09-26 RX ORDER — TRAZODONE HYDROCHLORIDE 50 MG/1
50 TABLET, FILM COATED ORAL
Status: DISCONTINUED | OUTPATIENT
Start: 2019-09-26 | End: 2019-09-28 | Stop reason: HOSPADM

## 2019-09-26 RX ORDER — ONDANSETRON 4 MG/1
4 TABLET, ORALLY DISINTEGRATING ORAL EVERY 6 HOURS PRN
Status: DISCONTINUED | OUTPATIENT
Start: 2019-09-26 | End: 2019-09-28 | Stop reason: HOSPADM

## 2019-09-26 RX ORDER — ALBUTEROL SULFATE 90 UG/1
2 AEROSOL, METERED RESPIRATORY (INHALATION) EVERY 4 HOURS PRN
Status: DISCONTINUED | OUTPATIENT
Start: 2019-09-26 | End: 2019-09-28 | Stop reason: HOSPADM

## 2019-09-26 RX ORDER — PREDNISONE 20 MG/1
40 TABLET ORAL DAILY
Status: DISCONTINUED | OUTPATIENT
Start: 2019-09-26 | End: 2019-09-28 | Stop reason: HOSPADM

## 2019-09-26 RX ORDER — OXYCODONE HYDROCHLORIDE 5 MG/1
5 TABLET ORAL 3 TIMES DAILY PRN
Status: DISCONTINUED | OUTPATIENT
Start: 2019-09-26 | End: 2019-09-28 | Stop reason: HOSPADM

## 2019-09-26 RX ORDER — OXYCODONE HYDROCHLORIDE 5 MG/1
1-2 TABLET ORAL 3 TIMES DAILY PRN
Refills: 0 | COMMUNITY
Start: 2019-08-30 | End: 2021-03-22

## 2019-09-26 RX ORDER — MORPHINE SULFATE 30 MG/1
TABLET, FILM COATED, EXTENDED RELEASE ORAL EVERY 12 HOURS
Refills: 0 | COMMUNITY
Start: 2019-08-30

## 2019-09-26 RX ORDER — NALOXONE HYDROCHLORIDE 0.4 MG/ML
.1-.4 INJECTION, SOLUTION INTRAMUSCULAR; INTRAVENOUS; SUBCUTANEOUS
Status: DISCONTINUED | OUTPATIENT
Start: 2019-09-26 | End: 2019-09-28 | Stop reason: HOSPADM

## 2019-09-26 RX ORDER — ONDANSETRON 2 MG/ML
4 INJECTION INTRAMUSCULAR; INTRAVENOUS EVERY 6 HOURS PRN
Status: DISCONTINUED | OUTPATIENT
Start: 2019-09-26 | End: 2019-09-28 | Stop reason: HOSPADM

## 2019-09-26 RX ORDER — SIMVASTATIN 40 MG
80 TABLET ORAL DAILY
Status: DISCONTINUED | OUTPATIENT
Start: 2019-09-27 | End: 2019-09-28 | Stop reason: HOSPADM

## 2019-09-26 RX ADMIN — PREDNISONE 40 MG: 20 TABLET ORAL at 16:18

## 2019-09-26 RX ADMIN — MORPHINE SULFATE 30 MG: 30 TABLET, FILM COATED, EXTENDED RELEASE ORAL at 20:47

## 2019-09-26 RX ADMIN — ENOXAPARIN SODIUM 40 MG: 40 INJECTION SUBCUTANEOUS at 16:18

## 2019-09-26 RX ADMIN — AZITHROMYCIN MONOHYDRATE 500 MG: 500 INJECTION, POWDER, LYOPHILIZED, FOR SOLUTION INTRAVENOUS at 13:22

## 2019-09-26 RX ADMIN — TAZOBACTAM SODIUM AND PIPERACILLIN SODIUM 4.5 G: 500; 4 INJECTION, SOLUTION INTRAVENOUS at 18:28

## 2019-09-26 RX ADMIN — SODIUM CHLORIDE 1000 ML: 9 INJECTION, SOLUTION INTRAVENOUS at 13:23

## 2019-09-26 RX ADMIN — IPRATROPIUM BROMIDE AND ALBUTEROL SULFATE 3 ML: .5; 3 SOLUTION RESPIRATORY (INHALATION) at 21:08

## 2019-09-26 RX ADMIN — IPRATROPIUM BROMIDE AND ALBUTEROL SULFATE 3 ML: .5; 3 SOLUTION RESPIRATORY (INHALATION) at 16:36

## 2019-09-26 RX ADMIN — PREGABALIN 150 MG: 100 CAPSULE ORAL at 20:47

## 2019-09-26 RX ADMIN — TAZOBACTAM SODIUM AND PIPERACILLIN SODIUM 4.5 G: 500; 4 INJECTION, SOLUTION INTRAVENOUS at 12:58

## 2019-09-26 RX ADMIN — NAPROXEN 250 MG: 250 TABLET ORAL at 17:19

## 2019-09-26 RX ADMIN — ACETAMINOPHEN 650 MG: 325 TABLET, FILM COATED ORAL at 20:47

## 2019-09-26 RX ADMIN — SODIUM CHLORIDE: 9 INJECTION, SOLUTION INTRAVENOUS at 16:18

## 2019-09-26 RX ADMIN — SODIUM CHLORIDE 1000 ML: 9 INJECTION, SOLUTION INTRAVENOUS at 12:09

## 2019-09-26 RX ADMIN — IPRATROPIUM BROMIDE AND ALBUTEROL SULFATE 3 ML: .5; 3 SOLUTION RESPIRATORY (INHALATION) at 14:17

## 2019-09-26 ASSESSMENT — ACTIVITIES OF DAILY LIVING (ADL)
ADLS_ACUITY_SCORE: 20
ADLS_ACUITY_SCORE: 17

## 2019-09-26 ASSESSMENT — MIFFLIN-ST. JEOR: SCORE: 1862

## 2019-09-26 NOTE — PLAN OF CARE
"Pt declining to keep capno on, \"it bothers me, I dont want it\". RN educated pt on the importance of monitoring his respiratory status at this time do to his opioid use and his current difficulty with breathing (possible pneumonia). He stated \"I dont care I am not wearing it\" and threw the cannula towards the wall. Pt agreeable to wear regular oxygen cannula, and placed on continuous pulse oxymetry.    "

## 2019-09-26 NOTE — PLAN OF CARE
Pt lethargic, oriented x4, slow to answer at times. Tachypneic, requiring 3-4L of o2. Declined skin assessment. Arouses to loud voice and frequent cueing as patient drowses off mid-sentence.     RN was completing patients med rec with patient, pt reported taking Morphine 3 tablets 3 times daily (ordered 2 tabs 3x daily) and oxycodone 20mg 3x daily for the last few days. Discussed with provider, pt on capno for respiratory monitoring.     Alarms on.

## 2019-09-26 NOTE — ED NOTES
Fever, chills, and weakness for a couple of days and productive cough with yellowish sputum for a couple of weeks.  No Tylenol or Ibuprofen today.  Patient's roommate also has a cough but feeling better.  Patient denies pain with urination.  Pale and diaphoretic in ER.

## 2019-09-26 NOTE — ED NOTES
DATE:  9/26/2019   TIME OF RECEIPT FROM LAB:  1206  LAB TEST:  Lactic Acid  LAB VALUE:  2.3  RESULTS GIVEN WITH READ-BACK TO (PROVIDER):  Issac Worthington MD  TIME LAB VALUE REPORTED TO PROVIDER:   1202

## 2019-09-26 NOTE — PLAN OF CARE
[facility] [place]    Patient admitted to room 2407 at approximately 1445 via cart from emergency room. Patient was accompanied by transport tech.     Verbal SBAR report received from Milly GUILLAUME prior to patient arrival.     Patient ambulated to bed with stand-by assist. Patient alert and oriented X 3. The patient is not having any pain. 0-10 Pain Scale: 8. Admission vital signs: Blood pressure (!) 174/90, pulse 100, temperature 99.2  F (37.3  C), temperature source Oral, resp. rate 18, height 1.829 m (6'), weight 102.4 kg (225 lb 12 oz), SpO2 93 %. Patient was oriented to plan of care, call light, bed controls, tv, telephone, bathroom and visiting hours.     Risk Assessment    The following safety risks were identified during admission: fall. Yellow risk band applied: YES.     Skin Initial Assessment    Pt declined skin assessment upon admission, will try again later.          Marisela Fuentes RN
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
## 2019-09-27 LAB — LACTATE BLD-SCNC: 1 MMOL/L (ref 0.7–2)

## 2019-09-27 PROCEDURE — 25000128 H RX IP 250 OP 636: Performed by: EMERGENCY MEDICINE

## 2019-09-27 PROCEDURE — 99233 SBSQ HOSP IP/OBS HIGH 50: CPT | Performed by: FAMILY MEDICINE

## 2019-09-27 PROCEDURE — 94640 AIRWAY INHALATION TREATMENT: CPT | Mod: 76

## 2019-09-27 PROCEDURE — 12000000 ZZH R&B MED SURG/OB

## 2019-09-27 PROCEDURE — 25000131 ZZH RX MED GY IP 250 OP 636 PS 637: Mod: GY | Performed by: EMERGENCY MEDICINE

## 2019-09-27 PROCEDURE — 40000275 ZZH STATISTIC RCP TIME EA 10 MIN

## 2019-09-27 PROCEDURE — 90682 RIV4 VACC RECOMBINANT DNA IM: CPT | Performed by: INTERNAL MEDICINE

## 2019-09-27 PROCEDURE — 25000132 ZZH RX MED GY IP 250 OP 250 PS 637: Mod: GY | Performed by: FAMILY MEDICINE

## 2019-09-27 PROCEDURE — 25000125 ZZHC RX 250: Performed by: FAMILY MEDICINE

## 2019-09-27 PROCEDURE — 25800030 ZZH RX IP 258 OP 636: Performed by: EMERGENCY MEDICINE

## 2019-09-27 PROCEDURE — 25000131 ZZH RX MED GY IP 250 OP 636 PS 637: Mod: GY | Performed by: INTERNAL MEDICINE

## 2019-09-27 PROCEDURE — 36415 COLL VENOUS BLD VENIPUNCTURE: CPT | Performed by: FAMILY MEDICINE

## 2019-09-27 PROCEDURE — 25000128 H RX IP 250 OP 636: Performed by: INTERNAL MEDICINE

## 2019-09-27 PROCEDURE — 83605 ASSAY OF LACTIC ACID: CPT | Performed by: FAMILY MEDICINE

## 2019-09-27 PROCEDURE — 25000132 ZZH RX MED GY IP 250 OP 250 PS 637: Mod: GY | Performed by: EMERGENCY MEDICINE

## 2019-09-27 PROCEDURE — 40000270 ZZH STATISTIC OXYGEN  O2DAILY TECH TIME

## 2019-09-27 PROCEDURE — 25000125 ZZHC RX 250: Performed by: INTERNAL MEDICINE

## 2019-09-27 PROCEDURE — 94640 AIRWAY INHALATION TREATMENT: CPT

## 2019-09-27 PROCEDURE — 25000132 ZZH RX MED GY IP 250 OP 250 PS 637: Mod: GY | Performed by: INTERNAL MEDICINE

## 2019-09-27 PROCEDURE — 25800030 ZZH RX IP 258 OP 636: Performed by: INTERNAL MEDICINE

## 2019-09-27 RX ORDER — IPRATROPIUM BROMIDE AND ALBUTEROL SULFATE 2.5; .5 MG/3ML; MG/3ML
3 SOLUTION RESPIRATORY (INHALATION)
Status: DISCONTINUED | OUTPATIENT
Start: 2019-09-27 | End: 2019-09-28 | Stop reason: HOSPADM

## 2019-09-27 RX ORDER — CALCIUM CARBONATE 500 MG/1
500 TABLET, CHEWABLE ORAL DAILY PRN
Status: DISCONTINUED | OUTPATIENT
Start: 2019-09-27 | End: 2019-09-28 | Stop reason: HOSPADM

## 2019-09-27 RX ORDER — TRAZODONE HYDROCHLORIDE 100 MG/1
200 TABLET ORAL
Status: DISCONTINUED | OUTPATIENT
Start: 2019-09-27 | End: 2019-09-28 | Stop reason: HOSPADM

## 2019-09-27 RX ADMIN — TAZOBACTAM SODIUM AND PIPERACILLIN SODIUM 4.5 G: 500; 4 INJECTION, SOLUTION INTRAVENOUS at 00:30

## 2019-09-27 RX ADMIN — PREGABALIN 150 MG: 100 CAPSULE ORAL at 08:04

## 2019-09-27 RX ADMIN — CALCIUM CARBONATE (ANTACID) CHEW TAB 500 MG 500 MG: 500 CHEW TAB at 16:52

## 2019-09-27 RX ADMIN — IPRATROPIUM BROMIDE AND ALBUTEROL SULFATE 3 ML: .5; 3 SOLUTION RESPIRATORY (INHALATION) at 07:26

## 2019-09-27 RX ADMIN — TRAZODONE HYDROCHLORIDE 50 MG: 50 TABLET ORAL at 21:51

## 2019-09-27 RX ADMIN — AZITHROMYCIN MONOHYDRATE 250 MG: 500 INJECTION, POWDER, LYOPHILIZED, FOR SOLUTION INTRAVENOUS at 08:06

## 2019-09-27 RX ADMIN — PREGABALIN 150 MG: 100 CAPSULE ORAL at 20:05

## 2019-09-27 RX ADMIN — TAZOBACTAM SODIUM AND PIPERACILLIN SODIUM 4.5 G: 500; 4 INJECTION, SOLUTION INTRAVENOUS at 12:30

## 2019-09-27 RX ADMIN — ONDANSETRON 4 MG: 4 TABLET, ORALLY DISINTEGRATING ORAL at 19:03

## 2019-09-27 RX ADMIN — IPRATROPIUM BROMIDE AND ALBUTEROL SULFATE 3 ML: .5; 3 SOLUTION RESPIRATORY (INHALATION) at 11:57

## 2019-09-27 RX ADMIN — Medication 1 MG: at 21:51

## 2019-09-27 RX ADMIN — ACETAMINOPHEN 650 MG: 325 TABLET, FILM COATED ORAL at 03:30

## 2019-09-27 RX ADMIN — MORPHINE SULFATE 30 MG: 30 TABLET, FILM COATED, EXTENDED RELEASE ORAL at 08:05

## 2019-09-27 RX ADMIN — INFLUENZA A VIRUS A/BRISBANE/02/2018 (H1N1) RECOMBINANT HEMAGGLUTININ ANTIGEN, INFLUENZA A VIRUS A/KANSAS/14/2017 (H3N2) RECOMBINANT HEMAGGLUTININ ANTIGEN, INFLUENZA B VIRUS B/PHUKET/3073/2013 RECOMBINANT HEMAGGLUTININ ANTIGEN, AND INFLUENZA B VIRUS B/MARYLAND/15/2016 RECOMBINANT HEMAGGLUTININ ANTIGEN 0.5 ML: 45; 45; 45; 45 INJECTION INTRAMUSCULAR at 13:15

## 2019-09-27 RX ADMIN — TAZOBACTAM SODIUM AND PIPERACILLIN SODIUM 4.5 G: 500; 4 INJECTION, SOLUTION INTRAVENOUS at 18:23

## 2019-09-27 RX ADMIN — ENOXAPARIN SODIUM 40 MG: 40 INJECTION SUBCUTANEOUS at 15:34

## 2019-09-27 RX ADMIN — PREDNISONE 40 MG: 20 TABLET ORAL at 08:03

## 2019-09-27 RX ADMIN — SODIUM CHLORIDE: 9 INJECTION, SOLUTION INTRAVENOUS at 00:30

## 2019-09-27 RX ADMIN — IPRATROPIUM BROMIDE AND ALBUTEROL SULFATE 3 ML: .5; 3 SOLUTION RESPIRATORY (INHALATION) at 16:04

## 2019-09-27 RX ADMIN — SIMVASTATIN 80 MG: 40 TABLET, FILM COATED ORAL at 08:56

## 2019-09-27 RX ADMIN — TAZOBACTAM SODIUM AND PIPERACILLIN SODIUM 4.5 G: 500; 4 INJECTION, SOLUTION INTRAVENOUS at 06:16

## 2019-09-27 RX ADMIN — MORPHINE SULFATE 30 MG: 30 TABLET, FILM COATED, EXTENDED RELEASE ORAL at 20:05

## 2019-09-27 RX ADMIN — TRAZODONE HYDROCHLORIDE 200 MG: 100 TABLET ORAL at 21:52

## 2019-09-27 ASSESSMENT — ACTIVITIES OF DAILY LIVING (ADL)
ADLS_ACUITY_SCORE: 19
ADLS_ACUITY_SCORE: 17

## 2019-09-27 NOTE — PROGRESS NOTES
ASSESSMENT: PLAN:   A/P  PNEUMONIA LIKELY DUE TO INFECTIOUS ORGANISM  Presenting with fever/chills/cough with green sputum. Has leucocytosis/ lactic acidosis/ tachycardia and encephalopathy. Given 2L fluid bolus in ED and started on antbx.  -will continue antbx zosyn/ zithromax. Start iv fluids. Add prednisone x 5 days. Follow blood cx.  -will check procal and if negative would stop the antibiotics.  The infiltrates are not impressive but the wheezing is.          SEPSIS due to CAP   Some lethargy, elevated WBC, fever,   Lactic normal on admission.       Acute hypoxic resp failure due to COPD EXACERBATION  -start prednisone/ nebs  -may be a viral trigger.  procal pending.       ACUTE METABOLIC ENCEPHALOPATHY  -was very lethargic, somnolent on admission.  Is on MS contin 30 BID at baseline.    Due to pneumonia with sepsis and narcotics along with cymbalta/ lyrica/trazodone  -will rx pneumia/ sepsis as above. Resume meds at lower doses and follow.     HLD  Continue meds     CHRONIC PAIN SYNDROME/ lumbar radiculopathy/chronic back pain  On chronic high dose narcs. Pt somnolant now.   -will resume meds at lower dose once med-rec done  -does nothing except lays on the couch at home the last 13 years.      ANXIETY  Continue meds     Discussion.   procal to see if antibiotics needed.   Nebs/steroids, O2 support.      DVT PROF-lovenox         SUBJECTIVE:  Still sob, productive cough.   Appetite OK  More awake than on admission.       ROS:4 point ROS including Respiratory, CV, GI and , other than that noted in the HPI,  is negative     OBJECTIVE:   /68 (BP Location: Left arm)   Pulse 91   Temp 97.6  F (36.4  C) (Oral)   Resp 18   Ht 1.829 m (6')   Wt 102.4 kg (225 lb 12 oz)   SpO2 94%   BMI 30.62 kg/m      GENERAL APPEARANCE:  Alert, responsive,      RESP:diffuse wheezes, prolonged expiratory phase      CV: regular rate and rhythm,  No  murmur , edema: none       Abdomen: soft, nontender, no liver or spleen  enlargement, no masses, BSs normal   Skin: no cyanosis, pallor, or jaundice    CMP  Recent Labs   Lab 09/26/19  1140      POTASSIUM 4.0   CHLORIDE 105   CO2 33*   ANIONGAP 6   *   BUN 7   CR 0.72   GFRESTIMATED >90   GFRESTBLACK >90   JESSEE 9.8   PROTTOTAL 8.4   ALBUMIN 3.8   BILITOTAL 0.6   ALKPHOS 85   AST 13   ALT 16     CBC  Recent Labs   Lab 09/26/19  1140   WBC 13.0*   RBC 5.52   HGB 17.7   HCT 54.3*   MCV 98   MCH 32.1   MCHC 32.6   RDW 13.5        INRNo lab results found in last 7 days.  Arterial BloodGas  No lab results found in last 7 days.   Venous Blood Gas  No lab results found in last 7 days.    Medications     azithromycin  250 mg Intravenous Q24H     enoxaparin  40 mg Subcutaneous Q24H     influenza vaccine adult (product based on age)  0.5 mL Intramuscular Prior to discharge     ipratropium - albuterol 0.5 mg/2.5 mg/3 mL  3 mL Nebulization 4x daily     melatonin  1 mg Oral At Bedtime     morphine  30 mg Oral Q12H DENIZ     piperacillin-tazobactam  4.5 g Intravenous Q6H     predniSONE  40 mg Oral Daily     pregabalin  150 mg Oral BID     simvastatin  80 mg Oral Daily       Intake/Output Summary (Last 24 hours) at 9/27/2019 1017  Last data filed at 9/27/2019 0617  Gross per 24 hour   Intake 2326 ml   Output --   Net 2326 ml

## 2019-09-27 NOTE — PLAN OF CARE
Pt afebrile this shift. Sleeping soundly but rouses to voice. Mumbles answers with eyes closed. Did get up with SBA to the bathroom. Chilled, bed damp from sweating. Pt washed up, bed linen changed. Pt voiding. Drinking po fluids with encouragement. Lungs coarse with exp wheezes. Sats on 4 liters 96%. Decreased oxygen 2 liters, sats 89-90%. Pt now on 3 liters & sats 93%. Has occasional non-productive cough.

## 2019-09-27 NOTE — PLAN OF CARE
Pt sleeping, rouses to voice, will mumble & answer questions with yes/no mostly. Keeps eyes closed most of time but does follow commands.  Pts bed was soaked with sweat, temp 99.5 oral. Sats on 4 liters 98%. Refused to have BP taken. When asked about pain, pt states its in his back. Pt sat up so staff could do partial bed bath & change bedding. Ok to take scheduled pain meds & also gave tylenol. Continuous oximeter on as pt has refused capnography. Bed alarm on for safety. Pt repositions self.

## 2019-09-27 NOTE — PLAN OF CARE
Pt continues to wheeze and remains on oxygen at 3 liters.  Declined neb initially then discussed Dr ordered for wheezing and pt complied.  Flu vaccine given and education sheet also.  Pt tolerating food and fluids.  Using morphine for pain with good relief.  No prn doses given or requested.  Complaint earlier of stomach burning so tums ordered and available if needed.  Up to bathroom with assist of 1.  Enc to call for needs.  Still need sputum cx-not able to get yet

## 2019-09-28 VITALS
HEART RATE: 70 BPM | HEIGHT: 72 IN | BODY MASS INDEX: 30.58 KG/M2 | SYSTOLIC BLOOD PRESSURE: 157 MMHG | DIASTOLIC BLOOD PRESSURE: 70 MMHG | TEMPERATURE: 98.5 F | OXYGEN SATURATION: 91 % | RESPIRATION RATE: 18 BRPM | WEIGHT: 225.75 LBS

## 2019-09-28 LAB
ALBUMIN SERPL-MCNC: 2.7 G/DL (ref 3.4–5)
ALP SERPL-CCNC: 54 U/L (ref 40–150)
ALT SERPL W P-5'-P-CCNC: 18 U/L (ref 0–70)
ANION GAP SERPL CALCULATED.3IONS-SCNC: 4 MMOL/L (ref 3–14)
AST SERPL W P-5'-P-CCNC: 17 U/L (ref 0–45)
BASE EXCESS BLDV CALC-SCNC: 5.4 MMOL/L
BILIRUB SERPL-MCNC: 0.2 MG/DL (ref 0.2–1.3)
BUN SERPL-MCNC: 9 MG/DL (ref 7–30)
CALCIUM SERPL-MCNC: 8 MG/DL (ref 8.5–10.1)
CHLORIDE SERPL-SCNC: 112 MMOL/L (ref 94–109)
CO2 SERPL-SCNC: 31 MMOL/L (ref 20–32)
CREAT SERPL-MCNC: 0.58 MG/DL (ref 0.66–1.25)
ERYTHROCYTE [DISTWIDTH] IN BLOOD BY AUTOMATED COUNT: 14 % (ref 10–15)
GFR SERPL CREATININE-BSD FRML MDRD: >90 ML/MIN/{1.73_M2}
GLUCOSE SERPL-MCNC: 105 MG/DL (ref 70–99)
GRAM STN SPEC: NORMAL
HCO3 BLDV-SCNC: 32 MMOL/L (ref 21–28)
HCT VFR BLD AUTO: 42.3 % (ref 40–53)
HGB BLD-MCNC: 13.4 G/DL (ref 13.3–17.7)
Lab: NORMAL
MCH RBC QN AUTO: 31.7 PG (ref 26.5–33)
MCHC RBC AUTO-ENTMCNC: 31.7 G/DL (ref 31.5–36.5)
MCV RBC AUTO: 100 FL (ref 78–100)
O2/TOTAL GAS SETTING VFR VENT: ABNORMAL %
PCO2 BLDV: 53 MM HG (ref 40–50)
PH BLDV: 7.39 PH (ref 7.32–7.43)
PLATELET # BLD AUTO: 237 10E9/L (ref 150–450)
PO2 BLDV: 59 MM HG (ref 25–47)
POTASSIUM SERPL-SCNC: 3.2 MMOL/L (ref 3.4–5.3)
PROCALCITONIN SERPL-MCNC: 0.11 NG/ML
PROT SERPL-MCNC: 6.1 G/DL (ref 6.8–8.8)
RBC # BLD AUTO: 4.23 10E12/L (ref 4.4–5.9)
SODIUM SERPL-SCNC: 147 MMOL/L (ref 133–144)
SPECIMEN SOURCE: NORMAL
WBC # BLD AUTO: 11.1 10E9/L (ref 4–11)

## 2019-09-28 PROCEDURE — 25800030 ZZH RX IP 258 OP 636: Performed by: EMERGENCY MEDICINE

## 2019-09-28 PROCEDURE — 84145 PROCALCITONIN (PCT): CPT | Performed by: FAMILY MEDICINE

## 2019-09-28 PROCEDURE — 80053 COMPREHEN METABOLIC PANEL: CPT | Performed by: FAMILY MEDICINE

## 2019-09-28 PROCEDURE — 87106 FUNGI IDENTIFICATION YEAST: CPT | Performed by: FAMILY MEDICINE

## 2019-09-28 PROCEDURE — 25000132 ZZH RX MED GY IP 250 OP 250 PS 637: Mod: GY | Performed by: INTERNAL MEDICINE

## 2019-09-28 PROCEDURE — 25000131 ZZH RX MED GY IP 250 OP 636 PS 637: Mod: GY | Performed by: INTERNAL MEDICINE

## 2019-09-28 PROCEDURE — 25000132 ZZH RX MED GY IP 250 OP 250 PS 637: Mod: GY | Performed by: FAMILY MEDICINE

## 2019-09-28 PROCEDURE — 36415 COLL VENOUS BLD VENIPUNCTURE: CPT | Performed by: FAMILY MEDICINE

## 2019-09-28 PROCEDURE — 85027 COMPLETE CBC AUTOMATED: CPT | Performed by: FAMILY MEDICINE

## 2019-09-28 PROCEDURE — 82803 BLOOD GASES ANY COMBINATION: CPT | Performed by: FAMILY MEDICINE

## 2019-09-28 PROCEDURE — 25000128 H RX IP 250 OP 636: Performed by: EMERGENCY MEDICINE

## 2019-09-28 PROCEDURE — 87070 CULTURE OTHR SPECIMN AEROBIC: CPT | Performed by: FAMILY MEDICINE

## 2019-09-28 PROCEDURE — 99238 HOSP IP/OBS DSCHRG MGMT 30/<: CPT | Performed by: FAMILY MEDICINE

## 2019-09-28 PROCEDURE — 87205 SMEAR GRAM STAIN: CPT | Performed by: FAMILY MEDICINE

## 2019-09-28 RX ORDER — PREDNISONE 20 MG/1
40 TABLET ORAL DAILY
Qty: 6 TABLET | Refills: 0 | Status: SHIPPED | OUTPATIENT
Start: 2019-09-29 | End: 2019-10-07

## 2019-09-28 RX ORDER — POTASSIUM CHLORIDE 1.5 G/1.58G
20-40 POWDER, FOR SOLUTION ORAL
Status: DISCONTINUED | OUTPATIENT
Start: 2019-09-28 | End: 2019-09-28 | Stop reason: HOSPADM

## 2019-09-28 RX ORDER — AZITHROMYCIN 250 MG/1
250 TABLET, FILM COATED ORAL DAILY
Qty: 2 TABLET | Refills: 0 | Status: SHIPPED | OUTPATIENT
Start: 2019-09-28 | End: 2019-09-30

## 2019-09-28 RX ORDER — POTASSIUM CHLORIDE 1500 MG/1
20-40 TABLET, EXTENDED RELEASE ORAL
Status: DISCONTINUED | OUTPATIENT
Start: 2019-09-28 | End: 2019-09-28 | Stop reason: HOSPADM

## 2019-09-28 RX ADMIN — AZITHROMYCIN MONOHYDRATE 250 MG: 500 INJECTION, POWDER, LYOPHILIZED, FOR SOLUTION INTRAVENOUS at 08:26

## 2019-09-28 RX ADMIN — PREGABALIN 150 MG: 100 CAPSULE ORAL at 08:25

## 2019-09-28 RX ADMIN — MORPHINE SULFATE 30 MG: 30 TABLET, FILM COATED, EXTENDED RELEASE ORAL at 08:25

## 2019-09-28 RX ADMIN — TAZOBACTAM SODIUM AND PIPERACILLIN SODIUM 4.5 G: 500; 4 INJECTION, SOLUTION INTRAVENOUS at 06:30

## 2019-09-28 RX ADMIN — POTASSIUM CHLORIDE 40 MEQ: 20 TABLET, EXTENDED RELEASE ORAL at 11:42

## 2019-09-28 RX ADMIN — SIMVASTATIN 80 MG: 40 TABLET, FILM COATED ORAL at 08:25

## 2019-09-28 RX ADMIN — TAZOBACTAM SODIUM AND PIPERACILLIN SODIUM 4.5 G: 500; 4 INJECTION, SOLUTION INTRAVENOUS at 00:39

## 2019-09-28 RX ADMIN — PREDNISONE 40 MG: 20 TABLET ORAL at 08:25

## 2019-09-28 ASSESSMENT — ACTIVITIES OF DAILY LIVING (ADL)
ADLS_ACUITY_SCORE: 19
ADLS_ACUITY_SCORE: 18

## 2019-09-28 NOTE — PROGRESS NOTES
[facility] DISCHARGE NOTE    Patient discharged to home at 12:54 PM via wheel chair. Accompanied by other:friend and staff. Discharge instructions reviewed with patient, opportunity offered to ask questions. Prescriptions sent to patients preferred pharmacy. All belongings sent with patient.    Avis Laureano RN

## 2019-09-28 NOTE — DISCHARGE SUMMARY
[facility] Discharge Summary    Melquiades Morales MRN# [id number]   Age: 64 year old YOB: 1955     Date of Admission:  9/26/2019  Date of Discharge::  No discharge date for patient encounter.  Admitting Physician:  Rey Garcia MD  Discharge Physician:  Bravo Hernandez MD  Primary Physician: Jignesh Travis       Home clinic: [facility]               Discharge Diagnosis:   Principle diagnosis: Acute Hypoxemic Respiratory Failure due to COPD exacerbation     Secondary diagnoses:  community-acquired pneumonia, suspect strep/hflu  Acute metabolic encephalopathy due to sepsis.    Chronic pain syndrome  Anxiety  Smoker      Discharge Instructions:   For the pneumonia  - azithromycin 250 mg daily ×2 more days  - would follow up in clinic and repeat the chest x-ray in 2-3 weeks    For the COPD exacerbation  -Prednisone 40 mg daily ×3 more days  - albuterol inhaler, 2 puffs every 4 hours as needed for cough or wheezing  - Strongly encouraged attempting to quit smoking    Would follow-up in clinic within the next 7 days to ensure you're improving      Follow up with primary care provider in 7 days        Procedures:       Results for orders placed or performed during the hospital encounter of 09/26/19   Chest XR,  PA & LAT    Narrative    CHEST TWO VIEWS  9/26/2019 12:29 PM     HISTORY: 64-year-old patient with history of cough, rigors, and  hypoxia.       Impression    IMPRESSION: Since September 8, 2018, heart size is normal. No pleural  effusion, pneumothorax, or abnormal area of consolidation. Somewhat  minimal patchy opacity inferolateral left lung may be atelectasis.  Early pneumonia is not excluded.    CHRIS FAUSTIN MD                    Allergies:      Allergies   Allergen Reactions     Abilify [Aripiprazole] Other (See Comments)     Altered metal status.  Was admitted to hospital 3/17/2015.     Asa [Aspirin] GI Disturbance     Upset stomach     Black Pepper [Piper]       Robitussin Cough-Cold D Other (See Comments)     Bad dreams about killing people                   Discharge Medications:     Current Discharge Medication List      START taking these medications    Details   azithromycin 250 mg Inject 250 mg into the vein every 24 hours  Qty: 2 capsule, Refills: 0    Associated Diagnoses: Chronic obstructive pulmonary disease with acute exacerbation (H)      predniSONE (DELTASONE) 20 MG tablet Take 2 tablets (40 mg) by mouth daily  Qty: 6 tablet, Refills: 0    Associated Diagnoses: Chronic obstructive pulmonary disease with acute exacerbation (H)         CONTINUE these medications which have NOT CHANGED    Details   albuterol (PROAIR HFA) 108 (90 Base) MCG/ACT inhaler Inhale 2 puffs into the lungs every 4 hours as needed for shortness of breath / dyspnea or wheezing  Qty: 1 Inhaler, Refills: 11    Comments: Pharmacy may dispense brand covered by insurance (Proair, or proventil or ventolin or generic albuterol inhaler)  Associated Diagnoses: Bronchitis      DULoxetine (CYMBALTA) 60 MG capsule TAKE 2 CAPSULES BY MOUTH EVERY DAY  Qty: 180 capsule, Refills: 3    Associated Diagnoses: Depression, unspecified depression type      Melatonin 10 MG CAPS Take 1 capsule by mouth At Bedtime  Refills: 0      morphine (MS CONTIN) 30 MG CR tablet Take 1 tablet by mouth Every 8-12 hours  Refills: 0      naproxen (NAPROSYN) 250 MG tablet 2 tabs bid if needed for headache  Qty: 60 tablet, Refills: 3    Associated Diagnoses: Chronic pain syndrome      oxyCODONE (ROXICODONE) 5 MG tablet Take 1 tablet by mouth 3 times daily as needed  Refills: 0      Pregabalin (LYRICA PO) Take 150 mg by mouth 2 times daily       simvastatin (ZOCOR) 80 MG tablet Take 1 tablet (80 mg) by mouth daily DUE FOR LAB WORK FOR FURTHER REFILLS  Qty: 90 tablet, Refills: 3    Associated Diagnoses: Hyperlipidemia LDL goal <130      !! traZODone (DESYREL) 100 MG tablet TAKE 2 TABLETS BY MOUTH AT BEDTIME AS NEEDED FOR  SLEEP  Qty: 180 tablet, Refills: 1    Associated Diagnoses: Depression, unspecified depression type      !! traZODone (DESYREL) 50 MG tablet Take 1 tablet (50 mg) by mouth nightly as needed for sleep Take along with the 100 mg tablets for total dose of 250 mg  Qty: 90 tablet, Refills: 1    Associated Diagnoses: Depression, unspecified depression type      !! order for DME Equipment being ordered: Hospital Bed and mattress.  Qty: 1 each, Refills: 0    Associated Diagnoses: Chronic pain syndrome; Lumbosacral radiculitis; Chronic obstructive pulmonary disease, unspecified COPD type (H); Obese; Lumbar radiculopathy; Encephalopathy; Spinal stenosis in cervical region      !! order for DME Equipment being ordered: Lift Chair  Qty: 1 each, Refills: 0    Associated Diagnoses: Chronic pain syndrome; Lumbosacral radiculitis; Chronic obstructive pulmonary disease, unspecified COPD type (H); Obese; Lumbar radiculopathy; Encephalopathy; Spinal stenosis in cervical region; Unable to ambulate      !! order for DME Equipment being ordered: Wheelchair. Electric, including adjustable back rest sitting to resting, leg elevation adjustment, approved for outdoor use  Qty: 1 Units, Refills: 0    Associated Diagnoses: Chronic pain syndrome; Lumbosacral radiculitis      !! ORDER FOR DME Semi electric hospital bed with side rails and mattress. Length of bed is for lifetime  Qty: 1 Device, Refills: 0    Associated Diagnoses: Other unspecified back disorder; Obese; Lumbosacral radiculitis; COPD (chronic obstructive pulmonary disease) (H)       !! - Potential duplicate medications found. Please discuss with provider.                Consultations:   nnone           Brief History of Presenting Illness:   This patient is a 64 year old  male with a significant past medical history of chronic pain on chronic high dose narcs who presents with fever/ chills/ cough with green productive sputum. Feels sick-unable to give any more hx. Does deny  cp/n/v/d. Pt somnolant-unable to give more hx.                 Hospital Course:   PNEUMONIA LIKELY DUE TO INFECTIOUS ORGANISM  Presenting with fever/chills/cough with green sputum. Has leucocytosis/ lactic acidosis/ tachycardia and encephalopathy. Given 2L fluid bolus in ED and started on antbx.  -will continue antbx zosyn/ zithromax. Start iv fluids. Add prednisone x 5 days. Follow blood cx.  -will check procal and if negative would stop the antibiotics.  The infiltrates are not impressive but the wheezing is.   -much better on 3rd hospital day, off O2, much less wheezing, pushing to go home to get back on his medical Marijuana.            SEPSIS due to CAP   Some lethargy, elevated WBC, fever,   Lactic normal on admission.       Acute hypoxic resp failure due to COPD EXACERBATION  -start prednisone/ nebs  -may be a viral trigger.  procal pending.       ACUTE METABOLIC ENCEPHALOPATHY  -was very lethargic, somnolent on admission.  Is on MS contin 30 BID at baseline.    Due to pneumonia with sepsis and narcotics along with cymbalta/ lyrica/trazodone  -will rx pneumia/ sepsis as above. Resume meds at lower doses and follow.     HLD  Continue meds     CHRONIC PAIN SYNDROME/ lumbar radiculopathy/chronic back pain  On chronic high dose narcs. Pt somnolant now.   -will resume meds at lower dose once med-rec done  -does nothing except lays on the couch at home the last 13 years.      ANXIETY  Continue meds     Discussion.   procal to see if antibiotics needed.   Nebs/steroids, O2 support.      DVT PROF-lovenox            Discharge Exam:   OBJECTIVE:   BP (!) 157/70 (BP Location: Left arm)   Pulse 70   Temp 98.5  F (36.9  C) (Oral)   Resp 18   Ht 1.829 m (6')   Wt 102.4 kg (225 lb 12 oz)   SpO2 91%   BMI 30.62 kg/m      GENERAL APPEARANCE: , alert, angry, NAD, Ox3      RESP: lungs some prolonged expiratory phase and rhonchi at the bases, scattered wheezes.      CV: regular rates and rhythm, no  murmur, edema: none       Abdomen: soft, nontender, no HSM or masses, BSs normal   Skin: no cyanosis, pallor, or jaundice               Pending Tests at Discharge:     Unresulted Labs Ordered in the Past 30 Days of this Admission     Date and Time Order Name Status Description    9/28/2019 0001 Procalcitonin In process     9/27/2019 1038 Gram stain In process     9/27/2019 1038 Sputum Culture Aerobic Bacterial In process     9/26/2019 1155 Blood culture Preliminary     9/26/2019 1155 Blood culture Preliminary                      Discharge Disposition:   Discharged to home      Attestation:  Amount of time performed on this discharge : 30  minutes.    Bravo Hernandez MD MD

## 2019-09-28 NOTE — PLAN OF CARE
Oxygen on at 2 LPM via NC, saturation levels 91-95%.  Lung sound coarse with wheezes, non productive cough.  Prutum sample not collected. Calls with needs.  Up to bathroom with SBA.
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
## 2019-09-30 ENCOUNTER — PATIENT OUTREACH (OUTPATIENT)
Dept: CARE COORDINATION | Facility: CLINIC | Age: 64
End: 2019-09-30

## 2019-09-30 ENCOUNTER — OFFICE VISIT (OUTPATIENT)
Dept: FAMILY MEDICINE | Facility: CLINIC | Age: 64
End: 2019-09-30
Payer: MEDICARE

## 2019-09-30 VITALS
BODY MASS INDEX: 34.13 KG/M2 | DIASTOLIC BLOOD PRESSURE: 80 MMHG | RESPIRATION RATE: 20 BRPM | SYSTOLIC BLOOD PRESSURE: 138 MMHG | WEIGHT: 252 LBS | TEMPERATURE: 99 F | HEIGHT: 72 IN | HEART RATE: 95 BPM | OXYGEN SATURATION: 90 %

## 2019-09-30 DIAGNOSIS — F17.200 TOBACCO USE DISORDER: ICD-10-CM

## 2019-09-30 DIAGNOSIS — J18.9 COMMUNITY ACQUIRED PNEUMONIA OF LEFT LOWER LOBE OF LUNG: ICD-10-CM

## 2019-09-30 DIAGNOSIS — J44.1 COPD EXACERBATION (H): Primary | ICD-10-CM

## 2019-09-30 LAB
BACTERIA SPEC CULT: ABNORMAL
SPECIMEN SOURCE: ABNORMAL

## 2019-09-30 PROCEDURE — 99214 OFFICE O/P EST MOD 30 MIN: CPT | Performed by: FAMILY MEDICINE

## 2019-09-30 ASSESSMENT — PAIN SCALES - GENERAL: PAINLEVEL: MODERATE PAIN (5)

## 2019-09-30 ASSESSMENT — MIFFLIN-ST. JEOR: SCORE: 1973.12

## 2019-09-30 NOTE — PATIENT INSTRUCTIONS
Consider Pulmonary function testing - talk with Dr. Travis about this.    Return in 2 week - for follow up chest xray        Our Clinic hours are:  Mondays    7:20 am - 7 pm  Tues -  Fri  7:20 am - 5 pm    Clinic Phone: [phone]    The clinic lab opens at 7:30 am Mon - Fri and appointments are required.    [facility]. [phone]  Monday  8 am - 7pm  Tues - Fri 8 am - 5:30 pm

## 2019-09-30 NOTE — PROGRESS NOTES
Clinic Care Coordination Contact  [facility]/Voicemail    Referral Source: IP Report    Clinical Data: Care Coordinator Outreach    Outreach attempted.  Left message on patient's voicemail with call back information and requested return call.    Patient was inpatient at [facility] from 9/26/19 to 9/28/19 with community acquired pneumonia and exacerbation of COPD.  Patient is on antibiotics and prednisone taper.     Patient has PCP appointment for follow up this am.    Plan: Care Coordinator will do no further outreaches at this time.    Merle Haley RN, [facility] - Primary Care Clinic RN Coordinator  [facility]   9/30/2019    8:18 AM  [phone]

## 2019-09-30 NOTE — PROGRESS NOTES
"Subjective     Melquiades Morales is a 64 year old male who presents to clinic today for the following health issues:    [facility]       Hospital Follow-up Visit:    Hospital/Nursing Home/IP Rehab Facility: [facility]  Date of Admission: 9/26/19  Date of Discharge: 9/28/19  Reason(s) for Admission: Acute Hypoxemic Respiratory Failure due to COPD exacerbation             Problems taking medications regularly:  None       Medication changes since discharge: prednisone and inhaler. Today is the last dose of azithromycin       Problems adhering to non-medication therapy:  None    Summary of hospitalization:  [facility] discharge summary reviewed  Diagnostic Tests/Treatments reviewed.  Follow up needed: chest xray in 2-3 weeks  Other Healthcare Providers Involved in Patient s Care:         None  Update since discharge: improved. Cough has improved. Patient is still having hot and cold spells.       Post Discharge Medication Reconciliation: discharge medications reconciled, continue medications without change.  Plan of care communicated with patient     Coding guidelines for this visit:  Type of Medical   Decision Making Face-to-Face Visit       within 7 Days of discharge Face-to-Face Visit        within 14 days of discharge   Moderate Complexity [id number]   High Complexity [id number]          Patient here to follow up after recent hospitalization for copd/community acquired pneumonia.  Had last azithromycin today, last prednisone tomorrow.     He also notes he's taking \"medical marijuana\" but is smoking this \"off the black market\" because he can't afford the fees at the dispensaries.     He is feeling much better today, he used his inhaler 4 times on Saturday, once yesterday and not at all yet today. He denies feeling short of breath despite oxygen sat of 90% today.      Patient has never had PFTs done.     Reviewed and updated as needed this visit by Provider         Review of Systems   ROS COMP: " "Constitutional, HEENT, cardiovascular, pulmonary, gi and gu systems are negative, except as otherwise noted.      Objective    /80   Pulse 95   Temp 99  F (37.2  C) (Tympanic)   Resp 20   Ht 1.816 m (5' 11.5\")   Wt 114.3 kg (252 lb)   SpO2 90%   BMI 34.66 kg/m    Body mass index is 34.66 kg/m .  Physical Exam   GENERAL: healthy, alert and no distress  NECK: no adenopathy, no asymmetry, masses, or scars and thyroid normal to palpation  RESP: prolonged expiratory phase and decreased breath sounds throughout  CV: regular rate and rhythm, normal S1 S2, no S3 or S4, no murmur, click or rub, no peripheral edema and peripheral pulses strong  ABDOMEN: soft, nontender, no hepatosplenomegaly, no masses and bowel sounds normal  MS: no gross musculoskeletal defects noted, no edema    Diagnostic Test Results:  Labs reviewed in Epic        Assessment & Plan       ICD-10-CM    1. COPD exacerbation (H) J44.1    2. Community acquired pneumonia of left lower lobe of lung (H) J18.1 XR Chest 2 Views   3. Tobacco use disorder F17.200 XR Chest 2 Views     Patient Instructions   Consider Pulmonary function testing - talk with Dr. Travis about this.    Return in 2 week - for follow up chest xray        Our Clinic hours are:  Mondays    7:20 am - 7 pm  Tues -  Fri  7:20 am - 5 pm    Clinic Phone: [phone]    The clinic lab opens at 7:30 am Mon - Fri and appointments are required.    [facility]  Ph. [phone]  Monday  8 am - 7pm  Tues - Fri 8 am - 5:30 pm                Tobacco Cessation:   reports that he has been smoking cigarettes. He has a 26.25 pack-year smoking history. He has quit using smokeless tobacco.  Tobacco Cessation Action Plan: Information offered: Patient not interested at this time          No follow-ups on file.    Tia Dominguez MD  [facility]      "

## 2019-10-07 ENCOUNTER — APPOINTMENT (OUTPATIENT)
Dept: GENERAL RADIOLOGY | Facility: CLINIC | Age: 64
End: 2019-10-07
Attending: FAMILY MEDICINE
Payer: MEDICARE

## 2019-10-07 ENCOUNTER — HOSPITAL ENCOUNTER (OUTPATIENT)
Facility: CLINIC | Age: 64
Setting detail: OBSERVATION
Discharge: HOME | End: 2019-10-08
Attending: FAMILY MEDICINE | Admitting: INTERNAL MEDICINE
Payer: MEDICARE

## 2019-10-07 DIAGNOSIS — F17.210 CIGARETTE SMOKER: ICD-10-CM

## 2019-10-07 DIAGNOSIS — J44.1 CHRONIC OBSTRUCTIVE PULMONARY DISEASE WITH ACUTE EXACERBATION (H): Primary | Chronic | ICD-10-CM

## 2019-10-07 DIAGNOSIS — J18.9 COMMUNITY ACQUIRED PNEUMONIA OF LEFT LOWER LOBE OF LUNG: ICD-10-CM

## 2019-10-07 DIAGNOSIS — J96.01 ACUTE RESPIRATORY FAILURE WITH HYPOXIA (H): ICD-10-CM

## 2019-10-07 LAB
ANION GAP SERPL CALCULATED.3IONS-SCNC: 6 MMOL/L (ref 3–14)
BASOPHILS # BLD AUTO: 0 10E9/L (ref 0–0.2)
BASOPHILS NFR BLD AUTO: 0.3 %
BUN SERPL-MCNC: 5 MG/DL (ref 7–30)
CALCIUM SERPL-MCNC: 7.5 MG/DL (ref 8.5–10.1)
CHLORIDE SERPL-SCNC: 102 MMOL/L (ref 94–109)
CO2 SERPL-SCNC: 34 MMOL/L (ref 20–32)
CREAT SERPL-MCNC: 0.61 MG/DL (ref 0.66–1.25)
DIFFERENTIAL METHOD BLD: ABNORMAL
EOSINOPHIL # BLD AUTO: 0.1 10E9/L (ref 0–0.7)
EOSINOPHIL NFR BLD AUTO: 0.9 %
ERYTHROCYTE [DISTWIDTH] IN BLOOD BY AUTOMATED COUNT: 13.8 % (ref 10–15)
GFR SERPL CREATININE-BSD FRML MDRD: >90 ML/MIN/{1.73_M2}
GLUCOSE SERPL-MCNC: 129 MG/DL (ref 70–99)
HCT VFR BLD AUTO: 43.6 % (ref 40–53)
HGB BLD-MCNC: 14.1 G/DL (ref 13.3–17.7)
IMM GRANULOCYTES # BLD: 0 10E9/L (ref 0–0.4)
IMM GRANULOCYTES NFR BLD: 0.3 %
LACTATE BLD-SCNC: 1.2 MMOL/L (ref 0.7–2)
LYMPHOCYTES # BLD AUTO: 3.9 10E9/L (ref 0.8–5.3)
LYMPHOCYTES NFR BLD AUTO: 34.8 %
MCH RBC QN AUTO: 31.8 PG (ref 26.5–33)
MCHC RBC AUTO-ENTMCNC: 32.3 G/DL (ref 31.5–36.5)
MCV RBC AUTO: 98 FL (ref 78–100)
MONOCYTES # BLD AUTO: 0.6 10E9/L (ref 0–1.3)
MONOCYTES NFR BLD AUTO: 5.2 %
NEUTROPHILS # BLD AUTO: 6.5 10E9/L (ref 1.6–8.3)
NEUTROPHILS NFR BLD AUTO: 58.5 %
NRBC # BLD AUTO: 0 10*3/UL
NRBC BLD AUTO-RTO: 0 /100
PLATELET # BLD AUTO: 258 10E9/L (ref 150–450)
POTASSIUM SERPL-SCNC: 3.7 MMOL/L (ref 3.4–5.3)
RBC # BLD AUTO: 4.43 10E12/L (ref 4.4–5.9)
SODIUM SERPL-SCNC: 142 MMOL/L (ref 133–144)
TROPONIN I SERPL-MCNC: 0.02 UG/L (ref 0–0.04)
WBC # BLD AUTO: 11.1 10E9/L (ref 4–11)

## 2019-10-07 PROCEDURE — 99220 ZZC INITIAL OBSERVATION CARE,LEVL III: CPT | Performed by: INTERNAL MEDICINE

## 2019-10-07 PROCEDURE — 83605 ASSAY OF LACTIC ACID: CPT | Performed by: FAMILY MEDICINE

## 2019-10-07 PROCEDURE — 40000274 ZZH STATISTIC RCP CONSULT EA 30 MIN

## 2019-10-07 PROCEDURE — 93010 ELECTROCARDIOGRAM REPORT: CPT | Mod: Z6 | Performed by: FAMILY MEDICINE

## 2019-10-07 PROCEDURE — 80048 BASIC METABOLIC PNL TOTAL CA: CPT | Performed by: FAMILY MEDICINE

## 2019-10-07 PROCEDURE — 25000132 ZZH RX MED GY IP 250 OP 250 PS 637: Mod: GY | Performed by: INTERNAL MEDICINE

## 2019-10-07 PROCEDURE — 40000275 ZZH STATISTIC RCP TIME EA 10 MIN

## 2019-10-07 PROCEDURE — 96375 TX/PRO/DX INJ NEW DRUG ADDON: CPT

## 2019-10-07 PROCEDURE — 94640 AIRWAY INHALATION TREATMENT: CPT

## 2019-10-07 PROCEDURE — 96365 THER/PROPH/DIAG IV INF INIT: CPT | Performed by: FAMILY MEDICINE

## 2019-10-07 PROCEDURE — 99285 EMERGENCY DEPT VISIT HI MDM: CPT | Mod: 25 | Performed by: FAMILY MEDICINE

## 2019-10-07 PROCEDURE — 25000128 H RX IP 250 OP 636: Performed by: FAMILY MEDICINE

## 2019-10-07 PROCEDURE — 99207 ZZC APP CREDIT; MD BILLING SHARED VISIT: CPT | Performed by: PHYSICIAN ASSISTANT

## 2019-10-07 PROCEDURE — 93005 ELECTROCARDIOGRAM TRACING: CPT | Performed by: FAMILY MEDICINE

## 2019-10-07 PROCEDURE — 25000132 ZZH RX MED GY IP 250 OP 250 PS 637: Mod: GY | Performed by: PHYSICIAN ASSISTANT

## 2019-10-07 PROCEDURE — 25000128 H RX IP 250 OP 636: Performed by: PHYSICIAN ASSISTANT

## 2019-10-07 PROCEDURE — 25000131 ZZH RX MED GY IP 250 OP 636 PS 637: Mod: GY | Performed by: PHYSICIAN ASSISTANT

## 2019-10-07 PROCEDURE — 25000125 ZZHC RX 250: Performed by: FAMILY MEDICINE

## 2019-10-07 PROCEDURE — 84484 ASSAY OF TROPONIN QUANT: CPT | Performed by: FAMILY MEDICINE

## 2019-10-07 PROCEDURE — 71046 X-RAY EXAM CHEST 2 VIEWS: CPT

## 2019-10-07 PROCEDURE — 94640 AIRWAY INHALATION TREATMENT: CPT | Mod: 76

## 2019-10-07 PROCEDURE — G0378 HOSPITAL OBSERVATION PER HR: HCPCS

## 2019-10-07 PROCEDURE — 96367 TX/PROPH/DG ADDL SEQ IV INF: CPT | Performed by: FAMILY MEDICINE

## 2019-10-07 PROCEDURE — 25000125 ZZHC RX 250: Performed by: PHYSICIAN ASSISTANT

## 2019-10-07 PROCEDURE — 85025 COMPLETE CBC W/AUTO DIFF WBC: CPT | Performed by: FAMILY MEDICINE

## 2019-10-07 RX ORDER — CLINDAMYCIN PHOSPHATE 900 MG/50ML
900 INJECTION, SOLUTION INTRAVENOUS ONCE
Status: DISCONTINUED | OUTPATIENT
Start: 2019-10-07 | End: 2019-10-07 | Stop reason: ALTCHOICE

## 2019-10-07 RX ORDER — ACETAMINOPHEN 325 MG/1
650 TABLET ORAL EVERY 4 HOURS PRN
Status: DISCONTINUED | OUTPATIENT
Start: 2019-10-07 | End: 2019-10-08 | Stop reason: HOSPADM

## 2019-10-07 RX ORDER — PROCHLORPERAZINE MALEATE 5 MG
10 TABLET ORAL EVERY 6 HOURS PRN
Status: DISCONTINUED | OUTPATIENT
Start: 2019-10-07 | End: 2019-10-08 | Stop reason: HOSPADM

## 2019-10-07 RX ORDER — CEFTRIAXONE SODIUM 1 G/50ML
1 INJECTION, SOLUTION INTRAVENOUS ONCE
Status: COMPLETED | OUTPATIENT
Start: 2019-10-07 | End: 2019-10-07

## 2019-10-07 RX ORDER — ERGOCALCIFEROL 1.25 MG/1
1 CAPSULE, LIQUID FILLED ORAL WEEKLY
Refills: 11 | COMMUNITY
Start: 2019-10-02 | End: 2020-08-14

## 2019-10-07 RX ORDER — ONDANSETRON 4 MG/1
4 TABLET, ORALLY DISINTEGRATING ORAL EVERY 6 HOURS PRN
Status: DISCONTINUED | OUTPATIENT
Start: 2019-10-07 | End: 2019-10-08 | Stop reason: HOSPADM

## 2019-10-07 RX ORDER — SIMVASTATIN 40 MG
80 TABLET ORAL AT BEDTIME
Status: DISCONTINUED | OUTPATIENT
Start: 2019-10-07 | End: 2019-10-08 | Stop reason: HOSPADM

## 2019-10-07 RX ORDER — IPRATROPIUM BROMIDE AND ALBUTEROL SULFATE 2.5; .5 MG/3ML; MG/3ML
3 SOLUTION RESPIRATORY (INHALATION)
Status: DISCONTINUED | OUTPATIENT
Start: 2019-10-07 | End: 2019-10-08

## 2019-10-07 RX ORDER — NAPROXEN 250 MG/1
500 TABLET ORAL 2 TIMES DAILY PRN
Status: DISCONTINUED | OUTPATIENT
Start: 2019-10-07 | End: 2019-10-08 | Stop reason: HOSPADM

## 2019-10-07 RX ORDER — BENZONATATE 100 MG/1
100 CAPSULE ORAL 3 TIMES DAILY PRN
Status: DISCONTINUED | OUTPATIENT
Start: 2019-10-07 | End: 2019-10-08 | Stop reason: HOSPADM

## 2019-10-07 RX ORDER — MORPHINE SULFATE 30 MG/1
30 TABLET, FILM COATED, EXTENDED RELEASE ORAL 3 TIMES DAILY
Status: DISCONTINUED | OUTPATIENT
Start: 2019-10-07 | End: 2019-10-08 | Stop reason: HOSPADM

## 2019-10-07 RX ORDER — AMOXICILLIN 250 MG
1 CAPSULE ORAL 2 TIMES DAILY PRN
Status: DISCONTINUED | OUTPATIENT
Start: 2019-10-07 | End: 2019-10-08 | Stop reason: HOSPADM

## 2019-10-07 RX ORDER — NALOXONE HYDROCHLORIDE 0.4 MG/ML
.1-.4 INJECTION, SOLUTION INTRAMUSCULAR; INTRAVENOUS; SUBCUTANEOUS
Status: DISCONTINUED | OUTPATIENT
Start: 2019-10-07 | End: 2019-10-08 | Stop reason: HOSPADM

## 2019-10-07 RX ORDER — OXYCODONE HYDROCHLORIDE 5 MG/1
10 TABLET ORAL 3 TIMES DAILY PRN
Status: DISCONTINUED | OUTPATIENT
Start: 2019-10-07 | End: 2019-10-08 | Stop reason: HOSPADM

## 2019-10-07 RX ORDER — LEVOFLOXACIN 5 MG/ML
750 INJECTION, SOLUTION INTRAVENOUS EVERY 24 HOURS
Status: DISCONTINUED | OUTPATIENT
Start: 2019-10-07 | End: 2019-10-08 | Stop reason: HOSPADM

## 2019-10-07 RX ORDER — PROCHLORPERAZINE 25 MG
25 SUPPOSITORY, RECTAL RECTAL EVERY 12 HOURS PRN
Status: DISCONTINUED | OUTPATIENT
Start: 2019-10-07 | End: 2019-10-08 | Stop reason: HOSPADM

## 2019-10-07 RX ORDER — CLINDAMYCIN PHOSPHATE 900 MG/50ML
900 INJECTION, SOLUTION INTRAVENOUS EVERY 8 HOURS
Status: DISCONTINUED | OUTPATIENT
Start: 2019-10-07 | End: 2019-10-07

## 2019-10-07 RX ORDER — ALBUTEROL SULFATE 5 MG/ML
2.5 SOLUTION RESPIRATORY (INHALATION)
Status: DISCONTINUED | OUTPATIENT
Start: 2019-10-07 | End: 2019-10-08 | Stop reason: HOSPADM

## 2019-10-07 RX ORDER — CALCIUM CARBONATE 500 MG/1
500 TABLET, CHEWABLE ORAL DAILY PRN
Status: DISCONTINUED | OUTPATIENT
Start: 2019-10-07 | End: 2019-10-08 | Stop reason: HOSPADM

## 2019-10-07 RX ORDER — PREDNISONE 20 MG/1
40 TABLET ORAL DAILY
Status: DISCONTINUED | OUTPATIENT
Start: 2019-10-07 | End: 2019-10-08 | Stop reason: HOSPADM

## 2019-10-07 RX ORDER — NICOTINE 21 MG/24HR
1 PATCH, TRANSDERMAL 24 HOURS TRANSDERMAL DAILY
Status: DISCONTINUED | OUTPATIENT
Start: 2019-10-07 | End: 2019-10-08 | Stop reason: HOSPADM

## 2019-10-07 RX ORDER — MELATONIN 10 MG
1 CAPSULE ORAL AT BEDTIME
Status: DISCONTINUED | OUTPATIENT
Start: 2019-10-07 | End: 2019-10-07 | Stop reason: CLARIF

## 2019-10-07 RX ORDER — ONDANSETRON 2 MG/ML
4 INJECTION INTRAMUSCULAR; INTRAVENOUS EVERY 6 HOURS PRN
Status: DISCONTINUED | OUTPATIENT
Start: 2019-10-07 | End: 2019-10-08 | Stop reason: HOSPADM

## 2019-10-07 RX ORDER — DULOXETIN HYDROCHLORIDE 30 MG/1
120 CAPSULE, DELAYED RELEASE ORAL DAILY
Status: DISCONTINUED | OUTPATIENT
Start: 2019-10-08 | End: 2019-10-08 | Stop reason: HOSPADM

## 2019-10-07 RX ORDER — AMOXICILLIN 250 MG
2 CAPSULE ORAL 2 TIMES DAILY PRN
Status: DISCONTINUED | OUTPATIENT
Start: 2019-10-07 | End: 2019-10-08 | Stop reason: HOSPADM

## 2019-10-07 RX ADMIN — IPRATROPIUM BROMIDE AND ALBUTEROL SULFATE 3 ML: .5; 3 SOLUTION RESPIRATORY (INHALATION) at 19:37

## 2019-10-07 RX ADMIN — CALCIUM CARBONATE (ANTACID) CHEW TAB 500 MG 500 MG: 500 CHEW TAB at 20:18

## 2019-10-07 RX ADMIN — CEFTRIAXONE SODIUM 1 G: 1 INJECTION, SOLUTION INTRAVENOUS at 12:19

## 2019-10-07 RX ADMIN — PREGABALIN 150 MG: 100 CAPSULE ORAL at 20:15

## 2019-10-07 RX ADMIN — PREDNISONE 40 MG: 20 TABLET ORAL at 14:10

## 2019-10-07 RX ADMIN — NICOTINE 1 PATCH: 21 PATCH TRANSDERMAL at 14:37

## 2019-10-07 RX ADMIN — IPRATROPIUM BROMIDE AND ALBUTEROL SULFATE 3 ML: .5; 3 SOLUTION RESPIRATORY (INHALATION) at 15:21

## 2019-10-07 RX ADMIN — LEVOFLOXACIN 750 MG: 5 INJECTION, SOLUTION INTRAVENOUS at 14:30

## 2019-10-07 RX ADMIN — SENNOSIDES AND DOCUSATE SODIUM 2 TABLET: 8.6; 5 TABLET ORAL at 14:10

## 2019-10-07 RX ADMIN — MORPHINE SULFATE 30 MG: 30 TABLET, FILM COATED, EXTENDED RELEASE ORAL at 14:10

## 2019-10-07 RX ADMIN — MORPHINE SULFATE 30 MG: 30 TABLET, FILM COATED, EXTENDED RELEASE ORAL at 20:14

## 2019-10-07 RX ADMIN — BENZONATATE 100 MG: 100 CAPSULE ORAL at 14:12

## 2019-10-07 RX ADMIN — TRAZODONE HYDROCHLORIDE 250 MG: 50 TABLET ORAL at 21:23

## 2019-10-07 RX ADMIN — CLINDAMYCIN PHOSPHATE 900 MG: 900 INJECTION, SOLUTION INTRAVENOUS at 12:45

## 2019-10-07 RX ADMIN — SIMVASTATIN 80 MG: 40 TABLET, FILM COATED ORAL at 21:23

## 2019-10-07 RX ADMIN — ACETAMINOPHEN 650 MG: 325 TABLET, FILM COATED ORAL at 21:58

## 2019-10-07 RX ADMIN — Medication 10 MG: at 21:23

## 2019-10-07 ASSESSMENT — MIFFLIN-ST. JEOR: SCORE: 1965.19

## 2019-10-07 NOTE — ED NOTES
"Pt c/o soa, ha, and fever since 3 am.  Pt took naproxen and states ha is much better but rates 8/10.  Pt A&Ox3.  neuros intact.  Pt has hx of copd and also discharged from hospital Sept 28th for pneumonia.  Last abx was this past Tuesday.  Pt was given duo neb and solumedrol by medics and pt states his breathing is \"much better.\"  "

## 2019-10-07 NOTE — ED NOTES
Pt breathing well without BIpap, talked with RT and MD to remove BiPap to see how he does while in x ray. Pt tolerated this well, no resp distress or drop in OX. Will keep him off of this and monitor, pt will use call light if he feels he needs this again

## 2019-10-07 NOTE — PLAN OF CARE
"[facility] ADMISSION NOTE    Patient admitted to room 2314 at approximately 1340 via cart from emergency room. Patient was accompanied by transport tech.     Verbal SBAR report received from Jenelle prior to patient arrival.     Patient ambulated to bed with stand-by assist. Patient alert and oriented X 3. The patient is not having any pain. 0-10 Pain Scale: 8(ha). Admission vital signs: Blood pressure 125/71, pulse 73, temperature 100.3  F (37.9  C), temperature source Oral, height 1.803 m (5' 11\"), weight 114.3 kg (252 lb), SpO2 93 %. Patient was oriented to plan of care, call light, bed controls, tv, telephone, bathroom, and visiting hours.     Risk Assessment    The following safety risks were identified during admission: fall. Yellow risk band applied: YES.     Skin Initial Assessment    Patient refused skin assessment, no open areas or rash's noted on admission.     Diana Castellon RN      "
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
## 2019-10-08 VITALS
HEART RATE: 89 BPM | TEMPERATURE: 97.4 F | SYSTOLIC BLOOD PRESSURE: 136 MMHG | WEIGHT: 252 LBS | BODY MASS INDEX: 35.28 KG/M2 | OXYGEN SATURATION: 92 % | HEIGHT: 71 IN | RESPIRATION RATE: 20 BRPM | DIASTOLIC BLOOD PRESSURE: 80 MMHG

## 2019-10-08 LAB
ANION GAP SERPL CALCULATED.3IONS-SCNC: 7 MMOL/L (ref 3–14)
BUN SERPL-MCNC: 12 MG/DL (ref 7–30)
CALCIUM SERPL-MCNC: 8.7 MG/DL (ref 8.5–10.1)
CHLORIDE SERPL-SCNC: 103 MMOL/L (ref 94–109)
CO2 SERPL-SCNC: 31 MMOL/L (ref 20–32)
CREAT SERPL-MCNC: 0.64 MG/DL (ref 0.66–1.25)
ERYTHROCYTE [DISTWIDTH] IN BLOOD BY AUTOMATED COUNT: 13.7 % (ref 10–15)
GFR SERPL CREATININE-BSD FRML MDRD: >90 ML/MIN/{1.73_M2}
GLUCOSE SERPL-MCNC: 270 MG/DL (ref 70–99)
HCT VFR BLD AUTO: 43.4 % (ref 40–53)
HGB BLD-MCNC: 13.7 G/DL (ref 13.3–17.7)
MCH RBC QN AUTO: 31.6 PG (ref 26.5–33)
MCHC RBC AUTO-ENTMCNC: 31.6 G/DL (ref 31.5–36.5)
MCV RBC AUTO: 100 FL (ref 78–100)
PLATELET # BLD AUTO: 258 10E9/L (ref 150–450)
POTASSIUM SERPL-SCNC: 3.8 MMOL/L (ref 3.4–5.3)
RBC # BLD AUTO: 4.33 10E12/L (ref 4.4–5.9)
SODIUM SERPL-SCNC: 141 MMOL/L (ref 133–144)
WBC # BLD AUTO: 30.7 10E9/L (ref 4–11)

## 2019-10-08 PROCEDURE — 25000131 ZZH RX MED GY IP 250 OP 636 PS 637: Mod: GY | Performed by: PHYSICIAN ASSISTANT

## 2019-10-08 PROCEDURE — 99217 ZZC OBSERVATION CARE DISCHARGE: CPT | Performed by: INTERNAL MEDICINE

## 2019-10-08 PROCEDURE — 94640 AIRWAY INHALATION TREATMENT: CPT

## 2019-10-08 PROCEDURE — 25000132 ZZH RX MED GY IP 250 OP 250 PS 637: Mod: GY | Performed by: PHYSICIAN ASSISTANT

## 2019-10-08 PROCEDURE — 36415 COLL VENOUS BLD VENIPUNCTURE: CPT | Performed by: PHYSICIAN ASSISTANT

## 2019-10-08 PROCEDURE — 25000125 ZZHC RX 250: Performed by: PHYSICIAN ASSISTANT

## 2019-10-08 PROCEDURE — 80048 BASIC METABOLIC PNL TOTAL CA: CPT | Performed by: PHYSICIAN ASSISTANT

## 2019-10-08 PROCEDURE — G0378 HOSPITAL OBSERVATION PER HR: HCPCS

## 2019-10-08 PROCEDURE — 85027 COMPLETE CBC AUTOMATED: CPT | Performed by: PHYSICIAN ASSISTANT

## 2019-10-08 PROCEDURE — 40000275 ZZH STATISTIC RCP TIME EA 10 MIN

## 2019-10-08 RX ORDER — IPRATROPIUM BROMIDE AND ALBUTEROL SULFATE 2.5; .5 MG/3ML; MG/3ML
3 SOLUTION RESPIRATORY (INHALATION)
Status: DISCONTINUED | OUTPATIENT
Start: 2019-10-08 | End: 2019-10-08 | Stop reason: HOSPADM

## 2019-10-08 RX ORDER — PREDNISONE 10 MG/1
TABLET ORAL
Qty: 30 TABLET | Refills: 0 | Status: ON HOLD | OUTPATIENT
Start: 2019-10-08 | End: 2019-10-30

## 2019-10-08 RX ORDER — LEVOFLOXACIN 750 MG/1
750 TABLET, FILM COATED ORAL DAILY
Qty: 4 TABLET | Refills: 0 | Status: ON HOLD | OUTPATIENT
Start: 2019-10-08 | End: 2019-10-30

## 2019-10-08 RX ADMIN — DULOXETINE HYDROCHLORIDE 120 MG: 30 CAPSULE, DELAYED RELEASE ORAL at 08:30

## 2019-10-08 RX ADMIN — NICOTINE 1 PATCH: 21 PATCH TRANSDERMAL at 08:28

## 2019-10-08 RX ADMIN — IPRATROPIUM BROMIDE AND ALBUTEROL SULFATE 3 ML: .5; 3 SOLUTION RESPIRATORY (INHALATION) at 06:45

## 2019-10-08 RX ADMIN — PREDNISONE 40 MG: 20 TABLET ORAL at 08:30

## 2019-10-08 RX ADMIN — MORPHINE SULFATE 30 MG: 30 TABLET, FILM COATED, EXTENDED RELEASE ORAL at 08:38

## 2019-10-08 RX ADMIN — PREGABALIN 150 MG: 100 CAPSULE ORAL at 08:30

## 2019-10-08 RX ADMIN — OXYCODONE HYDROCHLORIDE 10 MG: 5 TABLET ORAL at 02:55

## 2019-10-08 NOTE — DISCHARGE SUMMARY
Discharge Summary    Melquiades Morales MRN# [id number]   YOB: 1955 Age: 64 year old     Date of Admission:  10/7/2019  Date of Discharge:  10/8/2019  Admitting Physician:  Pastor Rodriguez MD  Discharge Physician:  Rey Garcia MD  Discharging Service:  Hospitalist     Primary Provider: Jignesh Travis          Admission Diagnoses:   Acute respiratory failure with hypoxia (H) [J96.01]  Community acquired pneumonia of left lower lobe of lung (H) [J18.1]         Brief History of Illness:   Melquiades Morales is a 64 year old male who was admitted for dyspnea.          Hospital Course:     Acute respiratory failure with hypoxemia  He presented after three days of cough, congestion and fever. Hypoxemia to 84% in ED, required BiPAP via EMS due to hypoxemia. On admission oxygenating well with 2 LPM of oxygen, per nursing only intermittently de-satting in ED. Labs show elevated carbon dioxide level on BMP, likely component of chronic respiratory issues. Previously treated with 2 days of zosyn and 5 days of azithromycin as well as 6 days of prednisone. With change antibiotic class and likely plan for steroid taper.   - Patient was weaned off bipap and supplemental oxygen  - He was treated with Prednisone 40 mg daily  - Antibiotics were changed to IV levofloxacin daily  - recommended cessation of vaping use due to current concerns with lung injury and infection, patient aware of current risks but does not feel they apply to him  - On day of discharge, patient was oxygenating well on room and breathing comfortably.  He was discharge to home with 4 additional doses of Levaquin and a 2 week taper of Prednisone.     COPD exacerbation  Community acquired pneumonia due to unknown organism  3 days of cough, congestion and fever. Hypoxemia as discussed above, currently on 2 LPM for intermittent desaturations. No prior PFTs on file though longtime smoker. Previously treated with 2 days Zosyn and 5 days of azithromycin.  "CXR on admission shows persistent though improved left lower lobe consolidation. Lungs diminished, few crackles in the right lower lobe. Concern that he needs broader antibiotics and steroid taper to fully treat, likely has recurrence of his COPD exacerbation.  - Discharged to home with 4 additional doses of Levaquin and a 2 week taper of Prednisone.     Intermittent dysphagia  Occasionally has issues swallowing bread specifically. Has never reported this to a provider so no previous work up. Last occurred > 1 months ago.  - consider further work up with outpatient provider if ongoing issue     Chronic pain due to lumbar radiculopathy with opioid dependence  [facility].  reviewed, prescriptions last filled on 9/26/19.  - continue home morphine, oxycodone, pregabalin and duloxetine     Depression  Chronic.  - continue home trazodone, duloxetine     Hyperlipidemia  Chronic.  - continue home simvastatin     Tobacco use  Smokes ~1 pack per day. Smoking since age 10 reportedly.  - patch ordered     Marijuana Use  Started vaping marijuana ~ 3 months ago for medicinal uses though buys from unauthorized seller.   - recommended cessation of vaping use due to current concerns with lung injury and infection, patient aware of current risks but does not feel they apply to him             Condition on Discharge:         Discharge vitals: Blood pressure 136/80, pulse 89, temperature 97.4  F (36.3  C), temperature source Oral, resp. rate 20, height 1.803 m (5' 11\"), weight 114.3 kg (252 lb), SpO2 92 %.         Gen: Well nourished, well developed, alert and oriented x 3, no acute distressed  HEENT: Atraumatic, normocephalic  Lungs: Few scattered rhonchi without wheezes or rales  Heart: Regular rate and rhythm, no gallops or rubs, no murmurs  GI: Bowel sound normal, no hepatosplenomegaly or masses  Lymph: No lymphadenopathy or edema  Skin: No rashes          Procedures / Labs / Imaging:   Most " Recent 3 CBC's:  Recent Labs   Lab Test 10/08/19  0552 10/07/19  0926 09/28/19  0526   WBC 30.7* 11.1* 11.1*   HGB 13.7 14.1 13.4    98 100    258 237      Most Recent 3 BMP's:  Recent Labs   Lab Test 10/08/19  0552 10/07/19  0926 09/28/19  0526    142 147*   POTASSIUM 3.8 3.7 3.2*   CHLORIDE 103 102 112*   CO2 31 34* 31   BUN 12 5* 9   CR 0.64* 0.61* 0.58*   ANIONGAP 7 6 4   JESSEE 8.7 7.5* 8.0*   * 129* 105*     Most Recent 3 Troponin's:  Recent Labs   Lab Test 10/07/19  0926 09/08/18  1330 01/17/17  1813   TROPI 0.018 <0.015 <0.015  The 99th percentile for upper reference range is 0.045 ug/L.  Troponin values in   the range of 0.045 - 0.120 ug/L may be associated with risks of adverse   clinical events.       Most Recent 3 INR's:  Recent Labs   Lab Test 09/06/15  1635 08/25/15  1030 03/17/15  1347   INR 0.97 0.99 0.97     Most Recent 2 LFT's:  Recent Labs   Lab Test 09/28/19  0526 09/26/19  1140   AST 17 13   ALT 18 16   ALKPHOS 54 85   BILITOTAL 0.2 0.6     Most Recent Cholesterol Panel:  Recent Labs   Lab Test 05/20/19  1004   CHOL 121   LDL 49   HDL 31*   TRIG 203*     Most Recent 6 Bacteria Isolates From Any Culture (See EPIC Reports for Culture Details):  Recent Labs   Lab Test 09/28/19  1125 09/26/19  1235 09/26/19  1140 10/13/18  1325 10/13/18  1256 10/13/18  1253   CULT Light growth  Tamiko tropicalis  Susceptibility testing not routinely done  * No growth No growth No growth Light growth  Normal lisette    Moderate growth  Candida glabrata  Susceptibility testing not routinely done  *  Moderate growth  Candida tropicalis  Susceptibility testing not routinely done  * No growth     Most Recent TSH, T4 and HgbA1c:   Recent Labs   Lab Test 10/18/15  0515  12/29/14  0808   TSH 0.31*   < >  --    T4 0.92   < >  --    A1C  --   --  6.1*    < > = values in this interval not displayed.     Results for orders placed or performed during the hospital encounter of 10/07/19   XR Chest 2 Views     Narrative    CHEST TWO VIEWS October 7, 2019 10:04 AM     HISTORY: Recent pneumonia, shortness of breath.    COMPARISON: 9/26/2019.      Impression    IMPRESSION: Stable appearance of the chest since 9/26/2019. Unchanged  streaky opacities in the left lower lung likely representing scarring  versus subsegmental atelectasis. Persistent pneumonia is also in the  differential diagnosis. Multilevel degenerative changes seen in the  spine.    RAMIREZ STAFFORD MD             Medications Prior to Admission:     No medications prior to admission.             Discharge Medications:     Discharge Medication List as of 10/8/2019  9:52 AM      START taking these medications    Details   levofloxacin (LEVAQUIN) 750 MG tablet Take 1 tablet (750 mg) by mouth daily, Disp-4 tablet, R-0, E-Prescribe      predniSONE (DELTASONE) 10 MG tablet 4 tabs daily for 3 days, then 3 tabs daily for 3 days, then 2 tabs daily for 3 days, then 1 tab daily for 3 days, then stop, Disp-30 tablet, R-0, E-Prescribe         CONTINUE these medications which have NOT CHANGED    Details   albuterol (PROAIR HFA) 108 (90 Base) MCG/ACT inhaler Inhale 2 puffs into the lungs every 4 hours as needed for shortness of breath / dyspnea or wheezing, Disp-1 Inhaler, R-11, E-PrescribePharmacy may dispense brand covered by insurance (Proair, or proventil or ventolin or generic albuterol inhaler)      DULoxetine (CYMBALTA) 60 MG capsule TAKE 2 CAPSULES BY MOUTH EVERY DAY, Disp-180 capsule, R-3, E-Prescribe      Melatonin 10 MG CAPS Take 1 capsule by mouth At Bedtime, R-0, Historical      morphine (MS CONTIN) 30 MG CR tablet Take 1 tablet by mouth 3 times daily , R-0, Historical      naproxen (NAPROSYN) 250 MG tablet 2 tabs bid if needed for headache, Disp-60 tablet, R-3, E-Prescribe      !! order for DME Equipment being ordered: Hospital Bed and mattress.Disp-1 each, R-0, Local Print      !! order for DME Equipment being ordered: Lift ChairDisp-1 each, R-0, Local Print       !! order for DME Equipment being ordered: Wheelchair. Electric, including adjustable back rest sitting to resting, leg elevation adjustment, approved for outdoor useDisp-1 Units, R-0, Local Print      !! ORDER FOR DME Semi electric hospital bed with side rails and mattress. Length of bed is for lifetimeDisp-1 Device, R-0, Normal      oxyCODONE (ROXICODONE) 5 MG tablet Take 2 tablets by mouth 3 times daily as needed , R-0, Historical      Pregabalin (LYRICA PO) Take 150 mg by mouth 2 times daily , Historical      simvastatin (ZOCOR) 80 MG tablet Take 1 tablet (80 mg) by mouth daily DUE FOR LAB WORK FOR FURTHER REFILLS, Disp-90 tablet, R-3, E-Prescribe      !! traZODone (DESYREL) 100 MG tablet TAKE 2 TABLETS BY MOUTH AT BEDTIME AS NEEDED FOR SLEEP, Disp-180 tablet, R-1, E-Prescribe      !! traZODone (DESYREL) 50 MG tablet Take 1 tablet (50 mg) by mouth nightly as needed for sleep Take along with the 100 mg tablets for total dose of 250 mg, Disp-90 tablet, R-1, E-Prescribe      vitamin D2 (ERGOCALCIFEROL) 85552 units (1250 mcg) capsule Take 1 capsule by mouth once a week, R-11, Historical       !! - Potential duplicate medications found. Please discuss with provider.               Pending Results:     Unresulted Labs Ordered in the Past 30 Days of this Admission     No orders found for last 31 day(s).

## 2019-10-08 NOTE — PLAN OF CARE
Patient alert and oriented.  Uses call light appropriately.  Makes needs known.  Complains of chronic back pain.  Took 10 mg oxycodone with good relief.  Patient lung sounds are diminished through out.  Infrequent productive cough.  On 4 liters oxygen with O2sats in lower 90s.  Patient ambulates to bathroom and tolerates well.  Drinking fluids well.  Using icentive spirometer up to 4000, tolerates well.

## 2019-10-08 NOTE — PLAN OF CARE
[facility] DISCHARGE NOTE    Patient discharged to home at 10:21 AM via wheel chair. Accompanied by caregiver and staff. Discharge instructions reviewed with patient, opportunity offered to ask questions. Prescriptions sent to patients preferred pharmacy. All belongings sent with patient.    Diana Castellon RN

## 2019-10-10 ENCOUNTER — NURSE TRIAGE (OUTPATIENT)
Dept: FAMILY MEDICINE | Facility: CLINIC | Age: 64
End: 2019-10-10

## 2019-10-10 NOTE — TELEPHONE ENCOUNTER
Reason for call:  Patient reporting a symptom    Symptom or request: Diarrhea     Duration (how long have symptoms been present): 2 days    Have you been treated for this before? No    Additional comments: Recent;y discharged from inpatient hospital    Phone Number patient can be reached at:  Home number on file [phone] (home)    Best Time:  Any    Can we leave a detailed message on this number:  YES    Call taken on 10/10/2019 at 4:45 PM by Jessy Carlin

## 2019-10-11 ENCOUNTER — TELEPHONE (OUTPATIENT)
Dept: FAMILY MEDICINE | Facility: CLINIC | Age: 64
End: 2019-10-11

## 2019-10-11 NOTE — TELEPHONE ENCOUNTER
Left message for patient to return call to clinic.  Does have tentative appt 10-15-19 with Dr. Travis.  There are openings today if he would like to be seen sooner.  Dr. Dominguez has openings.  Zara PETERS RN

## 2019-10-11 NOTE — TELEPHONE ENCOUNTER
Reason for Call:  Medication question:    Do you use a [facility]?  Name of the pharmacy and phone number for the current request:  [facility] [phone]    Name of the medication requested: Patient asking how much Imodium he can take in a day. Does not want earlier appointment.Please call patient and advise.        Can we leave a detailed message on this number? YES    Phone number patient can be reached at: Home number on file [phone] (home)    Best Time: Any    Call taken on 10/11/2019 at 9:36 AM by Libby Washington

## 2019-10-11 NOTE — TELEPHONE ENCOUNTER
Libby Washington 7 minutes ago (9:41 AM)        Reason for Call:  Medication question:     Do you use a [facility]?  Name of the pharmacy and phone number for the current request:  [facility] [phone]     Name of the medication requested: Patient asking how much Imodium he can take in a day. Does not want earlier appointment.Please call patient and advise.          Can we leave a detailed message on this number? YES     Phone number patient can be reached at: Home number on file [phone] (home)     Best Time: Any     Call taken on 10/11/2019 at 9:36 AM by Libby Washington

## 2019-10-11 NOTE — TELEPHONE ENCOUNTER
Patient states he has had diarrhea since Tuesday afternoon. He denies any blood in the stool or dark tarry stools. States his PCA is making sure he gets plenty of fluids. He is advised per the 16 mg of Imodium a day per Dr. Travis's message below. He states they are 2 mg each tablet per the box so advised he is able to take 8 a day but to follow the directions on the box. He agreed with this plan.    Sharon Poon RN

## 2019-10-14 ENCOUNTER — ANCILLARY PROCEDURE (OUTPATIENT)
Dept: GENERAL RADIOLOGY | Facility: CLINIC | Age: 64
End: 2019-10-14
Attending: FAMILY MEDICINE
Payer: MEDICARE

## 2019-10-14 PROCEDURE — 71046 X-RAY EXAM CHEST 2 VIEWS: CPT | Mod: FY

## 2019-10-18 ENCOUNTER — OFFICE VISIT (OUTPATIENT)
Dept: FAMILY MEDICINE | Facility: CLINIC | Age: 64
End: 2019-10-18
Payer: MEDICARE

## 2019-10-18 VITALS
RESPIRATION RATE: 18 BRPM | DIASTOLIC BLOOD PRESSURE: 68 MMHG | TEMPERATURE: 97.8 F | BODY MASS INDEX: 33.89 KG/M2 | WEIGHT: 243 LBS | SYSTOLIC BLOOD PRESSURE: 120 MMHG | OXYGEN SATURATION: 96 % | HEART RATE: 82 BPM

## 2019-10-18 DIAGNOSIS — J18.9 PNEUMONIA DUE TO INFECTIOUS ORGANISM, UNSPECIFIED LATERALITY, UNSPECIFIED PART OF LUNG: ICD-10-CM

## 2019-10-18 DIAGNOSIS — Z72.0 TOBACCO ABUSE: Primary | ICD-10-CM

## 2019-10-18 DIAGNOSIS — J44.1 COPD EXACERBATION (H): ICD-10-CM

## 2019-10-18 PROCEDURE — 99214 OFFICE O/P EST MOD 30 MIN: CPT | Performed by: FAMILY MEDICINE

## 2019-10-18 RX ORDER — NICOTINE 21 MG/24HR
1 PATCH, TRANSDERMAL 24 HOURS TRANSDERMAL EVERY 24 HOURS
Qty: 10 PATCH | Refills: 1 | Status: ON HOLD | OUTPATIENT
Start: 2019-10-18 | End: 2020-03-13

## 2019-10-18 NOTE — PROGRESS NOTES
Subjective     Melquiades Morales is a 64 year old male who presents to clinic today for the following health issues:    HPI       Hospital Follow-up Visit:    Hospital/Nursing Home/IP Rehab Facility: [facility]  Date of Admission: 10/7/19  Date of Discharge: 10/8/19  Reason(s) for Admission: COPD             Problems taking medications regularly:  None       Medication changes since discharge: None       Problems adhering to non-medication therapy:  None    Summary of hospitalization:  [facility] discharge summary reviewed  Diagnostic Tests/Treatments reviewed.  Follow up needed: none  Other Healthcare Providers Involved in Patient s Care:         None  Update since discharge: improved.     Post Discharge Medication Reconciliation: .  Plan of care communicated with      Coding guidelines for this visit:  Type of Medical   Decision Making Face-to-Face Visit       within 7 Days of discharge Face-to-Face Visit        within 14 days of discharge   Moderate Complexity [id number] [id number]   High Complexity 22359 13610                    Reviewed and updated as needed this visit by Provider         Review of Systems         Objective    There were no vitals taken for this visit.  There is no height or weight on file to calculate BMI.  Physical Exam               Further history obtained, clarified or corrected by physician:  He has improved since discharge from the hospital.  He is on a prednisone taper.  His diarrhea has resolved 4 days.  He continues to be vaping THC product for his back pain.  He sees the pain specialist next week.    OBJECTIVE:  /68   Pulse 82   Temp 97.8  F (36.6  C)   Resp 18   Wt 110.2 kg (243 lb)   SpO2 96%   BMI 33.89 kg/m    LUNGS: Scattered rhonchi without rales, mild expiratory wheeze.  CV: RRR without murmur  ABD: BS+, soft, nontender, no masses, no hepatosplenomegaly  EXTREMITIES: without joint tenderness, swelling or erythema.  No muscle tenderness or  abnormality.  SKIN: No rashes or abnormalities  NEURO:non focal exam    ASSESSMENT:     Pneumonia due to infectious organism, unspecified laterality, unspecified part of lung  COPD exacerbation (H)  Tobacco abuse    PLAN:    I encouraged him to stop vaping which he says he cannot but he will discuss this with his pain specialist  Finish the prednisone taper  Recheck here in 4 to 6 weeks.    Orders Placed This Encounter     nicotine (NICODERM CQ) 21 MG/24HR 24 hr patch

## 2019-10-18 NOTE — PATIENT INSTRUCTIONS
Our Clinic hours are:  Mondays    7:20 am - 7 pm  Tues -  Fri  7:20 am - 5 pm    Clinic Phone: [phone]    The clinic lab opens at 7:30 am Mon - Fri and appointments are required.    [facility]. [phone]  Monday  8 am - 7pm  Tues - Fri 8 am - 5:30 pm

## 2019-10-23 DIAGNOSIS — F32.A DEPRESSION, UNSPECIFIED DEPRESSION TYPE: ICD-10-CM

## 2019-10-23 NOTE — TELEPHONE ENCOUNTER
"Requested Prescriptions   Pending Prescriptions Disp Refills     traZODone (DESYREL) 100 MG tablet [Pharmacy Med Name: TRAZODONE  MG TABLET] 60 tablet      Sig: TAKE 2 TABLETS BY MOUTH AT BEDTIME AS NEEDED FOR SLEEP   Last Written Prescription Date:  4/26/19  Last Fill Quantity: 180 tab,  # refills: 1   Last office visit: 10/18/2019 with prescribing provider:  Jignesh Travis     Future Office Visit:   Next 5 appointments (look out 90 days)    Oct 25, 2019  9:00 AM CDT  Return Visit with Kleber Pollack, [facility] ([address] [address]  [phone]   Nov 08, 2019  9:00 AM CST  Return Visit with Kleber Pollack [address] [address]  [phone]   Nov 22, 2019  9:00 AM CST  Return Visit with Kleber Pollack [facility] ([address] [address]  [phone]             Serotonin Modulators Passed - 10/23/2019  8:00 AM        Passed - Recent (12 mo) or future (30 days) visit within the authorizing provider's specialty     Patient has had an office visit with the authorizing provider or a provider within the authorizing providers department within the previous 12 mos or has a future within next 30 days. See \"Patient Info\" tab in inbasket, or \"Choose Columns\" in Meds & Orders section of the refill encounter.              Passed - Medication is active on med list        Passed - Patient is age 18 or older          "

## 2019-10-24 RX ORDER — TRAZODONE HYDROCHLORIDE 100 MG/1
TABLET ORAL
Qty: 60 TABLET | Refills: 3 | Status: SHIPPED | OUTPATIENT
Start: 2019-10-24 | End: 2020-03-02

## 2019-10-25 ENCOUNTER — OFFICE VISIT (OUTPATIENT)
Dept: PSYCHOLOGY | Facility: CLINIC | Age: 64
End: 2019-10-25
Payer: MEDICARE

## 2019-10-25 DIAGNOSIS — F41.1 GENERALIZED ANXIETY DISORDER: ICD-10-CM

## 2019-10-25 DIAGNOSIS — F33.1 MAJOR DEPRESSIVE DISORDER, RECURRENT EPISODE, MODERATE (H): Primary | ICD-10-CM

## 2019-10-25 DIAGNOSIS — F43.10 POSTTRAUMATIC STRESS DISORDER: ICD-10-CM

## 2019-10-25 PROCEDURE — 90834 PSYTX W PT 45 MINUTES: CPT | Performed by: SOCIAL WORKER

## 2019-10-25 ASSESSMENT — PATIENT HEALTH QUESTIONNAIRE - PHQ9
5. POOR APPETITE OR OVEREATING: SEVERAL DAYS
SUM OF ALL RESPONSES TO PHQ QUESTIONS 1-9: 16

## 2019-10-25 ASSESSMENT — ANXIETY QUESTIONNAIRES
5. BEING SO RESTLESS THAT IT IS HARD TO SIT STILL: SEVERAL DAYS
3. WORRYING TOO MUCH ABOUT DIFFERENT THINGS: MORE THAN HALF THE DAYS
IF YOU CHECKED OFF ANY PROBLEMS ON THIS QUESTIONNAIRE, HOW DIFFICULT HAVE THESE PROBLEMS MADE IT FOR YOU TO DO YOUR WORK, TAKE CARE OF THINGS AT HOME, OR GET ALONG WITH OTHER PEOPLE: VERY DIFFICULT
1. FEELING NERVOUS, ANXIOUS, OR ON EDGE: NEARLY EVERY DAY
GAD7 TOTAL SCORE: 11
7. FEELING AFRAID AS IF SOMETHING AWFUL MIGHT HAPPEN: NOT AT ALL
2. NOT BEING ABLE TO STOP OR CONTROL WORRYING: MORE THAN HALF THE DAYS
6. BECOMING EASILY ANNOYED OR IRRITABLE: MORE THAN HALF THE DAYS
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
## 2019-10-25 NOTE — Clinical Note
Jeffrey's motorized wheel chair not working; he was able to make it in to see me without it today; didn't have a cane either; some wincing in pain but I was impressed.

## 2019-10-26 ASSESSMENT — ANXIETY QUESTIONNAIRES: GAD7 TOTAL SCORE: 11

## 2019-10-28 ENCOUNTER — HOSPITAL ENCOUNTER (INPATIENT)
Facility: CLINIC | Age: 64
LOS: 2 days | Discharge: HOME | DRG: 917 | End: 2019-10-30
Attending: STUDENT IN AN ORGANIZED HEALTH CARE EDUCATION/TRAINING PROGRAM | Admitting: FAMILY MEDICINE
Payer: MEDICARE

## 2019-10-28 ENCOUNTER — APPOINTMENT (OUTPATIENT)
Dept: GENERAL RADIOLOGY | Facility: CLINIC | Age: 64
DRG: 917 | End: 2019-10-28
Attending: STUDENT IN AN ORGANIZED HEALTH CARE EDUCATION/TRAINING PROGRAM
Payer: MEDICARE

## 2019-10-28 ENCOUNTER — APPOINTMENT (OUTPATIENT)
Dept: CT IMAGING | Facility: CLINIC | Age: 64
DRG: 917 | End: 2019-10-28
Attending: STUDENT IN AN ORGANIZED HEALTH CARE EDUCATION/TRAINING PROGRAM
Payer: MEDICARE

## 2019-10-28 DIAGNOSIS — R50.9 HYPERTHERMIA-INDUCED DEFECT: ICD-10-CM

## 2019-10-28 DIAGNOSIS — R05.9 COUGH: ICD-10-CM

## 2019-10-28 DIAGNOSIS — R41.82 ALTERED MENTAL STATUS, UNSPECIFIED ALTERED MENTAL STATUS TYPE: ICD-10-CM

## 2019-10-28 DIAGNOSIS — R05.8 PRODUCTIVE COUGH: ICD-10-CM

## 2019-10-28 DIAGNOSIS — R09.02 HYPOXIA: ICD-10-CM

## 2019-10-28 DIAGNOSIS — R50.9 FEVER, UNSPECIFIED: ICD-10-CM

## 2019-10-28 DIAGNOSIS — J42 CHRONIC BRONCHITIS, UNSPECIFIED CHRONIC BRONCHITIS TYPE (H): Primary | Chronic | ICD-10-CM

## 2019-10-28 PROBLEM — J98.8 RESPIRATORY INFECTION: Status: ACTIVE | Noted: 2019-10-28

## 2019-10-28 LAB
ALBUMIN SERPL-MCNC: 2.9 G/DL (ref 3.4–5)
ALBUMIN UR-MCNC: NEGATIVE MG/DL
ALP SERPL-CCNC: 51 U/L (ref 40–150)
ALT SERPL W P-5'-P-CCNC: 18 U/L (ref 0–70)
AMPHETAMINES UR QL SCN: NEGATIVE
ANION GAP SERPL CALCULATED.3IONS-SCNC: 6 MMOL/L (ref 3–14)
APAP SERPL-MCNC: <2 MG/L (ref 10–20)
APPEARANCE UR: CLEAR
AST SERPL W P-5'-P-CCNC: 15 U/L (ref 0–45)
BARBITURATES UR QL: NEGATIVE
BASE EXCESS BLDV CALC-SCNC: 4.9 MMOL/L
BASE EXCESS BLDV CALC-SCNC: 6.7 MMOL/L
BASOPHILS # BLD AUTO: 0 10E9/L (ref 0–0.2)
BASOPHILS NFR BLD AUTO: 0.2 %
BENZODIAZ UR QL: NEGATIVE
BILIRUB SERPL-MCNC: 0.4 MG/DL (ref 0.2–1.3)
BILIRUB UR QL STRIP: NEGATIVE
BUN SERPL-MCNC: 8 MG/DL (ref 7–30)
CALCIUM SERPL-MCNC: 8.1 MG/DL (ref 8.5–10.1)
CANNABINOIDS UR QL SCN: POSITIVE
CHLORIDE SERPL-SCNC: 108 MMOL/L (ref 94–109)
CO2 SERPL-SCNC: 31 MMOL/L (ref 20–32)
COCAINE UR QL: NEGATIVE
COLOR UR AUTO: YELLOW
CREAT SERPL-MCNC: 0.67 MG/DL (ref 0.66–1.25)
DIFFERENTIAL METHOD BLD: ABNORMAL
EOSINOPHIL # BLD AUTO: 0.1 10E9/L (ref 0–0.7)
EOSINOPHIL NFR BLD AUTO: 0.7 %
ERYTHROCYTE [DISTWIDTH] IN BLOOD BY AUTOMATED COUNT: 14.7 % (ref 10–15)
ETHANOL SERPL-MCNC: <0.01 G/DL
FLUAV+FLUBV AG SPEC QL: NEGATIVE
FLUAV+FLUBV AG SPEC QL: NEGATIVE
GFR SERPL CREATININE-BSD FRML MDRD: >90 ML/MIN/{1.73_M2}
GLUCOSE SERPL-MCNC: 172 MG/DL (ref 70–99)
GLUCOSE UR STRIP-MCNC: NEGATIVE MG/DL
HCO3 BLDV-SCNC: 34 MMOL/L (ref 21–28)
HCO3 BLDV-SCNC: 35 MMOL/L (ref 21–28)
HCT VFR BLD AUTO: 46.3 % (ref 40–53)
HGB BLD-MCNC: 14.4 G/DL (ref 13.3–17.7)
HGB UR QL STRIP: NEGATIVE
IMM GRANULOCYTES # BLD: 0 10E9/L (ref 0–0.4)
IMM GRANULOCYTES NFR BLD: 0.3 %
INR PPP: 1.04 (ref 0.86–1.14)
KETONES UR STRIP-MCNC: NEGATIVE MG/DL
LACTATE BLD-SCNC: 1.8 MMOL/L (ref 0.7–2)
LEUKOCYTE ESTERASE UR QL STRIP: NEGATIVE
LYMPHOCYTES # BLD AUTO: 1.7 10E9/L (ref 0.8–5.3)
LYMPHOCYTES NFR BLD AUTO: 13 %
MCH RBC QN AUTO: 31.7 PG (ref 26.5–33)
MCHC RBC AUTO-ENTMCNC: 31.1 G/DL (ref 31.5–36.5)
MCV RBC AUTO: 102 FL (ref 78–100)
MONOCYTES # BLD AUTO: 0.7 10E9/L (ref 0–1.3)
MONOCYTES NFR BLD AUTO: 5.3 %
NEUTROPHILS # BLD AUTO: 10.4 10E9/L (ref 1.6–8.3)
NEUTROPHILS NFR BLD AUTO: 80.5 %
NITRATE UR QL: NEGATIVE
NRBC # BLD AUTO: 0 10*3/UL
NRBC BLD AUTO-RTO: 0 /100
NT-PROBNP SERPL-MCNC: 113 PG/ML (ref 0–900)
O2/TOTAL GAS SETTING VFR VENT: 2 %
O2/TOTAL GAS SETTING VFR VENT: 36 %
OPIATES UR QL SCN: POSITIVE
PCO2 BLDV: 62 MM HG (ref 40–50)
PCO2 BLDV: 74 MM HG (ref 40–50)
PCP UR QL SCN: NEGATIVE
PH BLDV: 7.28 PH (ref 7.32–7.43)
PH BLDV: 7.36 PH (ref 7.32–7.43)
PH UR STRIP: 5 PH (ref 5–7)
PLATELET # BLD AUTO: 201 10E9/L (ref 150–450)
PO2 BLDV: 31 MM HG (ref 25–47)
PO2 BLDV: 54 MM HG (ref 25–47)
POTASSIUM SERPL-SCNC: 3.4 MMOL/L (ref 3.4–5.3)
PROT SERPL-MCNC: 6 G/DL (ref 6.8–8.8)
RBC # BLD AUTO: 4.54 10E12/L (ref 4.4–5.9)
SALICYLATES SERPL-MCNC: 3 MG/DL
SODIUM SERPL-SCNC: 145 MMOL/L (ref 133–144)
SOURCE: NORMAL
SP GR UR STRIP: 1.01 (ref 1–1.03)
SPECIMEN SOURCE: NORMAL
TROPONIN I SERPL-MCNC: 0.04 UG/L (ref 0–0.04)
UROBILINOGEN UR STRIP-MCNC: 0 MG/DL (ref 0–2)
WBC # BLD AUTO: 12.9 10E9/L (ref 4–11)

## 2019-10-28 PROCEDURE — 96365 THER/PROPH/DIAG IV INF INIT: CPT | Performed by: STUDENT IN AN ORGANIZED HEALTH CARE EDUCATION/TRAINING PROGRAM

## 2019-10-28 PROCEDURE — 25000128 H RX IP 250 OP 636: Performed by: STUDENT IN AN ORGANIZED HEALTH CARE EDUCATION/TRAINING PROGRAM

## 2019-10-28 PROCEDURE — 25000128 H RX IP 250 OP 636: Performed by: FAMILY MEDICINE

## 2019-10-28 PROCEDURE — 84484 ASSAY OF TROPONIN QUANT: CPT | Performed by: STUDENT IN AN ORGANIZED HEALTH CARE EDUCATION/TRAINING PROGRAM

## 2019-10-28 PROCEDURE — 85610 PROTHROMBIN TIME: CPT | Performed by: STUDENT IN AN ORGANIZED HEALTH CARE EDUCATION/TRAINING PROGRAM

## 2019-10-28 PROCEDURE — 96361 HYDRATE IV INFUSION ADD-ON: CPT | Mod: 59 | Performed by: STUDENT IN AN ORGANIZED HEALTH CARE EDUCATION/TRAINING PROGRAM

## 2019-10-28 PROCEDURE — 93005 ELECTROCARDIOGRAM TRACING: CPT | Performed by: STUDENT IN AN ORGANIZED HEALTH CARE EDUCATION/TRAINING PROGRAM

## 2019-10-28 PROCEDURE — 25000132 ZZH RX MED GY IP 250 OP 250 PS 637: Mod: GY | Performed by: FAMILY MEDICINE

## 2019-10-28 PROCEDURE — 71275 CT ANGIOGRAPHY CHEST: CPT

## 2019-10-28 PROCEDURE — 80307 DRUG TEST PRSMV CHEM ANLYZR: CPT | Performed by: STUDENT IN AN ORGANIZED HEALTH CARE EDUCATION/TRAINING PROGRAM

## 2019-10-28 PROCEDURE — 80320 DRUG SCREEN QUANTALCOHOLS: CPT | Performed by: STUDENT IN AN ORGANIZED HEALTH CARE EDUCATION/TRAINING PROGRAM

## 2019-10-28 PROCEDURE — 82803 BLOOD GASES ANY COMBINATION: CPT | Performed by: STUDENT IN AN ORGANIZED HEALTH CARE EDUCATION/TRAINING PROGRAM

## 2019-10-28 PROCEDURE — 85025 COMPLETE CBC W/AUTO DIFF WBC: CPT | Performed by: STUDENT IN AN ORGANIZED HEALTH CARE EDUCATION/TRAINING PROGRAM

## 2019-10-28 PROCEDURE — 87086 URINE CULTURE/COLONY COUNT: CPT

## 2019-10-28 PROCEDURE — 99285 EMERGENCY DEPT VISIT HI MDM: CPT | Mod: 25 | Performed by: STUDENT IN AN ORGANIZED HEALTH CARE EDUCATION/TRAINING PROGRAM

## 2019-10-28 PROCEDURE — 80329 ANALGESICS NON-OPIOID 1 OR 2: CPT | Performed by: STUDENT IN AN ORGANIZED HEALTH CARE EDUCATION/TRAINING PROGRAM

## 2019-10-28 PROCEDURE — 99223 1ST HOSP IP/OBS HIGH 75: CPT | Mod: AI | Performed by: FAMILY MEDICINE

## 2019-10-28 PROCEDURE — 84145 PROCALCITONIN (PCT): CPT | Performed by: FAMILY MEDICINE

## 2019-10-28 PROCEDURE — 83880 ASSAY OF NATRIURETIC PEPTIDE: CPT | Performed by: STUDENT IN AN ORGANIZED HEALTH CARE EDUCATION/TRAINING PROGRAM

## 2019-10-28 PROCEDURE — 71046 X-RAY EXAM CHEST 2 VIEWS: CPT

## 2019-10-28 PROCEDURE — 25000125 ZZHC RX 250: Performed by: STUDENT IN AN ORGANIZED HEALTH CARE EDUCATION/TRAINING PROGRAM

## 2019-10-28 PROCEDURE — 93010 ELECTROCARDIOGRAM REPORT: CPT | Mod: Z6 | Performed by: STUDENT IN AN ORGANIZED HEALTH CARE EDUCATION/TRAINING PROGRAM

## 2019-10-28 PROCEDURE — 12000000 ZZH R&B MED SURG/OB

## 2019-10-28 PROCEDURE — 80053 COMPREHEN METABOLIC PANEL: CPT | Performed by: STUDENT IN AN ORGANIZED HEALTH CARE EDUCATION/TRAINING PROGRAM

## 2019-10-28 PROCEDURE — 87040 BLOOD CULTURE FOR BACTERIA: CPT | Performed by: STUDENT IN AN ORGANIZED HEALTH CARE EDUCATION/TRAINING PROGRAM

## 2019-10-28 PROCEDURE — 81003 URINALYSIS AUTO W/O SCOPE: CPT | Performed by: STUDENT IN AN ORGANIZED HEALTH CARE EDUCATION/TRAINING PROGRAM

## 2019-10-28 PROCEDURE — 87804 INFLUENZA ASSAY W/OPTIC: CPT | Performed by: STUDENT IN AN ORGANIZED HEALTH CARE EDUCATION/TRAINING PROGRAM

## 2019-10-28 PROCEDURE — 83605 ASSAY OF LACTIC ACID: CPT | Performed by: STUDENT IN AN ORGANIZED HEALTH CARE EDUCATION/TRAINING PROGRAM

## 2019-10-28 PROCEDURE — 82803 BLOOD GASES ANY COMBINATION: CPT | Performed by: FAMILY MEDICINE

## 2019-10-28 RX ORDER — LIDOCAINE 40 MG/G
CREAM TOPICAL
Status: DISCONTINUED | OUTPATIENT
Start: 2019-10-28 | End: 2019-10-30 | Stop reason: HOSPADM

## 2019-10-28 RX ORDER — DULOXETIN HYDROCHLORIDE 30 MG/1
120 CAPSULE, DELAYED RELEASE ORAL DAILY
Status: DISCONTINUED | OUTPATIENT
Start: 2019-10-29 | End: 2019-10-30 | Stop reason: HOSPADM

## 2019-10-28 RX ORDER — ONDANSETRON 4 MG/1
4 TABLET, ORALLY DISINTEGRATING ORAL EVERY 6 HOURS PRN
Status: DISCONTINUED | OUTPATIENT
Start: 2019-10-28 | End: 2019-10-30 | Stop reason: HOSPADM

## 2019-10-28 RX ORDER — NALOXONE HYDROCHLORIDE 0.4 MG/ML
0.4 INJECTION, SOLUTION INTRAMUSCULAR; INTRAVENOUS; SUBCUTANEOUS ONCE
Status: COMPLETED | OUTPATIENT
Start: 2019-10-28 | End: 2019-10-28

## 2019-10-28 RX ORDER — NALOXONE HYDROCHLORIDE 0.4 MG/ML
.1-.4 INJECTION, SOLUTION INTRAMUSCULAR; INTRAVENOUS; SUBCUTANEOUS
Status: DISCONTINUED | OUTPATIENT
Start: 2019-10-28 | End: 2019-10-30 | Stop reason: HOSPADM

## 2019-10-28 RX ORDER — NAPROXEN 500 MG/1
500 TABLET ORAL 2 TIMES DAILY PRN
Status: DISCONTINUED | OUTPATIENT
Start: 2019-10-28 | End: 2019-10-30 | Stop reason: HOSPADM

## 2019-10-28 RX ORDER — PREDNISONE 10 MG/1
10 TABLET ORAL DAILY
Status: DISCONTINUED | OUTPATIENT
Start: 2019-10-29 | End: 2019-10-30 | Stop reason: HOSPADM

## 2019-10-28 RX ORDER — NICOTINE 21 MG/24HR
1 PATCH, TRANSDERMAL 24 HOURS TRANSDERMAL EVERY 24 HOURS
Status: DISCONTINUED | OUTPATIENT
Start: 2019-10-28 | End: 2019-10-30 | Stop reason: HOSPADM

## 2019-10-28 RX ORDER — ONDANSETRON 2 MG/ML
4 INJECTION INTRAMUSCULAR; INTRAVENOUS EVERY 6 HOURS PRN
Status: DISCONTINUED | OUTPATIENT
Start: 2019-10-28 | End: 2019-10-30 | Stop reason: HOSPADM

## 2019-10-28 RX ORDER — SIMVASTATIN 40 MG
80 TABLET ORAL AT BEDTIME
Status: DISCONTINUED | OUTPATIENT
Start: 2019-10-28 | End: 2019-10-30 | Stop reason: HOSPADM

## 2019-10-28 RX ORDER — OXYCODONE HYDROCHLORIDE 5 MG/1
10 TABLET ORAL 3 TIMES DAILY PRN
Status: DISCONTINUED | OUTPATIENT
Start: 2019-10-28 | End: 2019-10-30 | Stop reason: HOSPADM

## 2019-10-28 RX ORDER — ALBUTEROL SULFATE 90 UG/1
2 AEROSOL, METERED RESPIRATORY (INHALATION) EVERY 4 HOURS PRN
Status: DISCONTINUED | OUTPATIENT
Start: 2019-10-28 | End: 2019-10-30 | Stop reason: HOSPADM

## 2019-10-28 RX ORDER — PREGABALIN 150 MG/1
150 CAPSULE ORAL 2 TIMES DAILY
Refills: 0 | Status: ON HOLD | COMMUNITY
Start: 2019-10-24 | End: 2020-03-13

## 2019-10-28 RX ORDER — NALOXONE HYDROCHLORIDE 0.4 MG/ML
0.4 INJECTION, SOLUTION INTRAMUSCULAR; INTRAVENOUS; SUBCUTANEOUS
Status: DISCONTINUED | OUTPATIENT
Start: 2019-10-28 | End: 2019-10-28

## 2019-10-28 RX ORDER — PREGABALIN 75 MG/1
150 CAPSULE ORAL 2 TIMES DAILY
Status: DISCONTINUED | OUTPATIENT
Start: 2019-10-28 | End: 2019-10-30 | Stop reason: HOSPADM

## 2019-10-28 RX ORDER — IOPAMIDOL 755 MG/ML
91 INJECTION, SOLUTION INTRAVASCULAR ONCE
Status: COMPLETED | OUTPATIENT
Start: 2019-10-28 | End: 2019-10-28

## 2019-10-28 RX ORDER — MORPHINE SULFATE 30 MG/1
30 TABLET, FILM COATED, EXTENDED RELEASE ORAL 3 TIMES DAILY
Status: DISCONTINUED | OUTPATIENT
Start: 2019-10-29 | End: 2019-10-28

## 2019-10-28 RX ADMIN — PREGABALIN 150 MG: 100 CAPSULE ORAL at 22:20

## 2019-10-28 RX ADMIN — SODIUM CHLORIDE 1000 ML: 9 INJECTION, SOLUTION INTRAVENOUS at 18:10

## 2019-10-28 RX ADMIN — SODIUM CHLORIDE 1000 ML: 9 INJECTION, SOLUTION INTRAVENOUS at 20:03

## 2019-10-28 RX ADMIN — IOPAMIDOL 91 ML: 755 INJECTION, SOLUTION INTRAVENOUS at 19:21

## 2019-10-28 RX ADMIN — TAZOBACTAM SODIUM AND PIPERACILLIN SODIUM 3.38 G: 375; 3 INJECTION, SOLUTION INTRAVENOUS at 20:04

## 2019-10-28 RX ADMIN — SIMVASTATIN 80 MG: 40 TABLET, FILM COATED ORAL at 22:20

## 2019-10-28 RX ADMIN — SODIUM CHLORIDE 100 ML: 9 INJECTION, SOLUTION INTRAVENOUS at 19:21

## 2019-10-28 RX ADMIN — NICOTINE 1 PATCH: 21 PATCH, EXTENDED RELEASE TRANSDERMAL at 22:20

## 2019-10-28 RX ADMIN — NALOXONE HYDROCHLORIDE 0.4 MG: 0.4 INJECTION, SOLUTION INTRAMUSCULAR; INTRAVENOUS; SUBCUTANEOUS at 21:42

## 2019-10-28 RX ADMIN — ENOXAPARIN SODIUM 40 MG: 40 INJECTION SUBCUTANEOUS at 22:20

## 2019-10-28 ASSESSMENT — ACTIVITIES OF DAILY LIVING (ADL)
TRANSFERRING: 0-->INDEPENDENT
AMBULATION: 0-->INDEPENDENT
NUMBER_OF_TIMES_PATIENT_HAS_FALLEN_WITHIN_LAST_SIX_MONTHS: 1
RETIRED_COMMUNICATION: 0-->UNDERSTANDS/COMMUNICATES WITHOUT DIFFICULTY
DRESS: 0-->INDEPENDENT
COGNITION: 1 - ATTENTION OR MEMORY DEFICITS
FALL_HISTORY_WITHIN_LAST_SIX_MONTHS: YES
RETIRED_EATING: 0-->INDEPENDENT
TOILETING: 0-->INDEPENDENT
BATHING: 0-->INDEPENDENT
SWALLOWING: 0-->SWALLOWS FOODS/LIQUIDS WITHOUT DIFFICULTY

## 2019-10-28 NOTE — ED TRIAGE NOTES
"Comes in by EMS, was fine this afternoon per home health, after they left found by family unresponsive, barely responded to EMS with sternal rub, awoke after narcan 1mg, able to walk out to ambulance, stated \"I'm fucked up\". Awake on arrival, C/O nausea, states does not feel well but unable to describe what that means  "
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
## 2019-10-28 NOTE — LETTER
Transition Communication Hand-off for Care Transitions to Next Level of Care Provider    Name: Melquiades Morales  : 1955  MRN #: [id number]  Primary Care Provider: Jignesh Travis  Primary Care MD Name: Thaddeus  Primary Clinic: [address] [facility] [address]  Primary Care Clinic Name: [facility]   Reason for Hospitalization:  Hypoxia [R09.02]  Productive cough [R05]  Fever, unspecified [R50.9]  Altered mental status, unspecified altered mental status type [R41.82]  Admit Date/Time: 10/28/2019  4:48 PM  Discharge Date: 10/30  Payor Source: Payor: MEDICARE / Plan: MEDICARE / Product Type: Medicare /     Readmission Assessment Measure (BRIAN) Risk Score/category: elevated           Reason for Communication Hand-off Referral: Fragility    Discharge Plan: home w/ pca servcies       Concern for non-adherence with plan of care:   Y/N yes  Discharge Needs Assessment:  Needs      Most Recent Value   Equipment Currently Used at Home  cane, straight   Other Resources  [facility] Worker Name  Kyree Burt   [place] Worker Contact Info  [facility] Worker Status  Active          Follow-up specialty is recommended: Yes    Follow-up plan:    Future Appointments   Date Time Provider Department Center   2019 11:00 AM Jignesh Travis MD [facility]   2019  9:00 AM Kleber Pollack LICSW [facility]   2019  9:00 AM Kleber Pollack, LICSW [facility]   2019  9:00 AM Kleber Pollack LICSW [facility]   2019  9:00 AM Kleber Pollack LICSW [facility]   1/3/2020  9:00 AM Kleber Pollack LICSW [facility]               [place] Recommendations:  Elevated patient discharged home today with current 42 hours weekly PCA services.  Identifies no further needs.  PCP follow up appointment on .      Lala Kowalski RN    AVS/Discharge Summary is the source of truth; this is a helpful guide for improved communication of patient story

## 2019-10-29 PROBLEM — J98.8 RESPIRATORY INFECTION: Status: RESOLVED | Noted: 2019-10-28 | Resolved: 2019-10-29

## 2019-10-29 PROBLEM — G92.9 TOXIC ENCEPHALOPATHY: Status: ACTIVE | Noted: 2019-10-28

## 2019-10-29 PROBLEM — T50.901A POLYSUBSTANCE OVERDOSE: Status: ACTIVE | Noted: 2019-10-29

## 2019-10-29 PROBLEM — R41.82 AMS (ALTERED MENTAL STATUS): Status: ACTIVE | Noted: 2019-10-29

## 2019-10-29 PROBLEM — R50.9 FEVER: Status: ACTIVE | Noted: 2019-10-29

## 2019-10-29 PROBLEM — R41.82 AMS (ALTERED MENTAL STATUS): Status: RESOLVED | Noted: 2019-10-29 | Resolved: 2019-10-29

## 2019-10-29 LAB
ALBUMIN SERPL-MCNC: 2.6 G/DL (ref 3.4–5)
ALP SERPL-CCNC: 44 U/L (ref 40–150)
ALT SERPL W P-5'-P-CCNC: 16 U/L (ref 0–70)
ANION GAP SERPL CALCULATED.3IONS-SCNC: 2 MMOL/L (ref 3–14)
AST SERPL W P-5'-P-CCNC: 11 U/L (ref 0–45)
BILIRUB SERPL-MCNC: 0.7 MG/DL (ref 0.2–1.3)
BUN SERPL-MCNC: 8 MG/DL (ref 7–30)
CALCIUM SERPL-MCNC: 8 MG/DL (ref 8.5–10.1)
CHLORIDE SERPL-SCNC: 108 MMOL/L (ref 94–109)
CO2 SERPL-SCNC: 33 MMOL/L (ref 20–32)
CREAT SERPL-MCNC: 0.7 MG/DL (ref 0.66–1.25)
ERYTHROCYTE [DISTWIDTH] IN BLOOD BY AUTOMATED COUNT: 14.8 % (ref 10–15)
GFR SERPL CREATININE-BSD FRML MDRD: >90 ML/MIN/{1.73_M2}
GLUCOSE BLDC GLUCOMTR-MCNC: 165 MG/DL (ref 70–99)
GLUCOSE BLDC GLUCOMTR-MCNC: 92 MG/DL (ref 70–99)
GLUCOSE SERPL-MCNC: 110 MG/DL (ref 70–99)
HCT VFR BLD AUTO: 42.6 % (ref 40–53)
HGB BLD-MCNC: 13.2 G/DL (ref 13.3–17.7)
MCH RBC QN AUTO: 32.2 PG (ref 26.5–33)
MCHC RBC AUTO-ENTMCNC: 31 G/DL (ref 31.5–36.5)
MCV RBC AUTO: 104 FL (ref 78–100)
PLATELET # BLD AUTO: 173 10E9/L (ref 150–450)
POTASSIUM SERPL-SCNC: 4.3 MMOL/L (ref 3.4–5.3)
PROCALCITONIN SERPL-MCNC: <0.05 NG/ML
PROT SERPL-MCNC: 5.4 G/DL (ref 6.8–8.8)
RBC # BLD AUTO: 4.1 10E12/L (ref 4.4–5.9)
SODIUM SERPL-SCNC: 143 MMOL/L (ref 133–144)
WBC # BLD AUTO: 10.6 10E9/L (ref 4–11)

## 2019-10-29 PROCEDURE — 25000131 ZZH RX MED GY IP 250 OP 636 PS 637: Mod: GY | Performed by: FAMILY MEDICINE

## 2019-10-29 PROCEDURE — 80053 COMPREHEN METABOLIC PANEL: CPT | Performed by: FAMILY MEDICINE

## 2019-10-29 PROCEDURE — 99291 CRITICAL CARE FIRST HOUR: CPT

## 2019-10-29 PROCEDURE — 85027 COMPLETE CBC AUTOMATED: CPT | Performed by: FAMILY MEDICINE

## 2019-10-29 PROCEDURE — 25000128 H RX IP 250 OP 636: Performed by: FAMILY MEDICINE

## 2019-10-29 PROCEDURE — 25000132 ZZH RX MED GY IP 250 OP 250 PS 637: Mod: GY

## 2019-10-29 PROCEDURE — 20000003 ZZH R&B ICU

## 2019-10-29 PROCEDURE — 00000146 ZZHCL STATISTIC GLUCOSE BY METER IP

## 2019-10-29 PROCEDURE — 36415 COLL VENOUS BLD VENIPUNCTURE: CPT | Performed by: FAMILY MEDICINE

## 2019-10-29 PROCEDURE — 25000132 ZZH RX MED GY IP 250 OP 250 PS 637: Mod: GY | Performed by: FAMILY MEDICINE

## 2019-10-29 RX ORDER — MORPHINE SULFATE 30 MG/1
30 TABLET, FILM COATED, EXTENDED RELEASE ORAL ONCE
Status: COMPLETED | OUTPATIENT
Start: 2019-10-29 | End: 2019-10-29

## 2019-10-29 RX ORDER — TRAZODONE HYDROCHLORIDE 100 MG/1
200 TABLET ORAL AT BEDTIME
Status: DISCONTINUED | OUTPATIENT
Start: 2019-10-29 | End: 2019-10-30 | Stop reason: HOSPADM

## 2019-10-29 RX ORDER — NALOXONE HYDROCHLORIDE 0.4 MG/ML
.1-.4 INJECTION, SOLUTION INTRAMUSCULAR; INTRAVENOUS; SUBCUTANEOUS
Status: DISCONTINUED | OUTPATIENT
Start: 2019-10-29 | End: 2019-10-30 | Stop reason: HOSPADM

## 2019-10-29 RX ORDER — MORPHINE SULFATE 30 MG/1
30 TABLET, FILM COATED, EXTENDED RELEASE ORAL 3 TIMES DAILY
Status: DISCONTINUED | OUTPATIENT
Start: 2019-10-29 | End: 2019-10-29

## 2019-10-29 RX ORDER — MORPHINE SULFATE 30 MG/1
60 TABLET, FILM COATED, EXTENDED RELEASE ORAL EVERY MORNING
Status: DISCONTINUED | OUTPATIENT
Start: 2019-10-29 | End: 2019-10-29

## 2019-10-29 RX ORDER — MORPHINE SULFATE 30 MG/1
60 TABLET, FILM COATED, EXTENDED RELEASE ORAL EVERY MORNING
Status: DISCONTINUED | OUTPATIENT
Start: 2019-10-30 | End: 2019-10-30 | Stop reason: HOSPADM

## 2019-10-29 RX ORDER — TRAZODONE HYDROCHLORIDE 50 MG/1
50 TABLET, FILM COATED ORAL AT BEDTIME
Status: DISCONTINUED | OUTPATIENT
Start: 2019-10-29 | End: 2019-10-30 | Stop reason: HOSPADM

## 2019-10-29 RX ORDER — MORPHINE SULFATE 30 MG/1
30 TABLET, FILM COATED, EXTENDED RELEASE ORAL EVERY EVENING
Status: DISCONTINUED | OUTPATIENT
Start: 2019-10-29 | End: 2019-10-30 | Stop reason: HOSPADM

## 2019-10-29 RX ADMIN — TRAZODONE HYDROCHLORIDE 50 MG: 50 TABLET ORAL at 20:04

## 2019-10-29 RX ADMIN — NICOTINE 1 PATCH: 21 PATCH, EXTENDED RELEASE TRANSDERMAL at 20:02

## 2019-10-29 RX ADMIN — TAZOBACTAM SODIUM AND PIPERACILLIN SODIUM 3.38 G: 375; 3 INJECTION, SOLUTION INTRAVENOUS at 08:32

## 2019-10-29 RX ADMIN — TRAZODONE HYDROCHLORIDE 50 MG: 100 TABLET ORAL at 20:04

## 2019-10-29 RX ADMIN — TAZOBACTAM SODIUM AND PIPERACILLIN SODIUM 3.38 G: 375; 3 INJECTION, SOLUTION INTRAVENOUS at 01:55

## 2019-10-29 RX ADMIN — MORPHINE SULFATE 30 MG: 30 TABLET, EXTENDED RELEASE ORAL at 09:31

## 2019-10-29 RX ADMIN — SIMVASTATIN 80 MG: 40 TABLET, FILM COATED ORAL at 20:03

## 2019-10-29 RX ADMIN — MORPHINE SULFATE 30 MG: 30 TABLET, EXTENDED RELEASE ORAL at 12:41

## 2019-10-29 RX ADMIN — OXYCODONE HYDROCHLORIDE 10 MG: 5 TABLET ORAL at 09:35

## 2019-10-29 RX ADMIN — PREDNISONE 10 MG: 10 TABLET ORAL at 08:32

## 2019-10-29 RX ADMIN — DULOXETINE HYDROCHLORIDE 120 MG: 30 CAPSULE, DELAYED RELEASE ORAL at 08:31

## 2019-10-29 RX ADMIN — MORPHINE SULFATE 30 MG: 30 TABLET, FILM COATED, EXTENDED RELEASE ORAL at 18:00

## 2019-10-29 RX ADMIN — PREGABALIN 150 MG: 100 CAPSULE ORAL at 08:32

## 2019-10-29 RX ADMIN — PREGABALIN 150 MG: 100 CAPSULE ORAL at 20:02

## 2019-10-29 ASSESSMENT — ACTIVITIES OF DAILY LIVING (ADL)
ADLS_ACUITY_SCORE: 13
ADLS_ACUITY_SCORE: 15
ADLS_ACUITY_SCORE: 16
ADLS_ACUITY_SCORE: 14
ADLS_ACUITY_SCORE: 14
ADLS_ACUITY_SCORE: 13

## 2019-10-29 ASSESSMENT — PAIN DESCRIPTION - DESCRIPTORS: DESCRIPTORS: RADIATING

## 2019-10-29 NOTE — PROGRESS NOTES
"Pt upset this morning that his morphine was not ordered. Provider at bedside who stated they would restart it. Given when able. Pulled tely, bp, and pulse ox off. Refusing to put them back on stating \"the  Told me I didn't need that stuff\". Charge in rounds who will discuss changing status with provider.   "

## 2019-10-29 NOTE — PLAN OF CARE
Patient was transferred down to ICU around midnight last night due to respiratory issues. He has remained on oxymask or nasal cannula all night at 3-6L. Arouses to voice or physical stimuli. Occasionally requires re-orienting to environment, but knows year/date/president. Walking to bathroom with stand-by assistance.  Jazmin Ambrose RN on 10/29/2019 at 5:26 AM
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
## 2019-10-29 NOTE — PROGRESS NOTES
[facility] Medicine Progress Note  Date of Service: 10/29/2019    Assessment & Plan   Melquiades Morales is a 64 year old male who presented on 10/28/2019 with decreased level of consciousness.    Principal Problem:    Toxic encephalopathy - EMS was called to the patient's home when his roommate found him to be somnolent and confused.  He was given naloxone x2 in the field with improvement in mental status.  Following admission, alertness again waned, and he received additional naloxone here in house, each time with improvement in mental status.  He is on chronic opioid therapy for chronic pain syndrome, discussed below.  Admission tox screen was positive for opioids as expected, but also positive for THC.  He has not received naloxone since last night.  This morning, although he is irritable, he is awake, alert, and appropriate in conversation, requesting resumption of his chronic opioid therapy.  His encephalopathy, therefore, is considered to be secondary to polysubstance overdose with opioids and THC.  It is now resolved.  -Resolved.    Active Problems:    Chronic pain syndrome - managed at the [facility], so review of notes is not available.  His outpatient regimen includes MS Contin 30 mg 3 times daily, oxycodone 10 mg 3 times daily PRN, pregabalin 150 mg twice daily, duloxetine 120 mg daily and naproxen 500 mg twice daily as needed.  The patient does not think that he took any extra doses of his opioids, though the critical care RN thinks that there might be a discrepancy in his MS Contin supply, recently refilled, and is going over pill counts with him.  This morning, exam suggest the possibility of some mild opioid withdrawal, including irritability, and tremor severe enough that he was finding it hard to eat Jell-O with a spoon.  He reports recurrence of pain, and made multiple requests for resumption of his analgesic regimen.  Now that his mental status has  returned to baseline, I think that we can restart his usual opioids, while observing him for any recurrent decline in mental status.  -Resume outpatient analgesic regimen above.      Tobacco use disorder - he was irritable enough during my visit that I did not ask him about recent tobacco use.  -Offer transdermal nicotine if desired.      COPD (chronic obstructive pulmonary disease) (H) - no pulmonary function test results are found in the patient's chart.  Examination suggests at least moderate airflow obstruction.  His chronic cough suggest chronic bronchitis more than emphysema.  There is no current wheezing on exam.  He is either at or near the end of of recent prednisone taper, current dose prednisone 10 mg daily.  -We will give him an additional 2 days of prednisone 10 mg daily, then discontinue.  -He is on no inhalers at home.  I will discuss with him prior to discharge whether he wants a nebulizer for as needed use at home, and maintenance inhaler use can be discussed at primary care follow-up.      Acute respiratory failure with hypoxia and hypercapnia (H) - upon ED arrival, room air saturation was 88%.  As described above, the patient was obtunded, and became progressively more obtunded after admission.  His oxygen needs tended to increase the deeper his obtundation was. VBG showed 7.28/74 after admission, following which he was transferred to the intensive care unit and given additional naloxone.  Although he does have some mild atelectasis on chest CT scan, discussed below, I think that the majority of his hypoxemia was secondary to hypercapnia, and is now improved.  Saturation is now 95 to 96% on a 2 L cannula.  -Titrate oxygen to room air as able.      Bilateral dependent atelectasis - review of the admission chest CT shows mild, dependent atelectasis at both posterior bases.  He was a set up for atelectasis while obtunded.  This should improve now that mental status is back to normal.  -Observe.       "Fever - he had a temperature of 101.2 at 1700 yesterday.  Etiology is unclear, though atelectasis may contribute.  He does not clinically have pneumonia, and CT chest findings are not suggestive of pneumonia.  Procalcitonin less than 0.05 argues against pneumonia as well.  Influenza swab was negative.  He has been afebrile since that lone temperature elevation.  -We will discontinue empiric antibiotic therapy.  -Observe for fever recurrence.      DVT Prophylaxis: Low Risk/Ambulatory with no VTE prophylaxis indicated  Code Status: Full Code    Lines: Peripheral.   Gaona catheter: None.  Restraints: None.    Discussion: Mental status has returned to baseline.  Pneumonia or other bacterial infection appears to be clinically unlikely, and empiric antibiotics are going to be stopped.  I am going to resume the patient's usual outpatient analgesic regimen, including his usual opioids, and we will observe his tolerance in house over the day today.  If there is no recurrence of obtundation with resumption of opioids, he can probably be discharged tomorrow.    Disposition: Anticipate discharge in 1 to 2 additional days.     Attestation:  I have reviewed today's vital signs, notes, medications, labs and imaging.  Amount of time spent providing critical care service today: 45 minutes.    Rey Wagoner MD       Interval History   \"You do not have to turn that off\" when I tried to mute his TV.  He then said \"I am going to need my morphine now\", and \"give me a regular diet\".  He feels that his mental status is back to baseline.  He thinks that his reduction mental status was due to breathing problems last night, and does not think that he took any extra doses of MS Contin.    Physical Exam   Temp:  [98.2  F (36.8  C)-101.2  F (38.4  C)] 98.2  F (36.8  C)  Pulse:  [] 79  Heart Rate:  [] 72  Resp:  [9-22] 10  BP: ()/() 129/67  SpO2:  [88 %-95 %] 91 %    Weights:   Vitals:    10/28/19 1702 10/28/19 2120 " 10/29/19 0642   Weight: 109.8 kg (242 lb) 109.5 kg (241 lb 6.5 oz) 110.6 kg (243 lb 13.3 oz)    Body mass index is 34.01 kg/m .    GENERAL: Awake, alert, eating Jello.  EYES: Eyes grossly normal to inspection, extraocular movements intact  HENT: Nares patent bilaterally.  Nasal mucosa normal, no discharge.    NECK: Trachea midline, no stridor.    RESP: No accessory muscle use.  Lungs uniformly quiet, but clear throughout on inspiration.  Expiration at least moderately quiet and moderately prolonged, no wheeze.  CV: Regular rate and rhythm, non-tachycardic.  Normal S1 S2, no murmur or extra sound.  No lower extremity edema.  ABDOMEN: Soft, non-tender, no guarding.  Liver and spleen not enlarged, no masses palpable.  Bowel sounds positive.  MS: No bony deformities noted.  No red or inflamed joints.  SKIN: Warm and dry, no rashes.  NEURO: Alert, oriented, conversant.  Cranial nerves III - XII grossly intact.  No gross motor or sensory deficits.  Mild tremor of both hands, but not elsewhere.  PSYCH: Calm, conversant.  Able to articulate logical thoughts, no tangential thoughts, no hallucinations or delusions.  Affect irritable.        Data   Recent Labs   Lab 10/29/19  0442 10/28/19  1710   WBC 10.6 12.9*   HGB 13.2* 14.4   * 102*    201   INR  --  1.04    145*   POTASSIUM 4.3 3.4   CHLORIDE 108 108   CO2 33* 31   BUN 8 8   CR 0.70 0.67   ANIONGAP 2* 6   JESSEE 8.0* 8.1*   * 172*   ALBUMIN 2.6* 2.9*   PROTTOTAL 5.4* 6.0*   BILITOTAL 0.7 0.4   ALKPHOS 44 51   ALT 16 18   AST 11 15   TROPI  --  0.042       Recent Labs   Lab 10/29/19  0747 10/29/19  0442 10/28/19  1710   GLC  --  110* 172*   BGM 92  --   --         Unresulted Labs Ordered in the Past 30 Days of this Admission     Date and Time Order Name Status Description    10/29/2019 0733 Urine Culture Aerobic Bacterial In process     10/28/2019 1747 Blood culture Preliminary     10/28/2019 1747 Blood culture Preliminary             Imaging  Recent Results (from the past 24 hour(s))   XR Chest 2 Views    Narrative    CHEST TWO VIEWS      10/28/2019 6:38 PM     HISTORY: Confusion, hypoxia.    COMPARISON: 10/14/2019.      Impression    IMPRESSION: Stable cardiac silhouette. Interval increased vascular  congestion and interstitial prominence that may be developing edema.  Mild left base atelectasis.     ARETHA ESPAÑA MD   CT Chest Pulmonary Embolism w Contrast    Narrative    CT CHEST PULMONARY EMBOLISM WITH CONTRAST  10/28/2019 7:33 PM    HISTORY:  Fever, hypoxia.    TECHNIQUE: Scans obtained from the apices through the diaphragm with  IV contrast. 91 mL Isovue 370 IV injected. Radiation dose for this  scan was reduced using automated exposure control, adjustment of the  mA and/or kV according to patient size, or iterative reconstruction  technique.    COMPARISON:  CT chest 10/13/2018.    FINDINGS:  Vascular: No acute thoracic aortic abnormality. No evidence for  pulmonary embolism. Mild coronary artery calcifications and small  thoracic aortic calcifications.    Lungs and pleura: Scattered areas of bilateral subpleural scarring or  atelectasis. Difficult to completely exclude new areas of airspace  disease particularly at the bilateral lower lobes around series 8  image 204.    Lymph nodes: Stable mildly prominent right paratracheal lymph node 1.7  x 1.1 cm series 4 image 36. Another right paratracheal lymph node that  is mildly prominent is stable image 24.    Additional findings: No acute upper abdominal abnormality.      Impression    IMPRESSION:   1. No pulmonary embolism or acute thoracic aortic abnormality.  2. New patchy subpleural opacities that may be atelectasis or  potentially bibasilar new airspace disease.  3. Mild coronary artery calcifications.  4. Stable mildly prominent mediastinal lymph nodes.     ARETHA ESPAÑA MD        I reviewed all new labs and imaging results over the last 24 hours. I personally reviewed the chest CT  image(s) showing Mild dependent atelectasis bilaterally, and otherwise clear lung fields.    Medications       DULoxetine  120 mg Oral Daily     morphine  30 mg Oral TID     nicotine  1 patch Transdermal Q24H     nicotine   Transdermal Q8H     nicotine   Transdermal Daily     predniSONE  10 mg Oral Daily     pregabalin  150 mg Oral BID     simvastatin  80 mg Oral At Bedtime     sodium chloride (PF)  3 mL Intracatheter Q8H     traZODone  200 mg Oral At Bedtime     traZODone  50 mg Oral At Bedtime       Rey Wagoner MD

## 2019-10-29 NOTE — PROGRESS NOTES
9:39 PM  Pt arrived on floor and now needs 7 liters oxygen.  He is sluggish, RR is 12.  Will try narcan 0.4 now.  Check vbg  Will transfer to icu.  Hold scheduled narcotic. He has not received any here.    11:15 PM   Pt dramatically improved with narcan.  He is up and using the toilet now.  His vbg showed some mild hypercarbia-but he is clinically improved now.  He will go to the ICU for monitoring and further narcan if necessary.  I will use a narcan gtt if necessary--but I would like to avoid narcotic withdrawal.  The nurses say that there are many unaccounted pills in his recently refilled bottle of Morphine Sulfate.

## 2019-10-29 NOTE — PROGRESS NOTES
[facility] ADMISSION NOTE    Patient admitted to room 2302 at approximately 2045 via cart from emergency room. Patient was accompanied by transport tech.     Verbal SBAR report received from AUNDREA Augustin prior to patient arrival.     Patient trasferred to bed via air karen. Patient alert and oriented X 1. Pain is controlled without any medications.  . Admission vital signs: Blood pressure 133/64, pulse 97, temperature 98.7  F (37.1  C), temperature source Axillary, resp. rate 12, weight 109.5 kg (241 lb 6.5 oz), SpO2 94 %. Patient was oriented to plan of care, call light, bed controls, tv, telephone, bathroom and visiting hours.     Risk Assessment    The following safety risks were identified during admission: fall and skin. Yellow risk band applied: YES.     Skin Initial Assessment    This writer admitted this patient and completed a full skin assessment and Jeremiah score in the Adult PCS flowsheet. Appropriate interventions initiated as needed.     Secondary skin check completed by AUNDREA Rod.         Education    Patient has a [place] to Observation order: No  Observation education completed and documented: N/A      Filomena Acosta RN

## 2019-10-29 NOTE — ED NOTES
"Pt removed from 02 for monitoring, pt dropped his ox 84% within a min, had him take a few deep breaths in with no change, 02 reapplied on 4L/NC to maintain 90-91%. He is a/o when he wakes and states \" my roommate will come and get me\" he denies pain or resp distress. Monitor   "

## 2019-10-29 NOTE — PROGRESS NOTES
Skin affirmation note    Admitting nurse completed full skin assessment, Jeremiah score and Jeremiah interventions. This writer agrees with the initial skin assessment findings.

## 2019-10-29 NOTE — PROGRESS NOTES
MD notified of increasing O2 needs, lethargic. 8LPM 88-90%. Ordered to administer narcan. Narcan given, patient did wake, stated he was cold, O2 sat up to 98%, O2 decreased to 5LPM, currently 93%, RR16.    ZINA Acosta RN

## 2019-10-29 NOTE — PROGRESS NOTES
Pt awake most of the day. Oriented but not always sure of the exact date. VSS. Desats when sleeping. Refuses occasional vitals and wearing cardiac monitor, pulse ox, and bp cuff. Advanced to regular diet. Tolerating well. No BM today. Voiding in bathroom. Ambulating in room. Pt more agreeable and less irritable this evening after pain med schedule was fixed.

## 2019-10-29 NOTE — CONSULTS
Care Transition Initial Assessment - RN      Met with: Patient.    DATA  Principal Problem:    Toxic encephalopathy  Active Problems:    Chronic pain syndrome    Tobacco use disorder    COPD (chronic obstructive pulmonary disease) (H)    Acute respiratory failure with hypoxia and hypercapnia (H)    Polysubstance overdose    Bilateral dependent atelectasis    Fever       Cognitive Status: awake, alert and oriented.  Primary Care Clinic Name: [facility]   Primary Care MD Name: Thaddeus  Contact information and PCP information verified: Yes  Lives With: other (see comments)(friend and granddaughter)         Description of Support System: Supportive, Involved   Who is your support system?: PCA   Support Assessment: Adequate family and caregiver support   Insurance concerns: No Insurance issues identified        This writer met with pt, I introduced self and role.  Patient currently lives with his granddaughter and friend in [facility] in [place].  He has a PCA for 6 hours daily, 7 days a week.  They help with meals, shopping, medication management, etc.  He has had PCA help for 10 years.  He has a current [facility] , Kyree Burt.  Discussed discharge planning and Medicare guidelines in regards to home care, TCU and LTC.  His goal is to return home with current PCA services.  He has no additional needs at this time.  His PCA, Cristóbal will plan to transport patient home upon discharge.      PLAN    Home, no needs    Lala oKwalski, RN  Inpatient Care Coordinator  [facility] [phone]  [facility] [phone]

## 2019-10-29 NOTE — PROGRESS NOTES
Received a call from [facility] pharmacy stating that pt received 84 of MS contin on 10/23 and 84 5mg oxy tablets on 10/24. According to RNs count, pt should have come in with 60 oxy tabs but only came in with 7. Provider informed.

## 2019-10-29 NOTE — PROGRESS NOTES
Arrives to unit on 4L oxymask. Able to walk to bed. Oriented to person and place, able to follow commands.   Jazmin Ambrose RN on 10/29/2019 at 12:55 AM

## 2019-10-30 VITALS
SYSTOLIC BLOOD PRESSURE: 139 MMHG | RESPIRATION RATE: 12 BRPM | WEIGHT: 243.39 LBS | TEMPERATURE: 98 F | DIASTOLIC BLOOD PRESSURE: 62 MMHG | OXYGEN SATURATION: 96 % | HEART RATE: 72 BPM | BODY MASS INDEX: 33.95 KG/M2

## 2019-10-30 LAB
BACTERIA SPEC CULT: NO GROWTH
Lab: NORMAL
SPECIMEN SOURCE: NORMAL

## 2019-10-30 PROCEDURE — 99239 HOSP IP/OBS DSCHRG MGMT >30: CPT

## 2019-10-30 PROCEDURE — 25000132 ZZH RX MED GY IP 250 OP 250 PS 637: Mod: GY | Performed by: FAMILY MEDICINE

## 2019-10-30 PROCEDURE — 25000131 ZZH RX MED GY IP 250 OP 636 PS 637: Mod: GY | Performed by: FAMILY MEDICINE

## 2019-10-30 RX ORDER — IPRATROPIUM BROMIDE AND ALBUTEROL SULFATE 2.5; .5 MG/3ML; MG/3ML
1 SOLUTION RESPIRATORY (INHALATION) EVERY 4 HOURS PRN
Qty: 120 VIAL | Refills: 0 | Status: ON HOLD | OUTPATIENT
Start: 2019-10-30 | End: 2021-03-24

## 2019-10-30 RX ADMIN — DULOXETINE HYDROCHLORIDE 120 MG: 30 CAPSULE, DELAYED RELEASE ORAL at 07:33

## 2019-10-30 RX ADMIN — PREGABALIN 150 MG: 100 CAPSULE ORAL at 07:34

## 2019-10-30 RX ADMIN — OXYCODONE HYDROCHLORIDE 10 MG: 5 TABLET ORAL at 06:37

## 2019-10-30 RX ADMIN — PREDNISONE 10 MG: 10 TABLET ORAL at 07:34

## 2019-10-30 ASSESSMENT — ACTIVITIES OF DAILY LIVING (ADL)
ADLS_ACUITY_SCORE: 14

## 2019-10-30 ASSESSMENT — PAIN DESCRIPTION - DESCRIPTORS: DESCRIPTORS: RADIATING

## 2019-10-30 NOTE — PROGRESS NOTES
Unable to do monitor strip for ICU status as patient refusing to wear monitor.  Will continue to attempt.

## 2019-10-30 NOTE — PROGRESS NOTES
[facility] DISCHARGE NOTE    Patient discharged to home at 10:15 AM via wheel chair. Accompanied by other:None and staff. Discharge instructions reviewed with patient, opportunity offered to ask questions. Prescriptions sent to patients preferred pharmacy. All belongings sent with patient.    Carmen Diaz RN

## 2019-10-30 NOTE — PROGRESS NOTES
"Patient refuses to wear telemetry monitor and oxygen monitor stating \"nothing you could say would get me to wear that\". Reasons and risks were explained.   Jazmin Ambrose RN on 10/29/2019 at 8:48 PM    "

## 2019-10-30 NOTE — PLAN OF CARE
Rested intermittently throughout the night, up to bathroom independently. Still refusing cardiac monitoring but did allow Q4hr vital checks. 2L O2 worn all night. Otherwise VSS.   Jazmin Ambrose RN on 10/30/2019 at 5:40 AM

## 2019-10-30 NOTE — PROGRESS NOTES
Name: Melquiades Morales    MRN#: [id number]    Reason for Hospitalization: Hypoxia [R09.02]  Productive cough [R05]  Fever, unspecified [R50.9]  Altered mental status, unspecified altered mental status type [R41.82]    Discharge Date: 10/30/2019    Patient / Family response to discharge plan: Discharging home w/ current PCA services.  Follow-up PCP appointment made.  Patient's PCA, Cristóbal transporting patient home when ready.  Clinic care coordination sent hand off.      Other Providers (Care Coordinator, County Services, PCA services etc): No    CTS Hand Off Completed: Yes: completed    PAS #: NA    BRIAN Score: elevated    Future Appointments:   Future Appointments   Date Time Provider Department Center   11/5/2019 11:00 AM Jignesh Travis MD [facility]   11/8/2019  9:00 AM Kleber Pollack [facility]   11/22/2019  9:00 AM [facility]   12/13/2019  9:00 AM [facility]   12/20/2019  9:00 AM [facility]   1/3/2020  9:00 AM Kleber Pollack [facility]       Discharge Disposition: home    Discharge Planner   Discharge Plans in progress: completed: home w/ PCA services  Barriers to discharge plan: none  Follow up plan: PCP, PCA       Entered by: Lala Kowalski 10/30/2019 11:06 AM

## 2019-10-30 NOTE — DISCHARGE SUMMARY
[facility]    Discharge Summary  Hospital Medicine    Date of Admission:  10/28/2019  Date of Discharge:  10/30/2019   Discharging Provider: Rey Wagoner  Date of Service: 10/30/2019      Primary Care     Jignesh Travis  [id number]  [facility] [id number]      Identification and Chief Compaint:  Melquiades Morales is a 64 year old male who presented on 10/28/2019 with decreased level of consciousness.    Discharge Diagnoses       Toxic encephalopathy    Chronic pain syndrome    Tobacco use disorder    COPD (chronic obstructive pulmonary disease) (H)    Acute respiratory failure with hypoxia and hypercapnia (H)    Polysubstance overdose    Bilateral dependent atelectasis    Fever      Discharge Disposition   Discharged to home    Discharge Orders      Reason for your hospital stay    Your roommate found you to be only partially conscious and confused, and therefore called the paramedics to bring him to the hospital.  Your mental status quickly improved after he received some doses of naloxone (Narcan), suggesting that your decreased alertness was a side effect of one or both of your opioid medications.  We let those medications wear off, and by the morning after admission you were once again awake, alert, and required no additional naloxone.  At that point, we resumed your home regimen of oxycodone, pregabalin, duloxetine, and naproxen, and observed you for another 24 hours.  You have remained alert and cognitively normal throughout that 24 hours, and as result you can go home today.    I have written you a prescription for a nebulizer of your own, so you no longer need to use your wife's.  I have also sent in an electronic prescription for DuoNeb, the medication you can use to that nebulizer on an every 4 hour as-needed basis for cough, shortness of breath, or wheezing.     Follow-up and recommended labs and tests     Follow up with primary care provider, Jignesh Travis, within  7 days for hospital follow- up.  No specific follow up labs or tests are needed.     Activity    Your activity upon discharge: activity as tolerated.     Full Code     Diet    Follow this diet upon discharge: Orders Placed This Encounter      Advance Diet as Tolerated: Regular Diet Adult        Discharge Medications   Current Discharge Medication List      START taking these medications    Details   ipratropium - albuterol 0.5 mg/2.5 mg/3 mL (DUONEB) 0.5-2.5 (3) MG/3ML neb solution Take 1 vial (3 mLs) by nebulization every 4 hours as needed for shortness of breath / dyspnea or wheezing  Qty: 120 vial, Refills: 0    Associated Diagnoses: Chronic bronchitis, unspecified chronic bronchitis type (H)      !! order for DME Equipment being ordered: Nebulizer.    Diagnosis: chronic obstructive bronchitis (COPD).    Duration of need:  99 months.  Qty: 1 each, Refills: 0    Associated Diagnoses: Chronic bronchitis, unspecified chronic bronchitis type (H)       !! - Potential duplicate medications found. Please discuss with provider.      CONTINUE these medications which have NOT CHANGED    Details   albuterol (PROAIR HFA) 108 (90 Base) MCG/ACT inhaler Inhale 2 puffs into the lungs every 4 hours as needed for shortness of breath / dyspnea or wheezing  Qty: 1 Inhaler, Refills: 11    Comments: Pharmacy may dispense brand covered by insurance (Proair, or proventil or ventolin or generic albuterol inhaler)  Associated Diagnoses: Bronchitis      DULoxetine (CYMBALTA) 60 MG capsule TAKE 2 CAPSULES BY MOUTH EVERY DAY  Qty: 180 capsule, Refills: 3    Associated Diagnoses: Depression, unspecified depression type      Melatonin 10 MG CAPS Take 1 capsule by mouth At Bedtime  Refills: 0      morphine (MS CONTIN) 30 MG CR tablet 2 in AM and 1 at bedtime  Refills: 0      naproxen (NAPROSYN) 250 MG tablet 2 tabs bid if needed for headache  Qty: 60 tablet, Refills: 3    Associated Diagnoses: Chronic pain syndrome      nicotine (NICODERM CQ)  21 MG/24HR 24 hr patch Place 1 patch onto the skin every 24 hours  Qty: 10 patch, Refills: 1    Associated Diagnoses: Tobacco abuse      oxyCODONE (ROXICODONE) 5 MG tablet Take 1-2 tablets by mouth 3 times daily as needed   Refills: 0      !! pregabalin (LYRICA) 150 MG capsule Take 150 mg by mouth 2 times daily  Refills: 0      simvastatin (ZOCOR) 80 MG tablet Take 1 tablet (80 mg) by mouth daily DUE FOR LAB WORK FOR FURTHER REFILLS  Qty: 90 tablet, Refills: 3    Associated Diagnoses: Hyperlipidemia LDL goal <130      !! traZODone (DESYREL) 100 MG tablet TAKE 2 TABLETS BY MOUTH AT BEDTIME AS NEEDED FOR SLEEP  Qty: 60 tablet, Refills: 3    Comments: This prescription was filled on 10/2/2019. Any refills authorized will be placed on file.  Associated Diagnoses: Depression, unspecified depression type      !! traZODone (DESYREL) 50 MG tablet Take 1 tablet (50 mg) by mouth nightly as needed for sleep Take along with the 100 mg tablets for total dose of 250 mg  Qty: 90 tablet, Refills: 1    Associated Diagnoses: Depression, unspecified depression type      vitamin D2 (ERGOCALCIFEROL) 39797 units (1250 mcg) capsule Take 1 capsule by mouth once a week On Saturday  Refills: 11      !! order for DME Equipment being ordered: Hospital Bed and mattress.  Qty: 1 each, Refills: 0    Associated Diagnoses: Chronic pain syndrome; Lumbosacral radiculitis; Chronic obstructive pulmonary disease, unspecified COPD type (H); Obese; Lumbar radiculopathy; Encephalopathy; Spinal stenosis in cervical region      !! order for DME Equipment being ordered: Lift Chair  Qty: 1 each, Refills: 0    Associated Diagnoses: Chronic pain syndrome; Lumbosacral radiculitis; Chronic obstructive pulmonary disease, unspecified COPD type (H); Obese; Lumbar radiculopathy; Encephalopathy; Spinal stenosis in cervical region; Unable to ambulate      !! order for DME Equipment being ordered: Wheelchair. Electric, including adjustable back rest sitting to resting,  leg elevation adjustment, approved for outdoor use  Qty: 1 Units, Refills: 0    Associated Diagnoses: Chronic pain syndrome; Lumbosacral radiculitis      !! ORDER FOR DME Semi electric hospital bed with side rails and mattress. Length of bed is for lifetime  Qty: 1 Device, Refills: 0    Associated Diagnoses: Other unspecified back disorder; Obese; Lumbosacral radiculitis; COPD (chronic obstructive pulmonary disease) (H)      !! Pregabalin (LYRICA PO) Take 150 mg by mouth 2 times daily        !! - Potential duplicate medications found. Please discuss with provider.      STOP taking these medications       levofloxacin (LEVAQUIN) 750 MG tablet Comments:   Reason for Stopping:         predniSONE (DELTASONE) 10 MG tablet Comments:   Reason for Stopping:             Allergies   Allergies   Allergen Reactions     Abilify [Aripiprazole] Other (See Comments)     Altered metal status.  Was admitted to hospital 3/17/2015.     Asa [Aspirin] GI Disturbance     Upset stomach     Black Pepper [Piper]      Caffeine Other (See Comments)     Comment: GI problems, Description:      Robitussin Cough-Cold D Other (See Comments)     Bad dreams about killing people      Varenicline Unknown       Consultations This Hospital Stay   Consultation during this admission received from:    CARE TRANSITION RN/SW IP CONSULT    Significant Results and Procedures   Procedures    None.    Data   Results for orders placed or performed during the hospital encounter of 10/28/19   XR Chest 2 Views    Narrative    CHEST TWO VIEWS      10/28/2019 6:38 PM     HISTORY: Confusion, hypoxia.    COMPARISON: 10/14/2019.      Impression    IMPRESSION: Stable cardiac silhouette. Interval increased vascular  congestion and interstitial prominence that may be developing edema.  Mild left base atelectasis.     ARETHA ESPAÑA MD   CT Chest Pulmonary Embolism w Contrast    Narrative    CT CHEST PULMONARY EMBOLISM WITH CONTRAST  10/28/2019 7:33 PM    HISTORY:  Fever,  hypoxia.    TECHNIQUE: Scans obtained from the apices through the diaphragm with  IV contrast. 91 mL Isovue 370 IV injected. Radiation dose for this  scan was reduced using automated exposure control, adjustment of the  mA and/or kV according to patient size, or iterative reconstruction  technique.    COMPARISON:  CT chest 10/13/2018.    FINDINGS:  Vascular: No acute thoracic aortic abnormality. No evidence for  pulmonary embolism. Mild coronary artery calcifications and small  thoracic aortic calcifications.    Lungs and pleura: Scattered areas of bilateral subpleural scarring or  atelectasis. Difficult to completely exclude new areas of airspace  disease particularly at the bilateral lower lobes around series 8  image 204.    Lymph nodes: Stable mildly prominent right paratracheal lymph node 1.7  x 1.1 cm series 4 image 36. Another right paratracheal lymph node that  is mildly prominent is stable image 24.    Additional findings: No acute upper abdominal abnormality.      Impression    IMPRESSION:   1. No pulmonary embolism or acute thoracic aortic abnormality.  2. New patchy subpleural opacities that may be atelectasis or  potentially bibasilar new airspace disease.  3. Mild coronary artery calcifications.  4. Stable mildly prominent mediastinal lymph nodes.     ARETHA ESPAÑA MD       History of Present Illness   (From H&P) Jeffrey Morales is a 64-year-old male with a history of COPD, chronic lumbar pain with chronic opioid use, depression, hyperlipidemia, tobacco use and some previous history of marijuana use and vaping (he currently denies that).  The patient has had 2 recent admissions for respiratory problems.  The patient was admitted on 09/26/2019.  At that time he had COPD, hypoxia and possible pneumonia.  He was treated with Zosyn in the hospital and Zithromax.  He was sent home on Zithromax.  He was readmitted on 10/07 with cough, congestion, hypoxia, COPD exacerbation and a persistent, but improved left lower  lobe consolidation.  He was treated with Levaquin and ultimately discharged on Levaquin and prednisone.  At this point in time,  he should be tapered down to 10 mg a day of prednisone if he has been taking that appropriately.      He tells me that he has had fevers at home, some shortness of breath, cough.  His roommate called 911 today because of confusion and decreased level of consciousness.  They found him difficult to arouse and gave him Narcan x 2 with definite improvement in mental status.  When he first arrived in the emergency room, he was alert but disoriented.  His mental status improved during his ER stay.  He was noted to have fever.  A chest CT showed new patchy subpleural opacities that might be atelectasis or potentially bibasilar new airspace disease.  Because of this and because of fever in the emergency room, he was started on Zosyn.  There was some concern that he may have had an aspiration event given his decreased level of consciousness.      He currently is sleepy but fully oriented and can give a history.  He feels weak.     Hospital Course   Melquaides Morales was admitted on 10/28/2019.  The following problems were addressed during his hospitalization:    Principal Problem:    Toxic encephalopathy - EMS was called to the patient's home when his roommate found him to be somnolent and confused.  He was given naloxone x2 in the field with improvement in mental status.  Following admission, alertness again waned, and he received additional naloxone here in house, each time with improvement in mental status.  He is on chronic opioid therapy for chronic pain syndrome, discussed below.  Admission tox screen was positive for opioids as expected, but also positive for THC.  His encephalopathy, therefore, is considered to be secondary to polysubstance overdose with opioids and THC.  Alertness and cognition were noted to be normal as of the morning following admission, and have remained normal for an  "additional 24 hours of observation here.  We resumed his preadmission regimen of MS Contin and oxycodone for the final 24 hours of hospitalization, and saw no episodes of decreased alertness or confusion.  He feels well this morning, and requests discharge.  We will send him home.  -Resolved.     Active Problems:    Chronic pain syndrome - managed at the [facility], so review of notes is not available.  His outpatient regimen includes MS Contin 60 mg QAM and 30 mg QPM, oxycodone 10 mg 3 times daily PRN, pregabalin 150 mg twice daily, duloxetine 120 mg daily and naproxen 500 mg twice daily as needed.  The patient does not think that he took any extra doses of his opioids the day of admission, though we received a call from the patient's pharmacy with the following information (this is from the critical care RN note at 10/29/19 at 1550):    \"Received a call from Jeffrey's pharmacy stating that pt received 84 of MS contin on 10/23 and 84 5mg oxy tablets on 10/24. According to RNs count, pt should have come in with 60 oxy tabs but only came in with 7. Provider informed.\"    As described above, the morning following admission, we resumed his outpatient analgesic regimen as outlined above, and observed him for an additional 24 hours to ensure that his alertness and cognition remained intact.  We saw no abnormalities of mental status over the past 24 hours here.  He is being discharged home today.  -Resume outpatient analgesic regimen above.       Tobacco use disorder - he was irritable enough during my visit that I did not ask him about recent tobacco use.  -Transdermal nicotine used here.       COPD (chronic obstructive pulmonary disease) (H) - no pulmonary function test results are found in the patient's chart.  Examination here suggests at least moderate airflow obstruction, not in exacerbation.  His chronic cough suggest chronic bronchitis more than emphysema.  On admission he was near the end of of recent prednisone " taper, admission prednisone dose 10 mg daily.  We gave him an additional 2 days of prednisone 10 mg daily, though Prednisone is discontinued at discharge.  He is on no inhalers at home, but has been using his wife's nebulizer on a PRN basis.  I discussed with him whether he wants a nebulizer of his own for as needed use at home; he does.  -Rx printed for nebulizer.  -Duoneb Q4H PRN dyspnea or wheeze.  - A maintenance inhaler regimen can be discussed at primary care follow-up.       Acute respiratory failure with hypoxia and hypercapnia (H) - upon ED arrival, room air saturation was 88%.  As described above, the patient was obtunded, and became progressively more obtunded after admission.  His oxygen needs tended to increase the deeper his obtundation was. VBG showed 7.28/74 after admission, following which he was transferred to the intensive care unit and given additional naloxone.  Although he did have some mild atelectasis on chest CT scan, discussed below, I think that the majority of his hypoxemia was secondary to hypercapnia, and is now improved.  Saturation is now 95 to 96% on a 2 L cannula.  Will ensure adequate SpO2 on room air prior to discharge.       Bilateral dependent atelectasis - review of the admission chest CT shows mild, dependent atelectasis at both posterior bases.  He was a set up for atelectasis while obtunded.  This has clinically improved now that mental status is back to normal.  -Resolved.       Fever - he had a temperature of 101.2 at 1700 the day of admission.  Etiology is unclear, though atelectasis may contribute.  He does not clinically have pneumonia, and CT chest findings were not suggestive of pneumonia.  Procalcitonin less than 0.05 argued against pneumonia as well.  Influenza swab was negative.  He has been afebrile since that lone temperature elevation the day of admission.  -Resolved.  Empiric antibiotic therapy is not warranted.    Pending Results   Unresulted Labs Ordered in  the Past 30 Days of this Admission     Date and Time Order Name Status Description    10/28/2019 1747 Blood culture Preliminary     10/28/2019 1747 Blood culture Preliminary           Physical Exam   Temp:  [97.7  F (36.5  C)-98.6  F (37  C)] 98  F (36.7  C)  Pulse:  [70-85] 72  Heart Rate:  [69-76] 69  Resp:  [12-16] 12  BP: (109-181)/() 139/62  SpO2:  [87 %-96 %] 96 %  Vitals:    10/28/19 2120 10/29/19 0642 10/30/19 0527   Weight: 109.5 kg (241 lb 6.5 oz) 110.6 kg (243 lb 13.3 oz) 110.4 kg (243 lb 6.2 oz)       GENERAL: Awake, alert, looks comfortable.  EYES: Eyes grossly normal to inspection, extraocular movements intact  HENT: Nares patent bilaterally.  Nasal mucosa normal, no discharge.    NECK: Trachea midline, no stridor.    RESP: No accessory muscle use.  Lungs uniformly quiet, but clear throughout on inspiration.  Expiration at least moderately quiet and moderately prolonged, no wheeze.  CV: Regular rate and rhythm, non-tachycardic.  Normal S1 S2, no murmur or extra sound.  No lower extremity edema.  ABDOMEN: Soft, non-tender, no guarding.  Bowel sounds positive.  NEURO: Alert, oriented, conversant.  Cranial nerves III - XII grossly intact.  No gross motor or sensory deficits.  No tremor seen.  PSYCH: Calm, conversant.  Able to articulate logical thoughts, no tangential thoughts, no hallucinations or delusions.  Affect normal.    The discharge plan was discussed with the patient, who expressed agreement.    Total time on this discharge was 35 minutes.    Rey Wagoner MD

## 2019-10-30 NOTE — PROGRESS NOTES
BP reading 181/138. Patient refuses to sit still for blood pressure reading. Will re-attempt in the next 2 hours.   Jazmin Ambrose RN on 10/30/2019 at 3:44 AM

## 2019-10-31 ENCOUNTER — PATIENT OUTREACH (OUTPATIENT)
Dept: CARE COORDINATION | Facility: CLINIC | Age: 64
End: 2019-10-31

## 2019-10-31 DIAGNOSIS — Z71.89 OTHER SPECIFIED COUNSELING: ICD-10-CM

## 2019-10-31 ASSESSMENT — ACTIVITIES OF DAILY LIVING (ADL): DEPENDENT_IADLS:: TRANSPORTATION;MEDICATION MANAGEMENT;MEAL PREPARATION;SHOPPING;LAUNDRY;COOKING;CLEANING

## 2019-10-31 NOTE — PROGRESS NOTES
Clinic Care Coordination Contact    Clinic Care Coordination Contact  OUTREACH    Referral Information:  Referral Source: IP Handoff    Primary Diagnosis: Pneumonia    Chief Complaint   Patient presents with     Clinic Care Coordination - Post Hospital     RN   [place] Utilization: Three hospital admission in past month. Last admission for polysubstance abuse with THC.   Clinic Utilization  Compliance Concerns: No  No-Show Concerns: No  No PCP office visit in Past Year: No  Utilization    Last refreshed: 10/31/2019  7:48 AM:  Hospital Admissions 3           Last refreshed: 10/31/2019  7:48 AM:  ED Visits 0           Last refreshed: 10/31/2019  7:48 AM:  No Show Count (past year) 3              Current as of: 10/31/2019  7:48 AM          Clinical Concerns:    Patient has COPD and has been treated for bronchitis. Patient was found unresponsive at home. Tested positive for THC, recovered after Narcan x3.    patient states that he has diarrhea from the antibiotics he has been taking.  Patient states he can use immodium which he did take this am.     Current Behavioral Concerns: Denies concerns    Education Provided to patient: Discussed BRAT diet. Patient states he is having a double meat sandwich from [employer].      Pain  Pain (GOAL):: Yes  Type: Chronic (>3mo)  Location of chronic pain:: back pain  Progression: Unchanged  Description of pain: Stabbing  Limitation of routine activities due to chronic pain:: Yes  Description: Unable to perform most daily activities (chores, hobbies, social activities, driving)  Alleviating Factors: Pain Medication  Aggravating Factors: Activity, Coughing    Health Maintenance Reviewed: Due/Overdue   Health Maintenance Due   Topic Date Due     URINE DRUG SCREEN  1955     COPD ACTION PLAN  1955     MIGRAINE ACTION PLAN  1955     HIV SCREENING  02/28/1972     ZOSTER IMMUNIZATION (1 of 2) 02/28/2007     ADVANCE CARE PLANNING  09/07/2017     MEDICARE ANNUAL WELLNESS VISIT   04/12/2018     Clinical Pathway: None    Medication Management:  PCA sets up medications     Functional Status:  Dependent ADLs:: Ambulation-cane, Wheelchair-independent  Dependent IADLs:: Transportation, Medication Management, Meal Preparation, Shopping, Laundry, Cooking, Cleaning  Mobility Status: Dependent/Assisted by Another    Living Situation:  Current living arrangement:: I live in a private home with family  Type of residence:: Private home - no stairs    Diet/Exercise/Sleep:  Diet:: Regular  Inadequate nutrition (GOAL):: No  Food Insecurity: No  Tube Feeding: No  Exercise:: Unable to exercise  Inadequate activity/exercise (GOAL):: No  Significant changes in sleep pattern (GOAL): No    Transportation:  Transportation concerns (GOAL):: No  Transportation means:: Accessible car     Psychosocial:  Informal Support system:: Friends, Family     Financial/Insurance:   Financial/Insurance concerns (GOAL):: No     Resources and Interventions:  Current Resources:     Community Resources: PCA, County Worker(6 hours per day/7days a week)  Supplies used at home:: Nebulizer tubing  Equipment Currently Used at Home: cane, straight, wheelchair, power, hospital bed, lift device    Advance Care Plan/Directive  Advanced Care Plans/Directives on file:: No    Referrals Placed: None     Patient/Caregiver understanding: States understanding, but clearly  Will do as he chooses.      Future Appointments              In 5 days Jignesh Travis MD [facility]    In 1 week Kleber Pollack [facility]    In 3 weeks Kleber Pollack [facility]    In 1 month Kleber Pollack [facility]    In 1 month Kleber Pollack [facility]    In 2 months Kleber Pollack [facility]  GRE[place]      Plan: Patient will attend PCP follow up appointment. Verified date and time.     Patient declines any further intervention from clinic care coordination.    Merle Haley RN, CCM - Primary Care Clinic RN Coordinator  [facility]   10/31/2019    10:18 AM  [phone]

## 2019-11-05 ENCOUNTER — OFFICE VISIT (OUTPATIENT)
Dept: FAMILY MEDICINE | Facility: CLINIC | Age: 64
End: 2019-11-05
Payer: MEDICARE

## 2019-11-05 VITALS
RESPIRATION RATE: 18 BRPM | BODY MASS INDEX: 34.02 KG/M2 | HEIGHT: 71 IN | TEMPERATURE: 98.1 F | SYSTOLIC BLOOD PRESSURE: 121 MMHG | WEIGHT: 243 LBS | HEART RATE: 89 BPM | DIASTOLIC BLOOD PRESSURE: 82 MMHG | OXYGEN SATURATION: 92 %

## 2019-11-05 DIAGNOSIS — G92.9 TOXIC ENCEPHALOPATHY: Primary | ICD-10-CM

## 2019-11-05 DIAGNOSIS — G89.4 CHRONIC PAIN SYNDROME: Chronic | ICD-10-CM

## 2019-11-05 DIAGNOSIS — J42 CHRONIC BRONCHITIS, UNSPECIFIED CHRONIC BRONCHITIS TYPE (H): Chronic | ICD-10-CM

## 2019-11-05 DIAGNOSIS — F11.29 OPIOID DEPENDENCE WITH OPIOID-INDUCED DISORDER (H): ICD-10-CM

## 2019-11-05 PROCEDURE — 99214 OFFICE O/P EST MOD 30 MIN: CPT | Performed by: FAMILY MEDICINE

## 2019-11-05 ASSESSMENT — MIFFLIN-ST. JEOR: SCORE: 1924.37

## 2019-11-05 NOTE — PROGRESS NOTES
"Subjective     Melquiades Morales is a 64 year old male who presents to clinic today for the following health issues:    [facility]       Hospital Follow-up Visit:    Hospital/Nursing Home/IP Rehab Facility: [facility]  Date of Admission: 10/28/19  Date of Discharge: 10/30/19  Reason(s) for Admission:  decreased level of consciousness.            Problems taking medications regularly:  None       Medication changes since discharge: None       Problems adhering to non-medication therapy:  None    Summary of hospitalization:  [facility] discharge summary reviewed  Diagnostic Tests/Treatments reviewed.  Follow up needed: none  Other Healthcare Providers Involved in Patient s Care:         None  Update since discharge: improved.     Post Discharge Medication Reconciliation: .  Plan of care communicated with      Coding guidelines for this visit:  Type of Medical   Decision Making Face-to-Face Visit       within 7 Days of discharge Face-to-Face Visit        within 14 days of discharge   Moderate Complexity [id number]   High Complexity [id number]                    Reviewed and updated as needed this visit by Provider         Review of Systems         Objective    There were no vitals taken for this visit.  There is no height or weight on file to calculate BMI.  Physical Exam             Further history obtained, clarified or corrected by physician:  He has been feeling well since discharge from the hospital.  His breathing is better than it has been in several months.  He is using nebulizers regularly.    OBJECTIVE:  /82   Pulse 89   Temp 98.1  F (36.7  C)   Resp 18   Ht 1.803 m (5' 11\")   Wt 110.2 kg (243 lb)   SpO2 92%   BMI 33.89 kg/m    LUNGS: clear to auscultation, normal breath sounds  CV: RRR without murmur  ABD: BS+, soft, nontender, no masses, no hepatosplenomegaly  EXTREMITIES: without joint tenderness, swelling or erythema.  No muscle tenderness or abnormality.  SKIN: No rashes or " abnormalities  NEURO:non focal exam    ASSESSMENT:     Toxic encephalopathy  Opioid dependence with opioid-induced disorder (H)  Chronic pain syndrome  Chronic bronchitis, unspecified chronic bronchitis type (H)    PLAN:  No medication change  I talked him at length about discussing with the pain clinic the issue of relative overdose when he was admitted to the hospital this last time while he was ill.

## 2019-11-05 NOTE — PATIENT INSTRUCTIONS
Our Clinic hours are:  Mondays    7:20 am - 7 pm  Tues -  Fri  7:20 am - 5 pm    Clinic Phone: [phone]    The clinic lab opens at 7:30 am Mon - Fri and appointments are required.    [facility]. [phone]  Monday  8 am - 7pm  Tues - Fri 8 am - 5:30 pm

## 2019-11-08 ENCOUNTER — FCC EXTENDED DOCUMENTATION (OUTPATIENT)
Dept: PSYCHOLOGY | Facility: CLINIC | Age: 64
End: 2019-11-08

## 2019-11-08 ENCOUNTER — OFFICE VISIT (OUTPATIENT)
Dept: PSYCHOLOGY | Facility: CLINIC | Age: 64
End: 2019-11-08
Payer: MEDICARE

## 2019-11-08 DIAGNOSIS — F41.1 GENERALIZED ANXIETY DISORDER: ICD-10-CM

## 2019-11-08 DIAGNOSIS — F43.10 POSTTRAUMATIC STRESS DISORDER: ICD-10-CM

## 2019-11-08 DIAGNOSIS — F33.1 MAJOR DEPRESSIVE DISORDER, RECURRENT EPISODE, MODERATE (H): Primary | ICD-10-CM

## 2019-11-08 PROCEDURE — 90791 PSYCH DIAGNOSTIC EVALUATION: CPT | Performed by: SOCIAL WORKER
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
## 2019-11-08 NOTE — PROGRESS NOTES
Progress Note - Initial Session-NEW DA requested by [facility] .    Client Name:  Melquiades Morales Date: 11/8/19         Service Type: Individual  Video Visit: No     Session Start Time: 9  Session End Time: 9:45 am     Session Length: 45 min    Session #: 114    Attendees: Client attended alone.     DATA:  Diagnostic Assessment in progress.  Unable to complete documentation at the conclusion of the first session due to time constraints.    Interactive Complexity: No  Crisis: No    Intervention:  Assessed functioning. Went over the results of the phq/shailesh. Assessed for safety. Reviewed the diagnostic assessment together. Processed feelings about wife asking for a divorce and of his decision to go off tobacco.    ASSESSMENT:  Mental Status Assessment:  Appearance:   Appropriate   Eye Contact:   Good   Psychomotor Behavior: Normal  Retarded (Slowed)   Attitude:   Cooperative   Orientation:   All  Speech   Rate / Production: Normal    Volume:  Normal   Mood:    Depressed  Normal  Affect:    Appropriate   Thought Content:  Clear   Thought Form:  Coherent  Logical   Insight:    Fair       Safety Issues and Plan for Safety and Risk Management:  Client denies current fears or concerns for personal safety.  Client denies current or recent suicidal ideation or behaviors.  Client denies current or recent homicidal ideation or behaviors.  Client denies current or recent self injurious behavior or ideation.  Client denies other safety concerns.  A safety and risk management plan has been developed including: Patient consented to co-developed safety plan.  Safety and risk management plan was completed.  Patient agreed to use safety plan should any safety concerns arise.  A copy was given to the patient.  Client reports there are firearms in the house. The firearms are secured in a locked space.      Diagnostic Criteria:  A. Excessive anxiety and worry about a number of events or activities (such as work  or school performance).   B. The person finds it difficult to control the worry.  C. Select 3 or more symptoms (required for diagnosis). Only one item is required in children.   - Restlessness or feeling keyed up or on edge.    - Being easily fatigued.    - Difficulty concentrating or mind going blank.    - Irritability.    - Muscle tension.    - Sleep disturbance (difficulty falling or staying asleep, or restless unsatisfying sleep).   D. The focus of the anxiety and worry is not confined to features of an Axis I disorder.  E. The anxiety, worry, or physical symptoms cause clinically significant distress or impairment in social, occupational, or other important areas of functioning.   F. The disturbance is not due to the direct physiological effects of a substance (e.g., a drug of abuse, a medication) or a general medical condition (e.g., hyperthyroidism) and does not occur exclusively during a Mood Disorder, a Psychotic Disorder, or a Pervasive Developmental Disorder.    - The aformentioned symptoms began several year(s) ago and occurs 4 days per week and is experienced as moderate.  CRITERIA (A-C) REPRESENT A MAJOR DEPRESSIVE EPISODE - SELECT THESE CRITERIA  A) Recurrent episode(s) - symptoms have been present during the same 2-week period and represent a change from previous functioning 5 or more symptoms (required for diagnosis)   - Depressed mood. Note: In children and adolescents, can be irritable mood.     - Diminished interest or pleasure in all, or almost all, activities.    - Increased sleep.    - Psychomotor activity retardation.    - Fatigue or loss of energy.    - Diminished ability to think or concentrate, or indecisiveness.   B) The symptoms cause clinically significant distress or impairment in social, occupational, or other important areas of functioning  C) The episode is not attributable to the physiological effects of a substance or to another medical condition  D) The occurence of major  depressive episode is not better explained by other thought / psychotic disorders  E) There has never been a manic episode or hypomanic episode  previously diagnosed with PTSD      DSM5 Diagnoses: (Sustained by DSM5 Criteria Listed Above)  Diagnoses: 296.32 (F33.1) Major Depressive Disorder, Recurrent Episode, Moderate _ and With mixed features  300.02 (F41.1) Generalized Anxiety Disorder  309.81 (F43.10) Posttraumatic Stress Disorder (includes Posttraumatic Stress Disorder for Children 6 Years and Younger)  With dissociative symptoms  Psychosocial & Contextual Factors: trauma history; wife asking for a divorce; medical and financial.  WHODAS 2.0 (12 item)            This questionnaire asks about difficulties due to health conditions. Health conditions  include  disease or illnesses, other health problems that may be short or long lasting,  injuries, mental health or emotional problems, and problems with alcohol or drugs.                     Think back over the past 30 days and answer these questions, thinking about how much  difficulty you had doing the following activities. For each question, please [place] only  one response.    S1 Standing for long periods such as 30 minutes? Moderate =   3   S2 Taking care of household responsibilities? Extreme / or cannot do = 5   S3 Learning a new task, for example, learning how to get to a new place? None =         1   S4 How much of a problem do you have joining community activities (for example, festivals, Temple or other activities) in the same way as anyone else can? None =         1   S5 How much have you been emotionally affected by your health problems? Extreme / or cannot do = 5     In the past 30 days, how much difficulty did you have in:   S6 Concentrating on doing something for ten minutes? Severe =       4   S7 Walking a long distance such as a kilometer (or equivalent)? Severe =       4   S8 Washing your whole body? Severe =       4   S9 Getting dressed?  Severe =       4   S10 Dealing with people you do not know? None =         1   S11 Maintaining a friendship? None =         1   S12 Your day to day work? Severe =       4     H1 Overall, in the past 30 days, how many days were these difficulties present? Record number of days 30   H2 In the past 30 days, for how many days were you totally unable to carry out your usual activities or work because of any health condition? Record number of days  3   H3 In the past 30 days, not counting the days that you were totally unable, for how many days did you cut back or reduce your usual activities or work because of any health condition? Record number of days 30       Collateral Reports Completed:  Routed note to PCP      PLAN: (Homework, other):  Client stated that he may follow up for ongoing services with [facility].  Scheduled to return in a few weeks. Follow safety plan.    ADDENDUM: This client is continuing his episode of care. We are addressing his Major Depression; Generalized Anxiety and Posttraumatic Stress Disorder. His depression score on the PHQ-9 was 16 today and his Generalized Anxiety Disorder score was 13. I believe if he is reasonably likely to end up requiring inpatient psychiatric care without on going Adult [facility] Case Management.      SHAVONNE Yao

## 2019-11-22 ENCOUNTER — OFFICE VISIT (OUTPATIENT)
Dept: PSYCHOLOGY | Facility: CLINIC | Age: 64
End: 2019-11-22
Payer: MEDICARE

## 2019-11-22 DIAGNOSIS — F41.1 GENERALIZED ANXIETY DISORDER: ICD-10-CM

## 2019-11-22 DIAGNOSIS — F33.1 MAJOR DEPRESSIVE DISORDER, RECURRENT EPISODE, MODERATE (H): ICD-10-CM

## 2019-11-22 DIAGNOSIS — F43.10 POSTTRAUMATIC STRESS DISORDER: Primary | ICD-10-CM

## 2019-11-22 PROCEDURE — 90834 PSYTX W PT 45 MINUTES: CPT | Performed by: SOCIAL WORKER

## 2019-11-22 ASSESSMENT — ANXIETY QUESTIONNAIRES
1. FEELING NERVOUS, ANXIOUS, OR ON EDGE: MORE THAN HALF THE DAYS
5. BEING SO RESTLESS THAT IT IS HARD TO SIT STILL: SEVERAL DAYS
3. WORRYING TOO MUCH ABOUT DIFFERENT THINGS: MORE THAN HALF THE DAYS
IF YOU CHECKED OFF ANY PROBLEMS ON THIS QUESTIONNAIRE, HOW DIFFICULT HAVE THESE PROBLEMS MADE IT FOR YOU TO DO YOUR WORK, TAKE CARE OF THINGS AT HOME, OR GET ALONG WITH OTHER PEOPLE: VERY DIFFICULT
7. FEELING AFRAID AS IF SOMETHING AWFUL MIGHT HAPPEN: NOT AT ALL
GAD7 TOTAL SCORE: 12
6. BECOMING EASILY ANNOYED OR IRRITABLE: NEARLY EVERY DAY
2. NOT BEING ABLE TO STOP OR CONTROL WORRYING: MORE THAN HALF THE DAYS
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
## 2019-11-23 ASSESSMENT — ANXIETY QUESTIONNAIRES: GAD7 TOTAL SCORE: 12

## 2019-11-26 DIAGNOSIS — F32.A DEPRESSION, UNSPECIFIED DEPRESSION TYPE: ICD-10-CM

## 2019-11-26 RX ORDER — TRAZODONE HYDROCHLORIDE 50 MG/1
50 TABLET, FILM COATED ORAL
Qty: 30 TABLET | OUTPATIENT
Start: 2019-11-26

## 2019-11-26 NOTE — TELEPHONE ENCOUNTER
"Requested Prescriptions   Pending Prescriptions Disp Refills     traZODone (DESYREL) 50 MG tablet [Pharmacy Med Name: TRAZODONE HCL 50 MG TABLET] 30 tablet      Sig: Take 1 tablet (50 mg) by mouth nightly as needed for sleep Take along with the 100 mg tablets for total dose of 250 mg       Serotonin Modulators Passed - 11/26/2019  8:01 AM        Passed - Recent (12 mo) or future (30 days) visit within the authorizing provider's specialty     Patient has had an office visit with the authorizing provider or a provider within the authorizing providers department within the previous 12 mos or has a future within next 30 days. See \"Patient Info\" tab in inbasket, or \"Choose Columns\" in Meds & Orders section of the refill encounter.              Passed - Medication is active on med list        Passed - Patient is age 18 or older        Last Written Prescription Date:  4/26/2019  Last Fill Quantity: 90,  # refills: 1   Last office visit: 11/5/2019 with prescribing provider:  Thaddeus  Future Office Visit:   Next 5 appointments (look out 90 days)    Dec 13, 2019  9:00 AM CST  Return Visit with Kleber Pollack [facility] ([facility] [address] [place] [address]  [phone]   Dec 20, 2019  9:00 AM CST  Return Visit with Kleber Pollack [facility] ([facility]) [address] [address]  [phone]   Jan 03, 2020  9:00 AM CST  Return Visit with Kleber Pollack [facility] ([facility]) [address] [address]  [phone]   Jan 17, 2020  9:00 AM CST  Return Visit with Kleber Pollack [facility] ([facility]) [address] [address]  [phone]   Jan 31, 2020  9:00 AM CST  Return Visit with Kleber Pollack [facility] [address] [facility] [address]  [facility] [address] [phone]

## 2019-12-05 NOTE — TELEPHONE ENCOUNTER
Reason for Call:  Medication or medication refill:    Do you use a [facility]?  Name of the pharmacy and phone number for the current request:  [facility] [place] [phone]    Name of the medication requested: Pt calling for Trazodone refills - 50mg.  He has 100mg tabs, but not the 50mg tabs.  Please send Rx to [facility] asap.  No need to call patient back, unless there are questions or problems.      Trazodone  Last Written Prescription Date:  4/26/19  Last Fill Quantity: 90,  # refills: 1   Last office visit: 11/5/2019 with prescribing provider:     Future Office Visit:   Next 5 appointments (look out 90 days)    Dec 13, 2019  9:00 AM CST  Return Visit with Kleber Pollack [facility] ([facility]) [address] [address] [phone]            Other request:     Can we leave a detailed message on this number? YES    Phone number patient can be reached at: Home number on file [phone] (home)    Best Time: any    Call taken on 12/5/2019 at 1:01 PM by Emily Padgett

## 2019-12-06 RX ORDER — TRAZODONE HYDROCHLORIDE 50 MG/1
50 TABLET, FILM COATED ORAL
Qty: 90 TABLET | Refills: 1 | Status: SHIPPED | OUTPATIENT
Start: 2019-12-06 | End: 2020-06-05

## 2019-12-13 ENCOUNTER — OFFICE VISIT (OUTPATIENT)
Dept: PSYCHOLOGY | Facility: CLINIC | Age: 64
End: 2019-12-13
Payer: MEDICARE

## 2019-12-13 DIAGNOSIS — F41.1 GENERALIZED ANXIETY DISORDER: ICD-10-CM

## 2019-12-13 DIAGNOSIS — F33.1 MAJOR DEPRESSIVE DISORDER, RECURRENT EPISODE, MODERATE (H): Primary | ICD-10-CM

## 2019-12-13 DIAGNOSIS — F43.10 POSTTRAUMATIC STRESS DISORDER: ICD-10-CM

## 2019-12-13 PROCEDURE — 90834 PSYTX W PT 45 MINUTES: CPT | Performed by: SOCIAL WORKER

## 2019-12-13 ASSESSMENT — ANXIETY QUESTIONNAIRES
7. FEELING AFRAID AS IF SOMETHING AWFUL MIGHT HAPPEN: NOT AT ALL
3. WORRYING TOO MUCH ABOUT DIFFERENT THINGS: MORE THAN HALF THE DAYS
5. BEING SO RESTLESS THAT IT IS HARD TO SIT STILL: SEVERAL DAYS
1. FEELING NERVOUS, ANXIOUS, OR ON EDGE: MORE THAN HALF THE DAYS
2. NOT BEING ABLE TO STOP OR CONTROL WORRYING: MORE THAN HALF THE DAYS
GAD7 TOTAL SCORE: 10
6. BECOMING EASILY ANNOYED OR IRRITABLE: MORE THAN HALF THE DAYS
IF YOU CHECKED OFF ANY PROBLEMS ON THIS QUESTIONNAIRE, HOW DIFFICULT HAVE THESE PROBLEMS MADE IT FOR YOU TO DO YOUR WORK, TAKE CARE OF THINGS AT HOME, OR GET ALONG WITH OTHER PEOPLE: VERY DIFFICULT

## 2019-12-13 ASSESSMENT — PATIENT HEALTH QUESTIONNAIRE - PHQ9
5. POOR APPETITE OR OVEREATING: SEVERAL DAYS
SUM OF ALL RESPONSES TO PHQ QUESTIONS 1-9: 15
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
## 2019-12-14 ASSESSMENT — ANXIETY QUESTIONNAIRES: GAD7 TOTAL SCORE: 10

## 2020-01-03 ENCOUNTER — OFFICE VISIT (OUTPATIENT)
Dept: PSYCHOLOGY | Facility: CLINIC | Age: 65
End: 2020-01-03
Payer: MEDICARE

## 2020-01-03 DIAGNOSIS — F33.1 MAJOR DEPRESSIVE DISORDER, RECURRENT EPISODE, MODERATE (H): Primary | ICD-10-CM

## 2020-01-03 DIAGNOSIS — F43.10 POSTTRAUMATIC STRESS DISORDER: ICD-10-CM

## 2020-01-03 DIAGNOSIS — F41.1 GENERALIZED ANXIETY DISORDER: ICD-10-CM

## 2020-01-03 PROCEDURE — 90834 PSYTX W PT 45 MINUTES: CPT | Performed by: SOCIAL WORKER

## 2020-01-03 ASSESSMENT — ANXIETY QUESTIONNAIRES
3. WORRYING TOO MUCH ABOUT DIFFERENT THINGS: MORE THAN HALF THE DAYS
IF YOU CHECKED OFF ANY PROBLEMS ON THIS QUESTIONNAIRE, HOW DIFFICULT HAVE THESE PROBLEMS MADE IT FOR YOU TO DO YOUR WORK, TAKE CARE OF THINGS AT HOME, OR GET ALONG WITH OTHER PEOPLE: VERY DIFFICULT
6. BECOMING EASILY ANNOYED OR IRRITABLE: NEARLY EVERY DAY
1. FEELING NERVOUS, ANXIOUS, OR ON EDGE: MORE THAN HALF THE DAYS
2. NOT BEING ABLE TO STOP OR CONTROL WORRYING: MORE THAN HALF THE DAYS
GAD7 TOTAL SCORE: 12
5. BEING SO RESTLESS THAT IT IS HARD TO SIT STILL: SEVERAL DAYS
7. FEELING AFRAID AS IF SOMETHING AWFUL MIGHT HAPPEN: NOT AT ALL

## 2020-01-03 ASSESSMENT — PATIENT HEALTH QUESTIONNAIRE - PHQ9
5. POOR APPETITE OR OVEREATING: MORE THAN HALF THE DAYS
SUM OF ALL RESPONSES TO PHQ QUESTIONS 1-9: 14
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
## 2020-01-04 ASSESSMENT — ANXIETY QUESTIONNAIRES: GAD7 TOTAL SCORE: 12

## 2020-01-17 ENCOUNTER — OFFICE VISIT (OUTPATIENT)
Dept: PSYCHOLOGY | Facility: CLINIC | Age: 65
End: 2020-01-17
Payer: MEDICARE

## 2020-01-17 DIAGNOSIS — F33.1 MAJOR DEPRESSIVE DISORDER, RECURRENT EPISODE, MODERATE (H): Primary | ICD-10-CM

## 2020-01-17 DIAGNOSIS — F43.10 POSTTRAUMATIC STRESS DISORDER: ICD-10-CM

## 2020-01-17 DIAGNOSIS — F41.1 GENERALIZED ANXIETY DISORDER: ICD-10-CM

## 2020-01-17 PROCEDURE — 90834 PSYTX W PT 45 MINUTES: CPT | Performed by: SOCIAL WORKER

## 2020-01-17 ASSESSMENT — ANXIETY QUESTIONNAIRES
6. BECOMING EASILY ANNOYED OR IRRITABLE: MORE THAN HALF THE DAYS
7. FEELING AFRAID AS IF SOMETHING AWFUL MIGHT HAPPEN: NOT AT ALL
GAD7 TOTAL SCORE: 11
2. NOT BEING ABLE TO STOP OR CONTROL WORRYING: MORE THAN HALF THE DAYS
5. BEING SO RESTLESS THAT IT IS HARD TO SIT STILL: MORE THAN HALF THE DAYS
3. WORRYING TOO MUCH ABOUT DIFFERENT THINGS: MORE THAN HALF THE DAYS
1. FEELING NERVOUS, ANXIOUS, OR ON EDGE: MORE THAN HALF THE DAYS
IF YOU CHECKED OFF ANY PROBLEMS ON THIS QUESTIONNAIRE, HOW DIFFICULT HAVE THESE PROBLEMS MADE IT FOR YOU TO DO YOUR WORK, TAKE CARE OF THINGS AT HOME, OR GET ALONG WITH OTHER PEOPLE: VERY DIFFICULT

## 2020-01-17 ASSESSMENT — PATIENT HEALTH QUESTIONNAIRE - PHQ9
SUM OF ALL RESPONSES TO PHQ QUESTIONS 1-9: 14
5. POOR APPETITE OR OVEREATING: SEVERAL DAYS
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
## 2020-01-18 ASSESSMENT — ANXIETY QUESTIONNAIRES: GAD7 TOTAL SCORE: 11

## 2020-01-31 ENCOUNTER — OFFICE VISIT (OUTPATIENT)
Dept: PSYCHOLOGY | Facility: CLINIC | Age: 65
End: 2020-01-31
Payer: MEDICARE

## 2020-01-31 DIAGNOSIS — F43.10 POSTTRAUMATIC STRESS DISORDER: ICD-10-CM

## 2020-01-31 DIAGNOSIS — F41.1 GENERALIZED ANXIETY DISORDER: ICD-10-CM

## 2020-01-31 DIAGNOSIS — F33.1 MAJOR DEPRESSIVE DISORDER, RECURRENT EPISODE, MODERATE (H): Primary | ICD-10-CM

## 2020-01-31 PROCEDURE — 90834 PSYTX W PT 45 MINUTES: CPT | Performed by: SOCIAL WORKER

## 2020-01-31 ASSESSMENT — ANXIETY QUESTIONNAIRES
7. FEELING AFRAID AS IF SOMETHING AWFUL MIGHT HAPPEN: NOT AT ALL
IF YOU CHECKED OFF ANY PROBLEMS ON THIS QUESTIONNAIRE, HOW DIFFICULT HAVE THESE PROBLEMS MADE IT FOR YOU TO DO YOUR WORK, TAKE CARE OF THINGS AT HOME, OR GET ALONG WITH OTHER PEOPLE: VERY DIFFICULT
2. NOT BEING ABLE TO STOP OR CONTROL WORRYING: MORE THAN HALF THE DAYS
GAD7 TOTAL SCORE: 11
1. FEELING NERVOUS, ANXIOUS, OR ON EDGE: MORE THAN HALF THE DAYS
6. BECOMING EASILY ANNOYED OR IRRITABLE: NEARLY EVERY DAY
3. WORRYING TOO MUCH ABOUT DIFFERENT THINGS: MORE THAN HALF THE DAYS
5. BEING SO RESTLESS THAT IT IS HARD TO SIT STILL: SEVERAL DAYS

## 2020-01-31 ASSESSMENT — PATIENT HEALTH QUESTIONNAIRE - PHQ9
5. POOR APPETITE OR OVEREATING: SEVERAL DAYS
SUM OF ALL RESPONSES TO PHQ QUESTIONS 1-9: 14
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
## 2020-02-01 ASSESSMENT — ANXIETY QUESTIONNAIRES: GAD7 TOTAL SCORE: 11

## 2020-02-05 ENCOUNTER — TELEPHONE (OUTPATIENT)
Dept: FAMILY MEDICINE | Facility: CLINIC | Age: 65
End: 2020-02-05

## 2020-02-05 DIAGNOSIS — M54.16 LUMBAR RADICULOPATHY: ICD-10-CM

## 2020-02-05 DIAGNOSIS — G89.4 CHRONIC PAIN SYNDROME: Primary | ICD-10-CM

## 2020-02-05 DIAGNOSIS — R26.2 UNABLE TO AMBULATE: ICD-10-CM

## 2020-02-05 DIAGNOSIS — M48.02 SPINAL STENOSIS IN CERVICAL REGION: ICD-10-CM

## 2020-02-05 NOTE — TELEPHONE ENCOUNTER
Reason for Call: Request for an order or referral:    Order or referral being requested: Pt calling asking for a referral to [facility] for rehab and massage.  [facility] @ [phone].  Please call patient and advise.      Date needed: at your convenience    Has the patient been seen by the PCP for this problem? NO    Additional comments:     Phone number Patient can be reached at:  Home number on file [phone] (home)    Best Time:  any    Can we leave a detailed message on this number?  YES    Call taken on 2/5/2020 at 11:00 AM by Emily Padgett

## 2020-02-07 ENCOUNTER — OFFICE VISIT (OUTPATIENT)
Dept: PSYCHOLOGY | Facility: CLINIC | Age: 65
End: 2020-02-07
Payer: MEDICARE

## 2020-02-07 DIAGNOSIS — F41.1 GENERALIZED ANXIETY DISORDER: ICD-10-CM

## 2020-02-07 DIAGNOSIS — F33.1 MAJOR DEPRESSIVE DISORDER, RECURRENT EPISODE, MODERATE (H): Primary | ICD-10-CM

## 2020-02-07 DIAGNOSIS — F43.10 POSTTRAUMATIC STRESS DISORDER: ICD-10-CM

## 2020-02-07 PROCEDURE — 90834 PSYTX W PT 45 MINUTES: CPT | Performed by: SOCIAL WORKER
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
## 2020-02-11 NOTE — IP AVS SNAPSHOT
[facility]    [address] [address]    Phone:  [phone]    Fax:  [phone]                                       After Visit Summary   1/17/2017    Melquiades Morales    MRN: [id number]           After Visit Summary Signature Page     I have received my discharge instructions, and my questions have been answered. I have discussed any challenges I see with this plan with the nurse or doctor.    ..........................................................................................................................................  Patient/Patient Representative Signature      ..........................................................................................................................................  Patient Representative Print Name and Relationship to Patient    ..................................................               ................................................  Date                                            Time    ..........................................................................................................................................  Reviewed by Signature/Title    ...................................................              ..............................................  Date                                                            Time           negative...

## 2020-02-29 DIAGNOSIS — F32.A DEPRESSION, UNSPECIFIED DEPRESSION TYPE: ICD-10-CM

## 2020-03-02 RX ORDER — TRAZODONE HYDROCHLORIDE 100 MG/1
TABLET ORAL
Qty: 60 TABLET | Refills: 3 | Status: SHIPPED | OUTPATIENT
Start: 2020-03-02 | End: 2020-07-08

## 2020-03-02 NOTE — TELEPHONE ENCOUNTER
"Requested Prescriptions   Pending Prescriptions Disp Refills     traZODone (DESYREL) 100 MG tablet [Pharmacy Med Name: TRAZODONE  MG TABLET] 60 tablet 3     Sig: TAKE 2 TABLETS BY MOUTH AT BEDTIME AS NEEDED FOR SLEEP       Serotonin Modulators Passed - 2/29/2020  8:00 AM        Passed - Recent (12 mo) or future (30 days) visit within the authorizing provider's specialty     Patient has had an office visit with the authorizing provider or a provider within the authorizing providers department within the previous 12 mos or has a future within next 30 days. See \"Patient Info\" tab in inbasket, or \"Choose Columns\" in Meds & Orders section of the refill encounter.              Passed - Medication is active on med list        Passed - Patient is age 18 or older        Last Written Prescription Date:  10/24/2019  Last Fill Quantity: 60,  # refills: 3   Last office visit: 11/5/2019 with prescribing provider:  Thaddeus   Future Office Visit:   Next 5 appointments (look out 90 days)    Mar 06, 2020  9:00 AM CST  Return Visit with Kleber Pollack [facility] ([facility]) [address] [address]  [phone]   Mar 20, 2020  9:00 AM CDT  Return Visit with Kleber Pollack [facility] ([facility]) [address] [address]  [phone]   Apr 10, 2020  9:00 AM CDT  Return Visit with Kleber Pollack [facility] ([facility]) [address] [address]  [phone]   Apr 17, 2020  9:00 AM CDT  Return Visit with Kleber Pollack [facility] ([facility]) [address] [address]  [phone]   May 01, 2020  9:00 AM CDT  Return Visit with Kleber Pollack [facility] ([address]" [facility] [address]  [facility] [address] [phone]

## 2020-03-02 NOTE — TELEPHONE ENCOUNTER
Routing refill request to provider for review/approval because:  Patient is due for depression review. PHQ was 14 last on 1-31-20. No thoughts of harm to self on this PHQ.    AUNDREA Schaeffer

## 2020-03-12 ENCOUNTER — APPOINTMENT (OUTPATIENT)
Dept: CT IMAGING | Facility: CLINIC | Age: 65
DRG: 193 | End: 2020-03-12
Attending: FAMILY MEDICINE
Payer: MEDICARE

## 2020-03-12 ENCOUNTER — HOSPITAL ENCOUNTER (INPATIENT)
Facility: CLINIC | Age: 65
LOS: 1 days | Discharge: HOME | DRG: 193 | End: 2020-03-13
Attending: FAMILY MEDICINE | Admitting: FAMILY MEDICINE
Payer: MEDICARE

## 2020-03-12 DIAGNOSIS — R10.9 FLANK PAIN: ICD-10-CM

## 2020-03-12 DIAGNOSIS — J18.9 PNEUMONIA OF BOTH LUNGS DUE TO INFECTIOUS ORGANISM, UNSPECIFIED PART OF LUNG: ICD-10-CM

## 2020-03-12 DIAGNOSIS — R55 SYNCOPE, UNSPECIFIED SYNCOPE TYPE: ICD-10-CM

## 2020-03-12 DIAGNOSIS — Z91.81 PERSONAL HISTORY OF FALL: ICD-10-CM
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
## 2020-03-12 DIAGNOSIS — R55 SYNCOPE AND COLLAPSE: ICD-10-CM

## 2020-03-12 PROBLEM — G93.40 ACUTE ENCEPHALOPATHY: Status: ACTIVE | Noted: 2020-03-12

## 2020-03-12 LAB
ALBUMIN SERPL-MCNC: 3.2 G/DL (ref 3.4–5)
ALBUMIN UR-MCNC: NEGATIVE MG/DL
ALP SERPL-CCNC: 71 U/L (ref 40–150)
ALT SERPL W P-5'-P-CCNC: 19 U/L (ref 0–70)
AMMONIA PLAS-SCNC: 31 UMOL/L (ref 10–50)
AMORPH CRY #/AREA URNS HPF: ABNORMAL /HPF
AMPHETAMINES UR QL SCN: NEGATIVE
ANION GAP SERPL CALCULATED.3IONS-SCNC: 6 MMOL/L (ref 3–14)
APAP SERPL-MCNC: <2 MG/L (ref 10–20)
APPEARANCE UR: ABNORMAL
AST SERPL W P-5'-P-CCNC: 16 U/L (ref 0–45)
BARBITURATES UR QL: NEGATIVE
BASE EXCESS BLDV CALC-SCNC: 9.6 MMOL/L
BASOPHILS # BLD AUTO: 0 10E9/L (ref 0–0.2)
BASOPHILS NFR BLD AUTO: 0.4 %
BENZODIAZ UR QL: NEGATIVE
BILIRUB SERPL-MCNC: 0.5 MG/DL (ref 0.2–1.3)
BILIRUB UR QL STRIP: NEGATIVE
BUN SERPL-MCNC: 8 MG/DL (ref 7–30)
CALCIUM SERPL-MCNC: 9.5 MG/DL (ref 8.5–10.1)
CANNABINOIDS UR QL SCN: POSITIVE
CHLORIDE SERPL-SCNC: 105 MMOL/L (ref 94–109)
CO2 SERPL-SCNC: 30 MMOL/L (ref 20–32)
COCAINE UR QL: NEGATIVE
COLOR UR AUTO: YELLOW
CREAT SERPL-MCNC: 0.55 MG/DL (ref 0.66–1.25)
DIFFERENTIAL METHOD BLD: NORMAL
EOSINOPHIL # BLD AUTO: 0 10E9/L (ref 0–0.7)
EOSINOPHIL NFR BLD AUTO: 0.4 %
ERYTHROCYTE [DISTWIDTH] IN BLOOD BY AUTOMATED COUNT: 13.5 % (ref 10–15)
ETHANOL SERPL-MCNC: <0.01 G/DL
ETHANOL SERPL-MCNC: <0.01 G/DL
FLUAV+FLUBV AG SPEC QL: NEGATIVE
FLUAV+FLUBV AG SPEC QL: NEGATIVE
GFR SERPL CREATININE-BSD FRML MDRD: >90 ML/MIN/{1.73_M2}
GLUCOSE SERPL-MCNC: 187 MG/DL (ref 70–99)
GLUCOSE UR STRIP-MCNC: NEGATIVE MG/DL
HCO3 BLDV-SCNC: 37 MMOL/L (ref 21–28)
HCT VFR BLD AUTO: 50 % (ref 40–53)
HGB BLD-MCNC: 16.3 G/DL (ref 13.3–17.7)
HGB UR QL STRIP: NEGATIVE
IMM GRANULOCYTES # BLD: 0.1 10E9/L (ref 0–0.4)
IMM GRANULOCYTES NFR BLD: 0.5 %
KETONES UR STRIP-MCNC: NEGATIVE MG/DL
LACTATE BLD-SCNC: 0.8 MMOL/L (ref 0.7–2)
LACTATE BLD-SCNC: 3.3 MMOL/L (ref 0.7–2)
LEUKOCYTE ESTERASE UR QL STRIP: NEGATIVE
LYMPHOCYTES # BLD AUTO: 3.8 10E9/L (ref 0.8–5.3)
LYMPHOCYTES NFR BLD AUTO: 38.4 %
MCH RBC QN AUTO: 31.8 PG (ref 26.5–33)
MCHC RBC AUTO-ENTMCNC: 32.6 G/DL (ref 31.5–36.5)
MCV RBC AUTO: 98 FL (ref 78–100)
MONOCYTES # BLD AUTO: 0.6 10E9/L (ref 0–1.3)
MONOCYTES NFR BLD AUTO: 6.2 %
MUCOUS THREADS #/AREA URNS LPF: PRESENT /LPF
NEUTROPHILS # BLD AUTO: 5.3 10E9/L (ref 1.6–8.3)
NEUTROPHILS NFR BLD AUTO: 54.1 %
NITRATE UR QL: NEGATIVE
NRBC # BLD AUTO: 0 10*3/UL
NRBC BLD AUTO-RTO: 0 /100
O2/TOTAL GAS SETTING VFR VENT: ABNORMAL %
OPIATES UR QL SCN: POSITIVE
PCO2 BLDV: 57 MM HG (ref 40–50)
PCP UR QL SCN: NEGATIVE
PH BLDV: 7.41 PH (ref 7.32–7.43)
PH UR STRIP: 9 PH (ref 5–7)
PLATELET # BLD AUTO: 322 10E9/L (ref 150–450)
PO2 BLDV: 41 MM HG (ref 25–47)
POTASSIUM SERPL-SCNC: 3.8 MMOL/L (ref 3.4–5.3)
PROCALCITONIN SERPL-MCNC: <0.05 NG/ML
PROT SERPL-MCNC: 7.4 G/DL (ref 6.8–8.8)
RBC # BLD AUTO: 5.12 10E12/L (ref 4.4–5.9)
RBC #/AREA URNS AUTO: 7 /HPF (ref 0–2)
SALICYLATES SERPL-MCNC: 3 MG/DL
SODIUM SERPL-SCNC: 141 MMOL/L (ref 133–144)
SOURCE: ABNORMAL
SP GR UR STRIP: 1.01 (ref 1–1.03)
SPECIMEN SOURCE: NORMAL
T4 FREE SERPL-MCNC: 1.14 NG/DL (ref 0.76–1.46)
TROPONIN I SERPL-MCNC: 0.02 UG/L (ref 0–0.04)
TSH SERPL DL<=0.005 MIU/L-ACNC: 0.3 MU/L (ref 0.4–4)
UROBILINOGEN UR STRIP-MCNC: 0 MG/DL (ref 0–2)
WBC # BLD AUTO: 9.9 10E9/L (ref 4–11)
WBC #/AREA URNS AUTO: 2 /HPF (ref 0–5)

## 2020-03-12 PROCEDURE — 12000000 ZZH R&B MED SURG/OB

## 2020-03-12 PROCEDURE — 84439 ASSAY OF FREE THYROXINE: CPT | Performed by: FAMILY MEDICINE

## 2020-03-12 PROCEDURE — 99285 EMERGENCY DEPT VISIT HI MDM: CPT | Mod: 25 | Performed by: FAMILY MEDICINE

## 2020-03-12 PROCEDURE — 70450 CT HEAD/BRAIN W/O DYE: CPT

## 2020-03-12 PROCEDURE — 25000128 H RX IP 250 OP 636: Performed by: FAMILY MEDICINE

## 2020-03-12 PROCEDURE — 87077 CULTURE AEROBIC IDENTIFY: CPT | Performed by: FAMILY MEDICINE

## 2020-03-12 PROCEDURE — 84443 ASSAY THYROID STIM HORMONE: CPT | Performed by: FAMILY MEDICINE

## 2020-03-12 PROCEDURE — 84484 ASSAY OF TROPONIN QUANT: CPT | Performed by: FAMILY MEDICINE

## 2020-03-12 PROCEDURE — 82803 BLOOD GASES ANY COMBINATION: CPT | Performed by: FAMILY MEDICINE

## 2020-03-12 PROCEDURE — 25800030 ZZH RX IP 258 OP 636: Performed by: FAMILY MEDICINE

## 2020-03-12 PROCEDURE — 96366 THER/PROPH/DIAG IV INF ADDON: CPT | Performed by: FAMILY MEDICINE

## 2020-03-12 PROCEDURE — 87804 INFLUENZA ASSAY W/OPTIC: CPT | Performed by: FAMILY MEDICINE

## 2020-03-12 PROCEDURE — 96361 HYDRATE IV INFUSION ADD-ON: CPT | Performed by: FAMILY MEDICINE

## 2020-03-12 PROCEDURE — 87040 BLOOD CULTURE FOR BACTERIA: CPT | Performed by: FAMILY MEDICINE

## 2020-03-12 PROCEDURE — 87800 DETECT AGNT MULT DNA DIREC: CPT | Performed by: FAMILY MEDICINE

## 2020-03-12 PROCEDURE — 25000125 ZZHC RX 250: Performed by: FAMILY MEDICINE

## 2020-03-12 PROCEDURE — 72125 CT NECK SPINE W/O DYE: CPT

## 2020-03-12 PROCEDURE — 87086 URINE CULTURE/COLONY COUNT: CPT | Performed by: FAMILY MEDICINE

## 2020-03-12 PROCEDURE — 87581 M.PNEUMON DNA AMP PROBE: CPT | Performed by: FAMILY MEDICINE

## 2020-03-12 PROCEDURE — 74177 CT ABD & PELVIS W/CONTRAST: CPT

## 2020-03-12 PROCEDURE — 82140 ASSAY OF AMMONIA: CPT | Performed by: FAMILY MEDICINE

## 2020-03-12 PROCEDURE — 93010 ELECTROCARDIOGRAM REPORT: CPT | Mod: Z6 | Performed by: FAMILY MEDICINE

## 2020-03-12 PROCEDURE — 80329 ANALGESICS NON-OPIOID 1 OR 2: CPT | Performed by: FAMILY MEDICINE

## 2020-03-12 PROCEDURE — 80320 DRUG SCREEN QUANTALCOHOLS: CPT | Performed by: FAMILY MEDICINE

## 2020-03-12 PROCEDURE — 36415 COLL VENOUS BLD VENIPUNCTURE: CPT | Performed by: FAMILY MEDICINE

## 2020-03-12 PROCEDURE — 87633 RESP VIRUS 12-25 TARGETS: CPT | Performed by: FAMILY MEDICINE

## 2020-03-12 PROCEDURE — 96365 THER/PROPH/DIAG IV INF INIT: CPT | Mod: 59 | Performed by: FAMILY MEDICINE

## 2020-03-12 PROCEDURE — 93005 ELECTROCARDIOGRAM TRACING: CPT | Performed by: FAMILY MEDICINE

## 2020-03-12 PROCEDURE — 80053 COMPREHEN METABOLIC PANEL: CPT | Performed by: FAMILY MEDICINE

## 2020-03-12 PROCEDURE — 87486 CHLMYD PNEUM DNA AMP PROBE: CPT | Performed by: FAMILY MEDICINE

## 2020-03-12 PROCEDURE — 83605 ASSAY OF LACTIC ACID: CPT | Performed by: FAMILY MEDICINE

## 2020-03-12 PROCEDURE — 99223 1ST HOSP IP/OBS HIGH 75: CPT | Mod: AI | Performed by: FAMILY MEDICINE

## 2020-03-12 PROCEDURE — 81001 URINALYSIS AUTO W/SCOPE: CPT | Performed by: FAMILY MEDICINE

## 2020-03-12 PROCEDURE — 84145 PROCALCITONIN (PCT): CPT | Performed by: FAMILY MEDICINE

## 2020-03-12 PROCEDURE — 87186 SC STD MICRODIL/AGAR DIL: CPT | Performed by: FAMILY MEDICINE

## 2020-03-12 PROCEDURE — 25000132 ZZH RX MED GY IP 250 OP 250 PS 637: Mod: GY | Performed by: FAMILY MEDICINE

## 2020-03-12 PROCEDURE — 80307 DRUG TEST PRSMV CHEM ANLYZR: CPT | Performed by: FAMILY MEDICINE

## 2020-03-12 PROCEDURE — 85025 COMPLETE CBC W/AUTO DIFF WBC: CPT | Performed by: FAMILY MEDICINE

## 2020-03-12 RX ORDER — ONDANSETRON 4 MG/1
4 TABLET, ORALLY DISINTEGRATING ORAL EVERY 6 HOURS PRN
Status: DISCONTINUED | OUTPATIENT
Start: 2020-03-12 | End: 2020-03-13 | Stop reason: HOSPADM

## 2020-03-12 RX ORDER — IOPAMIDOL 755 MG/ML
85 INJECTION, SOLUTION INTRAVASCULAR ONCE
Status: DISCONTINUED | OUTPATIENT
Start: 2020-03-12 | End: 2020-03-12

## 2020-03-12 RX ORDER — PREDNISONE 20 MG/1
20 TABLET ORAL DAILY
Status: DISCONTINUED | OUTPATIENT
Start: 2020-03-13 | End: 2020-03-13 | Stop reason: HOSPADM

## 2020-03-12 RX ORDER — IOPAMIDOL 755 MG/ML
91 INJECTION, SOLUTION INTRAVASCULAR ONCE
Status: COMPLETED | OUTPATIENT
Start: 2020-03-12 | End: 2020-03-12

## 2020-03-12 RX ORDER — IPRATROPIUM BROMIDE AND ALBUTEROL SULFATE 2.5; .5 MG/3ML; MG/3ML
1 SOLUTION RESPIRATORY (INHALATION) EVERY 4 HOURS
Status: DISCONTINUED | OUTPATIENT
Start: 2020-03-12 | End: 2020-03-13 | Stop reason: HOSPADM

## 2020-03-12 RX ORDER — AMOXICILLIN 250 MG
2 CAPSULE ORAL 2 TIMES DAILY PRN
Status: DISCONTINUED | OUTPATIENT
Start: 2020-03-12 | End: 2020-03-13 | Stop reason: HOSPADM

## 2020-03-12 RX ORDER — ACETAMINOPHEN 325 MG/1
650 TABLET ORAL EVERY 4 HOURS PRN
Status: DISCONTINUED | OUTPATIENT
Start: 2020-03-12 | End: 2020-03-13 | Stop reason: HOSPADM

## 2020-03-12 RX ORDER — MORPHINE SULFATE 30 MG/1
60 TABLET, FILM COATED, EXTENDED RELEASE ORAL EVERY MORNING
Status: DISCONTINUED | OUTPATIENT
Start: 2020-03-13 | End: 2020-03-13 | Stop reason: HOSPADM

## 2020-03-12 RX ORDER — OXYCODONE HYDROCHLORIDE 5 MG/1
5-10 TABLET ORAL 3 TIMES DAILY PRN
Status: DISCONTINUED | OUTPATIENT
Start: 2020-03-12 | End: 2020-03-13 | Stop reason: HOSPADM

## 2020-03-12 RX ORDER — TRAZODONE HYDROCHLORIDE 100 MG/1
200 TABLET ORAL
Status: DISCONTINUED | OUTPATIENT
Start: 2020-03-12 | End: 2020-03-13 | Stop reason: HOSPADM

## 2020-03-12 RX ORDER — ALBUTEROL SULFATE 0.83 MG/ML
2.5 SOLUTION RESPIRATORY (INHALATION)
Status: DISCONTINUED | OUTPATIENT
Start: 2020-03-12 | End: 2020-03-13 | Stop reason: HOSPADM

## 2020-03-12 RX ORDER — NICOTINE 21 MG/24HR
1 PATCH, TRANSDERMAL 24 HOURS TRANSDERMAL EVERY 24 HOURS
Status: DISCONTINUED | OUTPATIENT
Start: 2020-03-12 | End: 2020-03-13 | Stop reason: HOSPADM

## 2020-03-12 RX ORDER — SIMVASTATIN 40 MG
40 TABLET ORAL AT BEDTIME
Status: DISCONTINUED | OUTPATIENT
Start: 2020-03-12 | End: 2020-03-13 | Stop reason: HOSPADM

## 2020-03-12 RX ORDER — NAPROXEN 250 MG/1
500 TABLET ORAL 2 TIMES DAILY PRN
Status: DISCONTINUED | OUTPATIENT
Start: 2020-03-12 | End: 2020-03-13 | Stop reason: HOSPADM

## 2020-03-12 RX ORDER — SODIUM CHLORIDE 9 MG/ML
INJECTION, SOLUTION INTRAVENOUS CONTINUOUS
Status: DISCONTINUED | OUTPATIENT
Start: 2020-03-12 | End: 2020-03-13 | Stop reason: HOSPADM

## 2020-03-12 RX ORDER — ERGOCALCIFEROL 1.25 MG/1
50000 CAPSULE, LIQUID FILLED ORAL
Status: DISCONTINUED | OUTPATIENT
Start: 2020-03-14 | End: 2020-03-13 | Stop reason: HOSPADM

## 2020-03-12 RX ORDER — NALOXONE HYDROCHLORIDE 0.4 MG/ML
.1-.4 INJECTION, SOLUTION INTRAMUSCULAR; INTRAVENOUS; SUBCUTANEOUS
Status: DISCONTINUED | OUTPATIENT
Start: 2020-03-12 | End: 2020-03-13 | Stop reason: HOSPADM

## 2020-03-12 RX ORDER — MORPHINE SULFATE 30 MG/1
30 TABLET, FILM COATED, EXTENDED RELEASE ORAL AT BEDTIME
Status: DISCONTINUED | OUTPATIENT
Start: 2020-03-13 | End: 2020-03-13 | Stop reason: HOSPADM

## 2020-03-12 RX ORDER — DULOXETIN HYDROCHLORIDE 30 MG/1
120 CAPSULE, DELAYED RELEASE ORAL DAILY
Status: DISCONTINUED | OUTPATIENT
Start: 2020-03-13 | End: 2020-03-13 | Stop reason: HOSPADM

## 2020-03-12 RX ORDER — AMOXICILLIN 250 MG
1 CAPSULE ORAL 2 TIMES DAILY PRN
Status: DISCONTINUED | OUTPATIENT
Start: 2020-03-12 | End: 2020-03-13 | Stop reason: HOSPADM

## 2020-03-12 RX ORDER — LIDOCAINE 40 MG/G
CREAM TOPICAL
Status: DISCONTINUED | OUTPATIENT
Start: 2020-03-12 | End: 2020-03-13 | Stop reason: HOSPADM

## 2020-03-12 RX ORDER — ONDANSETRON 2 MG/ML
4 INJECTION INTRAMUSCULAR; INTRAVENOUS EVERY 6 HOURS PRN
Status: DISCONTINUED | OUTPATIENT
Start: 2020-03-12 | End: 2020-03-13 | Stop reason: HOSPADM

## 2020-03-12 RX ADMIN — SODIUM CHLORIDE 100 ML: 9 INJECTION, SOLUTION INTRAVENOUS at 13:16

## 2020-03-12 RX ADMIN — SIMVASTATIN 40 MG: 40 TABLET, FILM COATED ORAL at 21:18

## 2020-03-12 RX ADMIN — ENOXAPARIN SODIUM 40 MG: 40 INJECTION SUBCUTANEOUS at 21:19

## 2020-03-12 RX ADMIN — TAZOBACTAM SODIUM AND PIPERACILLIN SODIUM 4.5 G: 500; 4 INJECTION, SOLUTION INTRAVENOUS at 19:44

## 2020-03-12 RX ADMIN — SODIUM CHLORIDE, POTASSIUM CHLORIDE, SODIUM LACTATE AND CALCIUM CHLORIDE 2000 ML: 600; 310; 30; 20 INJECTION, SOLUTION INTRAVENOUS at 12:20

## 2020-03-12 RX ADMIN — IOPAMIDOL 91 ML: 755 INJECTION, SOLUTION INTRAVENOUS at 13:16

## 2020-03-12 RX ADMIN — TAZOBACTAM SODIUM AND PIPERACILLIN SODIUM 4.5 G: 500; 4 INJECTION, SOLUTION INTRAVENOUS at 12:55

## 2020-03-12 RX ADMIN — SODIUM CHLORIDE: 9 INJECTION, SOLUTION INTRAVENOUS at 21:17

## 2020-03-12 RX ADMIN — PREGABALIN 150 MG: 100 CAPSULE ORAL at 21:18

## 2020-03-12 ASSESSMENT — ACTIVITIES OF DAILY LIVING (ADL)
RETIRED_EATING: 0-->INDEPENDENT
DRESS: 0-->INDEPENDENT
FALL_HISTORY_WITHIN_LAST_SIX_MONTHS: YES
SWALLOWING: 0-->SWALLOWS FOODS/LIQUIDS WITHOUT DIFFICULTY
AMBULATION: 0-->INDEPENDENT
ADLS_ACUITY_SCORE: 13
TOILETING: 0-->INDEPENDENT
NUMBER_OF_TIMES_PATIENT_HAS_FALLEN_WITHIN_LAST_SIX_MONTHS: 2
BATHING: 0-->INDEPENDENT
TRANSFERRING: 0-->INDEPENDENT
RETIRED_COMMUNICATION: 0-->UNDERSTANDS/COMMUNICATES WITHOUT DIFFICULTY
COGNITION: 0 - NO COGNITION ISSUES REPORTED

## 2020-03-12 ASSESSMENT — ENCOUNTER SYMPTOMS
FLANK PAIN: 1
DIARRHEA: 0
SHORTNESS OF BREATH: 1
SORE THROAT: 0
ABDOMINAL PAIN: 1
FEVER: 1
SINUS PRESSURE: 0
FREQUENCY: 0
HEADACHES: 0
WHEEZING: 0
PALPITATIONS: 0
CONSTIPATION: 0
CHILLS: 1
COUGH: 1
DIAPHORESIS: 0
NAUSEA: 1
BLOOD IN STOOL: 0
VOMITING: 1
DYSURIA: 0

## 2020-03-12 ASSESSMENT — MIFFLIN-ST. JEOR: SCORE: 1855

## 2020-03-12 NOTE — PROGRESS NOTES
[place] [facility] ADMISSION NOTE    Patient admitted to room 2403 at approximately 1655 via cart from emergency room. Patient was accompanied by transport tech.     Verbal SBAR report received from Otilio GUILLAUME prior to patient arrival.     Patient ambulated to bed with stand-by assist. Patient alert and oriented X 3. Pain is controlled without any medications. 0-10 Pain Scale: 5. Admission vital signs: Blood pressure (!) 156/75, pulse 73, temperature 98.2  F (36.8  C), temperature source Oral, resp. rate 20, height 1.829 m (6'), weight 102.2 kg (225 lb 5 oz), SpO2 94 %. Patient was oriented to plan of care, call light, bed controls, tv, telephone, bathroom and visiting hours.     Risk Assessment    The following safety risks were identified during admission: fall. Yellow risk band applied: YES.     Skin Initial Assessment    This writer admitted this patient and completed a full skin assessment and Jeremiah score in the Adult PCS flowsheet. Appropriate interventions initiated as needed.     Secondary skin check completed by Iza GUILLAUME.         Education    Patient has a [place] to Observation order: No  Observation education completed and documented: N/A      Toni Lunsford RN
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
## 2020-03-12 NOTE — LETTER
Transition Communication Hand-off for Care Transitions to Next Level of Care Provider    Name: Melquiades Morales  : 1955  MRN #: [id number]  Primary Care Provider: Jignesh Travis  Primary Care MD Name: Thaddeus  Primary Clinic: [address]  [facility] [address]  Primary Care Clinic Name: ([facility])  Reason for Hospitalization:  Fall, initial encounter [W19.XXXA]  Syncope, unspecified syncope type [R55]  Pneumonia of both lungs due to infectious organism, unspecified part of lung [J18.9]  Admit Date/Time: 3/12/2020 11:27 AM  Discharge Date: 3/14/20  Payor Source: Payor: MEDICARE / Plan: MEDICARE / Product Type: Medicare /     Readmission Assessment Measure (BRIAN) Risk Score/category: elevated         Reason for Communication Hand-off Referral: Fragility    Discharge Plan:home       Concern for non-adherence with plan of care:   Y/N no  Discharge Needs Assessment:  Needs      Most Recent Value   Other Resources  County Worker              Key Recommendations:  Pt will most likely return home today/tomorrow. Pt has PCA with him 7 days per week.    PONCHO Rudd   [facility] [phone]        AVS/Discharge Summary is the source of truth; this is a helpful guide for improved communication of patient story

## 2020-03-12 NOTE — PHARMACY
Awaiting medication list from PCA Katarina.    Patient was at Physical Therapy appointment today and PCA did not have medication list with. Writer gave fax number to have list with last known dosages faxed to ED department for reconciliation.
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
## 2020-03-12 NOTE — ED NOTES
"Staff states that he has fallen x1 on Tuesday and x1 on Wednesday.  Pt denies hitting head.  C/o low back pain from yesterdays fall.  Chronic tingling/numbness in leg.  Pt states he falls because he is dizzy.  Pt constant moaning and states \"I hurt so bad.\"  Eating and drinking fine yesterday.  Pt also c/o soa over last several days.  "

## 2020-03-12 NOTE — PROGRESS NOTES
Writer completed baseline admit skin assessment with primary nurse Toni GUILLAUME. Did not assess buttocks. No overt skin conditions noted otherwise.

## 2020-03-13 VITALS
WEIGHT: 229.5 LBS | BODY MASS INDEX: 31.08 KG/M2 | HEART RATE: 75 BPM | RESPIRATION RATE: 18 BRPM | SYSTOLIC BLOOD PRESSURE: 152 MMHG | OXYGEN SATURATION: 95 % | TEMPERATURE: 97.6 F | DIASTOLIC BLOOD PRESSURE: 71 MMHG | HEIGHT: 72 IN

## 2020-03-13 LAB
ANION GAP SERPL CALCULATED.3IONS-SCNC: 3 MMOL/L (ref 3–14)
BACTERIA SPEC CULT: NO GROWTH
BASOPHILS # BLD AUTO: 0 10E9/L (ref 0–0.2)
BASOPHILS NFR BLD AUTO: 0.4 %
BUN SERPL-MCNC: 8 MG/DL (ref 7–30)
C PNEUM DNA SPEC QL NAA+PROBE: NOT DETECTED
CALCIUM SERPL-MCNC: 8.7 MG/DL (ref 8.5–10.1)
CHLORIDE SERPL-SCNC: 105 MMOL/L (ref 94–109)
CO2 SERPL-SCNC: 33 MMOL/L (ref 20–32)
CREAT SERPL-MCNC: 0.69 MG/DL (ref 0.66–1.25)
DIFFERENTIAL METHOD BLD: NORMAL
EOSINOPHIL # BLD AUTO: 0.2 10E9/L (ref 0–0.7)
EOSINOPHIL NFR BLD AUTO: 2 %
ERYTHROCYTE [DISTWIDTH] IN BLOOD BY AUTOMATED COUNT: 14 % (ref 10–15)
FLUAV H1 2009 PAND RNA SPEC QL NAA+PROBE: NOT DETECTED
FLUAV H1 RNA SPEC QL NAA+PROBE: NOT DETECTED
FLUAV H3 RNA SPEC QL NAA+PROBE: NOT DETECTED
FLUAV RNA SPEC QL NAA+PROBE: NOT DETECTED
FLUBV RNA SPEC QL NAA+PROBE: NOT DETECTED
GFR SERPL CREATININE-BSD FRML MDRD: >90 ML/MIN/{1.73_M2}
GLUCOSE SERPL-MCNC: 126 MG/DL (ref 70–99)
HADV DNA SPEC QL NAA+PROBE: NOT DETECTED
HCOV PNL SPEC NAA+PROBE: NOT DETECTED
HCT VFR BLD AUTO: 47.5 % (ref 40–53)
HGB BLD-MCNC: 15.3 G/DL (ref 13.3–17.7)
HMPV RNA SPEC QL NAA+PROBE: NOT DETECTED
HPIV1 RNA SPEC QL NAA+PROBE: NOT DETECTED
HPIV2 RNA SPEC QL NAA+PROBE: NOT DETECTED
HPIV3 RNA SPEC QL NAA+PROBE: NOT DETECTED
HPIV4 RNA SPEC QL NAA+PROBE: NOT DETECTED
IMM GRANULOCYTES # BLD: 0 10E9/L (ref 0–0.4)
IMM GRANULOCYTES NFR BLD: 0.3 %
LYMPHOCYTES # BLD AUTO: 4.7 10E9/L (ref 0.8–5.3)
LYMPHOCYTES NFR BLD AUTO: 50.1 %
Lab: NORMAL
M PNEUMO DNA SPEC QL NAA+PROBE: NOT DETECTED
MCH RBC QN AUTO: 31.5 PG (ref 26.5–33)
MCHC RBC AUTO-ENTMCNC: 32.2 G/DL (ref 31.5–36.5)
MCV RBC AUTO: 98 FL (ref 78–100)
MICROBIOLOGIST REVIEW: NORMAL
MONOCYTES # BLD AUTO: 0.7 10E9/L (ref 0–1.3)
MONOCYTES NFR BLD AUTO: 7.9 %
NEUTROPHILS # BLD AUTO: 3.7 10E9/L (ref 1.6–8.3)
NEUTROPHILS NFR BLD AUTO: 39.3 %
NRBC # BLD AUTO: 0 10*3/UL
NRBC BLD AUTO-RTO: 0 /100
PLATELET # BLD AUTO: 308 10E9/L (ref 150–450)
POTASSIUM SERPL-SCNC: 3.6 MMOL/L (ref 3.4–5.3)
RBC # BLD AUTO: 4.86 10E12/L (ref 4.4–5.9)
RSV RNA SPEC QL NAA+PROBE: NOT DETECTED
RSV RNA SPEC QL NAA+PROBE: NOT DETECTED
RV+EV RNA SPEC QL NAA+PROBE: NOT DETECTED
SODIUM SERPL-SCNC: 141 MMOL/L (ref 133–144)
SPECIMEN SOURCE: NORMAL
WBC # BLD AUTO: 9.3 10E9/L (ref 4–11)

## 2020-03-13 PROCEDURE — 99239 HOSP IP/OBS DSCHRG MGMT >30: CPT

## 2020-03-13 PROCEDURE — 85025 COMPLETE CBC W/AUTO DIFF WBC: CPT | Performed by: FAMILY MEDICINE

## 2020-03-13 PROCEDURE — 25800030 ZZH RX IP 258 OP 636: Performed by: FAMILY MEDICINE

## 2020-03-13 PROCEDURE — 94640 AIRWAY INHALATION TREATMENT: CPT

## 2020-03-13 PROCEDURE — 80048 BASIC METABOLIC PNL TOTAL CA: CPT | Performed by: FAMILY MEDICINE

## 2020-03-13 PROCEDURE — 94640 AIRWAY INHALATION TREATMENT: CPT | Mod: 76

## 2020-03-13 PROCEDURE — 25000132 ZZH RX MED GY IP 250 OP 250 PS 637: Mod: GY | Performed by: FAMILY MEDICINE

## 2020-03-13 PROCEDURE — 25000131 ZZH RX MED GY IP 250 OP 636 PS 637: Mod: GY | Performed by: FAMILY MEDICINE

## 2020-03-13 PROCEDURE — 36415 COLL VENOUS BLD VENIPUNCTURE: CPT | Performed by: FAMILY MEDICINE

## 2020-03-13 PROCEDURE — 25000128 H RX IP 250 OP 636: Performed by: FAMILY MEDICINE

## 2020-03-13 PROCEDURE — 25000125 ZZHC RX 250: Performed by: FAMILY MEDICINE

## 2020-03-13 RX ADMIN — PREDNISONE 20 MG: 20 TABLET ORAL at 08:58

## 2020-03-13 RX ADMIN — IPRATROPIUM BROMIDE AND ALBUTEROL SULFATE 3 ML: .5; 3 SOLUTION RESPIRATORY (INHALATION) at 07:53

## 2020-03-13 RX ADMIN — TAZOBACTAM SODIUM AND PIPERACILLIN SODIUM 4.5 G: 500; 4 INJECTION, SOLUTION INTRAVENOUS at 01:52

## 2020-03-13 RX ADMIN — PREGABALIN 150 MG: 100 CAPSULE ORAL at 08:57

## 2020-03-13 RX ADMIN — TAZOBACTAM SODIUM AND PIPERACILLIN SODIUM 4.5 G: 500; 4 INJECTION, SOLUTION INTRAVENOUS at 08:56

## 2020-03-13 RX ADMIN — MORPHINE SULFATE 60 MG: 30 TABLET, EXTENDED RELEASE ORAL at 08:57

## 2020-03-13 RX ADMIN — DULOXETINE HYDROCHLORIDE 120 MG: 30 CAPSULE, DELAYED RELEASE ORAL at 08:55

## 2020-03-13 RX ADMIN — IPRATROPIUM BROMIDE AND ALBUTEROL SULFATE 3 ML: .5; 3 SOLUTION RESPIRATORY (INHALATION) at 04:16

## 2020-03-13 RX ADMIN — SODIUM CHLORIDE: 9 INJECTION, SOLUTION INTRAVENOUS at 01:53

## 2020-03-13 ASSESSMENT — ACTIVITIES OF DAILY LIVING (ADL)
ADLS_ACUITY_SCORE: 13

## 2020-03-13 ASSESSMENT — MIFFLIN-ST. JEOR: SCORE: 1874

## 2020-03-13 NOTE — PHARMACY
Patient medication list updated. Patient is on Simvastatin 80 mg at bedtime. Patient is no longer taking Melatonin or using Nicotine patches.    List updated to reflect changes.

## 2020-03-13 NOTE — DISCHARGE SUMMARY
"[facility]    Discharge Summary  Hospital Medicine    Date of Admission:  3/12/2020  Date of Discharge:  3/13/2020   Discharging Provider: Rey Wagoner  Date of Service: 3/13/2020      Primary Care     Jignesh Travis  [id number]  [facility] [address]      Identification and Chief Compaint: Melquiades Morales is a 65 year old male who presented on 3/12/2020 with complaints of cough, fatigue, and grogginess.    Discharge Diagnoses       Chronic pain syndrome    Tobacco use disorder    Major depressive disorder, single episode, severe (H)    COPD (chronic obstructive pulmonary disease) (H)    Migraine headache    Anxiety    Chronic maxillary sinusitis    Lumbar radiculopathy    Cocaine abuse (H)    Alcohol abuse, episodic    Cannabis abuse    Generalized anxiety disorder    Opioid type dependence (H)    Pneumonia    Acute encephalopathy      Discharge Disposition   Discharged to home    Discharge Orders      Reason for your hospital stay    Your PCA noted that you are coughing a lot, and were \"groggy\", so she brought you to the emergency department for evaluation.  In the ER, you were diagnosed as having pneumonia, based upon some patchy infiltrate seen on the initial CT scan of your chest.  We look for evidence of a bacterial pneumonia, though found none.  Your temperature remained normal while you were here, you are not coughing up anything that looked like pus, your white blood cell count was normal, and a procalcitonin value, which is specific for bacterial infection, was normal.  As result, I think that your suspicion that you have a viral pneumonia is correct.  As you know, antibiotics are not helpful to treat viral infections.  Therefore, when you go home today, you will not be on any antibiotics.    You had some low oxygen levels shortly after arrival in the emergency department, but those have improved as of this morning, and you do not require oxygen at home. "     Follow-up and recommended labs and tests     Follow up with primary care provider, Jignesh Travis, within 7 days for hospital follow-up.  No specific follow up labs or tests are needed.     Activity    Your activity upon discharge: activity as tolerated.     Full Code     Diet    Follow this diet upon discharge: Orders Placed This Encounter      Advance Diet as Tolerated: Regular Diet Adult        Discharge Medications   Current Discharge Medication List      CONTINUE these medications which have NOT CHANGED    Details   DULoxetine (CYMBALTA) 60 MG capsule TAKE 2 CAPSULES BY MOUTH EVERY DAY  Qty: 180 capsule, Refills: 3    Associated Diagnoses: Depression, unspecified depression type      ipratropium - albuterol 0.5 mg/2.5 mg/3 mL (DUONEB) 0.5-2.5 (3) MG/3ML neb solution Take 1 vial (3 mLs) by nebulization every 4 hours as needed for shortness of breath / dyspnea or wheezing  Qty: 120 vial, Refills: 0    Associated Diagnoses: Chronic bronchitis, unspecified chronic bronchitis type (H)      morphine (MS CONTIN) 30 MG CR tablet 60 mg in AM and 30 mg in HS  Refills: 0      oxyCODONE (ROXICODONE) 5 MG tablet Take 1-2 tablets by mouth 3 times daily as needed   Refills: 0      pregabalin (LYRICA) 150 MG capsule Take 150 mg by mouth 2 times daily       simvastatin (ZOCOR) 80 MG tablet Take 1 tablet (80 mg) by mouth daily DUE FOR LAB WORK FOR FURTHER REFILLS  Qty: 90 tablet, Refills: 3    Associated Diagnoses: Hyperlipidemia LDL goal <130      !! traZODone (DESYREL) 100 MG tablet TAKE 2 TABLETS BY MOUTH AT BEDTIME AS NEEDED FOR SLEEP  Qty: 60 tablet, Refills: 3    Associated Diagnoses: Depression, unspecified depression type      !! traZODone (DESYREL) 50 MG tablet Take 1 tablet (50 mg) by mouth nightly as needed for sleep Take along with the 100 mg tablets for total dose of 250 mg  Qty: 90 tablet, Refills: 1    Associated Diagnoses: Depression, unspecified depression type      vitamin D2 (ERGOCALCIFEROL) 39129 units  (1250 mcg) capsule Take 1 capsule by mouth once a week On Saturday  Refills: 11      albuterol (PROAIR HFA) 108 (90 Base) MCG/ACT inhaler Inhale 2 puffs into the lungs every 4 hours as needed for shortness of breath / dyspnea or wheezing  Qty: 1 Inhaler, Refills: 11    Comments: Pharmacy may dispense brand covered by insurance (Proair, or proventil or ventolin or generic albuterol inhaler)  Associated Diagnoses: Bronchitis      naproxen (NAPROSYN) 250 MG tablet 2 tabs bid if needed for headache  Qty: 60 tablet, Refills: 3    Associated Diagnoses: Chronic pain syndrome      !! order for DME Equipment being ordered: Nebulizer.    Diagnosis: chronic obstructive bronchitis (COPD).    Duration of need:  99 months.  Qty: 1 each, Refills: 0    Associated Diagnoses: Chronic bronchitis, unspecified chronic bronchitis type (H)      !! order for DME Equipment being ordered: Hospital Bed and mattress.  Qty: 1 each, Refills: 0    Associated Diagnoses: Chronic pain syndrome; Lumbosacral radiculitis; Chronic obstructive pulmonary disease, unspecified COPD type (H); Obese; Lumbar radiculopathy; Encephalopathy; Spinal stenosis in cervical region      !! order for DME Equipment being ordered: Lift Chair  Qty: 1 each, Refills: 0    Associated Diagnoses: Chronic pain syndrome; Lumbosacral radiculitis; Chronic obstructive pulmonary disease, unspecified COPD type (H); Obese; Lumbar radiculopathy; Encephalopathy; Spinal stenosis in cervical region; Unable to ambulate      !! order for DME Equipment being ordered: Wheelchair. Electric, including adjustable back rest sitting to resting, leg elevation adjustment, approved for outdoor use  Qty: 1 Units, Refills: 0    Associated Diagnoses: Chronic pain syndrome; Lumbosacral radiculitis      !! ORDER FOR DME Semi electric hospital bed with side rails and mattress. Length of bed is for lifetime  Qty: 1 Device, Refills: 0    Associated Diagnoses: Other unspecified back disorder; Obese;  Lumbosacral radiculitis; COPD (chronic obstructive pulmonary disease) (H)       !! - Potential duplicate medications found. Please discuss with provider.      STOP taking these medications       nicotine (NICODERM CQ) 21 MG/24HR 24 hr patch Comments:   Reason for Stopping:             Allergies   Allergies   Allergen Reactions     Abilify [Aripiprazole] Other (See Comments)     Altered metal status.  Was admitted to hospital 3/17/2015.     Asa [Aspirin] GI Disturbance     Upset stomach     Black Pepper [Piper]      Caffeine Other (See Comments)     Comment: GI problems, Description:      Robitussin Cough-Cold D Other (See Comments)     Bad dreams about killing people      Varenicline Unknown       Consultations This Hospital Stay    PHYSICAL THERAPY ADULT IP CONSULT  OCCUPATIONAL THERAPY ADULT IP CONSULT    Significant Results and Procedures   Procedures    None.    Data   Results for orders placed or performed during the hospital encounter of 03/12/20   Head CT w/o contrast    Narrative    CT SCAN OF THE HEAD WITHOUT CONTRAST   3/12/2020 1:33 PM     HISTORY: multiple, falls, confused    TECHNIQUE:  Axial images of the head and coronal reformations without  IV contrast material. Radiation dose for this scan was reduced using  automated exposure control, adjustment of the mA and/or kV according  to patient size, or iterative reconstruction technique.    COMPARISON: Head CT dated 2/12/2018.    FINDINGS:     Intracranial contents: The ventricles are normal in size, shape and  configuration.  The brain parenchyma and subarachnoid spaces are  normal. There is no evidence of intracranial hemorrhage, mass, acute  infarct or anomaly.    Visualized orbits/sinuses/mastoids:  The left frontal sinus is  completely opacified. Multiple left anterior ethmoid sinuses are also  opacified. There is sclerosis and thickening of the walls of several  ethmoid sinuses consistent with chronic osteitis. The patient had  similar findings on  the head CT dated 2/12/2018.    Osseous structures/soft tissues:  No intracranial bleed or skull  fractures.      Impression    IMPRESSION:   1. The brain parenchyma appears normal. No bleed, mass, or infarcts.  2. Left ethmoid and left frontal sinus disease.   This is unchanged  since 2/12/2018.      MILAGRO BEAVERS MD   Cervical spine CT w/o contrast    Narrative    CT CERVICAL SPINE WITHOUT CONTRAST   3/12/2020 1:44 PM     HISTORY: Trauma from falls.     TECHNIQUE: Axial images of the cervical spine were obtained without  intravenous contrast. Multiplanar reformations were performed.   Radiation dose for this scan was reduced using automated exposure  control, adjustment of the mA and/or kV according to patient size, or  iterative reconstruction technique.    COMPARISON: MRI 12/11/2009    FINDINGS: There is no evidence of fracture.     Alignment: Normal.      Craniocervical Junction and C1-C2:  Normal.    C2-C3:  Normal disc, facet joints, spinal canal and neural foramina.    C3-C4:  Normal disc, facet joints, spinal canal and neural foramina.    C4-C5:  Normal disc, facet joints, spinal canal and neural foramina.    C5-C6:  Moderate degenerative disc disease, unchanged.    C6-C7:  Normal disc, facet joints, spinal canal and neural foramina.    C7-T1: Normal disc, facet joints, spinal canal and neural foramina.    Paraspinous Soft Tissues:  Normal as visualized.      Impression    IMPRESSION:    1. No acute abnormality.  2. C5-C6 moderate degenerative disc disease, unchanged.     DAVI SWAIN MD   CT Chest (PE) Abdomen Pelvis w Contrast    Narrative    CT CHEST PE ABDOMEN AND PELVIS WITH CONTRAST 3/12/2020 1:40 PM    CLINICAL HISTORY: Syncope, flank pain, chills, dyspnea.    TECHNIQUE: CT angiogram chest and routine CT abdomen pelvis with IV  contrast. Arterial phase through the chest and venous phase through  the abdomen and pelvis. 2D and 3D MIP reconstructions were preformed  by the CT technologist. Dose  "reduction techniques were used.     CONTRAST: 91 mL Isovue 370    COMPARISON: CT chest 10/28/2019, CT abdomen and pelvis 9/7/2018.    FINDINGS:  ANGIOGRAM CHEST: Pulmonary arteries are normal caliber and negative  for pulmonary emboli. Thoracic aorta is negative for dissection. Mild  to moderate coronary artery calcifications. Mild thoracic aortic  calcifications.     LUNGS AND PLEURA: No effusions. There are patchy areas of new  consolidation identified within the left perihilar lung at the upper  and lower lobes. There are patchy nodular groundglass areas of  consolidation at the right lower lobe, minimally at the right upper  lobe, and right middle lobe.    MEDIASTINUM/AXILLAE: No other acute abnormality. Right paratracheal  lymph node continues to have a stable short axis of 1.1 cm series 506  image 30. Posterior right upper back sebaceous cyst is stable.    HEPATOBILIARY: Diffuse hepatic steatosis. No significant mass. No  calcified gallstones. Mild dilatation of the distal common bile duct  measuring 0.9 cm, stable.    PANCREAS: Normal.    SPLEEN: Normal.    ADRENAL GLANDS: Normal.    KIDNEYS/BLADDER: No significant mass, stones, or hydronephrosis. There  are simple or benign cysts. No follow up is needed.    BOWEL: No acute abnormality. Normal appendix. A few colonic  diverticula. No abscess or free air.    LYMPH NODES: Normal.    PELVIC ORGANS: Normal.    OTHER: Mild vascular calcifications.    MUSCULOSKELETAL: Normal.      Impression    IMPRESSION:  1.  Patchy bilateral areas of pulmonary consolidation most consistent  with multifocal pneumonia.  2.  No pulmonary embolism.  3.  Stable mild dilatation of the extrahepatic biliary tree with no  obstructing etiology seen.  4.  Fatty infiltration of the liver.    ARETHA ESPAÑA MD       History of Present Illness   (From H&P) This patient is a 65 year old  male with a significant past medical history of prior episodes of pneumonia that have looked \"just " "like this\" with the grogginess/confusion per his PCA as well as COPD, depression/anxiety, chronic back pain and history of prior alcohol and cocaine use now just using marijuana who presents with cough, tiredness and sleepiness.  Per PCA he was at baseline until past few days.  Has had more of a cough than usual the past 4-5 days.  Did have 2 reported \"falls\" in the past few days but hard to get much history about these, patient unable to provide history on these, PCA says he told her he \"fell forward\" 3 days ago but didn't have any injury and no report of new or increased pain since then.  They yesterday he \"fell\" back onto the toilet seat hard when sitting down and has felt like his sacrum was more sore after this.  Still ambulating as usual.  No apparent syncope with either of these and no other injury.  Then yesterday and today he's been more tired, reporting feeling \"crummy\" and having chills/sweats but no known fever.  Has been more sleepy than usual, taking a nap today which was unusual and waking up later than usual this AM. Eventually told PCA that he wanted to come to the hospital.  No clear dyspnea.  Reports back pain at present that appears to be baseline, per PCA hasn't reported increased pain today, just the soreness after falling on the toilet yesterday.  Patient currently asleep on arrival but awakens to voice, will answer questions but needs gentle shaking of shoulder to get his attention to focus on questions at times.  Answers many questions with \"yes\" regardless of the question, does better if asked a specific non-yes/no questions.  Denies pain besides back, denies dyspnea.  Says feels \"ok\".  Per PCA this is \"exactly\" how he's been with prior episodes of pneumonia.    He has not reported nor does he report to me any headache or neck pain.    No known sick contacts, no recent travel.       Patient on 2LNC - sats 87-88% on room air when he took this off for a few minutes while I was in the room.  " "     No recent medication changes other than some mucinex for a few days.       Smokes about 1 ppd, has been sober from alcohol for \"a while\" and PCA is confident about this.  Uses marijuana, former cocaine but not recently   Lives independently but has a PCA during the week and another who is his roommate on weekends.     Hospital Course   Melquiades Morales was admitted on 3/12/2020.  The following problems were addressed during his hospitalization:    Bilateral pneumonia, suspect viral etiology   Presented with a mild, nonproductive cough.  He had some chills prior to admission, but no measured fever.  He was also noted by his PCA to have altered mental status.  On admission CT scan, patchy areas of airspace opacity were seen adjacent to the left hilum, and more distally in the lateral left lower lobe, as well as a vague groundglass opacity within the right lower lobe.  No consolidated areas of lung were seen.  There were no effusions.  Admission WBC count was normal at 9.9.  Two blood cultures were sent 3/12/2020 and are negative to date.  Influenza swab was negative.  Viral respiratory panel was negative.  Procalcitonin done last night was less than 0.05.      The patient was started empirically on Zosyn monotherapy.  Today, he feels \"back to normal\", and wants to go home.  He says that he has almost an annual \"viral infection\" with symptoms similar to this episode, almost every year around his birthday.  He has had no fever while here, and no additional chills.  His cough remains very mild, and is now nonproductive.  He has no dyspnea at rest.  He has no respiratory chest pain.      -We will discontinue antibiotics, suspicion for bacterial pneumonia is low, particularly at the negative procalcitonin value.    -I tried to make a case for an additional day of hospital observation off of antibiotics, though the patient is politely firm that he wants to go home today.  Given his improvement to date, I have no " "objection.  Will discharge home.    Lactic acidosis - initial lactate was mildly elevated at 3.3.  The patient was then given some IV fluid, following which lactate was found to be normal about 3 hours later.  Clinical presentation and vital signs did not suggest sepsis.  He may have come in mildly volume depleted explaining the lactic acidosis.  -Resolved.     Encephalopathy, unclear etiology: toxic due to opioids and canabinoids vs metabolic due to viral pneumonia  On admission, the patient awakened easily to voice/touch although was noted to be somewhat \"groggy\".  He was able to answer questions including knowning that he's in the hospital.  According to his PCA, he has had similar mental status changes in association with prior pneumonias.  He reported no headache, neck pain or neck stiffness.  VB.41/57.   Ammonia normal.  Drug screen on admission was notable for opioids (which the patient takes by prescription) as well as cannabinoids, which he does not have a prescription for.  This morning, the patient is awake, alert, and conversant.  He is appropriate in conversation, and his speech is fluent.  He acknowledges that he was probably confused last night, which he says can happen in association with \"viral infections\".  He feels that his mental status is back to normal.  Neurologic exam is non-focal.  He does have a tremor, though says that it is chronic.  -Resolved.    Acute hypoxemic respiratory failure due to viral pneumonia -  SpO2 recorded in the ED were normal, with a sony value of 91% on room air.  However, the H&P indicates that the patient had a saturation of 87% on room air when the admission visit was being done.  This morning, oxygen saturations 92% on room air.  -Resolved.  The patient has no supplemental oxygen needs at discharge.     Chronic obstructive pulmonary disease (H)   The patient acknowledges the diagnosis of COPD.  I find no pulmonary function tests in his medical record.  Exam " "suggest at least moderate airflow obstruction, with no evidence of exacerbation.  -Continue albuterol/ipratropium nebulized treatments at home.  The patient says that he has a functional nebulizer and plenty of medication.  -He has no discharge oxygen needs as above.      Chronic pain syndrome   Lumbar radiculopathy   Opioid type dependence (H)   His outpatient pain regimen includes duloxetine 120 mg daily, MS Contin 60 mg every morning and 30 mg every afternoon, oxycodone 5 to 10 mg 3 times daily as needed, and pregabalin 150 mg twice daily.  MS Contin p.m. dose was held on admission due to altered mental status, though has been resumed this morning.  The patient says that his back pain is at baseline.    -Continue preadmission analgesic regimen as above.      Tobacco use disorder  Smokes 1 pack/day.  -Nicotine patch used this hospital stay.      Major depressive disorder, single episode, severe (H)/Generalized anxiety disorder  Affect this morning trended toward anxious, though the patient reports no recent changes in mood.   -Continue preadmission duloxetine and trazodone.     History of Migraine headache  No headache reported this hospital stay.      Chronic maxillary sinusitis  Unchanged from previous on admission CT head.     Cannabis abuse  History of Cocaine abuse (H)  Alcohol abuse, episodic  Urine drug screen positive for cannabinoids and opioids, but opioids are a prescription medication as above.  Screen was negative for cocaine, the patient reports no recent cocaine use.  Per PCA, patient has been sober for \"a good while\" and doing well with this.       Pending Results   Unresulted Labs Ordered in the Past 30 Days of this Admission     Date and Time Order Name Status Description    3/12/2020 1207 Urine Culture Aerobic Bacterial Preliminary     3/12/2020 1206 Blood culture Preliminary     3/12/2020 1206 Blood culture Preliminary     3/12/2020 1152 T4 free In process           Physical Exam   Temp:  [97.3 "  F (36.3  C)-98.2  F (36.8  C)] 97.6  F (36.4  C)  Pulse:  [68-85] 75  Resp:  [18-20] 18  BP: ()/(71-95) 152/71  SpO2:  [90 %-95 %] 95 %  Vitals:    03/12/20 1122 03/12/20 1700 03/13/20 0518   Weight: 110.2 kg (243 lb) 102.2 kg (225 lb 5 oz) 104.1 kg (229 lb 8 oz)       GENERAL: Pleasant man, sitting up in bed, looks comfortable.  RESP: No accessory muscle use at rest.  Symmetrically quiet breath sounds over all fields.  Lungs clear throughout on inspiration.  Expiration moderately prolonged and quiet, no wheeze.  CV: Regular rate and rhythm, non-tachycardic, distantly heard.  Normal S1 S2, no murmur or extra sound.  No lower extremity edema.  ABDOMEN: Soft, non-tender, no guarding.  Bowel sounds positive.  NEURO: Alert, oriented, conversant.  Speech is clear and fluent.  Cranial nerves III - XII grossly intact.  No gross motor or sensory deficits.  He has a prominent tremor of both hands at rest and with intention.    PSYCH: Calm, alert, conversant.  Able to articulate logical thoughts, no tangential thoughts, no hallucinations or delusions.  Affect trended toward anxious.        The discharge plan was discussed with the patient, who expressed agreement.    Total time on this discharge was 40 minutes.    Rey Wagoner MD

## 2020-03-13 NOTE — PLAN OF CARE
Patient sleepy, but arouses to voice. Patient responding by nodding his head yes or no, or would only state yes/no. Vital signs stable. BP hypertensive. Afebrile.     Patient continues to be on 2L of oxygen via nasal cannula. Patient had removed his oxygen and his O2 sat was 88%. Oxygen was reapplied. Droplet precautions maintained. Scheduled IV antibiotics given. IV fluids running.      Patient able to sleep the majority of the night.

## 2020-03-13 NOTE — PLAN OF CARE
OT: Upon attempt pt declines any therapy as states he is already discharged to return home and has no ADL or functional mobility concerns. Will discontinue orders at this time per patient request.

## 2020-03-13 NOTE — PROGRESS NOTES
CARE TRANSITION SOCIAL WORK INITIAL ASSESSMENT:      Met with: Patient.    DATA  Active Problems:    Chronic pain syndrome    Tobacco use disorder    Major depressive disorder, single episode, severe (H)    COPD (chronic obstructive pulmonary disease) (H)    Migraine headache    Anxiety    Chronic maxillary sinusitis    Lumbar radiculopathy    Cocaine abuse (H)    Alcohol abuse, episodic    Cannabis abuse    Generalized anxiety disorder    Opioid type dependence (H)    Pneumonia    Acute encephalopathy       Primary Care Clinic Name: ([facility] )  Primary Care MD Name: Thaddeus  Contact information and PCP information verified: Yes      ASSESSMENT  Cognitive Status: awake, alert and oriented.       Resources List: Home Care  Other Resources: County Worker           Description of Support System: Supportive, Involved   Who is your support system?: PCA   Support Assessment: Adequate family and caregiver support, Adequate social supports   Insurance Concerns: No Insurance issues identified           This writer met with pt introduced self and role. Discussed discharge planning and medicare guidelines in regards to home care and SNF benefits. Pt/family was given the Medicare Compare list for Home Care, with associated star ratings to assist with choice for referrals/discharge planning Yes    Education was given to pt/family that star ratings are updated/maintained by Medicare and can be reviewed by visiting www.medicare.gov Yes    Pt lives at home with his granddaughter and friend. He has PCA for 6 hours dialy, 7 days per week.  His goal is to return home and resume his PCA services. He has a [facility] , Kyree Burt.    Pt is currently seeing a counselor for mental health issues.       PLAN    Return home with PCA        Micki MARVIN, [facility] [phone]

## 2020-03-13 NOTE — PROGRESS NOTES
[facility] DISCHARGE NOTE    Patient discharged to home at 11:58 AM via wheel chair. Accompanied by other:PCA and staff. Discharge instructions reviewed with patient and other, opportunity offered to ask questions. Prescriptions - None ordered for discharge  Declined shower before leaving. All belongings sent with patient.    Taina Solis RN

## 2020-03-14 NOTE — PROGRESS NOTES
[facility]    Hospital Medicine   Cross Cover Note  Date of Service: 3/13/2020     I was called due to positive blood culture after discharge. 1 of 2 blood cultures drawn 3/12 positive for GPC with Verigene positive for Staph species other than S.arueus, S.epidermidis and S.lugdunensis. This is likely a contaminant. No further management needed at this time.   Ant Bautista MD  [facility]

## 2020-03-14 NOTE — PROGRESS NOTES
Called in for blood cx 3/12--no GPC in chains/ pairs. Only in clusters. Susceptibility testing in progress

## 2020-03-16 ENCOUNTER — PATIENT OUTREACH (OUTPATIENT)
Dept: CARE COORDINATION | Facility: CLINIC | Age: 65
End: 2020-03-16

## 2020-03-16 DIAGNOSIS — G89.4 CHRONIC PAIN SYNDROME: Chronic | ICD-10-CM

## 2020-03-16 NOTE — PROGRESS NOTES
"Clinic Care Coordination Contact  [facility]/Voicemail      Referral Source: IP Handoff    Clinical Data: Care Coordinator Outreach    Outreach attempted. Left message on patient's voicemail with call back information and requested return call.      65 year old  male with a significant past medical history of prior episodes of pneumonia that have looked \"just like this\" with the grogginess/confusion per his PCA as well as COPD, depression/anxiety, chronic back pain and history of prior alcohol and cocaine use now just using marijuana who presents with cough, tiredness and sleepiness.     Plan: Patient will attend PCP follow up visit as scheduled on 3/17/20.  Care Coordinator will do no further outreaches at this time.    Merle Haley RN, [facility] Clinic RN Coordinator  [facility]   3/16/2020    9:40 AM  [phone]    "

## 2020-03-16 NOTE — TELEPHONE ENCOUNTER
Requested Prescriptions   Pending Prescriptions Disp Refills     naproxen (NAPROSYN) 500 MG tablet [Pharmacy Med Name: naproxen 500 mg tablet]  Last Written Prescription Date:  7/12/19  Last Fill Quantity: 60,  # refills: 3   Last Office Visit with [facility], [facility] or [facility] prescribing provider:  11/5/19   Future Office Visit:    Next 5 appointments (look out 90 days)    Mar 17, 2020 11:00 AM CDT  Office Visit with Jignesh Travis MD  [facility] ([facility] [address]  [facility] [address]  [phone]   Mar 20, 2020  9:00 AM CDT  Return Visit with Kleber Pollack [address] [address]  [phone]   Apr 10, 2020  9:00 AM CDT  Return Visit with Kleber Pollack [facility] ([address] [address]  [phone]   May 01, 2020  9:00 AM CDT  Return Visit with Kleber Pollack [facility] ([address] [address]  [phone]          60 tablet 3     Sig: TAKE 1 TABLET BY MOUTH EVERY DAY AT ONSET of HEADACHE       NSAID Medications Failed - 3/16/2020  1:20 PM        Failed - Blood pressure under 140/90 in past 12 months     BP Readings from Last 3 Encounters:   03/13/20 (!) 152/71   11/05/19 121/82   10/30/19 139/62                 Passed - Normal ALT on file in past 12 months     Recent Labs   Lab Test 03/12/20  1152   ALT 19             Passed - Normal AST on file in past 12 months     Recent Labs   Lab Test 03/12/20  1152   AST 16             Passed - Recent (12 mo) or future (30 days) visit within the authorizing provider's specialty     Patient has had an office visit with the authorizing provider or a provider within the [facility] providers department within the previous 12 mos or has  "a future within next 30 days. See \"Patient Info\" tab in inbasket, or \"Choose Columns\" in Meds & Orders section of the refill encounter.              Passed - Patient is age 6-64 years        Passed - Normal CBC on file in past 12 months     Recent Labs   Lab Test 03/13/20  0512   WBC 9.3   RBC 4.86   HGB 15.3   HCT 47.5                    Passed - Medication is active on med list        Passed - Normal serum creatinine on file in past 12 months     Recent Labs   Lab Test 03/13/20 0512   CR 0.69       Ok to refill medication if creatinine is low               "

## 2020-03-17 LAB
BACTERIA SPEC CULT: ABNORMAL
SPECIMEN SOURCE: ABNORMAL

## 2020-03-18 LAB
BACTERIA SPEC CULT: NO GROWTH
Lab: NORMAL
SPECIMEN SOURCE: NORMAL

## 2020-03-18 RX ORDER — NAPROXEN 500 MG/1
TABLET ORAL
Qty: 60 TABLET | Refills: 3 | Status: SHIPPED | OUTPATIENT
Start: 2020-03-18 | End: 2021-07-19

## 2020-03-18 NOTE — TELEPHONE ENCOUNTER
Routing refill request to provider for review/approval because:  Blood pressure not in range.    Sharon Poon RN

## 2020-03-20 ENCOUNTER — VIRTUAL VISIT (OUTPATIENT)
Dept: PSYCHOLOGY | Facility: CLINIC | Age: 65
End: 2020-03-20
Payer: MEDICARE

## 2020-03-20 DIAGNOSIS — F33.1 MAJOR DEPRESSIVE DISORDER, RECURRENT EPISODE, MODERATE (H): Primary | ICD-10-CM

## 2020-03-20 DIAGNOSIS — F41.1 GENERALIZED ANXIETY DISORDER: ICD-10-CM

## 2020-03-20 DIAGNOSIS — F43.10 POSTTRAUMATIC STRESS DISORDER: ICD-10-CM

## 2020-03-20 PROCEDURE — 98968 PH1 ASSMT&MGMT NQHP 21-30: CPT | Performed by: SOCIAL WORKER

## 2020-03-20 NOTE — PROGRESS NOTES
"Melquiades Morales is a 65 year old male who is being evaluated via a billable telephone visit.      The patient has been notified of following:     \"This telephone visit will be conducted via a call between you and your physician/provider. We have found that certain health care needs can be provided without the need for a physical exam.  This service lets us provide the care you need with a short phone conversation.  If a prescription is necessary we can send it directly to your pharmacy.  If lab work is needed we can place an order for that and you can then stop by our lab to have the test done at a later time.    If during the course of the call the physician/provider feels a telephone visit is not appropriate, you will not be charged for this service.\"     Melquiades Morales complains of  No chief complaint on file.      I have reviewed and updated the patient's Past Medical History, Social History, Family History and Medication List.    ALLERGIES  Abilify [aripiprazole]; Asa [aspirin]; Black pepper [piper]; Caffeine; Robitussin cough-cold d; and Varenicline    DATA:  Assessed funtioning and for safety; he denied any safety concerns other than covid-19 and is self quarantining to prevent yuni it. Reviewed healthy coping ideas and given verbal praise for practicing them. He has been watching a lot of tv and likes deep breathing.    Assessment/Plan:  Major depression, recurrent, moderate  ptsd  EVGENY    Phone call duration:  30 minutes        "

## 2020-04-10 ENCOUNTER — VIRTUAL VISIT (OUTPATIENT)
Dept: PSYCHOLOGY | Facility: CLINIC | Age: 65
End: 2020-04-10
Payer: MEDICARE

## 2020-04-10 DIAGNOSIS — F41.1 GENERALIZED ANXIETY DISORDER: ICD-10-CM

## 2020-04-10 DIAGNOSIS — F33.1 MAJOR DEPRESSIVE DISORDER, RECURRENT EPISODE, MODERATE (H): Primary | ICD-10-CM

## 2020-04-10 PROCEDURE — 98968 PH1 ASSMT&MGMT NQHP 21-30: CPT | Performed by: SOCIAL WORKER
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
## 2020-04-20 ENCOUNTER — TELEPHONE (OUTPATIENT)
Dept: PSYCHOLOGY | Facility: CLINIC | Age: 65
End: 2020-04-20

## 2020-04-21 ENCOUNTER — TELEPHONE (OUTPATIENT)
Dept: FAMILY MEDICINE | Facility: CLINIC | Age: 65
End: 2020-04-21

## 2020-04-21 NOTE — TELEPHONE ENCOUNTER
[facility] called asking if Dr Travis would follow patient for homecare services-- such as evaluate and treat OT/PT, skilled nursing.  Service would start on Thursday 4/23.     homecare services due to recent Lung infection, general health decline, and recent falls.     Requesting verbal order and OK to follow patient for homecare.     Josie ALBERTO  Station

## 2020-04-24 ENCOUNTER — VIRTUAL VISIT (OUTPATIENT)
Dept: PSYCHOLOGY | Facility: CLINIC | Age: 65
End: 2020-04-24
Payer: MEDICARE

## 2020-04-24 DIAGNOSIS — F33.1 MAJOR DEPRESSIVE DISORDER, RECURRENT EPISODE, MODERATE (H): Primary | ICD-10-CM

## 2020-04-24 DIAGNOSIS — F43.10 POSTTRAUMATIC STRESS DISORDER: ICD-10-CM

## 2020-04-24 DIAGNOSIS — F41.1 GENERALIZED ANXIETY DISORDER: ICD-10-CM

## 2020-04-24 PROCEDURE — 90834 PSYTX W PT 45 MINUTES: CPT | Mod: TEL | Performed by: SOCIAL WORKER
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
## 2020-04-27 ENCOUNTER — MEDICAL CORRESPONDENCE (OUTPATIENT)
Dept: HEALTH INFORMATION MANAGEMENT | Facility: CLINIC | Age: 65
End: 2020-04-27

## 2020-04-28 ENCOUNTER — TELEPHONE (OUTPATIENT)
Dept: FAMILY MEDICINE | Facility: CLINIC | Age: 65
End: 2020-04-28

## 2020-04-28 DIAGNOSIS — Z53.9 DIAGNOSIS NOT YET DEFINED: Primary | ICD-10-CM

## 2020-04-28 PROCEDURE — G0180 MD CERTIFICATION HHA PATIENT: HCPCS | Performed by: FAMILY MEDICINE

## 2020-04-28 NOTE — TELEPHONE ENCOUNTER
Reason for Call: Request for an order or referral:    Order or referral being requested: Nancy with [facility] is Calling for the following Orders:  OT, Treatment Orders  1 x week for 1 week  2 x week for 1 week  1 x week for 2 weeks      Date needed: as soon as possible    Has the patient been seen by the PCP for this problem? Not Applicable    Additional comments: none    Phone number Patient can be reached at:  Other phone number:  Nancy is at [phone]    Best Time:  any    Can we leave a detailed message on this number?  YES    Call taken on 4/28/2020 at 7:34 AM by Jyoti Ross

## 2020-04-28 NOTE — TELEPHONE ENCOUNTER
Reason for Call:  Home Health Care    Yulia with [facility] called regarding (reason for call): Please call with verbal orders    Orders are needed for this patient.     PT:  2x a week x3 weeks and then 1x a week x4 weeks    Pt Provider: Thaddeus    Phone Number Homecare Nurse can be reached at: [phone]    Can we leave a detailed message on this number? YES    Phone number patient can be reached at: Home number on file [phone] (home)    Best Time: any    Call taken on 4/28/2020 at 9:29 AM by Emily Padgett

## 2020-05-05 ENCOUNTER — MEDICAL CORRESPONDENCE (OUTPATIENT)
Dept: HEALTH INFORMATION MANAGEMENT | Facility: CLINIC | Age: 65
End: 2020-05-05

## 2020-05-15 ENCOUNTER — VIRTUAL VISIT (OUTPATIENT)
Dept: PSYCHOLOGY | Facility: CLINIC | Age: 65
End: 2020-05-15
Payer: MEDICARE

## 2020-05-15 DIAGNOSIS — F41.1 GENERALIZED ANXIETY DISORDER: ICD-10-CM

## 2020-05-15 DIAGNOSIS — F43.10 POSTTRAUMATIC STRESS DISORDER: Primary | ICD-10-CM

## 2020-05-15 DIAGNOSIS — F33.1 MAJOR DEPRESSIVE DISORDER, RECURRENT EPISODE, MODERATE (H): ICD-10-CM

## 2020-05-15 PROCEDURE — 90834 PSYTX W PT 45 MINUTES: CPT | Mod: TEL | Performed by: SOCIAL WORKER

## 2020-05-15 ASSESSMENT — ANXIETY QUESTIONNAIRES
6. BECOMING EASILY ANNOYED OR IRRITABLE: NEARLY EVERY DAY
5. BEING SO RESTLESS THAT IT IS HARD TO SIT STILL: SEVERAL DAYS
3. WORRYING TOO MUCH ABOUT DIFFERENT THINGS: MORE THAN HALF THE DAYS
1. FEELING NERVOUS, ANXIOUS, OR ON EDGE: MORE THAN HALF THE DAYS
IF YOU CHECKED OFF ANY PROBLEMS ON THIS QUESTIONNAIRE, HOW DIFFICULT HAVE THESE PROBLEMS MADE IT FOR YOU TO DO YOUR WORK, TAKE CARE OF THINGS AT HOME, OR GET ALONG WITH OTHER PEOPLE: SOMEWHAT DIFFICULT
7. FEELING AFRAID AS IF SOMETHING AWFUL MIGHT HAPPEN: SEVERAL DAYS
2. NOT BEING ABLE TO STOP OR CONTROL WORRYING: MORE THAN HALF THE DAYS
GAD7 TOTAL SCORE: 12

## 2020-05-15 ASSESSMENT — PATIENT HEALTH QUESTIONNAIRE - PHQ9
SUM OF ALL RESPONSES TO PHQ QUESTIONS 1-9: 14
5. POOR APPETITE OR OVEREATING: SEVERAL DAYS
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
## 2020-05-16 ASSESSMENT — ANXIETY QUESTIONNAIRES: GAD7 TOTAL SCORE: 12

## 2020-05-29 ENCOUNTER — VIRTUAL VISIT (OUTPATIENT)
Dept: PSYCHOLOGY | Facility: CLINIC | Age: 65
End: 2020-05-29
Payer: MEDICARE

## 2020-05-29 DIAGNOSIS — F43.10 POSTTRAUMATIC STRESS DISORDER: ICD-10-CM

## 2020-05-29 DIAGNOSIS — F41.1 GENERALIZED ANXIETY DISORDER: ICD-10-CM

## 2020-05-29 DIAGNOSIS — F33.1 MAJOR DEPRESSIVE DISORDER, RECURRENT EPISODE, MODERATE (H): Primary | ICD-10-CM

## 2020-05-29 PROCEDURE — 90834 PSYTX W PT 45 MINUTES: CPT | Mod: TEL | Performed by: SOCIAL WORKER

## 2020-05-29 ASSESSMENT — PATIENT HEALTH QUESTIONNAIRE - PHQ9
5. POOR APPETITE OR OVEREATING: NEARLY EVERY DAY
SUM OF ALL RESPONSES TO PHQ QUESTIONS 1-9: 18

## 2020-05-29 ASSESSMENT — ANXIETY QUESTIONNAIRES
3. WORRYING TOO MUCH ABOUT DIFFERENT THINGS: MORE THAN HALF THE DAYS
2. NOT BEING ABLE TO STOP OR CONTROL WORRYING: MORE THAN HALF THE DAYS
1. FEELING NERVOUS, ANXIOUS, OR ON EDGE: MORE THAN HALF THE DAYS
GAD7 TOTAL SCORE: 14
7. FEELING AFRAID AS IF SOMETHING AWFUL MIGHT HAPPEN: NOT AT ALL
5. BEING SO RESTLESS THAT IT IS HARD TO SIT STILL: MORE THAN HALF THE DAYS
6. BECOMING EASILY ANNOYED OR IRRITABLE: NEARLY EVERY DAY
IF YOU CHECKED OFF ANY PROBLEMS ON THIS QUESTIONNAIRE, HOW DIFFICULT HAVE THESE PROBLEMS MADE IT FOR YOU TO DO YOUR WORK, TAKE CARE OF THINGS AT HOME, OR GET ALONG WITH OTHER PEOPLE: VERY DIFFICULT
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
## 2020-05-30 ASSESSMENT — ANXIETY QUESTIONNAIRES: GAD7 TOTAL SCORE: 14

## 2020-06-04 ENCOUNTER — TELEPHONE (OUTPATIENT)
Dept: FAMILY MEDICINE | Facility: CLINIC | Age: 65
End: 2020-06-04

## 2020-06-04 DIAGNOSIS — F32.A DEPRESSION, UNSPECIFIED DEPRESSION TYPE: ICD-10-CM

## 2020-06-04 NOTE — TELEPHONE ENCOUNTER
Pt requesting to start Rexulti.  Pt states his Psychologist recommended it.  Pt sees Jaki ELLIS LicSW 2x/month.  Do not see this med mentioned in his last visit note?  Appt?  Advise.  Wil

## 2020-06-05 RX ORDER — TRAZODONE HYDROCHLORIDE 50 MG/1
50 TABLET, FILM COATED ORAL
Qty: 30 TABLET | Refills: 5 | Status: SHIPPED | OUTPATIENT
Start: 2020-06-05 | End: 2020-12-08

## 2020-06-05 NOTE — TELEPHONE ENCOUNTER
Spoke with pt that he would need to see a Psychiatrist to prescribe this med.  Pt will speak with his counselor and for him to send a msg to  if he is recommending this medication. Next step would then be referral to psychiatry.   Pt will discuss @ his next appt with Aureliano, Counselor.  Sweetie

## 2020-06-12 ENCOUNTER — VIRTUAL VISIT (OUTPATIENT)
Dept: PSYCHOLOGY | Facility: CLINIC | Age: 65
End: 2020-06-12
Payer: MEDICARE

## 2020-06-12 DIAGNOSIS — F43.10 POSTTRAUMATIC STRESS DISORDER: ICD-10-CM

## 2020-06-12 DIAGNOSIS — F33.1 MAJOR DEPRESSIVE DISORDER, RECURRENT EPISODE, MODERATE (H): Primary | ICD-10-CM

## 2020-06-12 DIAGNOSIS — F41.1 GENERALIZED ANXIETY DISORDER: ICD-10-CM

## 2020-06-12 PROCEDURE — 90834 PSYTX W PT 45 MINUTES: CPT | Performed by: SOCIAL WORKER

## 2020-06-12 ASSESSMENT — ANXIETY QUESTIONNAIRES
2. NOT BEING ABLE TO STOP OR CONTROL WORRYING: MORE THAN HALF THE DAYS
6. BECOMING EASILY ANNOYED OR IRRITABLE: NEARLY EVERY DAY
5. BEING SO RESTLESS THAT IT IS HARD TO SIT STILL: SEVERAL DAYS
GAD7 TOTAL SCORE: 12
1. FEELING NERVOUS, ANXIOUS, OR ON EDGE: MORE THAN HALF THE DAYS
IF YOU CHECKED OFF ANY PROBLEMS ON THIS QUESTIONNAIRE, HOW DIFFICULT HAVE THESE PROBLEMS MADE IT FOR YOU TO DO YOUR WORK, TAKE CARE OF THINGS AT HOME, OR GET ALONG WITH OTHER PEOPLE: SOMEWHAT DIFFICULT
7. FEELING AFRAID AS IF SOMETHING AWFUL MIGHT HAPPEN: NOT AT ALL
3. WORRYING TOO MUCH ABOUT DIFFERENT THINGS: MORE THAN HALF THE DAYS

## 2020-06-12 ASSESSMENT — PATIENT HEALTH QUESTIONNAIRE - PHQ9
SUM OF ALL RESPONSES TO PHQ QUESTIONS 1-9: 17
5. POOR APPETITE OR OVEREATING: MORE THAN HALF THE DAYS
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
## 2020-06-13 ASSESSMENT — ANXIETY QUESTIONNAIRES: GAD7 TOTAL SCORE: 12

## 2020-06-23 ENCOUNTER — TELEPHONE (OUTPATIENT)
Dept: FAMILY MEDICINE | Facility: CLINIC | Age: 65
End: 2020-06-23

## 2020-06-23 NOTE — TELEPHONE ENCOUNTER
Reason for Call:  Home Health Care    Bing with [facility] called regarding (reason for call): Please call with verbal orders - OK to leave message on voicemail    Orders are needed for this patient.     PT: [facility] Treat    OT: [facility] Treat    Skilled Nursinx a week x 9 weeks.      Pt Provider: Thaddeus    Phone Number Homecare Nurse can be reached at: [phone]    Can we leave a detailed message on this number? YES    Phone number patient can be reached at: Home number on file [phone] (home)    Best Time: any    Call taken on 2020 at 10:12 AM by Emily Padgett

## 2020-07-03 ENCOUNTER — VIRTUAL VISIT (OUTPATIENT)
Dept: PSYCHOLOGY | Facility: CLINIC | Age: 65
End: 2020-07-03
Payer: COMMERCIAL

## 2020-07-03 DIAGNOSIS — F43.10 POSTTRAUMATIC STRESS DISORDER: ICD-10-CM

## 2020-07-03 DIAGNOSIS — F33.1 MAJOR DEPRESSIVE DISORDER, RECURRENT EPISODE, MODERATE (H): Primary | ICD-10-CM

## 2020-07-03 DIAGNOSIS — F41.1 GENERALIZED ANXIETY DISORDER: ICD-10-CM

## 2020-07-03 PROCEDURE — 90834 PSYTX W PT 45 MINUTES: CPT | Performed by: SOCIAL WORKER

## 2020-07-03 ASSESSMENT — ANXIETY QUESTIONNAIRES
GAD7 TOTAL SCORE: 13
7. FEELING AFRAID AS IF SOMETHING AWFUL MIGHT HAPPEN: SEVERAL DAYS
3. WORRYING TOO MUCH ABOUT DIFFERENT THINGS: NEARLY EVERY DAY
2. NOT BEING ABLE TO STOP OR CONTROL WORRYING: MORE THAN HALF THE DAYS
5. BEING SO RESTLESS THAT IT IS HARD TO SIT STILL: SEVERAL DAYS
6. BECOMING EASILY ANNOYED OR IRRITABLE: MORE THAN HALF THE DAYS
1. FEELING NERVOUS, ANXIOUS, OR ON EDGE: MORE THAN HALF THE DAYS
IF YOU CHECKED OFF ANY PROBLEMS ON THIS QUESTIONNAIRE, HOW DIFFICULT HAVE THESE PROBLEMS MADE IT FOR YOU TO DO YOUR WORK, TAKE CARE OF THINGS AT HOME, OR GET ALONG WITH OTHER PEOPLE: SOMEWHAT DIFFICULT

## 2020-07-03 NOTE — PROGRESS NOTES
"                                           Progress Note    Patient Name: Melquiades Morales  Date: 7/3/20         Service Type: Individual      Session Start Time: 9  Session End Time: 9:45 am     Session Length: 45 min    Session #: 127    Attendees: Client attended alone    Service Modality:  Phone Visit:    The patient has been notified of the following:      \"We have found that certain health care needs can be provided without the need for a face to face visit.  This service lets us provide the care you need with a phone conversation.       I will have full access to your [place] medical record during this entire phone call.   I will be taking notes for your medical record.      Since this is like an office visit, we will bill your insurance company for this service.       There are potential benefits and risks of telephone visits (e.g. limits to patient confidentiality) that differ from in-person visits.?  Confidentiality still applies for telephone services, and nobody will record the visit.  It is important to be in a quiet, private space that is free of distractions (including cell phone or other devices) during the visit.??      If during the course of the call I believe a telephone visit is not appropriate, you will not be charged for this service\"     Consent has been obtained for this service by care team member: Yes      Treatment Plan Last Reviewed: due 10/3/20  PHQ-9 / SHAILESH-7 : phq=14; shailesh=13.    DATA  Interactive Complexity: No  Crisis: No      Progress Since Last Session (Related to Symptoms / Goals / Homework):   Symptoms: stable    Homework: Partially completed     Episode of Care Goals: Minimal progress - PREPARATION (Decided to change - considering how); Intervened by negotiating a change plan and determining options / strategies for behavior change, identifying triggers, exploring social supports, and working towards setting a date to begin behavior change    Current / Ongoing Stressors and " Concerns:  Ex wife's health. Financial.    Treatment Objective(s) Addressed in This Session:   practice a distraction technique as needed 100% of trials for 2 weeks    Follow safety plan.     Intervention:  Assessed functioning and for safety. Went over the results of the phq/shailesh. Reviewed goals and completed the CGI. Reinforced efforts made on goals. Processed feelings and problem solved current life stressors. Reviewed healthy coping ideas.        ASSESSMENT: Current Emotional / Mental Status (status of significant symptoms):   Risk status (Self / Other harm or suicidal ideation)   Patient denies current fears or concerns for personal safety.   Patient denies current or recent suicidal ideation or behaviors.   Patient denies current or recent homicidal ideation or behaviors.   Patient denies current or recent self injurious behavior or ideation.   Patient denies other safety concerns.   Patient reports there has been no change in risk factors since their last session.     Patient reports there has been no change in protective factors since their last session.     A safety and risk management plan has been developed including: Patient consented to co-developed safety plan.  Safety and risk management plan was completed.  Patient agreed to use safety plan should any safety concerns arise.  A copy was given to the patient.         Appearance:    Unable to assess.                                Eye Contact: Unable to assess.                                     Psychomotor Behavior: Unable to assess.              Attitude:                                   Cooperative               Orientation:                             All              Speech                          Rate / Production:       Normal                           Volume:                       Normal               Mood:  depressed               Affect:                                      Appropriate               Thought Content:                    Clear                Thought Form:                        Coherent  Logical               Insight:                                     Fair/Good         Medication Review:   No changes to current psychiatric medication(s)     Medication Compliance:   Yes     Changes in Health Issues:   None reported     Chemical Use Review:   Substance Use: Chemical use reviewed, no active concerns identified      Tobacco Use: No change in amount of tobacco use since last session.  Provided encouragement to quit     Diagnosis:  Ptsd; shailesh; major depression (f33.1).    Collateral Reports Completed:   Routed note to PCP    PLAN: (Patient Tasks / Therapist Tasks / Other)   Schedule biweekly. Practice distraction ideas and other coping ideas. Utilize support network. Use safety plan as needed. Practice gratitude. Use emdr to address chronic pain when he is ready. He admits to forgetting to work on the letter to son Nader (not to send)-same. Goals due 10/3/20.        Kleber Pollack, [facility]                                                         ______________________________________________________________________    Treatment Plan    Patient's Name: Melquiades Morales  YOB: 1955    Date: 8/23/13    DSM5 Diagnoses: 296.32 (F33.1) Major Depressive Disorder, Recurrent Episode, Moderate _ and With anxious distress, 300.02 (F41.1) Generalized Anxiety Disorder or 309.81 (F43.10) Posttraumatic Stress Disorder (includes Posttraumatic Stress Disorder for Children 6 Years and Younger)  Without dissociative symptoms  Psychosocial / Contextual Factors: ; she is in poor health and relies on client a lot. Financial.   WHODAS: already completed.    Referral / Collaboration:  Referral to another professional/service is not indicated at this time.    Anticipated number of session or this episode of care: 15+          Treatment Goal(s)  Start Date Goal Dates  Reviewed Status    8/2/13 Goal 1:  Client will report coping better.       New  "    \"  \" Objective #1A (Client Action)  Client will process feelings related to current situations and work on coming up with solutions.     Intervention(s)  Therapist will encourage and help problem solve.    11/1/13  2/7/14  5/9/14  8/29/14  12/19/14  5/13/15  8/29/15  11/13/15  2/26/16  6/17/16  10/7/16  1/6/17  4/28/17  7/21/17  10/20/17  1/19/18  4/20/18  7/27/18  10/26/18  3/15/19  6/21/19  9/13/19  12/13/19  4/10/20  7/3/20 New  Continued  \"  \"  \"  \"  \"  \"  \"  \"     \"  \" Objective #1B  Client will use a coping technique 100% of trials for 4 weeks.     Intervention(s)  Therapist came up with a list of ideas with client's input.     He likes: prayer; fishing; talking to friends; time with his dog. 11/1/13   2/7/14  5/29/14  8/29/14  12/19/14  5/13/15  8/7/15  11/13/15  2/26/16  6/17/16  10/7/16  1/6/17  4/28/17  7/21/17  10/20/17  1/19/18  4/20/18  7/27/18  10/26/18  3/15/19  6/21/19  9/13/19  12/13/19  4/10/20 continued  \"  \"  \"  \"  \"  \"  \"  \"  \"     \"  \" Objective #1C  Client will process feelings related to his wife's failing health.     Intervention(s)   educate on grief.    11/1/13  2/7/14  5/9/14  8/29/14  12/19/14  5/13/15  8/7/15  11/13/15  2/26/16  6/17/16  10/7/16  1/6/17  4/28/17  7/21/17  10/20/17  1/19/18  4/20/18  7/27/18  10/26/18  3/15/19  6/21/19  9/13/19  12/13/19  4/10/20  7/3/20 continued  \"  \"  \"  \"  \"  \"  \"  \"  \"                  Start Date Goal Dates  Reviewed Status     12/6/13 Goal 4: Report coping better (continued).          new     \"  \" Objective #2A (Client Action)  Client will follow his safety plan as needed.     Intervention(s) (Therapist Action)           2/7/14  5/9/14  8/29/14  12/19/14  5/13/15  8/7/15  11/13/15  2/26/16  6/17/16  10/7/16  1/6/17  4/28/17  7/21/17 10/20/17  1/19/18  4/20/18  7/27/18  10/26/18  3/15/19  6/21/19  9/13/19  12/13/19  4/10/20  7/3/20 New  Continued  \"  \"  \"  \"  \"  \"  \"  \"      Objective #2B  Client will reprocess chronic pain by addressing the " negative cognition until no longer feeling true and upsetting.     Intervention(s)   EMDR       New  4/26/19  6/21/19  9/13/19  12/13/19  4/10/20  7/3/20      Objective #2C         Intervention(s)                             Client has reviewed and agreed to the above plan.     Kleber Pollack, [facility]                August 2, 2013

## 2020-07-04 ASSESSMENT — ANXIETY QUESTIONNAIRES: GAD7 TOTAL SCORE: 13

## 2020-07-07 DIAGNOSIS — F32.A DEPRESSION, UNSPECIFIED DEPRESSION TYPE: ICD-10-CM

## 2020-07-08 RX ORDER — TRAZODONE HYDROCHLORIDE 100 MG/1
TABLET ORAL
Qty: 60 TABLET | Refills: 1 | Status: SHIPPED | OUTPATIENT
Start: 2020-07-08 | End: 2020-08-14

## 2020-07-08 NOTE — TELEPHONE ENCOUNTER
"Prescription approved per [facility] Refill Protocol.  Zara PETERS RN BSN      Requested Prescriptions   Pending Prescriptions Disp Refills     traZODone (DESYREL) 100 MG tablet [Pharmacy Med Name: trazodone 100 mg tablet] 60 tablet 3     Sig: TAKE 2 TABLETS BY MOUTH AT BEDTIME AS NEEDED FOR SLEEP       Serotonin Modulators Passed - 7/7/2020  8:00 AM        Passed - Recent (12 mo) or future (30 days) visit within the authorizing provider's specialty     Patient has had an office visit with the authorizing provider or a provider within the authorizing providers department within the previous 12 mos or has a future within next 30 days. See \"Patient Info\" tab in inbasket, or \"Choose Columns\" in Meds & Orders section of the refill encounter.              Passed - Medication is active on med list        Passed - Patient is age 18 or older             "

## 2020-07-14 ENCOUNTER — TELEPHONE (OUTPATIENT)
Dept: FAMILY MEDICINE | Facility: CLINIC | Age: 65
End: 2020-07-14

## 2020-07-17 ENCOUNTER — TELEPHONE (OUTPATIENT)
Dept: FAMILY MEDICINE | Facility: CLINIC | Age: 65
End: 2020-07-17

## 2020-07-17 ENCOUNTER — VIRTUAL VISIT (OUTPATIENT)
Dept: PSYCHOLOGY | Facility: CLINIC | Age: 65
End: 2020-07-17
Payer: COMMERCIAL

## 2020-07-17 DIAGNOSIS — F43.10 POSTTRAUMATIC STRESS DISORDER: ICD-10-CM

## 2020-07-17 DIAGNOSIS — F41.1 GENERALIZED ANXIETY DISORDER: ICD-10-CM

## 2020-07-17 DIAGNOSIS — F33.1 MAJOR DEPRESSIVE DISORDER, RECURRENT EPISODE, MODERATE (H): Primary | ICD-10-CM

## 2020-07-17 PROCEDURE — 90832 PSYTX W PT 30 MINUTES: CPT | Performed by: SOCIAL WORKER

## 2020-07-17 ASSESSMENT — ANXIETY QUESTIONNAIRES
3. WORRYING TOO MUCH ABOUT DIFFERENT THINGS: MORE THAN HALF THE DAYS
GAD7 TOTAL SCORE: 12
2. NOT BEING ABLE TO STOP OR CONTROL WORRYING: MORE THAN HALF THE DAYS
6. BECOMING EASILY ANNOYED OR IRRITABLE: NEARLY EVERY DAY
5. BEING SO RESTLESS THAT IT IS HARD TO SIT STILL: MORE THAN HALF THE DAYS
1. FEELING NERVOUS, ANXIOUS, OR ON EDGE: MORE THAN HALF THE DAYS
IF YOU CHECKED OFF ANY PROBLEMS ON THIS QUESTIONNAIRE, HOW DIFFICULT HAVE THESE PROBLEMS MADE IT FOR YOU TO DO YOUR WORK, TAKE CARE OF THINGS AT HOME, OR GET ALONG WITH OTHER PEOPLE: SOMEWHAT DIFFICULT
7. FEELING AFRAID AS IF SOMETHING AWFUL MIGHT HAPPEN: NOT AT ALL

## 2020-07-17 ASSESSMENT — PATIENT HEALTH QUESTIONNAIRE - PHQ9
5. POOR APPETITE OR OVEREATING: SEVERAL DAYS
SUM OF ALL RESPONSES TO PHQ QUESTIONS 1-9: 18

## 2020-07-17 NOTE — PROGRESS NOTES
"                                           Progress Note    Patient Name: Melquiades Morales  Date: 7/17/20         Service Type: Individual      Session Start Time: 9  Session End Time: 9:30 am     Session Length: 30 min    Session #: 128    Attendees: Client attended alone    Service Modality:  Phone Visit:    The patient has been notified of the following:      \"We have found that certain health care needs can be provided without the need for a face to face visit.  This service lets us provide the care you need with a phone conversation.       I will have full access to your [facility] medical record during this entire phone call.   I will be taking notes for your medical record.      Since this is like an office visit, we will bill your insurance company for this service.       There are potential benefits and risks of telephone visits (e.g. limits to patient confidentiality) that differ from in-person visits.?  Confidentiality still applies for telephone services, and nobody will record the visit.  It is important to be in a quiet, private space that is free of distractions (including cell phone or other devices) during the visit.??      If during the course of the call I believe a telephone visit is not appropriate, you will not be charged for this service\"     Consent has been obtained for this service by care team member: Yes      Treatment Plan Last Reviewed: due 10/3/20  PHQ-9 / SHAILESH-7 : phq=18; shailesh=12.    DATA  Interactive Complexity: No  Crisis: No      Progress Since Last Session (Related to Symptoms / Goals / Homework):   Symptoms: stable    Homework: Partially completed     Episode of Care Goals: Minimal progress - PREPARATION (Decided to change - considering how); Intervened by negotiating a change plan and determining options / strategies for behavior change, identifying triggers, exploring social supports, and working towards setting a date to begin behavior change    Current / Ongoing Stressors and " Concerns:  Ex wife's health. Financial. His son [place] has been calling more. Jeffrey has been self quarantining as he hears about more cases of covid-19. He talks to his wife daily and visits her at least once per week. He has been doing physical therapy.    Treatment Objective(s) Addressed in This Session:   practice a distraction technique as needed 100% of trials for 2 weeks    Follow safety plan.     Intervention:  Assessed functioning and for safety. Went over the results of the phq/shailesh. processed feelings about impact of covid-19 on his life. Reviewed healthy coping ideas.        ASSESSMENT: Current Emotional / Mental Status (status of significant symptoms):   Risk status (Self / Other harm or suicidal ideation)   Patient denies current fears or concerns for personal safety.   Patient denies current or recent suicidal ideation or behaviors.   Patient denies current or recent homicidal ideation or behaviors.   Patient denies current or recent self injurious behavior or ideation.   Patient denies other safety concerns.   Patient reports there has been no change in risk factors since their last session.     Patient reports there has been no change in protective factors since their last session.     A safety and risk management plan has been developed including: Patient consented to co-developed safety plan.  Safety and risk management plan was completed.  Patient agreed to use safety plan should any safety concerns arise.  A copy was given to the patient.         Appearance:    Unable to assess.                                Eye Contact: Unable to assess.                                     Psychomotor Behavior: Unable to assess.              Attitude:                                   Cooperative               Orientation:                             All              Speech                          Rate / Production:       Normal                           Volume:         soft              Mood:  depressed                Affect:                                      Appropriate               Thought Content:                    Clear               Thought Form:                        Coherent  Logical               Insight:                                     Fair/Good         Medication Review:   No changes to current psychiatric medication(s). PCP Dr. Jignesh Travis. Psych medications: cymbalta 60 mg.     Medication Compliance:   Yes     Changes in Health Issues:   None reported     Chemical Use Review:   Substance Use: Chemical use reviewed, no active concerns identified      Tobacco Use: No change in amount of tobacco use since last session.  Provided encouragement to quit     Diagnosis:  Ptsd; shailesh; major depression (f33.1).    Collateral Reports Completed:   Routed note to PCP    PLAN: (Patient Tasks / Therapist Tasks / Other)  Schedule biweekly. Practice distraction ideas and other coping ideas. Utilize support network. Use safety plan as needed. Practice gratitude. Use emdr to address chronic pain when he is ready. He admits to forgetting to work on the letter to son Nader (not to send)-same. Goals due 10/3/20.        Kleber Pollack, [facility]                                                         ______________________________________________________________________    Treatment Plan    Patient's Name: Melquiades Morales  YOB: 1955    Date: 8/23/13    DSM5 Diagnoses: 296.32 (F33.1) Major Depressive Disorder, Recurrent Episode, Moderate _ and With anxious distress, 300.02 (F41.1) Generalized Anxiety Disorder or 309.81 (F43.10) Posttraumatic Stress Disorder (includes Posttraumatic Stress Disorder for Children 6 Years and Younger)  Without dissociative symptoms  Psychosocial / Contextual Factors: ; she is in poor health and relies on client a lot. Financial.   WHODAS: already completed.    Referral / Collaboration:  Referral to another professional/service is not indicated at this time.    Anticipated number  "of session or this episode of care: 15+          Treatment Goal(s)  Start Date Goal Dates  Reviewed Status    8/2/13 Goal 1:  Client will report coping better.       New     \"  \" Objective #1A (Client Action)  Client will process feelings related to current situations and work on coming up with solutions.     Intervention(s)  Therapist will encourage and help problem solve.    11/1/13  2/7/14  5/9/14  8/29/14  12/19/14  5/13/15  8/29/15  11/13/15  2/26/16  6/17/16  10/7/16  1/6/17  4/28/17  7/21/17  10/20/17  1/19/18  4/20/18  7/27/18  10/26/18  3/15/19  6/21/19  9/13/19  12/13/19  4/10/20  7/3/20 New  Continued  \"  \"  \"  \"  \"  \"  \"  \"     \"  \" Objective #1B  Client will use a coping technique 100% of trials for 4 weeks.     Intervention(s)  Therapist came up with a list of ideas with client's input.     He likes: prayer; fishing; talking to friends; time with his dog. 11/1/13   2/7/14  5/29/14  8/29/14  12/19/14  5/13/15  8/7/15  11/13/15  2/26/16  6/17/16  10/7/16  1/6/17  4/28/17  7/21/17  10/20/17  1/19/18  4/20/18  7/27/18  10/26/18  3/15/19  6/21/19  9/13/19  12/13/19  4/10/20 continued  \"  \"  \"  \"  \"  \"  \"  \"  \"     \"  \" Objective #1C  Client will process feelings related to his wife's failing health.     Intervention(s)   educate on grief.    11/1/13  2/7/14  5/9/14  8/29/14  12/19/14  5/13/15  8/7/15  11/13/15  2/26/16  6/17/16  10/7/16  1/6/17  4/28/17  7/21/17  10/20/17  1/19/18  4/20/18  7/27/18  10/26/18  3/15/19  6/21/19  9/13/19  12/13/19  4/10/20  7/3/20 continued  \"  \"  \"  \"  \"  \"  \"  \"  \"                  Start Date Goal Dates  Reviewed Status     12/6/13 Goal 4: Report coping better (continued).          new     \"  \" Objective #2A (Client Action)  Client will follow his safety plan as needed.     Intervention(s) (Therapist Action)           2/7/14  5/9/14  8/29/14  12/19/14  5/13/15  8/7/15  11/13/15  2/26/16  6/17/16  10/7/16  1/6/17  4/28/17  7/21/17 " "10/20/17  1/19/18  4/20/18  7/27/18  10/26/18  3/15/19  6/21/19  9/13/19  12/13/19  4/10/20  7/3/20 New  Continued  \"  \"  \"  \"  \"  \"  \"  \"      Objective #2B  Client will reprocess chronic pain by addressing the negative cognition until no longer feeling true and upsetting.     Intervention(s)   EMDR       New  4/26/19  6/21/19  9/13/19  12/13/19  4/10/20  7/3/20      Objective #2C         Intervention(s)                             Client has reviewed and agreed to the above plan.     Kleber Pollack, [facility]                August 2, 2013     "

## 2020-07-17 NOTE — TELEPHONE ENCOUNTER
Left message for home care RN to call us back. Needs appointment to have this evaluated.      Anai Savage, RN

## 2020-07-17 NOTE — TELEPHONE ENCOUNTER
Reason for call:  Patient reporting a symptom    Symptom or request: Rash/red dots on bottom of left foot, patient is having sharp pains on the bottom of left foot.    Duration (how long have symptoms been present): Since Sat, 7/11/2020    Have you been treated for this before? No    Additional comments: Patient was seen by Home Care RN on Wed. 7/15/2020.Since Sat, 7/11/2020 hashad  red dots/rash on bottom of left foot, patient is having sharp pains on the bottom of left foot    Phone Number patient can be reached at:  Other phone number:  [facility] [phone]    Best Time:  Any    Can we leave a detailed message on this number:  YES    Call taken on 7/17/2020 at 10:53 AM by Libby Washington

## 2020-07-18 ASSESSMENT — ANXIETY QUESTIONNAIRES: GAD7 TOTAL SCORE: 12

## 2020-07-20 NOTE — TELEPHONE ENCOUNTER
Left message for home care RN to call us back. Needs appointment to have this evaluated.      Thank you    Milly MCGOVERN RN

## 2020-07-30 DIAGNOSIS — Z53.9 DIAGNOSIS NOT YET DEFINED: Primary | ICD-10-CM

## 2020-07-30 PROCEDURE — G0179 MD RECERTIFICATION HHA PT: HCPCS | Performed by: FAMILY MEDICINE

## 2020-08-05 ENCOUNTER — APPOINTMENT (OUTPATIENT)
Dept: CT IMAGING | Facility: CLINIC | Age: 65
DRG: 193 | End: 2020-08-05
Attending: NURSE PRACTITIONER
Payer: COMMERCIAL

## 2020-08-05 ENCOUNTER — HOSPITAL ENCOUNTER (INPATIENT)
Facility: CLINIC | Age: 65
LOS: 2 days | Discharge: HOME HEALTH | DRG: 193 | End: 2020-08-07
Attending: NURSE PRACTITIONER | Admitting: INTERNAL MEDICINE
Payer: COMMERCIAL

## 2020-08-05 ENCOUNTER — APPOINTMENT (OUTPATIENT)
Dept: GENERAL RADIOLOGY | Facility: CLINIC | Age: 65
DRG: 193 | End: 2020-08-05
Attending: NURSE PRACTITIONER
Payer: COMMERCIAL

## 2020-08-05 DIAGNOSIS — R42 DIZZINESS: ICD-10-CM

## 2020-08-05 DIAGNOSIS — Z20.822 SUSPECTED COVID-19 VIRUS INFECTION: ICD-10-CM

## 2020-08-05 DIAGNOSIS — R41.82 ALTERED MENTAL STATUS, UNSPECIFIED ALTERED MENTAL STATUS TYPE: ICD-10-CM

## 2020-08-05 DIAGNOSIS — J96.01 ACUTE RESPIRATORY FAILURE WITH HYPOXIA AND HYPERCAPNIA (H): Primary | ICD-10-CM

## 2020-08-05 DIAGNOSIS — R41.82 ALTERED MENTAL STATUS: ICD-10-CM

## 2020-08-05 DIAGNOSIS — R09.02 HYPOXIA: ICD-10-CM

## 2020-08-05 DIAGNOSIS — J96.02 ACUTE RESPIRATORY FAILURE WITH HYPOXIA AND HYPERCAPNIA (H): Primary | ICD-10-CM

## 2020-08-05 DIAGNOSIS — R05.9 COUGH: ICD-10-CM

## 2020-08-05 PROBLEM — E87.29 RESPIRATORY ACIDOSIS: Status: ACTIVE | Noted: 2020-08-05

## 2020-08-05 PROBLEM — F32.A DEPRESSION: Status: ACTIVE | Noted: 2020-08-05

## 2020-08-05 PROBLEM — Z79.899 MEDICAL CANNABIS USE: Status: ACTIVE | Noted: 2020-08-05

## 2020-08-05 LAB
ALBUMIN SERPL-MCNC: 2.8 G/DL (ref 3.4–5)
ALBUMIN SERPL-MCNC: 3.2 G/DL (ref 3.4–5)
ALBUMIN UR-MCNC: NEGATIVE MG/DL
ALP SERPL-CCNC: 50 U/L (ref 40–150)
ALP SERPL-CCNC: 59 U/L (ref 40–150)
ALT SERPL W P-5'-P-CCNC: 15 U/L (ref 0–70)
ALT SERPL W P-5'-P-CCNC: 17 U/L (ref 0–70)
AMPHETAMINES UR QL SCN: NEGATIVE
ANION GAP SERPL CALCULATED.3IONS-SCNC: 1 MMOL/L (ref 3–14)
ANION GAP SERPL CALCULATED.3IONS-SCNC: 1 MMOL/L (ref 3–14)
APPEARANCE UR: CLEAR
AST SERPL W P-5'-P-CCNC: 13 U/L (ref 0–45)
AST SERPL W P-5'-P-CCNC: 17 U/L (ref 0–45)
BARBITURATES UR QL: NEGATIVE
BASE EXCESS BLDV CALC-SCNC: 6.8 MMOL/L
BASE EXCESS BLDV CALC-SCNC: 7.3 MMOL/L
BASOPHILS # BLD AUTO: 0 10E9/L (ref 0–0.2)
BASOPHILS NFR BLD AUTO: 0.5 %
BENZODIAZ UR QL: NEGATIVE
BILIRUB DIRECT SERPL-MCNC: 0.1 MG/DL (ref 0–0.2)
BILIRUB SERPL-MCNC: 0.3 MG/DL (ref 0.2–1.3)
BILIRUB SERPL-MCNC: 0.3 MG/DL (ref 0.2–1.3)
BILIRUB UR QL STRIP: NEGATIVE
BUN SERPL-MCNC: 8 MG/DL (ref 7–30)
BUN SERPL-MCNC: 8 MG/DL (ref 7–30)
CALCIUM SERPL-MCNC: 8.8 MG/DL (ref 8.5–10.1)
CALCIUM SERPL-MCNC: 8.9 MG/DL (ref 8.5–10.1)
CANNABINOIDS UR QL SCN: POSITIVE
CHLORIDE SERPL-SCNC: 107 MMOL/L (ref 94–109)
CHLORIDE SERPL-SCNC: 108 MMOL/L (ref 94–109)
CO2 SERPL-SCNC: 34 MMOL/L (ref 20–32)
CO2 SERPL-SCNC: 34 MMOL/L (ref 20–32)
COCAINE UR QL: NEGATIVE
COLOR UR AUTO: YELLOW
CREAT SERPL-MCNC: 0.55 MG/DL (ref 0.66–1.25)
CREAT SERPL-MCNC: 0.6 MG/DL (ref 0.66–1.25)
CRP SERPL-MCNC: 10.7 MG/L (ref 0–8)
DIFFERENTIAL METHOD BLD: NORMAL
EOSINOPHIL # BLD AUTO: 0.1 10E9/L (ref 0–0.7)
EOSINOPHIL NFR BLD AUTO: 2 %
ERYTHROCYTE [DISTWIDTH] IN BLOOD BY AUTOMATED COUNT: 13.5 % (ref 10–15)
FERRITIN SERPL-MCNC: 46 NG/ML (ref 26–388)
GFR SERPL CREATININE-BSD FRML MDRD: >90 ML/MIN/{1.73_M2}
GFR SERPL CREATININE-BSD FRML MDRD: >90 ML/MIN/{1.73_M2}
GLUCOSE SERPL-MCNC: 127 MG/DL (ref 70–99)
GLUCOSE SERPL-MCNC: 130 MG/DL (ref 70–99)
GLUCOSE UR STRIP-MCNC: NEGATIVE MG/DL
HCO3 BLDV-SCNC: 35 MMOL/L (ref 21–28)
HCO3 BLDV-SCNC: 37 MMOL/L (ref 21–28)
HCT VFR BLD AUTO: 48.7 % (ref 40–53)
HGB BLD-MCNC: 16.1 G/DL (ref 13.3–17.7)
HGB UR QL STRIP: NEGATIVE
HYALINE CASTS #/AREA URNS LPF: 2 /LPF (ref 0–2)
IMM GRANULOCYTES # BLD: 0 10E9/L (ref 0–0.4)
IMM GRANULOCYTES NFR BLD: 0.2 %
KETONES UR STRIP-MCNC: NEGATIVE MG/DL
LABORATORY COMMENT REPORT: NORMAL
LACTATE BLD-SCNC: 1 MMOL/L (ref 0.7–2)
LEUKOCYTE ESTERASE UR QL STRIP: NEGATIVE
LYMPHOCYTES # BLD AUTO: 3 10E9/L (ref 0.8–5.3)
LYMPHOCYTES NFR BLD AUTO: 46.1 %
MCH RBC QN AUTO: 32.7 PG (ref 26.5–33)
MCHC RBC AUTO-ENTMCNC: 33.1 G/DL (ref 31.5–36.5)
MCV RBC AUTO: 99 FL (ref 78–100)
MONOCYTES # BLD AUTO: 0.7 10E9/L (ref 0–1.3)
MONOCYTES NFR BLD AUTO: 10.4 %
MUCOUS THREADS #/AREA URNS LPF: PRESENT /LPF
NEUTROPHILS # BLD AUTO: 2.6 10E9/L (ref 1.6–8.3)
NEUTROPHILS NFR BLD AUTO: 40.8 %
NITRATE UR QL: NEGATIVE
NRBC # BLD AUTO: 0 10*3/UL
NRBC BLD AUTO-RTO: 0 /100
O2/TOTAL GAS SETTING VFR VENT: ABNORMAL %
O2/TOTAL GAS SETTING VFR VENT: ABNORMAL %
OPIATES UR QL SCN: POSITIVE
PCO2 BLDV: 67 MM HG (ref 40–50)
PCO2 BLDV: 74 MM HG (ref 40–50)
PCP UR QL SCN: NEGATIVE
PH BLDV: 7.31 PH (ref 7.32–7.43)
PH BLDV: 7.33 PH (ref 7.32–7.43)
PH UR STRIP: 5 PH (ref 5–7)
PLATELET # BLD AUTO: 217 10E9/L (ref 150–450)
PO2 BLDV: 36 MM HG (ref 25–47)
PO2 BLDV: 47 MM HG (ref 25–47)
POTASSIUM SERPL-SCNC: 3.9 MMOL/L (ref 3.4–5.3)
POTASSIUM SERPL-SCNC: 4 MMOL/L (ref 3.4–5.3)
PROT SERPL-MCNC: 6.1 G/DL (ref 6.8–8.8)
PROT SERPL-MCNC: 6.9 G/DL (ref 6.8–8.8)
RBC # BLD AUTO: 4.93 10E12/L (ref 4.4–5.9)
RBC #/AREA URNS AUTO: 1 /HPF (ref 0–2)
SARS-COV-2 RNA SPEC QL NAA+PROBE: NEGATIVE
SARS-COV-2 RNA SPEC QL NAA+PROBE: NORMAL
SODIUM SERPL-SCNC: 142 MMOL/L (ref 133–144)
SODIUM SERPL-SCNC: 143 MMOL/L (ref 133–144)
SOURCE: ABNORMAL
SP GR UR STRIP: 1.02 (ref 1–1.03)
SPECIMEN SOURCE: NORMAL
SPECIMEN SOURCE: NORMAL
UROBILINOGEN UR STRIP-MCNC: 2 MG/DL (ref 0–2)
WBC # BLD AUTO: 6.5 10E9/L (ref 4–11)
WBC #/AREA URNS AUTO: 3 /HPF (ref 0–5)

## 2020-08-05 PROCEDURE — 80076 HEPATIC FUNCTION PANEL: CPT | Performed by: PHYSICIAN ASSISTANT

## 2020-08-05 PROCEDURE — 25000128 H RX IP 250 OP 636: Performed by: PHYSICIAN ASSISTANT

## 2020-08-05 PROCEDURE — C9803 HOPD COVID-19 SPEC COLLECT: HCPCS | Performed by: NURSE PRACTITIONER

## 2020-08-05 PROCEDURE — 81001 URINALYSIS AUTO W/SCOPE: CPT | Performed by: NURSE PRACTITIONER

## 2020-08-05 PROCEDURE — 85025 COMPLETE CBC W/AUTO DIFF WBC: CPT | Performed by: NURSE PRACTITIONER

## 2020-08-05 PROCEDURE — 25000125 ZZHC RX 250: Performed by: NURSE PRACTITIONER

## 2020-08-05 PROCEDURE — 36415 COLL VENOUS BLD VENIPUNCTURE: CPT | Performed by: PHYSICIAN ASSISTANT

## 2020-08-05 PROCEDURE — 71045 X-RAY EXAM CHEST 1 VIEW: CPT

## 2020-08-05 PROCEDURE — 80307 DRUG TEST PRSMV CHEM ANLYZR: CPT | Performed by: NURSE PRACTITIONER

## 2020-08-05 PROCEDURE — 70450 CT HEAD/BRAIN W/O DYE: CPT

## 2020-08-05 PROCEDURE — 82803 BLOOD GASES ANY COMBINATION: CPT | Performed by: PHYSICIAN ASSISTANT

## 2020-08-05 PROCEDURE — 99285 EMERGENCY DEPT VISIT HI MDM: CPT | Mod: 25 | Performed by: NURSE PRACTITIONER

## 2020-08-05 PROCEDURE — 80053 COMPREHEN METABOLIC PANEL: CPT | Performed by: NURSE PRACTITIONER

## 2020-08-05 PROCEDURE — 12000000 ZZH R&B MED SURG/OB

## 2020-08-05 PROCEDURE — 83605 ASSAY OF LACTIC ACID: CPT | Performed by: NURSE PRACTITIONER

## 2020-08-05 PROCEDURE — 96360 HYDRATION IV INFUSION INIT: CPT | Performed by: NURSE PRACTITIONER

## 2020-08-05 PROCEDURE — 93005 ELECTROCARDIOGRAM TRACING: CPT | Performed by: NURSE PRACTITIONER

## 2020-08-05 PROCEDURE — 82803 BLOOD GASES ANY COMBINATION: CPT | Performed by: NURSE PRACTITIONER

## 2020-08-05 PROCEDURE — 84145 PROCALCITONIN (PCT): CPT | Performed by: PHYSICIAN ASSISTANT

## 2020-08-05 PROCEDURE — 80048 BASIC METABOLIC PNL TOTAL CA: CPT | Performed by: NURSE PRACTITIONER

## 2020-08-05 PROCEDURE — 71275 CT ANGIOGRAPHY CHEST: CPT

## 2020-08-05 PROCEDURE — 25800030 ZZH RX IP 258 OP 636: Performed by: PHYSICIAN ASSISTANT

## 2020-08-05 PROCEDURE — 99223 1ST HOSP IP/OBS HIGH 75: CPT | Mod: AI | Performed by: PHYSICIAN ASSISTANT

## 2020-08-05 PROCEDURE — 25000128 H RX IP 250 OP 636: Performed by: NURSE PRACTITIONER

## 2020-08-05 PROCEDURE — 82728 ASSAY OF FERRITIN: CPT | Performed by: PHYSICIAN ASSISTANT

## 2020-08-05 PROCEDURE — 25800030 ZZH RX IP 258 OP 636: Performed by: NURSE PRACTITIONER

## 2020-08-05 PROCEDURE — U0003 INFECTIOUS AGENT DETECTION BY NUCLEIC ACID (DNA OR RNA); SEVERE ACUTE RESPIRATORY SYNDROME CORONAVIRUS 2 (SARS-COV-2) (CORONAVIRUS DISEASE [COVID-19]), AMPLIFIED PROBE TECHNIQUE, MAKING USE OF HIGH THROUGHPUT TECHNOLOGIES AS DESCRIBED BY CMS-2020-01-R: HCPCS | Performed by: NURSE PRACTITIONER

## 2020-08-05 PROCEDURE — 86140 C-REACTIVE PROTEIN: CPT | Performed by: PHYSICIAN ASSISTANT

## 2020-08-05 PROCEDURE — 93010 ELECTROCARDIOGRAM REPORT: CPT | Mod: Z6 | Performed by: NURSE PRACTITIONER

## 2020-08-05 RX ORDER — PROCHLORPERAZINE 25 MG
25 SUPPOSITORY, RECTAL RECTAL EVERY 12 HOURS PRN
Status: DISCONTINUED | OUTPATIENT
Start: 2020-08-05 | End: 2020-08-07 | Stop reason: HOSPADM

## 2020-08-05 RX ORDER — ONDANSETRON 4 MG/1
4 TABLET, ORALLY DISINTEGRATING ORAL EVERY 6 HOURS PRN
Status: DISCONTINUED | OUTPATIENT
Start: 2020-08-05 | End: 2020-08-07 | Stop reason: HOSPADM

## 2020-08-05 RX ORDER — ONDANSETRON 2 MG/ML
4 INJECTION INTRAMUSCULAR; INTRAVENOUS EVERY 6 HOURS PRN
Status: DISCONTINUED | OUTPATIENT
Start: 2020-08-05 | End: 2020-08-07 | Stop reason: HOSPADM

## 2020-08-05 RX ORDER — ONDANSETRON 2 MG/ML
4 INJECTION INTRAMUSCULAR; INTRAVENOUS EVERY 6 HOURS PRN
Status: DISCONTINUED | OUTPATIENT
Start: 2020-08-05 | End: 2020-08-05

## 2020-08-05 RX ORDER — LIDOCAINE 40 MG/G
CREAM TOPICAL
Status: DISCONTINUED | OUTPATIENT
Start: 2020-08-05 | End: 2020-08-07 | Stop reason: HOSPADM

## 2020-08-05 RX ORDER — SIMVASTATIN 40 MG
80 TABLET ORAL AT BEDTIME
Status: DISCONTINUED | OUTPATIENT
Start: 2020-08-05 | End: 2020-08-07 | Stop reason: HOSPADM

## 2020-08-05 RX ORDER — IOPAMIDOL 755 MG/ML
91 INJECTION, SOLUTION INTRAVASCULAR ONCE
Status: COMPLETED | OUTPATIENT
Start: 2020-08-05 | End: 2020-08-05

## 2020-08-05 RX ORDER — METHYLPREDNISOLONE SODIUM SUCCINATE 40 MG/ML
40 INJECTION, POWDER, LYOPHILIZED, FOR SOLUTION INTRAMUSCULAR; INTRAVENOUS EVERY 24 HOURS
Status: DISCONTINUED | OUTPATIENT
Start: 2020-08-06 | End: 2020-08-06

## 2020-08-05 RX ORDER — PROCHLORPERAZINE MALEATE 5 MG
10 TABLET ORAL EVERY 6 HOURS PRN
Status: DISCONTINUED | OUTPATIENT
Start: 2020-08-05 | End: 2020-08-07 | Stop reason: HOSPADM

## 2020-08-05 RX ORDER — ONDANSETRON 4 MG/1
4 TABLET, ORALLY DISINTEGRATING ORAL EVERY 6 HOURS PRN
Status: DISCONTINUED | OUTPATIENT
Start: 2020-08-05 | End: 2020-08-05

## 2020-08-05 RX ORDER — NALOXONE HYDROCHLORIDE 0.4 MG/ML
.1-.4 INJECTION, SOLUTION INTRAMUSCULAR; INTRAVENOUS; SUBCUTANEOUS
Status: DISCONTINUED | OUTPATIENT
Start: 2020-08-05 | End: 2020-08-07 | Stop reason: HOSPADM

## 2020-08-05 RX ORDER — IPRATROPIUM BROMIDE AND ALBUTEROL SULFATE 2.5; .5 MG/3ML; MG/3ML
3 SOLUTION RESPIRATORY (INHALATION)
Status: DISCONTINUED | OUTPATIENT
Start: 2020-08-05 | End: 2020-08-05

## 2020-08-05 RX ORDER — CEFTRIAXONE SODIUM 1 G/50ML
1 INJECTION, SOLUTION INTRAVENOUS EVERY 24 HOURS
Status: DISCONTINUED | OUTPATIENT
Start: 2020-08-05 | End: 2020-08-07 | Stop reason: HOSPADM

## 2020-08-05 RX ORDER — METHYLPREDNISOLONE SODIUM SUCCINATE 125 MG/2ML
125 INJECTION, POWDER, LYOPHILIZED, FOR SOLUTION INTRAMUSCULAR; INTRAVENOUS ONCE
Status: COMPLETED | OUTPATIENT
Start: 2020-08-05 | End: 2020-08-05

## 2020-08-05 RX ORDER — ACETAMINOPHEN 325 MG/1
650 TABLET ORAL EVERY 4 HOURS PRN
Status: DISCONTINUED | OUTPATIENT
Start: 2020-08-05 | End: 2020-08-07 | Stop reason: HOSPADM

## 2020-08-05 RX ORDER — IPRATROPIUM BROMIDE AND ALBUTEROL SULFATE 2.5; .5 MG/3ML; MG/3ML
3 SOLUTION RESPIRATORY (INHALATION) EVERY 6 HOURS PRN
Status: DISCONTINUED | OUTPATIENT
Start: 2020-08-05 | End: 2020-08-05

## 2020-08-05 RX ORDER — AMOXICILLIN 250 MG
2 CAPSULE ORAL 2 TIMES DAILY PRN
Status: DISCONTINUED | OUTPATIENT
Start: 2020-08-05 | End: 2020-08-07 | Stop reason: HOSPADM

## 2020-08-05 RX ORDER — AMOXICILLIN 250 MG
1 CAPSULE ORAL 2 TIMES DAILY PRN
Status: DISCONTINUED | OUTPATIENT
Start: 2020-08-05 | End: 2020-08-07 | Stop reason: HOSPADM

## 2020-08-05 RX ORDER — ACETAMINOPHEN 650 MG/1
650 SUPPOSITORY RECTAL EVERY 4 HOURS PRN
Status: DISCONTINUED | OUTPATIENT
Start: 2020-08-05 | End: 2020-08-07 | Stop reason: HOSPADM

## 2020-08-05 RX ORDER — DULOXETIN HYDROCHLORIDE 30 MG/1
120 CAPSULE, DELAYED RELEASE ORAL DAILY
Status: DISCONTINUED | OUTPATIENT
Start: 2020-08-06 | End: 2020-08-07 | Stop reason: HOSPADM

## 2020-08-05 RX ADMIN — SODIUM CHLORIDE 1000 ML: 9 INJECTION, SOLUTION INTRAVENOUS at 15:16

## 2020-08-05 RX ADMIN — AZITHROMYCIN 500 MG: 500 INJECTION, POWDER, LYOPHILIZED, FOR SOLUTION INTRAVENOUS at 23:17

## 2020-08-05 RX ADMIN — SODIUM CHLORIDE 90 ML: 9 INJECTION, SOLUTION INTRAVENOUS at 20:03

## 2020-08-05 RX ADMIN — METHYLPREDNISOLONE SODIUM SUCCINATE 125 MG: 125 INJECTION, POWDER, FOR SOLUTION INTRAMUSCULAR; INTRAVENOUS at 22:10

## 2020-08-05 RX ADMIN — IOPAMIDOL 91 ML: 755 INJECTION, SOLUTION INTRAVENOUS at 20:03

## 2020-08-05 RX ADMIN — CEFTRIAXONE SODIUM 1 G: 1 INJECTION, SOLUTION INTRAVENOUS at 22:10

## 2020-08-05 ASSESSMENT — ENCOUNTER SYMPTOMS
LIGHT-HEADEDNESS: 0
WEAKNESS: 0
DYSURIA: 0
EYE REDNESS: 0
SINUS PRESSURE: 0
NAUSEA: 0
COUGH: 1
EYE DISCHARGE: 0
SHORTNESS OF BREATH: 0
MYALGIAS: 0
SINUS PAIN: 0
NUMBNESS: 0
TROUBLE SWALLOWING: 0
ABDOMINAL PAIN: 0
FATIGUE: 0
DIZZINESS: 1
ARTHRALGIAS: 0
SORE THROAT: 0
HEADACHES: 0
RHINORRHEA: 0
JOINT SWELLING: 0
VOMITING: 0
DIARRHEA: 0
FEVER: 0
CHILLS: 0
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
## 2020-08-05 NOTE — ED NOTES
Pt arrives via EMS with fever, cough and dizziness. Everything started today between noon and 1 pm. Pt has a hx of COPD, does not use home oxygen. Has chronic pain, on oxycodone, took 2 pills this morning around 10. Pt states that he feels dizzy all the time but it is worse with movement. Feels like the room is spinning. Denies hx of vertigo.

## 2020-08-06 ENCOUNTER — APPOINTMENT (OUTPATIENT)
Dept: OCCUPATIONAL THERAPY | Facility: CLINIC | Age: 65
DRG: 193 | End: 2020-08-06
Payer: COMMERCIAL

## 2020-08-06 LAB
ANION GAP SERPL CALCULATED.3IONS-SCNC: 2 MMOL/L (ref 3–14)
BASE EXCESS BLDV CALC-SCNC: 5.5 MMOL/L
BUN SERPL-MCNC: 7 MG/DL (ref 7–30)
CALCIUM SERPL-MCNC: 8.4 MG/DL (ref 8.5–10.1)
CHLORIDE SERPL-SCNC: 104 MMOL/L (ref 94–109)
CO2 SERPL-SCNC: 32 MMOL/L (ref 20–32)
CREAT SERPL-MCNC: 0.58 MG/DL (ref 0.66–1.25)
ERYTHROCYTE [DISTWIDTH] IN BLOOD BY AUTOMATED COUNT: 13.4 % (ref 10–15)
GFR SERPL CREATININE-BSD FRML MDRD: >90 ML/MIN/{1.73_M2}
GLUCOSE SERPL-MCNC: 164 MG/DL (ref 70–99)
HCO3 BLDV-SCNC: 33 MMOL/L (ref 21–28)
HCT VFR BLD AUTO: 48.2 % (ref 40–53)
HGB BLD-MCNC: 15.8 G/DL (ref 13.3–17.7)
MCH RBC QN AUTO: 32.3 PG (ref 26.5–33)
MCHC RBC AUTO-ENTMCNC: 32.8 G/DL (ref 31.5–36.5)
MCV RBC AUTO: 99 FL (ref 78–100)
O2/TOTAL GAS SETTING VFR VENT: ABNORMAL %
PCO2 BLDV: 58 MM HG (ref 40–50)
PH BLDV: 7.37 PH (ref 7.32–7.43)
PLATELET # BLD AUTO: 213 10E9/L (ref 150–450)
PO2 BLDV: 56 MM HG (ref 25–47)
POTASSIUM SERPL-SCNC: 4.1 MMOL/L (ref 3.4–5.3)
PROCALCITONIN SERPL-MCNC: <0.05 NG/ML
RBC # BLD AUTO: 4.89 10E12/L (ref 4.4–5.9)
SODIUM SERPL-SCNC: 138 MMOL/L (ref 133–144)
WBC # BLD AUTO: 6.5 10E9/L (ref 4–11)

## 2020-08-06 PROCEDURE — 12000000 ZZH R&B MED SURG/OB

## 2020-08-06 PROCEDURE — 80048 BASIC METABOLIC PNL TOTAL CA: CPT | Performed by: PHYSICIAN ASSISTANT

## 2020-08-06 PROCEDURE — 97165 OT EVAL LOW COMPLEX 30 MIN: CPT | Mod: GO

## 2020-08-06 PROCEDURE — 36415 COLL VENOUS BLD VENIPUNCTURE: CPT | Performed by: PHYSICIAN ASSISTANT

## 2020-08-06 PROCEDURE — 25800030 ZZH RX IP 258 OP 636: Performed by: PHYSICIAN ASSISTANT

## 2020-08-06 PROCEDURE — 82803 BLOOD GASES ANY COMBINATION: CPT | Performed by: PHYSICIAN ASSISTANT

## 2020-08-06 PROCEDURE — 25000128 H RX IP 250 OP 636: Performed by: PHYSICIAN ASSISTANT

## 2020-08-06 PROCEDURE — 99233 SBSQ HOSP IP/OBS HIGH 50: CPT | Performed by: FAMILY MEDICINE

## 2020-08-06 PROCEDURE — 85027 COMPLETE CBC AUTOMATED: CPT | Performed by: PHYSICIAN ASSISTANT

## 2020-08-06 PROCEDURE — 25000132 ZZH RX MED GY IP 250 OP 250 PS 637: Mod: GY | Performed by: PHYSICIAN ASSISTANT

## 2020-08-06 RX ORDER — MORPHINE SULFATE 30 MG/1
30 TABLET, FILM COATED, EXTENDED RELEASE ORAL AT BEDTIME
Status: DISCONTINUED | OUTPATIENT
Start: 2020-08-06 | End: 2020-08-07 | Stop reason: HOSPADM

## 2020-08-06 RX ORDER — OXYCODONE HYDROCHLORIDE 5 MG/1
5-10 TABLET ORAL 3 TIMES DAILY PRN
Status: DISCONTINUED | OUTPATIENT
Start: 2020-08-06 | End: 2020-08-07 | Stop reason: HOSPADM

## 2020-08-06 RX ADMIN — AZITHROMYCIN 500 MG: 500 INJECTION, POWDER, LYOPHILIZED, FOR SOLUTION INTRAVENOUS at 22:08

## 2020-08-06 RX ADMIN — MORPHINE SULFATE 30 MG: 30 TABLET, FILM COATED, EXTENDED RELEASE ORAL at 21:34

## 2020-08-06 RX ADMIN — OXYCODONE HYDROCHLORIDE 5 MG: 5 TABLET ORAL at 08:22

## 2020-08-06 RX ADMIN — SIMVASTATIN 80 MG: 40 TABLET, FILM COATED ORAL at 21:34

## 2020-08-06 RX ADMIN — DULOXETINE HYDROCHLORIDE 120 MG: 30 CAPSULE, DELAYED RELEASE PELLETS ORAL at 08:21

## 2020-08-06 RX ADMIN — OXYCODONE HYDROCHLORIDE 5 MG: 5 TABLET ORAL at 16:48

## 2020-08-06 RX ADMIN — TRAZODONE HYDROCHLORIDE 250 MG: 100 TABLET ORAL at 21:34

## 2020-08-06 RX ADMIN — MORPHINE SULFATE 30 MG: 30 TABLET, FILM COATED, EXTENDED RELEASE ORAL at 00:42

## 2020-08-06 RX ADMIN — CEFTRIAXONE SODIUM 1 G: 1 INJECTION, SOLUTION INTRAVENOUS at 21:25

## 2020-08-06 ASSESSMENT — ACTIVITIES OF DAILY LIVING (ADL)
ADLS_ACUITY_SCORE: 14
ADLS_ACUITY_SCORE: 16
ADLS_ACUITY_SCORE: 14
ADLS_ACUITY_SCORE: 16
ADLS_ACUITY_SCORE: 16
ADLS_ACUITY_SCORE: 14

## 2020-08-06 NOTE — CONSULTS
Addendum @ 1610:  Spoke with Cristóbal @ [facility] (ph: [phone], fax: [phone]).  He confirmed RN and PT services.  Faxed appropriate clinical documentation.  Discussed discharge plans to resume services.        Care Transition Initial Assessment - RN      Met with: Patient.    DATA  Active Problems:    Chronic pain syndrome    Tobacco use disorder    COPD (chronic obstructive pulmonary disease) (H)    Hyperlipidemia LDL goal <130    Lumbosacral radiculitis    Encephalopathy    Generalized anxiety disorder    Opioid type dependence (H)    Acute respiratory failure with hypoxia (H)    Depression    Hypoxia    Dizziness    Respiratory acidosis    Person under investigation for COVID-19    Medical cannabis use    Spinal stenosis in cervical region       Cognitive Status: awake, alert and oriented.  Primary Care Clinic Name: [facility]  Primary Care MD Name: Thaddeus  Contact information and PCP information verified: Yes  Lives With: grandchild(valentina), other (see comments)(friend)   Living Arrangements: house  Quality of Family Relationships: supportive, involved, helpful  Description of Support System: Supportive, Involved   Who is your support system?: PCA, Other (specify)(granddaughter)   Support Assessment: Adequate family and caregiver support   Insurance concerns: No Insurance issues identified  Admit date and time:  8/5/2020  2:27 PM        This writer met with pt, introduced self and role. Patient currently lives with his granddaughter, Colette and a friend in a home in [place].  He is currently open with [facility] ([phone]) RN and PT.  Left message with intake department to confirm and notify.  Patient also has 6 hrs daily/5 days a week PCA assistance.  His [facility]  is Kyree Burt (patient unaware of contact information).  Patient's PCA, Monserrat helps with transportation, laundry, housecleaning.  Patient uses a cane for some ambulation assist.  At baseline, he  is steady on his feet and independent with grooming, dressing, bathing.   Patient does NOT wear home oxygen at baseline.  Discussed discharge planning and medicare guidelines in regards to home care and SNF benefits.  Patient's goal is to return home with current services at discharge.  Resumption home care order placed.      Patient's PCA or family will provide transportation upon discharge.        PLAN    Home w/ resumption of home care and PCA     TRESSA Levine RN  Inpatient Care Coordinator  [facility] [phone]  [facility] [phone]
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
## 2020-08-06 NOTE — PLAN OF CARE
S:  Patient inpatient at [facility]    B:  Patient admitted for somnolence/infection/hypoxia    A:  Vitals stable on 2.5 ltr/min  T 98.1 R 16 P 68 /69 O2 97% on 2.5 ltr/min  Wheezes in lungs  Patient was somnolent and almost unresponsive until he got up to urinate at 0010, then patient was alert, oriented x 4 and asking for trazodone, contin, morphine, etc...  Patient ambulated to bathroom with assist of 1  CoVid came back negative, still on precautions at this time  IV running KVO 10mL/hr to facilitate ABX    R:  Patient rested comfortably in room for most of NOC shift    Nilesh Tirado RN

## 2020-08-06 NOTE — PROGRESS NOTES
Cross Cover:    Patient became more alert and oriented around midnight, ambulated in room without difficulity. RN was able to do medication reconciliation and patient was requesting home narcotics and trazodone.     Originally did not order these medications due to patient being so fatigued and not able to indicate what medications he takes.     He reports taking morphine 60 mg in AM and 30 mg at bed, oxycodone 5 -10 mg TID as needed and trazodone 250 mg nightly prn for sleep.     Will order home medications.    - Continue to monitor closely.     Claudette Holley PA-C on 8/6/2020 at 12:21 AM

## 2020-08-06 NOTE — PROGRESS NOTES
Assessment & Plan     Melquiades Morales is a 65 year old male who presents on 8/5/2020 with dizziness, shortness of breath, cough, fatigue.      Acute hypoxic resp failure   Patient arrived and found to be 87% on room air. Does have a history of COPD. No known sick contacts. Mild elevation in temp to 99. Differential includes COPD exacerbation vs viral illness vs CAP vs PE vs other. Requiring 2-3 L of oxygen. CXR with no acute consolidation. VBG shows resp acidosis as discussed below. Normal WBC, lactic acid, CBC. COVID pending. CT scan revealed new mild right lower lobe dependent infiltrate favored to be atelectasis though developing pneumonia possible; moderate central inflammatory bronchial wall thickening; mild mediastinal lymphadenopathy stable or minimally more pronounced; modest dilatation of extrahepatic bile duct unchanged.   -Procalcitonin is low.  - Was hypercapnic on admission, but resolved the next morning.  - Still needing 2 L after 24 hours, but really minimal wheezing.  - He refused further prednisone since he feels he does not need it, and his evidence of bronchospasm is minimal at this time, will continue off of it.   - Will start empirically on ceftriaxone and azithromycin for potential CAP. Determine need to continue in am.      Dizziness  Started this am, described as room spinning. Differential includes vertigo vs TIA vs CVA vs medication side effects vs other. Labs are reassuring. Initially wanted to obtain MRI in ED, but was not able to until potentially tomorrow. CT was obtained which revealed no acute intracranial findings, nonspecific white matter changes are most likely due to mild small vessel ischemic disease, complete opacification of the frontal sinus and obstruction of the ostiomeatal complex on the left side, possibly by a polyp. Mucocele in the frontal sinus is not excluded. No dehiscence of the frontal sinus identifiable by CT.   - Fall precautions in place.    - Up with stand by  assist.    - Continue to monitor for onset of dizziness overnight and into foster am, has improved at this time.      Encephalopathy/toxic  Unclear due to cause at this time could be toxic due to opioids and cannabinoids vs metabolic vs other. He admits to feeling groggy. PCA reports he has been acting out of it. He was hospitalized in March 2020 for similar complaints and was believed to be due to viral pneumonia. Difficult to arouse in room this evening.    -He did improve by about 12 hours after admission.  -I suspect that because of his massive narcotic doses he has a very low threshold for becoming sedated, specifically if any underlying illness supersedes his baseline function.  I did discuss with this with him.      Respiratory acidosis  Seems to be acute on chronic, history of COPD.    Venous Blood Gas  Recent Labs   Lab 08/06/20 0718 08/05/20 2127 08/05/20  1631   PHV 7.37 7.33 7.31*   PCO2V 58* 67* 74*   PO2V 56* 47 36   HCO3V 33* 35* 37*   KALYANI 5.5 6.8 7.3   O2PER Ra Hide 2 liters          Rule Out COVID-19 infection  This patient was evaluated during a global COVID-19 pandemic, which necessitated consideration that the patient might be at risk for infection with the SARS-CoV-2 virus that causes COVID-19. Applicable protocols for evaluation were followed during the patient's care. Moderate suspicion for COVID given shortness of breath, elevated temperature, cough.    - Special precautions in place, continue.    - Check ferritin and CRP and pro calcitonin.      ADDENDUM: COVID negative. CRP slightly elevated at 10.7.   Low risk COVID.     COPD (chronic obstructive pulmonary disease)  Possible acute excerebration given arrival to ED and found to be hypoxic. Does admit to cough. Requiring 2-3L of supplemental oxygen.   - Continue oxygen, wean as able.    - Scheduled duonebs.    -He was given 1 dose of Solu-Medrol 125 mg, but refused further doses.  I do not think he has significant evidence of bronchospasm at  this time and will stop it.    Chronic pain syndrome  Lumbosacral radiculitis  Spinal stenosis in cervical region  Opioid type dependence   Patient managed prior to admission with oxycodone and medical marijuana.    - Hold oxycodone tonight given acute encephalopathy with unclear cause, will give tylenol for pain control.    - Resume oxycodone tomorrow if able.  -On very large doses of MS Contin and oxycodone      Tobacco use disorder  Current daily smoker, up to pack per day.    - Patch available.        Hyperlipidemia LDL goal <130  Chronic. Patient managed prior to admission with simvastatin 80 mg at bed.    - Continue home medication.       Generalized anxiety disorder  Depression  Follows with psychology outpatient. Patient managed prior to admission with duloxetine 120 mg daily, lyrica 150 mg BID, trazodone 250 mg at bed prn.    - Hold Trazodone and lyrica tonight.    - Continue duloxetine.    - Continue outpatient follow up.            Diet: Regular   DVT Prophylaxis: Pneumatic Compression Devices  [place] Catheter: not present  Code Status: Full            Disposition Plan     Expected discharge      /SUBJECTIVE  Feels fine  Is not sure why he ended up calling the ambulance last night  Does not remember being groggy.  Does not feel his breathing is any worse than usual, but is still needing 2 L     ROS:4 point ROS including Respiratory, CV, GI and , other than that noted in the HPI,  is negative     OBJECTIVE:   /73 (BP Location: Left arm)   Pulse 79   Temp 98.6  F (37  C) (Oral)   Resp 16   Wt 109.3 kg (241 lb)   SpO2 96%   BMI 32.69 kg/m      GENERAL APPEARANCE: Awake, alert, oriented x3, no apparent distress, no respiratory distress     RESP: Diffuse mild wheezing no rales or rhonchi     CV: regular rate and rhythm, no murmur , edema: None     Abdomen: soft, nontender, no liver or spleen enlargement, no masses, BSs normal   Skin: no cyanosis, pallor, or jaundice    CMP  Recent Labs   Lab  08/06/20 0718 08/05/20 2127 08/05/20  1514 08/05/20  1454     --  143 142   POTASSIUM 4.1  --  3.9 4.0   CHLORIDE 104  --  108 107   CO2 32  --  34* 34*   ANIONGAP 2*  --  1* 1*   *  --  127* 130*   BUN 7  --  8 8   CR 0.58*  --  0.60* 0.55*   GFRESTIMATED >90  --  >90 >90   GFRESTBLACK >90  --  >90 >90   JESSEE 8.4*  --  8.8 8.9   PROTTOTAL  --  6.1* 6.9  --    ALBUMIN  --  2.8* 3.2*  --    BILITOTAL  --  0.3 0.3  --    ALKPHOS  --  50 59  --    AST  --  13 17  --    ALT  --  15 17  --      CBC  Recent Labs   Lab 08/06/20 0718 08/05/20  1454   WBC 6.5 6.5   RBC 4.89 4.93   HGB 15.8 16.1   HCT 48.2 48.7   MCV 99 99   MCH 32.3 32.7   MCHC 32.8 33.1   RDW 13.4 13.5    217     INRNo lab results found in last 7 days.  Arterial BloodGas  Recent Labs   Lab 08/06/20 0718 08/05/20 2127 08/05/20  1631   O2PER Ra Hide 2 liters      Venous Blood Gas  Recent Labs   Lab 08/06/20 0718 08/05/20 2127 08/05/20  1631   PHV 7.37 7.33 7.31*   PCO2V 58* 67* 74*   PO2V 56* 47 36   HCO3V 33* 35* 37*   KALYANI 5.5 6.8 7.3   O2PER Ra Hide 2 liters       Medications     azithromycin  500 mg Intravenous Q24H     cefTRIAXone  1 g Intravenous Q24H     DULoxetine  120 mg Oral Daily     methylPREDNISolone  40 mg Intravenous Q24H     morphine  30 mg Oral At Bedtime     simvastatin  80 mg Oral At Bedtime     sodium chloride (PF)  3 mL Intracatheter Q8H       Intake/Output Summary (Last 24 hours) at 8/6/2020 1528  Last data filed at 8/6/2020 1200  Gross per 24 hour   Intake 60 ml   Output --   Net 60 ml

## 2020-08-06 NOTE — PLAN OF CARE
"Patient denies nausea.  Does report \"pain\" and \"I need my morphine.\"  Medicated with oxycodone 5 mg at 0822.  Patient refused to take methylprednisolone; stating \"I don't need anymore steroids.\"  Patient had oxygen tubing off and laying next to him in the bed; room air oxygen saturation at rest was 88 %.  Reapplied oxygen 2 LPM via NC; saturation up to 94 %.    Patient just ambulated in hallway with Nursing assistant; room air oxygen saturation was 85-88 % throughout walk with HEART RATE 110.  Denied feeling SHORTENSS OF AIR and stated \"I feel fine.\"  Reapplied oxygen 2 LPM via NC when back in room with oxygen saturation 97 %.  "

## 2020-08-06 NOTE — PLAN OF CARE
"Patient reports \"no bowel movement for a week.\"  Refuses to take AS NEEDED senokot. Offered beverages; but declines.  Likes to drink \"[place].\"  Call to kitchen for options; but this is not available.  "

## 2020-08-06 NOTE — PROGRESS NOTES
[facility] [facility] [place]    Patient admitted to room 2311 at approximately 2030 via cart from emergency room. Patient was accompanied by transport tech.     Verbal SBAR report received from ER RN prior to patient arrival.     Patient trasferred to bed via air karen. Patient alert and oriented X 1. Patient is largely unresponsive, groggy, cannot report reliably, appears to be comfortable  0. Admission vital signs: Blood pressure 128/64, pulse 71, temperature 98.3  F (36.8  C), temperature source Oral, resp. rate 12, weight 109.3 kg (241 lb), SpO2 92 %. Patient was oriented to plan of care, call light, bed controls, tv, telephone, bathroom and visiting hours.     Risk Assessment    The following safety risks were identified during admission: fall. Yellow risk band applied: YES.     Skin Initial Assessment    This writer admitted this patient and completed a full skin assessment and Jeremiah score in the Adult PCS flowsheet. Appropriate interventions initiated as needed.     Secondary skin check completed by Patient refused skin check.         Education    Patient has a [place] to Observation order: No  Observation education completed and documented: N/A      Nilesh Tirado RN

## 2020-08-06 NOTE — PLAN OF CARE
Discharge Planner OT   Patient plan for discharge: return home with PCA services.     Current status: Eval complete. Pt up independently in room. Independent for supine to sit. At baseline pt has assist with socks from PCA.      Barriers to return to prior living situation: none    Recommendations for discharge: Home with PCA services.     Rationale for recommendations: No IP OT needs identified at this time. Appears to be at functional baseline.        Entered by: Lea Goss 08/06/2020 10:51 AM

## 2020-08-06 NOTE — PROGRESS NOTES
"   08/06/20 1100   Quick Adds   Type of Visit Initial Occupational Therapy Evaluation   Living Environment   Lives With grandchild(valentina);friend(s)   Living Arrangements house   Home Accessibility stairs to enter home   Number of Stairs, Main Entrance 5   Functional Level   Ambulation 0-->independent  (\"sometimes use a cane\")   Transferring 0-->independent   Toileting 0-->independent   Bathing 0-->independent   Dressing 2-->assistive person   Eating 0-->independent   Communication 0-->understands/communicates without difficulty   Swallowing 0-->swallows foods/liquids without difficulty   Cognition 0 - no cognition issues reported   Fall history within last six months yes   Number of times patient has fallen within last six months 5   General Information   Onset of Illness/Injury or Date of Surgery - Date 08/05/20   Referring Physician Bravo Hernandez MD    Patient/Family Goals Statement to return home.    Additional Occupational Profile Info/Pertinent History of Current Problem Melquiades Morales is a 65 year old male who presents on 8/5/2020 with dizziness, shortness of breath, cough, fatigue.   Precautions/Limitations fall precautions   Cognitive Status Examination   Orientation orientation to person, place and time   Level of Consciousness alert   Follows Commands (Cognition) WNL   Pain Assessment   Patient Currently in Pain No   Range of Motion (ROM)   ROM Comment B UE ROM: WNL   Strength   Strength Comments B UE strength: WNL   Transfer Skill: Bed to Chair/Chair to Bed   Level of [place]: Bed to Chair independent   Transfer Skill: Sit to Stand   Level of [place]: Sit/Stand independent   Transfer Skill: Toilet Transfer   Level of [place]: Toilet independent   Upper Body Dressing   Level of [place]: Dress Upper Body independent   Lower Body Dressing   Level of [place]: Dress Lower Body minimum assist (75% patients effort)   Physical Assist/Nonphysical Assist: Dress Lower Body 1 person " "assist   Toileting   Level of [place]: Toilet independent   Grooming   Level of [place]: Grooming independent   Eating/Self Feeding   Level of [place]: Eating independent   Instrumental Activities of Daily Living (IADL)   IADL Comments Has PCA 6-8hrs 5x/week. PCA assists with IADLs.    Clinical Impression   Criteria for Skilled Therapeutic Interventions Met evaluation only;no problems identified which require skilled intervention;current level of function same as previous level of function   OT Diagnosis Per OT orders: \"self care deficit\"   Clinical Decision Making (Complexity) Low complexity   Anticipated Discharge Disposition Home with Assist  (with continued PCA services )   Risks and Benefits of Treatment have been explained. Yes   Patient, Family & other staff in agreement with plan of care Yes   [facility] AM-PAC  \"6 Clicks\" Daily Activity Inpatient Short Form   1. Putting on and taking off regular lower body clothing? 4 - None   2. Bathing (including washing, rinsing, drying)? 4 - None   3. Toileting, which includes using toilet, bedpan or urinal? 4 - None   4. Putting on and taking off regular upper body clothing? 4 - None   5. Taking care of personal grooming such as brushing teeth? 4 - None   6. Eating meals? 4 - None   Daily Activity Raw Score (Score out of 24.Lower scores equate to lower levels of function) 24   Total Evaluation Time   Total Evaluation Time (Minutes) 15     "

## 2020-08-06 NOTE — PROGRESS NOTES
Patien COVID 19 negative. Dr Hernandez web paged to evaluate if precautions could be removed. He did approve discontinuation of special precautions. PT and OT also ordered.

## 2020-08-07 VITALS
DIASTOLIC BLOOD PRESSURE: 67 MMHG | WEIGHT: 241 LBS | HEART RATE: 69 BPM | SYSTOLIC BLOOD PRESSURE: 133 MMHG | TEMPERATURE: 97.3 F | OXYGEN SATURATION: 96 % | RESPIRATION RATE: 18 BRPM | BODY MASS INDEX: 32.69 KG/M2

## 2020-08-07 PROCEDURE — 99239 HOSP IP/OBS DSCHRG MGMT >30: CPT | Performed by: FAMILY MEDICINE

## 2020-08-07 PROCEDURE — 25000132 ZZH RX MED GY IP 250 OP 250 PS 637: Mod: GY | Performed by: PHYSICIAN ASSISTANT

## 2020-08-07 RX ORDER — CEFDINIR 300 MG/1
300 CAPSULE ORAL 2 TIMES DAILY
Qty: 10 CAPSULE | Refills: 0 | Status: SHIPPED | OUTPATIENT
Start: 2020-08-07 | End: 2020-08-14

## 2020-08-07 RX ADMIN — DULOXETINE HYDROCHLORIDE 120 MG: 30 CAPSULE, DELAYED RELEASE PELLETS ORAL at 08:14

## 2020-08-07 ASSESSMENT — ACTIVITIES OF DAILY LIVING (ADL)
ADLS_ACUITY_SCORE: 14

## 2020-08-07 NOTE — PLAN OF CARE
[place] [facility] DISCHARGE NOTE    Patient discharged to home at 10:19 AM via wheel chair. Accompanied by PCA and staff. Discharge instructions reviewed with patient and caregiver, opportunity offered to ask questions. Prescriptions sent to patients preferred pharmacy. All belongings sent with patient.    Diana Castellon RN

## 2020-08-07 NOTE — PROGRESS NOTES
Care Transitions Discharge Note:      Name: Melquiades Morales    MRN: [id number]    Reason for Hospitalization: Cough [R05]  Altered mental status [R41.82]  Dizziness [R42]  Hypoxia [R09.02]    Cognitive/Behavioral Status: awake, alert and oriented    Follow-up Appointments:   Future Appointments   Date Time Provider Department Center   8/14/2020 10:40 AM Jignesh Travis MD [facility]   8/21/2020  9:00 AM [facility]   9/4/2020  9:00 AM [facility]   9/18/2020  9:00 AM [facility]       Discharge Date:  8/7/2020    Patient/Care Partner in agreement and understands the discharge plan:  Yes    Discharge Disposition:  Home with resumption of [facility] PCA services.    Discharge Planner   Discharge Plans in progress: Home with resumption of home care.  Barriers to discharge plan: None  Follow up plan: Follow up with PCP       Entered by: Anyi Mccullough 08/07/2020 1:29 PM       Anyi Mccullough RN Care Coordinator  [phone]

## 2020-08-07 NOTE — PLAN OF CARE
Patient has had no c/o dizziness tonight. He is eating and drinking well. He was given 5 mg oxycodone for back pain earlier this shift. He reports adequate relief of pain with this.

## 2020-08-07 NOTE — PLAN OF CARE
Patient able to sleep the majority of the night. Vital signs stable. On 2L of oxygen via nasal cannula. Afebrile.

## 2020-08-07 NOTE — DISCHARGE SUMMARY
[facility] Hospitalist Discharge Summary    Melquiades Morales MRN# [id number]   Age: 65 year old YOB: 1955     Date of Admission:  8/5/2020  Date of Discharge::  8/7/2020 10:20 AM  Admitting Physician:  Rey Garcia MD  Discharge Physician:  Bravo Hernandez MD  Primary Physician: Jignesh Travis       Home clinic: [facility]               Discharge Diagnosis:   Principle diagnosis: Acute hypoxic/hypercapnic respiratory failure secondary to community-acquired pneumonia    Secondary diagnoses:  Toxic encephalopathy secondary to opioids  Severe COPD  Chronic pain syndrome with opioid dependence  Tobacco use disorder  EVGENY/depression  P URI/COVID ruled out/hypercapnic     Discharge Instructions:   For the right upper lobe pneumonia  -cefdinir 300 mg 2 times/day for 5 more days  -would recheck in clinic in the next week .          Follow up with primary care provider in 7 days        Procedures:       Results for orders placed or performed during the hospital encounter of 08/05/20   XR Chest Port 1 View    Narrative    CHEST ONE VIEW PORTABLE  8/5/2020 4:08 PM     HISTORY: Cough, dizziness     COMPARISON: 10/28/2019    FINDINGS: Suboptimal inspiration with hypoventilatory changes. Minimal  left basilar atelectasis.      Impression    IMPRESSION: No acute consolidation.    ROBERTO BECKER MD   CT Head w/o Contrast    Narrative    EXAM: CT HEAD W/O CONTRAST  LOCATION: [facility]  DATE/TIME: 8/5/2020 7:49 PM    INDICATION: Loss of consciousness.  COMPARISON: None.  TECHNIQUE: Routine without IV contrast. Multiplanar reformats. Dose reduction techniques were used.    FINDINGS:  INTRACRANIAL CONTENTS: No intracranial hemorrhage, extraaxial collection, or mass effect.  No CT evidence of acute infarct. Mild presumed chronic small vessel ischemic changes. Normal ventricles and sulci.     VISUALIZED ORBITS/SINUSES/MASTOIDS: No intraorbital abnormality. The frontal sinuses and anterior  and mid ethmoid air cells are completely opacified. There is polypoid shaped opacity in the nasal cavity which is probably representative of a polyp.   Mucocele in the frontal sinus is not excluded. No middle ear or mastoid effusion.    BONES/SOFT TISSUES: No acute abnormality.      Impression    IMPRESSION:  1.  No acute intracranial findings.  2.  Nonspecific white matter changes are most likely due to mild small vessel ischemic disease.  3.  Complete opacification of the frontal sinus and obstruction of the ostiomeatal complex on the left side, possibly by a polyp. Mucocele in the frontal sinus is not excluded. No dehiscence of the frontal sinus identifiable by CT.   CT Chest Pulmonary Embolism w Contrast    Narrative    EXAM: CT CHEST PULMONARY EMBOLISM W CONTRAST  LOCATION: [facility]  DATE/TIME: 8/5/2020 7:49 PM    INDICATION: Shortness of breath  COMPARISON: 03/12/2020  TECHNIQUE: CT chest pulmonary angiogram during arterial phase injection of IV contrast. Multiplanar reformats and MIP reconstructions were performed. Dose reduction techniques were used.   CONTRAST: 91 ml Isovue 370    FINDINGS:  ANGIOGRAM CHEST: Pulmonary arteries are normal caliber and negative for pulmonary emboli. Thoracic aorta is negative for dissection. No CT evidence of right heart strain.    LUNGS AND PLEURA: Near-complete resolution of the previous left upper lobe pneumonia, slight amount of linear discoid atelectasis or scarring persists left midlung. There is new mild peripheral infiltrate right lower lobe which may relate to new   developing pneumonia though atelectasis more likely. Moderate inflammatory central bronchial wall thickening. Emphysema.    MEDIASTINUM/AXILLAE: Mild esophageal wall thickening. Mild lymphadenopathy. Dominant node is a right paratracheal node with short axis dimension of 1.3 cm, previously this has short axis dimension of 1.1 cm. Smaller prevascular nodes are stable in size.    UPPER  ABDOMEN: Moderate dilatation of extrahepatic bile duct unchanged measuring 1.1 cm. No suggestion for intrahepatic biliary dilatation.    MUSCULOSKELETAL: Normal.      Impression    IMPRESSION:  1.  No pulmonary embolism identified.  2.  Resolution of left lung pneumonia. New mild right lower lobe dependent infiltrate favored to be atelectasis though developing pneumonia possible.  3.  Moderate central inflammatory bronchial wall thickening.  4.  Mild mediastinal lymphadenopathy stable or minimally more pronounced.  5.  Modest dilatation of extrahepatic bile duct unchanged.                    Allergies:      Allergies   Allergen Reactions     Abilify [Aripiprazole] Other (See Comments)     Altered metal status.  Was admitted to hospital 3/17/2015.     Asa [Aspirin] GI Disturbance     Upset stomach     Black Pepper [Piper]      Caffeine Other (See Comments)     Comment: GI problems, Description:      Robitussin Cough-Cold D Other (See Comments)     Bad dreams about killing people      Varenicline Unknown                  Discharge Medications:     Current Discharge Medication List      START taking these medications    Details   cefdinir (OMNICEF) 300 MG capsule Take 1 capsule (300 mg) by mouth 2 times daily  Qty: 10 capsule, Refills: 0    Associated Diagnoses: Acute respiratory failure with hypoxia and hypercapnia (H)         CONTINUE these medications which have NOT CHANGED    Details   albuterol (PROAIR HFA) 108 (90 Base) MCG/ACT inhaler Inhale 2 puffs into the lungs every 4 hours as needed for shortness of breath / dyspnea or wheezing  Qty: 1 Inhaler, Refills: 11    Comments: Pharmacy may dispense brand covered by insurance (Proair, or proventil or ventolin or generic albuterol inhaler)  Associated Diagnoses: Bronchitis      DULoxetine (CYMBALTA) 60 MG capsule TAKE 2 CAPSULES BY MOUTH EVERY DAY  Qty: 180 capsule, Refills: 3    Associated Diagnoses: Depression, unspecified depression type      morphine (MS CONTIN)  30 MG CR tablet 60 mg in AM and 30 mg in HS  Refills: 0      !! naproxen (NAPROSYN) 250 MG tablet 2 tabs bid if needed for headache  Qty: 60 tablet, Refills: 3    Associated Diagnoses: Chronic pain syndrome      !! naproxen (NAPROSYN) 500 MG tablet TAKE 1 TABLET BY MOUTH EVERY DAY AT ONSET of HEADACHE  Qty: 60 tablet, Refills: 3    Associated Diagnoses: Chronic pain syndrome      oxyCODONE (ROXICODONE) 5 MG tablet Take 1-2 tablets by mouth 3 times daily as needed   Refills: 0      pregabalin (LYRICA) 150 MG capsule Take 150 mg by mouth 2 times daily       simvastatin (ZOCOR) 80 MG tablet Take 1 tablet (80 mg) by mouth daily DUE FOR LAB WORK FOR FURTHER REFILLS  Qty: 90 tablet, Refills: 3    Associated Diagnoses: Hyperlipidemia LDL goal <130      vitamin D2 (ERGOCALCIFEROL) 06288 units (1250 mcg) capsule Take 1 capsule by mouth once a week On Saturday  Refills: 11      ipratropium - albuterol 0.5 mg/2.5 mg/3 mL (DUONEB) 0.5-2.5 (3) MG/3ML neb solution Take 1 vial (3 mLs) by nebulization every 4 hours as needed for shortness of breath / dyspnea or wheezing  Qty: 120 vial, Refills: 0    Associated Diagnoses: Chronic bronchitis, unspecified chronic bronchitis type (H)      !! order for DME Equipment being ordered: Nebulizer.    Diagnosis: chronic obstructive bronchitis (COPD).    Duration of need:  99 months.  Qty: 1 each, Refills: 0    Associated Diagnoses: Chronic bronchitis, unspecified chronic bronchitis type (H)      !! order for DME Equipment being ordered: Hospital Bed and mattress.  Qty: 1 each, Refills: 0    Associated Diagnoses: Chronic pain syndrome; Lumbosacral radiculitis; Chronic obstructive pulmonary disease, unspecified COPD type (H); Obese; Lumbar radiculopathy; Encephalopathy; Spinal stenosis in cervical region      !! order for DME Equipment being ordered: Lift Chair  Qty: 1 each, Refills: 0    Associated Diagnoses: Chronic pain syndrome; Lumbosacral radiculitis; Chronic obstructive pulmonary disease,  unspecified COPD type (H); Obese; Lumbar radiculopathy; Encephalopathy; Spinal stenosis in cervical region; Unable to ambulate      !! order for DME Equipment being ordered: Wheelchair. Electric, including adjustable back rest sitting to resting, leg elevation adjustment, approved for outdoor use  Qty: 1 Units, Refills: 0    Associated Diagnoses: Chronic pain syndrome; Lumbosacral radiculitis      !! ORDER FOR DME Semi electric hospital bed with side rails and mattress. Length of bed is for lifetime  Qty: 1 Device, Refills: 0    Associated Diagnoses: Other unspecified back disorder; Obese; Lumbosacral radiculitis; COPD (chronic obstructive pulmonary disease) (H)      !! traZODone (DESYREL) 100 MG tablet TAKE 2 TABLETS BY MOUTH AT BEDTIME AS NEEDED FOR SLEEP  Qty: 60 tablet, Refills: 1    Comments: This prescription was filled on 6/16/2020. Any refills authorized will be placed on file.  Associated Diagnoses: Depression, unspecified depression type      !! traZODone (DESYREL) 50 MG tablet Take 1 tablet (50 mg) by mouth nightly as needed for sleep Take along with the 100 mg tablets for total dose of 250 mg  Qty: 30 tablet, Refills: 5    Comments: This prescription was filled on 5/14/2020. Any refills authorized will be placed on file.  Associated Diagnoses: Depression, unspecified depression type       !! - Potential duplicate medications found. Please discuss with provider.                Consultations:   None           Brief History of Presenting Illness:   Melquiades Morales is a 65 year old male with past medical history of COPD, chronic pain, depression, anxiety, hyperlipidemia who now presents on 8/5/2020 with dizziness.      Patient also reported to EMS complaints of dizziness, shortness of breath, cough, generalized fatigue.     Patient's dizziness began this am. He reports feeling like the room is spinning. Worse when he moves his head. Patient arrived with PCA who feels he is acting out of it.  He denies  "visual changes. No speech changes.      He admits to chronic \"smokers\" cough. He does have history of COPD, does not use oxygen at home. Arrived to ED and was hypoxic to 87%.      He admits to chronic pain for which he takes oxycodone for. Last used around 10 am today. He also uses medical marijuana for his chronic back pain. Says he \"eats it.\" He denies using more than usual or taking more pain medications than usual.      He admits to smoking cigarettes daily. Up to a pack per day.      No known sick contacts.      Lives with roommate. Has a PCA who is with him 6-8 hours a day due to being a vulnerable adult.      Patient is very lethargic when I seen him this evening. He responds to voice with simple yes or no answers. He does not provide much more in terms on his current status other than stated above.              Hospital Course:   Acute hypoxic resp failure   Patient arrived and found to be 87% on room air. Does have a history of COPD. No known sick contacts. Mild elevation in temp to 99. Differential includes COPD exacerbation vs viral illness vs CAP vs PE vs other. Requiring 2-3 L of oxygen. CXR with no acute consolidation. VBG shows resp acidosis as discussed below. Normal WBC, lactic acid, CBC. COVID pending. CT scan revealed new mild right lower lobe dependent infiltrate favored to be atelectasis though developing pneumonia possible; moderate central inflammatory bronchial wall thickening; mild mediastinal lymphadenopathy stable or minimally more pronounced; modest dilatation of extrahepatic bile duct unchanged.   -Procalcitonin is low.  - Was hypercapnic on admission, but resolved the next morning.  - Still needing 2 L after 24 hours, but really minimal wheezing.  - He refused further prednisone since he feels he does not need it, and his evidence of bronchospasm is minimal at this time, will continue off of it.   - Will start empirically on ceftriaxone and azithromycin for potential CAP.   - sent out " on oral cefdinir.       Dizziness  Started this am, described as room spinning. Differential includes vertigo vs TIA vs CVA vs medication side effects vs other. Labs are reassuring. Initially wanted to obtain MRI in ED, but was not able to until potentially tomorrow. CT was obtained which revealed no acute intracranial findings, nonspecific white matter changes are most likely due to mild small vessel ischemic disease, complete opacification of the frontal sinus and obstruction of the ostiomeatal complex on the left side, possibly by a polyp. Mucocele in the frontal sinus is not excluded. No dehiscence of the frontal sinus identifiable by CT.   - Fall precautions in place.    - Up with stand by assist.    - Continue to monitor for onset of dizziness overnight and into foster am, has improved at this time.      Encephalopathy/toxic  Unclear due to cause at this time could be toxic due to opioids and cannabinoids vs metabolic vs other. He admits to feeling groggy. PCA reports he has been acting out of it. He was hospitalized in March 2020 for similar complaints and was believed to be due to viral pneumonia. Difficult to arouse in room this evening.    -He did improve by about 12 hours after admission.  -I suspect that because of his massive narcotic doses he has a very low threshold for becoming sedated, specifically if any underlying illness supersedes his baseline function.  I did discuss with this with him.      Respiratory acidosis  Seems to be acute on chronic, history of COPD.    Venous Blood Gas        Recent Labs   Lab 08/06/20  0718 08/05/20 2127 08/05/20  1631   PHV 7.37 7.33 7.31*   PCO2V 58* 67* 74*   PO2V 56* 47 36   HCO3V 33* 35* 37*   KALYANI 5.5 6.8 7.3   O2PER Ra Hide 2 liters           Rule Out COVID-19 infection  This patient was evaluated during a global COVID-19 pandemic, which necessitated consideration that the patient might be at risk for infection with the SARS-CoV-2 virus that causes COVID-19.  Applicable protocols for evaluation were followed during the patient's care. Moderate suspicion for COVID given shortness of breath, elevated temperature, cough.    - Special precautions in place, continue.    - Check ferritin and CRP and pro calcitonin.      ADDENDUM: COVID negative. CRP slightly elevated at 10.7.   Low risk COVID.     COPD (chronic obstructive pulmonary disease)  Possible acute excerebration given arrival to ED and found to be hypoxic. Does admit to cough. Requiring 2-3L of supplemental oxygen.   - Continue oxygen, wean as able.    - Scheduled duonebs.    -He was given 1 dose of Solu-Medrol 125 mg, but refused further doses.  I do not think he has significant evidence of bronchospasm at this time and will stop it.     Chronic pain syndrome  Lumbosacral radiculitis  Spinal stenosis in cervical region  Opioid type dependence   Patient managed prior to admission with oxycodone and medical marijuana.    - Hold oxycodone tonight given acute encephalopathy with unclear cause, will give tylenol for pain control.    - Resume oxycodone tomorrow if able.  -On very large doses of MS Contin and oxycodone      Tobacco use disorder  Current daily smoker, up to pack per day.    - Patch available.        Hyperlipidemia LDL goal <130  Chronic. Patient managed prior to admission with simvastatin 80 mg at bed.    - Continue home medication.       Generalized anxiety disorder  Depression  Follows with psychology outpatient. Patient managed prior to admission with duloxetine 120 mg daily, lyrica 150 mg BID, trazodone 250 mg at bed prn.    - Hold Trazodone and lyrica tonight.    - Continue duloxetine.    - Continue outpatient follow up.            Diet: Regular   DVT Prophylaxis: Pneumatic Compression Devices  Gaona Catheter: not present  Code Status: Full                  Discharge Exam:   OBJECTIVE:   /67   Pulse 69   Temp 97.3  F (36.3  C) (Oral)   Resp 18   Wt 109.3 kg (241 lb)   SpO2 96%   BMI 32.69  kg/m      GENERAL APPEARANCE:  Alert, NAD, Ox3     RESP:clear      CV: regular rates and rhythm,no murmur, no click or rub - no edema     Abdomen: soft, nontender, no liver or spleen enlargement, no masses, BSs normal   Skin: no cyanosis, pallor, or jaundice            Pending Tests at Discharge:     Unresulted Labs Ordered in the Past 30 Days of this Admission     No orders found from 7/6/2020 to 8/6/2020.                   Discharge Disposition:   Discharged to home      Attestation:  Amount of time performed on this discharge : 45  minutes.    Bravo Hernandez MD MD

## 2020-08-07 NOTE — PROGRESS NOTES
[facility]  [facility] - Med/Surg    Referral Source: Pt request on admission    SH was not able to provide visit prior to discharge today.  Pt was just leaving as I attempted to visit.    Plan: No follow up because of discharge.      Rey Lambert M.A., [facility]  Lead   [facility]  Office: [phone]  Cell: [phone]  Pager [phone]

## 2020-08-10 ENCOUNTER — TELEPHONE (OUTPATIENT)
Dept: FAMILY MEDICINE | Facility: CLINIC | Age: 65
End: 2020-08-10

## 2020-08-10 NOTE — TELEPHONE ENCOUNTER
Reason for Call:  Orders    Detailed comments: VO needed for [facility] to resume their Home Care visits. Patient was just in the Hospital. Please call Ilda at [phone]  Can we leave a detailed message on this number? Yes  Libby Cook  Clinic Station        Call taken on 8/10/2020 at 4:40 PM by Libby Peterson

## 2020-08-12 NOTE — PROGRESS NOTES
"Subjective     Melquiades Morales is a 65 year old male who presents to clinic today for the following health issues:    HPI     Chief Complaint   Patient presents with     Hospital F/U     Patient/info Update     low O2 noted by rooming staff of 88%         Hospital Follow-up Visit:    Hospital/Nursing Home/IP Rehab Facility: [facility]  Date of Admission: 8/5/2020  Date of Discharge: 8/7/2020  Reason(s) for Admission: pneumonia      Was your hospitalization related to COVID-19? No   Problems taking medications regularly:  None  Medication changes since discharge: start cefdinir  Problems adhering to non-medication therapy:  None    Summary of hospitalization:  [facility] discharge summary reviewed  Diagnostic Tests/Treatments reviewed.  Follow up needed: none  Other Healthcare Providers Involved in Patient s Care:         None  Update since discharge: stable. He is still having excessive diarrhea, and weakness and dizziness   Post Discharge Medication Reconciliation: discharge medications reconciled, continue medications without change.  Plan of care communicated with patient and family          Hyperlipidemia Follow-Up      Are you regularly taking any medication or supplement to lower your cholesterol?   Yes- simvastatin    Are you having muscle aches or other side effects that you think could be caused by your cholesterol lowering medication?  No          Reviewed and updated as needed this visit by Provider         Review of Systems         Objective    Resp 18   Ht 1.803 m (5' 11\")   BMI 33.61 kg/m    Body mass index is 33.61 kg/m .  Physical Exam               Further history obtained, clarified or corrected by physician:    He is feeling better since discharge though he is having some loose stools though only once every couple of days.  He still has a cough and fatigue but does not feel short of breath.    OBJECTIVE:  /60   Pulse 88   Temp 97.1  F (36.2  C) (Tympanic)   Resp 18 " "  Ht 1.803 m (5' 11\")   SpO2 (!) 88%   BMI 33.61 kg/m    LUNGS: clear to auscultation, normal breath sounds  CV: RRR without murmur  ABD: BS+, soft, nontender, no masses, no hepatosplenomegaly  EXTREMITIES: without joint tenderness, swelling or erythema.  No muscle tenderness or abnormality.  SKIN: No rashes or abnormalities  NEURO:non focal exam    ASSESSMENT:     Pneumonia due to infectious organism, unspecified laterality, unspecified part of lung  Hyperlipidemia LDL goal <130  Depression, unspecified depression type    PLAN:    He will call if the diarrhea worsens or if there is signs of dehydration which were discussed  Recommend continue deep breaths and pulmonary toilet  Return for worsening or new symptoms.    Orders Placed This Encounter     simvastatin (ZOCOR) 80 MG tablet     traZODone (DESYREL) 100 MG tablet             "

## 2020-08-14 ENCOUNTER — TELEPHONE (OUTPATIENT)
Dept: FAMILY MEDICINE | Facility: CLINIC | Age: 65
End: 2020-08-14

## 2020-08-14 ENCOUNTER — OFFICE VISIT (OUTPATIENT)
Dept: FAMILY MEDICINE | Facility: CLINIC | Age: 65
End: 2020-08-14
Payer: COMMERCIAL

## 2020-08-14 ENCOUNTER — MEDICAL CORRESPONDENCE (OUTPATIENT)
Dept: HEALTH INFORMATION MANAGEMENT | Facility: CLINIC | Age: 65
End: 2020-08-14

## 2020-08-14 VITALS
DIASTOLIC BLOOD PRESSURE: 60 MMHG | TEMPERATURE: 97.1 F | OXYGEN SATURATION: 88 % | SYSTOLIC BLOOD PRESSURE: 114 MMHG | HEIGHT: 71 IN | BODY MASS INDEX: 33.61 KG/M2 | HEART RATE: 88 BPM | RESPIRATION RATE: 18 BRPM

## 2020-08-14 DIAGNOSIS — F32.A DEPRESSION, UNSPECIFIED DEPRESSION TYPE: ICD-10-CM

## 2020-08-14 DIAGNOSIS — Z23 NEED FOR VACCINATION: ICD-10-CM

## 2020-08-14 DIAGNOSIS — J18.9 PNEUMONIA DUE TO INFECTIOUS ORGANISM, UNSPECIFIED LATERALITY, UNSPECIFIED PART OF LUNG: Primary | ICD-10-CM

## 2020-08-14 DIAGNOSIS — E78.5 HYPERLIPIDEMIA LDL GOAL <130: ICD-10-CM

## 2020-08-14 PROCEDURE — 90471 IMMUNIZATION ADMIN: CPT | Performed by: FAMILY MEDICINE

## 2020-08-14 PROCEDURE — 90750 HZV VACC RECOMBINANT IM: CPT | Performed by: FAMILY MEDICINE

## 2020-08-14 PROCEDURE — 99214 OFFICE O/P EST MOD 30 MIN: CPT | Performed by: FAMILY MEDICINE

## 2020-08-14 RX ORDER — TRAZODONE HYDROCHLORIDE 100 MG/1
TABLET ORAL
Qty: 60 TABLET | Refills: 3 | Status: SHIPPED | OUTPATIENT
Start: 2020-08-14 | End: 2020-12-08

## 2020-08-14 RX ORDER — SIMVASTATIN 80 MG
80 TABLET ORAL DAILY
Qty: 90 TABLET | Refills: 3 | Status: SHIPPED | OUTPATIENT
Start: 2020-08-14 | End: 2021-08-23

## 2020-08-14 ASSESSMENT — PAIN SCALES - GENERAL: PAINLEVEL: NO PAIN (0)

## 2020-08-14 NOTE — PATIENT INSTRUCTIONS
Our Clinic hours are:  Mondays    7:20 am - 7 pm  Tues -  Fri  7:20 am - 5 pm    Clinic Phone: [phone]    The clinic lab opens at 7:30 am Mon - Fri and appointments are required.    [facility]. [phone]  Monday  8 am - 7pm  Tues - Fri 8 am - 5:30 pm

## 2020-08-14 NOTE — TELEPHONE ENCOUNTER
Reason for Call:  Home Health Care    Felipa with [facility] called regarding (reason for call):     Orders are needed for this patient. Resumption of care has continue SKN , OT, and PT he said that he has been of the antibiotics for 2 days and he doesn't feel any better. He has been having dihhrea immodium as not helped. This is all FYI he has an appointment today at 1040.  Phone Number Homecare Nurse can be reached at: [phone]    Can we leave a detailed message on this number? YES    Phone number patient can be reached at: Home number on file [phone] (home)    Best Time: any    Call taken on 8/14/2020 at 8:21 AM by Elizabeth Arita

## 2020-08-14 NOTE — TELEPHONE ENCOUNTER
Patient has appt today at 10:40 am.  Below is FYI for today's appt from [facility].    Routing to provider.  Zara PETERS RN

## 2020-08-16 ENCOUNTER — NURSE TRIAGE (OUTPATIENT)
Dept: NURSING | Facility: CLINIC | Age: 65
End: 2020-08-16

## 2020-08-16 NOTE — TELEPHONE ENCOUNTER
"S: Red skin bloch  B: Woke up this morning with a red skin bloch just above the top of his right wrist.  The red area does not itch.  It is not warm to the touch.  It is about the diameter of a \"golf ball\".  He sinan not remember any trauma to this area.  When he touches this area it hurts \"a bit\". Rates the pain a \"1\".  A: Per guideline can care of skin area at home.  Reviewed care advice with Jeffrey.  R: Write advised to take a picture of skin.  If able to download photo to my chart and send to Dr. Travis. Jeffrey will ask his PCA to help him with this on Monday.  Advised to call back if skin gets worse. Patient verbalized understanding, in agreement with plan, and voiced no further questions.    Jess Vasquez RN, [facility]      Additional Information    Negative: [1] Sudden onset of rash (within last 2 hours) AND [2] difficulty with breathing or swallowing    Negative: Sounds like a life-threatening emergency to the triager    Negative: [1] Localized purple or blood-colored spots or dots AND [2] not from injury or friction AND [3] fever    Negative: Patient sounds very sick or weak to the triager    Negative: [1] Red area or streak AND [2] fever    Negative: [1] Rash is painful to touch AND [2] fever    Negative: [1] Looks infected (spreading redness, pus) AND [2] large red area (> 2 in. or 5 cm)    Negative: [1] Looks infected (spreading redness, pus) AND [2] diabetes mellitus or weak immune system (e.g., HIV positive, cancer chemo, splenectomy, organ transplant, chronic steroids)    Negative: [1] Localized purple or blood-colored spots or dots AND [2] not from injury or friction AND [3] no fever    Negative: [1] Looks infected (spreading redness, pus) AND [2] no fever    Negative: Looks like a boil, infected sore, deep ulcer or other infected rash    Negative: [1] Localized rash is very painful AND [2] no fever    Negative: Genital area rash    Negative: Lyme disease suspected (e.g., bull's eye rash or tick " bite / exposure)    Negative: [1] Applying cream or ointment AND [2] causes severe itch, burning or pain    Negative: [1] Pimples (localized) AND [2 ] no improvement after using CARE ADVICE    Negative: Tender bumps in armpits    Negative: [1] Severe localized itching AND [2] after 2 days of steroid cream    Negative: Localized rash present > 7 days    Mild localized rash    Negative: Red, moist, irritated area between skin folds (or under larger breasts)    Negative: [1] Localized area of skin darkening or thicken on lower legs or ankles AND [2] has NOT been evaluated by a doctor (or NP/PA)    Protocols used: RASH OR REDNESS - [id number]    COVID 19 Nurse Triage Plan/Patient Instructions    Please be aware that novel coronavirus (COVID-19) may be circulating in the community. If you develop symptoms such as fever, cough, or SOB or if you have concerns about the presence of another infection including coronavirus (COVID-19), please contact your health care provider or visit [email or web address].     Disposition/Instructions    Home care recommended. Follow home care protocol based instructions.    Thank you for taking steps to prevent the spread of this virus.  o Limit your contact with others.  o Wear a simple mask to cover your cough.  o Wash your hands well and often.    Resources    [facility]: About COVID-19: [email or web address]    CDC: What to Do If You're Sick: www.cdc.gov/coronavirus/2019-ncov/about/steps-when-sick.html    CDC: Ending Home Isolation: www.cdc.gov/coronavirus/2019-ncov/hcp/disposition-in-home-patients.html     CDC: Caring for Someone: www.cdc.gov/coronavirus/2019-ncov/if-you-are-sick/care-for-someone.html     [facility]: Interim Guidance for Hospital Discharge to Home: www.health.[facility].mn.us/diseases/coronavirus/hcp/hospdischarge.pdf    [facility] clinical trials (COVID-19 research studies): [email or web address].[facility]/umn-clinical-trials     Below are the COVID-19 hotlines  at the [place] Department of Health ([facility]). Interpreters are available.   o For health questions: Call 274-681-2477 or 1-356.544.5081 (7 a.m. to 7 p.m.)  o For questions about schools and childcare: Call 582-136-7883 or 1-153.941.8955 (7 a.m. to 7 p.m.)

## 2020-08-17 ENCOUNTER — MYC MEDICAL ADVICE (OUTPATIENT)
Dept: FAMILY MEDICINE | Facility: CLINIC | Age: 65
End: 2020-08-17

## 2020-08-17 NOTE — TELEPHONE ENCOUNTER
Spoke with pt and he will monitor for more bruises and if current bruise gets larger or warm to touch.  PatyRWOODY

## 2020-08-18 ENCOUNTER — TELEPHONE (OUTPATIENT)
Dept: FAMILY MEDICINE | Facility: CLINIC | Age: 65
End: 2020-08-18

## 2020-08-18 DIAGNOSIS — F32.A DEPRESSION, UNSPECIFIED DEPRESSION TYPE: ICD-10-CM

## 2020-08-18 RX ORDER — DULOXETIN HYDROCHLORIDE 60 MG/1
CAPSULE, DELAYED RELEASE ORAL
Qty: 60 CAPSULE | Refills: 11 | Status: SHIPPED | OUTPATIENT
Start: 2020-08-18 | End: 2021-08-12

## 2020-08-18 NOTE — TELEPHONE ENCOUNTER
"Reason for Call:  Medication or medication refill:    Do you use a [facility]?  Name of the pharmacy and phone number for the current request:  [facility] [phone]    Name of the medication requested: Cymbalta (prescription has already been requested by Tessa Sanchez)    Other request: Needs medication by 8/19/2020 morning or he \"will be without this psych medication and he cannot be without it\".    Can we leave a detailed message on this number? YES    Phone number patient can be reached at: Home number on file [phone] (home)    Best Time: Any    Call taken on 8/18/2020 at 10:47 AM by Libby Washington        "

## 2020-08-18 NOTE — TELEPHONE ENCOUNTER
"Routing refill request to provider for review/approval because:  PHQ-9 scores elevated.    PHQ-9 score:    PHQ 7/17/2020   PHQ-9 Total Score 18   Q9: Thoughts of better off dead/self-harm past 2 weeks Not at all               Reason for Call:  Medication or medication refill:     Do you use a [place] [facility]?  Name of the pharmacy and phone number for the current request:  [facility] [phone]     Name of the medication requested: Cymbalta (prescription has already been requested by Tessa Sanchez)     Other request: Needs medication by 8/19/2020 morning or he \"will be without this psych medication and he cannot be without it\".     Can we leave a detailed message on this number? YES     Phone number patient can be reached at: Home number on file [phone] (home)     Best Time: Any     Call taken on 8/18/2020 at 10:47 AM by Libby Washington     "

## 2020-08-18 NOTE — TELEPHONE ENCOUNTER
"Requested Prescriptions   Pending Prescriptions Disp Refills     DULoxetine (CYMBALTA) 60 MG capsule [Pharmacy Med Name: duloxetine 60 mg capsule,delayed release] 60 capsule 11     Sig: TAKE 2 CAPSULES BY MOUTH EVERY DAY       Serotonin-Norepinephrine Reuptake Inhibitors  Failed - 8/18/2020  9:16 AM        Failed - PHQ-9 score of less than 5 in past 6 months     Please review last PHQ-9 score.   EVGENY-7 SCORE 6/12/2020 7/3/2020 7/17/2020   Total Score - - -   Total Score 12 13 12     PHQ 6/12/2020 7/3/2020 7/17/2020   PHQ-9 Total Score 17 14 18   Q9: Thoughts of better off dead/self-harm past 2 weeks Not at all Not at all Not at all             Passed - Blood pressure under 140/90 in past 12 months     BP Readings from Last 3 Encounters:   08/14/20 114/60   08/07/20 133/67   03/13/20 (!) 152/71                 Passed - Medication is active on med list        Passed - Patient is age 18 or older        Passed - Recent (6 mo) or future (30 days) visit within the authorizing provider's specialty     Patient had office visit in the last 6 months or has a visit in the next 30 days with authorizing provider or within the authorizing provider's specialty.  See \"Patient Info\" tab in inbasket, or \"Choose Columns\" in Meds & Orders section of the refill encounter.                 "

## 2020-08-20 ENCOUNTER — TELEPHONE (OUTPATIENT)
Dept: FAMILY MEDICINE | Facility: CLINIC | Age: 65
End: 2020-08-20

## 2020-08-20 NOTE — TELEPHONE ENCOUNTER
Reason for Call:  Verbal order for Home Care    Detailed comments: Adrianne from [facility] is calling and stating that she needs VO for re-certification for SN visits. 1 time a week for 9 weeks, and 1 prn visit. Please call [phone] to call for VO. You can leave a message, confidentjagjit Cook  [facility] [place]     Call taken on 8/20/2020 at 11:41 AM by Libby Peterson

## 2020-08-20 NOTE — TELEPHONE ENCOUNTER
[facility]    - Paperwork placed in forms box.    Libby Cook  [facility] [place]

## 2020-08-21 ENCOUNTER — VIRTUAL VISIT (OUTPATIENT)
Dept: PSYCHOLOGY | Facility: CLINIC | Age: 65
End: 2020-08-21
Payer: COMMERCIAL

## 2020-08-21 ENCOUNTER — MEDICAL CORRESPONDENCE (OUTPATIENT)
Dept: HEALTH INFORMATION MANAGEMENT | Facility: CLINIC | Age: 65
End: 2020-08-21

## 2020-08-21 DIAGNOSIS — F33.1 MAJOR DEPRESSIVE DISORDER, RECURRENT EPISODE, MODERATE (H): Primary | ICD-10-CM

## 2020-08-21 DIAGNOSIS — F41.1 GENERALIZED ANXIETY DISORDER: ICD-10-CM

## 2020-08-21 DIAGNOSIS — F43.10 POSTTRAUMATIC STRESS DISORDER: ICD-10-CM

## 2020-08-21 PROCEDURE — 90832 PSYTX W PT 30 MINUTES: CPT | Mod: 95 | Performed by: SOCIAL WORKER

## 2020-08-21 ASSESSMENT — PATIENT HEALTH QUESTIONNAIRE - PHQ9
5. POOR APPETITE OR OVEREATING: NOT AT ALL
SUM OF ALL RESPONSES TO PHQ QUESTIONS 1-9: 17

## 2020-08-21 ASSESSMENT — ANXIETY QUESTIONNAIRES
2. NOT BEING ABLE TO STOP OR CONTROL WORRYING: MORE THAN HALF THE DAYS
6. BECOMING EASILY ANNOYED OR IRRITABLE: NEARLY EVERY DAY
IF YOU CHECKED OFF ANY PROBLEMS ON THIS QUESTIONNAIRE, HOW DIFFICULT HAVE THESE PROBLEMS MADE IT FOR YOU TO DO YOUR WORK, TAKE CARE OF THINGS AT HOME, OR GET ALONG WITH OTHER PEOPLE: VERY DIFFICULT
3. WORRYING TOO MUCH ABOUT DIFFERENT THINGS: MORE THAN HALF THE DAYS
GAD7 TOTAL SCORE: 12
7. FEELING AFRAID AS IF SOMETHING AWFUL MIGHT HAPPEN: NOT AT ALL
5. BEING SO RESTLESS THAT IT IS HARD TO SIT STILL: MORE THAN HALF THE DAYS
1. FEELING NERVOUS, ANXIOUS, OR ON EDGE: NEARLY EVERY DAY

## 2020-08-21 NOTE — PROGRESS NOTES
"                                           Progress Note    Patient Name: Melquiades Morales  Date: 8/21/20         Service Type: Individual      Session Start Time: 9 am  Session End Time: 9:30 am     Session Length: 30 min    Session #: 129    Attendees: Client attended alone    Service Modality:  Phone Visit:    The patient has been notified of the following:      \"We have found that certain health care needs can be provided without the need for a face to face visit.  This service lets us provide the care you need with a phone conversation.       I will have full access to your [facility] medical record during this entire phone call.   I will be taking notes for your medical record.      Since this is like an office visit, we will bill your insurance company for this service.       There are potential benefits and risks of telephone visits (e.g. limits to patient confidentiality) that differ from in-person visits.?  Confidentiality still applies for telephone services, and nobody will record the visit.  It is important to be in a quiet, private space that is free of distractions (including cell phone or other devices) during the visit.??      If during the course of the call I believe a telephone visit is not appropriate, you will not be charged for this service\"     Consent has been obtained for this service by care team member: Yes      Treatment Plan Last Reviewed: due 10/3/20  PHQ-9 / SHAILESH-7 : phq=17; shailesh=12.    DATA  Interactive Complexity: No  Crisis: No      Progress Since Last Session (Related to Symptoms / Goals / Homework):   Symptoms: stable    Homework: Partially completed: use a healthy coping technique as needed.     Episode of Care Goals: Minimal progress - PREPARATION (Decided to change - considering how); Intervened by negotiating a change plan and determining options / strategies for behavior change, identifying triggers, exploring social supports, and working towards setting a date to begin " behavior change.    Current / Ongoing Stressors and Concerns:  Ex wife's health. Financial. He missed his last apt due to being in the hospital for pneumonia; he is still battling it today; thus our short session. It has been hard on him not being able to see his ex; hard on both he reports. He has a pca who is helpful.  He has a spot on his lung that he reports doctors are unable to explain.    Treatment Objective(s) Addressed in This Session:   practice a distraction technique as needed 100% of trials for 2 weeks    Follow safety plan.     Intervention:  Assessed functioning and for safety. Went over the results of the phq/shailesh. Processed feelings about impact of pneumonia on his life. Reviewed healthy coping ideas.        ASSESSMENT: Current Emotional / Mental Status (status of significant symptoms):   Risk status (Self / Other harm or suicidal ideation)   Patient denies current fears or concerns for personal safety.   Patient denies current or recent suicidal ideation or behaviors.   Patient denies current or recent homicidal ideation or behaviors.   Patient denies current or recent self injurious behavior or ideation.   Patient denies other safety concerns.   Patient reports there has been no change in risk factors since their last session.     Patient reports there has been no change in protective factors since their last session.     A safety and risk management plan has been developed including: Patient consented to co-developed safety plan.  Safety and risk management plan was completed.  Patient agreed to use safety plan should any safety concerns arise.  A copy was given to the patient.         Appearance:    Unable to assess.                                Eye Contact: Unable to assess.                                     Psychomotor Behavior: Unable to assess.              Attitude: Cooperative               Orientation: All              Speech                   Rate / Production:       Normal                Volume:  soft              Mood:  depressed               Affect:  Appropriate               Thought Content:  Clear               ThoughtForm:  Coherent,  Logical               Insight: Fair/Good     Medication Review:  No changes to current psychiatric medication(s). PCP Dr. Jignesh Travis. Psych medications: cymbalta 60 mg.     Medication Compliance:   Yes     Changes in Health Issues:   None reported    Chemical Use Review:  Substance Use: Chemical use reviewed, no active concerns identified     Tobacco Use: No change in amount of tobacco use since last session.  Provided encouragement to quit     Diagnosis:  Ptsd; shailesh; major depression.    Collateral Reports Completed:   Routed note to PCP    PLAN: (Patient Tasks / Therapist Tasks / Other)  Schedule biweekly. Practice distraction ideas and other coping ideas. Utilize support network. Use safety plan as needed. Practice gratitude. Use emdr to address chronic pain when he is ready. He admits to forgetting to work on the letter to son Nader (not to send)-same. Goals due 10/3/20.        Kleber Pollack, [facility]                                                         ______________________________________________________________________    Treatment Plan    Patient's Name: Melquiades Morales  YOB: 1955    Date: 8/23/13    DSM5 Diagnoses: 296.32 (F33.1) Major Depressive Disorder, Recurrent Episode, Moderate _ and With anxious distress, 300.02 (F41.1) Generalized Anxiety Disorder or 309.81 (F43.10) Posttraumatic Stress Disorder (includes Posttraumatic Stress Disorder for Children 6 Years and Younger)  Without dissociative symptoms  Psychosocial / Contextual Factors: ; she is in poor health and relies on client a lot. Financial.   WHODAS: already completed.    Referral / Collaboration:  Referral to another professional/service is not indicated at this time.    Anticipated number of session or this episode of care: 15+          Treatment  "Goal(s)  Start Date Goal Dates  Reviewed Status    8/2/13 Goal 1:  Client will report coping better.       New     \"  \" Objective #1A (Client Action)  Client will process feelings related to current situations and work on coming up with solutions.     Intervention(s)  Therapist will encourage and help problem solve.    11/1/13  2/7/14  5/9/14  8/29/14  12/19/14  5/13/15  8/29/15  11/13/15  2/26/16  6/17/16  10/7/16  1/6/17  4/28/17  7/21/17  10/20/17  1/19/18  4/20/18  7/27/18  10/26/18  3/15/19  6/21/19  9/13/19  12/13/19  4/10/20  7/3/20 New  Continued  \"  \"  \"  \"  \"  \"  \"  \"     \"  \" Objective #1B  Client will use a coping technique 100% of trials for 4 weeks.     Intervention(s)  Therapist came up with a list of ideas with client's input.     He likes: prayer; fishing; talking to friends; time with his dog. 11/1/13   2/7/14  5/29/14  8/29/14  12/19/14  5/13/15  8/7/15  11/13/15  2/26/16  6/17/16  10/7/16  1/6/17  4/28/17  7/21/17  10/20/17  1/19/18  4/20/18  7/27/18  10/26/18  3/15/19  6/21/19  9/13/19  12/13/19  4/10/20 continued  \"  \"  \"  \"  \"  \"  \"  \"  \"     \"  \" Objective #1C  Client will process feelings related to his wife's failing health.     Intervention(s)   educate on grief.    11/1/13  2/7/14  5/9/14  8/29/14  12/19/14  5/13/15  8/7/15  11/13/15  2/26/16  6/17/16  10/7/16  1/6/17  4/28/17  7/21/17  10/20/17  1/19/18  4/20/18  7/27/18  10/26/18  3/15/19  6/21/19  9/13/19  12/13/19  4/10/20  7/3/20 continued  \"  \"  \"  \"  \"  \"  \"  \"  \"                  Start Date Goal Dates  Reviewed Status     12/6/13 Goal 4: Report coping better (continued).          new     \"  \" Objective #2A (Client Action)  Client will follow his safety plan as needed.     Intervention(s) (Therapist Action)           2/7/14  5/9/14  8/29/14  12/19/14  5/13/15  8/7/15  11/13/15  2/26/16  6/17/16  10/7/16  1/6/17  4/28/17  7/21/17 10/20/17  1/19/18  4/20/18  7/27/18  10/26/18  3/15/19  6/21/19  9/13/19  12/13/19  4/10/20  7/3/20 " "New  Continued  \"  \"  \"  \"  \"  \"  \"  \"      Objective #2B  Client will reprocess chronic pain by addressing the negative cognition until no longer feeling true and upsetting.     Intervention(s)   EMDR       New  4/26/19  6/21/19  9/13/19  12/13/19  4/10/20  7/3/20      Objective #2C         Intervention(s)                             Client has reviewed and agreed to the above plan.     Kleber Pollack, [facility]                August 2, 2013     "

## 2020-08-22 ASSESSMENT — ANXIETY QUESTIONNAIRES: GAD7 TOTAL SCORE: 12

## 2020-08-24 DIAGNOSIS — J40 BRONCHITIS: ICD-10-CM

## 2020-08-24 NOTE — TELEPHONE ENCOUNTER
Routing refill request to provider for review/approval because:  Drug interaction warning  Jojo Garcia RNC

## 2020-08-24 NOTE — TELEPHONE ENCOUNTER
Patient is out of this medication. Please expedite.  [facility]  [facility]

## 2020-08-31 ENCOUNTER — MEDICAL CORRESPONDENCE (OUTPATIENT)
Dept: HEALTH INFORMATION MANAGEMENT | Facility: CLINIC | Age: 65
End: 2020-08-31

## 2020-08-31 ENCOUNTER — TELEPHONE (OUTPATIENT)
Dept: FAMILY MEDICINE | Facility: CLINIC | Age: 65
End: 2020-08-31

## 2020-08-31 DIAGNOSIS — Z53.9 DIAGNOSIS NOT YET DEFINED: Primary | ICD-10-CM

## 2020-08-31 PROCEDURE — G0179 MD RECERTIFICATION HHA PT: HCPCS | Performed by: FAMILY MEDICINE

## 2020-08-31 RX ORDER — ALBUTEROL SULFATE 90 UG/1
2 AEROSOL, METERED RESPIRATORY (INHALATION) EVERY 4 HOURS PRN
Qty: 1 INHALER | Refills: 11 | Status: SHIPPED | OUTPATIENT
Start: 2020-08-31 | End: 2021-10-05

## 2020-08-31 NOTE — TELEPHONE ENCOUNTER
Productive cough every couple hours.  Pt has mucous in sterile spec cup that he wants to go to lab.  Orders?  Pt was Inpt for pneumonia on 8/5/20 - 8/7/20.  Last seen in clinic for hosp f/u on 8/14/20.  Pt states he finished abx's about 1 week ago.  No fever.  Advise.  Wil

## 2020-08-31 NOTE — TELEPHONE ENCOUNTER
amanda thompson-- physical therapy [facility] called asking for verbal orders for physical therapy-- lower back pain.     Frequency--2x for 2 weeks and 1x a week for 4 weeks.     Josie ALBERTO  Station

## 2020-08-31 NOTE — TELEPHONE ENCOUNTER
Reason for call:    Symptom or request:     Patient called stating that he is coughing up discharge. Was able to collect a sample, wants it analyzed.           Best Time:  any    Can we leave a detailed message on this number?  YES     Josie ALBERTO  Station

## 2020-08-31 NOTE — TELEPHONE ENCOUNTER
Informed pt not to save mucous.  Pt to follow-up if coughing gets worse and/or running a fever. KPavelRN

## 2020-09-03 ENCOUNTER — VIRTUAL VISIT (OUTPATIENT)
Dept: PSYCHOLOGY | Facility: CLINIC | Age: 65
End: 2020-09-03
Payer: COMMERCIAL

## 2020-09-03 DIAGNOSIS — F43.10 POSTTRAUMATIC STRESS DISORDER: Primary | ICD-10-CM

## 2020-09-03 DIAGNOSIS — F41.1 GENERALIZED ANXIETY DISORDER: ICD-10-CM

## 2020-09-03 DIAGNOSIS — F33.1 MAJOR DEPRESSIVE DISORDER, RECURRENT EPISODE, MODERATE (H): ICD-10-CM

## 2020-09-03 PROCEDURE — 90834 PSYTX W PT 45 MINUTES: CPT | Mod: 95 | Performed by: SOCIAL WORKER

## 2020-09-03 ASSESSMENT — ANXIETY QUESTIONNAIRES
3. WORRYING TOO MUCH ABOUT DIFFERENT THINGS: MORE THAN HALF THE DAYS
GAD7 TOTAL SCORE: 10
IF YOU CHECKED OFF ANY PROBLEMS ON THIS QUESTIONNAIRE, HOW DIFFICULT HAVE THESE PROBLEMS MADE IT FOR YOU TO DO YOUR WORK, TAKE CARE OF THINGS AT HOME, OR GET ALONG WITH OTHER PEOPLE: VERY DIFFICULT
6. BECOMING EASILY ANNOYED OR IRRITABLE: NEARLY EVERY DAY
2. NOT BEING ABLE TO STOP OR CONTROL WORRYING: MORE THAN HALF THE DAYS
1. FEELING NERVOUS, ANXIOUS, OR ON EDGE: MORE THAN HALF THE DAYS
5. BEING SO RESTLESS THAT IT IS HARD TO SIT STILL: SEVERAL DAYS
7. FEELING AFRAID AS IF SOMETHING AWFUL MIGHT HAPPEN: NOT AT ALL

## 2020-09-03 ASSESSMENT — PATIENT HEALTH QUESTIONNAIRE - PHQ9
5. POOR APPETITE OR OVEREATING: NOT AT ALL
SUM OF ALL RESPONSES TO PHQ QUESTIONS 1-9: 19

## 2020-09-03 NOTE — PROGRESS NOTES
"                                           Progress Note    Patient Name: Melquiades Morales  Date: 9/3/20         Service Type: Individual      Session Start Time: 9 am  Session End Time: 9:45 am     Session Length: 45 min    Session #: 130    Attendees: Client attended alone    Service Modality:  Phone Visit:    The patient has been notified of the following:      \"We have found that certain health care needs can be provided without the need for a face to face visit.  This service lets us provide the care you need with a phone conversation.       I will have full access to your [place] medical record during this entire phone call.   I will be taking notes for your medical record.      Since this is like an office visit, we will bill your insurance company for this service.       There are potential benefits and risks of telephone visits (e.g. limits to patient confidentiality) that differ from in-person visits.?  Confidentiality still applies for telephone services, and nobody will record the visit.  It is important to be in a quiet, private space that is free of distractions (including cell phone or other devices) during the visit.??      If during the course of the call I believe a telephone visit is not appropriate, you will not be charged for this service\"     Consent has been obtained for this service by care team member: Yes      Treatment Plan Last Reviewed: due 10/3/20  PHQ-9 / SHAILESH-7 : phq=19; shailesh=10.    DATA  Interactive Complexity: No  Crisis: No      Progress Since Last Session (Related to Symptoms / Goals / Homework):   Symptoms: stable    Homework: Partially completed: use a healthy coping technique as needed.     Episode of Care Goals: Minimal progress - PREPARATION (Decided to change - considering how); Intervened by negotiating a change plan and determining options / strategies for behavior change, identifying triggers, exploring social supports, and working towards setting a date to begin behavior " change.    Current / Ongoing Stressors and Concerns:  Ex wife's health. Financial.    He has a pca who is helpful.  He has a spot on his lung that he reports doctors are unable to explain.    Treatment Objective(s) Addressed in This Session:   practice a distraction technique as needed 100% of trials for 2 weeks    Follow safety plan.     Intervention:  Assessed functioning and for safety. Went over the results of the phq/shailesh. Processed feelings about impact of pneumonia on his life. Processed feelings about roommate not paying him rent. Educated on boundaries and conflict resolution. Reviewed healthy coping ideas.        ASSESSMENT: Current Emotional / Mental Status (status of significant symptoms):   Risk status (Self / Other harm or suicidal ideation)   Patient denies current fears or concerns for personal safety.   Patient denies current or recent suicidal ideation or behaviors.   Patient denies current or recent homicidal ideation or behaviors.   Patient denies current or recent self injurious behavior or ideation.   Patient denies other safety concerns.   Patient reports there has been no change in risk factors since their last session.     Patient reports there has been no change in protective factors since their last session.     A safety and risk management plan has been developed including: Patient consented to co-developed safety plan.  Safety and risk management plan was completed.  Patient agreed to use safety plan should any safety concerns arise.  A copy was given to the patient.         Appearance:    Unable to assess.                                Eye Contact: Unable to assess.                                     Psychomotor Behavior: Unable to assess.              Attitude: Cooperative               Orientation: All              Speech                   Rate / Production:       Normal               Volume:  normal              Mood:  depressed               Affect:  Appropriate                Thought Content:  Clear               ThoughtForm:  Coherent,  Logical               Insight: Fair/Good     Medication Review:  No changes to current psychiatric medication(s). PCP Dr. Jignesh Travis. Psych medications: cymbalta 60 mg.     Medication Compliance:   Yes     Changes in Health Issues:   None reported    Chemical Use Review:  Substance Use: Chemical use reviewed, no active concerns identified     Tobacco Use: No change in amount of tobacco use since last session.  Provided encouragement to quit     Diagnosis:  Ptsd; shailesh; major depression.    Collateral Reports Completed:   Routed note to PCP    PLAN: (Patient Tasks / Therapist Tasks / Other)  Schedule biweekly. Practice distraction ideas and other coping ideas. Utilize support network. Use safety plan as needed. Practice gratitude. Use emdr to address chronic pain when he is ready. He admits to forgetting to work on the letter to son Nader (not to send)-same. Goals due 10/3/20.        Kleber Pollack, LICSW                                                         ______________________________________________________________________    Treatment Plan    Patient's Name: Melquiades Morales  YOB: 1955    Date: 8/23/13    DSM5 Diagnoses: 296.32 (F33.1) Major Depressive Disorder, Recurrent Episode, Moderate _ and With anxious distress, 300.02 (F41.1) Generalized Anxiety Disorder or 309.81 (F43.10) Posttraumatic Stress Disorder (includes Posttraumatic Stress Disorder for Children 6 Years and Younger)  Without dissociative symptoms  Psychosocial / Contextual Factors: ; she is in poor health and relies on client a lot. Financial.   WHODAS: already completed.    Referral / Collaboration:  Referral to another professional/service is not indicated at this time.    Anticipated number of session or this episode of care: 15+          Treatment Goal(s)  Start Date Goal Dates  Reviewed Status    8/2/13 Goal 1:  Client will report coping better.        "New     \"  \" Objective #1A (Client Action)  Client will process feelings related to current situations and work on coming up with solutions.     Intervention(s)  Therapist will encourage and help problem solve.    11/1/13  2/7/14  5/9/14  8/29/14  12/19/14  5/13/15  8/29/15  11/13/15  2/26/16  6/17/16  10/7/16  1/6/17  4/28/17  7/21/17  10/20/17  1/19/18  4/20/18  7/27/18  10/26/18  3/15/19  6/21/19  9/13/19  12/13/19  4/10/20  7/3/20 New  Continued  \"  \"  \"  \"  \"  \"  \"  \"     \"  \" Objective #1B  Client will use a coping technique 100% of trials for 4 weeks.     Intervention(s)  Therapist came up with a list of ideas with client's input.     He likes: prayer; fishing; talking to friends; time with his dog. 11/1/13   2/7/14  5/29/14  8/29/14  12/19/14  5/13/15  8/7/15  11/13/15  2/26/16  6/17/16  10/7/16  1/6/17  4/28/17  7/21/17  10/20/17  1/19/18  4/20/18  7/27/18  10/26/18  3/15/19  6/21/19  9/13/19  12/13/19  4/10/20 continued  \"  \"  \"  \"  \"  \"  \"  \"  \"     \"  \" Objective #1C  Client will process feelings related to his wife's failing health.     Intervention(s)   educate on grief.    11/1/13  2/7/14  5/9/14  8/29/14  12/19/14  5/13/15  8/7/15  11/13/15  2/26/16  6/17/16  10/7/16  1/6/17  4/28/17  7/21/17  10/20/17  1/19/18  4/20/18  7/27/18  10/26/18  3/15/19  6/21/19  9/13/19  12/13/19  4/10/20  7/3/20 continued  \"  \"  \"  \"  \"  \"  \"  \"  \"                  Start Date Goal Dates  Reviewed Status     12/6/13 Goal 4: Report coping better (continued).          new     \"  \" Objective #2A (Client Action)  Client will follow his safety plan as needed.     Intervention(s) (Therapist Action)           2/7/14  5/9/14  8/29/14  12/19/14  5/13/15  8/7/15  11/13/15  2/26/16  6/17/16  10/7/16  1/6/17  4/28/17  7/21/17 10/20/17  1/19/18  4/20/18  7/27/18  10/26/18  3/15/19  6/21/19  9/13/19  12/13/19  4/10/20  7/3/20 New  Continued  \"  \"  \"  \"  \"  \"  \"  \"      Objective #2B  Client will reprocess chronic pain by addressing the " negative cognition until no longer feeling true and upsetting.     Intervention(s)   EMDR       New  4/26/19  6/21/19  9/13/19  12/13/19  4/10/20  7/3/20      Objective #2C         Intervention(s)                             Client has reviewed and agreed to the above plan.     Kleber Pollack, [facility]                August 2, 2013

## 2020-09-04 ASSESSMENT — ANXIETY QUESTIONNAIRES: GAD7 TOTAL SCORE: 10

## 2020-09-14 ENCOUNTER — MEDICAL CORRESPONDENCE (OUTPATIENT)
Dept: HEALTH INFORMATION MANAGEMENT | Facility: CLINIC | Age: 65
End: 2020-09-14

## 2020-09-18 ENCOUNTER — VIRTUAL VISIT (OUTPATIENT)
Dept: PSYCHOLOGY | Facility: CLINIC | Age: 65
End: 2020-09-18
Payer: COMMERCIAL

## 2020-09-18 DIAGNOSIS — F41.1 GENERALIZED ANXIETY DISORDER: ICD-10-CM

## 2020-09-18 DIAGNOSIS — F33.1 MAJOR DEPRESSIVE DISORDER, RECURRENT EPISODE, MODERATE (H): ICD-10-CM

## 2020-09-18 DIAGNOSIS — F43.10 POSTTRAUMATIC STRESS DISORDER: Primary | ICD-10-CM

## 2020-09-18 PROCEDURE — 90834 PSYTX W PT 45 MINUTES: CPT | Mod: 95 | Performed by: SOCIAL WORKER

## 2020-09-18 ASSESSMENT — ANXIETY QUESTIONNAIRES
6. BECOMING EASILY ANNOYED OR IRRITABLE: NEARLY EVERY DAY
3. WORRYING TOO MUCH ABOUT DIFFERENT THINGS: MORE THAN HALF THE DAYS
5. BEING SO RESTLESS THAT IT IS HARD TO SIT STILL: MORE THAN HALF THE DAYS
1. FEELING NERVOUS, ANXIOUS, OR ON EDGE: NEARLY EVERY DAY
GAD7 TOTAL SCORE: 13
2. NOT BEING ABLE TO STOP OR CONTROL WORRYING: MORE THAN HALF THE DAYS
7. FEELING AFRAID AS IF SOMETHING AWFUL MIGHT HAPPEN: NOT AT ALL
IF YOU CHECKED OFF ANY PROBLEMS ON THIS QUESTIONNAIRE, HOW DIFFICULT HAVE THESE PROBLEMS MADE IT FOR YOU TO DO YOUR WORK, TAKE CARE OF THINGS AT HOME, OR GET ALONG WITH OTHER PEOPLE: VERY DIFFICULT

## 2020-09-18 ASSESSMENT — PATIENT HEALTH QUESTIONNAIRE - PHQ9
SUM OF ALL RESPONSES TO PHQ QUESTIONS 1-9: 13
5. POOR APPETITE OR OVEREATING: SEVERAL DAYS

## 2020-09-18 NOTE — PROGRESS NOTES
"                                           Progress Note    Patient Name: Melquiades Morales  Date: 9/18/20         Service Type: Individual      Session Start Time: 9 am  Session End Time: 9:45 am     Session Length: 45 min    Session #: 131    Attendees: Client attended alone    Service Modality:  Phone Visit:    The patient has been notified of the following:      \"We have found that certain health care needs can be provided without the need for a face to face visit.  This service lets us provide the care you need with a phone conversation.       I will have full access to your [place] medical record during this entire phone call.   I will be taking notes for your medical record.      Since this is like an office visit, we will bill your insurance company for this service.       There are potential benefits and risks of telephone visits (e.g. limits to patient confidentiality) that differ from in-person visits.?  Confidentiality still applies for telephone services, and nobody will record the visit.  It is important to be in a quiet, private space that is free of distractions (including cell phone or other devices) during the visit.??      If during the course of the call I believe a telephone visit is not appropriate, you will not be charged for this service\"     Consent has been obtained for this service by care team member: Yes      Treatment Plan Last Reviewed: due 10/3/20  PHQ-9 / SHAILESH-7 : phq=13; shailesh=13.    DATA  Interactive Complexity: No  Crisis: No      Progress Since Last Session (Related to Symptoms / Goals / Homework):   Symptoms: stable    Homework: Partially completed: use a healthy coping technique as needed. New: use peaceful place picture whenever beginning to feel upset/anxious.     Episode of Care Goals: Minimal progress - PREPARATION (Decided to change - considering how); Intervened by negotiating a change plan and determining options / strategies for behavior change, identifying triggers, " "exploring social supports, and working towards setting a date to begin behavior change.    Current / Ongoing Stressors and Concerns:  Ex wife's health. Financial.    He has a pca who is helpful.  He has a spot on his lung that he reports doctors are unable to explain.  He has been having panic attacks he believes connected to time he had gran mal seizure and was brought to the hospital by ambulance. He had a panic attack when they tried to put him on a ventilator and he thought he was going to die. He has been working on this on his own I found out today by processing the event from start to finish and then bringing himself to relaxation.    Treatment Objective(s) Addressed in This Session:   practice a distraction technique as needed 100% of trials for 2 weeks. New: use peaceful place picture.  Follow safety plan.     Intervention:  Assessed functioning and for safety. Went over the results of the phq/shailesh. Processed feelings about impact of ambulance trip and ensuing panic attack. educated on emdr. Identified the primary pos and neg cognitions for this and completed the emdr worksheet. Reviewed healthy coping ideas.        ASSESSMENT: Current Emotional / Mental Status (status of significant symptoms):   Risk status (Self / Other harm or suicidal ideation)   Patient denies current fears or concerns for personal safety.   Patient denies current or recent suicidal ideation or behaviors.today when asked he stated \"I don't believe in that shit no more\"-9/18/20.   Patient denies current or recent homicidal ideation or behaviors.   Patient denies current or recent self injurious behavior or ideation.   Patient denies other safety concerns.   Patient reports there has been no change in risk factors since their last session.     Patient reports there has been no change in protective factors since their last session.     A safety and risk management plan has been developed including: Patient consented to co-developed safety " plan.  Safety and risk management plan was completed.  Patient agreed to use safety plan should any safety concerns arise.  A copy was given to the patient.         Appearance:    Unable to assess.                                Eye Contact: Unable to assess.                                     Psychomotor Behavior: Unable to assess.              Attitude: Cooperative               Orientation: All              Speech                   Rate / Production:  Normal               Volume:  normal              Mood:  depressed               Affect:  Appropriate               Thought Content:  Clear               ThoughtForm:  Coherent,  Logical               Insight: Fair/Good     Medication Review:  No changes to current psychiatric medication(s). PCP Dr. Jignesh Travis. Psych medications: cymbalta 60 mg.     Medication Compliance:   Yes     Changes in Health Issues:   None reported    Chemical Use Review:  Substance Use: Chemical use reviewed, no active concerns identified     Tobacco Use: No change in amount of tobacco use since last session.  Provided encouragement to quit     Diagnosis:  Ptsd; shailesh; major depression.    Collateral Reports Completed:   Routed note to PCP    PLAN: (Patient Tasks / Therapist Tasks / Other)  Schedule biweekly. Practice distraction ideas and other coping ideas. Utilize support network. Use safety plan as needed. Practice gratitude and peaceful place picture. Use emdr to address ambulance experience. He admits to forgetting to work on the letter to clifford Doe (not to send)-same. Goals due 10/3/20.        Kleber Pollack, [facility]                                                         ______________________________________________________________________    Treatment Plan    Patient's Name: Melquiades Morales  YOB: 1955    Date: 8/23/13    DSM5 Diagnoses: 296.32 (F33.1) Major Depressive Disorder, Recurrent Episode, Moderate _ and With anxious distress, 300.02 (F41.1) Generalized  "Anxiety Disorder or 309.81 (F43.10) Posttraumatic Stress Disorder (includes Posttraumatic Stress Disorder for Children 6 Years and Younger)  Without dissociative symptoms  Psychosocial / Contextual Factors: ; she is in poor health and relies on client a lot. Financial.   WHODAS: already completed.    Referral / Collaboration:  Referral to another professional/service is not indicated at this time.    Anticipated number of session or this episode of care: 15+          Treatment Goal(s)  Start Date Goal Dates  Reviewed Status    8/2/13 Goal 1:  Client will report coping better.       New     \"  \" Objective #1A (Client Action)  Client will process feelings related to current situations and work on coming up with solutions.     Intervention(s)  Therapist will encourage and help problem solve.    11/1/13  2/7/14  5/9/14  8/29/14  12/19/14  5/13/15  8/29/15  11/13/15  2/26/16  6/17/16  10/7/16  1/6/17  4/28/17  7/21/17  10/20/17  1/19/18  4/20/18  7/27/18  10/26/18  3/15/19  6/21/19  9/13/19  12/13/19  4/10/20  7/3/20 New  Continued  \"  \"  \"  \"  \"  \"  \"  \"     \"  \" Objective #1B  Client will use a coping technique 100% of trials for 4 weeks.     Intervention(s)  Therapist came up with a list of ideas with client's input.     He likes: prayer; fishing; talking to friends; time with his dog. 11/1/13   2/7/14  5/29/14  8/29/14  12/19/14  5/13/15  8/7/15  11/13/15  2/26/16  6/17/16  10/7/16  1/6/17  4/28/17  7/21/17  10/20/17  1/19/18  4/20/18  7/27/18  10/26/18  3/15/19  6/21/19  9/13/19  12/13/19  4/10/20 continued  \"  \"  \"  \"  \"  \"  \"  \"  \"     \"  \" Objective #1C  Client will process feelings related to his wife's failing health.     Intervention(s)   educate on grief.    11/1/13  2/7/14  5/9/14  8/29/14  12/19/14  5/13/15  8/7/15  11/13/15  2/26/16  6/17/16  10/7/16  1/6/17  4/28/17  7/21/17  10/20/17  1/19/18  4/20/18  7/27/18  10/26/18  3/15/19  6/21/19  9/13/19  12/13/19  4/10/20  7/3/20 " "continued  \"  \"  \"  \"  \"  \"  \"  \"  \"                  Start Date Goal Dates  Reviewed Status     12/6/13 Goal 4: Report coping better (continued).          new     \"  \" Objective #2A (Client Action)  Client will follow his safety plan as needed.     Intervention(s) (Therapist Action)           2/7/14  5/9/14  8/29/14  12/19/14  5/13/15  8/7/15  11/13/15  2/26/16  6/17/16  10/7/16  1/6/17  4/28/17  7/21/17 10/20/17  1/19/18  4/20/18  7/27/18  10/26/18  3/15/19  6/21/19  9/13/19  12/13/19  4/10/20  7/3/20 New  Continued  \"  \"  \"  \"  \"  \"  \"  \"      Objective #2B  Client will reprocess chronic pain by addressing the negative cognition until no longer feeling true and upsetting.     Intervention(s)   EMDR       New  4/26/19  6/21/19  9/13/19  12/13/19  4/10/20  7/3/20      Objective #2C         Intervention(s)                             Client has reviewed and agreed to the above plan.     Kleber Pollack, [facility]                August 2, 2013     "

## 2020-09-19 ASSESSMENT — ANXIETY QUESTIONNAIRES: GAD7 TOTAL SCORE: 13

## 2020-10-02 ENCOUNTER — VIRTUAL VISIT (OUTPATIENT)
Dept: PSYCHOLOGY | Facility: CLINIC | Age: 65
End: 2020-10-02
Payer: COMMERCIAL

## 2020-10-02 DIAGNOSIS — F33.1 MAJOR DEPRESSIVE DISORDER, RECURRENT EPISODE, MODERATE (H): ICD-10-CM

## 2020-10-02 DIAGNOSIS — F41.1 GENERALIZED ANXIETY DISORDER: ICD-10-CM

## 2020-10-02 DIAGNOSIS — F43.10 POSTTRAUMATIC STRESS DISORDER: Primary | ICD-10-CM

## 2020-10-02 PROCEDURE — 90834 PSYTX W PT 45 MINUTES: CPT | Mod: 95 | Performed by: SOCIAL WORKER

## 2020-10-02 ASSESSMENT — ANXIETY QUESTIONNAIRES
IF YOU CHECKED OFF ANY PROBLEMS ON THIS QUESTIONNAIRE, HOW DIFFICULT HAVE THESE PROBLEMS MADE IT FOR YOU TO DO YOUR WORK, TAKE CARE OF THINGS AT HOME, OR GET ALONG WITH OTHER PEOPLE: VERY DIFFICULT
3. WORRYING TOO MUCH ABOUT DIFFERENT THINGS: MORE THAN HALF THE DAYS
6. BECOMING EASILY ANNOYED OR IRRITABLE: NEARLY EVERY DAY
GAD7 TOTAL SCORE: 12
7. FEELING AFRAID AS IF SOMETHING AWFUL MIGHT HAPPEN: NOT AT ALL
5. BEING SO RESTLESS THAT IT IS HARD TO SIT STILL: MORE THAN HALF THE DAYS
1. FEELING NERVOUS, ANXIOUS, OR ON EDGE: MORE THAN HALF THE DAYS
2. NOT BEING ABLE TO STOP OR CONTROL WORRYING: MORE THAN HALF THE DAYS

## 2020-10-02 ASSESSMENT — PATIENT HEALTH QUESTIONNAIRE - PHQ9
5. POOR APPETITE OR OVEREATING: SEVERAL DAYS
SUM OF ALL RESPONSES TO PHQ QUESTIONS 1-9: 16

## 2020-10-02 NOTE — PROGRESS NOTES
"                                           Progress Note    Patient Name: Melquiades Morales  Date: 10/1/20         Service Type: Individual      Session Start Time: 9 am  Session End Time: 9:45 am     Session Length: 45 min    Session #: 132    Attendees: Client attended alone    Service Modality:  Phone Visit:    The patient has been notified of the following:      \"We have found that certain health care needs can be provided without the need for a face to face visit.  This service lets us provide the care you need with a phone conversation.       I will have full access to your [place] medical record during this entire phone call.   I will be taking notes for your medical record.      Since this is like an office visit, we will bill your insurance company for this service.       There are potential benefits and risks of telephone visits (e.g. limits to patient confidentiality) that differ from in-person visits.?  Confidentiality still applies for telephone services, and nobody will record the visit.  It is important to be in a quiet, private space that is free of distractions (including cell phone or other devices) during the visit.??      If during the course of the call I believe a telephone visit is not appropriate, you will not be charged for this service\"     Consent has been obtained for this service by care team member: Yes      Treatment Plan Last Reviewed: due 12/2/20  PHQ-9 / SHAILESH-7 : phq=16; shailesh=12.    DATA  Interactive Complexity: No  Crisis: No      Progress Since Last Session (Related to Symptoms / Goals / Homework):   Symptoms: stable    Homework: Partially completed: use a healthy coping technique as needed. New: use peaceful place picture whenever beginning to feel upset/anxious.     Episode of Care Goals: Minimal progress - PREPARATION (Decided to change - considering how); Intervened by negotiating a change plan and determining options / strategies for behavior change, identifying triggers, " "exploring social supports, and working towards setting a date to begin behavior change.    Current / Ongoing Stressors and Concerns:  Ex wife's health. Financial.    He has a pca who is helpful.  He has a spot on his lung that he reports doctors are unable to explain.  He has been having panic attacks he believes connected to time he had gran mal seizure and was brought to the hospital by ambulance. He had a panic attack when they tried to put him on a ventilator and he thought he was going to die. He has been working on this on his own I found out today by processing the event from start to finish and then bringing himself to relaxation. He reports having success managing panic attacks using his peaceful place (fishing and \"catching the big one)\".    Treatment Objective(s) Addressed in This Session:   practice a distraction technique as needed 100% of trials for 2 weeks. New: use peaceful place picture.  Follow safety plan.     Intervention:  Assessed functioning and for safety. Went over the results of the phq/shailesh. reviewed goals and completed the cgi. Processed feelings about impact of ambulance trip and ensuing panic attack and how he has learned to cope using his peaceful place picture. Gave verbal praise for progress on issue. Reviewed healthy coping ideas.        ASSESSMENT: Current Emotional / Mental Status (status of significant symptoms):   Risk status (Self / Other harm or suicidal ideation)   Patient denies current fears or concerns for personal safety.   Patient denies current or recent suicidal ideation or behaviors.today when asked he stated \"I don't believe in that shit no more\"-9/18/20.   Patient denies current or recent homicidal ideation or behaviors.   Patient denies current or recent self injurious behavior or ideation.   Patient denies other safety concerns.   Patient reports there has been no change in risk factors since their last session.     Patient reports there has been no change in " protective factors since their last session.     A safety and risk management plan has been developed including: Patient consented to co-developed safety plan.  Safety and risk management plan was completed.  Patient agreed to use safety plan should any safety concerns arise.  A copy was given to the patient.          Appearance:    Unable to assess.                                Eye Contact: Unable to assess.                                     Psychomotor Behavior: Unable to assess.              Attitude: Cooperative               Orientation: All              Speech                   Rate / Production:  Normal               Volume:  normal              Mood:  depressed               Affect:  Appropriate               Thought Content:  Clear               ThoughtForm:  Coherent,  Logical               Insight: Fair/Good     Medication Review:  No changes to current psychiatric medication(s). PCP Dr. Jignesh Travis. Psych medications: cymbalta 60 mg.     Medication Compliance:   Yes     Changes in Health Issues:   None reported    Chemical Use Review:  Substance Use: Chemical use reviewed, no active concerns identified     Tobacco Use: No change in amount of tobacco use since last session.  Provided encouragement to quit     Diagnosis:  Ptsd; shailesh; major depression.    Collateral Reports Completed:   Routed note to PCP    PLAN: (Patient Tasks / Therapist Tasks / Other)  Schedule biweekly. Practice distraction ideas and other coping ideas. Utilize support network. Use safety plan as needed. Practice gratitude and peaceful place picture. Use emdr to address ambulance experience. Goals due 12/2/20.        Kleber Pollack, SHAVONNE                                                         ______________________________________________________________________    Treatment Plan    Patient's Name: Meqluiades Morales  YOB: 1955    Date: 8/23/13    DSM5 Diagnoses: 296.32 (F33.1) Major Depressive Disorder, Recurrent  "Episode, Moderate _ and With anxious distress, 300.02 (F41.1) Generalized Anxiety Disorder or 309.81 (F43.10) Posttraumatic Stress Disorder (includes Posttraumatic Stress Disorder for Children 6 Years and Younger)  Without dissociative symptoms  Psychosocial / Contextual Factors: ; she is in poor health and relies on client a lot. Financial.   WHODAS: already completed.    Referral / Collaboration:  Referral to another professional/service is not indicated at this time.    Anticipated number of session or this episode of care: 15+          Treatment Goal(s)  Start Date Goal Dates  Reviewed Status    8/2/13 Goal 1:  Client will report coping better.       New     \"  \" Objective #1A (Client Action)  Client will process feelings related to current situations and work on coming up with solutions.     Intervention(s)  Therapist will encourage and help problem solve.    11/1/13  2/7/14  5/9/14  8/29/14  12/19/14  5/13/15  8/29/15  11/13/15  2/26/16  6/17/16  10/7/16  1/6/17  4/28/17  7/21/17  10/20/17  1/19/18  4/20/18  7/27/18  10/26/18  3/15/19  6/21/19  9/13/19  12/13/19  4/10/20  7/3/20  10/2/20 New  Continued  \"  \"  \"  \"  \"  \"  \"  \"     \"  \" Objective #1B  Client will use a healthy coping technique 100% of trials for 4 weeks.     Intervention(s)  Therapist came up with a list of ideas with client's input.     He likes: prayer; fishing; talking to friends; time with his dog and talking to Milly or Katarina. 11/1/13   2/7/14  5/29/14  8/29/14  12/19/14  5/13/15  8/7/15  11/13/15  2/26/16  6/17/16  10/7/16  1/6/17  4/28/17  7/21/17  10/20/17  1/19/18  4/20/18  7/27/18  10/26/18  3/15/19  6/21/19  9/13/19  12/13/19  4/10/20  7/3/20  10/2/20 continued  \"  \"  \"  \"  \"  \"  \"  \"  \"     \"  \" Objective #1C  Client will process feelings related to his wife's failing health.     Intervention(s)   educate on grief.    " "11/1/13  2/7/14  5/9/14  8/29/14  12/19/14  5/13/15  8/7/15  11/13/15  2/26/16  6/17/16  10/7/16  1/6/17  4/28/17  7/21/17  10/20/17  1/19/18  4/20/18  7/27/18  10/26/18  3/15/19  6/21/19  9/13/19  12/13/19  4/10/20  7/3/20  10/2/20 continued  \"  \"  \"  \"  \"  \"  \"  \"  \"                  Start Date Goal Dates  Reviewed Status     12/6/13 Goal 4: Report coping better (continued).          new     \"  \" Objective #2A (Client Action)  Client will follow his safety plan as needed.     Intervention(s) (Therapist Action)           2/7/14  5/9/14  8/29/14  12/19/14  5/13/15  8/7/15  11/13/15  2/26/16  6/17/16  10/7/16  1/6/17  4/28/17  7/21/17 10/20/17  1/19/18  4/20/18  7/27/18  10/26/18  3/15/19  6/21/19  9/13/19  12/13/19  4/10/20  7/3/20  10/2/20 New  Continued  \"  \"  \"  \"  \"  \"  \"  \"      Objective #2B  Client will reprocess chronic pain by addressing the negative cognition until no longer feeling true and upsetting.     Intervention(s)   EMDR       New  4/26/19  6/21/19  9/13/19  12/13/19  4/10/20  7/3/20      Objective #2C         Intervention(s)                             Client has reviewed and agreed to the above plan.     Kleber Pollack [facility]                  August 2, 2013     "

## 2020-10-03 ASSESSMENT — ANXIETY QUESTIONNAIRES: GAD7 TOTAL SCORE: 12

## 2020-10-06 ENCOUNTER — TELEPHONE (OUTPATIENT)
Dept: FAMILY MEDICINE | Facility: CLINIC | Age: 65
End: 2020-10-06

## 2020-10-06 NOTE — TELEPHONE ENCOUNTER
Reason for Call:  Home Health Care    Yulia with [facility] called regarding (reason for call):     Orders are needed for this patient.     PT: 1 x a week x 5 weeks    Pt Provider: Thaddeus    Phone Number Homecare PT can be reached at: [phone]    Can we leave a detailed message on this number? YES    Phone number patient can be reached at: Home number on file [phone] (home)    Best Time: any    Call taken on 10/6/2020 at 8:37 AM by Emily Padgett

## 2020-10-14 ENCOUNTER — MEDICAL CORRESPONDENCE (OUTPATIENT)
Dept: HEALTH INFORMATION MANAGEMENT | Facility: CLINIC | Age: 65
End: 2020-10-14

## 2020-10-16 ENCOUNTER — VIRTUAL VISIT (OUTPATIENT)
Dept: PSYCHOLOGY | Facility: CLINIC | Age: 65
End: 2020-10-16
Payer: COMMERCIAL

## 2020-10-16 DIAGNOSIS — F41.1 GENERALIZED ANXIETY DISORDER: ICD-10-CM

## 2020-10-16 DIAGNOSIS — F33.1 MAJOR DEPRESSIVE DISORDER, RECURRENT EPISODE, MODERATE (H): ICD-10-CM

## 2020-10-16 DIAGNOSIS — F43.10 POSTTRAUMATIC STRESS DISORDER: Primary | ICD-10-CM

## 2020-10-16 PROCEDURE — 90834 PSYTX W PT 45 MINUTES: CPT | Mod: 95 | Performed by: SOCIAL WORKER

## 2020-10-16 ASSESSMENT — ANXIETY QUESTIONNAIRES
1. FEELING NERVOUS, ANXIOUS, OR ON EDGE: MORE THAN HALF THE DAYS
GAD7 TOTAL SCORE: 12
3. WORRYING TOO MUCH ABOUT DIFFERENT THINGS: MORE THAN HALF THE DAYS
IF YOU CHECKED OFF ANY PROBLEMS ON THIS QUESTIONNAIRE, HOW DIFFICULT HAVE THESE PROBLEMS MADE IT FOR YOU TO DO YOUR WORK, TAKE CARE OF THINGS AT HOME, OR GET ALONG WITH OTHER PEOPLE: VERY DIFFICULT
7. FEELING AFRAID AS IF SOMETHING AWFUL MIGHT HAPPEN: NOT AT ALL
2. NOT BEING ABLE TO STOP OR CONTROL WORRYING: MORE THAN HALF THE DAYS
5. BEING SO RESTLESS THAT IT IS HARD TO SIT STILL: MORE THAN HALF THE DAYS
6. BECOMING EASILY ANNOYED OR IRRITABLE: NEARLY EVERY DAY

## 2020-10-16 ASSESSMENT — PATIENT HEALTH QUESTIONNAIRE - PHQ9
SUM OF ALL RESPONSES TO PHQ QUESTIONS 1-9: 15
5. POOR APPETITE OR OVEREATING: SEVERAL DAYS

## 2020-10-16 NOTE — PROGRESS NOTES
"                                           Progress Note    Patient Name: Melquiades Morales  Date: 10/16/20         Service Type: Individual      Session Start Time: 9 am  Session End Time: 9:45 am     Session Length: 45 min    Session #: 133    Attendees: Client attended alone    Service Modality:  Phone Visit:    The patient has been notified of the following:      \"We have found that certain health care needs can be provided without the need for a face to face visit.  This service lets us provide the care you need with a phone conversation.       I will have full access to your [place] medical record during this entire phone call.   I will be taking notes for your medical record.      Since this is like an office visit, we will bill your insurance company for this service.       There are potential benefits and risks of telephone visits (e.g. limits to patient confidentiality) that differ from in-person visits.?  Confidentiality still applies for telephone services, and nobody will record the visit.  It is important to be in a quiet, private space that is free of distractions (including cell phone or other devices) during the visit.??      If during the course of the call I believe a telephone visit is not appropriate, you will not be charged for this service\"     Consent has been obtained for this service by care team member: Yes      Treatment Plan Last Reviewed: due 12/2/20  PHQ-9 / SHAILESH-7 : phq=15; shailesh=12.    DATA  Interactive Complexity: No  Crisis: No      Progress Since Last Session (Related to Symptoms / Goals / Homework):   Symptoms: stable    Homework: Partially completed: use a healthy coping technique as needed. New: use peaceful place picture whenever beginning to feel upset/anxious.     Episode of Care Goals: Minimal progress - PREPARATION (Decided to change - considering how); Intervened by negotiating a change plan and determining options / strategies for behavior change, identifying triggers, " "exploring social supports, and working towards setting a date to begin behavior change.    Current / Ongoing Stressors and Concerns:  Ex wife's health. Financial.    He has a spot on his lung that he reports doctors are unable to explain.  He has been having panic attacks he believes connected to time he had gran mal seizure and was brought to the hospital by ambulance. He had a panic attack when they tried to put him on a ventilator and he thought he was going to die. He has been working on this on his own I found out today by processing the event from start to finish and then bringing himself to relaxation. He reports having success managing panic attacks using his peaceful place (fishing and \"catching the big one)\".  His pca was tested for covid and he is awaiting the results.    Treatment Objective(s) Addressed in This Session:   practice a distraction technique as needed 100% of trials for 2 weeks. New: use peaceful place picture.  Follow safety plan.     Intervention:  Assessed functioning and for safety. Went over the results of the phq/shailehs.  Processed feelings about not having his pca for the past week as she is waiting to hear the results of covid test.  Reviewed healthy coping ideas.        ASSESSMENT: Current Emotional / Mental Status (status of significant symptoms):   Risk status (Self / Other harm or suicidal ideation)   Patient denies current fears or concerns for personal safety.   Patient denies current or recent suicidal ideation or behaviors.today when asked he stated \"I don't believe in that shit no more\"-9/18/20.   Patient denies current or recent homicidal ideation or behaviors.   Patient denies current or recent self injurious behavior or ideation.   Patient denies other safety concerns.   Patient reports there has been no change in risk factors since their last session.     Patient reports there has been no change in protective factors since their last session.     A safety and risk " management plan has been developed including: Patient consented to co-developed safety plan.  Safety and risk management plan was completed.  Patient agreed to use safety plan should any safety concerns arise.  A copy was given to the patient.          Appearance:    Unable to assess.                                Eye Contact: Unable to assess.                                     Psychomotor Behavior: Unable to assess.              Attitude: Cooperative               Orientation: All              Speech                   Rate / Production:  Normal               Volume:  normal              Mood:  Depressed               Affect:  Appropriate               Thought Content:  Clear               ThoughtForm:  Coherent, Logical            Insight: Fair/Good     Medication Review:  No changes to current psychiatric medication(s). PCP Dr. Jignesh Travis. Psych medications: cymbalta 60 mg.     Medication Compliance:   Yes     Changes in Health Issues:   None reported    Chemical Use Review:  Substance Use: Chemical use reviewed, no active concerns identified     Tobacco Use: No change in amount of tobacco use since last session.  Provided encouragement to quit     Diagnosis:  Ptsd; shailesh; major depression.    Collateral Reports Completed:   Routed note to PCP    PLAN: (Patient Tasks / Therapist Tasks / Other)  Schedule biweekly. Practice distraction ideas and other coping ideas. Utilize support network. Use safety plan as needed. Practice gratitude and peaceful place picture. Use emdr to address ambulance experience (on hold). Goals due 12/2/20.        Kleber Pollack, SHAVONNE                                                         ______________________________________________________________________    Treatment Plan    Patient's Name: Melquiades Morales  YOB: 1955    Date: 8/23/13    DSM5 Diagnoses: 296.32 (F33.1) Major Depressive Disorder, Recurrent Episode, Moderate _ and With anxious distress, 300.02 (F41.1)  "Generalized Anxiety Disorder or 309.81 (F43.10) Posttraumatic Stress Disorder (includes Posttraumatic Stress Disorder for Children 6 Years and Younger)  Without dissociative symptoms  Psychosocial / Contextual Factors: ; she is in poor health and relies on client a lot. Financial.   WHODAS: already completed.    Referral / Collaboration:  Referral to another professional/service is not indicated at this time.    Anticipated number of session or this episode of care: 15+          Treatment Goal(s)  Start Date Goal Dates  Reviewed Status    8/2/13 Goal 1:  Client will report coping better.       New     \"  \" Objective #1A (Client Action)  Client will process feelings related to current situations and work on coming up with solutions.     Intervention(s)  Therapist will encourage and help problem solve.    11/1/13  2/7/14  5/9/14  8/29/14  12/19/14  5/13/15  8/29/15  11/13/15  2/26/16  6/17/16  10/7/16  1/6/17  4/28/17  7/21/17  10/20/17  1/19/18  4/20/18  7/27/18  10/26/18  3/15/19  6/21/19  9/13/19  12/13/19  4/10/20  7/3/20  10/2/20 New  Continued  \"  \"  \"  \"  \"  \"  \"  \"     \"  \" Objective #1B  Client will use a healthy coping technique 100% of trials for 4 weeks.     Intervention(s)  Therapist came up with a list of ideas with client's input.     He likes: prayer; fishing; talking to friends; time with his dog and talking to Milly or Katarina. 11/1/13   2/7/14  5/29/14  8/29/14  12/19/14  5/13/15  8/7/15  11/13/15  2/26/16  6/17/16  10/7/16  1/6/17  4/28/17  7/21/17  10/20/17  1/19/18  4/20/18  7/27/18  10/26/18  3/15/19  6/21/19  9/13/19  12/13/19  4/10/20  7/3/20  10/2/20 continued  \"  \"  \"  \"  \"  \"  \"  \"  \"     \"  \" Objective #1C  Client will process feelings related to his wife's failing health.     Intervention(s)   educate on grief.    " "11/1/13  2/7/14  5/9/14  8/29/14  12/19/14  5/13/15  8/7/15  11/13/15  2/26/16  6/17/16  10/7/16  1/6/17  4/28/17  7/21/17  10/20/17  1/19/18  4/20/18  7/27/18  10/26/18  3/15/19  6/21/19  9/13/19  12/13/19  4/10/20  7/3/20  10/2/20 continued  \"  \"  \"  \"  \"  \"  \"  \"  \"                  Start Date Goal Dates  Reviewed Status     12/6/13 Goal 4: Report coping better (continued).          new     \"  \" Objective #2A (Client Action)  Client will follow his safety plan as needed.     Intervention(s) (Therapist Action)           2/7/14  5/9/14  8/29/14  12/19/14  5/13/15  8/7/15  11/13/15  2/26/16  6/17/16  10/7/16  1/6/17  4/28/17  7/21/17 10/20/17  1/19/18  4/20/18  7/27/18  10/26/18  3/15/19  6/21/19  9/13/19  12/13/19  4/10/20  7/3/20  10/2/20 New  Continued  \"  \"  \"  \"  \"  \"  \"  \"      Objective #2B  Client will reprocess chronic pain by addressing the negative cognition until no longer feeling true and upsetting.     Intervention(s)   EMDR       New  4/26/19  6/21/19  9/13/19  12/13/19  4/10/20  7/3/20      Objective #2C         Intervention(s)                             Client has reviewed and agreed to the above plan.     Kleber Pollack [facility]                  August 2, 2013     "

## 2020-10-17 ASSESSMENT — ANXIETY QUESTIONNAIRES: GAD7 TOTAL SCORE: 12

## 2020-10-19 ENCOUNTER — TELEPHONE (OUTPATIENT)
Dept: FAMILY MEDICINE | Facility: CLINIC | Age: 65
End: 2020-10-19

## 2020-10-19 NOTE — TELEPHONE ENCOUNTER
Reason for Call:  Home Health Care    Ruth with [facility] called regarding (reason for call): Please call with verbal orders - OK to leave message    Orders are needed for this patient.     Skilled Nursinx a week x 9 weeks    Pt Provider: Thaddeus    Phone Number Homecare Nurse can be reached at: [phone]    Can we leave a detailed message on this number? YES    Phone number patient can be reached at: Home number on file [phone] (home)    Best Time: any    Call taken on 10/19/2020 at 1:45 PM by Emily Padgett

## 2020-10-23 ENCOUNTER — MEDICAL CORRESPONDENCE (OUTPATIENT)
Dept: HEALTH INFORMATION MANAGEMENT | Facility: CLINIC | Age: 65
End: 2020-10-23

## 2020-10-27 ENCOUNTER — ALLIED HEALTH/NURSE VISIT (OUTPATIENT)
Dept: FAMILY MEDICINE | Facility: CLINIC | Age: 65
End: 2020-10-27
Payer: COMMERCIAL

## 2020-10-27 DIAGNOSIS — Z23 NEED FOR PROPHYLACTIC VACCINATION AND INOCULATION AGAINST INFLUENZA: Primary | ICD-10-CM

## 2020-10-27 DIAGNOSIS — Z23 NEED FOR VACCINATION: ICD-10-CM

## 2020-10-27 PROCEDURE — 90682 RIV4 VACC RECOMBINANT DNA IM: CPT

## 2020-10-27 PROCEDURE — 90750 HZV VACC RECOMBINANT IM: CPT

## 2020-10-27 PROCEDURE — 90472 IMMUNIZATION ADMIN EACH ADD: CPT

## 2020-10-27 PROCEDURE — 99207 PR NO CHARGE NURSE ONLY: CPT

## 2020-10-27 PROCEDURE — G0008 ADMIN INFLUENZA VIRUS VAC: HCPCS

## 2020-10-30 ENCOUNTER — VIRTUAL VISIT (OUTPATIENT)
Dept: PSYCHOLOGY | Facility: CLINIC | Age: 65
End: 2020-10-30
Payer: COMMERCIAL

## 2020-10-30 DIAGNOSIS — F33.1 MAJOR DEPRESSIVE DISORDER, RECURRENT EPISODE, MODERATE (H): Primary | ICD-10-CM

## 2020-10-30 DIAGNOSIS — F43.10 POSTTRAUMATIC STRESS DISORDER: ICD-10-CM

## 2020-10-30 DIAGNOSIS — F41.1 GENERALIZED ANXIETY DISORDER: ICD-10-CM

## 2020-10-30 PROCEDURE — 90834 PSYTX W PT 45 MINUTES: CPT | Mod: 95 | Performed by: SOCIAL WORKER

## 2020-10-30 ASSESSMENT — ANXIETY QUESTIONNAIRES
5. BEING SO RESTLESS THAT IT IS HARD TO SIT STILL: MORE THAN HALF THE DAYS
2. NOT BEING ABLE TO STOP OR CONTROL WORRYING: NEARLY EVERY DAY
6. BECOMING EASILY ANNOYED OR IRRITABLE: NEARLY EVERY DAY
7. FEELING AFRAID AS IF SOMETHING AWFUL MIGHT HAPPEN: SEVERAL DAYS
1. FEELING NERVOUS, ANXIOUS, OR ON EDGE: NEARLY EVERY DAY
IF YOU CHECKED OFF ANY PROBLEMS ON THIS QUESTIONNAIRE, HOW DIFFICULT HAVE THESE PROBLEMS MADE IT FOR YOU TO DO YOUR WORK, TAKE CARE OF THINGS AT HOME, OR GET ALONG WITH OTHER PEOPLE: VERY DIFFICULT
3. WORRYING TOO MUCH ABOUT DIFFERENT THINGS: MORE THAN HALF THE DAYS
GAD7 TOTAL SCORE: 16

## 2020-10-30 ASSESSMENT — PATIENT HEALTH QUESTIONNAIRE - PHQ9
5. POOR APPETITE OR OVEREATING: MORE THAN HALF THE DAYS
SUM OF ALL RESPONSES TO PHQ QUESTIONS 1-9: 19

## 2020-10-30 NOTE — PROGRESS NOTES
"                                           Progress Note    Patient Name: Melquiades Morales  Date: 10/30/20         Service Type: Individual      Session Start Time: 9 am  Session End Time: 9:45 am     Session Length: 45 min    Session #: 134    Attendees: Client attended alone    Service Modality:  Phone Visit:    The patient has been notified of the following:      \"We have found that certain health care needs can be provided without the need for a face to face visit.  This service lets us provide the care you need with a phone conversation.       I will have full access to your [facility] medical record during this entire phone call.   I will be taking notes for your medical record.      Since this is like an office visit, we will bill your insurance company for this service.       There are potential benefits and risks of telephone visits (e.g. limits to patient confidentiality) that differ from in-person visits.?  Confidentiality still applies for telephone services, and nobody will record the visit.  It is important to be in a quiet, private space that is free of distractions (including cell phone or other devices) during the visit.??      If during the course of the call I believe a telephone visit is not appropriate, you will not be charged for this service\"     Consent has been obtained for this service by care team member: Yes      Treatment Plan Last Reviewed: due 12/2/20  PHQ-9 / SHAILESH-7 : phq=19; shailesh=16.    DATA  Interactive Complexity: No  Crisis: No      Progress Since Last Session (Related to Symptoms / Goals / Homework):   Symptoms: worsening.    Homework: Partially completed: use a healthy coping technique as needed. New: use peaceful place picture whenever beginning to feel upset/anxious.     Episode of Care Goals: Minimal progress - PREPARATION (Decided to change - considering how); Intervened by negotiating a change plan and determining options / strategies for behavior change, identifying triggers, " "exploring social supports, and working towards setting a date to begin behavior change.    Current / Ongoing Stressors and Concerns:  Ex wife's health. Financial.    He has a spot on his lung that he reports doctors are unable to explain.  He has been having panic attacks he believes connected to time he had gran mal seizure and was brought to the hospital by ambulance. He had a panic attack when they tried to put him on a ventilator and he thought he was going to die. He has been working on this on his own I found out today by processing the event from start to finish and then bringing himself to relaxation. He reports having success managing panic attacks using his peaceful place (fishing and \"catching the big one)\".  he worries about [place].    Treatment Objective(s) Addressed in This Session:   practice a distraction technique as needed 100% of trials for 2 weeks. New: use peaceful place picture.  Follow safety plan.     Intervention:  Assessed functioning and for safety. Went over the results of the phq/shailesh.  Processed feelings about worries of getting covid; problem solved how to stay safe as possible. Addressed frustration with wife wanting to talk daily and his not having much to say. Encouraged continue to maintain contact with friends for extra support. Reviewed healthy coping ideas.        ASSESSMENT: Current Emotional / Mental Status (status of significant symptoms):   Risk status (Self / Other harm or suicidal ideation)   Patient denies current fears or concerns for personal safety.   Patient denies current or recent suicidal ideation or behaviors.today when asked he stated \"I don't believe in that shit no more\"-9/18/20.   Patient denies current or recent homicidal ideation or behaviors.   Patient denies current or recent self injurious behavior or ideation.   Patient denies other safety concerns.   Patient reports there has been no change in risk factors since their last session.     Patient " reports there has been no change in protective factors since their last session.     A safety and risk management plan has been developed including: Patient consented to co-developed safety plan.  Safety and risk management plan was completed.  Patient agreed to use safety plan should any safety concerns arise.  A copy was given to the patient.          Appearance:    Unable to assess.                                Eye Contact: Unable to assess.                                     Psychomotor Behavior: Unable to assess.              Attitude: Cooperative               Orientation: All              Speech                   Rate / Production:  Normal               Volume:  normal              Mood:  Depressed               Affect:  Appropriate               Thought Content:  Clear               ThoughtForm:  Coherent, Logical            Insight: Fair/Good     Medication Review:  No changes to current psychiatric medication(s). PCP Dr. Jignesh Travis. Psych medications: cymbalta 60 mg.     Medication Compliance:   Yes     Changes in Health Issues:   None reported    Chemical Use Review:  Substance Use: Chemical use reviewed, no active concerns identified     Tobacco Use: No change in amount of tobacco use since last session.  Provided encouragement to quit     Diagnosis:  Ptsd; shailesh; major depression.    Collateral Reports Completed:   Routed note to PCP    PLAN: (Patient Tasks / Therapist Tasks / Other)  Schedule biweekly. Practice distraction ideas and other coping ideas. Utilize support network. Use safety plan as needed. Practice gratitude and peaceful place picture. Use emdr to address ambulance experience (on hold). Goals due 12/2/20.        Kleber Pollack, SHAVONNE                                                         ______________________________________________________________________    Treatment Plan    Patient's Name: Melquiades Morales  YOB: 1955    Date: 8/23/13    DSM5 Diagnoses: 296.32  "(F33.1) Major Depressive Disorder, Recurrent Episode, Moderate _ and With anxious distress, 300.02 (F41.1) Generalized Anxiety Disorder or 309.81 (F43.10) Posttraumatic Stress Disorder (includes Posttraumatic Stress Disorder for Children 6 Years and Younger)  Without dissociative symptoms  Psychosocial / Contextual Factors: ; she is in poor health and relies on client a lot. Financial.   WHODAS: already completed.    Referral / Collaboration:  Referral to another professional/service is not indicated at this time.    Anticipated number of session or this episode of care: 15+          Treatment Goal(s)  Start Date Goal Dates  Reviewed Status    8/2/13 Goal 1:  Client will report coping better.       New     \"  \" Objective #1A (Client Action)  Client will process feelings related to current situations and work on coming up with solutions.     Intervention(s)  Therapist will encourage and help problem solve.    11/1/13  2/7/14  5/9/14  8/29/14  12/19/14  5/13/15  8/29/15  11/13/15  2/26/16  6/17/16  10/7/16  1/6/17  4/28/17  7/21/17  10/20/17  1/19/18  4/20/18  7/27/18  10/26/18  3/15/19  6/21/19  9/13/19  12/13/19  4/10/20  7/3/20  10/2/20 New  Continued  \"  \"  \"  \"  \"  \"  \"  \"     \"  \" Objective #1B  Client will use a healthy coping technique 100% of trials for 4 weeks.     Intervention(s)  Therapist came up with a list of ideas with client's input.     He likes: prayer; fishing; talking to friends; time with his dog and talking to Milly or Katarina. 11/1/13   2/7/14  5/29/14  8/29/14  12/19/14  5/13/15  8/7/15  11/13/15  2/26/16  6/17/16  10/7/16  1/6/17  4/28/17  7/21/17  10/20/17  1/19/18  4/20/18  7/27/18  10/26/18  3/15/19  6/21/19  9/13/19  12/13/19  4/10/20  7/3/20  10/2/20 continued  \"  \"  \"  \"  \"  \"  \"  \"  \"     \"  \" Objective #1C  Client will process feelings related to his wife's failing health.     Intervention(s)   educate on grief.    " "11/1/13  2/7/14  5/9/14  8/29/14  12/19/14  5/13/15  8/7/15  11/13/15  2/26/16  6/17/16  10/7/16  1/6/17  4/28/17  7/21/17  10/20/17  1/19/18  4/20/18  7/27/18  10/26/18  3/15/19  6/21/19  9/13/19  12/13/19  4/10/20  7/3/20  10/2/20 continued  \"  \"  \"  \"  \"  \"  \"  \"  \"                  Start Date Goal Dates  Reviewed Status     12/6/13 Goal 4: Report coping better (continued).          new     \"  \" Objective #2A (Client Action)  Client will follow his safety plan as needed.     Intervention(s) (Therapist Action)           2/7/14  5/9/14  8/29/14  12/19/14  5/13/15  8/7/15  11/13/15  2/26/16  6/17/16  10/7/16  1/6/17  4/28/17  7/21/17 10/20/17  1/19/18  4/20/18  7/27/18  10/26/18  3/15/19  6/21/19  9/13/19  12/13/19  4/10/20  7/3/20  10/2/20 New  Continued  \"  \"  \"  \"  \"  \"  \"  \"      Objective #2B  Client will reprocess chronic pain by addressing the negative cognition until no longer feeling true and upsetting.     Intervention(s)   EMDR       New  4/26/19  6/21/19  9/13/19  12/13/19  4/10/20  7/3/20      Objective #2C         Intervention(s)                             Client has reviewed and agreed to the above plan.     Kleber Pollack [facility]                  August 2, 2013     "

## 2020-10-31 ASSESSMENT — ANXIETY QUESTIONNAIRES: GAD7 TOTAL SCORE: 16

## 2020-11-04 ENCOUNTER — TELEPHONE (OUTPATIENT)
Dept: FAMILY MEDICINE | Facility: CLINIC | Age: 65
End: 2020-11-04

## 2020-11-04 NOTE — TELEPHONE ENCOUNTER
Reason for call:    Symptom or request:     jonny thompson -physical therapy-- [facility]-- looking for verbal order to continue physical therapy for pain management. Looking for verbal order to included 1 visit per week for 8 weeks         Best Time:  any    Can we leave a detailed message on this number?  YES     Josie ALBERTO  [facility]

## 2020-11-07 ENCOUNTER — HEALTH MAINTENANCE LETTER (OUTPATIENT)
Age: 65
End: 2020-11-07

## 2020-11-13 ENCOUNTER — VIRTUAL VISIT (OUTPATIENT)
Dept: PSYCHOLOGY | Facility: CLINIC | Age: 65
End: 2020-11-13
Payer: COMMERCIAL

## 2020-11-13 DIAGNOSIS — F33.1 MAJOR DEPRESSIVE DISORDER, RECURRENT EPISODE, MODERATE (H): Primary | ICD-10-CM

## 2020-11-13 DIAGNOSIS — F41.1 GENERALIZED ANXIETY DISORDER: ICD-10-CM

## 2020-11-13 DIAGNOSIS — F43.10 POSTTRAUMATIC STRESS DISORDER: ICD-10-CM

## 2020-11-13 PROCEDURE — 90834 PSYTX W PT 45 MINUTES: CPT | Mod: 95 | Performed by: SOCIAL WORKER

## 2020-11-13 ASSESSMENT — ANXIETY QUESTIONNAIRES
6. BECOMING EASILY ANNOYED OR IRRITABLE: NEARLY EVERY DAY
3. WORRYING TOO MUCH ABOUT DIFFERENT THINGS: MORE THAN HALF THE DAYS
IF YOU CHECKED OFF ANY PROBLEMS ON THIS QUESTIONNAIRE, HOW DIFFICULT HAVE THESE PROBLEMS MADE IT FOR YOU TO DO YOUR WORK, TAKE CARE OF THINGS AT HOME, OR GET ALONG WITH OTHER PEOPLE: SOMEWHAT DIFFICULT
GAD7 TOTAL SCORE: 13
5. BEING SO RESTLESS THAT IT IS HARD TO SIT STILL: SEVERAL DAYS
1. FEELING NERVOUS, ANXIOUS, OR ON EDGE: MORE THAN HALF THE DAYS
7. FEELING AFRAID AS IF SOMETHING AWFUL MIGHT HAPPEN: SEVERAL DAYS
2. NOT BEING ABLE TO STOP OR CONTROL WORRYING: MORE THAN HALF THE DAYS

## 2020-11-13 NOTE — PROGRESS NOTES
"                                           Progress Note    Patient Name: Melquiades Morales  Date: 11/13/20         Service Type: Individual      Session Start Time: 9 am  Session End Time: 9:45 am     Session Length: 45 min    Session #: 135    Attendees: Client attended alone    Service Modality:  Phone Visit:    The patient has been notified of the following:      \"We have found that certain health care needs can be provided without the need for a face to face visit.  This service lets us provide the care you need with a phone conversation.       I will have full access to your [facility] medical record during this entire phone call.   I will be taking notes for your medical record.      Since this is like an office visit, we will bill your insurance company for this service.       There are potential benefits and risks of telephone visits (e.g. limits to patient confidentiality) that differ from in-person visits.?  Confidentiality still applies for telephone services, and nobody will record the visit.  It is important to be in a quiet, private space that is free of distractions (including cell phone or other devices) during the visit.??      If during the course of the call I believe a telephone visit is not appropriate, you will not be charged for this service\"     Consent has been obtained for this service by care team member: Yes      Treatment Plan Last Reviewed: due 12/2/20  PHQ-9 / SHAILESH-7 : phq=16; shailesh=13.    DATA  Interactive Complexity: No  Crisis: No      Progress Since Last Session (Related to Symptoms / Goals / Homework):   Symptoms: improvement.    Homework: Partially completed: use a healthy coping technique as needed. New: use peaceful place picture whenever beginning to feel upset/anxious.     Episode of Care Goals: Minimal progress - PREPARATION (Decided to change - considering how); Intervened by negotiating a change plan and determining options / strategies for behavior change, identifying " "triggers, exploring social supports, and working towards setting a date to begin behavior change.    Current / Ongoing Stressors and Concerns:  Ex wife's health. Financial.    He has a spot on his lung that he reports doctors are unable to explain.  He has been having panic attacks he believes connected to time he had a gran mal seizure and was brought to the hospital by ambulance. He had a panic attack when they tried to put him on a ventilator and he thought he was going to die. He has been working on this on his own I found out today by processing the event from start to finish and then bringing himself to relaxation. He reports having success managing panic attacks using his peaceful place (fishing and \"catching the big one)\".  he worries [place].  He reports less physical pain the past few weeks.    Treatment Objective(s) Addressed in This Session:   practice a distraction technique as needed 100% of trials for 2 weeks. New: use peaceful place picture.  Follow safety plan.     Intervention:  Assessed functioning and for safety. Went over the results of the phq/shailesh.  Processed feelings about worries of getting covid and not seeing family for the holidays because of it. Addressed frustration with wife wanting to talk daily. Encouraged him to continue to maintain contact with friends for extra support. Reviewed healthy coping ideas.        ASSESSMENT: Current Emotional / Mental Status (status of significant symptoms):   Risk status (Self / Other harm or suicidal ideation)   Patient denies current fears or concerns for personal safety.   Patient denies current or recent suicidal ideation or behaviors.today when asked he stated \"I don't believe in that shit no more\"-9/18/20.   Patient denies current or recent homicidal ideation or behaviors.   Patient denies current or recent self injurious behavior or ideation.   Patient denies other safety concerns.   Patient reports there has been no change in " risk factors since their last session.     Patient reports there has been no change in protective factors since their last session.     A safety and risk management plan has been developed including: Patient consented to co-developed safety plan.  Safety and risk management plan was completed.  Patient agreed to use safety plan should any safety concerns arise.  A copy was given to the patient.          Appearance:    Unable to assess.                                Eye Contact: Unable to assess.                                     Psychomotor Behavior: Unable to assess.              Attitude: Cooperative               Orientation: All              Speech                   Rate / Production:  Normal               Volume:  normal              Mood:  Depressed               Affect:  Appropriate               Thought Content:  Clear               ThoughtForm:  Coherent, Logical                  Insight: Fair/Good     Medication Review:  No changes to current psychiatric medication(s). PCP Dr. Jignesh Travis. Psych medications: cymbalta 60 mg.     Medication Compliance:   Yes     Changes in Health Issues:   None reported    Chemical Use Review:  Substance Use: Chemical use reviewed, no active concerns identified     Tobacco Use: No change in amount of tobacco use since last session.  Provided encouragement to quit     Diagnosis:  Ptsd; shailesh; major depression.    Collateral Reports Completed:   Routed note to PCP    PLAN: (Patient Tasks / Therapist Tasks / Other)  Schedule biweekly. Practice distraction ideas and other coping ideas. Utilize support network. Use safety plan as needed. Practice gratitude and peaceful place picture. Use emdr to address ambulance experience (on hold). Goals due 12/2/20.        Kleber Pollack, SHAVONNE                                                         ______________________________________________________________________    Treatment Plan    Patient's Name: Melquiades CALEB Morales  Date Of  "Birth: 1955    Date: 8/23/13    DSM5 Diagnoses: 296.32 (F33.1) Major Depressive Disorder, Recurrent Episode, Moderate _ and With anxious distress, 300.02 (F41.1) Generalized Anxiety Disorder or 309.81 (F43.10) Posttraumatic Stress Disorder (includes Posttraumatic Stress Disorder for Children 6 Years and Younger)  Without dissociative symptoms  Psychosocial / Contextual Factors: ; she is in poor health and relies on client a lot. Financial.   WHODAS: already completed.    Referral / Collaboration:  Referral to another professional/service is not indicated at this time.    Anticipated number of session or this episode of care: 15+          Treatment Goal(s)  Start Date Goal Dates  Reviewed Status    8/2/13 Goal 1:  Client will report coping better.       New     \"  \" Objective #1A (Client Action)  Client will process feelings related to current situations and work on coming up with solutions.     Intervention(s)  Therapist will encourage and help problem solve.    11/1/13  2/7/14  5/9/14  8/29/14  12/19/14  5/13/15  8/29/15  11/13/15  2/26/16  6/17/16  10/7/16  1/6/17  4/28/17  7/21/17  10/20/17  1/19/18  4/20/18  7/27/18  10/26/18  3/15/19  6/21/19  9/13/19  12/13/19  4/10/20  7/3/20  10/2/20 New  Continued  \"  \"  \"  \"  \"  \"  \"  \"     \"  \" Objective #1B  Client will use a healthy coping technique 100% of trials for 4 weeks.     Intervention(s)  Therapist came up with a list of ideas with client's input.     He likes: prayer; fishing; talking to friends; time with his dog and talking to Milly or Katarina. 11/1/13   2/7/14  5/29/14  8/29/14  12/19/14  5/13/15  8/7/15  11/13/15  2/26/16  6/17/16  10/7/16  1/6/17  4/28/17  7/21/17  10/20/17  1/19/18  4/20/18  7/27/18  10/26/18  3/15/19  6/21/19  9/13/19  12/13/19  4/10/20  7/3/20  10/2/20 continued  \"  \"  \"  \"  \"  \"  \"  \"  \"     \"  \" Objective #1C  Client will process feelings related to his wife's failing health.     Intervention(s)   educate on grief.    " "11/1/13  2/7/14  5/9/14  8/29/14  12/19/14  5/13/15  8/7/15  11/13/15  2/26/16  6/17/16  10/7/16  1/6/17  4/28/17  7/21/17  10/20/17  1/19/18  4/20/18  7/27/18  10/26/18  3/15/19  6/21/19  9/13/19  12/13/19  4/10/20  7/3/20  10/2/20 continued  \"  \"  \"  \"  \"  \"  \"  \"  \"                  Start Date Goal Dates  Reviewed Status     12/6/13 Goal 4: Report coping better (continued).          new     \"  \" Objective #2A (Client Action)  Client will follow his safety plan as needed.     Intervention(s) (Therapist Action)           2/7/14  5/9/14  8/29/14  12/19/14  5/13/15  8/7/15  11/13/15  2/26/16  6/17/16  10/7/16  1/6/17  4/28/17  7/21/17 10/20/17  1/19/18  4/20/18  7/27/18  10/26/18  3/15/19  6/21/19  9/13/19  12/13/19  4/10/20  7/3/20  10/2/20 New  Continued  \"  \"  \"  \"  \"  \"  \"  \"      Objective #2B  Client will reprocess chronic pain by addressing the negative cognition until no longer feeling true and upsetting.     Intervention(s)   EMDR       New  4/26/19  6/21/19  9/13/19  12/13/19  4/10/20  7/3/20      Objective #2C         Intervention(s)                             Client has reviewed and agreed to the above plan.     Kleber Pollack [facility]                  August 2, 2013     "

## 2020-11-14 ASSESSMENT — ANXIETY QUESTIONNAIRES: GAD7 TOTAL SCORE: 13

## 2020-12-04 ENCOUNTER — VIRTUAL VISIT (OUTPATIENT)
Dept: PSYCHOLOGY | Facility: CLINIC | Age: 65
End: 2020-12-04
Payer: COMMERCIAL

## 2020-12-04 DIAGNOSIS — F33.1 MAJOR DEPRESSIVE DISORDER, RECURRENT EPISODE, MODERATE (H): Primary | ICD-10-CM

## 2020-12-04 DIAGNOSIS — Z53.9 DIAGNOSIS NOT YET DEFINED: Primary | ICD-10-CM

## 2020-12-04 DIAGNOSIS — F43.10 POSTTRAUMATIC STRESS DISORDER: ICD-10-CM

## 2020-12-04 DIAGNOSIS — F41.1 GENERALIZED ANXIETY DISORDER: ICD-10-CM

## 2020-12-04 PROCEDURE — G0179 MD RECERTIFICATION HHA PT: HCPCS | Performed by: FAMILY MEDICINE

## 2020-12-04 PROCEDURE — 90834 PSYTX W PT 45 MINUTES: CPT | Mod: 95 | Performed by: SOCIAL WORKER

## 2020-12-04 ASSESSMENT — ANXIETY QUESTIONNAIRES
6. BECOMING EASILY ANNOYED OR IRRITABLE: NEARLY EVERY DAY
7. FEELING AFRAID AS IF SOMETHING AWFUL MIGHT HAPPEN: SEVERAL DAYS
5. BEING SO RESTLESS THAT IT IS HARD TO SIT STILL: MORE THAN HALF THE DAYS
IF YOU CHECKED OFF ANY PROBLEMS ON THIS QUESTIONNAIRE, HOW DIFFICULT HAVE THESE PROBLEMS MADE IT FOR YOU TO DO YOUR WORK, TAKE CARE OF THINGS AT HOME, OR GET ALONG WITH OTHER PEOPLE: VERY DIFFICULT
2. NOT BEING ABLE TO STOP OR CONTROL WORRYING: NEARLY EVERY DAY
GAD7 TOTAL SCORE: 16
3. WORRYING TOO MUCH ABOUT DIFFERENT THINGS: NEARLY EVERY DAY
1. FEELING NERVOUS, ANXIOUS, OR ON EDGE: MORE THAN HALF THE DAYS

## 2020-12-04 ASSESSMENT — PATIENT HEALTH QUESTIONNAIRE - PHQ9
5. POOR APPETITE OR OVEREATING: MORE THAN HALF THE DAYS
SUM OF ALL RESPONSES TO PHQ QUESTIONS 1-9: 17

## 2020-12-04 NOTE — TELEPHONE ENCOUNTER
See note below.  Advise.  Sweetie   84yo female with pmhx of hypothyroid, depression, subdural hematoma 8/2020 2/2 fall presents with cough x 1 week. pt reports nonproductive cough with sob. pt went to [facility] who dx pt with covid + on rapid test and advised pt to come to ed for eval. pt did not take anything for symptoms. pt unsure of covid exposure. pt poor historian. pt denies fever, cp, n/v/d, abd pain, rash.    ER course:  Patient initially seen in [facility]. hypoxemic to 81 on room air. improved to 96 on 2 liters  labs: benign  ddimer [phone]   84yo female with pmhx of hypothyroid, depression, subdural hematoma 8/2020 2/2 fall presents to [facility] with C/C of cough, low grade temp.  History obtained by patient. Patient reports her symptoms started less than one week ago. She reports productive cough, sore throat, low grade temps. Patient reported visiting [facility] whom dx pt with covid + on rapid test and advised pt to come to [facility] December 2 for evaluation.   Patient was  seen in [facility] on December 2 and ultimatel dced home. Since initial discharge, patient shortness of breath worsened, therefore she returned to [facility] ED. Patient reports she did not take medication for her symptoms. Patient lives alone with home health aid ,although uunsure of covid exposure.  on ROS: patient denies fever, cp, n/v/d, abd pain, rash.    ER course:  Patient initially seen in [facility]. hypoxemic to 81 on room air. improved to 96 on 2 liters  labs: benign  ddimer [phone]

## 2020-12-04 NOTE — PROGRESS NOTES
"                                           Progress Note    Patient Name: Melquiades Morales  Date: 12/4/20         Service Type: Individual      Session Start Time: 9 am  Session End Time: 9:45 am     Session Length: 45 min    Session #: 136    Attendees: Client attended alone.    Service Modality:  Phone Visit:    The patient has been notified of the following:      \"We have found that certain health care needs can be provided without the need for a face to face visit.  This service lets us provide the care you need with a phone conversation.       I will have full access to your [facility] medical record during this entire phone call.   I will be taking notes for your medical record.      Since this is like an office visit, we will bill your insurance company for this service.       There are potential benefits and risks of telephone visits (e.g. limits to patient confidentiality) that differ from in-person visits.?  Confidentiality still applies for telephone services, and nobody will record the visit.  It is important to be in a quiet, private space that is free of distractions (including cell phone or other devices) during the visit.??      If during the course of the call I believe a telephone visit is not appropriate, you will not be charged for this service\"     Consent has been obtained for this service by care team member: Yes      Treatment Plan Last Reviewed: due 1/2/21  PHQ-9 / SHAILESH-7 : phq=17; shailesh=16.    DATA  Interactive Complexity: No  Crisis: No      Progress Since Last Session (Related to Symptoms / Goals / Homework):   Symptoms:  worsening.    Homework: Partially completed: use a healthy coping technique as needed. New: use peaceful place picture whenever beginning to feel upset/anxious.     Episode of Care Goals: Minimal progress - PREPARATION (Decided to change - considering how); Intervened by negotiating a change plan and determining options / strategies for behavior change, identifying triggers, " "exploring social supports, and working towards setting a date to begin behavior change.    Current / Ongoing Stressors and Concerns:  Ex wife's health. Financial.    He has a spot on his lung that he reports doctors are unable to explain.  He has been having panic attacks he believes connected to time he had a gran mal seizure and was brought to the hospital by ambulance. He had a panic attack when they tried to put him on a ventilator and he thought he was going to die. He has been working on this on his own I found out today by processing the event from start to finish and then bringing himself to relaxation. He reports having success managing panic attacks using his peaceful place (fishing and \"catching the big one)\". Doing ok with this \"so far\".  He worries about [place]uni covid.  He reports less physical pain the past few weeks.    Treatment Objective(s) Addressed in This Session:   practice a distraction technique as needed 100% of trials for 2 weeks. New: use peaceful place picture.  Follow safety plan.     Intervention:  Assessed functioning and for safety. Went over the results of the phq/shailesh.  Processed feelings about worries of getting covid and not seeing family for the holidays because of it. Addressed frustration with wife wanting to talk daily. Encouraged him to continue to maintain contact with friends for extra support. Reviewed healthy coping ideas.        ASSESSMENT: Current Emotional / Mental Status (status of significant symptoms):   Risk status (Self / Other harm or suicidal ideation)   Patient denies current fears or concerns for personal safety.   Patient denies current or recent suicidal ideation or behaviors.  Today when asked he stated \"I don't believe in that shit no more\"-9/18/20.   Patient denies current or recent homicidal ideation or behaviors.   Patient denies current or recent self injurious behavior or ideation.   Patient denies other safety concerns.   Patient reports there has " been no change in risk factors since their last session.     Patient reports there has been no change in protective factors since their last session.     A safety and risk management plan has been developed including: Patient consented to co-developed safety plan.  Safety and risk management plan was completed.  Patient agreed to use safety plan should any safety concerns arise.  A copy was given to the patient.                     Appearance:                 Unable to assess.                                Eye Contact:                 Unable to assess.                                     Psychomotor Behavior: Unable to assess.              Attitude: Cooperative               Orientation: All              Speech                   Rate / Production:  Normal               Volume:  normal              Mood:  Depressed               Affect:  Appropriate               Thought Content:  Clear               ThoughtForm:  Coherent, Logical                                Insight: Fair/Good     Medication Review:  No changes to current psychiatric medication(s). PCP Dr. Jignesh Travis. Psych medications: cymbalta 60 mg.     Medication Compliance:   Yes     Changes in Health Issues:   None reported    Chemical Use Review:  Substance Use: Chemical use reviewed, no active concerns identified     Tobacco Use: No change in amount of tobacco use since last session.  Provided encouragement to quit     Diagnosis:  Ptsd; shailesh; major depression.    Collateral Reports Completed:   Routed note to PCP    PLAN: (Patient Tasks / Therapist Tasks / Other)  Schedule biweekly. Practice distraction ideas and other coping ideas. Utilize support network. Use safety plan as needed. Practice gratitude and peaceful place picture. Use emdr to address ambulance experience (on hold). Goals due 1/2/21.        SHAVONNE Yao                                                      "    ______________________________________________________________________    Treatment Plan    Patient's Name: Melquiades Morales  YOB: 1955    Date: 8/23/13    DSM5 Diagnoses: 296.32 (F33.1) Major Depressive Disorder, Recurrent Episode, Moderate _ and With anxious distress, 300.02 (F41.1) Generalized Anxiety Disorder or 309.81 (F43.10) Posttraumatic Stress Disorder (includes Posttraumatic Stress Disorder for Children 6 Years and Younger)  Without dissociative symptoms  Psychosocial / Contextual Factors: ; she is in poor health and relies on client a lot. Financial.   WHODAS: already completed.    Referral / Collaboration:  Referral to another professional/service is not indicated at this time.    Anticipated number of session or this episode of care: 15+          Treatment Goal(s)  Start Date Goal Dates  Reviewed Status    8/2/13 Goal 1:  Client will report coping better.       New     \"  \" Objective #1A (Client Action)  Client will process feelings related to current situations and work on coming up with solutions.     Intervention(s)  Therapist will encourage and help problem solve.    11/1/13  2/7/14  5/9/14  8/29/14  12/19/14  5/13/15  8/29/15  11/13/15  2/26/16  6/17/16  10/7/16  1/6/17  4/28/17  7/21/17  10/20/17  1/19/18  4/20/18  7/27/18  10/26/18  3/15/19  6/21/19  9/13/19  12/13/19  4/10/20  7/3/20  10/2/20 New  Continued  \"  \"  \"  \"  \"  \"  \"  \"     \"  \" Objective #1B  Client will use a healthy coping technique 100% of trials for 4 weeks.     Intervention(s)  Therapist came up with a list of ideas with client's input.     He likes: prayer; fishing; talking to friends; time with his dog and talking to Milly and/or Katarina. 11/1/13   2/7/14  5/29/14  8/29/14  12/19/14  5/13/15  8/7/15  11/13/15  2/26/16  6/17/16  10/7/16  1/6/17  4/28/17  7/21/17  10/20/17  1/19/18  4/20/18  7/27/18  10/26/18  3/15/19  6/21/19  9/13/19  12/13/19  4/10/20  7/3/20  10/2/20 " "continued  \"  \"  \"  \"  \"  \"  \"  \"  \"  \"  \"  \"  \"  \"  \"  \"  \"  \"  \"  \"  \"  \"  \"  \"  \"     \"  \" Objective #1C  Client will process feelings related to his wife's failing health.     Intervention(s)  educate on grief.    11/1/13  2/7/14  5/9/14  8/29/14  12/19/14  5/13/15  8/7/15  11/13/15  2/26/16  6/17/16  10/7/16  1/6/17  4/28/17  7/21/17  10/20/17  1/19/18  4/20/18  7/27/18  10/26/18  3/15/19  6/21/19  9/13/19  12/13/19  4/10/20  7/3/20  10/2/20 continued  \"  \"  \"  \"  \"  \"  \"  \"  \"  \"  \"  \"  \"  \"  \"  \"  \"  \"  \"  \"  \"  \"  \"  \"  \"                  Start Date Goal Dates  Reviewed Status     12/6/13 Goal 4: Report coping better (continued).          NEW     \"  \" Objective #2A (Client Action)  Client will follow his safety plan as needed.     Intervention(s) (Therapist Action)           2/7/14  5/9/14  8/29/14  12/19/14  5/13/15  8/7/15  11/13/15  2/26/16  6/17/16  10/7/16  1/6/17  4/28/17  7/21/17 10/20/17  1/19/18  4/20/18  7/27/18  10/26/18  3/15/19  6/21/19  9/13/19  12/13/19  4/10/20  7/3/20  10/2/20 NEW  Continued  \"  \"  \"  \"  \"  \"  \"  \"  \"  \"  \"  \"  \"  \"  \"  \"  \"  \"  \"  \"  \"  \"  \"  \"      Objective #2B  Client will reprocess chronic pain by addressing the negative cognition until no longer feeling true and upsetting.     Intervention(s)   EMDR       NEW  4/26/19  6/21/19  9/13/19  12/13/19  4/10/20  7/3/20  10/2/20 (on hold)      Objective #2C         Intervention(s)                             Client has reviewed and agreed to the above plan.     Kleber Pollack [facility]                  August 2, 2013     "

## 2020-12-05 ASSESSMENT — ANXIETY QUESTIONNAIRES: GAD7 TOTAL SCORE: 16

## 2020-12-08 ENCOUNTER — TELEPHONE (OUTPATIENT)
Dept: FAMILY MEDICINE | Facility: CLINIC | Age: 65
End: 2020-12-08

## 2020-12-08 DIAGNOSIS — F32.A DEPRESSION, UNSPECIFIED DEPRESSION TYPE: ICD-10-CM

## 2020-12-08 RX ORDER — TRAZODONE HYDROCHLORIDE 100 MG/1
TABLET ORAL
Qty: 60 TABLET | Refills: 1 | Status: SHIPPED | OUTPATIENT
Start: 2020-12-08 | End: 2021-02-09

## 2020-12-08 RX ORDER — TRAZODONE HYDROCHLORIDE 50 MG/1
50 TABLET, FILM COATED ORAL
Qty: 30 TABLET | Refills: 1 | Status: SHIPPED | OUTPATIENT
Start: 2020-12-08 | End: 2021-02-09

## 2020-12-08 NOTE — TELEPHONE ENCOUNTER
"Requested Prescriptions   Pending Prescriptions Disp Refills     traZODone (DESYREL) 100 MG tablet [Pharmacy Med Name: trazodone 100 mg tablet] 60 tablet 3     Sig: TAKE 2 TABLETS BY MOUTH AT BEDTIME AS NEEDED FOR SLEEP       Serotonin Modulators Passed - 12/8/2020  8:01 AM        Passed - Recent (12 mo) or future (30 days) visit within the authorizing provider's specialty     Patient has had an office visit with the authorizing provider or a provider within the authorizing providers department within the previous 12 mos or has a future within next 30 days. See \"Patient Info\" tab in inbasket, or \"Choose Columns\" in Meds & Orders section of the refill encounter.              Passed - Medication is active on med list        Passed - Patient is age 18 or older           traZODone (DESYREL) 50 MG tablet [Pharmacy Med Name: trazodone 50 mg tablet] 30 tablet 5     Sig: Take 1 tablet (50 mg) by mouth nightly as needed for sleep Take along with the 100 mg tablets for total dose of 250 mg       Serotonin Modulators Passed - 12/8/2020  8:01 AM        Passed - Recent (12 mo) or future (30 days) visit within the authorizing provider's specialty     Patient has had an office visit with the authorizing provider or a provider within the authorizing providers department within the previous 12 mos or has a future within next 30 days. See \"Patient Info\" tab in inbasket, or \"Choose Columns\" in Meds & Orders section of the refill encounter.              Passed - Medication is active on med list        Passed - Patient is age 18 or older             "

## 2020-12-08 NOTE — TELEPHONE ENCOUNTER
Reason for Call:  Medication question    Name of the medication requested: Patient bought Super Beta Prostate. Wants to know if he can take it with his medication. Ingredients are Beta-sitosterol, Vit D, Zinc, Selenium, Calcium and six other minerals    Can we leave a detailed message on this number? YES    Phone number patient can be reached at: Home number on file [phone] (home)    Best Time: Any    Call taken on 12/8/2020 at 3:19 PM by Libby Washington

## 2020-12-18 ENCOUNTER — VIRTUAL VISIT (OUTPATIENT)
Dept: PSYCHOLOGY | Facility: CLINIC | Age: 65
End: 2020-12-18
Payer: COMMERCIAL

## 2020-12-18 DIAGNOSIS — F41.1 GENERALIZED ANXIETY DISORDER: ICD-10-CM

## 2020-12-18 DIAGNOSIS — F33.1 MAJOR DEPRESSIVE DISORDER, RECURRENT EPISODE, MODERATE (H): Primary | ICD-10-CM

## 2020-12-18 DIAGNOSIS — F43.10 POSTTRAUMATIC STRESS DISORDER: ICD-10-CM

## 2020-12-18 PROCEDURE — 90834 PSYTX W PT 45 MINUTES: CPT | Mod: 95 | Performed by: SOCIAL WORKER

## 2020-12-18 ASSESSMENT — ANXIETY QUESTIONNAIRES
5. BEING SO RESTLESS THAT IT IS HARD TO SIT STILL: MORE THAN HALF THE DAYS
IF YOU CHECKED OFF ANY PROBLEMS ON THIS QUESTIONNAIRE, HOW DIFFICULT HAVE THESE PROBLEMS MADE IT FOR YOU TO DO YOUR WORK, TAKE CARE OF THINGS AT HOME, OR GET ALONG WITH OTHER PEOPLE: VERY DIFFICULT
1. FEELING NERVOUS, ANXIOUS, OR ON EDGE: MORE THAN HALF THE DAYS
3. WORRYING TOO MUCH ABOUT DIFFERENT THINGS: MORE THAN HALF THE DAYS
GAD7 TOTAL SCORE: 13
6. BECOMING EASILY ANNOYED OR IRRITABLE: NEARLY EVERY DAY
7. FEELING AFRAID AS IF SOMETHING AWFUL MIGHT HAPPEN: SEVERAL DAYS
2. NOT BEING ABLE TO STOP OR CONTROL WORRYING: MORE THAN HALF THE DAYS

## 2020-12-18 ASSESSMENT — PATIENT HEALTH QUESTIONNAIRE - PHQ9
SUM OF ALL RESPONSES TO PHQ QUESTIONS 1-9: 16
5. POOR APPETITE OR OVEREATING: SEVERAL DAYS

## 2020-12-18 NOTE — PROGRESS NOTES
"                                           Progress Note    Patient Name: Melquiades Morales  Date: 12/18/20         Service Type: Individual      Session Start Time: 9 am  Session End Time: 9:45 am     Session Length: 45 min    Session #: 137    Attendees: Client attended alone.    Service Modality:  Phone Visit:    The patient has been notified of the following:      \"We have found that certain health care needs can be provided without the need for a face to face visit.  This service lets us provide the care you need with a phone conversation.       I will have full access to your [place] medical record during this entire phone call.   I will be taking notes for your medical record.      Since this is like an office visit, we will bill your insurance company for this service.       There are potential benefits and risks of telephone visits (e.g. limits to patient confidentiality) that differ from in-person visits.?  Confidentiality still applies for telephone services, and nobody will record the visit.  It is important to be in a quiet, private space that is free of distractions (including cell phone or other devices) during the visit.??      If during the course of the call I believe a telephone visit is not appropriate, you will not be charged for this service\"     Consent has been obtained for this service by care team member: Yes      Treatment Plan Last Reviewed: due 1/2/21  PHQ-9 / SHAILESH-7 : phq=16; shailesh=13.    DATA  Interactive Complexity: No  Crisis: No      Progress Since Last Session (Related to Symptoms / Goals / Homework):   Symptoms:  improvement.    Homework: Partially completed: use a healthy coping technique as needed. New: use peaceful place picture whenever beginning to feel upset/anxious.     Episode of Care Goals: Minimal progress - PREPARATION (Decided to change - considering how); Intervened by negotiating a change plan and determining options / strategies for behavior change, identifying " "triggers, exploring social supports, and working towards setting a date to begin behavior change.    Current / Ongoing Stressors and Concerns:  Ex wife's health. Financial.    He has a spot on his lung that he reports doctors are unable to explain.  He has been having panic attacks he believes connected to time he had a gran mal seizure and was brought to the hospital by ambulance. He had a panic attack when they tried to put him on a ventilator and he thought he was going to die. He has been working on this on his own I found out today by processing the event from start to finish and then bringing himself to relaxation. He reports having success managing panic attacks using his peaceful place (fishing and \"catching the big one)\". Doing ok with this \"so far\".  He worries about [place] cov[place].  He reports less physical pain the past few weeks.  His house mate has been starting up his motorcycle at various hours and this has been complained about by neighbors. There is concern about eviction.    Treatment Objective(s) Addressed in This Session:   Practice a distraction technique as needed 100% of trials for 2 weeks. New: use peaceful place picture.  Follow safety plan.     Intervention:  Assessed functioning and for safety. Went over the results of the phq/shailesh. Processed feelings about Job and letter he received from the city; problem solved. Reviewed healthy coping ideas.        ASSESSMENT: Current Emotional / Mental Status (status of significant symptoms):   Risk status (Self / Other harm or suicidal ideation)   Patient denies current fears or concerns for personal safety.   Patient denies current or recent suicidal ideation or behaviors.  Today when asked he stated \"I don't believe in that shit no more\"-9/18/20.   Patient denies current or recent homicidal ideation or behaviors.   Patient denies current or recent self injurious behavior or ideation.   Patient denies other safety concerns.   Patient reports " there has been no change in risk factors since their last session.     Patient reports there has been no change in protective factors since their last session.     A safety and risk management plan has been developed including: Patient consented to co-developed safety plan.  Safety and risk management plan was completed.  Patient agreed to use safety plan should any safety concerns arise.  A copy was given to the patient.                     Appearance:                 Unable to assess.                                Eye Contact:                 Unable to assess.                                     Psychomotor Behavior: Unable to assess.              Attitude: Cooperative               Orientation: All              Speech                   Rate / Production:  Normal               Volume:  normal              Mood:  Depressed               Affect:  Appropriate               Thought Content:  Clear               ThoughtForm:  Coherent, Logical                                Insight: Fair/Good     Medication Review:  No changes to current psychiatric medication(s). PCP Dr. Jingesh Travis. Psych medications: cymbalta 60 mg.     Medication Compliance:   Yes     Changes in Health Issues:   None reported    Chemical Use Review:  Substance Use: Chemical use reviewed, no active concerns identified     Tobacco Use: No change in amount of tobacco use since last session.  Provided encouragement to quit     Diagnosis:  Ptsd; shailesh; major depression.    Collateral Reports Completed:   Routed note to PCP    PLAN: (Patient Tasks / Therapist Tasks / Other)  Schedule biweekly. Practice distraction ideas and other coping ideas. Utilize support network. Use safety plan as needed. Practice gratitude and peaceful place picture. Use emdr to address ambulance experience (on hold). Goals due 1/2/21.        SHAVONNE Yao                                                      "    ______________________________________________________________________    Treatment Plan    Patient's Name: Melquiades Morales  YOB: 1955    Date: 8/23/13    DSM5 Diagnoses: 296.32 (F33.1) Major Depressive Disorder, Recurrent Episode, Moderate _ and With anxious distress, 300.02 (F41.1) Generalized Anxiety Disorder or 309.81 (F43.10) Posttraumatic Stress Disorder (includes Posttraumatic Stress Disorder for Children 6 Years and Younger)  Without dissociative symptoms  Psychosocial / Contextual Factors: ; she is in poor health and relies on client a lot. Financial.   WHODAS: already completed.    Referral / Collaboration:  Referral to another professional/service is not indicated at this time.    Anticipated number of session or this episode of care: 15+          Treatment Goal(s)  Start Date Goal Dates  Reviewed Status    8/2/13 Goal 1:  Client will report coping better.       New     \"  \" Objective #1A (Client Action)  Client will process feelings related to current situations and work on coming up with solutions.     Intervention(s)  Therapist will encourage and help problem solve.    11/1/13  2/7/14  5/9/14  8/29/14  12/19/14  5/13/15  8/29/15  11/13/15  2/26/16  6/17/16  10/7/16  1/6/17  4/28/17  7/21/17  10/20/17  1/19/18  4/20/18  7/27/18  10/26/18  3/15/19  6/21/19  9/13/19  12/13/19  4/10/20  7/3/20  10/2/20 New  Continued  \"  \"  \"  \"  \"  \"  \"  \"     \"  \" Objective #1B  Client will use a healthy coping technique 100% of trials for 4 weeks.     Intervention(s)  Therapist came up with a list of ideas with client's input.     He likes: prayer; fishing; talking to friends; time with his dog and talking to Milly and/or Katarina. 11/1/13   2/7/14  5/29/14  8/29/14  12/19/14  5/13/15  8/7/15  11/13/15  2/26/16  6/17/16  10/7/16  1/6/17  4/28/17  7/21/17  10/20/17  1/19/18  4/20/18  7/27/18  10/26/18  3/15/19  6/21/19  9/13/19  12/13/19  4/10/20  7/3/20  10/2/20 " "continued  \"  \"  \"  \"  \"  \"  \"  \"  \"  \"  \"  \"  \"  \"  \"  \"  \"  \"  \"  \"  \"  \"  \"  \"  \"     \"  \" Objective #1C  Client will process feelings related to his wife's failing health.     Intervention(s)  educate on grief.    11/1/13  2/7/14  5/9/14  8/29/14  12/19/14  5/13/15  8/7/15  11/13/15  2/26/16  6/17/16  10/7/16  1/6/17  4/28/17  7/21/17  10/20/17  1/19/18  4/20/18  7/27/18  10/26/18  3/15/19  6/21/19  9/13/19  12/13/19  4/10/20  7/3/20  10/2/20 continued  \"  \"  \"  \"  \"  \"  \"  \"  \"  \"  \"  \"  \"  \"  \"  \"  \"  \"  \"  \"  \"  \"  \"  \"  \"                  Start Date Goal Dates  Reviewed Status     12/6/13 Goal 4: Report coping better (continued).          NEW     \"  \" Objective #2A (Client Action)  Client will follow his safety plan as needed.     Intervention(s) (Therapist Action)           2/7/14  5/9/14  8/29/14  12/19/14  5/13/15  8/7/15  11/13/15  2/26/16  6/17/16  10/7/16  1/6/17  4/28/17  7/21/17 10/20/17  1/19/18  4/20/18  7/27/18  10/26/18  3/15/19  6/21/19  9/13/19  12/13/19  4/10/20  7/3/20  10/2/20 NEW  Continued  \"  \"  \"  \"  \"  \"  \"  \"  \"  \"  \"  \"  \"  \"  \"  \"  \"  \"  \"  \"  \"  \"  \"  \"      Objective #2B  Client will reprocess chronic pain by addressing the negative cognition until no longer feeling true and upsetting.     Intervention(s)   EMDR       NEW  4/26/19  6/21/19  9/13/19  12/13/19  4/10/20  7/3/20  10/2/20 (on hold)      Objective #2C         Intervention(s)                             Client has reviewed and agreed to the above plan.     Kleber Pollack [facility]                  August 2, 2013                                              Progress Note    Patient Name: Melquiades Morales  Date: 12/4/20         Service Type: Individual      Session Start Time: 9 am  Session End Time: 9:45 am     Session Length: 45 min    Session #: 136    Attendees: Client attended alone.    Service Modality:  Phone Visit:    The patient has been notified of the following:      \"We have found that certain health care " "needs can be provided without the need for a face to face visit.  This service lets us provide the care you need with a phone conversation.       I will have full access to your [place] medical record during this entire phone call.   I will be taking notes for your medical record.      Since this is like an office visit, we will bill your insurance company for this service.       There are potential benefits and risks of telephone visits (e.g. limits to patient confidentiality) that differ from in-person visits.?  Confidentiality still applies for telephone services, and nobody will record the visit.  It is important to be in a quiet, private space that is free of distractions (including cell phone or other devices) during the visit.??      If during the course of the call I believe a telephone visit is not appropriate, you will not be charged for this service\"     Consent has been obtained for this service by care team member: Yes      Treatment Plan Last Reviewed: due 1/2/21  PHQ-9 / EVGENY-7 : phq=17; evgeny=16.    DATA  Interactive Complexity: No  Crisis: No      Progress Since Last Session (Related to Symptoms / Goals / Homework):   Symptoms:  worsening.    Homework: Partially completed: use a healthy coping technique as needed. New: use peaceful place picture whenever beginning to feel upset/anxious.     Episode of Care Goals: Minimal progress - PREPARATION (Decided to change - considering how); Intervened by negotiating a change plan and determining options / strategies for behavior change, identifying triggers, exploring social supports, and working towards setting a date to begin behavior change.    Current / Ongoing Stressors and Concerns:  Ex wife's health. Financial.    He has a spot on his lung that he reports doctors are unable to explain.  He has been having panic attacks he believes connected to time he had a gran mal seizure and was brought to the hospital by ambulance. He had a panic attack when they " "tried to put him on a ventilator and he thought he was going to die. He has been working on this on his own I found out today by processing the event from start to finish and then bringing himself to relaxation. He reports having success managing panic attacks using his peaceful place (fishing and \"catching the big one)\". Doing ok with this \"so far\".  He worries about [place] cov[place].  He reports less physical pain the past few weeks.    Treatment Objective(s) Addressed in This Session:   practice a distraction technique as needed 100% of trials for 2 weeks. New: use peaceful place picture.  Follow safety plan.     Intervention:  Assessed functioning and for safety. Went over the results of the phq/shailesh.  Processed feelings about worries of getting covid and not seeing family for the holidays because of it. Addressed frustration with wife wanting to talk daily. Encouraged him to continue to maintain contact with friends for extra support. Reviewed healthy coping ideas.        ASSESSMENT: Current Emotional / Mental Status (status of significant symptoms):   Risk status (Self / Other harm or suicidal ideation)   Patient denies current fears or concerns for personal safety.   Patient denies current or recent suicidal ideation or behaviors.  Today when asked he stated \"I don't believe in that shit no more\"-9/18/20.   Patient denies current or recent homicidal ideation or behaviors.   Patient denies current or recent self injurious behavior or ideation.   Patient denies other safety concerns.   Patient reports there has been no change in risk factors since their last session.     Patient reports there has been no change in protective factors since their last session.     A safety and risk management plan has been developed including: Patient consented to co-developed safety plan.  Safety and risk management plan was completed.  Patient agreed to use safety plan should any safety concerns arise.  A copy was given to the " patient.                     Appearance:                 Unable to assess.                                Eye Contact:                 Unable to assess.                                     Psychomotor Behavior: Unable to assess.              Attitude: Cooperative               Orientation: All              Speech                   Rate / Production:  Normal               Volume:  normal              Mood:  Depressed               Affect:  Appropriate               Thought Content:  Clear               ThoughtForm:  Coherent, Logical                                Insight: Fair/Good     Medication Review:  No changes to current psychiatric medication(s). PCP Dr. Jignesh Travis. Psych medications: cymbalta 60 mg.     Medication Compliance:   Yes     Changes in Health Issues:   None reported    Chemical Use Review:  Substance Use: Chemical use reviewed, no active concerns identified     Tobacco Use: No change in amount of tobacco use since last session.  Provided encouragement to quit     Diagnosis:  Ptsd; shailesh; major depression.    Collateral Reports Completed:   Routed note to PCP    PLAN: (Patient Tasks / Therapist Tasks / Other)  Schedule biweekly. Practice distraction ideas and other coping ideas. Utilize support network. Use safety plan as needed. Practice gratitude and peaceful place picture. Use emdr to address ambulance experience (on hold). Goals due 1/2/21.        Kleber Pollack, SHAVONNE                                                         ______________________________________________________________________    Treatment Plan    Patient's Name: Melquiades Morales  YOB: 1955    Date: 8/23/13    DSM5 Diagnoses: 296.32 (F33.1) Major Depressive Disorder, Recurrent Episode, Moderate _ and With anxious distress, 300.02 (F41.1) Generalized Anxiety Disorder or 309.81 (F43.10) Posttraumatic Stress Disorder (includes Posttraumatic Stress Disorder for Children 6 Years and Younger)  Without dissociative  "symptoms  Psychosocial / Contextual Factors: ; she is in poor health and relies on client a lot. Financial.   WHODAS: already completed.    Referral / Collaboration:  Referral to another professional/service is not indicated at this time.    Anticipated number of session or this episode of care: 15+          Treatment Goal(s)  Start Date Goal Dates  Reviewed Status    8/2/13 Goal 1:  Client will report coping better.       New     \"  \" Objective #1A (Client Action)  Client will process feelings related to current situations and work on coming up with solutions.     Intervention(s)  Therapist will encourage and help problem solve.    11/1/13  2/7/14  5/9/14  8/29/14  12/19/14  5/13/15  8/29/15  11/13/15  2/26/16  6/17/16  10/7/16  1/6/17  4/28/17  7/21/17  10/20/17  1/19/18  4/20/18  7/27/18  10/26/18  3/15/19  6/21/19  9/13/19  12/13/19  4/10/20  7/3/20  10/2/20 New  Continued  \"  \"  \"  \"  \"  \"  \"  \"     \"  \" Objective #1B  Client will use a healthy coping technique 100% of trials for 4 weeks.     Intervention(s)  Therapist came up with a list of ideas with client's input.     He likes: prayer; fishing; talking to friends; time with his dog and talking to Milly and/or Katarina. 11/1/13   2/7/14  5/29/14  8/29/14  12/19/14  5/13/15  8/7/15  11/13/15  2/26/16  6/17/16  10/7/16  1/6/17  4/28/17  7/21/17  10/20/17  1/19/18  4/20/18  7/27/18  10/26/18  3/15/19  6/21/19  9/13/19  12/13/19  4/10/20  7/3/20  10/2/20 continued  \"  \"  \"  \"  \"  \"  \"  \"  \"  \"  \"  \"  \"  \"  \"  \"  \"  \"  \"  \"  \"  \"  \"  \"  \"     \"  \" Objective #1C  Client will process feelings related to his wife's failing health.     Intervention(s)  educate on grief.    11/1/13  2/7/14  5/9/14  8/29/14  12/19/14  5/13/15  8/7/15  11/13/15  2/26/16  6/17/16  10/7/16  1/6/17  4/28/17  7/21/17  10/20/17  1/19/18  4/20/18  7/27/18  10/26/18  3/15/19  6/21/19  9/13/19  12/13/19  4/10/20  7/3/20  10/2/20 " "continued  \"  \"  \"  \"  \"  \"  \"  \"  \"  \"  \"  \"  \"  \"  \"  \"  \"  \"  \"  \"  \"  \"  \"  \"  \"                  Start Date Goal Dates  Reviewed Status     12/6/13 Goal 4: Report coping better (continued).          NEW     \"  \" Objective #2A (Client Action)  Client will follow his safety plan as needed.     Intervention(s) (Therapist Action)           2/7/14  5/9/14  8/29/14  12/19/14  5/13/15  8/7/15  11/13/15  2/26/16  6/17/16  10/7/16  1/6/17  4/28/17  7/21/17 10/20/17  1/19/18  4/20/18  7/27/18  10/26/18  3/15/19  6/21/19  9/13/19  12/13/19  4/10/20  7/3/20  10/2/20 NEW  Continued  \"  \"  \"  \"  \"  \"  \"  \"  \"  \"  \"  \"  \"  \"  \"  \"  \"  \"  \"  \"  \"  \"  \"  \"      Objective #2B  Client will reprocess chronic pain by addressing the negative cognition until no longer feeling true and upsetting.     Intervention(s)   EMDR       NEW  4/26/19  6/21/19  9/13/19  12/13/19  4/10/20  7/3/20  10/2/20 (on hold)      Objective #2C         Intervention(s)                             Client has reviewed and agreed to the above plan.     Kleber Pollack [facility]                  August 2, 2013     "

## 2020-12-19 ASSESSMENT — ANXIETY QUESTIONNAIRES: GAD7 TOTAL SCORE: 13

## 2020-12-22 ENCOUNTER — TELEPHONE (OUTPATIENT)
Dept: FAMILY MEDICINE | Facility: CLINIC | Age: 65
End: 2020-12-22

## 2021-01-08 ENCOUNTER — VIRTUAL VISIT (OUTPATIENT)
Dept: PSYCHOLOGY | Facility: CLINIC | Age: 66
End: 2021-01-08
Payer: COMMERCIAL

## 2021-01-08 DIAGNOSIS — F33.1 MAJOR DEPRESSIVE DISORDER, RECURRENT EPISODE, MODERATE (H): Primary | ICD-10-CM

## 2021-01-08 DIAGNOSIS — F41.1 GENERALIZED ANXIETY DISORDER: ICD-10-CM

## 2021-01-08 DIAGNOSIS — F43.10 POSTTRAUMATIC STRESS DISORDER: ICD-10-CM

## 2021-01-08 PROCEDURE — 90832 PSYTX W PT 30 MINUTES: CPT | Mod: 95 | Performed by: SOCIAL WORKER

## 2021-01-08 ASSESSMENT — ANXIETY QUESTIONNAIRES
5. BEING SO RESTLESS THAT IT IS HARD TO SIT STILL: MORE THAN HALF THE DAYS
3. WORRYING TOO MUCH ABOUT DIFFERENT THINGS: NEARLY EVERY DAY
1. FEELING NERVOUS, ANXIOUS, OR ON EDGE: MORE THAN HALF THE DAYS
IF YOU CHECKED OFF ANY PROBLEMS ON THIS QUESTIONNAIRE, HOW DIFFICULT HAVE THESE PROBLEMS MADE IT FOR YOU TO DO YOUR WORK, TAKE CARE OF THINGS AT HOME, OR GET ALONG WITH OTHER PEOPLE: VERY DIFFICULT
6. BECOMING EASILY ANNOYED OR IRRITABLE: NEARLY EVERY DAY
2. NOT BEING ABLE TO STOP OR CONTROL WORRYING: NEARLY EVERY DAY
GAD7 TOTAL SCORE: 16
7. FEELING AFRAID AS IF SOMETHING AWFUL MIGHT HAPPEN: SEVERAL DAYS

## 2021-01-08 NOTE — PROGRESS NOTES
"                                           Progress Note    Patient Name: Melquiades Morales  Date: 1/8/21         Service Type: Individual      Session Start Time: 9 am  Session End Time: 9:45 am     Session Length: 45 min    Session #: 137    Attendees: Client attended alone.    Service Modality:  Phone Visit:    The patient has been notified of the following:      \"We have found that certain health care needs can be provided without the need for a face to face visit.  This service lets us provide the care you need with a phone conversation.       I will have full access to your [place] medical record during this entire phone call.   I will be taking notes for your medical record.      Since this is like an office visit, we will bill your insurance company for this service.       There are potential benefits and risks of telephone visits (e.g. limits to patient confidentiality) that differ from in-person visits.?  Confidentiality still applies for telephone services, and nobody will record the visit.  It is important to be in a quiet, private space that is free of distractions (including cell phone or other devices) during the visit.??      If during the course of the call I believe a telephone visit is not appropriate, you will not be charged for this service\"     Consent has been obtained for this service by care team member: Yes      Treatment Plan Last Reviewed: due 4/8/21  PHQ-9 / SHAILESH-7 : phq=16; shailesh=16.    DATA  Interactive Complexity: No  Crisis: No      Progress Since Last Session (Related to Symptoms / Goals / Homework):   Symptoms:  worsening.    Homework: Partially completed: use a healthy coping technique as needed. New: use peaceful place picture whenever beginning to feel upset/anxious.     Episode of Care Goals: Minimal progress - PREPARATION (Decided to change - considering how); Intervened by negotiating a change plan and determining options / strategies for behavior change, identifying triggers, " "exploring social supports, and working towards setting a date to begin behavior change.    Current / Ongoing Stressors and Concerns:  Ex wife's health. Financial.    He has a spot on his lung that he reports doctors are unable to explain.  He has been having panic attacks he believes connected to time he had a gran mal seizure and was brought to the hospital by ambulance. He had a panic attack when they tried to put him on a ventilator and he thought he was going to die. He has been working on this on his own I found out today by processing the event from start to finish and then bringing himself to relaxation. He reports having success managing panic attacks using his peaceful place (fishing and \"catching the big one)\". Doing ok with this \"so far\".  He worries about [place].  He reports less physical pain the past few weeks.  His house mate has been starting up his motorcycle at various hours and this has been complained about by neighbors. There is concern about eviction. After receiving the eviction notice his roommate has been better.    Treatment Objective(s) Addressed in This Session:   Practice a distraction technique as needed 100% of trials for 2 weeks. New: use peaceful place picture.  Follow safety plan.     Intervention:  Assessed functioning and for safety. Went over the results of the phq/shailesh. reviewed goals and completed the CGI. Processed feelings about [place] and letter he received from the city; problem solved. Reviewed healthy coping ideas.        ASSESSMENT: Current Emotional / Mental Status (status of significant symptoms):   Risk status (Self / Other harm or suicidal ideation)   Patient denies current fears or concerns for personal safety.   Patient denies current or recent suicidal ideation or behaviors.  Today when asked he stated \"I don't believe in that shit no more\"-9/18/20.   Patient denies current or recent homicidal ideation or behaviors.   Patient denies current or recent " self injurious behavior or ideation.   Patient denies other safety concerns.   Patient reports there has been no change in risk factors since their last session.     Patient reports there has been no change in protective factors since their last session.     A safety and risk management plan has been developed including: Patient consented to co-developed safety plan.  Safety and risk management plan was completed.  Patient agreed to use safety plan should any safety concerns arise.  A copy was given to the patient.                     Appearance:                 Unable to assess.                                Eye Contact:                 Unable to assess.                                     Psychomotor Behavior: Unable to assess.              Attitude: Cooperative               Orientation: All              Speech                   Rate / Production:  Normal               Volume:  normal              Mood:  Depressed               Affect:  Appropriate               Thought Content:  Clear               ThoughtForm:  Coherent, Logical                                Insight: Fair/Good     Medication Review:  No changes to current psychiatric medication(s). PCP Dr. Jignesh Travis. Psych medications: cymbalta 60 mg.     Medication Compliance:   Yes     Changes in Health Issues:   None reported    Chemical Use Review:  Substance Use: Chemical use reviewed, no active concerns identified     Tobacco Use: No change in amount of tobacco use since last session.  Provided encouragement to quit     Diagnosis:  Ptsd; shailesh; major depression.    Collateral Reports Completed:   Routed note to PCP    PLAN: (Patient Tasks / Therapist Tasks / Other)  Schedule biweekly. Practice distraction ideas and other coping ideas. Utilize support network. Use safety plan as needed. Practice gratitude and peaceful place picture. Use emdr to address ambulance experience (on hold). Goals due 4/8/21.        SHAVONNE Yao                        "                                  ______________________________________________________________________    Treatment Plan    Patient's Name: Melquiades Morales  YOB: 1955    Date: 8/23/13    DSM5 Diagnoses: 296.32 (F33.1) Major Depressive Disorder, Recurrent Episode, Moderate _ and With anxious distress, 300.02 (F41.1) Generalized Anxiety Disorder or 309.81 (F43.10) Posttraumatic Stress Disorder (includes Posttraumatic Stress Disorder for Children 6 Years and Younger)  Without dissociative symptoms  Psychosocial / Contextual Factors: ; she is in poor health and relies on client a lot. Financial.   WHODAS: already completed.    Referral / Collaboration:  Referral to another professional/service is not indicated at this time.    Anticipated number of session or this episode of care: 15+          Treatment Goal(s)  Start Date Goal Dates  Reviewed Status    8/2/13 Goal 1:  Client will report coping better.       New     \"  \" Objective #1A (Client Action)  Client will process feelings related to current situations and work on coming up with solutions.     Intervention(s)  Therapist will encourage and help problem solve.    11/1/13  2/7/14  5/9/14  8/29/14  12/19/14  5/13/15  8/29/15  11/13/15  2/26/16  6/17/16  10/7/16  1/6/17  4/28/17  7/21/17  10/20/17  1/19/18  4/20/18  7/27/18  10/26/18  3/15/19  6/21/19  9/13/19  12/13/19  4/10/20  7/3/20  10/2/20  1/8/21 New  Continued  \"  \"  \"  \"  \"  \"  \"  \"     \"  \" Objective #1B  Client will use a healthy coping technique 100% of trials for 4 weeks.     Intervention(s)  Therapist came up with a list of ideas with client's input.     He likes: prayer; fishing; talking to friends; time with his dog and talking to Milly and/or Katarina. 11/1/13   " "2/7/14  5/29/14  8/29/14  12/19/14  5/13/15  8/7/15  11/13/15  2/26/16  6/17/16  10/7/16  1/6/17  4/28/17  7/21/17  10/20/17  1/19/18  4/20/18  7/27/18  10/26/18  3/15/19  6/21/19  9/13/19  12/13/19  4/10/20  7/3/20  10/2/20  1/8/21 continued  \"  \"  \"  \"  \"  \"  \"  \"  \"  \"  \"  \"  \"  \"  \"  \"  \"  \"  \"  \"  \"  \"  \"  \"  \"  \"     \"  \" Objective #1C  Client will process feelings related to his wife's failing health.     Intervention(s)  educate on grief.    11/1/13  2/7/14  5/9/14  8/29/14  12/19/14  5/13/15  8/7/15  11/13/15  2/26/16  6/17/16  10/7/16  1/6/17  4/28/17  7/21/17  10/20/17  1/19/18  4/20/18  7/27/18  10/26/18  3/15/19  6/21/19  9/13/19  12/13/19  4/10/20  7/3/20  10/2/20  1/8/21 continued  \"  \"  \"  \"  \"  \"  \"  \"  \"  \"  \"  \"  \"  \"  \"  \"  \"  \"  \"  \"  \"  \"  \"  \"  \"  \"                  Start Date Goal Dates  Reviewed Status     12/6/13 Goal 4: Report coping better (continued).          NEW     \"  \" Objective #2A (Client Action)  Client will follow his safety plan as needed.     Intervention(s) (Therapist Action)           2/7/14  5/9/14  8/29/14  12/19/14  5/13/15  8/7/15  11/13/15  2/26/16  6/17/16  10/7/16  1/6/17  4/28/17  7/21/17 10/20/17  1/19/18  4/20/18  7/27/18  10/26/18  3/15/19  6/21/19  9/13/19  12/13/19  4/10/20  7/3/20  10/2/20  1/8/21 NEW  Continued  \"  \"  \"  \"  \"  \"  \"  \"  \"  \"  \"  \"  \"  \"  \"  \"  \"  \"  \"  \"  \"  \"  \"  \"  \"      Objective #2B  Client will reprocess chronic pain by addressing the negative cognition until no longer feeling true and upsetting.     Intervention(s)   EMDR       NEW  4/26/19  6/21/19  9/13/19  12/13/19  4/10/20  7/3/20  10/2/20 (on hold)  1/8/21      Objective #2C         Intervention(s)                             Client has reviewed and agreed to the above plan.     Kleber Pollack [facility]                  August 2, 2013         "

## 2021-01-09 ASSESSMENT — ANXIETY QUESTIONNAIRES: GAD7 TOTAL SCORE: 16

## 2021-01-11 ENCOUNTER — MEDICAL CORRESPONDENCE (OUTPATIENT)
Dept: HEALTH INFORMATION MANAGEMENT | Facility: CLINIC | Age: 66
End: 2021-01-11

## 2021-01-22 ENCOUNTER — VIRTUAL VISIT (OUTPATIENT)
Dept: PSYCHOLOGY | Facility: CLINIC | Age: 66
End: 2021-01-22
Payer: COMMERCIAL

## 2021-01-22 DIAGNOSIS — F43.10 POSTTRAUMATIC STRESS DISORDER: ICD-10-CM

## 2021-01-22 DIAGNOSIS — F33.1 MAJOR DEPRESSIVE DISORDER, RECURRENT EPISODE, MODERATE (H): Primary | ICD-10-CM

## 2021-01-22 DIAGNOSIS — F41.1 GENERALIZED ANXIETY DISORDER: ICD-10-CM

## 2021-01-22 PROCEDURE — 90832 PSYTX W PT 30 MINUTES: CPT | Mod: 95 | Performed by: SOCIAL WORKER

## 2021-01-22 ASSESSMENT — ANXIETY QUESTIONNAIRES
IF YOU CHECKED OFF ANY PROBLEMS ON THIS QUESTIONNAIRE, HOW DIFFICULT HAVE THESE PROBLEMS MADE IT FOR YOU TO DO YOUR WORK, TAKE CARE OF THINGS AT HOME, OR GET ALONG WITH OTHER PEOPLE: VERY DIFFICULT
GAD7 TOTAL SCORE: 13
6. BECOMING EASILY ANNOYED OR IRRITABLE: NEARLY EVERY DAY
5. BEING SO RESTLESS THAT IT IS HARD TO SIT STILL: MORE THAN HALF THE DAYS
2. NOT BEING ABLE TO STOP OR CONTROL WORRYING: MORE THAN HALF THE DAYS
3. WORRYING TOO MUCH ABOUT DIFFERENT THINGS: MORE THAN HALF THE DAYS
1. FEELING NERVOUS, ANXIOUS, OR ON EDGE: MORE THAN HALF THE DAYS
7. FEELING AFRAID AS IF SOMETHING AWFUL MIGHT HAPPEN: NOT AT ALL

## 2021-01-22 NOTE — PROGRESS NOTES
"                                           Progress Note    Patient Name: Melquiades Morales  Date: 1/22/21         Service Type: Individual      Session Start Time: 9 am  Session End Time: 9:30 am     Session Length: 30 min    Session #: 139    Attendees: Client attended alone.    Service Modality:  Phone Visit:    The patient has been notified of the following:      \"We have found that certain health care needs can be provided without the need for a face to face visit.  This service lets us provide the care you need with a phone conversation.       I will have full access to your [place] medical record during this entire phone call.   I will be taking notes for your medical record.      Since this is like an office visit, we will bill your insurance company for this service.       There are potential benefits and risks of telephone visits (e.g. limits to patient confidentiality) that differ from in-person visits.?  Confidentiality still applies for telephone services, and nobody will record the visit.  It is important to be in a quiet, private space that is free of distractions (including cell phone or other devices) during the visit.??      If during the course of the call I believe a telephone visit is not appropriate, you will not be charged for this service\"     Consent has been obtained for this service by care team member: Yes      Treatment Plan Last Reviewed: due 4/8/21  PHQ-9 / SHAILESH-7 : phq=17; shailesh=13.    DATA  Interactive Complexity: No  Crisis: No      Progress Since Last Session (Related to Symptoms / Goals / Homework):   Symptoms:  improvement.    Homework: Partially completed: use a healthy coping technique as needed. New: use peaceful place picture whenever beginning to feel upset/anxious.     Episode of Care Goals: Minimal progress - PREPARATION (Decided to change - considering how); Intervened by negotiating a change plan and determining options / strategies for behavior change, identifying " "triggers, exploring social supports, and working towards setting a date to begin behavior change.    Current / Ongoing Stressors and Concerns:  Ex wife's health. Financial.    He has a spot on his lung that he reports doctors are unable to explain.  He has been having panic attacks he believes connected to time he had a gran mal seizure and was brought to the hospital by ambulance. He had a panic attack when they tried to put him on a ventilator and he thought he was going to die. He has been working on this on his own I found out today by processing the event from start to finish and then bringing himself to relaxation. He reports having success managing panic attacks using his peaceful place (fishing and \"catching the big one)\". Doing ok with this \"so far\".  He worries about [place].  He reports less physical pain the past few weeks.  His house mate has been starting up his motorcycle at various hours and this has been complained about by neighbors. There is concern about eviction. After receiving the eviction notice his roommate has been better.    Treatment Objective(s) Addressed in This Session:   Practice a distraction technique as needed 100% of trials for 2 weeks. New: use peaceful place picture. Follow safety plan.     Intervention:  Assessed functioning and for safety. Went over the results of the phq/shailesh. Processed feelings about relationships and problem solved. Reviewed healthy coping ideas.        ASSESSMENT: Current Emotional / Mental Status (status of significant symptoms):   Risk status (Self / Other harm or suicidal ideation)   Patient denies current fears or concerns for personal safety.   Patient denies current or recent suicidal ideation or behaviors.  Today when asked he stated \"I don't believe in that shit no more\"-9/18/20.   Patient denies current or recent homicidal ideation or behaviors.   Patient denies current or recent self injurious behavior or ideation.   Patient denies other " safety concerns.   Patient reports there has been no change in risk factors since their last session.     Patient reports there has been no change in protective factors since their last session.     A safety and risk management plan has been developed including: Patient consented to co-developed safety plan.  Safety and risk management plan was completed.  Patient agreed to use safety plan should any safety concerns arise.  A copy was given to the patient.                     Appearance:                 Unable to assess.                                Eye Contact:                 Unable to assess.                                     Psychomotor Behavior: Unable to assess.              Attitude: Cooperative               Orientation: All              Speech                   Rate / Production:  Normal               Volume:  Normal              Mood:  Depressed               Affect:  Appropriate               Thought Content:  Clear               ThoughtForm:  Coherent, Logical                                Insight: Fair/Good     Medication Review:  No changes to current psychiatric medication(s). PCP Dr. Jignesh Travis. Psych medications: cymbalta 60 mg.     Medication Compliance:   Yes     Changes in Health Issues:   None reported    Chemical Use Review:  Substance Use: Chemical use reviewed, no active concerns identified     Tobacco Use: No change in amount of tobacco use since last session.  Provided encouragement to quit     Diagnosis:  Ptsd; shailesh; major depression.    Collateral Reports Completed:   Routed note to PCP    PLAN: (Patient Tasks / Therapist Tasks / Other)  Schedule biweekly. Practice distraction ideas and other coping ideas. Utilize support network. Use safety plan as needed. Practice gratitude and peaceful place picture. Use emdr to address ambulance experience (on hold). Goals due 4/8/21.        SHAVONNE Yao                                                      "    ______________________________________________________________________    Treatment Plan    Patient's Name: Melquiades Morales  YOB: 1955    Date: 8/23/13    DSM5 Diagnoses: 296.32 (F33.1) Major Depressive Disorder, Recurrent Episode, Moderate _ and With anxious distress, 300.02 (F41.1) Generalized Anxiety Disorder or 309.81 (F43.10) Posttraumatic Stress Disorder (includes Posttraumatic Stress Disorder for Children 6 Years and Younger)  Without dissociative symptoms  Psychosocial / Contextual Factors: ; she is in poor health and relies on client a lot. Financial.   WHODAS: already completed.    Referral / Collaboration:  Referral to another professional/service is not indicated at this time.    Anticipated number of session or this episode of care: 15+          Treatment Goal(s)  Start Date Goal Dates  Reviewed Status    8/2/13 Goal 1:  Client will report coping better.       New     \"  \" Objective #1A (Client Action)  Client will process feelings related to current situations and work on coming up with solutions.     Intervention(s)  Therapist will encourage and help problem solve.    11/1/13  2/7/14  5/9/14  8/29/14  12/19/14  5/13/15  8/29/15  11/13/15  2/26/16  6/17/16  10/7/16  1/6/17  4/28/17  7/21/17  10/20/17  1/19/18  4/20/18  7/27/18  10/26/18  3/15/19  6/21/19  9/13/19  12/13/19  4/10/20  7/3/20  10/2/20  1/8/21 New  Continued  \"  \"  \"  \"  \"  \"  \"  \"     \"  \" Objective #1B  Client will use a healthy coping technique 100% of trials for 4 weeks.     Intervention(s)  Therapist came up with a list of ideas with client's input.     He likes: prayer; fishing; talking to friends; time with his dog and talking to Milly and/or Katarina. 11/1/13   2/7/14  5/29/14  8/29/14  12/19/14  5/13/15  8/7/15  11/13/15  2/26/16  6/17/16  10/7/16  1/6/17  4/28/17  7/21/17  10/20/17  1/19/18  4/20/18  7/27/18  10/26/18  3/15/19  6/21/19  9/13/19  12/13/19  4/10/20  7/3/20  10/2/20  1/8/21 " "continued  \"  \"  \"  \"  \"  \"  \"  \"  \"  \"  \"  \"  \"  \"  \"  \"  \"  \"  \"  \"  \"  \"  \"  \"  \"  \"     \"  \" Objective #1C  Client will process feelings related to his wife's failing health.     Intervention(s)  educate on grief.    11/1/13  2/7/14  5/9/14  8/29/14  12/19/14  5/13/15  8/7/15  11/13/15  2/26/16  6/17/16  10/7/16  1/6/17  4/28/17  7/21/17  10/20/17  1/19/18  4/20/18  7/27/18  10/26/18  3/15/19  6/21/19  9/13/19  12/13/19  4/10/20  7/3/20  10/2/20  1/8/21 continued  \"  \"  \"  \"  \"  \"  \"  \"  \"  \"  \"  \"  \"  \"  \"  \"  \"  \"  \"  \"  \"  \"  \"  \"  \"  \"                  Start Date Goal Dates  Reviewed Status     12/6/13 Goal 4: Report coping better (continued).          NEW     \"  \" Objective #2A (Client Action)  Client will follow his safety plan as needed.     Intervention(s) (Therapist Action)           2/7/14  5/9/14  8/29/14  12/19/14  5/13/15  8/7/15  11/13/15  2/26/16  6/17/16  10/7/16  1/6/17  4/28/17  7/21/17 10/20/17  1/19/18  4/20/18  7/27/18  10/26/18  3/15/19  6/21/19  9/13/19  12/13/19  4/10/20  7/3/20  10/2/20  1/8/21 NEW  Continued  \"  \"  \"  \"  \"  \"  \"  \"  \"  \"  \"  \"  \"  \"  \"  \"  \"  \"  \"  \"  \"  \"  \"  \"  \"      Objective #2B  Client will reprocess chronic pain by addressing the negative cognition until no longer feeling true and upsetting.     Intervention(s)   EMDR       NEW  4/26/19  6/21/19  9/13/19  12/13/19  4/10/20  7/3/20  10/2/20 (on hold)  1/8/21      Objective #2C         Intervention(s)                             Client has reviewed and agreed to the above plan.     Kleber Pollack [facility]                  August 2, 2013         "

## 2021-01-23 ASSESSMENT — ANXIETY QUESTIONNAIRES: GAD7 TOTAL SCORE: 13

## 2021-01-27 ENCOUNTER — TELEPHONE (OUTPATIENT)
Dept: PSYCHOLOGY | Facility: CLINIC | Age: 66
End: 2021-01-27

## 2021-01-29 DIAGNOSIS — Z53.9 DIAGNOSIS NOT YET DEFINED: Primary | ICD-10-CM

## 2021-01-29 PROCEDURE — G0179 MD RECERTIFICATION HHA PT: HCPCS | Performed by: FAMILY MEDICINE

## 2021-02-05 ENCOUNTER — VIRTUAL VISIT (OUTPATIENT)
Dept: PSYCHOLOGY | Facility: CLINIC | Age: 66
End: 2021-02-05
Payer: COMMERCIAL

## 2021-02-05 DIAGNOSIS — F43.10 POSTTRAUMATIC STRESS DISORDER: ICD-10-CM

## 2021-02-05 DIAGNOSIS — F41.1 GENERALIZED ANXIETY DISORDER: ICD-10-CM

## 2021-02-05 DIAGNOSIS — F33.1 MAJOR DEPRESSIVE DISORDER, RECURRENT EPISODE, MODERATE (H): Primary | ICD-10-CM

## 2021-02-05 PROCEDURE — 90834 PSYTX W PT 45 MINUTES: CPT | Mod: 95 | Performed by: SOCIAL WORKER

## 2021-02-05 ASSESSMENT — ANXIETY QUESTIONNAIRES
2. NOT BEING ABLE TO STOP OR CONTROL WORRYING: MORE THAN HALF THE DAYS
1. FEELING NERVOUS, ANXIOUS, OR ON EDGE: MORE THAN HALF THE DAYS
6. BECOMING EASILY ANNOYED OR IRRITABLE: NEARLY EVERY DAY
5. BEING SO RESTLESS THAT IT IS HARD TO SIT STILL: MORE THAN HALF THE DAYS
3. WORRYING TOO MUCH ABOUT DIFFERENT THINGS: MORE THAN HALF THE DAYS
GAD7 TOTAL SCORE: 13
7. FEELING AFRAID AS IF SOMETHING AWFUL MIGHT HAPPEN: NOT AT ALL
IF YOU CHECKED OFF ANY PROBLEMS ON THIS QUESTIONNAIRE, HOW DIFFICULT HAVE THESE PROBLEMS MADE IT FOR YOU TO DO YOUR WORK, TAKE CARE OF THINGS AT HOME, OR GET ALONG WITH OTHER PEOPLE: VERY DIFFICULT

## 2021-02-05 NOTE — PROGRESS NOTES
"                                                 Progress Note    Patient Name: Melquiades Morales  Date: 2/5/21         Service Type: Individual      Session Start Time: 9 am  Session End Time: 9:45 am     Session Length: 45 min    Session #: 140    Attendees: Client attended alone.    Service Modality:  Phone Visit:    The patient has been notified of the following:      \"We have found that certain health care needs can be provided without the need for a face to face visit.  This service lets us provide the care you need with a phone conversation.       I will have full access to your [facility] medical record during this entire phone call.   I will be taking notes for your medical record.      Since this is like an office visit, we will bill your insurance company for this service.       There are potential benefits and risks of telephone visits (e.g. limits to patient confidentiality) that differ from in-person visits.?  Confidentiality still applies for telephone services, and nobody will record the visit.  It is important to be in a quiet, private space that is free of distractions (including cell phone or other devices) during the visit.??      If during the course of the call I believe a telephone visit is not appropriate, you will not be charged for this service\"     Consent has been obtained for this service by care team member: Yes      Treatment Plan Last Reviewed: due 4/8/21  PHQ-9 / SHAILESH-7 : phq=17; shailesh=13.    DATA  Interactive Complexity: No  Crisis: No      Progress Since Last Session (Related to Symptoms / Goals / Homework):   Symptoms:  stable.    Homework: Partially completed: use a healthy coping technique as needed. New: use peaceful place picture whenever beginning to feel upset/anxious.     Episode of Care Goals: Minimal progress - PREPARATION (Decided to change - considering how); Intervened by negotiating a change plan and determining options / strategies for behavior change, identifying " "triggers, exploring social supports, and working towards setting a date to begin behavior change.    Current / Ongoing Stressors and Concerns:  Ex wife's health. Financial.    He has a spot on his lung that he reports doctors are unable to explain.  He heard from his  that [facility] are covered; despite the [employer] issue. He should find out today. I gave him our billing number and requested [facility] with our billing dept; he agreed. He requested we schedule apts.    Treatment Objective(s) Addressed in This Session:   Practice a distraction technique as needed 100% of trials for 2 weeks. New: use peaceful place picture. Follow safety plan.     Intervention:  Assessed functioning and for safety. Went over the results of the phq/shailesh. Processed feelings about insurance issue and problem solved. Reviewed healthy coping ideas.        ASSESSMENT: Current Emotional / Mental Status (status of significant symptoms):   Risk status (Self / Other harm or suicidal ideation)   Patient denies current fears or concerns for personal safety.   Patient denies current or recent suicidal ideation or behaviors.  Today when asked he stated \"I don't believe in that shit no more\"-9/18/20.   Patient denies current or recent homicidal ideation or behaviors.   Patient denies current or recent self injurious behavior or ideation.   Patient denies other safety concerns.   Patient reports there has been no change in risk factors since their last session.     Patient reports there has been no change in protective factors since their last session.     A safety and risk management plan has been developed including: Patient consented to co-developed safety plan.  Safety and risk management plan was completed.  Patient agreed to use safety plan should any safety concerns arise.  A copy was given to the patient.                     Appearance:                 " Unable to assess.                                Eye Contact:                 Unable to assess.                                     Psychomotor Behavior: Unable to assess.              Attitude: Cooperative               Orientation: All              Speech                   Rate / Production:  Normal               Volume:  Normal              Mood:  Depressed               Affect:  Appropriate               Thought Content:  Clear               ThoughtForm:  Coherent, Logical                                Insight: Fair/Good     Medication Review:  No changes to current psychiatric medication(s). PCP Dr. Jignesh Travis. Psych medications: cymbalta 60 mg.     Medication Compliance:   Yes     Changes in Health Issues:   None reported    Chemical Use Review:  Substance Use: Chemical use reviewed, no active concerns identified     Tobacco Use: No change in amount of tobacco use since last session.  Provided encouragement to quit     Diagnosis:  Ptsd; shailesh; major depression.    Collateral Reports Completed:   Routed note to PCP    PLAN: (Patient Tasks / Therapist Tasks / Other)  Schedule biweekly. Practice distraction ideas and other coping ideas. Utilize support network. Use safety plan as needed. Practice gratitude and peaceful place picture. Use emdr to address ambulance experience (on hold). Goals due 4/8/21. His county worker to contact our billing dept about coverage.        Kleber Pollack, SHAVONNE                                                         ______________________________________________________________________    Treatment Plan    Patient's Name: Melquiades Morales  YOB: 1955    Date: 8/23/13    DSM5 Diagnoses: 296.32 (F33.1) Major Depressive Disorder, Recurrent Episode, Moderate _ and With anxious distress, 300.02 (F41.1) Generalized Anxiety Disorder or 309.81 (F43.10) Posttraumatic Stress Disorder (includes Posttraumatic Stress Disorder for Children 6 Years and Younger)  Without  "dissociative symptoms  Psychosocial / Contextual Factors: ; she is in poor health and relies on client a lot. Financial.   WHODAS: already completed.    Referral / Collaboration:  Referral to another professional/service is not indicated at this time.    Anticipated number of session or this episode of care: 15+          Treatment Goal(s)  Start Date Goal Dates  Reviewed Status    8/2/13 Goal 1:  Client will report coping better.       New     \"  \" Objective #1A (Client Action)  Client will process feelings related to current situations and work on coming up with solutions.     Intervention(s)  Therapist will encourage and help problem solve.    11/1/13  2/7/14  5/9/14  8/29/14  12/19/14  5/13/15  8/29/15  11/13/15  2/26/16  6/17/16  10/7/16  1/6/17  4/28/17  7/21/17  10/20/17  1/19/18  4/20/18  7/27/18  10/26/18  3/15/19  6/21/19  9/13/19  12/13/19  4/10/20  7/3/20  10/2/20  1/8/21 New  Continued  \"  \"  \"  \"  \"  \"  \"  \"     \"  \" Objective #1B  Client will use a healthy coping technique 100% of trials for 4 weeks.     Intervention(s)  Therapist came up with a list of ideas with client's input.     He likes: prayer; fishing; talking to friends; time with his dog and talking to Milly and/or Katarina. 11/1/13   2/7/14  5/29/14  8/29/14  12/19/14  5/13/15  8/7/15  11/13/15  2/26/16  6/17/16  10/7/16  1/6/17  4/28/17  7/21/17  10/20/17  1/19/18  4/20/18  7/27/18  10/26/18  3/15/19  6/21/19  9/13/19  12/13/19  4/10/20  7/3/20  10/2/20  1/8/21 continued  \"  \"  \"  \"  \"  \"  \"  \"  \"  \"  \"  \"  \"  \"  \"  \"  \"  \"  \"  \"  \"  \"  \"  \"  \"  \"     \"  \" Objective #1C  Client will process feelings related to his wife's failing health.     Intervention(s)  educate on grief.    11/1/13  2/7/14  5/9/14  8/29/14  12/19/14  5/13/15  8/7/15  11/13/15  2/26/16  6/17/16  10/7/16  1/6/17  4/28/17  7/21/17  10/20/17  1/19/18  4/20/18  7/27/18  10/26/18  3/15/19  6/21/19  9/13/19  12/13/19  4/10/20  7/3/20  10/2/20  1/8/21 " "continued  \"  \"  \"  \"  \"  \"  \"  \"  \"  \"  \"  \"  \"  \"  \"  \"  \"  \"  \"  \"  \"  \"  \"  \"  \"  \"                  Start Date Goal Dates  Reviewed Status     12/6/13 Goal 4: Report coping better (continued).          NEW     \"  \" Objective #2A (Client Action)  Client will follow his safety plan as needed.     Intervention(s) (Therapist Action)           2/7/14  5/9/14  8/29/14  12/19/14  5/13/15  8/7/15  11/13/15  2/26/16  6/17/16  10/7/16  1/6/17  4/28/17  7/21/17 10/20/17  1/19/18  4/20/18  7/27/18  10/26/18  3/15/19  6/21/19  9/13/19  12/13/19  4/10/20  7/3/20  10/2/20  1/8/21 NEW  Continued  \"  \"  \"  \"  \"  \"  \"  \"  \"  \"  \"  \"  \"  \"  \"  \"  \"  \"  \"  \"  \"  \"  \"  \"  \"      Objective #2B  Client will reprocess chronic pain by addressing the negative cognition until no longer feeling true and upsetting.     Intervention(s)   EMDR       NEW  4/26/19  6/21/19  9/13/19  12/13/19  4/10/20  7/3/20  10/2/20 (on hold)  1/8/21      Objective #2C         Intervention(s)                             Client has reviewed and agreed to the above plan.     Kleber Pollack [facility]                  August 2, 2013         "

## 2021-02-06 ASSESSMENT — ANXIETY QUESTIONNAIRES: GAD7 TOTAL SCORE: 13

## 2021-02-09 DIAGNOSIS — F32.A DEPRESSION, UNSPECIFIED DEPRESSION TYPE: ICD-10-CM

## 2021-02-09 RX ORDER — TRAZODONE HYDROCHLORIDE 100 MG/1
TABLET ORAL
Qty: 60 TABLET | Refills: 1 | Status: SHIPPED | OUTPATIENT
Start: 2021-02-09 | End: 2021-04-08

## 2021-02-09 RX ORDER — TRAZODONE HYDROCHLORIDE 50 MG/1
50 TABLET, FILM COATED ORAL
Qty: 30 TABLET | Refills: 1 | Status: SHIPPED | OUTPATIENT
Start: 2021-02-09 | End: 2021-04-08

## 2021-02-09 NOTE — TELEPHONE ENCOUNTER
"Requested Prescriptions   Pending Prescriptions Disp Refills     traZODone (DESYREL) 50 MG tablet [Pharmacy Med Name: trazodone 50 mg tablet] 30 tablet 1     Sig: Take 1 tablet (50 mg) by mouth nightly as needed for sleep Take along with the 100 mg tablets for total dose of 250 mg       Serotonin Modulators Passed - 2/9/2021  8:00 AM        Passed - Recent (12 mo) or future (30 days) visit within the authorizing provider's specialty     Patient has had an office visit with the authorizing provider or a provider within the authorizing providers department within the previous 12 mos or has a future within next 30 days. See \"Patient Info\" tab in inbasket, or \"Choose Columns\" in Meds & Orders section of the refill encounter.              Passed - Medication is active on med list        Passed - Patient is age 18 or older           traZODone (DESYREL) 100 MG tablet [Pharmacy Med Name: trazodone 100 mg tablet] 60 tablet 1     Sig: TAKE 2 TABLETS BY MOUTH AT BEDTIME AS NEEDED FOR SLEEP       Serotonin Modulators Passed - 2/9/2021  8:00 AM        Passed - Recent (12 mo) or future (30 days) visit within the authorizing provider's specialty     Patient has had an office visit with the authorizing provider or a provider within the authorizing providers department within the previous 12 mos or has a future within next 30 days. See \"Patient Info\" tab in inbasket, or \"Choose Columns\" in Meds & Orders section of the refill encounter.              Passed - Medication is active on med list        Passed - Patient is age 18 or older             "

## 2021-02-19 ENCOUNTER — VIRTUAL VISIT (OUTPATIENT)
Dept: PSYCHOLOGY | Facility: CLINIC | Age: 66
End: 2021-02-19
Payer: COMMERCIAL

## 2021-02-19 DIAGNOSIS — F41.1 GENERALIZED ANXIETY DISORDER: ICD-10-CM

## 2021-02-19 DIAGNOSIS — F43.10 POSTTRAUMATIC STRESS DISORDER: ICD-10-CM

## 2021-02-19 DIAGNOSIS — F33.1 MAJOR DEPRESSIVE DISORDER, RECURRENT EPISODE, MODERATE (H): Primary | ICD-10-CM

## 2021-02-19 PROCEDURE — 90834 PSYTX W PT 45 MINUTES: CPT | Mod: 95 | Performed by: SOCIAL WORKER

## 2021-02-19 ASSESSMENT — ANXIETY QUESTIONNAIRES
5. BEING SO RESTLESS THAT IT IS HARD TO SIT STILL: MORE THAN HALF THE DAYS
6. BECOMING EASILY ANNOYED OR IRRITABLE: NEARLY EVERY DAY
GAD7 TOTAL SCORE: 18
2. NOT BEING ABLE TO STOP OR CONTROL WORRYING: NEARLY EVERY DAY
1. FEELING NERVOUS, ANXIOUS, OR ON EDGE: NEARLY EVERY DAY
7. FEELING AFRAID AS IF SOMETHING AWFUL MIGHT HAPPEN: MORE THAN HALF THE DAYS
IF YOU CHECKED OFF ANY PROBLEMS ON THIS QUESTIONNAIRE, HOW DIFFICULT HAVE THESE PROBLEMS MADE IT FOR YOU TO DO YOUR WORK, TAKE CARE OF THINGS AT HOME, OR GET ALONG WITH OTHER PEOPLE: VERY DIFFICULT
3. WORRYING TOO MUCH ABOUT DIFFERENT THINGS: NEARLY EVERY DAY

## 2021-02-19 ASSESSMENT — PATIENT HEALTH QUESTIONNAIRE - PHQ9
5. POOR APPETITE OR OVEREATING: MORE THAN HALF THE DAYS
SUM OF ALL RESPONSES TO PHQ QUESTIONS 1-9: 22

## 2021-02-19 NOTE — PROGRESS NOTES
"                                                 Progress Note    Patient Name: Melquiades Morales  Date: 2/19/21         Service Type: Individual      Session Start Time: 9 am  Session End Time: 9:45 am     Session Length: 45 min    Session #: 141    Attendees: Client attended alone.    Service Modality:  Phone Visit:    The patient has been notified of the following:      \"We have found that certain health care needs can be provided without the need for a face to face visit.  This service lets us provide the care you need with a phone conversation.       I will have full access to your [facility] medical record during this entire phone call. I will be taking notes for your medical record.      Since this is like an office visit, we will bill your insurance company for this service.       There are potential benefits and risks of telephone visits (e.g. limits to patient confidentiality) that differ from in-person visits. Confidentiality still applies for telephone services, and nobody will record the visit.  It is important to be in a quiet, private space that is free of distractions (including cell phone or other devices) during the visit.??      If during the course of the call I believe a telephone visit is not appropriate, you will not be charged for this service\"     Consent has been obtained for this service by care team member: Yes      Treatment Plan Last Reviewed: due 4/8/21  PHQ-9 / SHAILESH-7 : phq=22; shailesh=18.    DATA  Interactive Complexity: No  Crisis: No      Progress Since Last Session (Related to Symptoms / Goals / Homework):   Symptoms:  stable.    Homework: Partially completed: use a healthy coping technique as needed. New: use peaceful place picture whenever beginning to feel upset/anxious.     Episode of Care Goals: Minimal progress - PREPARATION (Decided to change - considering how); Intervened by negotiating a change plan and determining options / strategies for behavior change, identifying triggers, " "exploring social supports, and working towards setting a date to begin behavior change.    Current / Ongoing Stressors and Concerns:  Ex wife's health. Financial.    He has a spot on his lung that he reports doctors are unable to explain.  He heard from his  that since he is on a  waiver with the [facility] his [facility] fv apts are covered; despite the [employer] issue. He should find out today. I gave him our billing number and requested he have the [facility] worker confirm with our billing dept; he agreed. He requested we schedule apts. He reported today (2/19/21) that he learned the  waiver would cover our session.  Law enforcement showed up at his home related to stolen goods. Jeffrey reports they searched his van without a warrant and impounded it. He became upset when they returned later and he felt disrespected; in his home without warrant. He has had a negative interaction with one of them before. He admits to having thought of shooting them; the young \"mouthy\" one, and the other since he knew he would be shot. He reports not doing anything because his granddaughter was there. He reports feeling disrespected and violated when they searched his van and when they entered his home without a warrant. I believe this triggered his ptsd. When the  arrived later he admitted these things and the  had the 2 officers leave. Jeffrey said he had a shotgun and a handgun by him the whole time and the  saw them whereas the 2 did not he believes. He plans on calling the  and requesting the young mouthy officer not be sent to his home. He denied any plans of going after the officer when I asked him. He said he was relieved it was over and he had not acted on his thoughts.   He felt his safety plan was adequate at this time and did not need to be updated.     Treatment Objective(s) Addressed in This Session:   Practice a distraction technique as needed 100% of trials for 2 weeks. New: use " peaceful place picture. Follow safety plan.     Intervention:  Assessed functioning and for safety. Went over the results of the phq/shailesh. Addressed endorsement of thoughts better off dead and he told me about officers coming to his home. Processed feelings about fridays events and problem solved. Encouraged him to have someone hold onto his guns or to at least keep ammo separate; he was not ok with either idea. He also felt hs safety plan was adequate. I later consulted with my supervisor; see addendum below.        ASSESSMENT: Current Emotional / Mental Status (status of significant symptoms):   Risk status (Self / Other harm or suicidal ideation)   Patient denies current fears or concerns for personal safety.   Patient denies current or recent suicidal ideation or behaviors. he reports having such thoughts Friday night and some lingering ones with no plan and promised me he would follow his safety plan.     Patient denies current or recent homicidal ideation or behaviors. he had homicidal thoughts Friday night when officers came to his home (see below).   Patient denies current or recent self injurious behavior or ideation.   Patient denies other safety concerns.   Patient reports there has been no change in risk factors since their last session.     Patient reports there has been no change in protective factors since their last session.     A safety and risk management plan has been developed including: Patient consented to co-developed safety plan.  Safety and risk management plan was completed.  Patient agreed to use safety plan should any safety concerns arise.  A copy was given to the patient.                     Appearance:                 Unable to assess.                                Eye Contact:                 Unable to assess.                                     Psychomotor Behavior: Unable to assess.              Attitude: Cooperative               Orientation: All              Speech                  "  Rate / Production:  Normal               Volume:  Normal              Mood:  Depressed, anxious               Affect:  Appropriate               Thought Content:  Clear               ThoughtForm:  Coherent, Logical                                 Insight: Fair/Good     Medication Review:  No changes to current psychiatric medication(s). PCP Dr. Jignesh Travis. Psych medications: cymbalta 60 mg.     Medication Compliance:   Yes     Changes in Health Issues:   None reported    Chemical Use Review:  Substance Use: Chemical use reviewed, no active concerns identified     Tobacco Use: No change in amount of tobacco use since last session.  Provided encouragement to quit     Diagnosis:  Ptsd; shailesh; major depression.    Collateral Reports Completed:   Routed note to PCP    PLAN: (Patient Tasks / Therapist Tasks / Other)  Schedule biweekly. Practice distraction ideas and other coping ideas. Utilize support network. Use safety plan as needed. Practice gratitude and peaceful place picture. Use emdr to address ambulance experience (on hold).     Goals due 4/8/21.      Addendum: spoke with my supervisor Osiel Dove about patient's report of last Friday evening. It was recommended to me that it would be a good idea to discuss his voluntarily having someone hold onto his firearms- which I had did already addressed and Jeffrey feels he needs his firearms for protection. Just spoke to him minutes ago about his thoughts on having the ammo separate which he does not want to do stating \"what if someone comes into my home with a gun\". I told him I liked his earlier idea from this morning of calling the  to request the \"young, mouthy officer\" not come to his home anymore; my supervisor also liked that idea. My supervisor also recommended I discuss with Jeffrey whether we need to update his treatment plan and Jeffrey stated he believed it was helpful the way it was. It was decided by my supervisor and I that risk management was not needed for " "this situation.      Kleber Pollack, LICSW                                                         ______________________________________________________________________    Treatment Plan    Patient's Name: Melquiades Morales  YOB: 1955    Date: 8/23/13    DSM5 Diagnoses: 296.32 (F33.1) Major Depressive Disorder, Recurrent Episode, Moderate _ and With anxious distress, 300.02 (F41.1) Generalized Anxiety Disorder or 309.81 (F43.10) Posttraumatic Stress Disorder (includes Posttraumatic Stress Disorder for Children 6 Years and Younger)  Without dissociative symptoms  Psychosocial / Contextual Factors: ; she is in poor health and relies on client a lot. Financial.   WHODAS: already completed.    Referral / Collaboration:  Referral to another professional/service is not indicated at this time.    Anticipated number of session or this episode of care: 15+          Treatment Goal(s)  Start Date Goal Dates  Reviewed Status    8/2/13 Goal 1:  Client will report coping better.       New     \"  \" Objective #1A (Client Action)  Client will process feelings related to current situations and work on coming up with solutions.     Intervention(s)  Therapist will encourage and help problem solve.    11/1/13  2/7/14  5/9/14  8/29/14  12/19/14  5/13/15  8/29/15  11/13/15  2/26/16  6/17/16  10/7/16  1/6/17  4/28/17  7/21/17  10/20/17  1/19/18  4/20/18  7/27/18  10/26/18  3/15/19  6/21/19  9/13/19  12/13/19  4/10/20  7/3/20  10/2/20  1/8/21 New  Continued  \"  \"  \"  \"  \"  \"  \"  \"     \"  \" Objective #1B  Client will use a healthy coping technique 100% of trials for 4 weeks.     Intervention(s)  Therapist came up with a list of ideas with client's input.     He likes: prayer; fishing; talking to friends; time with his dog and talking to Milly and/or Katarina. 11/1/13   " "2/7/14  5/29/14  8/29/14  12/19/14  5/13/15  8/7/15  11/13/15  2/26/16  6/17/16  10/7/16  1/6/17  4/28/17  7/21/17  10/20/17  1/19/18  4/20/18  7/27/18  10/26/18  3/15/19  6/21/19  9/13/19  12/13/19  4/10/20  7/3/20  10/2/20  1/8/21 continued  \"  \"  \"  \"  \"  \"  \"  \"  \"  \"  \"  \"  \"  \"  \"  \"  \"  \"  \"  \"  \"  \"  \"  \"  \"  \"     \"  \" Objective #1C  Client will process feelings related to his wife's failing health.     Intervention(s)  educate on grief.    11/1/13  2/7/14  5/9/14  8/29/14  12/19/14  5/13/15  8/7/15  11/13/15  2/26/16  6/17/16  10/7/16  1/6/17  4/28/17  7/21/17  10/20/17  1/19/18  4/20/18  7/27/18  10/26/18  3/15/19  6/21/19  9/13/19  12/13/19  4/10/20  7/3/20  10/2/20  1/8/21 continued  \"  \"  \"  \"  \"  \"  \"  \"  \"  \"  \"  \"  \"  \"  \"  \"  \"  \"  \"  \"  \"  \"  \"  \"  \"  \"                  Start Date Goal Dates  Reviewed Status     12/6/13 Goal 4: Report coping better (continued).          NEW     \"  \" Objective #2A (Client Action)  Client will follow his safety plan as needed.     Intervention(s) (Therapist Action)           2/7/14  5/9/14  8/29/14  12/19/14  5/13/15  8/7/15  11/13/15  2/26/16  6/17/16  10/7/16  1/6/17  4/28/17  7/21/17 10/20/17  1/19/18  4/20/18  7/27/18  10/26/18  3/15/19  6/21/19  9/13/19  12/13/19  4/10/20  7/3/20  10/2/20  1/8/21 NEW  Continued  \"  \"  \"  \"  \"  \"  \"  \"  \"  \"  \"  \"  \"  \"  \"  \"  \"  \"  \"  \"  \"  \"  \"  \"  \"      Objective #2B  Client will reprocess chronic pain by addressing the negative cognition until no longer feeling true and upsetting.     Intervention(s)   EMDR       NEW  4/26/19  6/21/19  9/13/19  12/13/19  4/10/20  7/3/20  10/2/20 (on hold)  1/8/21      Objective #2C         Intervention(s)                             Client has reviewed and agreed to the above plan.     Kleber Pollack [facility]                  August 2, 2013         "

## 2021-02-20 ASSESSMENT — ANXIETY QUESTIONNAIRES: GAD7 TOTAL SCORE: 18

## 2021-03-03 ENCOUNTER — MEDICAL CORRESPONDENCE (OUTPATIENT)
Dept: HEALTH INFORMATION MANAGEMENT | Facility: CLINIC | Age: 66
End: 2021-03-03

## 2021-03-15 ENCOUNTER — IMMUNIZATION (OUTPATIENT)
Dept: FAMILY MEDICINE | Facility: CLINIC | Age: 66
End: 2021-03-15
Payer: COMMERCIAL

## 2021-03-15 PROCEDURE — 91301 PR COVID VAC MODERNA 100 MCG/0.5 ML IM: CPT

## 2021-03-15 PROCEDURE — 0011A PR COVID VAC MODERNA 100 MCG/0.5 ML IM: CPT

## 2021-03-19 ENCOUNTER — VIRTUAL VISIT (OUTPATIENT)
Dept: PSYCHOLOGY | Facility: CLINIC | Age: 66
End: 2021-03-19
Payer: COMMERCIAL

## 2021-03-19 DIAGNOSIS — F43.10 POSTTRAUMATIC STRESS DISORDER: ICD-10-CM

## 2021-03-19 DIAGNOSIS — F33.1 MAJOR DEPRESSIVE DISORDER, RECURRENT EPISODE, MODERATE (H): Primary | ICD-10-CM

## 2021-03-19 DIAGNOSIS — F41.1 GENERALIZED ANXIETY DISORDER: ICD-10-CM

## 2021-03-19 PROCEDURE — 90834 PSYTX W PT 45 MINUTES: CPT | Mod: 95 | Performed by: SOCIAL WORKER

## 2021-03-19 ASSESSMENT — ANXIETY QUESTIONNAIRES
7. FEELING AFRAID AS IF SOMETHING AWFUL MIGHT HAPPEN: MORE THAN HALF THE DAYS
GAD7 TOTAL SCORE: 16
IF YOU CHECKED OFF ANY PROBLEMS ON THIS QUESTIONNAIRE, HOW DIFFICULT HAVE THESE PROBLEMS MADE IT FOR YOU TO DO YOUR WORK, TAKE CARE OF THINGS AT HOME, OR GET ALONG WITH OTHER PEOPLE: VERY DIFFICULT
6. BECOMING EASILY ANNOYED OR IRRITABLE: NEARLY EVERY DAY
5. BEING SO RESTLESS THAT IT IS HARD TO SIT STILL: MORE THAN HALF THE DAYS
3. WORRYING TOO MUCH ABOUT DIFFERENT THINGS: NEARLY EVERY DAY
2. NOT BEING ABLE TO STOP OR CONTROL WORRYING: MORE THAN HALF THE DAYS
1. FEELING NERVOUS, ANXIOUS, OR ON EDGE: MORE THAN HALF THE DAYS

## 2021-03-19 NOTE — PROGRESS NOTES
"                                                 Progress Note    Patient Name: Melquiades Morales  Date: 3/19/21         Service Type: Individual      Session Start Time: 9 am  Session End Time: 9:45 am     Session Length: 45 min    Session #: 142    Attendees: Client attended alone.    Service Modality:  Phone Visit:    The patient has been notified of the following:      \"We have found that certain health care needs can be provided without the need for a face to face visit.  This service lets us provide the care you need with a phone conversation.       I will have full access to your [facility] medical record during this entire phone call. I will be taking notes for your medical record.      Since this is like an office visit, we will bill your insurance company for this service.       There are potential benefits and risks of telephone visits (e.g. limits to patient confidentiality) that differ from in-person visits. Confidentiality still applies for telephone services, and nobody will record the visit.  It is important to be in a quiet, private space that is free of distractions (including cell phone or other devices) during the visit.??      If during the course of the call I believe a telephone visit is not appropriate, you will not be charged for this service\"     Consent has been obtained for this service by care team member: Yes      Treatment Plan Last Reviewed: due 4/8/21  PHQ-9 / SHAILESH-7 : phq=17; shailesh=16.    DATA  Interactive Complexity: No  Crisis: No      Progress Since Last Session (Related to Symptoms / Goals / Homework):   Symptoms:  improvement.    Homework: Partially completed: use a healthy coping technique as needed. New: use peaceful place picture whenever beginning to feel upset/anxious.     Episode of Care Goals: Minimal progress - PREPARATION (Decided to change - considering how); Intervened by negotiating a change plan and determining options / strategies for behavior change, identifying " triggers, exploring social supports, and working towards setting a date to begin behavior change.    Current / Ongoing Stressors and Concerns:  Ex wife's health. Financial.    He has a spot on his lung that he reports doctors are unable to explain.  He heard from his  that since he is on a  waiver with the [facility] fv apts are covered; despite the human[employer] issue. He should find out today. I gave him our billing number and requested he have the [facility] worker confirm with our billing dept; he agreed. He requested we schedule apts. He reported today (2/19/21) that he learned the  waiver would cover our session.  He had his first covid vaccination and since then reports chest congestion. He has his second moderna one scheduled April 12th.    Treatment Objective(s) Addressed in This Session:   Practice a distraction technique as needed 100% of trials for 2 weeks. New: use peaceful place picture. Follow safety plan.     Intervention:  Assessed functioning and for safety. Went over the results of the phq/shailesh. Processed feelings about friend having to move out. Addressed friend having to move out. Processed thoughts and feelings about ex wife and her declining health.        ASSESSMENT: Current Emotional / Mental Status (status of significant symptoms):   Risk status (Self / Other harm or suicidal ideation)   Patient denies current fears or concerns for personal safety.   Patient denies current or recent suicidal ideation or behaviors.      Patient denies current or recent homicidal ideation or behaviors.     Patient denies current or recent self injurious behavior or ideation.   Patient denies other safety concerns.   Patient reports there has been no change in risk factors since their last session.     Patient reports there has been no change in protective factors since their last session.     A safety and risk management plan has been developed including: Patient consented to co-developed  safety plan.  Safety and risk management plan was completed.  Patient agreed to use safety plan should any safety concerns arise.  A copy was given to the patient.                     Appearance:                 Unable to assess.                                Eye Contact:                 Unable to assess.                                     Psychomotor Behavior: Unable to assess.              Attitude: Cooperative               Orientation: All              Speech                   Rate / Production:  Normal               Volume:  Normal              Mood:  Depressed              Affect:  Appropriate               Thought Content:  Clear               ThoughtForm:  Coherent, Logical                                 Insight: Fair/Good     Medication Review:  No changes to current psychiatric medication(s). PCP Dr. Jignesh Travis. Psych medications: cymbalta 60 mg.     Medication Compliance:   Yes     Changes in Health Issues:   None reported    Chemical Use Review:  Substance Use: Chemical use reviewed, no active concerns identified     Tobacco Use: No change in amount of tobacco use since last session.  Provided encouragement to quit     Diagnosis:  Ptsd; shailesh; major depression.    Collateral Reports Completed:   Routed note to PCP    PLAN: (Patient Tasks / Therapist Tasks / Other)  Schedule triweekly per his request. Practice distraction ideas and other coping ideas. Utilize support network. Use safety plan as needed. Practice gratitude and peaceful place picture. Use emdr to address ambulance experience (on hold).     Goals due 4/8/21.      Addendum: spoke with my supervisor Osiel Dove about patient's report of last Friday evening. It was recommended to me that it would be a good idea to discuss his voluntarily having someone hold onto his firearms- which I had did already addressed and Jeffrey feels he needs his firearms for protection. Just spoke to him minutes ago about his thoughts on having the ammo separate which  "he does not want to do stating \"what if someone comes into my home with a gun\". I told him I liked his earlier idea from this morning of calling the  to request the \"young, mouthy officer\" not come to his home anymore; my supervisor also liked that idea. My supervisor also recommended I discuss with Jeffrey whether we need to update his treatment plan and Jeffrey stated he believed it was helpful the way it was. It was decided by my supervisor and I that risk management was not needed for this situation.  Jeffrey reports that his house mate Rey had to move out of the trailer court due to court order. Jeffrey reports not feeling he needed to call the  because he doesn't expect any more visits now that Rey no longer lives with him. Rey had to move out 2-3 weeks ago.      Kleber Pollack, [facility]                                                         ______________________________________________________________________    Treatment Plan    Patient's Name: Melquiades Morales  YOB: 1955    Date: 8/23/13    DSM5 Diagnoses: 296.32 (F33.1) Major Depressive Disorder, Recurrent Episode, Moderate _ and With anxious distress, 300.02 (F41.1) Generalized Anxiety Disorder or 309.81 (F43.10) Posttraumatic Stress Disorder (includes Posttraumatic Stress Disorder for Children 6 Years and Younger)  Without dissociative symptoms  Psychosocial / Contextual Factors: ; she is in poor health and relies on client a lot. Financial.   WHODAS: already completed.    Referral / Collaboration:  Referral to another professional/service is not indicated at this time.    Anticipated number of session or this episode of care: 15+          Treatment Goal(s)  Start Date Goal Dates  Reviewed Status    8/2/13 Goal 1:  Client will report coping better.       New     \"  \" Objective #1A (Client Action)  Client will process feelings related to current situations and work on coming up with solutions.     Intervention(s)  Therapist " "will encourage and help problem solve.    11/1/13  2/7/14  5/9/14  8/29/14  12/19/14  5/13/15  8/29/15  11/13/15  2/26/16  6/17/16  10/7/16  1/6/17  4/28/17  7/21/17  10/20/17  1/19/18  4/20/18  7/27/18  10/26/18  3/15/19  6/21/19  9/13/19  12/13/19  4/10/20  7/3/20  10/2/20  1/8/21 New  Continued  \"  \"  \"  \"  \"  \"  \"  \"     \"  \" Objective #1B  Client will use a healthy coping technique 100% of trials for 4 weeks.     Intervention(s)  Therapist came up with a list of ideas with client's input.     He likes: prayer; fishing; talking to friends; time with his dog and talking to Milly and/or Katarina. 11/1/13   2/7/14  5/29/14  8/29/14  12/19/14  5/13/15  8/7/15  11/13/15  2/26/16  6/17/16  10/7/16  1/6/17  4/28/17  7/21/17  10/20/17  1/19/18  4/20/18  7/27/18  10/26/18  3/15/19  6/21/19  9/13/19  12/13/19  4/10/20  7/3/20  10/2/20  1/8/21 continued  \"  \"  \"  \"  \"  \"  \"  \"  \"  \"  \"  \"  \"  \"  \"  \"  \"  \"  \"  \"  \"  \"  \"  \"  \"  \"     \"  \" Objective #1C  Client will process feelings related to his wife's failing health.     Intervention(s)  educate on grief.    11/1/13  2/7/14  5/9/14  8/29/14  12/19/14  5/13/15  8/7/15  11/13/15  2/26/16  6/17/16  10/7/16  1/6/17  4/28/17  7/21/17  10/20/17  1/19/18  4/20/18  7/27/18  10/26/18  3/15/19  6/21/19  9/13/19  12/13/19  4/10/20  7/3/20  10/2/20  1/8/21 continued  \"  \"  \"  \"  \"  \"  \"  \"  \"  \"  \"  \"  \"  \"  \"  \"  \"  \"  \"  \"  \"  \"  \"  \"  \"  \"                  Start Date Goal Dates  Reviewed Status     12/6/13 Goal 4: Report coping better (continued).          NEW     \"  \" Objective #2A (Client Action)  Client will follow his safety plan as needed.     Intervention(s) (Therapist Action)           2/7/14  5/9/14  8/29/14  12/19/14  5/13/15  8/7/15  11/13/15  2/26/16  6/17/16  10/7/16  1/6/17  4/28/17  7/21/17 10/20/17  1/19/18  4/20/18  7/27/18  10/26/18  3/15/19  6/21/19  9/13/19  12/13/19  4/10/20  7/3/20  10/2/20  1/8/21 " "NEW  Continued  \"  \"  \"  \"  \"  \"  \"  \"  \"  \"  \"  \"  \"  \"  \"  \"  \"  \"  \"  \"  \"  \"  \"  \"  \"      Objective #2B  Client will reprocess chronic pain by addressing the negative cognition until no longer feeling true and upsetting.     Intervention(s)   EMDR       NEW  4/26/19  6/21/19  9/13/19  12/13/19  4/10/20  7/3/20  10/2/20 (on hold)  1/8/21      Objective #2C         Intervention(s)                             Client has reviewed and agreed to the above plan.     Kleber Pollack [facility]                  August 2, 2013         "

## 2021-03-20 ASSESSMENT — ANXIETY QUESTIONNAIRES: GAD7 TOTAL SCORE: 16

## 2021-03-22 ENCOUNTER — MYC MEDICAL ADVICE (OUTPATIENT)
Dept: FAMILY MEDICINE | Facility: CLINIC | Age: 66
End: 2021-03-22

## 2021-03-22 ENCOUNTER — HOSPITAL ENCOUNTER (INPATIENT)
Facility: CLINIC | Age: 66
LOS: 2 days | Discharge: HOME | DRG: 190 | End: 2021-03-24
Attending: FAMILY MEDICINE | Admitting: INTERNAL MEDICINE
Payer: COMMERCIAL

## 2021-03-22 ENCOUNTER — APPOINTMENT (OUTPATIENT)
Dept: GENERAL RADIOLOGY | Facility: CLINIC | Age: 66
DRG: 190 | End: 2021-03-22
Attending: FAMILY MEDICINE
Payer: COMMERCIAL

## 2021-03-22 DIAGNOSIS — J44.1 COPD EXACERBATION (H): ICD-10-CM

## 2021-03-22 DIAGNOSIS — Z11.52 ENCOUNTER FOR SCREENING LABORATORY TESTING FOR SEVERE ACUTE RESPIRATORY SYNDROME CORONAVIRUS 2 (SARS-COV-2): ICD-10-CM

## 2021-03-22 DIAGNOSIS — J96.01 ACUTE RESPIRATORY FAILURE WITH HYPOXIA (H): ICD-10-CM

## 2021-03-22 DIAGNOSIS — J42 CHRONIC BRONCHITIS, UNSPECIFIED CHRONIC BRONCHITIS TYPE (H): Chronic | ICD-10-CM

## 2021-03-22 DIAGNOSIS — F17.210 CIGARETTE SMOKER: ICD-10-CM

## 2021-03-22 LAB
ALBUMIN SERPL-MCNC: 3.2 G/DL (ref 3.4–5)
ALP SERPL-CCNC: 82 U/L (ref 40–150)
ALT SERPL W P-5'-P-CCNC: 27 U/L (ref 0–70)
ANION GAP SERPL CALCULATED.3IONS-SCNC: 4 MMOL/L (ref 3–14)
AST SERPL W P-5'-P-CCNC: 23 U/L (ref 0–45)
BASE EXCESS BLDV CALC-SCNC: 7.3 MMOL/L
BASOPHILS # BLD AUTO: 0 10E9/L (ref 0–0.2)
BASOPHILS NFR BLD AUTO: 0.4 %
BILIRUB SERPL-MCNC: 0.7 MG/DL (ref 0.2–1.3)
BUN SERPL-MCNC: 5 MG/DL (ref 7–30)
CALCIUM SERPL-MCNC: 8.9 MG/DL (ref 8.5–10.1)
CHLORIDE SERPL-SCNC: 106 MMOL/L (ref 94–109)
CO2 SERPL-SCNC: 31 MMOL/L (ref 20–32)
CREAT SERPL-MCNC: 0.74 MG/DL (ref 0.66–1.25)
CRP SERPL-MCNC: 21.9 MG/L (ref 0–8)
DIFFERENTIAL METHOD BLD: NORMAL
EOSINOPHIL # BLD AUTO: 0.2 10E9/L (ref 0–0.7)
EOSINOPHIL NFR BLD AUTO: 2.1 %
ERYTHROCYTE [DISTWIDTH] IN BLOOD BY AUTOMATED COUNT: 13.8 % (ref 10–15)
FLUAV RNA RESP QL NAA+PROBE: NEGATIVE
FLUBV RNA RESP QL NAA+PROBE: NEGATIVE
GFR SERPL CREATININE-BSD FRML MDRD: >90 ML/MIN/{1.73_M2}
GLUCOSE SERPL-MCNC: 156 MG/DL (ref 70–99)
HCO3 BLDV-SCNC: 36 MMOL/L (ref 21–28)
HCT VFR BLD AUTO: 50.8 % (ref 40–53)
HGB BLD-MCNC: 16.6 G/DL (ref 13.3–17.7)
IMM GRANULOCYTES # BLD: 0 10E9/L (ref 0–0.4)
IMM GRANULOCYTES NFR BLD: 0.2 %
INR PPP: 0.97 (ref 0.86–1.14)
LABORATORY COMMENT REPORT: NORMAL
LACTATE BLD-SCNC: 1.4 MMOL/L (ref 0.7–2)
LYMPHOCYTES # BLD AUTO: 2.9 10E9/L (ref 0.8–5.3)
LYMPHOCYTES NFR BLD AUTO: 27.8 %
MCH RBC QN AUTO: 32.2 PG (ref 26.5–33)
MCHC RBC AUTO-ENTMCNC: 32.7 G/DL (ref 31.5–36.5)
MCV RBC AUTO: 98 FL (ref 78–100)
MONOCYTES # BLD AUTO: 0.9 10E9/L (ref 0–1.3)
MONOCYTES NFR BLD AUTO: 8.7 %
NEUTROPHILS # BLD AUTO: 6.2 10E9/L (ref 1.6–8.3)
NEUTROPHILS NFR BLD AUTO: 60.8 %
NRBC # BLD AUTO: 0 10*3/UL
NRBC BLD AUTO-RTO: 0 /100
NT-PROBNP SERPL-MCNC: 72 PG/ML (ref 0–900)
PCO2 BLDV: 63 MM HG (ref 40–50)
PH BLDV: 7.36 PH (ref 7.32–7.43)
PLATELET # BLD AUTO: 227 10E9/L (ref 150–450)
PO2 BLDV: 47 MM HG (ref 25–47)
POTASSIUM SERPL-SCNC: 3.8 MMOL/L (ref 3.4–5.3)
PROT SERPL-MCNC: 7.2 G/DL (ref 6.8–8.8)
RBC # BLD AUTO: 5.16 10E12/L (ref 4.4–5.9)
RSV RNA SPEC QL NAA+PROBE: NORMAL
SARS-COV-2 RNA RESP QL NAA+PROBE: NEGATIVE
SODIUM SERPL-SCNC: 141 MMOL/L (ref 133–144)
SPECIMEN SOURCE: NORMAL
TROPONIN I SERPL-MCNC: <0.015 UG/L (ref 0–0.04)
WBC # BLD AUTO: 10.2 10E9/L (ref 4–11)

## 2021-03-22 PROCEDURE — 71045 X-RAY EXAM CHEST 1 VIEW: CPT

## 2021-03-22 PROCEDURE — 82803 BLOOD GASES ANY COMBINATION: CPT | Performed by: FAMILY MEDICINE

## 2021-03-22 PROCEDURE — 96367 TX/PROPH/DG ADDL SEQ IV INF: CPT | Performed by: FAMILY MEDICINE

## 2021-03-22 PROCEDURE — 85025 COMPLETE CBC W/AUTO DIFF WBC: CPT | Performed by: FAMILY MEDICINE

## 2021-03-22 PROCEDURE — 83880 ASSAY OF NATRIURETIC PEPTIDE: CPT | Performed by: FAMILY MEDICINE

## 2021-03-22 PROCEDURE — 87636 SARSCOV2 & INF A&B AMP PRB: CPT | Performed by: FAMILY MEDICINE

## 2021-03-22 PROCEDURE — 80053 COMPREHEN METABOLIC PANEL: CPT | Performed by: FAMILY MEDICINE

## 2021-03-22 PROCEDURE — 99285 EMERGENCY DEPT VISIT HI MDM: CPT | Performed by: FAMILY MEDICINE

## 2021-03-22 PROCEDURE — 99207 PR CDG-CODE CATEGORY CHANGED: CPT | Performed by: PHYSICIAN ASSISTANT

## 2021-03-22 PROCEDURE — 250N000013 HC RX MED GY IP 250 OP 250 PS 637: Performed by: PHYSICIAN ASSISTANT

## 2021-03-22 PROCEDURE — 87040 BLOOD CULTURE FOR BACTERIA: CPT | Performed by: FAMILY MEDICINE

## 2021-03-22 PROCEDURE — 96365 THER/PROPH/DIAG IV INF INIT: CPT | Performed by: FAMILY MEDICINE

## 2021-03-22 PROCEDURE — C9803 HOPD COVID-19 SPEC COLLECT: HCPCS | Performed by: FAMILY MEDICINE

## 2021-03-22 PROCEDURE — 83605 ASSAY OF LACTIC ACID: CPT | Performed by: FAMILY MEDICINE

## 2021-03-22 PROCEDURE — 258N000003 HC RX IP 258 OP 636: Performed by: FAMILY MEDICINE

## 2021-03-22 PROCEDURE — 84484 ASSAY OF TROPONIN QUANT: CPT | Performed by: FAMILY MEDICINE

## 2021-03-22 PROCEDURE — 250N000009 HC RX 250: Performed by: PHYSICIAN ASSISTANT

## 2021-03-22 PROCEDURE — 94640 AIRWAY INHALATION TREATMENT: CPT

## 2021-03-22 PROCEDURE — 99285 EMERGENCY DEPT VISIT HI MDM: CPT | Mod: 25 | Performed by: FAMILY MEDICINE

## 2021-03-22 PROCEDURE — 120N000001 HC R&B MED SURG/OB

## 2021-03-22 PROCEDURE — 96375 TX/PRO/DX INJ NEW DRUG ADDON: CPT | Performed by: FAMILY MEDICINE

## 2021-03-22 PROCEDURE — 99223 1ST HOSP IP/OBS HIGH 75: CPT | Mod: AI | Performed by: PHYSICIAN ASSISTANT

## 2021-03-22 PROCEDURE — 86140 C-REACTIVE PROTEIN: CPT | Performed by: FAMILY MEDICINE

## 2021-03-22 PROCEDURE — 93005 ELECTROCARDIOGRAM TRACING: CPT | Performed by: FAMILY MEDICINE

## 2021-03-22 PROCEDURE — 85610 PROTHROMBIN TIME: CPT | Performed by: FAMILY MEDICINE

## 2021-03-22 PROCEDURE — 250N000011 HC RX IP 250 OP 636: Performed by: FAMILY MEDICINE

## 2021-03-22 RX ORDER — MORPHINE SULFATE 15 MG/1
30 TABLET, FILM COATED, EXTENDED RELEASE ORAL AT BEDTIME
Status: DISCONTINUED | OUTPATIENT
Start: 2021-03-22 | End: 2021-03-24 | Stop reason: HOSPADM

## 2021-03-22 RX ORDER — ONDANSETRON 2 MG/ML
4 INJECTION INTRAMUSCULAR; INTRAVENOUS EVERY 6 HOURS PRN
Status: DISCONTINUED | OUTPATIENT
Start: 2021-03-22 | End: 2021-03-24 | Stop reason: HOSPADM

## 2021-03-22 RX ORDER — NALOXONE HYDROCHLORIDE 0.4 MG/ML
0.2 INJECTION, SOLUTION INTRAMUSCULAR; INTRAVENOUS; SUBCUTANEOUS
Status: DISCONTINUED | OUTPATIENT
Start: 2021-03-22 | End: 2021-03-24 | Stop reason: HOSPADM

## 2021-03-22 RX ORDER — SIMVASTATIN 40 MG
80 TABLET ORAL AT BEDTIME
Status: DISCONTINUED | OUTPATIENT
Start: 2021-03-22 | End: 2021-03-24 | Stop reason: HOSPADM

## 2021-03-22 RX ORDER — ALBUTEROL SULFATE 90 UG/1
6 AEROSOL, METERED RESPIRATORY (INHALATION) EVERY 6 HOURS PRN
Status: DISCONTINUED | OUTPATIENT
Start: 2021-03-22 | End: 2021-03-24 | Stop reason: HOSPADM

## 2021-03-22 RX ORDER — NALOXONE HYDROCHLORIDE 0.4 MG/ML
0.4 INJECTION, SOLUTION INTRAMUSCULAR; INTRAVENOUS; SUBCUTANEOUS
Status: DISCONTINUED | OUTPATIENT
Start: 2021-03-22 | End: 2021-03-24 | Stop reason: HOSPADM

## 2021-03-22 RX ORDER — HYDROMORPHONE HCL IN WATER/PF 6 MG/30 ML
0.2 PATIENT CONTROLLED ANALGESIA SYRINGE INTRAVENOUS
Status: DISCONTINUED | OUTPATIENT
Start: 2021-03-22 | End: 2021-03-24 | Stop reason: HOSPADM

## 2021-03-22 RX ORDER — CEFTRIAXONE SODIUM 1 G/50ML
1 INJECTION, SOLUTION INTRAVENOUS ONCE
Status: COMPLETED | OUTPATIENT
Start: 2021-03-22 | End: 2021-03-22

## 2021-03-22 RX ORDER — METHYLPREDNISOLONE SODIUM SUCCINATE 125 MG/2ML
125 INJECTION, POWDER, LYOPHILIZED, FOR SOLUTION INTRAMUSCULAR; INTRAVENOUS ONCE
Status: COMPLETED | OUTPATIENT
Start: 2021-03-22 | End: 2021-03-22

## 2021-03-22 RX ORDER — MORPHINE SULFATE 15 MG/1
60 TABLET, FILM COATED, EXTENDED RELEASE ORAL EVERY MORNING
Status: DISCONTINUED | OUTPATIENT
Start: 2021-03-23 | End: 2021-03-24 | Stop reason: HOSPADM

## 2021-03-22 RX ORDER — OXYCODONE HYDROCHLORIDE 5 MG/1
10 TABLET ORAL 3 TIMES DAILY
Status: DISCONTINUED | OUTPATIENT
Start: 2021-03-22 | End: 2021-03-24 | Stop reason: HOSPADM

## 2021-03-22 RX ORDER — ACETAMINOPHEN 325 MG/1
650 TABLET ORAL EVERY 4 HOURS PRN
Status: DISCONTINUED | OUTPATIENT
Start: 2021-03-22 | End: 2021-03-24 | Stop reason: HOSPADM

## 2021-03-22 RX ORDER — OXYCODONE HYDROCHLORIDE 10 MG/1
1 TABLET ORAL 3 TIMES DAILY
COMMUNITY
Start: 2021-03-18

## 2021-03-22 RX ORDER — SODIUM CHLORIDE 9 MG/ML
INJECTION, SOLUTION INTRAVENOUS CONTINUOUS
Status: DISCONTINUED | OUTPATIENT
Start: 2021-03-22 | End: 2021-03-22

## 2021-03-22 RX ORDER — BENZONATATE 100 MG/1
100 CAPSULE ORAL 3 TIMES DAILY PRN
Status: DISCONTINUED | OUTPATIENT
Start: 2021-03-22 | End: 2021-03-24 | Stop reason: HOSPADM

## 2021-03-22 RX ORDER — CEFTRIAXONE SODIUM 1 G/50ML
1 INJECTION, SOLUTION INTRAVENOUS EVERY 24 HOURS
Status: DISCONTINUED | OUTPATIENT
Start: 2021-03-23 | End: 2021-03-24 | Stop reason: HOSPADM

## 2021-03-22 RX ORDER — AMOXICILLIN 250 MG
2 CAPSULE ORAL 2 TIMES DAILY PRN
Status: DISCONTINUED | OUTPATIENT
Start: 2021-03-22 | End: 2021-03-24 | Stop reason: HOSPADM

## 2021-03-22 RX ORDER — POLYETHYLENE GLYCOL 3350 17 G/17G
17 POWDER, FOR SOLUTION ORAL DAILY PRN
Status: DISCONTINUED | OUTPATIENT
Start: 2021-03-22 | End: 2021-03-24 | Stop reason: HOSPADM

## 2021-03-22 RX ORDER — DULOXETIN HYDROCHLORIDE 30 MG/1
120 CAPSULE, DELAYED RELEASE ORAL DAILY
Status: DISCONTINUED | OUTPATIENT
Start: 2021-03-23 | End: 2021-03-24 | Stop reason: HOSPADM

## 2021-03-22 RX ORDER — PROCHLORPERAZINE MALEATE 5 MG
10 TABLET ORAL EVERY 6 HOURS PRN
Status: DISCONTINUED | OUTPATIENT
Start: 2021-03-22 | End: 2021-03-24 | Stop reason: HOSPADM

## 2021-03-22 RX ORDER — AMOXICILLIN 250 MG
1 CAPSULE ORAL 2 TIMES DAILY PRN
Status: DISCONTINUED | OUTPATIENT
Start: 2021-03-22 | End: 2021-03-24 | Stop reason: HOSPADM

## 2021-03-22 RX ORDER — PROCHLORPERAZINE 25 MG
25 SUPPOSITORY, RECTAL RECTAL EVERY 12 HOURS PRN
Status: DISCONTINUED | OUTPATIENT
Start: 2021-03-22 | End: 2021-03-24 | Stop reason: HOSPADM

## 2021-03-22 RX ORDER — PREDNISONE 20 MG/1
40 TABLET ORAL DAILY
Status: DISCONTINUED | OUTPATIENT
Start: 2021-03-23 | End: 2021-03-24 | Stop reason: HOSPADM

## 2021-03-22 RX ORDER — ONDANSETRON 4 MG/1
4 TABLET, ORALLY DISINTEGRATING ORAL EVERY 6 HOURS PRN
Status: DISCONTINUED | OUTPATIENT
Start: 2021-03-22 | End: 2021-03-23

## 2021-03-22 RX ORDER — MORPHINE SULFATE 30 MG/1
60 TABLET, FILM COATED, EXTENDED RELEASE ORAL EVERY MORNING
COMMUNITY
End: 2021-10-05

## 2021-03-22 RX ORDER — NAPROXEN 250 MG/1
250 TABLET ORAL DAILY PRN
Status: DISCONTINUED | OUTPATIENT
Start: 2021-03-22 | End: 2021-03-24 | Stop reason: HOSPADM

## 2021-03-22 RX ORDER — NICOTINE 21 MG/24HR
1 PATCH, TRANSDERMAL 24 HOURS TRANSDERMAL DAILY
Status: DISCONTINUED | OUTPATIENT
Start: 2021-03-23 | End: 2021-03-24 | Stop reason: HOSPADM

## 2021-03-22 RX ORDER — ONDANSETRON 2 MG/ML
4 INJECTION INTRAMUSCULAR; INTRAVENOUS EVERY 6 HOURS PRN
Status: DISCONTINUED | OUTPATIENT
Start: 2021-03-22 | End: 2021-03-23

## 2021-03-22 RX ORDER — IPRATROPIUM BROMIDE AND ALBUTEROL SULFATE 2.5; .5 MG/3ML; MG/3ML
3 SOLUTION RESPIRATORY (INHALATION)
Status: DISCONTINUED | OUTPATIENT
Start: 2021-03-22 | End: 2021-03-24 | Stop reason: HOSPADM

## 2021-03-22 RX ORDER — ACETAMINOPHEN 650 MG/1
650 SUPPOSITORY RECTAL EVERY 4 HOURS PRN
Status: DISCONTINUED | OUTPATIENT
Start: 2021-03-22 | End: 2021-03-24 | Stop reason: HOSPADM

## 2021-03-22 RX ORDER — ALBUTEROL SULFATE 90 UG/1
6 AEROSOL, METERED RESPIRATORY (INHALATION)
Status: DISCONTINUED | OUTPATIENT
Start: 2021-03-22 | End: 2021-03-22

## 2021-03-22 RX ORDER — ONDANSETRON 4 MG/1
4 TABLET, ORALLY DISINTEGRATING ORAL EVERY 6 HOURS PRN
Status: DISCONTINUED | OUTPATIENT
Start: 2021-03-22 | End: 2021-03-24 | Stop reason: HOSPADM

## 2021-03-22 RX ORDER — LIDOCAINE 40 MG/G
CREAM TOPICAL
Status: DISCONTINUED | OUTPATIENT
Start: 2021-03-22 | End: 2021-03-24 | Stop reason: HOSPADM

## 2021-03-22 RX ADMIN — AZITHROMYCIN MONOHYDRATE 500 MG: 500 INJECTION, POWDER, LYOPHILIZED, FOR SOLUTION INTRAVENOUS at 17:59

## 2021-03-22 RX ADMIN — METHYLPREDNISOLONE SODIUM SUCCINATE 125 MG: 125 INJECTION, POWDER, FOR SOLUTION INTRAMUSCULAR; INTRAVENOUS at 17:01

## 2021-03-22 RX ADMIN — SIMVASTATIN 80 MG: 40 TABLET, FILM COATED ORAL at 21:24

## 2021-03-22 RX ADMIN — MORPHINE SULFATE 30 MG: 15 TABLET, FILM COATED, EXTENDED RELEASE ORAL at 21:24

## 2021-03-22 RX ADMIN — PREGABALIN 150 MG: 100 CAPSULE ORAL at 21:24

## 2021-03-22 RX ADMIN — IPRATROPIUM BROMIDE AND ALBUTEROL SULFATE 3 ML: .5; 3 SOLUTION RESPIRATORY (INHALATION) at 21:35

## 2021-03-22 RX ADMIN — OXYCODONE HYDROCHLORIDE 10 MG: 5 TABLET ORAL at 21:24

## 2021-03-22 RX ADMIN — CEFTRIAXONE SODIUM 1 G: 1 INJECTION, SOLUTION INTRAVENOUS at 17:04

## 2021-03-22 ASSESSMENT — MIFFLIN-ST. JEOR
SCORE: 1937.13
SCORE: 1946.12

## 2021-03-22 ASSESSMENT — ACTIVITIES OF DAILY LIVING (ADL): ADLS_ACUITY_SCORE: 14

## 2021-03-22 NOTE — TELEPHONE ENCOUNTER
Left message on personal answering machine to return call. Direct line provided. Need to get more information of patient's symptoms.  Cait Sanabria RN    Patient returned call. He started feeling congested on his right side the day after receiving his first dose of the Moderna on 3/15/21. His symptoms are worsening each day. He is very short of breath and unable to complete a sentence without difficulty. He has a productive cough with green phlegm. Tightness and achy feeling in chest. He feels very shaky and weak. Symptoms continue to worsen. Denies headache, nausea, sore throat. He has tried inhaler, did not help, tylenol for pain, help some. Advised patient that he should be seen in urgent care. He agreed and will have someone drive him there.  Cait Sanabria RN
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
## 2021-03-23 PROBLEM — J96.01 ACUTE RESPIRATORY FAILURE WITH HYPOXIA (H): Status: RESOLVED | Noted: 2019-10-07 | Resolved: 2021-03-23

## 2021-03-23 PROBLEM — R50.9 FEVER: Status: RESOLVED | Noted: 2019-10-29 | Resolved: 2021-03-23

## 2021-03-23 PROBLEM — G47.9 SLEEP DISTURBANCE: Status: ACTIVE | Noted: 2021-03-23

## 2021-03-23 PROBLEM — J96.01 ACUTE RESPIRATORY FAILURE WITH HYPOXIA AND HYPERCAPNIA (H): Status: ACTIVE | Noted: 2019-10-29

## 2021-03-23 PROBLEM — A41.9 SEPSIS (H): Status: RESOLVED | Noted: 2019-09-26 | Resolved: 2021-03-23

## 2021-03-23 PROBLEM — G93.40 ACUTE ENCEPHALOPATHY: Status: RESOLVED | Noted: 2020-03-12 | Resolved: 2021-03-23

## 2021-03-23 PROBLEM — J69.0 ASPIRATION PNEUMONIA (H): Status: RESOLVED | Noted: 2018-09-08 | Resolved: 2021-03-23

## 2021-03-23 PROBLEM — J96.02 ACUTE RESPIRATORY FAILURE WITH HYPOXIA AND HYPERCAPNIA (H): Status: ACTIVE | Noted: 2019-10-29

## 2021-03-23 LAB
ALBUMIN SERPL-MCNC: 3 G/DL (ref 3.4–5)
ALP SERPL-CCNC: 79 U/L (ref 40–150)
ALT SERPL W P-5'-P-CCNC: 33 U/L (ref 0–70)
ANION GAP SERPL CALCULATED.3IONS-SCNC: 2 MMOL/L (ref 3–14)
AST SERPL W P-5'-P-CCNC: 22 U/L (ref 0–45)
BILIRUB SERPL-MCNC: 0.4 MG/DL (ref 0.2–1.3)
BUN SERPL-MCNC: 10 MG/DL (ref 7–30)
CALCIUM SERPL-MCNC: 8.7 MG/DL (ref 8.5–10.1)
CHLORIDE SERPL-SCNC: 104 MMOL/L (ref 94–109)
CO2 SERPL-SCNC: 34 MMOL/L (ref 20–32)
CREAT SERPL-MCNC: 0.89 MG/DL (ref 0.66–1.25)
CRP SERPL-MCNC: 31.7 MG/L (ref 0–8)
ERYTHROCYTE [DISTWIDTH] IN BLOOD BY AUTOMATED COUNT: 13.3 % (ref 10–15)
GFR SERPL CREATININE-BSD FRML MDRD: >90 ML/MIN/{1.73_M2}
GLUCOSE SERPL-MCNC: 225 MG/DL (ref 70–99)
HCT VFR BLD AUTO: 48.6 % (ref 40–53)
HGB BLD-MCNC: 16.1 G/DL (ref 13.3–17.7)
LACTATE BLD-SCNC: 3.8 MMOL/L (ref 0.7–2)
MCH RBC QN AUTO: 31.9 PG (ref 26.5–33)
MCHC RBC AUTO-ENTMCNC: 33.1 G/DL (ref 31.5–36.5)
MCV RBC AUTO: 96 FL (ref 78–100)
PLATELET # BLD AUTO: 233 10E9/L (ref 150–450)
POTASSIUM SERPL-SCNC: 4.1 MMOL/L (ref 3.4–5.3)
PROCALCITONIN SERPL-MCNC: <0.05 NG/ML
PROT SERPL-MCNC: 7 G/DL (ref 6.8–8.8)
RBC # BLD AUTO: 5.04 10E12/L (ref 4.4–5.9)
SODIUM SERPL-SCNC: 140 MMOL/L (ref 133–144)
WBC # BLD AUTO: 14.2 10E9/L (ref 4–11)

## 2021-03-23 PROCEDURE — 36415 COLL VENOUS BLD VENIPUNCTURE: CPT | Performed by: INTERNAL MEDICINE

## 2021-03-23 PROCEDURE — 94640 AIRWAY INHALATION TREATMENT: CPT

## 2021-03-23 PROCEDURE — 999N000156 HC STATISTIC RCP CONSULT EA 30 MIN

## 2021-03-23 PROCEDURE — 94640 AIRWAY INHALATION TREATMENT: CPT | Mod: 76

## 2021-03-23 PROCEDURE — 258N000003 HC RX IP 258 OP 636: Performed by: FAMILY MEDICINE

## 2021-03-23 PROCEDURE — 99232 SBSQ HOSP IP/OBS MODERATE 35: CPT | Performed by: INTERNAL MEDICINE

## 2021-03-23 PROCEDURE — 36415 COLL VENOUS BLD VENIPUNCTURE: CPT | Performed by: PHYSICIAN ASSISTANT

## 2021-03-23 PROCEDURE — 120N000001 HC R&B MED SURG/OB

## 2021-03-23 PROCEDURE — 999N000123 HC STATISTIC OXYGEN O2DAILY TECH TIME

## 2021-03-23 PROCEDURE — 250N000013 HC RX MED GY IP 250 OP 250 PS 637: Performed by: PHYSICIAN ASSISTANT

## 2021-03-23 PROCEDURE — 85027 COMPLETE CBC AUTOMATED: CPT | Performed by: PHYSICIAN ASSISTANT

## 2021-03-23 PROCEDURE — 999N000097 HC STATISTIC MECHANICAL IN-EXSUFFLATION TREATMENT

## 2021-03-23 PROCEDURE — 250N000011 HC RX IP 250 OP 636: Performed by: PHYSICIAN ASSISTANT

## 2021-03-23 PROCEDURE — 80053 COMPREHEN METABOLIC PANEL: CPT | Performed by: PHYSICIAN ASSISTANT

## 2021-03-23 PROCEDURE — 83605 ASSAY OF LACTIC ACID: CPT | Performed by: INTERNAL MEDICINE

## 2021-03-23 PROCEDURE — 999N000157 HC STATISTIC RCP TIME EA 10 MIN

## 2021-03-23 PROCEDURE — 84145 PROCALCITONIN (PCT): CPT | Performed by: PHYSICIAN ASSISTANT

## 2021-03-23 PROCEDURE — 250N000009 HC RX 250: Performed by: PHYSICIAN ASSISTANT

## 2021-03-23 PROCEDURE — 250N000012 HC RX MED GY IP 250 OP 636 PS 637: Performed by: PHYSICIAN ASSISTANT

## 2021-03-23 PROCEDURE — 258N000003 HC RX IP 258 OP 636: Performed by: PHYSICIAN ASSISTANT

## 2021-03-23 PROCEDURE — 86140 C-REACTIVE PROTEIN: CPT | Performed by: PHYSICIAN ASSISTANT

## 2021-03-23 RX ADMIN — PREDNISONE 40 MG: 20 TABLET ORAL at 08:56

## 2021-03-23 RX ADMIN — OXYCODONE HYDROCHLORIDE 10 MG: 5 TABLET ORAL at 08:57

## 2021-03-23 RX ADMIN — IPRATROPIUM BROMIDE AND ALBUTEROL SULFATE 3 ML: .5; 3 SOLUTION RESPIRATORY (INHALATION) at 07:40

## 2021-03-23 RX ADMIN — SIMVASTATIN 80 MG: 40 TABLET, FILM COATED ORAL at 21:01

## 2021-03-23 RX ADMIN — Medication 1 PATCH: at 08:57

## 2021-03-23 RX ADMIN — AZITHROMYCIN MONOHYDRATE 500 MG: 500 INJECTION, POWDER, LYOPHILIZED, FOR SOLUTION INTRAVENOUS at 17:37

## 2021-03-23 RX ADMIN — IPRATROPIUM BROMIDE AND ALBUTEROL SULFATE 3 ML: .5; 3 SOLUTION RESPIRATORY (INHALATION) at 11:15

## 2021-03-23 RX ADMIN — ALBUTEROL SULFATE 6 PUFF: 90 AEROSOL, METERED RESPIRATORY (INHALATION) at 05:12

## 2021-03-23 RX ADMIN — CEFTRIAXONE SODIUM 1 G: 1 INJECTION, SOLUTION INTRAVENOUS at 17:05

## 2021-03-23 RX ADMIN — OXYCODONE HYDROCHLORIDE 10 MG: 5 TABLET ORAL at 21:01

## 2021-03-23 RX ADMIN — DULOXETINE HYDROCHLORIDE 120 MG: 30 CAPSULE, DELAYED RELEASE ORAL at 08:56

## 2021-03-23 RX ADMIN — SODIUM CHLORIDE 500 ML: 9 INJECTION, SOLUTION INTRAVENOUS at 18:43

## 2021-03-23 RX ADMIN — PREGABALIN 150 MG: 100 CAPSULE ORAL at 21:01

## 2021-03-23 RX ADMIN — IPRATROPIUM BROMIDE AND ALBUTEROL SULFATE 3 ML: .5; 3 SOLUTION RESPIRATORY (INHALATION) at 15:36

## 2021-03-23 RX ADMIN — ALBUTEROL SULFATE 6 PUFF: 90 AEROSOL, METERED RESPIRATORY (INHALATION) at 12:28

## 2021-03-23 RX ADMIN — OXYCODONE HYDROCHLORIDE 10 MG: 5 TABLET ORAL at 13:39

## 2021-03-23 RX ADMIN — PREGABALIN 150 MG: 100 CAPSULE ORAL at 08:56

## 2021-03-23 RX ADMIN — MORPHINE SULFATE 30 MG: 15 TABLET, FILM COATED, EXTENDED RELEASE ORAL at 21:01

## 2021-03-23 RX ADMIN — MORPHINE SULFATE 60 MG: 15 TABLET, FILM COATED, EXTENDED RELEASE ORAL at 08:56

## 2021-03-23 ASSESSMENT — ACTIVITIES OF DAILY LIVING (ADL)
ADLS_ACUITY_SCORE: 14

## 2021-03-23 ASSESSMENT — MIFFLIN-ST. JEOR: SCORE: 1943.13

## 2021-03-23 NOTE — ED NOTES
"Pt with h/o COPD presents with increased cough. White and green sputum.  Feels he has pneumonia- h/o frequent infx in the past.  He is fatigued, pale and diaphoretic. He is alert and cooperative \"I just feel like I have pneumonia again!\".  He has a PCA at bedside.  Pt lives independently but has a PCA 5 days a week, 6 hrs per day, to help him with cleaning, cooking and other HH chores.  He has a medical disability dt a back injury at work years ago.  He is A&O x4.  He can walk short distances but otherwise uses a walker.   "

## 2021-03-23 NOTE — PLAN OF CARE
"Patient is alert and orientated x4. Has a flat affect, but will answer questions appropriately. Continues on 3L NC d/t oxygen saturation in the low 90s. Lung sounds are diminished. Patient continues to take off pulse oximeter and shortness of breath noted when he talks. PRN inhaler utilized. Patient refused to get out of bed this shift or to allow writer to look at his skin.     Writer gave patient's wife, Milly an update with patient's permission.    BP (!) 143/63   Pulse 84   Temp 98.5  F (36.9  C) (Oral)   Resp 20   Ht 1.803 m (5' 11\")   Wt 113.1 kg (249 lb 5.4 oz)   SpO2 92%   BMI 34.78 kg/m      "

## 2021-03-23 NOTE — PROGRESS NOTES
[facility]    Hospitalist Progress Note    Date of Service (when I saw the patient): 03/23/2021    Assessment & Plan   Melquiades Morales is a 66 year old male admitted on 3/22/2021. He presented to the emergency department for evaluation of productive cough and general malaise and was found to have acute respiratory failure with hypoxia and hypercarbia for which he is being admitted for further evaluation and treatment.     Acute respiratory failure with hypoxia and hypercapnia  Presented with new hypoxia requiring 3L supplemental O2. Occurs in setting of recent COVID vaccination (dose #1 of Moderna on 3/15). VBG shows compensated respiratory acidosis (pH 7.36 / pCO2 63 / bicarb 36). Chest x-ray with some mild left atelectasis but no obvious infiltrate or other abnormality. Afebrile, no leukocytosis. Clinically most consistent with COPD exacerbation with possible pneumonia.   - Continue supplemental O2 to maintain sats > 92%  - No indication for repeat VBG  - Blood culture x 2 pending  - Treat COPD and possible pneumonia below     COPD exacerbation with possible pneumonia  Known COPD, managed prior to admission with DuoNebs and prn albuterol, but not controller inhalers. No significant wheezing on exam. Is slightly ill appearing but not septic, clinically appears as if there could be an underlying infection.   - Given 125 mg Solumedrol in the emergency department   - Start prednisone 40 mg daily on 3/23  - Azithromycin and ceftriaxone initiated in the emergency department 3/22, continue  - Procalcitonin low at <0.05, but will continue in setting of increasing CRP  - Scheduled DuoNebs  - Prn albuterol   - Prn tessalon for cough     CRP elevation  Admit CRP 21.9. Does appear mildly ill, possible pneumonia as noted above.  - CRP increased to 31.7 on 3/23  - Repeat in AM     Palpitations   Reported per patient, new but intermittent since the vaccine. EKG normal sinus rhythm.  - Monitor on  telemetry      Possible encephalopathy vs chronic cognitive changes  Patient slightly lethargic and inconsistently answering questions (responds no, but later responds yes to the same question when asked differently). Family did not report changes in cognition, only weakness and fatigue.   - Monitor cognitive status for any changes  - On 3/23 remains lethargic, but answers questions appropriately     Chronic pain syndrome  Lumbosacral radiculitis  Chronic and stable. Managed prior to admission with MS contin (60 mg q am / 30 mg q hs), oxycodone 10 mg tid, Cymbalta 120 mg daily, Lyrica 150 mg bid, and prn Naproxen.  - Continue home pain regimen      Hyperlipidemia LDL goal <130  Zocor 80 mg q hs, continue.      Sleep disturbance  Managed prior to admission with Trazodone 250 mg q hs prn, continue.      Tobacco use disorder  Encourage cessation. Nicotine patch available.      Recent COVID vaccine  COVID status - negative   Tested as symptomatic COVID screen based on cough. Received vaccine dose #1 on 3/15. Low concern for active COVID infection on admission.  - COVID PCR negative 3/22  - No indication for COVID precautions or repeat testing    Diet: Regular Diet Adult    DVT Prophylaxis: Low Risk/Ambulatory with no VTE prophylaxis indicated  Gaona Catheter: not present  Code Status: Full Code  - discussed with patient on admission    Disposition Plan  Expected discharge: 2 days, recommended to prior living arrangement once work-up completed, improvement in respiratory status, weaned from supplemental O2.    Rey Garcia    Interval History   The patient is resting in bed.  He complains of lethargy, dyspnea, and poor appetite; but denies pain, myalgias, nausea, vomiting or diarrhea    -Data reviewed today: I reviewed all new labs and imaging results over the last 24 hours. I personally reviewed no images or EKG's today.    Physical Exam   Temp: 98.5  F (36.9  C) Temp src: Oral BP: (!) 143/63 Pulse: 84   Resp: 20  SpO2: 92 % O2 Device: Nasal cannula Oxygen Delivery: 3 LPM  Vitals:    03/22/21 1441 03/22/21 1943 03/23/21 0506   Weight: 113.4 kg (250 lb) 112.5 kg (248 lb 0.3 oz) 113.1 kg (249 lb 5.4 oz)     Vital Signs with Ranges  Temp:  [97.3  F (36.3  C)-99.1  F (37.3  C)] 98.5  F (36.9  C)  Pulse:  [] 84  Resp:  [9-20] 20  BP: (113-156)/(58-85) 143/63  SpO2:  [86 %-94 %] 92 %  No intake/output data recorded.    Gen: Well nourished, well developed, alert and oriented x 3, appears ill  HEENT: Atraumatic, normocephalic; sclera non-injected, anicterric; oral mucosa moist, no lesion, no exudate  Lungs: Clear to ausculation, no wheezes, no rhonchi, no rales  Heart: Regular rate, regular rhythm, no gallops, no rubs, no murmurs  GI: Bowel sound normal, no hepatosplenomegaly, no masses, non-tender, non-distended, no guarding, no rebound tenderness  Lymph: No lymphadenopathy, no edema  Skin: No rashes, no chronic venous stasis     Medications       azithromycin  500 mg Intravenous Q24H     cefTRIAXone  1 g Intravenous Q24H     DULoxetine  120 mg Oral Daily     ipratropium - albuterol 0.5 mg/2.5 mg/3 mL  3 mL Nebulization Q4H While awake     morphine  30 mg Oral At Bedtime     morphine  60 mg Oral QAM     nicotine  1 patch Transdermal Daily     nicotine   Transdermal Q8H     oxyCODONE IR  10 mg Oral TID     predniSONE  40 mg Oral Daily     pregabalin  150 mg Oral BID     simvastatin  80 mg Oral At Bedtime     sodium chloride (PF)  3 mL Intracatheter Q8H       Data   Recent Labs   Lab 03/23/21  0450 03/22/21  1453   WBC 14.2* 10.2   HGB 16.1 16.6   MCV 96 98    227   INR  --  0.97    141   POTASSIUM 4.1 3.8   CHLORIDE 104 106   CO2 34* 31   BUN 10 5*   CR 0.89 0.74   ANIONGAP 2* 4   JESSEE 8.7 8.9   * 156*   ALBUMIN 3.0* 3.2*   PROTTOTAL 7.0 7.2   BILITOTAL 0.4 0.7   ALKPHOS 79 82   ALT 33 27   AST 22 23   TROPI  --  <0.015       Recent Results (from the past 24 hour(s))   XR Chest Port 1 View    Narrative    XR  CHEST PORT 1 VW 3/22/2021 3:21 PM    HISTORY: soa    COMPARISON: Chest CT and chest radiograph 8/5/2020      Impression    IMPRESSION: Mild linear atelectasis in the left lateral midlung. No  focal infiltrate, pleural effusion or pneumothorax. The cardiac and  mediastinal silhouettes are within normal limits.    ALEAH BARCLAY MD

## 2021-03-23 NOTE — PROGRESS NOTES
DATE:  3/23/2021   TIME OF RECEIPT FROM LAB:  1804  LAB TEST:  Lactic Acid  LAB VALUE:  3.8  RESULTS GIVEN WITH READ-BACK TO (PROVIDER):  Dr. Clark  TIME LAB VALUE REPORTED TO PROVIDER:   6370

## 2021-03-23 NOTE — PLAN OF CARE
"[facility] [facility] [place]    Patient admitted to room 2303 at approximately 1945 via cart from emergency room. Patient was accompanied by transport tech.     Verbal SBAR report received from Charissa prior to patient arrival.     Patient ambulated to bed with stand-by assist. Patient alert and oriented X 3. Pain is controlled with current analgesics.  Medication(s) being used: scheduled pain medication.  Admission vital signs: Blood pressure 135/58, pulse 79, temperature 97.6  F (36.4  C), temperature source Oral, resp. rate 16, height 1.803 m (5' 11\"), weight 112.5 kg (248 lb 0.3 oz), SpO2 91 %. Patient was oriented to plan of care, call light, bed controls, tv, telephone, bathroom, and visiting hours.     Risk Assessment    The following safety risks were identified during admission: fall. Yellow risk band applied: YES.     Skin Initial Assessment    This writer admitted this patient and completed a full skin assessment and Jeremiah score in the Adult PCS flowsheet. Appropriate interventions initiated as needed.     Secondary skin check completed by NA due to patient declined.    Jeremiah Risk Assessment  Sensory Perception: 4-->no impairment  Moisture: 4-->rarely moist  Activity: 3-->walks occasionally  Mobility: 4-->no limitation  Nutrition: 3-->adequate  Friction and Shear: 3-->no apparent problem  Jeremiah Score: 21  Mattress: Standard Hospital Mattress (Foam)  Bed Frame: Standard width and length    Education    Patient has a [place] to Observation order: No  Observation education completed and documented: N/A      Naima Long RN      "

## 2021-03-23 NOTE — PLAN OF CARE
"Pt is A/O, able to make needs known. Blood pressure 120/72, pulse 98, temperature 98  F (36.7  C), temperature source Oral, resp. rate 18, height 1.803 m (5' 11\"), weight 113.1 kg (249 lb 5.4 oz), SpO2 94 %. Currently on 3L of supplemental oxygen. Nebs and Inhaler given. Denies pain on chronic pain medication. Up with SBA and ambulates for short distances. Bed alarm on for safety. Telemetry monitoring as ordered. No IV fluids. Continue to assess per POC.    "
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
## 2021-03-24 VITALS
RESPIRATION RATE: 18 BRPM | HEART RATE: 72 BPM | WEIGHT: 249.34 LBS | DIASTOLIC BLOOD PRESSURE: 69 MMHG | BODY MASS INDEX: 34.91 KG/M2 | SYSTOLIC BLOOD PRESSURE: 142 MMHG | OXYGEN SATURATION: 90 % | HEIGHT: 71 IN | TEMPERATURE: 98 F

## 2021-03-24 LAB
ANION GAP SERPL CALCULATED.3IONS-SCNC: <1 MMOL/L (ref 3–14)
BUN SERPL-MCNC: 16 MG/DL (ref 7–30)
CALCIUM SERPL-MCNC: 8.9 MG/DL (ref 8.5–10.1)
CHLORIDE SERPL-SCNC: 106 MMOL/L (ref 94–109)
CO2 SERPL-SCNC: 36 MMOL/L (ref 20–32)
CREAT SERPL-MCNC: 0.65 MG/DL (ref 0.66–1.25)
CRP SERPL-MCNC: 12.7 MG/L (ref 0–8)
ERYTHROCYTE [DISTWIDTH] IN BLOOD BY AUTOMATED COUNT: 13.9 % (ref 10–15)
GFR SERPL CREATININE-BSD FRML MDRD: >90 ML/MIN/{1.73_M2}
GLUCOSE SERPL-MCNC: 123 MG/DL (ref 70–99)
HCT VFR BLD AUTO: 44.2 % (ref 40–53)
HGB BLD-MCNC: 14.5 G/DL (ref 13.3–17.7)
LACTATE BLD-SCNC: 1.2 MMOL/L (ref 0.7–2)
MCH RBC QN AUTO: 31.7 PG (ref 26.5–33)
MCHC RBC AUTO-ENTMCNC: 32.8 G/DL (ref 31.5–36.5)
MCV RBC AUTO: 97 FL (ref 78–100)
PLATELET # BLD AUTO: 231 10E9/L (ref 150–450)
POTASSIUM SERPL-SCNC: 4.4 MMOL/L (ref 3.4–5.3)
RBC # BLD AUTO: 4.58 10E12/L (ref 4.4–5.9)
SODIUM SERPL-SCNC: 142 MMOL/L (ref 133–144)
WBC # BLD AUTO: 19.1 10E9/L (ref 4–11)

## 2021-03-24 PROCEDURE — 83605 ASSAY OF LACTIC ACID: CPT | Performed by: FAMILY MEDICINE

## 2021-03-24 PROCEDURE — 94640 AIRWAY INHALATION TREATMENT: CPT

## 2021-03-24 PROCEDURE — 99239 HOSP IP/OBS DSCHRG MGMT >30: CPT | Performed by: INTERNAL MEDICINE

## 2021-03-24 PROCEDURE — 86140 C-REACTIVE PROTEIN: CPT | Performed by: INTERNAL MEDICINE

## 2021-03-24 PROCEDURE — 94640 AIRWAY INHALATION TREATMENT: CPT | Mod: 76

## 2021-03-24 PROCEDURE — 250N000009 HC RX 250: Performed by: PHYSICIAN ASSISTANT

## 2021-03-24 PROCEDURE — 250N000012 HC RX MED GY IP 250 OP 636 PS 637: Performed by: PHYSICIAN ASSISTANT

## 2021-03-24 PROCEDURE — 250N000013 HC RX MED GY IP 250 OP 250 PS 637: Performed by: PHYSICIAN ASSISTANT

## 2021-03-24 PROCEDURE — 85027 COMPLETE CBC AUTOMATED: CPT | Performed by: INTERNAL MEDICINE

## 2021-03-24 PROCEDURE — 80048 BASIC METABOLIC PNL TOTAL CA: CPT | Performed by: INTERNAL MEDICINE

## 2021-03-24 PROCEDURE — 36415 COLL VENOUS BLD VENIPUNCTURE: CPT | Performed by: INTERNAL MEDICINE

## 2021-03-24 RX ORDER — IPRATROPIUM BROMIDE AND ALBUTEROL SULFATE 2.5; .5 MG/3ML; MG/3ML
1 SOLUTION RESPIRATORY (INHALATION) 4 TIMES DAILY
Qty: 84 ML | Refills: 0 | COMMUNITY
Start: 2021-03-24 | End: 2022-07-27

## 2021-03-24 RX ORDER — AZITHROMYCIN 250 MG/1
500 TABLET, FILM COATED ORAL AT BEDTIME
Qty: 2 TABLET | Refills: 0 | Status: SHIPPED | OUTPATIENT
Start: 2021-03-24 | End: 2021-03-25

## 2021-03-24 RX ORDER — PREDNISONE 20 MG/1
40 TABLET ORAL DAILY
Qty: 5 TABLET | Refills: 0 | Status: SHIPPED | OUTPATIENT
Start: 2021-03-25 | End: 2021-03-28

## 2021-03-24 RX ADMIN — Medication 1 PATCH: at 08:25

## 2021-03-24 RX ADMIN — OXYCODONE HYDROCHLORIDE 10 MG: 5 TABLET ORAL at 08:25

## 2021-03-24 RX ADMIN — DULOXETINE HYDROCHLORIDE 120 MG: 30 CAPSULE, DELAYED RELEASE ORAL at 08:25

## 2021-03-24 RX ADMIN — IPRATROPIUM BROMIDE AND ALBUTEROL SULFATE 3 ML: .5; 3 SOLUTION RESPIRATORY (INHALATION) at 11:24

## 2021-03-24 RX ADMIN — PREDNISONE 40 MG: 20 TABLET ORAL at 08:25

## 2021-03-24 RX ADMIN — IPRATROPIUM BROMIDE AND ALBUTEROL SULFATE 3 ML: .5; 3 SOLUTION RESPIRATORY (INHALATION) at 07:31

## 2021-03-24 RX ADMIN — PREGABALIN 150 MG: 100 CAPSULE ORAL at 08:25

## 2021-03-24 RX ADMIN — Medication 1 MG: at 02:35

## 2021-03-24 RX ADMIN — MORPHINE SULFATE 60 MG: 15 TABLET, FILM COATED, EXTENDED RELEASE ORAL at 08:24

## 2021-03-24 ASSESSMENT — ACTIVITIES OF DAILY LIVING (ADL)
ADLS_ACUITY_SCORE: 14
DEPENDENT_IADLS:: CLEANING;COOKING;LAUNDRY;SHOPPING;MEAL PREPARATION;TRANSPORTATION
ADLS_ACUITY_SCORE: 14

## 2021-03-24 ASSESSMENT — MIFFLIN-ST. JEOR: SCORE: 1943.13

## 2021-03-24 NOTE — PROGRESS NOTES
"Patient is leaving AMA.     - Patient expressing \"It's just too much\". When prompting him, patient reported that it's just too much stimulus and when asked if it was depression related, he said, \"yes\". Patient declining to have oxygen in or to have writer check oxygen saturation. Writer offered to place a sign on his door so staff would not bother him as often and to make him IND in his room. Patient shook his head, crying, saying \"it's just too much\". Patient understands that his oxygen levels are low and that there are risks involved with this. Patient spoke to Dr. Garcia and is still leaving AMA. Patient is having his PCA pick him up, but agreed to have staff talk to her in regards in hoping to convince him to stay d/t his oxygen needs.   "

## 2021-03-24 NOTE — PROGRESS NOTES
[facility] [place]    Hospitalist Progress Note    Date of Service (when I saw the patient): 03/24/2021    Assessment & Plan   Melquiades Morales is a 66 year old male admitted on 3/22/2021. He presented to the emergency department for evaluation of productive cough and general malaise and was found to have acute respiratory failure with hypoxia and hypercarbia for which he is being admitted for further evaluation and treatment.     Acute respiratory failure with hypoxia and hypercapnia  Presented with new hypoxia requiring 3L supplemental O2. Occurs in setting of recent COVID vaccination (dose #1 of Moderna on 3/15). VBG shows compensated respiratory acidosis (pH 7.36 / pCO2 63 / bicarb 36). Chest x-ray with some mild left atelectasis but no obvious infiltrate or other abnormality. Afebrile, no leukocytosis. Clinically most consistent with COPD exacerbation with possible pneumonia.   - Continue supplemental O2 to maintain sats > 92%  - No indication for repeat VBG  - Blood culture x 2 from 3/22 NGTD  - Treat COPD and possible pneumonia below  - Clinically appears improved, but still requiring oxygen  - Oxygen requirements improved to 1 LPM on AM of 3/24     COPD exacerbation with possible pneumonia  Known COPD, managed prior to admission with DuoNebs and prn albuterol, but not controller inhalers. No significant wheezing on exam. Is slightly ill appearing but not septic, clinically appears as if there could be an underlying infection.   - Given 125 mg Solumedrol in the emergency department   - Start prednisone 40 mg daily on 3/23  - Azithromycin and ceftriaxone initiated in the emergency department 3/22, continue  - Procalcitonin low at <0.05, but will continue in setting of increasing CRP  - Scheduled DuoNebs  - Prn albuterol   - Prn tessalon for cough     CRP elevation  Admit CRP 21.9. Does appear mildly ill, possible pneumonia as noted above.  - CRP increased to 31.7 on 3/23  - CPR decreased  to 12.7 on 3/24  - Appears clinically improved, no further testing     Palpitations   Reported per patient, new but intermittent since the vaccine. EKG normal sinus rhythm.  - Monitor on telemetry      Possible encephalopathy vs chronic cognitive changes  Patient slightly lethargic and inconsistently answering questions (responds no, but later responds yes to the same question when asked differently). Family did not report changes in cognition, only weakness and fatigue.   - Monitor cognitive status for any changes  - On 3/23 remains lethargic, but answers questions appropriately     Chronic pain syndrome  Lumbosacral radiculitis  Chronic and stable. Managed prior to admission with MS contin (60 mg q am / 30 mg q hs), oxycodone 10 mg tid, Cymbalta 120 mg daily, Lyrica 150 mg bid, and prn Naproxen.  - Continue home pain regimen      Hyperlipidemia LDL goal <130  Zocor 80 mg q hs, continue.      Sleep disturbance  Managed prior to admission with Trazodone 250 mg q hs prn, continue.      Tobacco use disorder  Encourage cessation. Nicotine patch available.      Recent COVID vaccine  COVID status - negative   Tested as symptomatic COVID screen based on cough. Received vaccine dose #1 on 3/15. Low concern for active COVID infection on admission.  - COVID PCR negative 3/22  - No indication for COVID precautions or repeat testing    Diet: Regular Diet Adult    DVT Prophylaxis: Low Risk/Ambulatory with no VTE prophylaxis indicated  Gaona Catheter: not present  Code Status: Full Code  - discussed with patient on admission    Disposition Plan  Expected discharge: 2 days, recommended to prior living arrangement once work-up completed, improvement in respiratory status, weaned from supplemental O2.    Rey Garcia    Interval History   The patient is resting in bed.  He is asking to be discharged, but will remain in hospital due to need for oxygen.  Appetite and lethargy are improving.    -Data reviewed today: I reviewed  all new labs and imaging results over the last 24 hours. I personally reviewed no images or EKG's today.    Physical Exam   Temp: 98  F (36.7  C) Temp src: Oral BP: (!) 142/69 Pulse: 72   Resp: 18 SpO2: 90 % O2 Device: Nasal cannula Oxygen Delivery: 2 LPM  Vitals:    03/22/21 1943 03/23/21 0506 03/24/21 0400   Weight: 112.5 kg (248 lb 0.3 oz) 113.1 kg (249 lb 5.4 oz) 113.1 kg (249 lb 5.4 oz)     Vital Signs with Ranges  Temp:  [97.2  F (36.2  C)-98.8  F (37.1  C)] 98  F (36.7  C)  Pulse:  [] 72  Resp:  [18-20] 18  BP: (132-169)/(62-83) 142/69  SpO2:  [90 %-95 %] 90 %  I/O last 3 completed shifts:  In: -   Out: 850 [Urine:850]    Gen: Well nourished, well developed, alert and oriented x 3, no acute distress  HEENT: Atraumatic, normocephalic; sclera non-injected, anicterric; oral mucosa moist, no lesion, no exudate  Lungs: Clear to ausculation, no wheezes, no rhonchi, no rales  Heart: Regular rate, regular rhythm, no gallops, no rubs, no murmurs  GI: Bowel sound normal, no hepatosplenomegaly, no masses, non-tender, non-distended, no guarding, no rebound tenderness  Lymph: No lymphadenopathy, no edema  Skin: No rashes, no chronic venous stasis     Medications       azithromycin  500 mg Intravenous Q24H     cefTRIAXone  1 g Intravenous Q24H     DULoxetine  120 mg Oral Daily     ipratropium - albuterol 0.5 mg/2.5 mg/3 mL  3 mL Nebulization Q4H While awake     morphine  30 mg Oral At Bedtime     morphine  60 mg Oral QAM     nicotine  1 patch Transdermal Daily     nicotine   Transdermal Q8H     oxyCODONE IR  10 mg Oral TID     predniSONE  40 mg Oral Daily     pregabalin  150 mg Oral BID     simvastatin  80 mg Oral At Bedtime     sodium chloride (PF)  3 mL Intracatheter Q8H       Data   Recent Labs   Lab 03/24/21  0455 03/23/21  0450 03/22/21  1453   WBC 19.1* 14.2* 10.2   HGB 14.5 16.1 16.6   MCV 97 96 98    233 227   INR  --   --  0.97    140 141   POTASSIUM 4.4 4.1 3.8   CHLORIDE 106 104 106   CO2 36*  34* 31   BUN 16 10 5*   CR 0.65* 0.89 0.74   ANIONGAP <1* 2* 4   JESSEE 8.9 8.7 8.9   * 225* 156*   ALBUMIN  --  3.0* 3.2*   PROTTOTAL  --  7.0 7.2   BILITOTAL  --  0.4 0.7   ALKPHOS  --  79 82   ALT  --  33 27   AST  --  22 23   TROPI  --   --  <0.015       No results found for this or any previous visit (from the past 24 hour(s)).

## 2021-03-24 NOTE — DISCHARGE SUMMARY
[facility]  Hospitalist Discharge Summary       Date of Admission:  3/22/2021  Date of Discharge:  3/24/2021  1:29 PM  Discharging Provider: Rey Garcia MD      Discharge Diagnoses     Acute respiratory failure with hypoxia and hypercapnia  COPD exacerbation with possible pneumonia  Sepsis, ruled out  CRP elevation  Palpitations   Possible encephalopathy vs chronic cognitive changes  Chronic pain syndrome  Lumbosacral radiculitis  Hyperlipidemia LDL goal <130  Sleep disturbance  Tobacco use disorder  Recent COVID vaccine  COVID status - negative     Follow-ups Needed After Discharge   Follow-up Appointments     Follow-up and recommended labs and tests      Follow up with primary care provider, Jignesh Travis, within 7 days for   hospital follow- up.  The following labs/tests are recommended: BMP and   CBC.           Unresulted Labs Ordered in the Past 30 Days of this Admission     Date and Time Order Name Status Description    3/22/2021 1445 Blood culture Preliminary     3/22/2021 1445 Blood culture Preliminary       These results will be followed up by Rey Garcia or PCP    Discharge Disposition   Discharged to home  Condition at discharge: Poor    Hospital Course     Melquiades Morales is a 66 year old male admitted on 3/22/2021. He presented to the emergency department for evaluation of productive cough and general malaise and was found to have acute respiratory failure with hypoxia and hypercarbia for which he is being admitted for further evaluation and treatment.     Acute respiratory failure with hypoxia and hypercapnia  Presented with new hypoxia requiring 3L supplemental O2. Occurs in setting of recent COVID vaccination (dose #1 of Moderna on 3/15). VBG shows compensated respiratory acidosis (pH 7.36 / pCO2 63 / bicarb 36). Chest x-ray with some mild left atelectasis but no obvious infiltrate or other abnormality. Afebrile, no leukocytosis. Clinically most consistent with  "COPD exacerbation with possible pneumonia.   - Continue supplemental O2 to maintain sats > 92%  - No indication for repeat VBG  - Blood culture x 2 from 3/22 NGTD  - Treat COPD and possible pneumonia below  - Clinically appears improved, but still requiring oxygen  - Oxygen requirements improved to 1 LPM on AM of 3/24  - Patient demanding to leave AMA, \"I just can't take it here\"  Patient unable to elaborate.  - Oxygen saturations dropping to < 85% off oxygen  - Encouraged patient to remain in hospital, explained risk of increase strain on bodily organs, need for rehospitalization, and death  - PCA arrived to transport patient home     COPD exacerbation with possible pneumonia  Sepsis, ruled out  Known COPD, managed prior to admission with DuoNebs and prn albuterol, but not controller inhalers. No significant wheezing on exam. Is slightly ill appearing but not septic, clinically appears as if there could be an underlying infection.   - Given 125 mg Solumedrol in the emergency department   - Start prednisone 40 mg daily on 3/23  - Azithromycin and ceftriaxone initiated in the emergency department 3/22, continue  - Procalcitonin low at <0.05, but will continue in setting of increasing CRP  - Scheduled DuoNebs  - Prn albuterol   - Prn tessalon for cough  - Discharge on azithromycin and prednisone  - Instructed to use Duonebs to 4 times daily for 7 days     CRP elevation  Admit CRP 21.9. Does appear mildly ill, possible pneumonia as noted above.  - CRP increased to 31.7 on 3/23  - CPR decreased to 12.7 on 3/24  - Appears clinically improved, no further testing     Palpitations   Reported per patient, new but intermittent since the vaccine. EKG normal sinus rhythm.  - Monitor on telemetry      Possible encephalopathy vs chronic cognitive changes  Patient slightly lethargic and inconsistently answering questions (responds no, but later responds yes to the same question when asked differently). Family did not report " changes in cognition, only weakness and fatigue.   - Monitor cognitive status for any changes  - On 3/23 remains lethargic, but answers questions appropriately  - Anxious on 3/24, demanding to leave AMA     Chronic pain syndrome  Lumbosacral radiculitis  Chronic and stable. Managed prior to admission with MS contin (60 mg q am / 30 mg q hs), oxycodone 10 mg tid, Cymbalta 120 mg daily, Lyrica 150 mg bid, and prn Naproxen.  - Continue home pain regimen      Hyperlipidemia LDL goal <130  Zocor 80 mg q hs, continue.      Sleep disturbance  Managed prior to admission with Trazodone 250 mg q hs prn, continue.      Tobacco use disorder  Encourage cessation. Nicotine patch available.      Recent COVID vaccine  COVID status - negative   Tested as symptomatic COVID screen based on cough. Received vaccine dose #1 on 3/15. Low concern for active COVID infection on admission.  - COVID PCR negative 3/22  - No indication for COVID precautions or repeat testing    Consultations This Hospital Stay   None    Code Status   Full Code    Time Spent on this Encounter   I, Rey Garcia MD, personally saw the patient today and spent greater than 30 minutes discharging this patient.       Rey Garcia MD  [facility]  ______________________________________________________________________    Physical Exam   Vital Signs: Temp: 98  F (36.7  C) Temp src: Oral BP: (!) 142/69 Pulse: 72   Resp: 18 SpO2: 90 % O2 Device: Nasal cannula Oxygen Delivery: 2 LPM  Weight: 249 lbs 5.44 oz       Gen: Well nourished, well developed, alert and oriented x 3, appears ill  HEENT: Atraumatic, normocephalic; sclera non-injected, anicterric; oral mucosa moist, no lesion, no exudate  Lungs: Clear to ausculation, few biapical wheezes, few scattered rhonchi, no rales  Heart: Regular rate, regular rhythm, no gallops, no rubs, no murmurs  GI: Bowel sound normal, no hepatosplenomegaly, no masses, non-tender, non-distended, no  guarding, no rebound tenderness  Lymph: No lymphadenopathy, no edema  Skin: No rashes, no chronic venous stasis     Primary Care Physician   Jignesh Travis    Discharge Orders      Reason for your hospital stay    This is a 66 year old male admitted with a COPD exacerbation and pneumonia.     Follow-up and recommended labs and tests    Follow up with primary care provider, Jignesh Travis, within 7 days for hospital follow- up.  The following labs/tests are recommended: BMP and CBC.     Activity    Your activity upon discharge: activity as tolerated     Diet    Follow this diet upon discharge: Orders Placed This Encounter      Regular Diet Adult       Significant Results and Procedures   Most Recent 3 CBC's:  Recent Labs   Lab Test 03/24/21  0455 03/23/21  0450 03/22/21  1453   WBC 19.1* 14.2* 10.2   HGB 14.5 16.1 16.6   MCV 97 96 98    233 227     Most Recent 3 BMP's:  Recent Labs   Lab Test 03/24/21  0455 03/23/21  0450 03/22/21  1453    140 141   POTASSIUM 4.4 4.1 3.8   CHLORIDE 106 104 106   CO2 36* 34* 31   BUN 16 10 5*   CR 0.65* 0.89 0.74   ANIONGAP <1* 2* 4   JESSEE 8.9 8.7 8.9   * 225* 156*     Most Recent 6 Bacteria Isolates From Any Culture (See EPIC Reports for Culture Details):  Recent Labs   Lab Test 03/22/21  1542 03/22/21  1453 03/12/20  1451 03/12/20  1254 03/12/20  1232 10/28/19  1807   CULT No growth after 2 days No growth after 2 days No growth No growth Previously reported as:  Cultured on the 1st day of incubation:  Gram positive cocci in pairs and chains  Upon further review, no gram positive cocci in pairs and chains isolated  CORRECTED ON:  3.14.2020 at 1510. KVO  *  Critical Value/Significant Value, preliminary result only, called to and read back by  Dr. Rey Wagoner 3.13.20 @ 1418 KATARINA    Cultured on the 1st day of incubation:  Staphylococcus warneri  *  Critical Value/Significant Value, preliminary result only, called to and read back by  Dr. Rey Wagoner @ 1514 3.13.20 KATARINA     Corrected report called to and read back by  Dr. Rodriguez on 3.14.2020 at 1515. KVO    Cultured on the 1st day of incubation:  Strain 2  Staphylococcus warneri  *  (Note)  POSITIVE for Staphylococci other than S.aureus, S.epidermidis and  S.lugdunensis, by Verigene multiplex nucleic acid test.  Coagulase-negative staphylococci are the most common venipuncture or  collection associated skin CONTAMINANTS grown in blood cultures.  Final identification and antimicrobial susceptibility testing will be  verified by standard methods.    Specimen tested with Verigene multiplex, gram-positive blood culture  nucleic acid test for the following targets: Staph aureus, Staph  epidermidis, Staph lugdunensis, other Staph species, Enterococcus  faecalis, Enterococcus faecium, Streptococcus species, S. agalactiae,  S. anginosus grp., S. pneumoniae, S. pyogenes, Listeria sp., mecA  (methicillin resistance) and Trina/B (vancomycin resistance).    Critical Value/Significant Value called to and read back by Pia Schrader RN. @1958. 3.13.20. BS.    No growth   ,   Results for orders placed or performed during the hospital encounter of 03/22/21   XR Chest Port 1 View    Narrative    XR CHEST PORT 1 VW 3/22/2021 3:21 PM    HISTORY: soa    COMPARISON: Chest CT and chest radiograph 8/5/2020      Impression    IMPRESSION: Mild linear atelectasis in the left lateral midlung. No  focal infiltrate, pleural effusion or pneumothorax. The cardiac and  mediastinal silhouettes are within normal limits.    ALEAH BARCLAY MD       Discharge Medications   Current Discharge Medication List      START taking these medications    Details   azithromycin (ZITHROMAX) 250 MG tablet Take 2 tablets (500 mg) by mouth At Bedtime for 1 day  Qty: 2 tablet, Refills: 0    Associated Diagnoses: COPD exacerbation (H)      predniSONE (DELTASONE) 20 MG tablet Take 2 tablets (40 mg) by mouth daily for 3 days  Qty: 5 tablet, Refills: 0    Associated Diagnoses: COPD  exacerbation (H)         CONTINUE these medications which have CHANGED    Details   ipratropium - albuterol 0.5 mg/2.5 mg/3 mL (DUONEB) 0.5-2.5 (3) MG/3ML neb solution Take 1 vial (3 mLs) by nebulization 4 times daily  Qty: 84 mL, Refills: 0    Associated Diagnoses: Chronic bronchitis, unspecified chronic bronchitis type (H)         CONTINUE these medications which have NOT CHANGED    Details   albuterol (PROAIR HFA) 108 (90 Base) MCG/ACT inhaler Inhale 2 puffs into the lungs every 4 hours as needed for shortness of breath / dyspnea or wheezing  Qty: 1 Inhaler, Refills: 11    Comments: Pharmacy may dispense brand covered by insurance (Proair, or proventil or ventolin or generic albuterol inhaler)  Associated Diagnoses: Bronchitis      Apoaequorin (PREVAGEN EXTRA STRENGTH) 20 MG CAPS Take 1 capsule by mouth daily Prevagen      DULoxetine (CYMBALTA) 60 MG capsule TAKE 2 CAPSULES BY MOUTH EVERY DAY  Qty: 60 capsule, Refills: 11    Associated Diagnoses: Depression, unspecified depression type      !! morphine (MS CONTIN) 30 MG CR tablet Take 60 mg by mouth every morning      !! morphine (MS CONTIN) 30 MG CR tablet Take 30 mg by mouth At Bedtime 60 mg in AM and 30 mg in HS  Refills: 0      naproxen (NAPROSYN) 500 MG tablet TAKE 1 TABLET BY MOUTH EVERY DAY AT ONSET of HEADACHE  Qty: 60 tablet, Refills: 3    Associated Diagnoses: Chronic pain syndrome      NONFORMULARY Take 2 capsules by mouth daily Super Beta Prostate      oxyCODONE IR (ROXICODONE) 10 MG tablet Take 1 tablet by mouth 3 times daily      pregabalin (LYRICA) 150 MG capsule Take 150 mg by mouth 2 times daily       simvastatin (ZOCOR) 80 MG tablet Take 1 tablet (80 mg) by mouth daily  Qty: 90 tablet, Refills: 3    Associated Diagnoses: Hyperlipidemia LDL goal <130      !! traZODone (DESYREL) 100 MG tablet TAKE 2 TABLETS BY MOUTH AT BEDTIME AS NEEDED FOR SLEEP  Qty: 60 tablet, Refills: 1    Comments: This prescription was filled on 1/19/2021. Any refills  authorized will be placed on file.  Associated Diagnoses: Depression, unspecified depression type      !! traZODone (DESYREL) 50 MG tablet Take 1 tablet (50 mg) by mouth nightly as needed for sleep Take along with the 100 mg tablets for total dose of 250 mg  Qty: 30 tablet, Refills: 1    Comments: This prescription was filled on 1/19/2021. Any refills authorized will be placed on file.  Associated Diagnoses: Depression, unspecified depression type      !! order for DME Equipment being ordered: Nebulizer.    Diagnosis: chronic obstructive bronchitis (COPD).    Duration of need:  99 months.  Qty: 1 each, Refills: 0    Associated Diagnoses: Chronic bronchitis, unspecified chronic bronchitis type (H)      !! order for DME Equipment being ordered: Hospital Bed and mattress.  Qty: 1 each, Refills: 0    Associated Diagnoses: Chronic pain syndrome; Lumbosacral radiculitis; Chronic obstructive pulmonary disease, unspecified COPD type (H); Obese; Lumbar radiculopathy; Encephalopathy; Spinal stenosis in cervical region      !! order for DME Equipment being ordered: Lift Chair  Qty: 1 each, Refills: 0    Associated Diagnoses: Chronic pain syndrome; Lumbosacral radiculitis; Chronic obstructive pulmonary disease, unspecified COPD type (H); Obese; Lumbar radiculopathy; Encephalopathy; Spinal stenosis in cervical region; Unable to ambulate      !! order for DME Equipment being ordered: Wheelchair. Electric, including adjustable back rest sitting to resting, leg elevation adjustment, approved for outdoor use  Qty: 1 Units, Refills: 0    Associated Diagnoses: Chronic pain syndrome; Lumbosacral radiculitis      !! ORDER FOR DME Semi electric hospital bed with side rails and mattress. Length of bed is for lifetime  Qty: 1 Device, Refills: 0    Associated Diagnoses: Other unspecified back disorder; Obese; Lumbosacral radiculitis; COPD (chronic obstructive pulmonary disease) (H)       !! - Potential duplicate medications found. Please  discuss with provider.        Allergies   Allergies   Allergen Reactions     Abilify [Aripiprazole] Other (See Comments)     Altered metal status.  Was admitted to hospital 3/17/2015.     Asa [Aspirin] GI Disturbance     Upset stomach     Black Pepper [Piper] Swelling     Tongue swells up     Caffeine Other (See Comments)     Comment: GI problems, Description:      Robitussin Cough-Cold D Other (See Comments)     Bad dreams about killing people      Varenicline Other (See Comments)     Vivid dreams and suicidal thoughts

## 2021-03-24 NOTE — PROGRESS NOTES
There is a system wide problem with the Tele monitoring system. [employer] here now to fix.   This writer paged Tele Abhay Milan. Requested that this patient's need for Tele be reassessed to try and avoid transfer of Pt to another floor.  Scott Brewer RN on 3/23/2021 at 9:49 PM    Order received to discontinue Tele monitoring.  Scott Brewer RN on 3/23/2021 at 10:56 PM

## 2021-03-24 NOTE — CONSULTS
Care Management Initial Consult    General Information  Assessment completed with: Patient,    Type of CM/SW Visit: Initial Assessment    Primary Care Provider verified and updated as needed: Yes   Readmission within the last 30 days: no previous admission in last 30 days      Reason for Consult: discharge planning  Advance Care Planning:            Communication Assessment  Patient's communication style: spoken language (English or Bilingual)    Hearing Difficulty or Deaf: no   Wear Glasses or Blind: no    Cognitive  Cognitive/Neuro/Behavioral: WDL                      Living Environment:   People in home: grandchild(monique Alba   Current living Arrangements: house      Able to return to prior arrangements: yes       Family/Social Support:  Care provided by: self, other (see comments)  Provides care for: no one  Marital Status:   PCA, Other (specify)          Description of Support System: Supportive, Involved    Support Assessment: Adequate family and caregiver support    Current Resources:   Patient receiving home care services: No     Community Resources: County Worker, PCA  Equipment currently used at home: walker, standard  Supplies currently used at home: Other, Oximeters    Employment/Financial:  Employment Status: disabled     Employment/ Comments: (Back injury at work )  Financial Concerns: No concerns identified           Lifestyle & Psychosocial Needs:        Socioeconomic History     Marital status:      Spouse name: Not on file     Number of children: Not on file     Years of education: Not on file     Highest education level: Not on file     Tobacco Use     Smoking status: Current Every Day Smoker     Packs/day: 0.25     Years: 50.00     Pack years: 12.50     Types: Cigarettes     Smokeless tobacco: Former User   Substance and Sexual Activity     Alcohol use: No     Drug use: Yes     Types: Marijuana     Comment: vaping marijuana since 7/2019 for medicinal purposes, buys from  unauthorized seller. recommended cessation in light of lung injury/illness associated with vaping.     Sexual activity: Never       Functional Status:  Prior to admission patient needed assistance:   Dependent ADLs:: Ambulation-walker  Dependent IADLs:: Cleaning, Cooking, Laundry, Shopping, Meal Preparation, Transportation  Assesssment of Functional Status: Not at baseline with mobility, Not at  functional baseline    Mental Health Status:  Mental Health Status: Other (see comment)(Followed by Kleber Herring )  Mental Health Management: Individual Therapy    Chemical Dependency Status:  Chemical Dependency Status: No Current Concerns                        Additional Information:  Met w/ patient around 10:35am and introduced self and role. Patient self AMA this afternoon.    Jeffrey reported to writer that at baseline he lives at his home w/ Anjali acosta who is in the home 24/7. He has PCA services provided through [facility] (unable to recall the name of the agency) for x5 days week, 6 hrs per day to assist in cleaning, cooking, errands and HH tasks. He is also unable to tell writer who his [facility] workers name is. At baseline, Jeffrey uses a walker up to 80ft. Jeffrey states that with his PCA services and help from Anjali that all of his needs are met at home.     Per chart review, patient recently had home care services though [facility] (documentation states ended 3/3/21). Patient did not recall or offered to share this information.     Writer called [facility] worker, Patty ([phone]) and left a message inquiring into county worker name and number. Writer also emailed Megan Dominguez ([email or web address] worker with no response at this time from either worker. Patty did call back around 4:30pm, she provided info for [employer]ze [employer]  w/ [employer] but unsure if she was still his . Writer called [employer]ze [employer] [phone] w/ no answer.    Patient stated that  his plan was to return to home w/ PCA services and help from Anjali. Writer spoke about home care services but patient stated he was not interested.     At baseline, Jeffrey was not using home 02. He does have an oximeter at home which he will occasionally check his 02 stats. During conversation with Jeffrey, he was 02 stats would decrease to 86-88%.     Writer did make a [facility] report #[id number].     Lori Emanuel, GENESIS  Care Management, [facility] [place]  [phone]

## 2021-03-25 ENCOUNTER — TELEPHONE (OUTPATIENT)
Dept: FAMILY MEDICINE | Facility: CLINIC | Age: 66
End: 2021-03-25

## 2021-03-25 NOTE — CONFIDENTIAL NOTE
"ED/Discharge Protocol    \"Hi, my name is Anai Svaage RN and I am calling on behalf of [facility].      I am calling to follow up and see how things are going; do you have just a minute?  For privacy purposes, can you please verify your last name and date of birth?  Thank you.\"    (Update EHR demographics when appropriate)    Initial Outreach    \"I see that you were in the hospital and discharged 3/26/21.  How are you doing now that you are home?\"  Pt. response: \"I feel like a new man\"    Is patient experiencing symptoms that may require a hospital visit?  None    \"Let's review your discharge instructions.  What is/are the follow-up recommendations?  Pt. Response: follow up with Dr. mcknight    \"Were you instructed to make a follow-up appointment?\"  (If the recommendation is to see the PCP / Specialist, review the EHR appointments and confirm (see chart review protocol))  Pt. Response Yes.  Has appointment been made?   No.  \"I would encourage you to make that appointment.  May I connect you at the end of this call?\"   Appointment made    \"If you have questions or things don't continue to improve, we encourage that you contact your primary clinic.  Through the clinic number, you can reach a nurse or the triage line 24 hours a day for advice.  For less severe, non-life threatening issues, we offer urgent care facilities with evening and weekend hours (provide details on location, hours, contact number, etc).  If you have any questions, please contact the clinic directly and they can advise you based on symptoms you may be experiencing.    \"When you see your primary doctor, I would recommend that you bring your discharge instructions with you to that appointment and discuss your recent hospital visit.  Your primary provider knows your medical history the best, and will be able to help you address any future concerns related to the visit.\"      Medications    \"Were there any changes made to your current medications?\" " "  Yes.   \"Were you given any new prescriptions?  Yes. Has the prescription been filled?   Yes. \"Did the pharmacist review the medication(s)?\"   Yes.  (Reinforce reading the pharmacy insert that is provided with the medications)  Are you taking the medication as directed?  Yes      Call Summary    \"Do you have any questions or concerns at the moment?\"  (Connect to appropriate resource based on response)  No    \"Okay, well thank you for your time today.  I hope you continue to feel better    Appointment made for tomorrow for follow up.\"                    "

## 2021-03-25 NOTE — CONFIDENTIAL NOTE
ED / Discharge Outreach Protocol    Patient Contact    Attempt # 1    Was call answered?  No.  Left message on voicemail with information to call me back.    Anai Savage RN

## 2021-03-26 ENCOUNTER — OFFICE VISIT (OUTPATIENT)
Dept: FAMILY MEDICINE | Facility: CLINIC | Age: 66
End: 2021-03-26
Payer: COMMERCIAL

## 2021-03-26 VITALS
HEART RATE: 110 BPM | OXYGEN SATURATION: 96 % | SYSTOLIC BLOOD PRESSURE: 150 MMHG | TEMPERATURE: 98.5 F | DIASTOLIC BLOOD PRESSURE: 80 MMHG | RESPIRATION RATE: 18 BRPM

## 2021-03-26 DIAGNOSIS — J44.1 COPD EXACERBATION (H): ICD-10-CM

## 2021-03-26 DIAGNOSIS — J96.02 ACUTE RESPIRATORY FAILURE WITH HYPOXIA AND HYPERCAPNIA (H): Primary | ICD-10-CM

## 2021-03-26 DIAGNOSIS — J42 CHRONIC BRONCHITIS, UNSPECIFIED CHRONIC BRONCHITIS TYPE (H): Chronic | ICD-10-CM

## 2021-03-26 DIAGNOSIS — J96.01 ACUTE RESPIRATORY FAILURE WITH HYPOXIA AND HYPERCAPNIA (H): Primary | ICD-10-CM

## 2021-03-26 PROCEDURE — 99214 OFFICE O/P EST MOD 30 MIN: CPT | Performed by: FAMILY MEDICINE

## 2021-03-26 NOTE — PROGRESS NOTES
Subjective   Melquiades is a 66 year old who presents for the following health issues     HPI       Hospital Follow-up Visit:    Hospital/Nursing Home/IP Rehab Facility: [facility]  Date of Admission: 3/22/21  Date of Discharge: 3/24/21  Reason(s) for Admission: Acute respiratory failure with hypoxia and hypercapnia  COPD exacerbation with possible pneumonia      Was your hospitalization related to COVID-19? No   Problems taking medications regularly:  None  Medication changes since discharge: None  Problems adhering to non-medication therapy:  None    Summary of hospitalization:  [facility] discharge summary reviewed  Diagnostic Tests/Treatments reviewed.  Follow up needed: none  Other Healthcare Providers Involved in Patient s Care:         None  Update since discharge: improved. Post Discharge Medication Reconciliation: discharge medications reconciled, continue medications without change.  Plan of care communicated with patient              Review of Systems         Objective    There were no vitals taken for this visit.  There is no height or weight on file to calculate BMI.  Physical Exam     Further history obtained, clarified or corrected by physician:  He left the hospital AGAINST MEDICAL ADVICE after several days of being treated for COPD exacerbation.  Has been home now for 3 days and actually has improved somewhat.  He is just about the end of his Zithromax and prednisone.  He is not complaining of any shortness of breath or cough or fever.    OBJECTIVE:  BP (!) 150/80   Pulse 110   Temp 98.5  F (36.9  C)   Resp 18   SpO2 96%   LUNGS: Distant breath sounds with no rales or rhonchi  CV: RRR without murmur  ABD: BS+, soft, nontender, no masses, no hepatosplenomegaly  EXTREMITIES: without joint tenderness, swelling or erythema.  No muscle tenderness or abnormality.  SKIN: No rashes or abnormalities  NEURO:non focal exam    ASSESSMENT:     Acute respiratory failure with hypoxia  and hypercapnia (H)  Chronic bronchitis, unspecified chronic bronchitis type (H)  COPD exacerbation (H)    PLAN:  Monitor for recurring or new symptoms  Recommend DC smoking  Return as needed.

## 2021-03-26 NOTE — PATIENT INSTRUCTIONS
Our Clinic hours are:  Mondays    7:20 am - 7 pm  Tues -  Fri  7:20 am - 5 pm    Clinic Phone: [phone]    The clinic lab opens at 7:30 am Mon - Fri and appointments are required.    [facility]. [phone]  Monday  8 am - 7pm  Tues - Fri 8 am - 5:30 pm

## 2021-03-28 LAB
BACTERIA SPEC CULT: NO GROWTH
BACTERIA SPEC CULT: NO GROWTH
SPECIMEN SOURCE: NORMAL
SPECIMEN SOURCE: NORMAL

## 2021-04-02 ENCOUNTER — FCC EXTENDED DOCUMENTATION (OUTPATIENT)
Dept: PSYCHOLOGY | Facility: CLINIC | Age: 66
End: 2021-04-02

## 2021-04-02 ENCOUNTER — TELEPHONE (OUTPATIENT)
Dept: PSYCHOLOGY | Facility: CLINIC | Age: 66
End: 2021-04-02

## 2021-04-02 NOTE — PROGRESS NOTES
Discharge Summary  Multiple Sessions    Client Name: Melquiades Morales MRN#: [id number] YOB: 1955      Intake / Discharge Date: 4/2/21      DSM5 Diagnoses: (Sustained by DSM5 Criteria Listed Above)  Diagnoses: 296.32 (F33.1) Major Depressive Disorder, Recurrent Episode, Moderate _ and With anxious distress  300.02 (F41.1) Generalized Anxiety Disorder  309.81 (F43.10) Posttraumatic Stress Disorder (includes Posttraumatic Stress Disorder for Children 6 Years and Younger)  Without dissociative symptoms  Psychosocial & Contextual Factors: trauma history; financial; divorce.  WHODAS 2.0 (12 item) Score:            Presenting Concern:  Depression; anxiety.      Reason for Discharge:  Insurance: he has [employer] and was told he could call his insurance or our behavioral access number. he stated he would wait to see me in the future and has enough support right now and no safety concerns.      Disposition at Time of Last Encounter:   Comments:   We spoke this morning about insurance issue.     Risk Management:   Client has had a history of suicidal ideation: several months ago and suicide attempts: tried to hang self many years ago  A safety and risk management plan has been developed including: Patient consented to co-developed safety plan.  Safety and risk management plan was completed.  Patient agreed to use safety plan should any safety concerns arise.  A copy was given to the patient.      Referred To:  Insurance if he wants to see a therapist in his network.        Kleber Pollack, [facility]   4/2/2021

## 2021-04-02 NOTE — TELEPHONE ENCOUNTER
And no safety concerns. Will close his chart discontinuing services with [facility].

## 2021-04-07 DIAGNOSIS — F32.A DEPRESSION, UNSPECIFIED DEPRESSION TYPE: ICD-10-CM

## 2021-04-08 RX ORDER — TRAZODONE HYDROCHLORIDE 50 MG/1
50 TABLET, FILM COATED ORAL AT BEDTIME
Qty: 90 TABLET | Refills: 3 | Status: SHIPPED | OUTPATIENT
Start: 2021-04-08 | End: 2021-10-05

## 2021-04-08 RX ORDER — TRAZODONE HYDROCHLORIDE 100 MG/1
TABLET ORAL
Qty: 180 TABLET | Refills: 3 | Status: SHIPPED | OUTPATIENT
Start: 2021-04-08 | End: 2021-10-05

## 2021-04-08 NOTE — TELEPHONE ENCOUNTER
Routing refill request to provider for review/approval because:  Drug not on the [facility] refill protocol     Cait Sanabria RN

## 2021-04-12 ENCOUNTER — IMMUNIZATION (OUTPATIENT)
Dept: FAMILY MEDICINE | Facility: CLINIC | Age: 66
End: 2021-04-12
Attending: FAMILY MEDICINE
Payer: COMMERCIAL

## 2021-04-12 PROCEDURE — 0012A PR COVID VAC MODERNA 100 MCG/0.5 ML IM: CPT

## 2021-04-12 PROCEDURE — 91301 PR COVID VAC MODERNA 100 MCG/0.5 ML IM: CPT

## 2021-05-27 ENCOUNTER — TELEPHONE (OUTPATIENT)
Dept: FAMILY MEDICINE | Facility: CLINIC | Age: 66
End: 2021-05-27

## 2021-05-27 NOTE — TELEPHONE ENCOUNTER
Reason for Call:  Form, our goal is to have forms completed with 72 hours, however, some forms may require a visit or additional information.    Type of letter, form or note:  disability    Who is the form from?: Insurance comp    Where did the form come from: form was faxed in    What clinic location was the form placed at?: [facility]    Where the form was placed: Given to MA/RN    What number is listed as a contact on the form?: Bonny Garner [phone]       Additional comments:     Call taken on 5/27/2021 at 11:29 AM by Reyna Montoya

## 2021-06-25 ENCOUNTER — TELEPHONE (OUTPATIENT)
Dept: FAMILY MEDICINE | Facility: CLINIC | Age: 66
End: 2021-06-25

## 2021-06-25 NOTE — TELEPHONE ENCOUNTER
Reason for call:  Patient reporting a symptom    Symptom or request: Pt reporting purple growing spots on the bottom of feet for several weeks with numbness.    Duration (how long have symptoms been present): Weeks    Have you been treated for this before? No    Additional comments:     Phone Number patient can be reached at:  Home number on file [phone] (home)    Best Time:  any    Can we leave a detailed message on this number:  YES    Call taken on 6/25/2021 at 2:42 PM by Reyna Montoya

## 2021-06-25 NOTE — TELEPHONE ENCOUNTER
Left message on personal answering machine to return call. Direct line provided.  Cait Sanabria RN

## 2021-07-13 ENCOUNTER — TELEPHONE (OUTPATIENT)
Dept: FAMILY MEDICINE | Facility: CLINIC | Age: 66
End: 2021-07-13

## 2021-07-13 NOTE — TELEPHONE ENCOUNTER
Reason for call:  Patient reporting a symptom    Symptom or request: Pt asking to talk to a nurse. He says he has spots growing on the bottom of his Left foot and they are starting to get painful.     Duration (how long have symptoms been present): 3 weeks      Have you been treated for this before? No    Additional comments:      Phone Number patient can be reached at:  Home number on file [phone] (home)    Best Time:  anytime    Can we leave a detailed message on this number:  YES    Call taken on 7/13/2021 at 3:02 PM by Malia Nolen

## 2021-07-13 NOTE — TELEPHONE ENCOUNTER
Patient wants an appointment to see a doctor only. He has been having spot on the bottom of his foot grow and are very painful. Advised to be seen and appointment made.  Cait Sanabria RN

## 2021-07-15 ENCOUNTER — MYC MEDICAL ADVICE (OUTPATIENT)
Dept: FAMILY MEDICINE | Facility: CLINIC | Age: 66
End: 2021-07-15

## 2021-07-19 ENCOUNTER — OFFICE VISIT (OUTPATIENT)
Dept: FAMILY MEDICINE | Facility: CLINIC | Age: 66
End: 2021-07-19
Payer: COMMERCIAL

## 2021-07-19 VITALS
WEIGHT: 249 LBS | RESPIRATION RATE: 18 BRPM | SYSTOLIC BLOOD PRESSURE: 138 MMHG | BODY MASS INDEX: 34.86 KG/M2 | HEIGHT: 71 IN | DIASTOLIC BLOOD PRESSURE: 72 MMHG | HEART RATE: 80 BPM | TEMPERATURE: 97.8 F | OXYGEN SATURATION: 93 %

## 2021-07-19 DIAGNOSIS — B35.3 TINEA PEDIS OF LEFT FOOT: Primary | ICD-10-CM

## 2021-07-19 DIAGNOSIS — G89.4 CHRONIC PAIN SYNDROME: Chronic | ICD-10-CM

## 2021-07-19 DIAGNOSIS — R73.9 HYPERGLYCEMIA: ICD-10-CM

## 2021-07-19 DIAGNOSIS — H53.9 VISION CHANGES: ICD-10-CM

## 2021-07-19 LAB
FASTING STATUS PATIENT QL REPORTED: NO
GLUCOSE BLD-MCNC: 95 MG/DL (ref 70–99)
HBA1C MFR BLD: 6.2 % (ref 0–5.6)

## 2021-07-19 PROCEDURE — 83036 HEMOGLOBIN GLYCOSYLATED A1C: CPT | Performed by: FAMILY MEDICINE

## 2021-07-19 PROCEDURE — 36415 COLL VENOUS BLD VENIPUNCTURE: CPT | Performed by: FAMILY MEDICINE

## 2021-07-19 PROCEDURE — 99214 OFFICE O/P EST MOD 30 MIN: CPT | Performed by: FAMILY MEDICINE

## 2021-07-19 PROCEDURE — 82947 ASSAY GLUCOSE BLOOD QUANT: CPT | Performed by: FAMILY MEDICINE

## 2021-07-19 RX ORDER — TRIAMCINOLONE ACETONIDE 1 MG/G
OINTMENT TOPICAL 2 TIMES DAILY
Qty: 30 G | Refills: 1 | Status: SHIPPED | OUTPATIENT
Start: 2021-07-19 | End: 2021-08-02

## 2021-07-19 RX ORDER — NAPROXEN 500 MG/1
500 TABLET ORAL 2 TIMES DAILY PRN
Qty: 60 TABLET | Refills: 3 | Status: SHIPPED | OUTPATIENT
Start: 2021-07-19 | End: 2022-10-07

## 2021-07-19 RX ORDER — PRENATAL VIT 91/IRON/FOLIC/DHA 28-975-200
COMBINATION PACKAGE (EA) ORAL 2 TIMES DAILY
Qty: 30 G | Refills: 1 | Status: SHIPPED | OUTPATIENT
Start: 2021-07-19 | End: 2021-08-02

## 2021-07-19 ASSESSMENT — MIFFLIN-ST. JEOR: SCORE: 1941.59

## 2021-07-19 ASSESSMENT — PATIENT HEALTH QUESTIONNAIRE - PHQ9: SUM OF ALL RESPONSES TO PHQ QUESTIONS 1-9: 15

## 2021-07-19 NOTE — PATIENT INSTRUCTIONS
Melquiades was seen today for musculoskeletal problem, refill request and eye problem.    Diagnoses and all orders for this visit:    Tinea pedis of left foot  We'll treat as fungal for now but if worsening, come back and we'll have to reassess  -     triamcinolone (KENALOG) 0.1 % external ointment; Apply topically 2 times daily for 14 days  -     terbinafine (LAMISIL) 1 % external cream; Apply topically 2 times daily for 14 days    Chronic pain syndrome  REFILL  -     naproxen (NAPROSYN) 500 MG tablet; Take 1 tablet (500 mg) by mouth 2 times daily as needed for headaches    Hyperglycemia  Get bloodwork drawn today - I'll comment on your results in MyChart and have you come in if needed  -     Hemoglobin A1c; Future  -     Glucose; Future    Vision changes  -     Adult Eye Referral; Future

## 2021-07-19 NOTE — PROGRESS NOTES
Assessment & Plan     Chronic pain syndrome  refill  - naproxen (NAPROSYN) 500 MG tablet  Dispense: 60 tablet; Refill: 3    Tinea pedis of left foot  Advised to treat BID for 2 weeks. His PCA reports they can do only 9am, 3pm, since pt unable or unwilling to do it himself, and I feel this should be enough/beneficial  - triamcinolone (KENALOG) 0.1 % external ointment  Dispense: 30 g; Refill: 1  - terbinafine (LAMISIL) 1 % external cream  Dispense: 30 g; Refill: 1    Hyperglycemia  - Hemoglobin A1c  - Glucose  - Hemoglobin A1c  - Glucose    Vision changes  Unsure based on sx description if this is just floaters, but no alarm symptoms and not related to his migraines. pt hasn't had eye exam in many years so I recommended and he was willing  - Adult Eye Referral    Return if symptoms worsen or fail to improve.    Eliane Quinn MD  [facility]   Melquiades is a 66 year old who presents for the following health issues  accompanied by his PCA Monserrat (sp?):    HPI     Medication Followup of     Taking Medication as prescribed: yes    Side Effects:  None    Medication Helping Symptoms:  yes     Musculoskeletal problem/pain  Onset/Duration: 2 mos ago started with dry skin now it has purple dots   Description Patient started with dry skin on the L foot now is followed by purple dots and is painful   Location: foot  - left  Joint Swelling: no  Redness: YES  Pain: YES  Warmth: no  Intensity:  mild  Progression of Symptoms:  worsening  Accompanying signs and symptoms:   Fevers: no  Numbness/tingling/weakness: YES- numbness   History  Trauma to the area: no  Recent illness:  no  Previous similar problem: no  Previous evaluation:  no  Precipitating or alleviating factors:  Aggravating factors include: walking  Therapies tried and outcome: nothing    Concern - Patient is seeing spots out of L eye   Onset: 2 months ago     Description:   Patient has been seeing spots out of L eye has been  going on for about 2 mos and is getting worse it is like flashes after you take a picture, will increase with movement but still has them while sitting     Intensity: mild    Progression of Symptoms:  worsening    Accompanying Signs & Symptoms:  none    Previous history of similar problem:   Yes but they will go away     Precipitating factors:   Worsened by: with movement     Alleviating factors:  Improved by: they do not improve at all     Tries to check his R eye as well and he doesn't see it.  All the time, unrelated to migraines  But feels maybe it's worse in certain rooms than others?    Feels he only eats once a day because of no money, therefore not really able to discuss nutrition changes.  Reports he hasn't lost weight however  1BM/week but denies straining nor hard stool ever    Migraine     Since your last clinic visit, how have your headaches changed?  No change    How often are you getting headaches or migraines? On and avg 4 monthly      Are you able to do normal daily activities when you have a migraine? No    Are you taking rescue/relief medications? (Select all that apply) naproxyn (Aleve)    How helpful is your rescue/relief medication?  I get total relief    Are you taking any medications to prevent migraines? (Select all that apply)  No    In the past 4 weeks, how often have you gone to urgent care or the emergency room because of your headaches?  0      How many servings of fruits and vegetables do you eat daily?  0-1    On average, how many sweetened beverages do you drink each day (Examples: soda, juice, sweet tea, etc.  Do NOT count diet or artificially sweetened beverages)?   0    How many days per week do you exercise enough to make your heart beat faster? 3 or less    How many minutes a day do you exercise enough to make your heart beat faster? 9 or less    How many days per week do you miss taking your medication? 0      Review of Systems   Constitutional, HEENT, cardiovascular,  "pulmonary, gi and gu systems are negative, except as otherwise noted.         Objective    /72   Pulse 80   Temp 97.8  F (36.6  C) (Tympanic)   Resp 18   Ht 1.803 m (5' 11\")   Wt 112.9 kg (249 lb)   SpO2 93%   BMI 34.73 kg/m    Body mass index is 34.73 kg/m .  Physical Exam   GENERAL: healthy, alert and no distress  EYES: Eyes grossly normal to inspection, PERRL and conjunctivae and sclerae normal  RESP: normal respiratory effort, speaking in complete sentences  CV: b/l PT pulses 2+  MS: no gross musculoskeletal defects noted, no edema bilaterally. L foot rash shows scattered scaly patches with excoriation and punctate microhemorrhages  PSYCH: mentation appears normal, affect normal/bright                  "

## 2021-08-16 ENCOUNTER — MEDICAL CORRESPONDENCE (OUTPATIENT)
Dept: FAMILY MEDICINE | Facility: CLINIC | Age: 66
End: 2021-08-16

## 2021-08-19 DIAGNOSIS — E78.5 HYPERLIPIDEMIA LDL GOAL <130: ICD-10-CM

## 2021-08-23 RX ORDER — SIMVASTATIN 80 MG
80 TABLET ORAL DAILY
Qty: 30 TABLET | Refills: 0 | Status: SHIPPED | OUTPATIENT
Start: 2021-08-23 | End: 2021-09-23

## 2021-08-23 NOTE — CONFIDENTIAL NOTE
Filled Rx for 1 month. Patient is due for annual office visit, please notify. Thank you. KONSTANTIN Tompkins CNP Needs new PCP.

## 2021-08-23 NOTE — TELEPHONE ENCOUNTER
Left detailed message on pt identified voicemail for pt to call back to make appt to establish care

## 2021-09-05 ENCOUNTER — HEALTH MAINTENANCE LETTER (OUTPATIENT)
Age: 66
End: 2021-09-05

## 2021-09-22 ENCOUNTER — TELEPHONE (OUTPATIENT)
Dept: FAMILY MEDICINE | Facility: CLINIC | Age: 66
End: 2021-09-22

## 2021-09-22 DIAGNOSIS — E78.5 HYPERLIPIDEMIA LDL GOAL <130: ICD-10-CM

## 2021-09-22 NOTE — LETTER
[facility]  [address]  [facility] [address]  [phone]        09/28/21    Melquiades Morales    [address] [place] [address]            Dear Melquiades Morales       APPOINTMENT REMINDER:   Our records indicates that it is time for you to be seen for a recheck.     Your current medication request will be approved for one refill but you will need to be seen before any additional refills can be approved.    Taking care of your health is important to us, and ongoing visits with your provider are vital to your care.    We look forward to seeing you in the near future.  You may call our office at [phone] to schedule a visit.    Please disregard this notice if you have already made an appointment.          Sincerely,        Dr. Eliane Quinn/AB

## 2021-09-23 RX ORDER — SIMVASTATIN 80 MG
80 TABLET ORAL DAILY
Qty: 30 TABLET | Refills: 0 | Status: SHIPPED | OUTPATIENT
Start: 2021-09-23 | End: 2021-10-05

## 2021-09-23 NOTE — TELEPHONE ENCOUNTER
"Requested Prescriptions   Pending Prescriptions Disp Refills     simvastatin (ZOCOR) 80 MG tablet [Pharmacy Med Name: simvastatin 80 mg tablet] 30 tablet 0     Sig: Take 1 tablet (80 mg) by mouth daily       Statins Protocol Failed - 9/22/2021 10:31 AM        Failed - LDL on file in past 12 months     Recent Labs   Lab Test 05/20/19  1004   LDL 49             Passed - No abnormal creatine kinase in past 12 months     Recent Labs   Lab Test 09/06/15  1635   CKT 77                Passed - Recent (12 mo) or future (30 days) visit within the authorizing provider's specialty     Patient has had an office visit with the authorizing provider or a provider within the authorizing providers department within the previous 12 mos or has a future within next 30 days. See \"Patient Info\" tab in inbasket, or \"Choose Columns\" in Meds & Orders section of the refill encounter.              Passed - Medication is active on med list        Passed - Patient is age 18 or older             "

## 2021-09-23 NOTE — TELEPHONE ENCOUNTER
Refilled simvastatin for 1 month. Please call patient and advise appointment in 1 month. To Establish care and preventive health/review all meds. Thank you. Eliane Quinn MD

## 2021-09-24 NOTE — TELEPHONE ENCOUNTER
Called patient 2nd time and left message to call us back.    Jennifer Saleem on 9/24/2021 at 11:00 AM

## 2021-10-05 ENCOUNTER — TELEPHONE (OUTPATIENT)
Dept: FAMILY MEDICINE | Facility: CLINIC | Age: 66
End: 2021-10-05

## 2021-10-05 ENCOUNTER — OFFICE VISIT (OUTPATIENT)
Dept: FAMILY MEDICINE | Facility: CLINIC | Age: 66
End: 2021-10-05
Payer: COMMERCIAL

## 2021-10-05 VITALS
RESPIRATION RATE: 19 BRPM | HEIGHT: 71 IN | DIASTOLIC BLOOD PRESSURE: 78 MMHG | OXYGEN SATURATION: 91 % | SYSTOLIC BLOOD PRESSURE: 124 MMHG | HEART RATE: 95 BPM | TEMPERATURE: 99 F | BODY MASS INDEX: 35.14 KG/M2 | WEIGHT: 251 LBS

## 2021-10-05 DIAGNOSIS — F32.A DEPRESSION, UNSPECIFIED DEPRESSION TYPE: ICD-10-CM

## 2021-10-05 DIAGNOSIS — Z71.6 ENCOUNTER FOR SMOKING CESSATION COUNSELING: ICD-10-CM

## 2021-10-05 DIAGNOSIS — E78.5 HYPERLIPIDEMIA LDL GOAL <130: ICD-10-CM

## 2021-10-05 DIAGNOSIS — Z23 NEED FOR PROPHYLACTIC VACCINATION AND INOCULATION AGAINST INFLUENZA: ICD-10-CM

## 2021-10-05 DIAGNOSIS — J42 CHRONIC BRONCHITIS, UNSPECIFIED CHRONIC BRONCHITIS TYPE (H): ICD-10-CM

## 2021-10-05 DIAGNOSIS — Z00.00 HEALTH CARE MAINTENANCE: Primary | ICD-10-CM

## 2021-10-05 LAB
CHOLEST SERPL-MCNC: 118 MG/DL
FASTING STATUS PATIENT QL REPORTED: NO
HDLC SERPL-MCNC: 32 MG/DL
LDLC SERPL CALC-MCNC: 41 MG/DL
NONHDLC SERPL-MCNC: 86 MG/DL
TRIGL SERPL-MCNC: 227 MG/DL

## 2021-10-05 PROCEDURE — G0008 ADMIN INFLUENZA VIRUS VAC: HCPCS | Performed by: FAMILY MEDICINE

## 2021-10-05 PROCEDURE — 36415 COLL VENOUS BLD VENIPUNCTURE: CPT | Performed by: FAMILY MEDICINE

## 2021-10-05 PROCEDURE — 80061 LIPID PANEL: CPT | Performed by: FAMILY MEDICINE

## 2021-10-05 PROCEDURE — 90682 RIV4 VACC RECOMBINANT DNA IM: CPT | Performed by: FAMILY MEDICINE

## 2021-10-05 PROCEDURE — 99214 OFFICE O/P EST MOD 30 MIN: CPT | Mod: 25 | Performed by: FAMILY MEDICINE

## 2021-10-05 RX ORDER — POLYETHYLENE GLYCOL 3350 17 G
2 POWDER IN PACKET (EA) ORAL
Qty: 72 LOZENGE | Refills: 3 | Status: SHIPPED | OUTPATIENT
Start: 2021-10-05 | End: 2022-03-11

## 2021-10-05 RX ORDER — TRAZODONE HYDROCHLORIDE 100 MG/1
TABLET ORAL
Qty: 180 TABLET | Refills: 3 | Status: SHIPPED | OUTPATIENT
Start: 2021-10-05 | End: 2022-10-07

## 2021-10-05 RX ORDER — NICOTINE 21 MG/24HR
1 PATCH, TRANSDERMAL 24 HOURS TRANSDERMAL EVERY 24 HOURS
Qty: 28 PATCH | Refills: 1 | Status: SHIPPED | OUTPATIENT
Start: 2021-10-05 | End: 2022-03-11

## 2021-10-05 RX ORDER — DULOXETIN HYDROCHLORIDE 60 MG/1
120 CAPSULE, DELAYED RELEASE ORAL DAILY
Qty: 60 CAPSULE | Refills: 11 | Status: SHIPPED | OUTPATIENT
Start: 2021-10-05 | End: 2022-08-22

## 2021-10-05 RX ORDER — SIMVASTATIN 80 MG
80 TABLET ORAL DAILY
Qty: 90 TABLET | Refills: 3 | Status: SHIPPED | OUTPATIENT
Start: 2021-10-05 | End: 2022-10-07

## 2021-10-05 RX ORDER — TRAZODONE HYDROCHLORIDE 50 MG/1
50 TABLET, FILM COATED ORAL AT BEDTIME
Qty: 90 TABLET | Refills: 3 | Status: SHIPPED | OUTPATIENT
Start: 2021-10-05 | End: 2022-10-07

## 2021-10-05 RX ORDER — ALBUTEROL SULFATE 90 UG/1
2 AEROSOL, METERED RESPIRATORY (INHALATION) EVERY 4 HOURS PRN
Qty: 18 G | Refills: 3 | Status: SHIPPED | OUTPATIENT
Start: 2021-10-05 | End: 2023-09-21

## 2021-10-05 ASSESSMENT — MIFFLIN-ST. JEOR: SCORE: 1950.66

## 2021-10-05 NOTE — PROGRESS NOTES
Assessment & Plan     Hyperlipidemia LDL goal <130  Refill. No dose change.  - simvastatin (ZOCOR) 80 MG tablet  Dispense: 90 tablet; Refill: 3  - Lipid panel reflex to direct LDL Non-fasting  - Lipid panel reflex to direct LDL Non-fasting    Health care maintenance  Reminded of need for annual wellness  Reviewed HM orders, colonoscopy    Depression, unspecified depression type  Refills. No dose change.  - traZODone (DESYREL) 100 MG tablet  Dispense: 180 tablet; Refill: 3  - traZODone (DESYREL) 50 MG tablet  Dispense: 90 tablet; Refill: 3  - DULoxetine (CYMBALTA) 60 MG capsule  Dispense: 60 capsule; Refill: 11    Chronic bronchitis, unspecified chronic bronchitis type (H)  - albuterol (PROAIR HFA) 108 (90 Base) MCG/ACT inhaler  Dispense: 18 g; Refill: 3    Encounter for smoking cessation counseling  Discussed cessation success tips, to plan ahead for quit date, anticipate challenges.  Pt has quit successfully before  - nicotine (NICODERM CQ) 21 MG/24HR 24 hr patch  Dispense: 28 patch; Refill: 1  - nicotine (COMMIT) 2 MG lozenge  Dispense: 72 lozenge; Refill: 3    Need for prophylactic vaccination and inoculation against influenza  - INFLUENZA QUAD, RECOMBINANT, P-FREE (RIV4) (FLUBLOK)    Tobacco Cessation:   reports that he has been smoking cigarettes. He has a 12.50 pack-year smoking history. He has quit using smokeless tobacco.    Return in about 2 months (around 12/5/2021) for Follow up smoking and continued health maintenance.    Eliane Quinn MD  [facility]   Melquiades is a 66 year old who presents for the following health issues  accompanied by his self :    HPI     Hyperlipidemia Follow-Up      Are you regularly taking any medication or supplement to lower your cholesterol?   Yes- simvastatin     Are you having muscle aches or other side effects that you think could be caused by your cholesterol lowering medication?  No    How many servings of fruits and  "vegetables do you eat daily?  0-1    On average, how many sweetened beverages do you drink each day (Examples: soda, juice, sweet tea, etc.  Do NOT count diet or artificially sweetened beverages)?   3 juice     How many days per week do you exercise enough to make your heart beat faster? 3 or less    How many minutes a day do you exercise enough to make your heart beat faster? 9 or less    How many days per week do you miss taking your medication? 0    Medication Followup of simvastatin     Taking Medication as prescribed: yes    Side Effects:  None    Medication Helping Symptoms:  yes     COPD  Not even needing it every month he says  Only using rescue inhalers  Still continues to smoke about a pack per day  Is interested in quittng. Has quit before using NRT patch.    Social History     Socioeconomic History     Marital status:      Spouse name: None     Number of children: None     Years of education: None     Highest education level: None   Occupational History     None   Tobacco Use     Smoking status: Current Every Day Smoker     Packs/day: 0.25     Years: 50.00     Pack years: 12.50     Types: Cigarettes     Smokeless tobacco: Former User   Substance and Sexual Activity     Alcohol use: No     Drug use: Yes     Types: Marijuana     Comment: vaping marijuana since 7/2019 for medicinal purposes, buys from unauthorized seller. recommended cessation in light of lung injury/illness associated with vaping.     Sexual activity: Never   Other Topics Concern     Parent/sibling w/ CABG, MI or angioplasty before 65F 55M? No   Social History Narrative     None       Review of Systems   Constitutional, HEENT, cardiovascular, pulmonary, gi and gu systems are negative, except as otherwise noted.        Objective    /78   Pulse 95   Temp 99  F (37.2  C) (Tympanic)   Resp 19   Ht 1.803 m (5' 11\")   Wt 113.9 kg (251 lb)   SpO2 91%   BMI 35.01 kg/m    Body mass index is 35.01 kg/m .  Physical Exam   GENERAL: " healthy, alert and no distress  RESP: lungs clear to auscultation - no rales, rhonchi or wheezes  CV: regular rate and rhythm, normal S1 S2, no S3 or S4, no murmur, click or rub, no peripheral edema and peripheral pulses strong  ABDOMEN: soft, nontender, no hepatosplenomegaly, no masses and bowel sounds normal  MS: no gross musculoskeletal defects noted, no edema

## 2021-10-05 NOTE — TELEPHONE ENCOUNTER
Per fax received from [facility]   - Nicotine (NICODERM CQ) 21 MG/24HR 24 hr patch & Nicotine (COMMIT) 2 MG lozenge is not covered by patient's Insurance Company  Dr. Quinn - Please choose:  1.  Change medication that is not covered to a different medication and send new prescription to patient's pharmacy?  2.  Patient will need to pay for the non-covered medication out-of-pocket?   3.  Try for Prior Authorization with Insurance Company to get medication covered?        Key# [id number]

## 2021-10-05 NOTE — LETTER
My COPD Action Plan     Name: Melquiades Morales    YOB: 1955   Date: 10/5/2021    My doctor: Eliane Quinn MD   My clinic: [facility]    [address] [facility] [address]  [phone]  My Controller Medicine: none   Dose:      My Rescue Medicine: duo nebs or albuterol inhaler     My Flare Up Medicine: none   Dose:      My COPD Severity: Mild      Use of Oxygen: Oxygen Not Prescribed      Make sure you've had your pneumonia   vaccines.          GREEN ZONE       Doing well today      Usual level of activity and exercise    Usual amount of cough and mucus    No shortness of breath    Usual level of health (thinking clearly, sleeping well, feel like eating) Actions:      Take daily medicines    Use oxygen as prescribed    Follow regular exercise and diet plan    Avoid cigarette smoke and other irritants that harm the lungs           YELLOW ZONE          Having a bad day or flare up      Short of breath more than usual    A lot more sputum (mucus) than usual    Sputum looks yellow, green, tan, brown or bloody    More coughing or wheezing    Fever or chills    Less energy; trouble completing activities    Trouble thinking or focusing    Using quick relief inhaler or nebulizer more often    Poor sleep; symptoms wake me up    Do not feel like eating Actions:      Get plenty of rest    Take daily medicines    Use quick relief inhaler every 4 hours    If you use oxygen, call you doctor to see if you should adjust your oxygen    Do breathing exercises or other things to help you relax    Let a loved one, friend or neighbor know you are feeling worse    Call your care team if you have 2 or more symptoms.  Start taking steroids or antibiotics if directed by your care team           RED ZONE       Need medical care now      Severe shortness of breath (feel you can't breathe)    Fever, chills    Not enough breath to do any activity    Trouble coughing up mucus, walking or  talking    Blood in mucus    Frequent coughing   Rescue medicines are not working    Not able to sleep because of breathing    Feel confused or drowsy    Chest pain    Actions:      Call your health care team.  If you cannot reach your care team, call 911 or go to the emergency room.        Annual Reminders:  Meet with Care Team, Flu Shot every Fall  Pharmacy:    [facility] - [place], [place] - [address]  [facility]  [facility] [place] - [address] [facility] - [place], [place] - [address] [place] [address]  [facility] [address] [facility]

## 2021-10-05 NOTE — PATIENT INSTRUCTIONS
Quitplan.org - a lot of smoking cessation resources. Free nicotine replacement options from there as well as counseling, support groups, etc.    Pick a quit date before you quit and come up with strategies of ways to entertain and occupy yourself that are not expensive.    Come back in about 1 week to the pharmacy to get your booster Covid shot.

## 2021-10-07 ENCOUNTER — TELEPHONE (OUTPATIENT)
Dept: FAMILY MEDICINE | Facility: CLINIC | Age: 66
End: 2021-10-07

## 2021-10-07 NOTE — TELEPHONE ENCOUNTER
Prior Authorization Retail Medication Request    Medication/Dose: Nicotine (NICODERM CQ) 21 MG/24HR 24 hr patch & Nicotine (COMMIT) 2 MG lozenge  ICD code (if different than what is on RX):    Previously Tried and Failed:  chantix starting anastacio; nicoderm cq; commit lozenge; nicoderm patch  Rationale:  Patient has used in the past.     Insurance Name:  Not provided  Insurance ID:  Not provided  [facility] Key: [id number]      Pharmacy Information (if different than what is on RX)  Name:    Phone:

## 2021-10-07 NOTE — TELEPHONE ENCOUNTER
Central Prior Authorization Team   Phone: [phone]      PA Initiation    Medication: Nicotine (NICODERM CQ) 21 MG/24HR 24 hr patch -Initiated  Insurance Company: [employer] - Phone [phone] Fax [phone]  Pharmacy Filling the Rx: [facility] - [place], [place] - [address] [facility]  Filling Pharmacy Phone: [phone]  Filling Pharmacy Fax:    Start Date: 10/7/2021

## 2021-10-08 NOTE — TELEPHONE ENCOUNTER
TOM Guzman for nicoderm CQ has been denied.    Denial Rational: Medicare has excluded OTC drug products from Part D coverage.    Emily DIAZ

## 2021-10-11 ENCOUNTER — TELEPHONE (OUTPATIENT)
Dept: FAMILY MEDICINE | Facility: CLINIC | Age: 66
End: 2021-10-11

## 2021-10-11 NOTE — TELEPHONE ENCOUNTER
Already tried to get PA for this and it was denied due to OTC availability.    I will go ahead and sign/close this. Thanks  Eliane Quinn MD

## 2021-10-11 NOTE — TELEPHONE ENCOUNTER
Would you prefer to order a different medication, try for a prior auth or have patient pay out of pocket? Routed to provider.  Cait Sanabria RN

## 2021-10-11 NOTE — TELEPHONE ENCOUNTER
Per fax received from [facility] NICOTINE 2MG LOZENGES is not covered by patient's Insurance Company  Dr. ESPAÑA - Please choose:  1.  Change medication that is not covered to a different medication and send new prescription to patient's pharmacy?  2.  Patient will need to pay for the non-covered medication out-of-pocket?   3.  Try for Prior Authorization with Insurance Company to get medication covered?       KEY: [id number]

## 2021-11-16 NOTE — TELEPHONE ENCOUNTER
PRIOR AUTHORIZATION DENIED    Medication: Nicotine (NICODERM CQ) 21 MG/24HR 24 hr patch -DENIED    Denial Date: 10/7/2021    Denial Rational: Medicare has excluded OTC drug products from Part D coverage.      Appeal Information:          Colchicine Counseling:  Patient counseled regarding adverse effects including but not limited to stomach upset (nausea, vomiting, stomach pain, or diarrhea).  Patient instructed to limit alcohol consumption while taking this medication.  Colchicine may reduce blood counts especially with prolonged use.  The patient understands that monitoring of kidney function and blood counts may be required, especially at baseline. The patient verbalized understanding of the proper use and possible adverse effects of colchicine.  All of the patient's questions and concerns were addressed.

## 2021-11-24 ENCOUNTER — MYC MEDICAL ADVICE (OUTPATIENT)
Dept: FAMILY MEDICINE | Facility: CLINIC | Age: 66
End: 2021-11-24
Payer: COMMERCIAL

## 2021-11-29 ENCOUNTER — IMMUNIZATION (OUTPATIENT)
Dept: FAMILY MEDICINE | Facility: CLINIC | Age: 66
End: 2021-11-29
Payer: COMMERCIAL

## 2021-11-29 PROCEDURE — 91306 COVID-19,PF,MODERNA (18+ YRS BOOSTER .25ML): CPT

## 2021-11-29 PROCEDURE — 0064A COVID-19,PF,MODERNA (18+ YRS BOOSTER .25ML): CPT

## 2021-12-26 ENCOUNTER — HEALTH MAINTENANCE LETTER (OUTPATIENT)
Age: 66
End: 2021-12-26

## 2022-01-20 ENCOUNTER — HOSPITAL ENCOUNTER (INPATIENT)
Facility: CLINIC | Age: 67
LOS: 4 days | Discharge: HOME | DRG: 177 | End: 2022-01-24
Attending: STUDENT IN AN ORGANIZED HEALTH CARE EDUCATION/TRAINING PROGRAM | Admitting: FAMILY MEDICINE
Payer: COMMERCIAL

## 2022-01-20 ENCOUNTER — APPOINTMENT (OUTPATIENT)
Dept: CT IMAGING | Facility: CLINIC | Age: 67
DRG: 177 | End: 2022-01-20
Attending: STUDENT IN AN ORGANIZED HEALTH CARE EDUCATION/TRAINING PROGRAM
Payer: COMMERCIAL

## 2022-01-20 ENCOUNTER — APPOINTMENT (OUTPATIENT)
Dept: GENERAL RADIOLOGY | Facility: CLINIC | Age: 67
DRG: 177 | End: 2022-01-20
Attending: STUDENT IN AN ORGANIZED HEALTH CARE EDUCATION/TRAINING PROGRAM
Payer: COMMERCIAL

## 2022-01-20 DIAGNOSIS — R09.02 HYPOXEMIA: ICD-10-CM

## 2022-01-20 DIAGNOSIS — R41.0 CONFUSION: ICD-10-CM

## 2022-01-20 DIAGNOSIS — J44.1 COPD EXACERBATION (H): ICD-10-CM

## 2022-01-20 DIAGNOSIS — M54.17 LUMBOSACRAL RADICULITIS: Primary | Chronic | ICD-10-CM

## 2022-01-20 DIAGNOSIS — R09.02 HYPOXIA: ICD-10-CM

## 2022-01-20 DIAGNOSIS — J34.1 MUCOCELE OF FRONTAL SINUS: ICD-10-CM

## 2022-01-20 DIAGNOSIS — U07.1 INFECTION DUE TO 2019 NOVEL CORONAVIRUS: ICD-10-CM

## 2022-01-20 DIAGNOSIS — R41.0 SUBACUTE DELIRIUM: ICD-10-CM

## 2022-01-20 DIAGNOSIS — B37.2 CANDIDIASIS OF SKIN: ICD-10-CM

## 2022-01-20 DIAGNOSIS — U07.1 COVID-19 VIRUS INFECTION: ICD-10-CM

## 2022-01-20 DIAGNOSIS — M54.16 LUMBAR RADICULOPATHY: Chronic | ICD-10-CM

## 2022-01-20 LAB
ALBUMIN SERPL-MCNC: 3.3 G/DL (ref 3.4–5)
ALBUMIN UR-MCNC: NEGATIVE MG/DL
ALP SERPL-CCNC: 81 U/L (ref 40–150)
ALT SERPL W P-5'-P-CCNC: 28 U/L (ref 0–70)
AMMONIA PLAS-SCNC: 37 UMOL/L (ref 10–50)
AMPHETAMINES UR QL SCN: ABNORMAL
ANION GAP SERPL CALCULATED.3IONS-SCNC: 3 MMOL/L (ref 3–14)
APAP SERPL-MCNC: <2 MG/L (ref 10–30)
APPEARANCE UR: CLEAR
AST SERPL W P-5'-P-CCNC: 15 U/L (ref 0–45)
BARBITURATES UR QL: ABNORMAL
BASE EXCESS BLDV CALC-SCNC: 7.5 MMOL/L (ref -7.7–1.9)
BASOPHILS # BLD AUTO: 0 10E3/UL (ref 0–0.2)
BASOPHILS NFR BLD AUTO: 0 %
BENZODIAZ UR QL: ABNORMAL
BILIRUB SERPL-MCNC: 0.4 MG/DL (ref 0.2–1.3)
BILIRUB UR QL STRIP: NEGATIVE
BUN SERPL-MCNC: 7 MG/DL (ref 7–30)
CALCIUM SERPL-MCNC: 9.3 MG/DL (ref 8.5–10.1)
CANNABINOIDS UR QL SCN: ABNORMAL
CHLORIDE BLD-SCNC: 105 MMOL/L (ref 94–109)
CO2 SERPL-SCNC: 35 MMOL/L (ref 20–32)
COCAINE UR QL: ABNORMAL
COLOR UR AUTO: YELLOW
CREAT SERPL-MCNC: 0.68 MG/DL (ref 0.66–1.25)
CRP SERPL-MCNC: 6.9 MG/L (ref 0–8)
D DIMER PPP FEU-MCNC: 0.37 UG/ML FEU (ref 0–0.5)
EOSINOPHIL # BLD AUTO: 0 10E3/UL (ref 0–0.7)
EOSINOPHIL NFR BLD AUTO: 0 %
ERYTHROCYTE [DISTWIDTH] IN BLOOD BY AUTOMATED COUNT: 13.8 % (ref 10–15)
ETHANOL SERPL-MCNC: <0.01 G/DL
FERRITIN SERPL-MCNC: 47 NG/ML (ref 26–388)
FLUAV RNA SPEC QL NAA+PROBE: NEGATIVE
FLUBV RNA RESP QL NAA+PROBE: NEGATIVE
GFR SERPL CREATININE-BSD FRML MDRD: >90 ML/MIN/1.73M2
GLUCOSE BLD-MCNC: 138 MG/DL (ref 70–99)
GLUCOSE UR STRIP-MCNC: NEGATIVE MG/DL
HCO3 BLDV-SCNC: 35 MMOL/L (ref 21–28)
HCT VFR BLD AUTO: 51.6 % (ref 40–53)
HGB BLD-MCNC: 16.8 G/DL (ref 13.3–17.7)
HGB UR QL STRIP: NEGATIVE
HOLD SPECIMEN: NORMAL
IMM GRANULOCYTES # BLD: 0 10E3/UL
IMM GRANULOCYTES NFR BLD: 0 %
INR PPP: 1.06 (ref 0.85–1.15)
KETONES UR STRIP-MCNC: NEGATIVE MG/DL
LEUKOCYTE ESTERASE UR QL STRIP: NEGATIVE
LYMPHOCYTES # BLD AUTO: 2.6 10E3/UL (ref 0.8–5.3)
LYMPHOCYTES NFR BLD AUTO: 27 %
MCH RBC QN AUTO: 31.9 PG (ref 26.5–33)
MCHC RBC AUTO-ENTMCNC: 32.6 G/DL (ref 31.5–36.5)
MCV RBC AUTO: 98 FL (ref 78–100)
MONOCYTES # BLD AUTO: 0.4 10E3/UL (ref 0–1.3)
MONOCYTES NFR BLD AUTO: 4 %
NEUTROPHILS # BLD AUTO: 6.7 10E3/UL (ref 1.6–8.3)
NEUTROPHILS NFR BLD AUTO: 69 %
NITRATE UR QL: NEGATIVE
NRBC # BLD AUTO: 0 10E3/UL
NRBC BLD AUTO-RTO: 0 /100
O2/TOTAL GAS SETTING VFR VENT: 29 %
OPIATES UR QL SCN: ABNORMAL
PCO2 BLDV: 58 MM HG (ref 40–50)
PCP UR QL SCN: ABNORMAL
PH BLDV: 7.39 [PH] (ref 7.32–7.43)
PH UR STRIP: 8 [PH] (ref 5–7)
PLATELET # BLD AUTO: 210 10E3/UL (ref 150–450)
PO2 BLDV: 44 MM HG (ref 25–47)
POTASSIUM BLD-SCNC: 3.9 MMOL/L (ref 3.4–5.3)
PROT SERPL-MCNC: 7.2 G/DL (ref 6.8–8.8)
RBC # BLD AUTO: 5.27 10E6/UL (ref 4.4–5.9)
RBC URINE: <1 /HPF
SALICYLATES SERPL-MCNC: 3 MG/DL
SARS-COV-2 RNA RESP QL NAA+PROBE: POSITIVE
SODIUM SERPL-SCNC: 143 MMOL/L (ref 133–144)
SP GR UR STRIP: 1.01 (ref 1–1.03)
TROPONIN I SERPL HS-MCNC: 14 NG/L
UROBILINOGEN UR STRIP-MCNC: NORMAL MG/DL
WBC # BLD AUTO: 9.8 10E3/UL (ref 4–11)
WBC URINE: <1 /HPF

## 2022-01-20 PROCEDURE — 85379 FIBRIN DEGRADATION QUANT: CPT | Performed by: STUDENT IN AN ORGANIZED HEALTH CARE EDUCATION/TRAINING PROGRAM

## 2022-01-20 PROCEDURE — 36415 COLL VENOUS BLD VENIPUNCTURE: CPT | Performed by: STUDENT IN AN ORGANIZED HEALTH CARE EDUCATION/TRAINING PROGRAM

## 2022-01-20 PROCEDURE — 96365 THER/PROPH/DIAG IV INF INIT: CPT | Performed by: STUDENT IN AN ORGANIZED HEALTH CARE EDUCATION/TRAINING PROGRAM

## 2022-01-20 PROCEDURE — C9803 HOPD COVID-19 SPEC COLLECT: HCPCS | Performed by: STUDENT IN AN ORGANIZED HEALTH CARE EDUCATION/TRAINING PROGRAM

## 2022-01-20 PROCEDURE — 85610 PROTHROMBIN TIME: CPT | Performed by: STUDENT IN AN ORGANIZED HEALTH CARE EDUCATION/TRAINING PROGRAM

## 2022-01-20 PROCEDURE — 99285 EMERGENCY DEPT VISIT HI MDM: CPT | Mod: 25 | Performed by: STUDENT IN AN ORGANIZED HEALTH CARE EDUCATION/TRAINING PROGRAM

## 2022-01-20 PROCEDURE — 96375 TX/PRO/DX INJ NEW DRUG ADDON: CPT | Performed by: STUDENT IN AN ORGANIZED HEALTH CARE EDUCATION/TRAINING PROGRAM

## 2022-01-20 PROCEDURE — XW033E5 INTRODUCTION OF REMDESIVIR ANTI-INFECTIVE INTO PERIPHERAL VEIN, PERCUTANEOUS APPROACH, NEW TECHNOLOGY GROUP 5: ICD-10-PCS | Performed by: STUDENT IN AN ORGANIZED HEALTH CARE EDUCATION/TRAINING PROGRAM

## 2022-01-20 PROCEDURE — 99223 1ST HOSP IP/OBS HIGH 75: CPT | Mod: AI | Performed by: FAMILY MEDICINE

## 2022-01-20 PROCEDURE — 85014 HEMATOCRIT: CPT | Performed by: STUDENT IN AN ORGANIZED HEALTH CARE EDUCATION/TRAINING PROGRAM

## 2022-01-20 PROCEDURE — 250N000009 HC RX 250: Performed by: STUDENT IN AN ORGANIZED HEALTH CARE EDUCATION/TRAINING PROGRAM

## 2022-01-20 PROCEDURE — 82140 ASSAY OF AMMONIA: CPT | Performed by: STUDENT IN AN ORGANIZED HEALTH CARE EDUCATION/TRAINING PROGRAM

## 2022-01-20 PROCEDURE — 70450 CT HEAD/BRAIN W/O DYE: CPT

## 2022-01-20 PROCEDURE — 80143 DRUG ASSAY ACETAMINOPHEN: CPT | Performed by: STUDENT IN AN ORGANIZED HEALTH CARE EDUCATION/TRAINING PROGRAM

## 2022-01-20 PROCEDURE — 80307 DRUG TEST PRSMV CHEM ANLYZR: CPT | Performed by: STUDENT IN AN ORGANIZED HEALTH CARE EDUCATION/TRAINING PROGRAM

## 2022-01-20 PROCEDURE — 93010 ELECTROCARDIOGRAM REPORT: CPT | Performed by: STUDENT IN AN ORGANIZED HEALTH CARE EDUCATION/TRAINING PROGRAM

## 2022-01-20 PROCEDURE — 250N000011 HC RX IP 250 OP 636: Performed by: STUDENT IN AN ORGANIZED HEALTH CARE EDUCATION/TRAINING PROGRAM

## 2022-01-20 PROCEDURE — 82728 ASSAY OF FERRITIN: CPT | Performed by: STUDENT IN AN ORGANIZED HEALTH CARE EDUCATION/TRAINING PROGRAM

## 2022-01-20 PROCEDURE — 81001 URINALYSIS AUTO W/SCOPE: CPT | Performed by: STUDENT IN AN ORGANIZED HEALTH CARE EDUCATION/TRAINING PROGRAM

## 2022-01-20 PROCEDURE — 87636 SARSCOV2 & INF A&B AMP PRB: CPT | Performed by: STUDENT IN AN ORGANIZED HEALTH CARE EDUCATION/TRAINING PROGRAM

## 2022-01-20 PROCEDURE — 250N000009 HC RX 250: Performed by: FAMILY MEDICINE

## 2022-01-20 PROCEDURE — 93005 ELECTROCARDIOGRAM TRACING: CPT | Performed by: STUDENT IN AN ORGANIZED HEALTH CARE EDUCATION/TRAINING PROGRAM

## 2022-01-20 PROCEDURE — 120N000001 HC R&B MED SURG/OB

## 2022-01-20 PROCEDURE — 250N000013 HC RX MED GY IP 250 OP 250 PS 637: Performed by: FAMILY MEDICINE

## 2022-01-20 PROCEDURE — 84484 ASSAY OF TROPONIN QUANT: CPT | Performed by: STUDENT IN AN ORGANIZED HEALTH CARE EDUCATION/TRAINING PROGRAM

## 2022-01-20 PROCEDURE — 71045 X-RAY EXAM CHEST 1 VIEW: CPT

## 2022-01-20 PROCEDURE — 250N000011 HC RX IP 250 OP 636: Performed by: FAMILY MEDICINE

## 2022-01-20 PROCEDURE — 82077 ASSAY SPEC XCP UR&BREATH IA: CPT | Performed by: STUDENT IN AN ORGANIZED HEALTH CARE EDUCATION/TRAINING PROGRAM

## 2022-01-20 PROCEDURE — 80053 COMPREHEN METABOLIC PANEL: CPT | Performed by: STUDENT IN AN ORGANIZED HEALTH CARE EDUCATION/TRAINING PROGRAM

## 2022-01-20 PROCEDURE — 80179 DRUG ASSAY SALICYLATE: CPT | Performed by: STUDENT IN AN ORGANIZED HEALTH CARE EDUCATION/TRAINING PROGRAM

## 2022-01-20 PROCEDURE — 86140 C-REACTIVE PROTEIN: CPT | Performed by: STUDENT IN AN ORGANIZED HEALTH CARE EDUCATION/TRAINING PROGRAM

## 2022-01-20 PROCEDURE — 82803 BLOOD GASES ANY COMBINATION: CPT | Performed by: STUDENT IN AN ORGANIZED HEALTH CARE EDUCATION/TRAINING PROGRAM

## 2022-01-20 PROCEDURE — 258N000003 HC RX IP 258 OP 636: Performed by: STUDENT IN AN ORGANIZED HEALTH CARE EDUCATION/TRAINING PROGRAM

## 2022-01-20 RX ORDER — ONDANSETRON 4 MG/1
4 TABLET, ORALLY DISINTEGRATING ORAL EVERY 6 HOURS PRN
Status: DISCONTINUED | OUTPATIENT
Start: 2022-01-20 | End: 2022-01-24 | Stop reason: HOSPADM

## 2022-01-20 RX ORDER — SIMVASTATIN 40 MG
80 TABLET ORAL DAILY
Status: DISCONTINUED | OUTPATIENT
Start: 2022-01-21 | End: 2022-01-24 | Stop reason: HOSPADM

## 2022-01-20 RX ORDER — NALOXONE HYDROCHLORIDE 0.4 MG/ML
0.2 INJECTION, SOLUTION INTRAMUSCULAR; INTRAVENOUS; SUBCUTANEOUS
Status: DISCONTINUED | OUTPATIENT
Start: 2022-01-20 | End: 2022-01-24 | Stop reason: HOSPADM

## 2022-01-20 RX ORDER — LIDOCAINE 40 MG/G
CREAM TOPICAL
Status: DISCONTINUED | OUTPATIENT
Start: 2022-01-20 | End: 2022-01-24 | Stop reason: HOSPADM

## 2022-01-20 RX ORDER — DULOXETIN HYDROCHLORIDE 30 MG/1
120 CAPSULE, DELAYED RELEASE ORAL DAILY
Status: DISCONTINUED | OUTPATIENT
Start: 2022-01-21 | End: 2022-01-20

## 2022-01-20 RX ORDER — NALOXONE HYDROCHLORIDE 0.4 MG/ML
0.4 INJECTION, SOLUTION INTRAMUSCULAR; INTRAVENOUS; SUBCUTANEOUS
Status: DISCONTINUED | OUTPATIENT
Start: 2022-01-20 | End: 2022-01-24 | Stop reason: HOSPADM

## 2022-01-20 RX ORDER — ALBUTEROL SULFATE 90 UG/1
2 AEROSOL, METERED RESPIRATORY (INHALATION) EVERY 4 HOURS PRN
Status: DISCONTINUED | OUTPATIENT
Start: 2022-01-20 | End: 2022-01-24 | Stop reason: HOSPADM

## 2022-01-20 RX ORDER — ONDANSETRON 2 MG/ML
4 INJECTION INTRAMUSCULAR; INTRAVENOUS EVERY 6 HOURS PRN
Status: DISCONTINUED | OUTPATIENT
Start: 2022-01-20 | End: 2022-01-24 | Stop reason: HOSPADM

## 2022-01-20 RX ORDER — MORPHINE SULFATE 30 MG/1
60 TABLET, FILM COATED, EXTENDED RELEASE ORAL
Status: DISCONTINUED | OUTPATIENT
Start: 2022-01-21 | End: 2022-01-24 | Stop reason: HOSPADM

## 2022-01-20 RX ORDER — DEXAMETHASONE SODIUM PHOSPHATE 10 MG/ML
6 INJECTION, SOLUTION INTRAMUSCULAR; INTRAVENOUS EVERY 24 HOURS
Status: DISCONTINUED | OUTPATIENT
Start: 2022-01-20 | End: 2022-01-20

## 2022-01-20 RX ORDER — TRAZODONE HYDROCHLORIDE 100 MG/1
200 TABLET ORAL AT BEDTIME
Status: DISCONTINUED | OUTPATIENT
Start: 2022-01-20 | End: 2022-01-23

## 2022-01-20 RX ORDER — DEXAMETHASONE SODIUM PHOSPHATE 10 MG/ML
12 INJECTION, SOLUTION INTRAMUSCULAR; INTRAVENOUS DAILY
Status: DISCONTINUED | OUTPATIENT
Start: 2022-01-20 | End: 2022-01-23

## 2022-01-20 RX ORDER — MORPHINE SULFATE 15 MG/1
30 TABLET, FILM COATED, EXTENDED RELEASE ORAL AT BEDTIME
Status: DISCONTINUED | OUTPATIENT
Start: 2022-01-20 | End: 2022-01-21

## 2022-01-20 RX ORDER — NICOTINE 21 MG/24HR
1 PATCH, TRANSDERMAL 24 HOURS TRANSDERMAL EVERY 24 HOURS
Status: DISCONTINUED | OUTPATIENT
Start: 2022-01-20 | End: 2022-01-24 | Stop reason: HOSPADM

## 2022-01-20 RX ADMIN — DEXAMETHASONE SODIUM PHOSPHATE 12 MG: 10 INJECTION, SOLUTION INTRAMUSCULAR; INTRAVENOUS at 17:03

## 2022-01-20 RX ADMIN — ENOXAPARIN SODIUM 40 MG: 100 INJECTION SUBCUTANEOUS at 20:32

## 2022-01-20 RX ADMIN — TRAZODONE HYDROCHLORIDE 200 MG: 100 TABLET ORAL at 22:22

## 2022-01-20 RX ADMIN — NICOTINE 1 PATCH: 21 PATCH, EXTENDED RELEASE TRANSDERMAL at 20:31

## 2022-01-20 RX ADMIN — SODIUM CHLORIDE 50 ML: 9 INJECTION, SOLUTION INTRAVENOUS at 17:05

## 2022-01-20 RX ADMIN — MORPHINE SULFATE 30 MG: 15 TABLET, FILM COATED, EXTENDED RELEASE ORAL at 22:22

## 2022-01-20 RX ADMIN — IPRATROPIUM BROMIDE AND ALBUTEROL 1 PUFF: 20; 100 SPRAY, METERED RESPIRATORY (INHALATION) at 22:23

## 2022-01-20 RX ADMIN — REMDESIVIR 200 MG: 100 INJECTION, POWDER, LYOPHILIZED, FOR SOLUTION INTRAVENOUS at 17:05

## 2022-01-20 ASSESSMENT — ACTIVITIES OF DAILY LIVING (ADL)
DIFFICULTY_EATING/SWALLOWING: NO
ADLS_ACUITY_SCORE: 9
DRESSING/BATHING_DIFFICULTY: NO
WALKING_OR_CLIMBING_STAIRS_DIFFICULTY: NO
DOING_ERRANDS_INDEPENDENTLY_DIFFICULTY: YES
ADLS_ACUITY_SCORE: 13
ADLS_ACUITY_SCORE: 13
TOILETING_ISSUES: NO
EQUIPMENT_CURRENTLY_USED_AT_HOME: WALKER, STANDARD
CONCENTRATING,_REMEMBERING_OR_MAKING_DECISIONS_DIFFICULTY: YES
FALL_HISTORY_WITHIN_LAST_SIX_MONTHS: NO
ADLS_ACUITY_SCORE: 13
DIFFICULTY_COMMUNICATING: NO
ADLS_ACUITY_SCORE: 9
ADLS_ACUITY_SCORE: 13
ADLS_ACUITY_SCORE: 14
ADLS_ACUITY_SCORE: 13
WEAR_GLASSES_OR_BLIND: NO

## 2022-01-20 ASSESSMENT — MIFFLIN-ST. JEOR: SCORE: 1891.38
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
## 2022-01-20 NOTE — ED TRIAGE NOTES
Patient here per EMS with low back pain, headache and reported low sats on arrival. Patient has [facility] RN who felt patient should be evaluated

## 2022-01-20 NOTE — ED NOTES
Patient's PCA called to report that patient's wife passed away yesterday. If more information needed Fior can be reached at [phone]
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
## 2022-01-21 LAB
ANION GAP SERPL CALCULATED.3IONS-SCNC: 3 MMOL/L (ref 3–14)
BASE EXCESS BLDV CALC-SCNC: 8.6 MMOL/L (ref -7.7–1.9)
BUN SERPL-MCNC: 11 MG/DL (ref 7–30)
CALCIUM SERPL-MCNC: 9.3 MG/DL (ref 8.5–10.1)
CHLORIDE BLD-SCNC: 109 MMOL/L (ref 94–109)
CO2 SERPL-SCNC: 32 MMOL/L (ref 20–32)
CREAT SERPL-MCNC: 0.62 MG/DL (ref 0.66–1.25)
CRP SERPL-MCNC: 8 MG/L (ref 0–8)
D DIMER PPP FEU-MCNC: 0.32 UG/ML FEU (ref 0–0.5)
ERYTHROCYTE [DISTWIDTH] IN BLOOD BY AUTOMATED COUNT: 13.5 % (ref 10–15)
GFR SERPL CREATININE-BSD FRML MDRD: >90 ML/MIN/1.73M2
GLUCOSE BLD-MCNC: 147 MG/DL (ref 70–99)
HCO3 BLDV-SCNC: 35 MMOL/L (ref 21–28)
HCT VFR BLD AUTO: 49.8 % (ref 40–53)
HGB BLD-MCNC: 16.8 G/DL (ref 13.3–17.7)
MCH RBC QN AUTO: 32.3 PG (ref 26.5–33)
MCHC RBC AUTO-ENTMCNC: 33.7 G/DL (ref 31.5–36.5)
MCV RBC AUTO: 96 FL (ref 78–100)
O2/TOTAL GAS SETTING VFR VENT: 32 %
PCO2 BLDV: 53 MM HG (ref 40–50)
PH BLDV: 7.43 [PH] (ref 7.32–7.43)
PLATELET # BLD AUTO: 206 10E3/UL (ref 150–450)
PO2 BLDV: 53 MM HG (ref 25–47)
POTASSIUM BLD-SCNC: 3.9 MMOL/L (ref 3.4–5.3)
RBC # BLD AUTO: 5.2 10E6/UL (ref 4.4–5.9)
SODIUM SERPL-SCNC: 144 MMOL/L (ref 133–144)
WBC # BLD AUTO: 13 10E3/UL (ref 4–11)

## 2022-01-21 PROCEDURE — 258N000003 HC RX IP 258 OP 636: Performed by: FAMILY MEDICINE

## 2022-01-21 PROCEDURE — 250N000013 HC RX MED GY IP 250 OP 250 PS 637: Performed by: HOSPITALIST

## 2022-01-21 PROCEDURE — 86140 C-REACTIVE PROTEIN: CPT | Performed by: FAMILY MEDICINE

## 2022-01-21 PROCEDURE — 85027 COMPLETE CBC AUTOMATED: CPT | Performed by: FAMILY MEDICINE

## 2022-01-21 PROCEDURE — 250N000013 HC RX MED GY IP 250 OP 250 PS 637: Performed by: INTERNAL MEDICINE

## 2022-01-21 PROCEDURE — 250N000009 HC RX 250: Performed by: FAMILY MEDICINE

## 2022-01-21 PROCEDURE — 80048 BASIC METABOLIC PNL TOTAL CA: CPT | Performed by: FAMILY MEDICINE

## 2022-01-21 PROCEDURE — 120N000001 HC R&B MED SURG/OB

## 2022-01-21 PROCEDURE — 36415 COLL VENOUS BLD VENIPUNCTURE: CPT | Performed by: FAMILY MEDICINE

## 2022-01-21 PROCEDURE — 85379 FIBRIN DEGRADATION QUANT: CPT | Performed by: FAMILY MEDICINE

## 2022-01-21 PROCEDURE — 250N000013 HC RX MED GY IP 250 OP 250 PS 637: Performed by: FAMILY MEDICINE

## 2022-01-21 PROCEDURE — 250N000011 HC RX IP 250 OP 636: Performed by: FAMILY MEDICINE

## 2022-01-21 PROCEDURE — 99232 SBSQ HOSP IP/OBS MODERATE 35: CPT | Performed by: HOSPITALIST

## 2022-01-21 PROCEDURE — 82803 BLOOD GASES ANY COMBINATION: CPT | Performed by: FAMILY MEDICINE

## 2022-01-21 RX ORDER — MORPHINE SULFATE 15 MG/1
30 TABLET, FILM COATED, EXTENDED RELEASE ORAL AT BEDTIME
Status: DISCONTINUED | OUTPATIENT
Start: 2022-01-21 | End: 2022-01-24 | Stop reason: HOSPADM

## 2022-01-21 RX ORDER — DOXYCYCLINE 100 MG/1
100 CAPSULE ORAL EVERY 12 HOURS SCHEDULED
Status: DISCONTINUED | OUTPATIENT
Start: 2022-01-21 | End: 2022-01-24 | Stop reason: HOSPADM

## 2022-01-21 RX ORDER — MORPHINE SULFATE 15 MG/1
30 TABLET, FILM COATED, EXTENDED RELEASE ORAL EVERY 12 HOURS SCHEDULED
Status: DISCONTINUED | OUTPATIENT
Start: 2022-01-21 | End: 2022-01-21

## 2022-01-21 RX ADMIN — REMDESIVIR 100 MG: 100 INJECTION, POWDER, LYOPHILIZED, FOR SOLUTION INTRAVENOUS at 19:16

## 2022-01-21 RX ADMIN — SIMVASTATIN 80 MG: 40 TABLET, FILM COATED ORAL at 08:33

## 2022-01-21 RX ADMIN — DOXYCYCLINE HYCLATE 100 MG: 100 CAPSULE ORAL at 19:14

## 2022-01-21 RX ADMIN — IPRATROPIUM BROMIDE AND ALBUTEROL 1 PUFF: 20; 100 SPRAY, METERED RESPIRATORY (INHALATION) at 16:54

## 2022-01-21 RX ADMIN — DOXYCYCLINE HYCLATE 100 MG: 100 CAPSULE ORAL at 13:22

## 2022-01-21 RX ADMIN — MORPHINE SULFATE 60 MG: 30 TABLET, FILM COATED, EXTENDED RELEASE ORAL at 08:33

## 2022-01-21 RX ADMIN — SODIUM CHLORIDE 50 ML: 9 INJECTION, SOLUTION INTRAVENOUS at 20:15

## 2022-01-21 RX ADMIN — IPRATROPIUM BROMIDE AND ALBUTEROL 1 PUFF: 20; 100 SPRAY, METERED RESPIRATORY (INHALATION) at 08:33

## 2022-01-21 RX ADMIN — IPRATROPIUM BROMIDE AND ALBUTEROL 1 PUFF: 20; 100 SPRAY, METERED RESPIRATORY (INHALATION) at 22:29

## 2022-01-21 RX ADMIN — ENOXAPARIN SODIUM 40 MG: 100 INJECTION SUBCUTANEOUS at 19:17

## 2022-01-21 RX ADMIN — MORPHINE SULFATE 30 MG: 15 TABLET, FILM COATED, EXTENDED RELEASE ORAL at 20:15

## 2022-01-21 RX ADMIN — NICOTINE 1 PATCH: 21 PATCH, EXTENDED RELEASE TRANSDERMAL at 19:16

## 2022-01-21 RX ADMIN — DEXAMETHASONE SODIUM PHOSPHATE 12 MG: 10 INJECTION, SOLUTION INTRAMUSCULAR; INTRAVENOUS at 08:27

## 2022-01-21 RX ADMIN — IPRATROPIUM BROMIDE AND ALBUTEROL 1 PUFF: 20; 100 SPRAY, METERED RESPIRATORY (INHALATION) at 13:23

## 2022-01-21 ASSESSMENT — ACTIVITIES OF DAILY LIVING (ADL)
ADLS_ACUITY_SCORE: 10
ADLS_ACUITY_SCORE: 10
ADLS_ACUITY_SCORE: 9
ADLS_ACUITY_SCORE: 10
ADLS_ACUITY_SCORE: 9
ADLS_ACUITY_SCORE: 10
ADLS_ACUITY_SCORE: 9
ADLS_ACUITY_SCORE: 9
ADLS_ACUITY_SCORE: 10
ADLS_ACUITY_SCORE: 10
ADLS_ACUITY_SCORE: 9
ADLS_ACUITY_SCORE: 9
ADLS_ACUITY_SCORE: 10

## 2022-01-21 NOTE — PROGRESS NOTES
Patient is alert today, follows commands and uses call light appropriately, however is confused and only oriented to place and person.     Spoke with PCA Katarina and son Tc who report patient is more confused than baseline which is normally just forgetful at times.    Oxygen per nasal cannula, decreased to 2L this shift with sats in mid 90s.     Patient wearing own clothing refusing to take off jeans or allow full skin assess this shift. Encouraged hygiene and offered set up or shower but patient declined.     Minimal SBA when up to bathroom or chair for meals. Alarms on for safety.

## 2022-01-21 NOTE — UTILIZATION REVIEW
"  Admission Status; Secondary Review Determination         Under the authority of the Utilization Management Committee, the utilization review process indicated a secondary review on the above patient.  The review outcome is based on review of the medical records, discussions with staff, and applying clinical experience noted on the date of the review.        (xxx)      Inpatient Status Appropriate - This patient's medical care is consistent with medical management for inpatient care and reasonable inpatient medical practice.      () Observation Status Appropriate - This patient does not meet hospital inpatient criteria and is placed in observation status. If this patient's primary payer is Medicare and was admitted as an inpatient, Condition Code 44 should be used and patient status changed to \"observation\".   () Admission Status NOT Appropriate - This patient's medical care is not consistent with medical management for Inpatient or Observation Status.          RATIONALE FOR DETERMINATION     Melquiades Morales is a 66 year old male vaccinated X3 for COVID with a history of polysubstance abuse, anxiety, chronic pain, chronic narcotic use, lumbar radiculopathy, COPD, hyperlipidemia, and depression who was admitted on 1/20/2022. He summoned EMS by activating a Lifeline button.  He was found to have altered mental status.  In the ER, he was hypoxic and positive for COVID.  He is now requiring 3 lpm supplemental oxygen to maintain saturations.  Blood work showed elevated WBC (19) but inflammatory markers were normal.  Utox positive for cannabinoids and opiates.  He is receiving IVF, IV steroids, IV remdesivir, and close clinical monitoring.  He is at increased risk for adverse events and clinical decompensation given his underlying lung disease.  It is reasonable to anticipate a hospital stay of at least 2 midnights.  IP status appropriate.       The severity of illness, intensity of service provided, expected LOS and " risk for adverse outcome make the care complex, high risk and appropriate for hospital admission.        The information on this document is developed by the utilization review team in order for the business office to ensure compliance.  This only denotes the appropriateness of proper admission status and does not reflect the quality of care rendered.         The definitions of Inpatient Status and Observation Status used in making the determination above are those provided in the CMS Coverage Manual, Chapter 1 and Chapter 6, section 70.4.      Sincerely,     Megan Torres MD  Physician Advisor   Utilization Review/ Case Management  [facility].

## 2022-01-21 NOTE — PLAN OF CARE
A&O, slow to respond, sleepy, tremulous movements. Up w/assist of 1 to bathroom void. 3L O2 via NC, sats 92-95%. Denies pain, currently resting comfortably.

## 2022-01-21 NOTE — PROGRESS NOTES
Internal Medicine Progress Note     Service Date: January 21, 2022  [facility] - Admission Date: 1/20/2022     Problems:     Patient Active Problem List   Diagnosis     BACK DISORDER NOS     Tobacco use disorder     Major depressive disorder, single episode, severe (H)     Morbid obesity (H)     Erectile dysfunction     COPD (chronic obstructive pulmonary disease) (H)     Migraine headache     Hyperlipidemia LDL goal <130     Lumbosacral radiculitis     Health Care Home     Advanced directives, counseling/discussion     Non-specific colitis     Anxiety     Chronic maxillary sinusitis     Nasal polyp     Tubular adenoma of colon     Encephalopathy     Chronic pain syndrome     Spinal stenosis in cervical region     Inverted papilloma of nasal cavity     Lumbar radiculopathy     Adenomatous colon polyp     Cocaine abuse (H)     Alcohol abuse, episodic     Cannabis abuse     Generalized anxiety disorder     Opioid type dependence (H)     Toxic encephalopathy     Acute respiratory failure with hypoxia and hypercapnia (H)     Polysubstance overdose     Bilateral dependent atelectasis     Pneumonia     Depression     Hypoxia     Dizziness     Respiratory acidosis     Person under investigation for COVID-19     Medical cannabis use     COPD exacerbation (H)     Sleep disturbance     Prediabetes     Confusion     COVID-19 virus infection       Assessments and Plans:     A 65 yo male with a h/o COPD, chronic pain, HLD, depression, anxiety, polysubstance abuse (cocaine, cannabis, nicotine, prior ETOH abuse (none recent per patient) who activated his lifeline at home for unclear reasons (patient cannot remember what symptoms he was having at that time), found to have acute respiratory failure 2/2 COVID-19 and COPD exacerbation, and AMS.    Acute hypoxic, hypercarbic respiratory failure 2/2 COVID-19 and COPD exacerbation: CXR on admission was neg for acute. COVID-19 positive on 1/20. D dimer neg and  CRP WNL.   -Cont decadron, remdesivir, combivent, prn albuterol  -Start doxycycline given COPD exacerbation  -02 prn to maintain sats >89%  -Trend CBC, CMP, CRP, d dimer    Acute metabolic and toxic encephalopathy: Likely multifactorial and related to COVID-19, hypoxia, medications (opioids, lyrica, cymbalta). EMS raised concern that patient may have taken too much of his oxycodone. Also some concern that patient may have some baseline cognitive impairment. UDS positive for opioids. UA neg for infection. Tylenol, ETOH, salicylate levels all neg. Ammonia WNL. CT head neg for acute.   -Holding oxycodone, lyrica, trazodone, cymbalta for now  -COVID-19 mgt as above  -Check TFTs, RPR, B12  -If no improvement with treatment of COVID-19 and holding above medications, and assuming labs for further AMS w/u are normal, will consider EEG/MRI brain  -OT [facility] evaluation once medically improved     Mucocele and/or nasal polyp: CT head with incidental finding of a mucocele and/or nasal polyp resulting in complete opacification and probable mild bony expansion of the left frontal sinus which was also noted on prior imaging.  -ENT evaluation as an outpatient    Chronic pain:  -Cont MS contin cautiously  -Holding oxycodone, lyrica for now as above    HLD:  -Cont statin    Nicotine dependence:  -Nicotine patch  -Encourage smoking cessation    Polysubstance abuse: H/o cocaine, opioid, ETOH abuse. No recent ETOH per patient and denies recent cocaine use. As above, EMS raised some concern that patient may have taken too much of his prescribed oxycodone.  -Encourage avoidance of illicit substances, sobriety and taking opioids only as prescribed    Anxiety, depression:  -Holding cymbalta for now as above    DVT Prophylaxis: Lovenox    Code Status: Full    Dispo: Likely home in next 2-3 days pending progress      Subjective:      Patient reports feeling okay. Denies SOB, cough, CP, abd pain, N/V/D, chills, diaphoresis or urinary symptoms.  "Per nursing, patient reports that his hand tremor is better when he uses cannabis at home. Denies ETOH intake.     Objective:      Vitals: /65   Pulse 73   Temp 98.6  F (37  C) (Oral)   Resp 17   Ht 1.753 m (5' 9\")   Wt 111.1 kg (244 lb 14.9 oz)   SpO2 96%   BMI 36.17 kg/m  Temp (24hrs), Av.9  F (37.2  C), Min:98.4  F (36.9  C), Max:99.9  F (37.7  C)    Gen: A+Ox2 (disoriented to time), no acute distress  Eyes: No scleral icterus  Neck: No JVD  ENT: MMM  Heart: RRR, clear S1S2, no rubs or gallops, no murmurs  Lungs: Reduced a/e throughout  Abdomen: Normal bowel sounds, soft, no tenderness to palpation  Extremities: No lower extremity edema  Skin: No rash  Neuro: Cranial nerves grossly intact, no focal deficits, b/l hand tremor  Psych: Appropriate affect / mood    I have reviewed all labs, imaging and other investigations.     Labs/Imaging:     Results for orders placed or performed during the hospital encounter of 22 (from the past 24 hour(s))   XR Chest Port 1 View    Narrative    CHEST ONE VIEW PORTABLE   2022 2:51 PM     HISTORY: Altered mental status.    COMPARISON: 3/22/2021.      Impression    IMPRESSION: No airspace consolidation, pneumothorax, or pleural  effusion.    BELLO CUMMINGS MD         SYSTEM ID:  [id number]   CBC with platelets   Result Value Ref Range    WBC Count 13.0 (H) 4.0 - 11.0 10e3/uL    RBC Count 5.20 4.40 - 5.90 10e6/uL    Hemoglobin 16.8 13.3 - 17.7 g/dL    Hematocrit 49.8 40.0 - 53.0 %    MCV 96 78 - 100 fL    MCH 32.3 26.5 - 33.0 pg    MCHC 33.7 31.5 - 36.5 g/dL    RDW 13.5 10.0 - 15.0 %    Platelet Count 206 150 - 450 10e3/uL   Basic metabolic panel   Result Value Ref Range    Sodium 144 133 - 144 mmol/L    Potassium 3.9 3.4 - 5.3 mmol/L    Chloride 109 94 - 109 mmol/L    Carbon Dioxide (CO2) 32 20 - 32 mmol/L    Anion Gap 3 3 - 14 mmol/L    Urea Nitrogen 11 7 - 30 mg/dL    Creatinine 0.62 (L) 0.66 - 1.25 mg/dL    Calcium 9.3 8.5 - 10.1 mg/dL    Glucose 147 " (H) 70 - 99 mg/dL    GFR Estimate >90 >60 mL/min/1.73m2   CRP inflammation   Result Value Ref Range    CRP Inflammation 8.0 0.0 - 8.0 mg/L   D dimer quantitative   Result Value Ref Range    D-Dimer Quantitative 0.32 0.00 - 0.50 ug/mL FEU    Narrative    This D-dimer assay is intended for use in conjunction with a clinical pretest probability assessment model to exclude pulmonary embolism (PE) and deep venous thrombosis (DVT) in outpatients suspected of PE or DVT. The cut-off value is 0.50 ug/mL FEU.   Blood gas venous   Result Value Ref Range    pH Venous 7.43 7.32 - 7.43    pCO2 Venous 53 (H) 40 - 50 mm Hg    pO2 Venous 53 (H) 25 - 47 mm Hg    Bicarbonate Venous 35 (H) 21 - 28 mmol/L    Base Excess/Deficit (+/-) 8.6 (H) -7.7 - 1.9 mmol/L    FIO2 32          Jessie Santos MD  January 21, 2022 2:29 PM

## 2022-01-21 NOTE — PLAN OF CARE
Alert, arouses to voice.  Flat affect.  Will answer yes or no to questions after a few requests.  Denies pain.  VSS.  Up to bathroom with SBA, voiding.  Continuous pulse on, 90-93 % on 3 lpm via NC.

## 2022-01-21 NOTE — PROGRESS NOTES
Patient arrived to med surg unit. Settled in bed. Skin not yet assessed due to shift change. Patient moderately confused and sleepy.

## 2022-01-22 LAB
ALBUMIN SERPL-MCNC: 3.1 G/DL (ref 3.4–5)
ALP SERPL-CCNC: 63 U/L (ref 40–150)
ALT SERPL W P-5'-P-CCNC: 35 U/L (ref 0–70)
ANION GAP SERPL CALCULATED.3IONS-SCNC: 6 MMOL/L (ref 3–14)
AST SERPL W P-5'-P-CCNC: 58 U/L (ref 0–45)
BILIRUB SERPL-MCNC: 0.4 MG/DL (ref 0.2–1.3)
BUN SERPL-MCNC: 15 MG/DL (ref 7–30)
CALCIUM SERPL-MCNC: 9 MG/DL (ref 8.5–10.1)
CHLORIDE BLD-SCNC: 106 MMOL/L (ref 94–109)
CO2 SERPL-SCNC: 32 MMOL/L (ref 20–32)
CREAT SERPL-MCNC: 0.67 MG/DL (ref 0.66–1.25)
CRP SERPL-MCNC: <2.9 MG/L (ref 0–8)
D DIMER PPP FEU-MCNC: 0.3 UG/ML FEU (ref 0–0.5)
ERYTHROCYTE [DISTWIDTH] IN BLOOD BY AUTOMATED COUNT: 14 % (ref 10–15)
GFR SERPL CREATININE-BSD FRML MDRD: >90 ML/MIN/1.73M2
GLUCOSE BLD-MCNC: 128 MG/DL (ref 70–99)
HCT VFR BLD AUTO: 47.2 % (ref 40–53)
HGB BLD-MCNC: 15.9 G/DL (ref 13.3–17.7)
MCH RBC QN AUTO: 31.7 PG (ref 26.5–33)
MCHC RBC AUTO-ENTMCNC: 33.7 G/DL (ref 31.5–36.5)
MCV RBC AUTO: 94 FL (ref 78–100)
PLATELET # BLD AUTO: 207 10E3/UL (ref 150–450)
POTASSIUM BLD-SCNC: 3.7 MMOL/L (ref 3.4–5.3)
PROT SERPL-MCNC: 6.6 G/DL (ref 6.8–8.8)
RBC # BLD AUTO: 5.01 10E6/UL (ref 4.4–5.9)
SODIUM SERPL-SCNC: 144 MMOL/L (ref 133–144)
T4 FREE SERPL-MCNC: 0.91 NG/DL (ref 0.76–1.46)
TSH SERPL DL<=0.005 MIU/L-ACNC: 0.3 MU/L (ref 0.4–4)
VIT B12 SERPL-MCNC: 442 PG/ML (ref 193–986)
WBC # BLD AUTO: 17.5 10E3/UL (ref 4–11)

## 2022-01-22 PROCEDURE — 250N000013 HC RX MED GY IP 250 OP 250 PS 637: Performed by: FAMILY MEDICINE

## 2022-01-22 PROCEDURE — 120N000001 HC R&B MED SURG/OB

## 2022-01-22 PROCEDURE — 84439 ASSAY OF FREE THYROXINE: CPT | Performed by: HOSPITALIST

## 2022-01-22 PROCEDURE — 82040 ASSAY OF SERUM ALBUMIN: CPT | Performed by: HOSPITALIST

## 2022-01-22 PROCEDURE — 85027 COMPLETE CBC AUTOMATED: CPT | Performed by: FAMILY MEDICINE

## 2022-01-22 PROCEDURE — 250N000009 HC RX 250: Performed by: FAMILY MEDICINE

## 2022-01-22 PROCEDURE — 84443 ASSAY THYROID STIM HORMONE: CPT | Performed by: HOSPITALIST

## 2022-01-22 PROCEDURE — 80053 COMPREHEN METABOLIC PANEL: CPT | Performed by: HOSPITALIST

## 2022-01-22 PROCEDURE — 99232 SBSQ HOSP IP/OBS MODERATE 35: CPT | Performed by: HOSPITALIST

## 2022-01-22 PROCEDURE — 250N000013 HC RX MED GY IP 250 OP 250 PS 637: Performed by: HOSPITALIST

## 2022-01-22 PROCEDURE — 36415 COLL VENOUS BLD VENIPUNCTURE: CPT | Performed by: FAMILY MEDICINE

## 2022-01-22 PROCEDURE — 258N000003 HC RX IP 258 OP 636: Performed by: FAMILY MEDICINE

## 2022-01-22 PROCEDURE — 82607 VITAMIN B-12: CPT | Performed by: HOSPITALIST

## 2022-01-22 PROCEDURE — 86592 SYPHILIS TEST NON-TREP QUAL: CPT | Performed by: HOSPITALIST

## 2022-01-22 PROCEDURE — 85379 FIBRIN DEGRADATION QUANT: CPT | Performed by: FAMILY MEDICINE

## 2022-01-22 PROCEDURE — 250N000013 HC RX MED GY IP 250 OP 250 PS 637: Performed by: INTERNAL MEDICINE

## 2022-01-22 PROCEDURE — 250N000011 HC RX IP 250 OP 636: Performed by: FAMILY MEDICINE

## 2022-01-22 PROCEDURE — 86140 C-REACTIVE PROTEIN: CPT | Performed by: FAMILY MEDICINE

## 2022-01-22 RX ORDER — LORAZEPAM 0.5 MG/1
0.5 TABLET ORAL EVERY 8 HOURS PRN
Status: DISCONTINUED | OUTPATIENT
Start: 2022-01-22 | End: 2022-01-24 | Stop reason: HOSPADM

## 2022-01-22 RX ORDER — OXYCODONE HYDROCHLORIDE 5 MG/1
10 TABLET ORAL EVERY 8 HOURS PRN
Status: DISCONTINUED | OUTPATIENT
Start: 2022-01-22 | End: 2022-01-24 | Stop reason: HOSPADM

## 2022-01-22 RX ORDER — DULOXETIN HYDROCHLORIDE 30 MG/1
120 CAPSULE, DELAYED RELEASE ORAL DAILY
Status: DISCONTINUED | OUTPATIENT
Start: 2022-01-22 | End: 2022-01-24 | Stop reason: HOSPADM

## 2022-01-22 RX ADMIN — PREGABALIN 150 MG: 100 CAPSULE ORAL at 19:01

## 2022-01-22 RX ADMIN — NICOTINE 1 PATCH: 21 PATCH, EXTENDED RELEASE TRANSDERMAL at 08:41

## 2022-01-22 RX ADMIN — DOXYCYCLINE HYCLATE 100 MG: 100 CAPSULE ORAL at 08:39

## 2022-01-22 RX ADMIN — MORPHINE SULFATE 60 MG: 30 TABLET, FILM COATED, EXTENDED RELEASE ORAL at 06:51

## 2022-01-22 RX ADMIN — SIMVASTATIN 80 MG: 40 TABLET, FILM COATED ORAL at 08:39

## 2022-01-22 RX ADMIN — IPRATROPIUM BROMIDE AND ALBUTEROL 1 PUFF: 20; 100 SPRAY, METERED RESPIRATORY (INHALATION) at 12:26

## 2022-01-22 RX ADMIN — IPRATROPIUM BROMIDE AND ALBUTEROL 1 PUFF: 20; 100 SPRAY, METERED RESPIRATORY (INHALATION) at 08:40

## 2022-01-22 RX ADMIN — SODIUM CHLORIDE 50 ML: 9 INJECTION, SOLUTION INTRAVENOUS at 21:34

## 2022-01-22 RX ADMIN — DULOXETINE HYDROCHLORIDE 120 MG: 30 CAPSULE, DELAYED RELEASE ORAL at 19:13

## 2022-01-22 RX ADMIN — DEXAMETHASONE SODIUM PHOSPHATE 12 MG: 10 INJECTION, SOLUTION INTRAMUSCULAR; INTRAVENOUS at 08:38

## 2022-01-22 RX ADMIN — OXYCODONE HYDROCHLORIDE 10 MG: 5 TABLET ORAL at 12:30

## 2022-01-22 RX ADMIN — DOXYCYCLINE HYCLATE 100 MG: 100 CAPSULE ORAL at 19:02

## 2022-01-22 RX ADMIN — ENOXAPARIN SODIUM 40 MG: 100 INJECTION SUBCUTANEOUS at 19:00

## 2022-01-22 RX ADMIN — LORAZEPAM 0.5 MG: 0.5 TABLET ORAL at 14:02

## 2022-01-22 RX ADMIN — MORPHINE SULFATE 30 MG: 15 TABLET, FILM COATED, EXTENDED RELEASE ORAL at 19:01

## 2022-01-22 RX ADMIN — PREGABALIN 150 MG: 100 CAPSULE ORAL at 11:36

## 2022-01-22 RX ADMIN — REMDESIVIR 100 MG: 100 INJECTION, POWDER, LYOPHILIZED, FOR SOLUTION INTRAVENOUS at 19:13

## 2022-01-22 ASSESSMENT — ACTIVITIES OF DAILY LIVING (ADL)
ADLS_ACUITY_SCORE: 10
ADLS_ACUITY_SCORE: 12
ADLS_ACUITY_SCORE: 10
ADLS_ACUITY_SCORE: 12
ADLS_ACUITY_SCORE: 10
ADLS_ACUITY_SCORE: 12
ADLS_ACUITY_SCORE: 10
ADLS_ACUITY_SCORE: 10
ADLS_ACUITY_SCORE: 12
ADLS_ACUITY_SCORE: 10
ADLS_ACUITY_SCORE: 10
ADLS_ACUITY_SCORE: 12
ADLS_ACUITY_SCORE: 12
ADLS_ACUITY_SCORE: 10
ADLS_ACUITY_SCORE: 10
DEPENDENT_IADLS:: CLEANING;COOKING;LAUNDRY;SHOPPING;MEAL PREPARATION;MEDICATION MANAGEMENT;MONEY MANAGEMENT
ADLS_ACUITY_SCORE: 12
ADLS_ACUITY_SCORE: 10
ADLS_ACUITY_SCORE: 12
ADLS_ACUITY_SCORE: 12
ADLS_ACUITY_SCORE: 10
ADLS_ACUITY_SCORE: 12
ADLS_ACUITY_SCORE: 12

## 2022-01-22 NOTE — PROVIDER NOTIFICATION
Paged Telemed provider [place], [facility] [email or web address]  Patient reports he is in a lot of pain, LBP, reports taking Morphine and oxycodone at baseline. Does provider want to order anything else?    Provider updated an order.

## 2022-01-22 NOTE — PROGRESS NOTES
Internal Medicine Progress Note     Service Date: 01/22/2022  [facility] - Admission Date: 1/20/2022     Problems:     Patient Active Problem List   Diagnosis     BACK DISORDER NOS     Tobacco use disorder     Major depressive disorder, single episode, severe (H)     Morbid obesity (H)     Erectile dysfunction     COPD (chronic obstructive pulmonary disease) (H)     Migraine headache     Hyperlipidemia LDL goal <130     Lumbosacral radiculitis     Health Care Home     Advanced directives, counseling/discussion     Non-specific colitis     Anxiety     Chronic maxillary sinusitis     Nasal polyp     Tubular adenoma of colon     Encephalopathy     Chronic pain syndrome     Spinal stenosis in cervical region     Inverted papilloma of nasal cavity     Lumbar radiculopathy     Adenomatous colon polyp     Cocaine abuse (H)     Alcohol abuse, episodic     Cannabis abuse     Generalized anxiety disorder     Opioid type dependence (H)     Toxic encephalopathy     Acute respiratory failure with hypoxia and hypercapnia (H)     Polysubstance overdose     Bilateral dependent atelectasis     Pneumonia     Depression     Hypoxia     Dizziness     Respiratory acidosis     Person under investigation for COVID-19     Medical cannabis use     COPD exacerbation (H)     Sleep disturbance     Prediabetes     Confusion     COVID-19 virus infection       Assessments and Plans:     A 65 yo male with a h/o COPD, chronic pain, HLD, depression, anxiety, polysubstance abuse (cocaine, cannabis, nicotine, prior ETOH abuse (none recent per patient) who activated his lifeline at home for unclear reasons (patient cannot remember what symptoms he was having at that time), found to have acute respiratory failure 2/2 COVID-19 and COPD exacerbation, and AMS.    Acute hypoxic, hypercarbic respiratory failure 2/2 COVID-19 and COPD exacerbation: CXR on admission was neg for acute. COVID-19 positive on 1/20. D dimer neg and CRP  WNL.   -Cont decadron, remdesivir, combivent, prn albuterol  -Cont doxycycline given COPD exacerbation  -02 prn to maintain sats >89%  -Trend CBC, CMP, CRP, d dimer    Acute metabolic and toxic encephalopathy: Likely multifactorial and related to COVID-19, hypoxia, medications (opioids, lyrica, cymbalta). EMS raised concern that patient may have taken too much of his oxycodone. Also some concern that patient may have some baseline cognitive impairment. UDS positive for opioids. UA neg for infection. Tylenol, ETOH, salicylate levels all neg. Ammonia and B12 WNL. TFTs as below. CT head neg for acute.   -Resume oxycodone, lyrica, cymbalta and monitor mental status  -Cont holding trazodone for now  -COVID-19 mgt as above  -F/u RPR  -OT [facility] evaluation  -Mental status improved this AM and patient now at suspected baseline    Leukocytosis: Likely 2/2 steroids. UA neg for infection. Afebrile.   -Check PCT  -BCx X 2 if spikes fever  -Trend WBC and fever curve    Mucocele and/or nasal polyp: CT head with incidental finding of a mucocele and/or nasal polyp resulting in complete opacification and probable mild bony expansion of the left frontal sinus which was also noted on prior imaging.  -ENT evaluation as an outpatient    Chronic pain:  -Cont MS contin   -Resume oxycodone, lyrica now that mental status is back to suspected baseline    Low TSH, normal FT4: Likely sick euthyroid.  -Needs repeat o/p TFTs in 4-6 weeks    HLD:  -Cont statin    Nicotine dependence:  -Nicotine patch  -Encourage smoking cessation    Polysubstance abuse: H/o cocaine, opioid, ETOH abuse. No recent ETOH per patient and denies recent cocaine use. As above, EMS raised some concern that patient may have taken too much of his prescribed oxycodone, though patient denies this.  -Encourage avoidance of illicit substances, sobriety and taking opioids only as prescribed    Anxiety, depression:  -Resume cymbalta  -Trial of low dose prn ativan     DVT  "Prophylaxis: Lovenox    Code Status: Full    Dispo: Likely home in 2 days pending progress      Subjective:      Patient reports feeling anxious. States that his chronic pain is uncontrolled off of his oxycodone and lyrica. C/o a dry cough this AM. Denies SOB, CP, abd pain, N/V/D, chills, diaphoresis or urinary symptoms. Per nursing, patient has been very anxious.     Objective:      Vitals: BP (!) 158/81 (BP Location: Left arm)   Pulse 77   Temp 97.4  F (36.3  C) (Oral)   Resp 18   Ht 1.753 m (5' 9\")   Wt 111.1 kg (244 lb 14.9 oz)   SpO2 99%   BMI 36.17 kg/m  Temp (24hrs), Av.9  F (37.2  C), Min:98.4  F (36.9  C), Max:99.9  F (37.7  C)    Gen: A+Ox3, no acute distress  Eyes: No scleral icterus  Neck: No JVD  ENT: MMM  Heart: RRR, clear S1S2, no rubs or gallops, no murmurs  Lungs: Reduced a/e throughout, no wheeze/rales/rhonchi  Abdomen: Normal bowel sounds, soft, no tenderness to palpation  Extremities: No lower extremity edema  Skin: No rash  Neuro: Cranial nerves grossly intact, no focal deficits, b/l hand tremor  Psych: Anxious    I have reviewed all labs, imaging and other investigations.     Labs/Imaging:     Results for orders placed or performed during the hospital encounter of 22 (from the past 24 hour(s))   CBC with platelets   Result Value Ref Range    WBC Count 17.5 (H) 4.0 - 11.0 10e3/uL    RBC Count 5.01 4.40 - 5.90 10e6/uL    Hemoglobin 15.9 13.3 - 17.7 g/dL    Hematocrit 47.2 40.0 - 53.0 %    MCV 94 78 - 100 fL    MCH 31.7 26.5 - 33.0 pg    MCHC 33.7 31.5 - 36.5 g/dL    RDW 14.0 10.0 - 15.0 %    Platelet Count 207 150 - 450 10e3/uL   CRP inflammation   Result Value Ref Range    CRP Inflammation <2.9 0.0 - 8.0 mg/L   D dimer quantitative   Result Value Ref Range    D-Dimer Quantitative 0.30 0.00 - 0.50 ug/mL FE    Narrative    This D-dimer assay is intended for use in conjunction with a clinical pretest probability assessment model to exclude pulmonary embolism (PE) and deep venous " thrombosis (DVT) in outpatients suspected of PE or DVT. The cut-off value is 0.50 ug/mL FEU.   Vitamin B12   Result Value Ref Range    Vitamin B12 442 193 - 986 pg/mL   TSH with free T4 reflex   Result Value Ref Range    TSH 0.30 (L) 0.40 - 4.00 mU/L   Comprehensive metabolic panel   Result Value Ref Range    Sodium 144 133 - 144 mmol/L    Potassium 3.7 3.4 - 5.3 mmol/L    Chloride 106 94 - 109 mmol/L    Carbon Dioxide (CO2) 32 20 - 32 mmol/L    Anion Gap 6 3 - 14 mmol/L    Urea Nitrogen 15 7 - 30 mg/dL    Creatinine 0.67 0.66 - 1.25 mg/dL    Calcium 9.0 8.5 - 10.1 mg/dL    Glucose 128 (H) 70 - 99 mg/dL    Alkaline Phosphatase 63 40 - 150 U/L    AST 58 (H) 0 - 45 U/L    ALT 35 0 - 70 U/L    Protein Total 6.6 (L) 6.8 - 8.8 g/dL    Albumin 3.1 (L) 3.4 - 5.0 g/dL    Bilirubin Total 0.4 0.2 - 1.3 mg/dL    GFR Estimate >90 >60 mL/min/1.73m2   T4 free   Result Value Ref Range    Free T4 0.91 0.76 - 1.46 ng/dL         Jessie Santos MD  01/22/2022 1:23 PM

## 2022-01-22 NOTE — PLAN OF CARE
"Patient alert and oriented x4 today  Up with SBA and showered this morning, tolerated well with no complaints of discomfort.  Oxygen weaned off and sats 99%RA    Patient does have periods of anxiousness with increased tremors noted during these times and reports feeling short of breath stating, \"I need oxygen.\" Patient reassured oxygens sats are 99%, encouraged to take slow deep breaths.       "

## 2022-01-22 NOTE — PLAN OF CARE
Alert to self and place, disoriented to time and situation intermittently. PIV saline locked. SBA. Chronic narcotic use at baseline for LBP. MS contin given per MAR. Patient has a resting bilateral tremors at baseline. Remdesivir and Lovenox given per MAR. Patient currently on 2L via NC maintaining SpO2 at 96%.

## 2022-01-22 NOTE — CONSULTS
Care Management Initial Consult    General Information  Assessment completed with: Patient,Caregiver, Katarina Arnett  Type of CM/SW Visit: Initial Assessment    Primary Care Provider verified and updated as needed: Yes   Readmission within the last 30 days: no previous admission in last 30 days      Reason for Consult: discharge planning  Advance Care Planning: Advance Care Planning Reviewed: no concerns identified        Communication Assessment  Patient's communication style: spoken language (English or Bilingual)    Hearing Difficulty or Deaf: yes   Wear Glasses or Blind: no    Cognitive  Cognitive/Neuro/Behavioral: .WDL except  Level of Consciousness: alert  Arousal Level: opens eyes spontaneously  Orientation: disoriented to,time  Mood/Behavior: flat affect  Best Language: 0 - No aphasia  Speech: slow    Living Environment:   People in home: alone     Current living Arrangements: mobile home      Able to return to prior arrangements: yes     Family/Social Support:  Care provided by: self,other (see comments)  Provides care for: no one  Marital Status:   Children,PCA          Description of Support System: Supportive,Involved    Support Assessment: Adequate family and caregiver support    Current Resources:   Patient receiving home care services: No     Community Resources: County Worker,PCA  Equipment currently used at home: walker, standard  Supplies currently used at home: None    Employment/Financial:  Employment Status:          Financial Concerns: No concerns identified      Lifestyle & Psychosocial Needs:  Social Determinants of Health     Tobacco Use: High Risk     Smoking Tobacco Use: Current Every Day Smoker     Smokeless Tobacco Use: Former User   Alcohol Use: Not on file   Financial Resource Strain: Not on file   Food Insecurity: Not on file   Transportation Needs: Not on file   Physical Activity: Not on file   Stress: Not on file   Social Connections: Not on file   Intimate Partner  Violence: Not on file   Depression: At risk     PHQ-2 Score: 4   Housing Stability: Not on file       Functional Status:  Prior to admission patient needed assistance:   Dependent ADLs:: Ambulation-walker  Dependent IADLs:: Cleaning,Cooking,Laundry,Shopping,Meal Preparation,Medication Management,Money Management           Values/Beliefs:  Spiritual, Cultural Beliefs, [employer]ism Practices, Values that affect care: no               Additional Information:    CM spoke briefly with patient over the phone as he was not up for having a conversation. Patient is adamant on discharging today. Patient reports that he lives alone in a mobile home. Per his reports he is independent at baseline with ADLs and IADLs. Denies having current PCA services. However according to chart review patient has a PCA and formerly [facility] . Spoke with Katarina ([phone]) at [employer]. Per Katarina she provides PCA services x5 days week, 6 hrs per day to assist in cleaning, cooking, errands and HH tasks. Spoke with patient regarding discharge planning. Patient is not interested in additional home care services. States he has his son to help him (along with PCA services).     Per Katarina, she has to contact [employer]. On Monday to see what the covid policy is. If patient decides to discharge she is unsure if she will be able to see him on Monday due to covid + status.     Patient states his son will transport at discharge.     Care Management team to follow for discharge plans.    Elizabeth Adair RN

## 2022-01-23 ENCOUNTER — APPOINTMENT (OUTPATIENT)
Dept: OCCUPATIONAL THERAPY | Facility: CLINIC | Age: 67
DRG: 177 | End: 2022-01-23
Payer: COMMERCIAL

## 2022-01-23 LAB
ALBUMIN SERPL-MCNC: 3.1 G/DL (ref 3.4–5)
ALP SERPL-CCNC: 64 U/L (ref 40–150)
ALT SERPL W P-5'-P-CCNC: 44 U/L (ref 0–70)
ANION GAP SERPL CALCULATED.3IONS-SCNC: 5 MMOL/L (ref 3–14)
AST SERPL W P-5'-P-CCNC: 63 U/L (ref 0–45)
BILIRUB SERPL-MCNC: 0.6 MG/DL (ref 0.2–1.3)
BUN SERPL-MCNC: 17 MG/DL (ref 7–30)
CALCIUM SERPL-MCNC: 8.8 MG/DL (ref 8.5–10.1)
CHLORIDE BLD-SCNC: 109 MMOL/L (ref 94–109)
CO2 SERPL-SCNC: 30 MMOL/L (ref 20–32)
CREAT SERPL-MCNC: 0.66 MG/DL (ref 0.66–1.25)
CRP SERPL-MCNC: 3.3 MG/L (ref 0–8)
D DIMER PPP FEU-MCNC: 0.3 UG/ML FEU (ref 0–0.5)
ERYTHROCYTE [DISTWIDTH] IN BLOOD BY AUTOMATED COUNT: 14 % (ref 10–15)
GFR SERPL CREATININE-BSD FRML MDRD: >90 ML/MIN/1.73M2
GLUCOSE BLD-MCNC: 122 MG/DL (ref 70–99)
HCT VFR BLD AUTO: 47.1 % (ref 40–53)
HGB BLD-MCNC: 16 G/DL (ref 13.3–17.7)
MCH RBC QN AUTO: 31.7 PG (ref 26.5–33)
MCHC RBC AUTO-ENTMCNC: 34 G/DL (ref 31.5–36.5)
MCV RBC AUTO: 94 FL (ref 78–100)
PLATELET # BLD AUTO: 224 10E3/UL (ref 150–450)
POTASSIUM BLD-SCNC: 3.7 MMOL/L (ref 3.4–5.3)
PROT SERPL-MCNC: 6.3 G/DL (ref 6.8–8.8)
RBC # BLD AUTO: 5.04 10E6/UL (ref 4.4–5.9)
SODIUM SERPL-SCNC: 144 MMOL/L (ref 133–144)
WBC # BLD AUTO: 12.9 10E3/UL (ref 4–11)

## 2022-01-23 PROCEDURE — 85027 COMPLETE CBC AUTOMATED: CPT | Performed by: FAMILY MEDICINE

## 2022-01-23 PROCEDURE — 99232 SBSQ HOSP IP/OBS MODERATE 35: CPT | Performed by: HOSPITALIST

## 2022-01-23 PROCEDURE — 250N000011 HC RX IP 250 OP 636: Performed by: FAMILY MEDICINE

## 2022-01-23 PROCEDURE — 250N000013 HC RX MED GY IP 250 OP 250 PS 637: Performed by: INTERNAL MEDICINE

## 2022-01-23 PROCEDURE — 120N000001 HC R&B MED SURG/OB

## 2022-01-23 PROCEDURE — 80053 COMPREHEN METABOLIC PANEL: CPT | Performed by: HOSPITALIST

## 2022-01-23 PROCEDURE — 86140 C-REACTIVE PROTEIN: CPT | Performed by: FAMILY MEDICINE

## 2022-01-23 PROCEDURE — 85379 FIBRIN DEGRADATION QUANT: CPT | Performed by: FAMILY MEDICINE

## 2022-01-23 PROCEDURE — 250N000013 HC RX MED GY IP 250 OP 250 PS 637: Performed by: HOSPITALIST

## 2022-01-23 PROCEDURE — 250N000013 HC RX MED GY IP 250 OP 250 PS 637: Performed by: FAMILY MEDICINE

## 2022-01-23 PROCEDURE — 36415 COLL VENOUS BLD VENIPUNCTURE: CPT | Performed by: FAMILY MEDICINE

## 2022-01-23 PROCEDURE — 97165 OT EVAL LOW COMPLEX 30 MIN: CPT | Mod: GO

## 2022-01-23 RX ORDER — TRAZODONE HYDROCHLORIDE 100 MG/1
200 TABLET ORAL
Status: DISCONTINUED | OUTPATIENT
Start: 2022-01-24 | End: 2022-01-24 | Stop reason: HOSPADM

## 2022-01-23 RX ADMIN — DEXAMETHASONE SODIUM PHOSPHATE 12 MG: 10 INJECTION, SOLUTION INTRAMUSCULAR; INTRAVENOUS at 08:27

## 2022-01-23 RX ADMIN — TRAZODONE HYDROCHLORIDE 200 MG: 100 TABLET ORAL at 23:51

## 2022-01-23 RX ADMIN — ENOXAPARIN SODIUM 40 MG: 100 INJECTION SUBCUTANEOUS at 19:26

## 2022-01-23 RX ADMIN — PREGABALIN 150 MG: 100 CAPSULE ORAL at 19:25

## 2022-01-23 RX ADMIN — MORPHINE SULFATE 60 MG: 30 TABLET, FILM COATED, EXTENDED RELEASE ORAL at 07:55

## 2022-01-23 RX ADMIN — PREGABALIN 150 MG: 100 CAPSULE ORAL at 08:32

## 2022-01-23 RX ADMIN — SIMVASTATIN 80 MG: 40 TABLET, FILM COATED ORAL at 08:33

## 2022-01-23 RX ADMIN — MORPHINE SULFATE 30 MG: 15 TABLET, FILM COATED, EXTENDED RELEASE ORAL at 19:25

## 2022-01-23 RX ADMIN — DOXYCYCLINE HYCLATE 100 MG: 100 CAPSULE ORAL at 08:32

## 2022-01-23 RX ADMIN — DOXYCYCLINE HYCLATE 100 MG: 100 CAPSULE ORAL at 19:25

## 2022-01-23 RX ADMIN — DULOXETINE HYDROCHLORIDE 120 MG: 30 CAPSULE, DELAYED RELEASE ORAL at 19:25

## 2022-01-23 ASSESSMENT — ACTIVITIES OF DAILY LIVING (ADL)
ADLS_ACUITY_SCORE: 11
ADLS_ACUITY_SCORE: 11
ADLS_ACUITY_SCORE: 12
ADLS_ACUITY_SCORE: 11
ADLS_ACUITY_SCORE: 12
ADLS_ACUITY_SCORE: 11
ADLS_ACUITY_SCORE: 12
ADLS_ACUITY_SCORE: 11
ADLS_ACUITY_SCORE: 11
ADLS_ACUITY_SCORE: 12
ADLS_ACUITY_SCORE: 12
ADLS_ACUITY_SCORE: 11
ADLS_ACUITY_SCORE: 11
ADLS_ACUITY_SCORE: 12
ADLS_ACUITY_SCORE: 11
ADLS_ACUITY_SCORE: 11
ADLS_ACUITY_SCORE: 12

## 2022-01-23 NOTE — PROGRESS NOTES
Internal Medicine Progress Note     Service Date: 01/23/2022  [facility] - Admission Date: 1/20/2022     Problems:     Patient Active Problem List   Diagnosis     BACK DISORDER NOS     Tobacco use disorder     Major depressive disorder, single episode, severe (H)     Morbid obesity (H)     Erectile dysfunction     COPD (chronic obstructive pulmonary disease) (H)     Migraine headache     Hyperlipidemia LDL goal <130     Lumbosacral radiculitis     Health Care Home     Advanced directives, counseling/discussion     Non-specific colitis     Anxiety     Chronic maxillary sinusitis     Nasal polyp     Tubular adenoma of colon     Encephalopathy     Chronic pain syndrome     Spinal stenosis in cervical region     Inverted papilloma of nasal cavity     Lumbar radiculopathy     Adenomatous colon polyp     Cocaine abuse (H)     Alcohol abuse, episodic     Cannabis abuse     Generalized anxiety disorder     Opioid type dependence (H)     Toxic encephalopathy     Acute respiratory failure with hypoxia and hypercapnia (H)     Polysubstance overdose     Bilateral dependent atelectasis     Pneumonia     Depression     Hypoxia     Dizziness     Respiratory acidosis     Person under investigation for COVID-19     Medical cannabis use     COPD exacerbation (H)     Sleep disturbance     Prediabetes     Confusion     COVID-19 virus infection       Assessments and Plans:     A 67 yo male with a h/o COPD, chronic pain, HLD, depression, anxiety, polysubstance abuse (cocaine, cannabis, nicotine, prior ETOH abuse (none recent per patient) who activated his lifeline at home for unclear reasons (patient cannot remember what symptoms he was having at that time), found to have acute respiratory failure 2/2 COVID-19 and COPD exacerbation, and AMS.    Acute hypoxic, hypercarbic respiratory failure 2/2 COVID-19 and COPD exacerbation: CXR on admission was neg for acute. COVID-19 positive on 1/20. D dimer neg and CRP  WNL. Weaned off supplemental oxygen.  -Cont decadron (change to po and reduce to 6mg daily), remdesivir, combivent, prn albuterol  -Cont doxycycline given COPD exacerbation  -02 prn to maintain sats >89%  -Trend CBC, CMP     Acute metabolic and toxic encephalopathy: Likely multifactorial and related to COVID-19, hypoxia, medications (opioids, lyrica, cymbalta). EMS raised concern that patient may have taken too much of his oxycodone. Also some concern that patient may have some baseline cognitive impairment. UDS positive for opioids. UA neg for infection. Tylenol, ETOH, salicylate levels all neg. Ammonia and B12 WNL. TFTs as below. CT head neg for acute.   -Cont holding trazodone for now  -COVID-19 mgt as above  -F/u RPR  -OT SLUMS evaluation  -Mental status improved to suspected baseline    Leukocytosis: Likely 2/2 steroids. UA neg for infection. Afebrile. Leukocytosis improving.   -F/u PCT  -BCx X 2 if spikes fever  -Trend WBC and fever curve    Mucocele and/or nasal polyp: CT head with incidental finding of a mucocele and/or nasal polyp resulting in complete opacification and probable mild bony expansion of the left frontal sinus which was also noted on prior imaging.  -ENT evaluation as an outpatient    Chronic pain:  -Cont MS contin, lyrica, prn oxycodone     Low TSH, normal FT4: Likely sick euthyroid.  -Needs repeat o/p TFTs in 4-6 weeks    HLD:  -Cont statin    Nicotine dependence:  -Nicotine patch  -Encourage smoking cessation    Polysubstance abuse: H/o cocaine, opioid, ETOH abuse. No recent ETOH per patient and denies recent cocaine use. As above, EMS raised some concern that patient may have taken too much of his prescribed oxycodone, though patient denies this.  -Encourage avoidance of illicit substances, sobriety and taking opioids only as prescribed    Anxiety, depression:  -Cont cymbalta, prn ativan     DVT Prophylaxis: Lovenox    Code Status: Full    Dispo: Likely home tmrw if PCA able to come to  "patient's house given COVID-19, will not know if this is possible until tmrw. If PCA will not be able to come to patient's home, may need to pursue TCU as home discharge would be unsafe w/o caregiver.       Subjective:      Patient reports feeling okay this AM. Chronic pain is now controlled after PTA meds restarted. Dry cough is resolving. Denies SOB, CP, abd pain, N/V/D, chills, diaphoresis or urinary symptoms.     Objective:      Vitals: BP (!) 153/87 (BP Location: Left arm)   Pulse 78   Temp 97.6  F (36.4  C) (Oral)   Resp 18   Ht 1.753 m (5' 9\")   Wt 111.1 kg (244 lb 14.9 oz)   SpO2 93%   BMI 36.17 kg/m  Temp (24hrs), Av.9  F (37.2  C), Min:98.4  F (36.9  C), Max:99.9  F (37.7  C)    Gen: A+Ox3, no acute distress  Eyes: No scleral icterus  Neck: No JVD  ENT: MMM  Heart: RRR, clear S1S2, no rubs or gallops, no murmurs  Lungs: Reduced a/e throughout, no wheeze/rales/rhonchi  Abdomen: Normal bowel sounds, soft, no tenderness to palpation  Extremities: No lower extremity edema  Skin: No rash  Neuro: Cranial nerves grossly intact, no focal deficits, b/l hand tremor  Psych: Anxious    I have reviewed all labs, imaging and other investigations.     Labs/Imaging:     Results for orders placed or performed during the hospital encounter of 22 (from the past 24 hour(s))   Care Management / Social Work IP Consult    Narrative    Elizabeth Adair, RN     2022  2:30 PM  Care Management Initial Consult    General Information  Assessment completed with: Patient,Caregiver, Melquiades Katarina  Type of CM/SW Visit: Initial Assessment    Primary Care Provider verified and updated as needed: Yes   Readmission within the last 30 days: no previous admission in   last 30 days      Reason for Consult: discharge planning  Advance Care Planning: Advance Care Planning Reviewed: no   concerns identified        Communication Assessment  Patient's communication style: spoken language (English or   Bilingual)    Hearing " Difficulty or Deaf: yes   Wear Glasses or Blind: no    Cognitive  Cognitive/Neuro/Behavioral: .WDL except  Level of Consciousness:   alert  Arousal Level: opens eyes spontaneously  Orientation:   disoriented to,time  Mood/Behavior: flat affect  Best Language: 0   - No aphasia  Speech: slow    Living Environment:   People in home: alone     Current living Arrangements: mobile home      Able to return to prior arrangements: yes     Family/Social Support:  Care provided by: self,other (see comments)  Provides care for: no one  Marital Status:   Children,PCA          Description of Support System: Supportive,Involved    Support Assessment: Adequate family and caregiver support    Current Resources:   Patient receiving home care services: No     Community Resources: County Worker,PCA  Equipment currently used at home: walker, standard  Supplies currently used at home: None    Employment/Financial:  Employment Status:          Financial Concerns: No concerns identified      Lifestyle & Psychosocial Needs:  Social Determinants of Health     Tobacco Use: High Risk     Smoking Tobacco Use: Current Every Day Smoker     Smokeless Tobacco Use: Former User   Alcohol Use: Not on file   Financial Resource Strain: Not on file   Food Insecurity: Not on file   Transportation Needs: Not on file   Physical Activity: Not on file   Stress: Not on file   Social Connections: Not on file   Intimate Partner Violence: Not on file   Depression: At risk     PHQ-2 Score: 4   Housing Stability: Not on file       Functional Status:  Prior to admission patient needed assistance:   Dependent ADLs:: Ambulation-walker  Dependent IADLs:: Cleaning,Cooking,Laundry,Shopping,Meal   Preparation,Medication Management,Money Management           Values/Beliefs:  Spiritual, Cultural Beliefs, Sabianism Practices, Values that   affect care: no               Additional Information:    CM spoke briefly with patient over the phone as he was not up for    having a conversation. Patient is adamant on discharging today.   Patient reports that he lives alone in a mobile home. Per his   reports he is independent at baseline with ADLs and IADLs. Denies   having current PCA services. However according to chart review   patient has a PCA and [facility] . Spoke with Katarina   ([phone]) at [facility]. Per Katarina she provides PCA   services x5 days week, 6 hrs per day to assist in cleaning,   cooking, errands and HH tasks. Spoke with patient regarding   discharge planning. Patient is not interested in additional home   care services. States he has his son to help him (along with PCA   services).     Per Katarina, she has to contact [employer]. On Monday to   see what the covid policy is. If patient decides to discharge she   is unsure if she will be able to see him on Monday due to covid +   status.     Patient states his son will transport at discharge.     Care Management team to follow for discharge plans.    Elizabeth Adair RN      CBC with platelets   Result Value Ref Range    WBC Count 12.9 (H) 4.0 - 11.0 10e3/uL    RBC Count 5.04 4.40 - 5.90 10e6/uL    Hemoglobin 16.0 13.3 - 17.7 g/dL    Hematocrit 47.1 40.0 - 53.0 %    MCV 94 78 - 100 fL    MCH 31.7 26.5 - 33.0 pg    MCHC 34.0 31.5 - 36.5 g/dL    RDW 14.0 10.0 - 15.0 %    Platelet Count 224 150 - 450 10e3/uL   CRP inflammation   Result Value Ref Range    CRP Inflammation 3.3 0.0 - 8.0 mg/L   D dimer quantitative   Result Value Ref Range    D-Dimer Quantitative 0.30 0.00 - 0.50 ug/mL FEU    Narrative    This D-dimer assay is intended for use in conjunction with a clinical pretest probability assessment model to exclude pulmonary embolism (PE) and deep venous thrombosis (DVT) in outpatients suspected of PE or DVT. The cut-off value is 0.50 ug/mL FEU.   Comprehensive metabolic panel   Result Value Ref Range    Sodium 144 133 - 144 mmol/L    Potassium 3.7 3.4 - 5.3 mmol/L    Chloride 109 94 - 109  mmol/L    Carbon Dioxide (CO2) 30 20 - 32 mmol/L    Anion Gap 5 3 - 14 mmol/L    Urea Nitrogen 17 7 - 30 mg/dL    Creatinine 0.66 0.66 - 1.25 mg/dL    Calcium 8.8 8.5 - 10.1 mg/dL    Glucose 122 (H) 70 - 99 mg/dL    Alkaline Phosphatase 64 40 - 150 U/L    AST 63 (H) 0 - 45 U/L    ALT 44 0 - 70 U/L    Protein Total 6.3 (L) 6.8 - 8.8 g/dL    Albumin 3.1 (L) 3.4 - 5.0 g/dL    Bilirubin Total 0.6 0.2 - 1.3 mg/dL    GFR Estimate >90 >60 mL/min/1.73m2         Jessie Santos MD  01/23/2022 10:49 AM

## 2022-01-23 NOTE — PROGRESS NOTES
Patient is alert, oriented.  Up with stand by assist.  Pain controlled with scheduled medications.  BUE tremors noted.  Sats 93% on room air.  Denies SOA.

## 2022-01-23 NOTE — PROGRESS NOTES
Patient is turning off bed alarm independently. Educated patient on the importance of bed alarms and advised patient to please not turn off his bed alarm. Patients response is flat, unable to determine if education successful. Will re approach at a later time if needed.

## 2022-01-23 NOTE — PROGRESS NOTES
NOTE: OT order for SLUMS cognitive screen. Pt scored 18/30 indicating dementia level impairment. Pt demo's difficulty with attention/focus, delayed recall, numeric calculation, visual spatial, and executive function skills. Pt demonstrates insight regarding cognitive deficits and verbalizes that he knows he needs assistance with higher level tasks. Pt has PCA services 5 days/wk for 6 hrs day. PCA assists with laundry, housecleaning, meal prep, finances, med management, and bathing. Pt appears at baseline with ADLs/cognition. No further IP OT warranted. Recommend he dc home when medically ready, resume prior services.        01/23/22 1300   Quick Adds   Type of Visit Initial Occupational Therapy Evaluation       Present no   Living Environment   People in home alone   Current Living Arrangements mobile home   Home Accessibility no concerns   Transportation Anticipated family or friend will provide   Living Environment Comments Lives in mobile home, steps to enter    Self-Care   Usual Activity Tolerance moderate   Current Activity Tolerance fair   Equipment Currently Used at Home walker, standard   Activity/Exercise/Self-Care Comment Has PCA services 5 days a week for 6 hrs/day to assist with laundry, housekeeping, meal prep, showers, finances, and errands. Ind dressing, toileting, g/h, simple meal prep.    Instrumental Activities of Daily Living (IADL)   IADL Comments relies on PCA/[facility]    Disability/Function   Hearing Difficulty or Deaf yes   Describe hearing loss bilateral hearing loss   Use of hearing assistive devices bilateral hearing aids   Were auxiliary aids offered? yes   Wear Glasses or Blind no   Concentrating, Remembering or Making Decisions Difficulty yes   Difficulty Communicating no   Difficulty Eating/Swallowing no   Walking or Climbing Stairs Difficulty no   Dressing/Bathing Difficulty no   Toileting issues no   Doing Errands Independently Difficulty (such as  shopping) yes   Errands Management PCA   Fall history within last six months no   Change in Functional Status Since Onset of Current Illness/Injury no   General Information   Onset of Illness/Injury or Date of Surgery 01/20/22   Referring Physician Dr. Sanots   Patient/Family Therapy Goal Statement (OT) To go home tomorrow    Additional Occupational Profile Info/Pertinent History of Current Problem A 67 yo male with a h/o COPD, chronic pain, HLD, depression, anxiety, polysubstance abuse (cocaine, cannabis, nicotine, prior ETOH abuse (none recent per patient) who activated his lifeline at home for unclear reasons (patient cannot remember what symptoms he was having at that time), found to have acute respiratory failure 2/2 COVID-19 and COPD exacerbation, and AMS.   Existing Precautions/Restrictions fall  (COVID isolation )   Limitations/Impairments safety/cognitive;hearing   Heart Disease Risk Factors Lack of physical activity;Overweight;Medical history;Gender;Age   Cognitive Status Examination   Orientation Status orientation to person, place and time   Affect/Mental Status (Cognitive) WNL   Safety Deficit minimal deficit   Memory Deficit moderate deficit   Attention Deficit moderate deficit   Executive Function Deficit moderate deficit   Cognitive Status Comments SLUMS 18/30 indicates dementia level impairment    Visual Perception   Visual Impairment/Limitations WNL   Pain Assessment   Patient Currently in Pain No   Range of Motion Comprehensive   General Range of Motion no range of motion deficits identified   Strength Comprehensive (MMT)   General Manual Muscle Testing (MMT) Assessment no strength deficits identified   Coordination   Coordination Comments BUE tremors noted    Bed Mobility   Bed Mobility No deficits identified   Clinical Impression   Criteria for Skilled Therapeutic Interventions Met (OT) no problems identified which require skilled intervention   OT Diagnosis decreased ind d/t covid, now at  functional baseline    OT Problem List-Impairments impacting ADL cognition;activity tolerance impaired   ADL comments/analysis appears at or near baseline    Assessment of Occupational Performance 1-3 Performance Deficits   Identified Performance Deficits safety with IADLs d/t cognitive deficits    Clinical Decision Making Complexity (OT) low complexity   Therapy Frequency (OT) 1x eval   Risk & Benefits of therapy have been explained evaluation/treatment results reviewed;participants voiced agreement with care plan;participants included;patient   Comment-Clinical Impression OT order for SLUMS cognitive screen. Pt scored 18/30 indicating dementia level impairment. Pt demo's difficulty with attention/focus, delayed recall, numeric calculation, visual spatial, and executive function skills. Pt demonstrates insight regarding cognitive deficits and verbalizes that he knows he needs assistance with higher level tasks. Pt has PCA services 5 days/wk for 6 hrs day. PCA assists with laundry, housecleaning, meal prep, finances, med management, and bathing. Pt appears at baseline with ADLs/cognition. No further IP OT warranted. Recommend he dc home when medically ready, resume prior services.    OT Discharge Planning    OT Discharge Recommendation (DC Rec) Home with assist   OT Rationale for DC Rec OT order for SLUMS cognitive screen. Pt scored 18/30 indicating dementia level impairment. Pt demo's difficulty with attention/focus, delayed recall, numeric calculation, visual spatial, and executive function skills. Pt demonstrates insight regarding cognitive deficits and verbalizes that he knows he needs assistance with higher level tasks. Pt has PCA services 5 days/wk for 6 hrs day. PCA assists with laundry, housecleaning, meal prep, finances, med management, and bathing. Pt appears at baseline with ADLs/cognition. No further IP OT warranted. Recommend he dc home when medically ready, resume prior services.    Total Evaluation  Time (Minutes)   Total Evaluation Time (Minutes) 25

## 2022-01-23 NOTE — PROVIDER NOTIFICATION
Patient has declined Combivent inhaler and has refused Remdesivir dose, explained and educated patient on the importance of Remdesivir for Covid-19, patient declines. Pharmacy and provider, Tom updated.

## 2022-01-23 NOTE — PLAN OF CARE
A&O x4 with occasional forgetfulness. Saline locked. SBA, bed alarms on. Patient declined Combivent inhaler this shift both doses. Patient continues on RA this shift and SpO2 maintaining around 99-94%.

## 2022-01-24 VITALS
SYSTOLIC BLOOD PRESSURE: 141 MMHG | BODY MASS INDEX: 36.28 KG/M2 | HEIGHT: 69 IN | TEMPERATURE: 98.4 F | RESPIRATION RATE: 18 BRPM | HEART RATE: 70 BPM | WEIGHT: 244.93 LBS | OXYGEN SATURATION: 93 % | DIASTOLIC BLOOD PRESSURE: 78 MMHG

## 2022-01-24 LAB
ALBUMIN SERPL-MCNC: 3 G/DL (ref 3.4–5)
ALP SERPL-CCNC: 59 U/L (ref 40–150)
ALT SERPL W P-5'-P-CCNC: 42 U/L (ref 0–70)
ANION GAP SERPL CALCULATED.3IONS-SCNC: 4 MMOL/L (ref 3–14)
AST SERPL W P-5'-P-CCNC: 33 U/L (ref 0–45)
BILIRUB SERPL-MCNC: 0.8 MG/DL (ref 0.2–1.3)
BUN SERPL-MCNC: 24 MG/DL (ref 7–30)
CALCIUM SERPL-MCNC: 8.6 MG/DL (ref 8.5–10.1)
CHLORIDE BLD-SCNC: 106 MMOL/L (ref 94–109)
CO2 SERPL-SCNC: 29 MMOL/L (ref 20–32)
CREAT SERPL-MCNC: 0.67 MG/DL (ref 0.66–1.25)
ERYTHROCYTE [DISTWIDTH] IN BLOOD BY AUTOMATED COUNT: 13.5 % (ref 10–15)
GFR SERPL CREATININE-BSD FRML MDRD: >90 ML/MIN/1.73M2
GLUCOSE BLD-MCNC: 110 MG/DL (ref 70–99)
HCT VFR BLD AUTO: 47.2 % (ref 40–53)
HGB BLD-MCNC: 16 G/DL (ref 13.3–17.7)
MCH RBC QN AUTO: 31.7 PG (ref 26.5–33)
MCHC RBC AUTO-ENTMCNC: 33.9 G/DL (ref 31.5–36.5)
MCV RBC AUTO: 94 FL (ref 78–100)
PLATELET # BLD AUTO: 195 10E3/UL (ref 150–450)
POTASSIUM BLD-SCNC: 3.4 MMOL/L (ref 3.4–5.3)
PROCALCITONIN SERPL-MCNC: <0.05 NG/ML
PROT SERPL-MCNC: 6.2 G/DL (ref 6.8–8.8)
RBC # BLD AUTO: 5.05 10E6/UL (ref 4.4–5.9)
RPR SER QL: NONREACTIVE
SODIUM SERPL-SCNC: 139 MMOL/L (ref 133–144)
WBC # BLD AUTO: 12.1 10E3/UL (ref 4–11)

## 2022-01-24 PROCEDURE — 82040 ASSAY OF SERUM ALBUMIN: CPT | Performed by: HOSPITALIST

## 2022-01-24 PROCEDURE — 250N000013 HC RX MED GY IP 250 OP 250 PS 637: Performed by: FAMILY MEDICINE

## 2022-01-24 PROCEDURE — 36415 COLL VENOUS BLD VENIPUNCTURE: CPT | Performed by: HOSPITALIST

## 2022-01-24 PROCEDURE — 250N000011 HC RX IP 250 OP 636: Performed by: HOSPITALIST

## 2022-01-24 PROCEDURE — 84145 PROCALCITONIN (PCT): CPT | Performed by: HOSPITALIST

## 2022-01-24 PROCEDURE — 99239 HOSP IP/OBS DSCHRG MGMT >30: CPT | Performed by: HOSPITALIST

## 2022-01-24 PROCEDURE — 85027 COMPLETE CBC AUTOMATED: CPT | Performed by: FAMILY MEDICINE

## 2022-01-24 PROCEDURE — 80053 COMPREHEN METABOLIC PANEL: CPT | Performed by: HOSPITALIST

## 2022-01-24 PROCEDURE — 250N000013 HC RX MED GY IP 250 OP 250 PS 637: Performed by: HOSPITALIST

## 2022-01-24 RX ORDER — DEXAMETHASONE 6 MG/1
6 TABLET ORAL DAILY
Qty: 6 TABLET | Refills: 0 | Status: SHIPPED | OUTPATIENT
Start: 2022-01-25 | End: 2022-03-11

## 2022-01-24 RX ORDER — DOXYCYCLINE 100 MG/1
100 CAPSULE ORAL EVERY 12 HOURS
Qty: 3 CAPSULE | Refills: 0 | Status: SHIPPED | OUTPATIENT
Start: 2022-01-24 | End: 2022-01-26

## 2022-01-24 RX ADMIN — MORPHINE SULFATE 60 MG: 30 TABLET, FILM COATED, EXTENDED RELEASE ORAL at 08:03

## 2022-01-24 RX ADMIN — DEXAMETHASONE 6 MG: 2 TABLET ORAL at 08:03

## 2022-01-24 RX ADMIN — DOXYCYCLINE HYCLATE 100 MG: 100 CAPSULE ORAL at 08:03

## 2022-01-24 RX ADMIN — PREGABALIN 150 MG: 100 CAPSULE ORAL at 08:03

## 2022-01-24 RX ADMIN — MICONAZOLE NITRATE: 2 POWDER TOPICAL at 08:05

## 2022-01-24 RX ADMIN — SIMVASTATIN 80 MG: 40 TABLET, FILM COATED ORAL at 08:02

## 2022-01-24 ASSESSMENT — ACTIVITIES OF DAILY LIVING (ADL)
ADLS_ACUITY_SCORE: 11

## 2022-01-24 NOTE — PLAN OF CARE
[place] [facility] DISCHARGE NOTE    Patient discharged to home at 12:17 PM via wheel chair. Accompanied by other:PCA and staff. Discharge instructions reviewed with patient, opportunity offered to ask questions. Prescriptions sent to patients preferred pharmacy. All belongings sent with patient.    Diana Castellon RN

## 2022-01-24 NOTE — PROGRESS NOTES
Care Management Discharge Note    Discharge Date: 01/24/2022       Discharge Disposition: Home    Discharge Services: PCA, County Worker    Discharge DME: None    Discharge Transportation: family or friend will provide    Private pay costs discussed: Not applicable    PAS Confirmation Code:  NA  Patient/family educated on Medicare website which has current facility and service quality ratings:  NA    Education Provided on the Discharge Plan:  Yes  Persons Notified of Discharge Plans: Patient, Katarina-PCA via [facility]  Patient/Family in Agreement with the Plan: yes    Handoff Referral Completed: Yes    Additional Information:  Per IDT rounds today, MD team states that pt is medically stable for discharge today.  Pt will return to home with resumed cares via [facility] for PCA services 5 days/week at 6 hours/day.  Katarina is his PCA.  DILAN called & spoke directly with Katarina & she said she would be able to transport pt home today around 12:30-1PM.    Katarina states that when she is not caring for the pt, he is alone in his mobile home, does have some support from his children, but not much.      DILAN updated MD and then spoke with pt.  Pt shared that he is glad to be discharging home & is aware that Katarina will transport home today.    KAROLINA Waldrop

## 2022-01-24 NOTE — PLAN OF CARE
A&O x4. Saline locked. Independent in the room. Stands at bedside to void in urinal. Patient on RA. Lung sounds diminished throughout but clear. Patient declined Remdesivir and Combivent inhalers this shift. Patient quiet and flat, calls appropriately.

## 2022-01-24 NOTE — DISCHARGE SUMMARY
Internal Medicine Discharge Summary     Name: Melquiades Morales  YOB: 1955 (Age: 66 year old)  [facility]  Admitting physician: Rey Clark MD   Discharging Physician: Jessie Santos MD   Date of Admission: 2022  Date of Discharge: 2022     Primary Discharge Diagnosis:      Acute hypoxic, hypercarbic respiratory failure 2/2 COVID-19 and COPD exacerbation  Acute metabolic and toxic encephalopathy     HPI (per admitting physician):      Melquiades Morales is a 66 year old male admitted on 2022. He has a history of polysubstance overdose, anxiety, chronic pain, chronic narcotic use, lumbar radiculopathy, alcohol cocaine and cannabis abuse, COPD, hyperlipidemia and depression.     Details are very unclear.  Patient is unable to give me any pertinent information.  I was told by the emergency room physician that he activated life alert.  EMS was summoned and he was found in his home.  He had altered mental status and ultimately was hypoxic.  He reports that his wife  either yesterday or today.  Tells me that she did not live with him.  He told the ER physician that she  of COPD.  He told me that she  of COVID.     In the emergency room he was hypoxic and COVID-positive.  His chest x-ray was negative.  He had altered mental status.  It was unclear if this was from COVID or if he had altered mental status from his medications.     When I saw him he was unable to give me any consistent information.  He would answer repeated questions in different fashions.  He was somewhat lethargic.     Hospital Course by Main Problems:      A 67 yo male with a h/o COPD, chronic pain, HLD, depression, anxiety, polysubstance abuse (cocaine, cannabis, nicotine, prior ETOH abuse (none recent per patient) who activated his lifeline at home for unclear reasons (patient cannot remember what symptoms he was having at that time), found to have acute respiratory failure  2/2 COVID-19 and COPD exacerbation, and AMS.     Acute hypoxic, hypercarbic respiratory failure 2/2 COVID-19 and COPD exacerbation: CXR on admission was neg for acute. COVID-19 positive on 1/20. D dimer neg and CRP WNL. The patient was started on decadron, doxycycline and remdesivir on admission. The patient received a couple doses of remdesivir, then refused to complete the course of same. He will complete the course of doxycycline and decadron at discharge. The patient was educated on the need to quarantine at home until 1/31/22. He was not willing to be started on AC for DVT ppx, but is likely low risk for thromboembolism given his d dimer has been negative on serial checks.      Acute metabolic and toxic encephalopathy: Likely multifactorial and related to COVID-19, hypoxia, medications (opioids, lyrica, cymbalta, trazodone). EMS raised concern that patient may have taken too much of his oxycodone as below, though patient denied this. UDS was positive for opioids. UA was neg for infection. Tylenol, ETOH, salicylate levels all neg. Ammonia and B12 WNL. RPR non-reactive. TFTs as below. CT head was neg for acute. The patient's PTA trazodone, cymbata and oxycodone were initially held on admission and gradually resumed once patient's mental status improved to his baseline. OT evaluated the patient and SLUMS was 18/30, consistent with a degree of cognitive impairment / dementia. The patient's mental status was at his suspected baseline at the time of discharge home.       Leukocytosis: Likely 2/2 steroids. UA neg for infection. Afebrile during this admission. Leukocytosis was improving spontaneously prior to discharge home.      Mucocele and/or nasal polyp: CT head with incidental finding of a mucocele and/or nasal polyp resulting in complete opacification and probable mild bony expansion of the left frontal sinus which was also noted on prior imaging. The patient was referred for o/p ENT evaluation.      Chronic  "pain: Cont MS contin, lyrica, prn oxycodone as PTA     Low TSH, normal FT4: Likely sick euthyroid. The patient needs repeat o/p TFTs in 4-6 weeks with his PCP.      HLD: Cont statin     Nicotine dependence: Cont nicotine replacement as PTA. The patient was strongly encouraged to stop smoking.      Polysubstance abuse: H/o cocaine, opioid, ETOH abuse. No recent ETOH per patient and he denied recent cocaine use. As above, EMS raised some concern that patient may have taken too much of his prescribed oxycodone, though patient denied this. The patient was strongly encouraged to only take opioids only as prescribed, to avoid illicit substances and to maintain sobriety.      Anxiety, depression: Cont cymbalta    Discharge Exam:     BP (!) 141/78 (BP Location: Right arm)   Pulse 70   Temp 98.4  F (36.9  C) (Oral)   Resp 18   Ht 1.753 m (5' 9\")   Wt 111.1 kg (244 lb 14.9 oz)   SpO2 93%   BMI 36.17 kg/m    Gen: A+Ox3, no acute distress  HEENT: No scleral icterus, no conjunctival pallor, no JVD  CVS: RRR, clear S1S2, no murmurs  Resp: Reduced a/e throughout  Abdomen: Pos bowel sounds, soft, no tenderness to palpation  Extremities: No lower extremity edema  Neuology: CN II - XII grossly intact, no focal deficits    Discharge/Recent Laboratory Results:     Results for orders placed or performed during the hospital encounter of 01/20/22 (from the past 24 hour(s))   CBC with platelets   Result Value Ref Range    WBC Count 12.1 (H) 4.0 - 11.0 10e3/uL    RBC Count 5.05 4.40 - 5.90 10e6/uL    Hemoglobin 16.0 13.3 - 17.7 g/dL    Hematocrit 47.2 40.0 - 53.0 %    MCV 94 78 - 100 fL    MCH 31.7 26.5 - 33.0 pg    MCHC 33.9 31.5 - 36.5 g/dL    RDW 13.5 10.0 - 15.0 %    Platelet Count 195 150 - 450 10e3/uL   Comprehensive metabolic panel   Result Value Ref Range    Sodium 139 133 - 144 mmol/L    Potassium 3.4 3.4 - 5.3 mmol/L    Chloride 106 94 - 109 mmol/L    Carbon Dioxide (CO2) 29 20 - 32 mmol/L    Anion Gap 4 3 - 14 mmol/L    " Urea Nitrogen 24 7 - 30 mg/dL    Creatinine 0.67 0.66 - 1.25 mg/dL    Calcium 8.6 8.5 - 10.1 mg/dL    Glucose 110 (H) 70 - 99 mg/dL    Alkaline Phosphatase 59 40 - 150 U/L    AST 33 0 - 45 U/L    ALT 42 0 - 70 U/L    Protein Total 6.2 (L) 6.8 - 8.8 g/dL    Albumin 3.0 (L) 3.4 - 5.0 g/dL    Bilirubin Total 0.8 0.2 - 1.3 mg/dL    GFR Estimate >90 >60 mL/min/1.73m2         Condition on Discharge:   -Good    Discharge Medications:      Current Discharge Medication List      START taking these medications    Details   dexamethasone (DECADRON) 6 MG tablet Take 1 tablet (6 mg) by mouth daily for 6 days  Qty: 6 tablet, Refills: 0    Associated Diagnoses: COPD exacerbation (H); Infection due to 2019 novel coronavirus      doxycycline hyclate (VIBRAMYCIN) 100 MG capsule Take 1 capsule (100 mg) by mouth every 12 hours for 3 doses  Qty: 3 capsule, Refills: 0    Associated Diagnoses: COPD exacerbation (H)      miconazole (MICATIN) 2 % external powder Apply topically 2 times daily  Qty: 43 g, Refills: 0    Associated Diagnoses: Candidiasis of skin         CONTINUE these medications which have NOT CHANGED    Details   albuterol (PROAIR HFA) 108 (90 Base) MCG/ACT inhaler Inhale 2 puffs into the lungs every 4 hours as needed for shortness of breath / dyspnea or wheezing  Qty: 18 g, Refills: 3    Comments: Pharmacy may dispense brand covered by insurance (Proair, or proventil or ventolin or generic albuterol inhaler)  Associated Diagnoses: Chronic bronchitis, unspecified chronic bronchitis type (H)      DULoxetine (CYMBALTA) 60 MG capsule Take 2 capsules (120 mg) by mouth daily TAKE 2 CAPSULES BY MOUTH EVERY DAY  Qty: 60 capsule, Refills: 11    Associated Diagnoses: Depression, unspecified depression type      morphine (MS CONTIN) 30 MG CR tablet Take by mouth every 12 hours 60 mg in AM and 30 mg in HS  Refills: 0      naproxen (NAPROSYN) 500 MG tablet Take 1 tablet (500 mg) by mouth 2 times daily as needed for headaches  Qty: 60  tablet, Refills: 3    Associated Diagnoses: Chronic pain syndrome      oxyCODONE IR (ROXICODONE) 10 MG tablet Take 1 tablet by mouth 3 times daily      pregabalin (LYRICA) 150 MG capsule Take 150 mg by mouth 2 times daily       simvastatin (ZOCOR) 80 MG tablet Take 1 tablet (80 mg) by mouth daily  Qty: 90 tablet, Refills: 3    Associated Diagnoses: Hyperlipidemia LDL goal <130      !! traZODone (DESYREL) 100 MG tablet TAKE 2 TABLETS BY MOUTH AT BEDTIME AS NEEDED FOR SLEEP  Qty: 180 tablet, Refills: 3    Associated Diagnoses: Depression, unspecified depression type      !! traZODone (DESYREL) 50 MG tablet Take 1 tablet (50 mg) by mouth At Bedtime Take along with the 100 mg tablets for total dose of 250 mg  Qty: 90 tablet, Refills: 3    Associated Diagnoses: Depression, unspecified depression type      ipratropium - albuterol 0.5 mg/2.5 mg/3 mL (DUONEB) 0.5-2.5 (3) MG/3ML neb solution Take 1 vial (3 mLs) by nebulization 4 times daily  Qty: 84 mL, Refills: 0    Associated Diagnoses: Chronic bronchitis, unspecified chronic bronchitis type (H)      nicotine (COMMIT) 2 MG lozenge Place 1 lozenge (2 mg) inside cheek every 2 hours as needed for smoking cessation (when you get cravings.)  Qty: 72 lozenge, Refills: 3    Associated Diagnoses: Encounter for smoking cessation counseling      nicotine (NICODERM CQ) 21 MG/24HR 24 hr patch Place 1 patch onto the skin every 24 hours  Qty: 28 patch, Refills: 1    Associated Diagnoses: Encounter for smoking cessation counseling      NONFORMULARY Take 2 capsules by mouth daily Super Beta Prostate      !! order for DME Equipment being ordered: Nebulizer.    Diagnosis: chronic obstructive bronchitis (COPD).    Duration of need:  99 months.  Qty: 1 each, Refills: 0    Associated Diagnoses: Chronic bronchitis, unspecified chronic bronchitis type (H)      !! order for DME Equipment being ordered: Hospital Bed and mattress.  Qty: 1 each, Refills: 0    Associated Diagnoses: Chronic pain  syndrome; Lumbosacral radiculitis; Chronic obstructive pulmonary disease, unspecified COPD type (H); Obese; Lumbar radiculopathy; Encephalopathy; Spinal stenosis in cervical region      !! order for DME Equipment being ordered: Lift Chair  Qty: 1 each, Refills: 0    Associated Diagnoses: Chronic pain syndrome; Lumbosacral radiculitis; Chronic obstructive pulmonary disease, unspecified COPD type (H); Obese; Lumbar radiculopathy; Encephalopathy; Spinal stenosis in cervical region; Unable to ambulate      !! order for DME Equipment being ordered: Wheelchair. Electric, including adjustable back rest sitting to resting, leg elevation adjustment, approved for outdoor use  Qty: 1 Units, Refills: 0    Associated Diagnoses: Chronic pain syndrome; Lumbosacral radiculitis      !! ORDER FOR DME Semi electric hospital bed with side rails and mattress. Length of bed is for lifetime  Qty: 1 Device, Refills: 0    Associated Diagnoses: Other unspecified back disorder; Obese; Lumbosacral radiculitis; COPD (chronic obstructive pulmonary disease) (H)      OVER-THE-COUNTER nereva Plus 1 tablet daily       !! - Potential duplicate medications found. Please discuss with provider.          Discharge Instructions:        Otolaryngology Referral      Reason for your hospital stay    COVID-19, COPD exacerbation, confusion     Follow-up and recommended labs and tests     PCP follow up in 1 week. You need labs to recheck your thyroid function with your PCP in 4-6 weeks. You also need to see ENT (ear, nose, throat doctor) at the next available appointment for findings noted in one of your sinuses on CT.     Activity    Your activity upon discharge: activity as tolerated     Discharge Instructions    Please ensure that all medications are taken as prescribed. It is very important that you complete the course of steroid (decadron) and antibiotic (doxycycline) at discharge. You need to quarantine at home until 1/31 given your COVID-19 diagnosis.  Please avoid taking illicit substances. If you develop intractable pain / vomiting, fever, shortness of breath, or if you have any other concerning symptoms, please seek medical attention.     Diet    Follow this diet upon discharge: Regular        Dispo:   -Home with PTA PCA    Recommended PCP Follow Up:   -Needs TFTs in 4-6 weeks    Signed:     Jessie Santos MD  1/24/2022 11:04 AM      37 minutes was spent evaluating the patient, writing orders, discharge planning, and providing patient discharge education and counseling in preparation for discharge.

## 2022-01-24 NOTE — PLAN OF CARE
Patient alert and oriented x4.  Up independently at bedside to use urinal.  Patient denies pain.  Lung sounds diminished, oxygen sats 91% on room air.  Patient has infrequent, non-productive cough.  Tremors noted, baseline per patient.  Pain controlled with scheduled pain medications.

## 2022-01-28 ENCOUNTER — VIRTUAL VISIT (OUTPATIENT)
Dept: FAMILY MEDICINE | Facility: OTHER | Age: 67
End: 2022-01-28
Payer: COMMERCIAL

## 2022-01-28 ENCOUNTER — VIRTUAL VISIT (OUTPATIENT)
Dept: PSYCHOLOGY | Facility: CLINIC | Age: 67
End: 2022-01-28
Payer: COMMERCIAL

## 2022-01-28 DIAGNOSIS — F43.21 GRIEF REACTION: ICD-10-CM

## 2022-01-28 DIAGNOSIS — F43.10 POSTTRAUMATIC STRESS DISORDER: ICD-10-CM

## 2022-01-28 DIAGNOSIS — U07.1 INFECTION DUE TO 2019 NOVEL CORONAVIRUS: Primary | ICD-10-CM

## 2022-01-28 DIAGNOSIS — J44.1 COPD EXACERBATION (H): ICD-10-CM

## 2022-01-28 DIAGNOSIS — Z87.09 HX OF ACUTE RESPIRATORY FAILURE: ICD-10-CM

## 2022-01-28 DIAGNOSIS — F33.1 MAJOR DEPRESSIVE DISORDER, RECURRENT EPISODE, MODERATE (H): Primary | ICD-10-CM

## 2022-01-28 DIAGNOSIS — F41.1 GENERALIZED ANXIETY DISORDER: ICD-10-CM

## 2022-01-28 DIAGNOSIS — E63.9 POOR NUTRITION: ICD-10-CM

## 2022-01-28 PROCEDURE — 99214 OFFICE O/P EST MOD 30 MIN: CPT | Mod: TEL | Performed by: PHYSICIAN ASSISTANT

## 2022-01-28 PROCEDURE — 90834 PSYTX W PT 45 MINUTES: CPT | Mod: 95 | Performed by: SOCIAL WORKER

## 2022-01-28 ASSESSMENT — ANXIETY QUESTIONNAIRES
1. FEELING NERVOUS, ANXIOUS, OR ON EDGE: NEARLY EVERY DAY
5. BEING SO RESTLESS THAT IT IS HARD TO SIT STILL: SEVERAL DAYS
GAD7 TOTAL SCORE: 13
7. FEELING AFRAID AS IF SOMETHING AWFUL MIGHT HAPPEN: NOT AT ALL
7. FEELING AFRAID AS IF SOMETHING AWFUL MIGHT HAPPEN: NOT AT ALL
3. WORRYING TOO MUCH ABOUT DIFFERENT THINGS: NEARLY EVERY DAY
6. BECOMING EASILY ANNOYED OR IRRITABLE: SEVERAL DAYS
4. TROUBLE RELAXING: MORE THAN HALF THE DAYS
GAD7 TOTAL SCORE: 13
2. NOT BEING ABLE TO STOP OR CONTROL WORRYING: NEARLY EVERY DAY
GAD7 TOTAL SCORE: 13

## 2022-01-28 ASSESSMENT — COLUMBIA-SUICIDE SEVERITY RATING SCALE - C-SSRS
LETHALITY/MEDICAL DAMAGE CODE FIRST ACTUAL ATTEMPT: MODERATE PHYSICAL DAMAGE, MEDICAL ATTENTION NEEDED
ATTEMPT PAST THREE MONTHS: NO
1. IN THE PAST MONTH, HAVE YOU WISHED YOU WERE DEAD OR WISHED YOU COULD GO TO SLEEP AND NOT WAKE UP?: NO
6. HAVE YOU EVER DONE ANYTHING, STARTED TO DO ANYTHING, OR PREPARED TO DO ANYTHING TO END YOUR LIFE?: NO
LETHALITY/MEDICAL DAMAGE CODE MOST LETHAL ACTUAL ATTEMPT: MODERATE PHYSICAL DAMAGE, MEDICAL ATTENTION NEEDED
4. HAVE YOU HAD THESE THOUGHTS AND HAD SOME INTENTION OF ACTING ON THEM?: YES
5. HAVE YOU STARTED TO WORK OUT OR WORKED OUT THE DETAILS OF HOW TO KILL YOURSELF? DO YOU INTEND TO CARRY OUT THIS PLAN?: YES
REASONS FOR IDEATION LIFETIME: MOSTLY TO END OR STOP THE PAIN (YOU COULDN'T GO ON LIVING WITH THE PAIN OR HOW YOU WERE FEELING)
5. HAVE YOU STARTED TO WORK OUT OR WORKED OUT THE DETAILS OF HOW TO KILL YOURSELF? DO YOU INTEND TO CARRY OUT THIS PLAN?: NO
TOTAL  NUMBER OF INTERRUPTED ATTEMPTS PAST 3 MONTHS: NO
2. HAVE YOU ACTUALLY HAD ANY THOUGHTS OF KILLING YOURSELF?: NO
TOTAL  NUMBER OF ABORTED OR SELF INTERRUPTED ATTEMPTS PAST 3 MONTHS: NO
4. HAVE YOU HAD THESE THOUGHTS AND HAD SOME INTENTION OF ACTING ON THEM?: NO
LETHALITY/MEDICAL DAMAGE CODE MOST RECENT ACTUAL ATTEMPT: MODERATELY SEVERE PHYSICAL DAMAGE, MEDICAL HOSPITALIZATION AND LIKELY INTENSIVE CARE REQUIRED
TOTAL  NUMBER OF INTERRUPTED ATTEMPTS LIFETIME: NO
3. HAVE YOU BEEN THINKING ABOUT HOW YOU MIGHT KILL YOURSELF?: YES
TOTAL  NUMBER OF ABORTED OR SELF INTERRUPTED ATTEMPTS PAST LIFETIME: NO
TOTAL  NUMBER OF ACTUAL ATTEMPTS LIFETIME: 3
6. HAVE YOU EVER DONE ANYTHING, STARTED TO DO ANYTHING, OR PREPARED TO DO ANYTHING TO END YOUR LIFE?: NO
ATTEMPT LIFETIME: YES
2. HAVE YOU ACTUALLY HAD ANY THOUGHTS OF KILLING YOURSELF LIFETIME?: YES
1. IN THE PAST MONTH, HAVE YOU WISHED YOU WERE DEAD OR WISHED YOU COULD GO TO SLEEP AND NOT WAKE UP?: YES

## 2022-01-28 ASSESSMENT — PATIENT HEALTH QUESTIONNAIRE - PHQ9
10. IF YOU CHECKED OFF ANY PROBLEMS, HOW DIFFICULT HAVE THESE PROBLEMS MADE IT FOR YOU TO DO YOUR WORK, TAKE CARE OF THINGS AT HOME, OR GET ALONG WITH OTHER PEOPLE: SOMEWHAT DIFFICULT
10. IF YOU CHECKED OFF ANY PROBLEMS, HOW DIFFICULT HAVE THESE PROBLEMS MADE IT FOR YOU TO DO YOUR WORK, TAKE CARE OF THINGS AT HOME, OR GET ALONG WITH OTHER PEOPLE: SOMEWHAT DIFFICULT
SUM OF ALL RESPONSES TO PHQ QUESTIONS 1-9: 14

## 2022-01-28 NOTE — PROGRESS NOTES
"                                           Progress Note    Patient Name: Melquiades Morales  Date: 1/28/22         Service Type: Individual      Session Start Time: 12 pm  Session End Time: 12:45 pm     Session Length: 45 min    Session #: 1    Attendees: Client attended alone    Service Modality:  Phone Visit:      Provider verified identity through the following two step process.  Patient provided:  Patient is known previously to provider    The patient has been notified of the following:      \"We have found that certain health care needs can be provided without the need for a face to face visit.  This service lets us provide the care you need with a phone conversation.       I will have full access to your [facility] medical record during this entire phone call.   I will be taking notes for your medical record.      Since this is like an office visit, we will bill your insurance company for this service.       There are potential benefits and risks of telephone visits (e.g. limits to patient confidentiality) that differ from in-person visits.?Confidentiality still applies for telephone services, and nobody will record the visit.  It is important to be in a quiet, private space that is free of distractions (including cell phone or other devices) during the visit.??      If during the course of the call I believe a telephone visit is not appropriate, you will not be charged for this service\"     Consent has been obtained for this service by care team member: Yes      Treatment Plan Last Reviewed: next time.  PHQ-9 / EVGENY-7 : 14/13    DATA  Interactive Complexity: No  Crisis: No       Progress Since Last Session (Related to Symptoms / Goals / Homework):   Symptoms: first visit    Homework: Partially completed     Episode of Care Goals: Minimal progress - PREPARATION (Decided to change - considering how); Intervened by negotiating a change plan and determining options / strategies for behavior change, " identifying triggers, exploring social supports, and working towards setting a date to begin behavior change     Current / Ongoing Stressors and Concerns:   His wife  about a week ago.      Treatment Objective(s) Addressed in This Session:   Coping with grief.    Intervention:  Assessed functioning and for safety. Reviewed the phq/shailesh; cage aid; promis 10, long [place]. Processed feelings about loss of wife and his support team. Processed feelings about being back in therapy with me.        ASSESSMENT: Current Emotional / Mental Status (status of significant symptoms):   Risk status (Self / Other harm or suicidal ideation)   Patient denies current fears or concerns for personal safety.   Patient denies current or recent suicidal ideation or behaviors.   Patient denies current or recent homicidal ideation or behaviors.   Patient denies current or recent self injurious behavior or ideation.   Patient denies other safety concerns.   Patient reports there has been no change in risk factors since their last session.     Patient reports there has been a chance in protective factors since their last session.  loss of wife but his promis to God remains strong.   a safety plan had been completed previously     Appearance:   Unable to assess.   Eye Contact:   Unable to assess.   Psychomotor Behavior: Unable to assess.   Attitude:   Cooperative  Friendly   Orientation:   All   Speech    Rate / Production: Normal     Volume:  Normal    Mood:    Normal   Affect:    Appropriate    Thought Content:  Clear    Thought Form:  Coherent  Logical    Insight:    Good      Medication Review:   No changes to current psychiatric medication(s)     Medication Compliance:   Yes     Changes in Health Issues:   None reported     Chemical Use Review:   Substance Use: Chemical use reviewed, no active concerns identified      Tobacco Use: No change in amount of tobacco use since last session.  Provided encouragement to quit      Diagnosis:  Major depression and generalized anxiety.    Collateral Reports Completed:   Routed note to PCP    PLAN: (Patient Tasks / Therapist Tasks / Other)  Weekly as able. He prefers in person visits.    Homework: use a healthy coping ideas as needed.        Kleber Pollack, MOLINA                                                         ______________________________________________________________________    Treatment Plan    Patient's Name: Melquiades Morales  YOB: 1955    Date: 1/28/22    DSM5 Diagnoses: 296.32 (F33.1) Major Depressive Disorder, Recurrent Episode, Moderate _, 300.02 (F41.1) Generalized Anxiety Disorder or 309.81 (F43.10) Posttraumatic Stress Disorder (includes Posttraumatic Stress Disorder for Children 6 Years and Younger)  With dissociative symptoms  Psychosocial / Contextual Factors: trauma experiences. Recent loss of wife.  PROMIS-10 completed 1/28/22    Referral / Collaboration:  Referral to another professional/service is not indicated at this time..    Anticipated number of session or this episode of care: 15      MeasurableTreatment Goal(s) related to diagnosis / functional impairment(s)  Goal 1: Patient will      I will know I've met my goal when  .      Objective #A (Patient Action)    Patient will continue to follow his safety plan.  Status: New - Date: 1/28/22     Intervention(s)  Therapist will monitor for safety each visit and encourage use of safety plan.    Objective #B  Patient will use a healthy coping idea as needed 100% of trials for 1 week.  Status: New - Date: 1/28/22     Intervention(s)  Therapist will provide ideas and encouragement to use them.    Objective #C  Patient will process the loss of his wife as needed.  Status: New - Date: 1/28/22     Intervention(s)  Therapist will consider EMDR.           Patient has reviewed and agreed to the above plan.      Kleber Pollack, SHAVONNE  January 28, 2022

## 2022-01-28 NOTE — PATIENT INSTRUCTIONS
Try to cut back on the sugary drinks and drink a few glasses/bottles of water instead.  Try to eat 3 meals per day with increased protein (nuts, meats, cheeses, etc).     Follow up with your primary care provider within the next month for a recheck     I am sorry for the loss of your wife.    Contact the clinic with any further questions or concerns.

## 2022-01-28 NOTE — PROGRESS NOTES
"Melquiades is a 66 year old who is being evaluated via a billable video visit.      How would you like to obtain your AVS? {AVS Preference:058348}  If the video visit is dropped, the invitation should be resent by: {video visit invitation:748449}  Will anyone else be joining your video visit? {:940119}  {If patient encounters technical issues they should call [phone] :553814}    Video Start Time: {video visit start/end time for provider to select:122676}    {PROVIDER CHARTING PREFERENCE:279427}    Subjective   Melquiades is a 66 year old who presents for the following health issues {ACCOMPANIED BY STATEMENT (Optional):225683}    [facility]       Hospital Follow-up Visit:    Hospital/Nursing Home/ Rehab Facility: {[facility]:1533611}  Date of Admission: 1/20/22  Date of Discharge: 1/24/22  Reason(s) for Admission: acute respiratory failure secondary to COVID-19      Was your hospitalization related to COVID-19? YES   How are you feeling today? { :318112}  In the past 24 hours have you had shortness of breath when speaking, walking, or climbing stairs? { :337397}  Do you have a cough? { :750150}  When is the last time you had a fever greater than 100? ***  Are you having any other symptoms? { :829498}   Do you have any other stressors you would like to discuss with your provider? { :884655}    {Rooming staff  PHQ was completed today, please pull results into note :282192}   {Rooming staff  GAD7 was completed today, please pull results into note :763067}    {Rooming staff  Please complete PTSD Screener :792148}  {Results-PHQ, GAD7, PTSD Screenings Completed Today(Optional):079153}    Was the patient in the ICU or did the patient experience delirium during hospitalization?  Yes     {Provider  Link to COVID SmartSet :593368}      Problems taking medications regularly:  {NONE DEFAULTED:823415::\"None\"}  Medication changes since discharge: {NONE DEFAULTED:586371::\"None\"}  Problems adhering to non-medication therapy:  " "{NONE DEFAULTED:641271::\"None\"}    Summary of hospitalization:    HPI (per admitting physician):      Melquiades Morales is a 66 year old male admitted on 2022. He has a history of polysubstance overdose, anxiety, chronic pain, chronic narcotic use, lumbar radiculopathy, alcohol cocaine and cannabis abuse, COPD, hyperlipidemia and depression.     Details are very unclear.  Patient is unable to give me any pertinent information.  I was told by the emergency room physician that he activated life alert.  EMS was summoned and he was found in his home.  He had altered mental status and ultimately was hypoxic.  He reports that his wife  either yesterday or today.  Tells me that she did not live with him.  He told the ER physician that she  of COPD.  He told me that she  of COVID.     In the emergency room he was hypoxic and COVID-positive.  His chest x-ray was negative.  He had altered mental status.  It was unclear if this was from COVID or if he had altered mental status from his medications.     When I saw him he was unable to give me any consistent information.  He would answer repeated questions in different fashions.  He was somewhat lethargic.      Hospital Course by Main Problems:      A 65 yo male with a h/o COPD, chronic pain, HLD, depression, anxiety, polysubstance abuse (cocaine, cannabis, nicotine, prior ETOH abuse (none recent per patient) who activated his lifeline at home for unclear reasons (patient cannot remember what symptoms he was having at that time), found to have acute respiratory failure 2/2 COVID-19 and COPD exacerbation, and AMS.     Acute hypoxic, hypercarbic respiratory failure 2/2 COVID-19 and COPD exacerbation: CXR on admission was neg for acute. COVID-19 positive on . D dimer neg and CRP WNL. The patient was started on decadron, doxycycline and remdesivir on admission. The patient received a couple doses of remdesivir, then refused to complete the course of same. He " will complete the course of doxycycline and decadron at discharge. The patient was educated on the need to quarantine at home until 1/31/22. He was not willing to be started on AC for DVT ppx, but is likely low risk for thromboembolism given his d dimer has been negative on serial checks.      Acute metabolic and toxic encephalopathy: Likely multifactorial and related to COVID-19, hypoxia, medications (opioids, lyrica, cymbalta, trazodone). EMS raised concern that patient may have taken too much of his oxycodone as below, though patient denied this. UDS was positive for opioids. UA was neg for infection. Tylenol, ETOH, salicylate levels all neg. Ammonia and B12 WNL. RPR non-reactive. TFTs as below. CT head was neg for acute. The patient's PTA trazodone, cymbata and oxycodone were initially held on admission and gradually resumed once patient's mental status improved to his baseline. OT evaluated the patient and SLUMS was 18/30, consistent with a degree of cognitive impairment / dementia. The patient's mental status was at his suspected baseline at the time of discharge home.       Leukocytosis: Likely 2/2 steroids. UA neg for infection. Afebrile during this admission. Leukocytosis was improving spontaneously prior to discharge home.      Mucocele and/or nasal polyp: CT head with incidental finding of a mucocele and/or nasal polyp resulting in complete opacification and probable mild bony expansion of the left frontal sinus which was also noted on prior imaging. The patient was referred for o/p ENT evaluation.      Chronic pain: Cont MS contin, lyrica, prn oxycodone as PTA     Low TSH, normal FT4: Likely sick euthyroid. The patient needs repeat o/p TFTs in 4-6 weeks with his PCP.      HLD: Cont statin     Nicotine dependence: Cont nicotine replacement as PTA. The patient was strongly encouraged to stop smoking.      Polysubstance abuse: H/o cocaine, opioid, ETOH abuse. No recent ETOH per patient and he denied recent  "cocaine use. As above, EMS raised some concern that patient may have taken too much of his prescribed oxycodone, though patient denied this. The patient was strongly encouraged to only take opioids only as prescribed, to avoid illicit substances and to maintain sobriety.      Anxiety, depression: Cont cymbalta    Diagnostic Tests/Treatments reviewed.  Follow up needed: {NONE DEFAULTED:743405::\"none\"}  Other Healthcare Providers Involved in Patient s Care:         {those currently involved after discharge:147294::\"None\"}  Update since discharge: {IMPROVED DEFAULT:929250::\"improved.\"} {TIP  Include information from family/caregivers, SNF, Care Coordination :665334}      Post Discharge Medication Reconciliation: {ACO Med Rec (Provider):375311}.  Plan of care communicated with {Communicate Plan to:503827::\"patient\"}     {Reference  Coding guidelines- Moderate Complexity F2F/Video within 7 - 14 days of discharge 1992191, High Complexity F2F/Video within 7 days 0403326 or [id number] days 8159918 :400318}         {additonal problems for provider to add (Optional):222726}    Review of Systems   {ROS COMP (Optional):636261}      Objective           Vitals:  No vitals were obtained today due to virtual visit.    Physical Exam   {video visit exam brief selected:694584::\"GENERAL: Healthy, alert and no distress\",\"EYES: Eyes grossly normal to inspection.  No discharge or erythema, or obvious scleral/conjunctival abnormalities.\",\"RESP: No audible wheeze, cough, or visible cyanosis.  No visible retractions or increased work of breathing.  \",\"SKIN: Visible skin clear. No significant rash, abnormal pigmentation or lesions.\",\"NEURO: Cranial nerves grossly intact.  Mentation and speech appropriate for age.\",\"PSYCH: Mentation appears normal, affect normal/bright, judgement and insight intact, normal speech and appearance well-groomed.\"}    {Diagnostic Test Results (Optional):984838}    {AMBULATORY ATTESTATION (Optional):533443}    " "    Video-Visit Details    Type of service:  Video Visit    Video End Time:{video visit start/end time for provider to select:208838}    Originating Location (pt. Location): {video visit patient location:270916::\"Home\"}    Distant Location (provider location):  [facility]     Platform used for Video Visit: {Virtual Visit Platforms:798811::\"Matter and Form\"}    "

## 2022-01-28 NOTE — Clinical Note
Dr. Quinn,    I had a virtual follow-up with Jeffrey today due to his recent COVID related hospitalization. He is doing much better but did lose his wife while he was in the hospital (apparently she was on hospice). He will be restarting counseling. I discussed the importance of better nutrition with Jeffrey and recommend he follow up with you in 3-4 weeks for an office visit recheck. Thanks.    Ty Wagoner PA-C

## 2022-01-28 NOTE — PROGRESS NOTES
Melquiades is a 66 year old who is being evaluated via a billable telephone visit.      What phone number would you like to be contacted at? [phone]  How would you like to obtain your AVS? MyChart    Assessment & Plan       ICD-10-CM    1. Infection due to 2019 novel coronavirus  U07.1    2. Hx of acute respiratory failure  Z87.09    3. COPD exacerbation (H)  J44.1    4. Poor nutrition  E63.9    5. Grief reaction  F43.21        1-3. Recent hospitalization due to acute hypoxic respiratory failure secondary to COVID-19 infection with underlying COPD. He is feeling much better with no cough or shortness of breath. He has inhalers on hand to use as needed and is finishing his course of oral Decadron. He has a PCA who is very helpful and he wants her added to his list of contacts for release of medical information. He will need to complete an RAFITA at his next visit in the clinic. His mentation seems to be back to baseline and he converses easily and thoughtfully during today's visit. I recommend he follow up with his primary care provider within 3-4 weeks for a clinic recheck.     4. He admits to poor nutrition with a lot of caffeine and sugary drinks throughout the day and only one meal per day. I discussed the importance of eating 3 meals daily and/or more frequent snacking along with swapping some of his energy drinks for a glass of water. He states he has tried Ensure before but gained too much weight. Better nutrition will give him more energy and decrease the risk of future falls/lightheadedness.    5. He unfortunately lost his wife during his hospitalization but is thankful for the time he was able to spend with her while she was on hospice. He will be restarting counseling in the near future and states that he is doing fairly well despite her loss.    Follow up within the next month with PCP.      Ty Wagoner PA-C  [facility]   Melquiades is a 66 year old who presents for  the following health issues:    HPI     Hospital Follow-up Visit:    Hospital/Nursing Home/IP Rehab Facility: [facility]  Date of Admission: 22  Date of Discharge: 22  Reason(s) for Admission: acute respiratory failure secondary to COVID-19      Was your hospitalization related to COVID-19? YES   How are you feeling today? Better  In the past 24 hours have you had shortness of breath when speaking, walking, or climbing stairs? My breathing issues have improved  Do you have a cough? I don't have a cough.  When is the last time you had a fever greater than 100? N/A  Are you having any other symptoms? None   Do you have any other stressors you would like to discuss with your provider? Grief or Loss    PHQ 2021   PHQ-9 Total Score 15 14 14   Q9: Thoughts of better off dead/self-harm past 2 weeks Not at all Not at all Not at all     EVGENY-7 SCORE 2021 3/19/2021 2022   Total Score - - -   Total Score - - 13 (moderate anxiety)   Total Score 18 16 13           Was the patient in the ICU or did the patient experience delirium during hospitalization?  Yes   His mentation is back to normal.      Problems taking medications regularly:  None  Medication changes since discharge: None  Problems adhering to non-medication therapy:  None    Summary of hospitalization:    HPI (per admitting physician):      Melquiades Morales is a 66 year old male admitted on 2022. He has a history of polysubstance overdose, anxiety, chronic pain, chronic narcotic use, lumbar radiculopathy, alcohol cocaine and cannabis abuse, COPD, hyperlipidemia and depression.     Details are very unclear.  Patient is unable to give me any pertinent information.  I was told by the emergency room physician that he activated life alert.  EMS was summoned and he was found in his home.  He had altered mental status and ultimately was hypoxic.  He reports that his wife  either yesterday or  today.  Tells me that she did not live with him.  He told the ER physician that she  of COPD.  He told me that she  of COVID.     In the emergency room he was hypoxic and COVID-positive.  His chest x-ray was negative.  He had altered mental status.  It was unclear if this was from COVID or if he had altered mental status from his medications.     When I saw him he was unable to give me any consistent information.  He would answer repeated questions in different fashions.  He was somewhat lethargic.      Hospital Course by Main Problems:      A 67 yo male with a h/o COPD, chronic pain, HLD, depression, anxiety, polysubstance abuse (cocaine, cannabis, nicotine, prior ETOH abuse (none recent per patient) who activated his lifeline at home for unclear reasons (patient cannot remember what symptoms he was having at that time), found to have acute respiratory failure 2/2 COVID-19 and COPD exacerbation, and AMS.     Acute hypoxic, hypercarbic respiratory failure 2/2 COVID-19 and COPD exacerbation: CXR on admission was neg for acute. COVID-19 positive on . D dimer neg and CRP WNL. The patient was started on decadron, doxycycline and remdesivir on admission. The patient received a couple doses of remdesivir, then refused to complete the course of same. He will complete the course of doxycycline and decadron at discharge. The patient was educated on the need to quarantine at home until 22. He was not willing to be started on AC for DVT ppx, but is likely low risk for thromboembolism given his d dimer has been negative on serial checks.      Acute metabolic and toxic encephalopathy: Likely multifactorial and related to COVID-19, hypoxia, medications (opioids, lyrica, cymbalta, trazodone). EMS raised concern that patient may have taken too much of his oxycodone as below, though patient denied this. UDS was positive for opioids. UA was neg for infection. Tylenol, ETOH, salicylate levels all neg. Ammonia and B12  WNL. RPR non-reactive. TFTs as below. CT head was neg for acute. The patient's PTA trazodone, cymbata and oxycodone were initially held on admission and gradually resumed once patient's mental status improved to his baseline. OT evaluated the patient and SLUMS was 18/30, consistent with a degree of cognitive impairment / dementia. The patient's mental status was at his suspected baseline at the time of discharge home.       Leukocytosis: Likely 2/2 steroids. UA neg for infection. Afebrile during this admission. Leukocytosis was improving spontaneously prior to discharge home.      Mucocele and/or nasal polyp: CT head with incidental finding of a mucocele and/or nasal polyp resulting in complete opacification and probable mild bony expansion of the left frontal sinus which was also noted on prior imaging. The patient was referred for o/p ENT evaluation.      Chronic pain: Cont MS contin, lyrica, prn oxycodone as PTA     Low TSH, normal FT4: Likely sick euthyroid. The patient needs repeat o/p TFTs in 4-6 weeks with his PCP.      HLD: Cont statin     Nicotine dependence: Cont nicotine replacement as PTA. The patient was strongly encouraged to stop smoking.      Polysubstance abuse: H/o cocaine, opioid, ETOH abuse. No recent ETOH per patient and he denied recent cocaine use. As above, EMS raised some concern that patient may have taken too much of his prescribed oxycodone, though patient denied this. The patient was strongly encouraged to only take opioids only as prescribed, to avoid illicit substances and to maintain sobriety.      Anxiety, depression: Cont cymbalta    Diagnostic Tests/Treatments reviewed.  Follow up needed: PCP  Other Healthcare Providers Involved in Patient s Care:         PCP, psychologist/therapist, pain management  Update since discharge: improved. He is overall feeling much better since hospital discharge. He denies any shortness of breath or cough. No fevers or chills. He does admit to a few  "falls when he first was discharged home as he states his \"blood pressure dropped\". He thinks this was a combination of being in the hospital for 5 days along with his discharge medications. This has not happened over the past few days as he feels much better. He states he drinks 2 [employer] drinks per day along with [place] [employer] drinks. He only eats once per day and states that this has been his routine for many years. He does not drink much water. He has a PCA who helps him throughout the day. He is grieving the loss of his wife who passed away the day after he was admitted to the hospital. She had a long history of COPD and was on hospice. He states he was able to spend a lot of time with her these past few months so is thankful for that. He has upcoming appointment with his therapist again.     Post Discharge Medication Reconciliation: discharge medications reconciled, continue medications without change.  Plan of care communicated with patient and caregiver (PCA)                  Review of Systems   Constitutional, HEENT, cardiovascular, pulmonary, neuro, psych, gi and gu systems are negative, except as otherwise noted.      Objective       Vitals:  No vitals were obtained today due to virtual visit.    Physical Exam   healthy, alert and no distress  PSYCH: Alert and oriented times 3; coherent speech, normal   rate and volume, able to articulate logical thoughts, able   to abstract reason, no tangential thoughts, no hallucinations   or delusions. His affect is normal.  RESP: No cough, no audible wheezing, able to talk in full sentences  Remainder of exam unable to be completed due to telephone visits        Phone call duration: 24 minutes    Answers for HPI/ROS submitted by the patient on 1/28/2022  If you checked off any problems, how difficult have these problems made it for you to do your work, take care of things at home, or get along with other people?: Somewhat difficult  PHQ9 TOTAL SCORE: 14  EVGENY 7 " TOTAL SCORE: 13  How many servings of fruits and vegetables do you eat daily?: 0-1  On average, how many sweetened beverages do you drink each day (Examples: soda, juice, sweet tea, etc.  Do NOT count diet or artificially sweetened beverages)?: 5  How many minutes a day do you exercise enough to make your heart beat faster?: 9 or less  How many days a week do you exercise enough to make your heart beat faster?: 3 or less  How many days per week do you miss taking your medication?: 0

## 2022-01-29 ASSESSMENT — PATIENT HEALTH QUESTIONNAIRE - PHQ9
SUM OF ALL RESPONSES TO PHQ QUESTIONS 1-9: 14
SUM OF ALL RESPONSES TO PHQ QUESTIONS 1-9: 14

## 2022-01-29 ASSESSMENT — ANXIETY QUESTIONNAIRES: GAD7 TOTAL SCORE: 13

## 2022-02-07 ENCOUNTER — PSYCHE (OUTPATIENT)
Dept: PSYCHOLOGY | Facility: CLINIC | Age: 67
End: 2022-02-07
Payer: COMMERCIAL

## 2022-02-07 DIAGNOSIS — F33.1 MAJOR DEPRESSIVE DISORDER, RECURRENT EPISODE, MODERATE (H): Primary | ICD-10-CM

## 2022-02-07 DIAGNOSIS — F43.10 POSTTRAUMATIC STRESS DISORDER: ICD-10-CM

## 2022-02-07 DIAGNOSIS — F41.1 GENERALIZED ANXIETY DISORDER: ICD-10-CM

## 2022-02-07 PROCEDURE — 99207 PR CDG-CODE CATEGORY CHANGED: CPT | Performed by: SOCIAL WORKER

## 2022-02-07 PROCEDURE — 90834 PSYTX W PT 45 MINUTES: CPT | Performed by: SOCIAL WORKER

## 2022-02-07 ASSESSMENT — ANXIETY QUESTIONNAIRES
2. NOT BEING ABLE TO STOP OR CONTROL WORRYING: MORE THAN HALF THE DAYS
GAD7 TOTAL SCORE: 10
1. FEELING NERVOUS, ANXIOUS, OR ON EDGE: MORE THAN HALF THE DAYS
3. WORRYING TOO MUCH ABOUT DIFFERENT THINGS: MORE THAN HALF THE DAYS
6. BECOMING EASILY ANNOYED OR IRRITABLE: MORE THAN HALF THE DAYS
5. BEING SO RESTLESS THAT IT IS HARD TO SIT STILL: SEVERAL DAYS
7. FEELING AFRAID AS IF SOMETHING AWFUL MIGHT HAPPEN: NOT AT ALL

## 2022-02-07 ASSESSMENT — PATIENT HEALTH QUESTIONNAIRE - PHQ9
5. POOR APPETITE OR OVEREATING: SEVERAL DAYS
SUM OF ALL RESPONSES TO PHQ QUESTIONS 1-9: 14

## 2022-02-07 NOTE — PROGRESS NOTES
Progress Note    Patient Name: Melquiades Morales  Date: 22         Service Type: Individual      Session Start Time: 12 pm  Session End Time: 12:45 pm     Session Length: 45 min    Session #: 2    Attendees: Client attended alone.    Service Modality:  In Person Visit:            Treatment Plan Last Reviewed: next time.  PHQ-9 / SHAILESH-7 :     DATA  Interactive Complexity: No  Crisis: No       Progress Since Last Session (Related to Symptoms / Goals / Homework):   Symptoms: stable    Homework: Partially completed. New: try some of the grounding ideas provided. Read the emdr handout.     Episode of Care Goals: Minimal progress - PREPARATION (Decided to change - considering how); Intervened by negotiating a change plan and determining options / strategies for behavior change, identifying triggers, exploring social supports, and working towards setting a date to begin behavior change     Current / Ongoing Stressors and Concerns:   His wife  recently. He has been having some panic attacks.     Treatment Objective(s) Addressed in This Session:   Coping with grief.    Intervention:  Assessed functioning and for safety. Reviewed the phq/shailesh. Processed feelings about his health and corona virus. Processed feelings about loss of wife. Processed feelings about being back in therapy with me. Gave homework.        ASSESSMENT: Current Emotional / Mental Status (status of significant symptoms):   Risk status (Self / Other harm or suicidal ideation)   Patient denies current fears or concerns for personal safety.   Patient denies current or recent suicidal ideation or behaviors.   Patient denies current or recent homicidal ideation or behaviors.   Patient denies current or recent self injurious behavior or ideation.   Patient denies other safety concerns.   Patient reports there has been no change in risk factors since their last session.     Patient reports there has been a  chance in protective factors since their last session.  loss of wife but his promis to God remains strong.   a safety plan had been completed previously     Appearance:   Appropriate.   Eye Contact:   Good.   Psychomotor Behavior: Normal.   Attitude:   Cooperative    Orientation:   All   Speech    Rate / Production: Normal     Volume:  Normal    Mood:    Depressed, anxious    Affect:    Appropriate    Thought Content:  Clear    Thought Form:  Coherent  Logical    Insight:    Good      Medication Review:   No changes to current psychiatric medication(s). PCP Dr. Eliane Quinn is prescribing trazadone, and cymbalta 60 mg.     Medication Compliance:   Yes     Changes in Health Issues:   None reported     Chemical Use Review:   Substance Use: Chemical use reviewed, no active concerns identified      Tobacco Use: No change in amount of tobacco use since last session.  Provided encouragement to quit     Diagnosis:  Major depression and generalized anxiety.    Collateral Reports Completed:   Routed note to PCP    PLAN: (Patient Tasks / Therapist Tasks / Other)  Biweekly as able per patient request. He prefers in person visits.    Homework: use a healthy coping ideas as needed.        Kleber Pollack, [facility]                                                         ______________________________________________________________________    Treatment Plan    Patient's Name: Melquiades Morales  YOB: 1955    Date: 1/28/22    DSM5 Diagnoses: 296.32 (F33.1) Major Depressive Disorder, Recurrent Episode, Moderate _, 300.02 (F41.1) Generalized Anxiety Disorder or 309.81 (F43.10) Posttraumatic Stress Disorder (includes Posttraumatic Stress Disorder for Children 6 Years and Younger)  With dissociative symptoms  Psychosocial / Contextual Factors: trauma experiences. Recent loss of wife.  PROMIS-10 completed 1/28/22    Referral / Collaboration:  Referral to another professional/service is not indicated at this  time..    Anticipated number of session or this episode of care: 15      MeasurableTreatment Goal(s) related to diagnosis / functional impairment(s)  Goal 1: Patient will      I will know I've met my goal when  .      Objective #A (Patient Action)    Patient will continue to follow his safety plan.  Status: New - Date: 1/28/22     Intervention(s)  Therapist will monitor for safety each visit and encourage use of safety plan.    Objective #B  Patient will use a healthy coping idea as needed 100% of trials for 1 week.  Status: New - Date: 1/28/22     Intervention(s)  Therapist will provide ideas and encouragement to use them.    Objective #C  Patient will process the loss of his wife as needed.  Status: New - Date: 1/28/22     Intervention(s)  Therapist will consider EMDR.           Patient has reviewed and agreed to the above plan.      Kleber Pollack, [facility]  January 28, 2022

## 2022-02-08 ASSESSMENT — ANXIETY QUESTIONNAIRES: GAD7 TOTAL SCORE: 10

## 2022-02-17 PROBLEM — R73.03 PREDIABETES: Status: ACTIVE | Noted: 2021-07-19

## 2022-03-01 ENCOUNTER — VIRTUAL VISIT (OUTPATIENT)
Dept: PSYCHOLOGY | Facility: CLINIC | Age: 67
End: 2022-03-01
Payer: COMMERCIAL

## 2022-03-01 DIAGNOSIS — F33.1 MAJOR DEPRESSIVE DISORDER, RECURRENT EPISODE, MODERATE (H): Primary | ICD-10-CM

## 2022-03-01 DIAGNOSIS — F41.1 GENERALIZED ANXIETY DISORDER: ICD-10-CM

## 2022-03-01 DIAGNOSIS — F43.10 POSTTRAUMATIC STRESS DISORDER: ICD-10-CM

## 2022-03-01 PROCEDURE — 90791 PSYCH DIAGNOSTIC EVALUATION: CPT | Mod: 95 | Performed by: SOCIAL WORKER

## 2022-03-01 ASSESSMENT — ANXIETY QUESTIONNAIRES
6. BECOMING EASILY ANNOYED OR IRRITABLE: NEARLY EVERY DAY
GAD7 TOTAL SCORE: 16
5. BEING SO RESTLESS THAT IT IS HARD TO SIT STILL: NOT AT ALL
1. FEELING NERVOUS, ANXIOUS, OR ON EDGE: NEARLY EVERY DAY
3. WORRYING TOO MUCH ABOUT DIFFERENT THINGS: NEARLY EVERY DAY
7. FEELING AFRAID AS IF SOMETHING AWFUL MIGHT HAPPEN: SEVERAL DAYS
2. NOT BEING ABLE TO STOP OR CONTROL WORRYING: NEARLY EVERY DAY

## 2022-03-01 ASSESSMENT — PATIENT HEALTH QUESTIONNAIRE - PHQ9
SUM OF ALL RESPONSES TO PHQ QUESTIONS 1-9: 16
5. POOR APPETITE OR OVEREATING: NEARLY EVERY DAY

## 2022-03-01 NOTE — PROGRESS NOTES
"    [facility]                                     Progress Note    Patient Name: Melquiades Morales  Date: 3/1/22         Service Type: Individual      Session Start Time: 11 am  Session End Time: 11:45 am     Session Length: 45 min    Session #: 2    Attendees: Client attended alone    Service Modality:  Phone Visit:      Provider verified identity through the following two step process.  Patient provided:  Patient is known previously to provider    The patient has been notified of the following:      \"We have found that certain health care needs can be provided without the need for a face to face visit.  This service lets us provide the care you need with a phone conversation.       I will have full access to your [facility] medical record during this entire phone call.   I will be taking notes for your medical record.      Since this is like an office visit, we will bill your insurance company for this service.       There are potential benefits and risks of telephone visits (e.g. limits to patient confidentiality) that differ from in-person visits.?Confidentiality still applies for telephone services, and nobody will record the visit.  It is important to be in a quiet, private space that is free of distractions (including cell phone or other devices) during the visit.??      If during the course of the call I believe a telephone visit is not appropriate, you will not be charged for this service\"     Consent has been obtained for this service by care team member: Yes     DATA  Interactive Complexity: No  Crisis: No        Progress Since Last Session (Related to Symptoms / Goals / Homework):   Symptoms: Worsening on both scales    Homework: Partially completed     Episode of Care Goals: Minimal progress - ACTION (Actively working towards change); Intervened by reinforcing change plan / affirming steps taken    Current / Ongoing Stressors and Concerns:  His [facility] worker whom he is very " close to will be moving on to another job.     Treatment Objective(s) Addressed in This Session:   Use a healthy coping idea as needed.    Intervention:  Assessed functioning and for safety. Reviewed the phq/shailesh. Processed his feelings about his caregiver moving on and prospects of future one being added in time.    Assessments completed prior to visit:  The following assessments were completed by patient for this visit:  PHQ9:   PHQ-9 SCORE 2/19/2021 3/19/2021 7/19/2021 1/28/2022 1/28/2022 2/7/2022 3/1/2022   PHQ-9 Total Score - - - - - - -   PHQ-9 Total Score MyChart - - - - 14 (Moderate depression) - -   PHQ-9 Total Score 22 17 15 14 14 14 16     GAD7:   SHAILESH-7 SCORE 1/22/2021 2/5/2021 2/19/2021 3/19/2021 1/28/2022 2/7/2022 3/1/2022   Total Score - - - - - - -   Total Score - - - - 13 (moderate anxiety) - -   Total Score 13 13 18 16 13 10 16     CAGE-AID:   CAGE-AID Total Score 1/28/2022   Total Score 0   Total Score MyChart 0 (A total score of 2 or greater is considered clinically significant)     PROMIS 10-Global Health (all questions and answers displayed):   PROMIS 10 1/28/2022   In general, would you say your health is: Poor   In general, would you say your quality of life is: Fair   In general, how would you rate your physical health? Poor   In general, how would you rate your mental health, including your mood and your ability to think? Fair   In general, how would you rate your satisfaction with your social activities and relationships? Fair   In general, please rate how well you carry out your usual social activities and roles Poor   To what extent are you able to carry out your everyday physical activities such as walking, climbing stairs, carrying groceries, or moving a chair? A little   How often have you been bothered by emotional problems such as feeling anxious, depressed or irritable? Often   How would you rate your fatigue on average? Moderate   How would you rate your pain on average?   0 = No  Pain  to  10 = Worst Imaginable Pain 5   In general, would you say your health is: 1   In general, would you say your quality of life is: 2   In general, how would you rate your physical health? 1   In general, how would you rate your mental health, including your mood and your ability to think? 2   In general, how would you rate your satisfaction with your social activities and relationships? 2   In general, please rate how well you carry out your usual social activities and roles. (This includes activities at home, at work and in your community, and responsibilities as a parent, child, spouse, employee, friend, etc.) 1   To what extent are you able to carry out your everyday physical activities such as walking, climbing stairs, carrying groceries, or moving a chair? 2   In the past 7 days, how often have you been bothered by emotional problems such as feeling anxious, depressed, or irritable? 4   In the past 7 days, how would you rate your fatigue on average? 3   In the past 7 days, how would you rate your pain on average, where 0 means no pain, and 10 means worst imaginable pain? 5   Global Mental Health Score 8   Global Physical Health Score 9   PROMIS TOTAL - SUBSCORES 17   Some recent data might be hidden     [place] Suicide Severity Rating Scale (Lifetime/Recent)  [place] Suicide Severity Rating (Lifetime/Recent) 9/8/2018 1/28/2022   Wish to be Dead (Lifetime) - Yes   Non-Specific Active Suicidal Thoughts (Lifetime) - Yes   Most Severe Ideation Rating (Lifetime) - 5   Frequency (Lifetime) - 3   Duration (Lifetime) - 2   Controllability (Lifetime) - 5   Protective Factors  (Lifetime) - 5   Reasons for Ideation (Lifetime) - 4   RETIRED: 1. Wish to be Dead (Recent) - No   RETIRED: 2. Non-Specific Active Suicidal Thoughts (Recent) - No   3. Active Suicidal Ideation with any Methods (Not Plan) Without Intent to Act (Lifetime) - Yes   RETIRED: 3. Active Suicidal Ideation with any Methods (Not Plan) Without Intent  to Act (Recent) - No   RETIRE: 4. Active Suicidal Ideation with Some Intent to Act, Without Specific Plan (Lifetime) - Yes   4. Active Suicidal Ideation with Some Intent to Act, Without Specific Plan (Recent) - No   RETIRE: 5. Active Suicidal Ideation with Specific Plan and Intent (Lifetime) - Yes   RETIRED: 5. Active Suicidal Ideation with Specific Plan and Intent (Recent) - No   Most Severe Ideation Rating (Past Month) NA NA   Frequency (Past Month) - NA   Duration (Past Month) - NA   Controllability (Past Month) - NA   Protective Factors (Past Month) - NA   Reasons for Ideation (Past Month) - NA   Actual Attempt (Lifetime) - Yes   Actual Attempt Description (Lifetime) - 3 attempts with last one 15 years ago   Total Number of Actual Attempts (Lifetime) - 3   Actual Attempt (Past 3 Months) - No   Has subject engaged in non-suicidal self-injurious behavior? (Lifetime) - No   Has subject engaged in non-suicidal self-injurious behavior? (Past 3 Months) - No   Interrupted Attempts (Lifetime) - No   Interrupted Attempts (Past 3 Months) - No   Aborted or Self-Interrupted Attempt (Lifetime) - No   Aborted or Self-Interrupted Attempt (Past 3 Months) - No   Preparatory Acts or Behavior (Lifetime) - No   Preparatory Acts or Behavior (Past 3 Months) - No   Most Recent Attempt Actual Lethality Code - 3   Most Lethal Attempt Actual Lethality Code - 2   Initial/First Attempt Actual Lethality Code - 2         ASSESSMENT: Current Emotional / Mental Status (status of significant symptoms):   Risk status (Self / Other harm or suicidal ideation)   Patient denies current fears or concerns for personal safety.   Patient denies current or recent suicidal ideation or behaviors.   Patient denies current or recent homicidal ideation or behaviors.   Patient denies current or recent self injurious behavior or ideation.   Patient denies other safety concerns.   Patient reports there has been no change in risk factors since their last session.      Patient reports there has been no change in protective factors since their last session.     a safety plan was completed several years ago     Appearance:   Unable to assess.   Eye Contact:   Unable to assess.   Psychomotor Behavior: Unable to assess.   Attitude:   Cooperative    Orientation:   All   Speech    Rate / Production: Normal     Volume:  Normal    Mood:    Depressed  Normal   Affect:    Appropriate    Thought Content:  Clear    Thought Form:  Coherent  Logical    Insight:    Good  and Fair      Medication Review:   No changes to current psychiatric medication(s)     Medication Compliance:   Yes     Changes in Health Issues:   None reported     Chemical Use Review:   Substance Use: Chemical use reviewed, no active concerns identified      Tobacco Use: No change in amount of tobacco use since last session.  Contemplation    Diagnosis:  MDD; EVGENY; PTSD.    Collateral Reports Completed:   Routed note to PCP    PLAN: (Patient Tasks / Therapist Tasks / Other)  Weekly as able.   Follow safety plan. Use a healthy coping idea as needed.  Update safety plan.    Goals due 5/1/22      Kleber Pollack, [facility]                                                         ______________________________________________________________________    Individual Treatment Plan    Patient's Name: Melquiades Morales  YOB: 1955    Date of Creation: 1/28/22  Date Treatment Plan Last Reviewed/Revised: 3/1/22    DSM5 Diagnoses: 296.32 (F33.1) Major Depressive Disorder, Recurrent Episode, Moderate _, 300.02 (F41.1) Generalized Anxiety Disorder or 309.81 (F43.10) Posttraumatic Stress Disorder (includes Posttraumatic Stress Disorder for Children 6 Years and Younger)  With dissociative symptoms  Psychosocial / Contextual Factors: recent loss of wife.  PROMIS (reviewed every 90 days): 1/28/22    Referral / Collaboration:  Referral to another professional/service is not indicated at this time.    Anticipated number of session  for this episode of care: 10+  Anticipation frequency of session: Weekly  Anticipated Duration of each session: 38-52 minutes  Treatment plan will be reviewed in 90 days or when goals have been changed.       MeasurableTreatment Goal(s) related to diagnosis / functional impairment(s)  Goal 1: Patient will      I will know I've met my goal when  .       Objective #A (Patient Action)                          Patient will continue to follow his safety plan.  Status: New - Date: 1/28/22      Intervention(s)  Therapist will monitor for safety each visit and encourage use of safety plan.     Objective #B  Patient will use a healthy coping idea as needed 100% of trials for 1 week.  Status: New - Date: 1/28/22      Intervention(s)  Therapist will provide ideas and encouragement to use them.     Objective #C  Patient will process the loss of his wife as needed.  Status: New - Date: 1/28/22      Intervention(s)  Therapist will consider EMDR.              Patient has reviewed and agreed to the above plan.        Kleber Pollack, [facility]                January 28, 2022

## 2022-03-02 ASSESSMENT — ANXIETY QUESTIONNAIRES: GAD7 TOTAL SCORE: 16

## 2022-03-11 ENCOUNTER — OFFICE VISIT (OUTPATIENT)
Dept: FAMILY MEDICINE | Facility: CLINIC | Age: 67
End: 2022-03-11
Payer: COMMERCIAL

## 2022-03-11 VITALS
TEMPERATURE: 98.2 F | HEART RATE: 100 BPM | SYSTOLIC BLOOD PRESSURE: 138 MMHG | BODY MASS INDEX: 34.8 KG/M2 | HEIGHT: 71 IN | WEIGHT: 248.6 LBS | RESPIRATION RATE: 18 BRPM | DIASTOLIC BLOOD PRESSURE: 74 MMHG | OXYGEN SATURATION: 87 %

## 2022-03-11 DIAGNOSIS — Z51.81 ENCOUNTER FOR THERAPEUTIC DRUG MONITORING: ICD-10-CM

## 2022-03-11 DIAGNOSIS — Z86.0100 HISTORY OF COLONIC POLYPS: ICD-10-CM

## 2022-03-11 DIAGNOSIS — F11.90 CHRONIC, CONTINUOUS USE OF OPIOIDS: ICD-10-CM

## 2022-03-11 DIAGNOSIS — J96.11 CHRONIC RESPIRATORY FAILURE WITH HYPOXIA (H): ICD-10-CM

## 2022-03-11 DIAGNOSIS — E66.01 MORBID OBESITY (H): ICD-10-CM

## 2022-03-11 DIAGNOSIS — Z00.00 ENCOUNTER FOR MEDICARE ANNUAL WELLNESS EXAM: Primary | ICD-10-CM

## 2022-03-11 DIAGNOSIS — G89.4 CHRONIC PAIN SYNDROME: ICD-10-CM

## 2022-03-11 DIAGNOSIS — F17.210 SMOKES WITH GREATER THAN 30 PACK YEAR HISTORY: ICD-10-CM

## 2022-03-11 DIAGNOSIS — D72.829 LEUKOCYTOSIS, UNSPECIFIED TYPE: ICD-10-CM

## 2022-03-11 DIAGNOSIS — Z23 NEED FOR VACCINATION: ICD-10-CM

## 2022-03-11 DIAGNOSIS — L82.1 SEBORRHEIC KERATOSES: ICD-10-CM

## 2022-03-11 DIAGNOSIS — Z87.891 PERSONAL HISTORY OF TOBACCO USE: ICD-10-CM

## 2022-03-11 DIAGNOSIS — F11.29 OPIOID DEPENDENCE WITH OPIOID-INDUCED DISORDER (H): ICD-10-CM

## 2022-03-11 DIAGNOSIS — F32.2 MAJOR DEPRESSIVE DISORDER, SINGLE EPISODE, SEVERE (H): ICD-10-CM

## 2022-03-11 PROBLEM — J96.01 ACUTE RESPIRATORY FAILURE WITH HYPOXIA AND HYPERCAPNIA (H): Status: RESOLVED | Noted: 2019-10-29 | Resolved: 2022-03-11

## 2022-03-11 PROBLEM — J96.02 ACUTE RESPIRATORY FAILURE WITH HYPOXIA AND HYPERCAPNIA (H): Status: RESOLVED | Noted: 2019-10-29 | Resolved: 2022-03-11

## 2022-03-11 LAB
ALBUMIN SERPL-MCNC: 3.5 G/DL (ref 3.4–5)
ALP SERPL-CCNC: 77 U/L (ref 40–150)
ALT SERPL W P-5'-P-CCNC: 25 U/L (ref 0–70)
ANION GAP SERPL CALCULATED.3IONS-SCNC: 2 MMOL/L (ref 3–14)
AST SERPL W P-5'-P-CCNC: 19 U/L (ref 0–45)
BASOPHILS # BLD AUTO: 0 10E3/UL (ref 0–0.2)
BASOPHILS NFR BLD AUTO: 0 %
BILIRUB SERPL-MCNC: 0.4 MG/DL (ref 0.2–1.3)
BUN SERPL-MCNC: 5 MG/DL (ref 7–30)
CALCIUM SERPL-MCNC: 9.3 MG/DL (ref 8.5–10.1)
CANNABINOIDS UR QL SCN: ABNORMAL
CHLORIDE BLD-SCNC: 102 MMOL/L (ref 94–109)
CO2 SERPL-SCNC: 34 MMOL/L (ref 20–32)
CREAT SERPL-MCNC: 0.69 MG/DL (ref 0.66–1.25)
CREAT UR-MCNC: 116 MG/DL
EOSINOPHIL # BLD AUTO: 0.2 10E3/UL (ref 0–0.7)
EOSINOPHIL NFR BLD AUTO: 2 %
ERYTHROCYTE [DISTWIDTH] IN BLOOD BY AUTOMATED COUNT: 15.1 % (ref 10–15)
GFR SERPL CREATININE-BSD FRML MDRD: >90 ML/MIN/1.73M2
GLUCOSE BLD-MCNC: 150 MG/DL (ref 70–99)
HCT VFR BLD AUTO: 52.2 % (ref 40–53)
HGB BLD-MCNC: 16.6 G/DL (ref 13.3–17.7)
HOLD SPECIMEN: NORMAL
LYMPHOCYTES # BLD AUTO: 4.1 10E3/UL (ref 0.8–5.3)
LYMPHOCYTES NFR BLD AUTO: 37 %
MCH RBC QN AUTO: 32 PG (ref 26.5–33)
MCHC RBC AUTO-ENTMCNC: 31.8 G/DL (ref 31.5–36.5)
MCV RBC AUTO: 101 FL (ref 78–100)
MONOCYTES # BLD AUTO: 1 10E3/UL (ref 0–1.3)
MONOCYTES NFR BLD AUTO: 9 %
NEUTROPHILS # BLD AUTO: 5.6 10E3/UL (ref 1.6–8.3)
NEUTROPHILS NFR BLD AUTO: 51 %
PLATELET # BLD AUTO: 260 10E3/UL (ref 150–450)
POTASSIUM BLD-SCNC: 4.1 MMOL/L (ref 3.4–5.3)
PROT SERPL-MCNC: 7.1 G/DL (ref 6.8–8.8)
RBC # BLD AUTO: 5.18 10E6/UL (ref 4.4–5.9)
SODIUM SERPL-SCNC: 138 MMOL/L (ref 133–144)
WBC # BLD AUTO: 10.9 10E3/UL (ref 4–11)

## 2022-03-11 PROCEDURE — G0296 VISIT TO DETERM LDCT ELIG: HCPCS | Performed by: FAMILY MEDICINE

## 2022-03-11 PROCEDURE — 90732 PPSV23 VACC 2 YRS+ SUBQ/IM: CPT | Performed by: FAMILY MEDICINE

## 2022-03-11 PROCEDURE — 80307 DRUG TEST PRSMV CHEM ANLYZR: CPT | Mod: 59 | Performed by: FAMILY MEDICINE

## 2022-03-11 PROCEDURE — 99214 OFFICE O/P EST MOD 30 MIN: CPT | Mod: 25 | Performed by: FAMILY MEDICINE

## 2022-03-11 PROCEDURE — 80307 DRUG TEST PRSMV CHEM ANLYZR: CPT | Performed by: FAMILY MEDICINE

## 2022-03-11 PROCEDURE — 90746 HEPB VACCINE 3 DOSE ADULT IM: CPT | Performed by: FAMILY MEDICINE

## 2022-03-11 PROCEDURE — G0009 ADMIN PNEUMOCOCCAL VACCINE: HCPCS | Performed by: FAMILY MEDICINE

## 2022-03-11 PROCEDURE — 36415 COLL VENOUS BLD VENIPUNCTURE: CPT | Performed by: FAMILY MEDICINE

## 2022-03-11 PROCEDURE — 85025 COMPLETE CBC W/AUTO DIFF WBC: CPT | Performed by: FAMILY MEDICINE

## 2022-03-11 PROCEDURE — G0010 ADMIN HEPATITIS B VACCINE: HCPCS | Performed by: FAMILY MEDICINE

## 2022-03-11 PROCEDURE — 80053 COMPREHEN METABOLIC PANEL: CPT | Performed by: FAMILY MEDICINE

## 2022-03-11 PROCEDURE — G0439 PPPS, SUBSEQ VISIT: HCPCS | Performed by: FAMILY MEDICINE

## 2022-03-11 ASSESSMENT — ACTIVITIES OF DAILY LIVING (ADL)
CURRENT_FUNCTION: SHOPPING REQUIRES ASSISTANCE
CURRENT_FUNCTION: PREPARING MEALS REQUIRES ASSISTANCE
CURRENT_FUNCTION: MONEY MANAGEMENT REQUIRES ASSISTANCE
CURRENT_FUNCTION: HOUSEWORK REQUIRES ASSISTANCE
CURRENT_FUNCTION: LAUNDRY REQUIRES ASSISTANCE

## 2022-03-11 NOTE — LETTER
March 11, 2022      Melquiades Morales  [address] [place] [address]    To  [facility] [address]  [place], [place]  ZIP [address]  Phone: [phone]  Fax: [phone]      To Whom it May Concern    We are writing on behalf of Melquiades Morales who requested that we send the following results to you for management of his chronic pain medications.       Resulted Orders   Urine Drug Confirmation Panel   Result Value Ref Range    Hydromorphone ng/mL 640 (H) <50 ng/mL    Hydromorphone 552 Absent ng/mg [creat]      Comment:      Hydromorphone may be present as a metabolite of morphine.  Concentrations of hydromorphone rarely exceed 5% of the morphine concentration when this is the source of hydromorphone.  Hydromorphone and dihydrocodeine are expected metabolites of hydrocodone. Hydromorphone and dihydrocodeine are also available as scheduled prescription medications.    Morphine ng/mL >7,000 (H) <50 ng/mL    Morphine        Comment:      Unable to calculate: Result value above analytical measurement range      Oxycodone ng/mL 8,120 (H) <50 ng/mL    Oxycodone 7,000 Absent ng/mg [creat]      Comment:      Sources of oxycodone are scheduled prescription medications.    Oxymorphone ng/mL 1,460 (H) <50 ng/mL    Oxymorphone 1,259 Absent ng/mg [creat]      Comment:      Oxymorphone is an expected metabolite of oxycodone. Oxymorphone is also available as a scheduled prescription medication.    Pregabalin Present (A) Absent      Comment:      Sources of pregabalin are prescription medications.    Narrative    This test was developed and its performance characteristics determined by the [facility],  Special [facility] Laboratory. It has not been cleared or approved by the FDA. The laboratory is regulated under CLIA as qualified to perform high-complexity testing. This test is used for clinical purposes. It should not be regarded as investigational or  for research.   Urine Creatinine for Drug Screen Panel   Result Value Ref Range    Creatinine Urine for Drug Screen 116 mg/dL      Comment:      The reference range has not been established for creatinine in random urines. The results should be integrated into the clinical context for interpretation.   Cannabinoids qualitative urine   Result Value Ref Range    Cannabinoids Urine Screen Positive (A) Screen Negative      Comment:      Cutoff for a positive cannabinoid is greater than 50 ng/mL.   This is an unconfirmed screening result to be used for medical purposes only.   Comprehensive metabolic panel (BMP + Alb, Alk Phos, ALT, AST, Total. Bili, TP)   Result Value Ref Range    Sodium 138 133 - 144 mmol/L    Potassium 4.1 3.4 - 5.3 mmol/L    Chloride 102 94 - 109 mmol/L    Carbon Dioxide (CO2) 34 (H) 20 - 32 mmol/L    Anion Gap 2 (L) 3 - 14 mmol/L    Urea Nitrogen 5 (L) 7 - 30 mg/dL    Creatinine 0.69 0.66 - 1.25 mg/dL    Calcium 9.3 8.5 - 10.1 mg/dL    Glucose 150 (H) 70 - 99 mg/dL    Alkaline Phosphatase 77 40 - 150 U/L    AST 19 0 - 45 U/L    ALT 25 0 - 70 U/L    Protein Total 7.1 6.8 - 8.8 g/dL    Albumin 3.5 3.4 - 5.0 g/dL    Bilirubin Total 0.4 0.2 - 1.3 mg/dL    GFR Estimate >90 >60 mL/min/1.73m2      Comment:      Effective December 21, 2021 eGFRcr in adults is calculated using the 2021 CKD-EPI creatinine equation which includes age and gender (Levi monroy al., [place], DOI: 10.1056/DZGIwm1137117)   CBC with platelets and differential   Result Value Ref Range    WBC Count 10.9 4.0 - 11.0 10e3/uL    RBC Count 5.18 4.40 - 5.90 10e6/uL    Hemoglobin 16.6 13.3 - 17.7 g/dL    Hematocrit 52.2 40.0 - 53.0 %     (H) 78 - 100 fL    MCH 32.0 26.5 - 33.0 pg    MCHC 31.8 31.5 - 36.5 g/dL    RDW 15.1 (H) 10.0 - 15.0 %    Platelet Count 260 150 - 450 10e3/uL    % Neutrophils 51 %    % Lymphocytes 37 %    % Monocytes 9 %    % Eosinophils 2 %    % Basophils 0 %    Absolute Neutrophils 5.6 1.6 - 8.3 10e3/uL    Absolute  Lymphocytes 4.1 0.8 - 5.3 10e3/uL    Absolute Monocytes 1.0 0.0 - 1.3 10e3/uL    Absolute Eosinophils 0.2 0.0 - 0.7 10e3/uL    Absolute Basophils 0.0 0.0 - 0.2 10e3/uL       If you have any questions or concerns, please call the clinic at the number listed above.       Sincerely,      Eliane Quinn MD

## 2022-03-11 NOTE — PATIENT INSTRUCTIONS
Patient Education   Personalized Prevention Plan  You are due for the preventive services outlined below.  Your care team is available to assist you in scheduling these services.  If you have already completed any of these items, please share that information with your care team to update in your medical record.  Health Maintenance Due   Topic Date Due     ANNUAL REVIEW OF HM ORDERS  Never done     Migraine Action Plan  Never done     Hepatitis B Vaccine (1 of 3 - Risk 3-dose series) Never done     LUNG CANCER SCREENING  08/05/2021     Colorectal Cancer Screening  11/01/2021     FALL RISK ASSESSMENT  Never done     Pneumococcal Vaccine (2 of 2 - PPSV23) 02/28/2022     Pneumococcal Vaccine (2 of 2 - PPSV23) 02/28/2022     Your Health Risk Assessment indicates you feel you are not in good health    A healthy lifestyle helps keep the body fit and the mind alert. It helps protect you from disease, helps you fight disease, and helps prevent chronic disease (disease that doesn't go away) from getting worse. This is important as you get older and begin to notice twinges in muscles and joints and a decline in the strength and stamina you once took for granted. A healthy lifestyle includes good healthcare, good nutrition, weight control, recreation, and regular exercise. Avoid harmful substances and do what you can to keep safe. Another part of a healthy lifestyle is stay mentally active and socially involved.    Good healthcare     Have a wellness visit every year.     If you have new symptoms, let us know right away. Don't wait until the next checkup.     Take medicines exactly as prescribed and keep your medicines in a safe place. Tell us if your medicine causes problems.   Healthy diet and weight control     Eat 3 or 4 small, nutritious, low-fat, high-fiber meals a day. Include a variety of fruits, vegetables, and whole-grain foods.     Make sure you get enough calcium in your diet. Calcium, vitamin D, and exercise  help prevent osteoporosis (bone thinning).     If you live alone, try eating with others when you can. That way you get a good meal and have company while you eat it.     Try to keep a healthy weight. If you eat more calories than your body uses for energy, it will be stored as fat and you will gain weight.     Recreation   Recreation is not limited to sports and team events. It includes any activity that provides relaxation, interest, enjoyment, and exercise. Recreation provides an outlet for physical, mental, and social energy. It can give a sense of worth and achievement. It can help you stay healthy.    Mental Exercise and Social Involvement  Mental and emotional health is as important as physical health. Keep in touch with friends and family. Stay as active as possible. Continue to learn and challenge yourself.   Things you can do to stay mentally active are:    Learn something new, like a foreign language or musical instrument.     Play SCRABBLE or do crossword puzzles. If you cannot find people to play these games with you at home, you can play them with others on your computer through the Internet.     Join a games club--anything from card games to chess or checkers or lawn bowling.     Start a new hobby.     Go back to school.     Volunteer.     Read.   Keep up with world events.    Exercise for a Healthier Heart  You may wonder how you can improve the health of your heart. If you re thinking about exercise, you re on the right track. You don t need to become an athlete. But you do need a certain amount of brisk exercise to help strengthen your heart. If you have been diagnosed with a heart condition, your healthcare provider may advise exercise to help stabilize your condition. To help make exercise a habit, choose safe, fun activities.      Exercise with a friend. When activity is fun, you're more likely to stick with it.   Before you start  Check with your healthcare provider before starting an exercise  program. This is especially important if you have not been active for a while. It's also important if you have a long-term (chronic) health problem such as heart disease, diabetes, or obesity. Or if you are at high risk for having these problems.   Why exercise?  Exercising regularly offers many healthy rewards. It can help you do all of the following:     Improve your blood cholesterol level to help prevent further heart trouble    Lower your blood pressure to help prevent a stroke or heart attack    Control diabetes, or reduce your risk of getting this disease    Improve your heart and lung function    Reach and stay at a healthy weight    Make your muscles stronger so you can stay active    Prevent falls and fractures by slowing the loss of bone mass (osteoporosis)    Manage stress better    Reduce your blood pressure    Improve your sense of self and your body image  Exercise tips      Ease into your routine. Set small goals. Then build on them. If you are not sure what your activity level should be, talk with your healthcare provider first before starting an exercise routine.    Exercise on most days. Aim for a total of 150 minutes (2 hours and 30 minutes) or more of moderate-intensity aerobic activity each week. Or 75 minutes (1 hour and 15 minutes) or more of vigorous-intensity aerobic activity each week. Or try for a combination of both. Moderate activity means that you breathe heavier and your heart rate increases but you can still talk. Think about doing 40 minutes of moderate exercise, 3 to 4 times a week. For best results, activity should last for about 40 minutes to lower blood pressure and cholesterol. It's OK to work up to the 40-minute period over time. Examples of moderate-intensity activity are walking 1 mile in 15 minutes. Or doing 30 to 45 minutes of yard work.    Step up your daily activity level.  Along with your exercise program, try being more active the whole day. Walk instead of drive. Or  park further away so that you take more steps each day. Do more household tasks or yard work. You may not be able to meet the advised mount of physical activity. But doing some moderate- or vigorous-intensity aerobic activity can help reduce your risk for heart disease. Your healthcare provider can help you figure out what is best for you.    Choose 1 or more activities you enjoy.  Walking is one of the easiest things you can do. You can also try swimming, riding a bike, dancing, or taking an exercise class.    When to call your healthcare provider  Call your healthcare provider if you have any of these:     Chest pain or feel dizzy or lightheaded    Burning, tightness, pressure, or heaviness in your chest, neck, shoulders, back, or arms    Abnormal shortness of breath    More joint or muscle pain    A very fast or irregular heartbeat (palpitations)  [employer] last reviewed this educational content on 7/1/2019 2000-2021 [employer]. All rights reserved. This information is not intended as a substitute for professional medical care. Always follow your healthcare professional's instructions.           Lung Cancer Screening   Frequently Asked Questions  If you are at high-risk for lung cancer, getting screened with low-dose computed tomography (LDCT) every year can help save your life. This handout offers answers to some of the most common questions about lung cancer screening. If you have other questions, please call 6-350-7-PCancer ([phone]).     What is it?  Lung cancer screening uses special X-ray technology to create an image of your lung tissue. The exam is quick and easy and takes less than 10 seconds. We don t give you any medicine or use any needles. You can eat before and after the exam. You don t need to change your clothes as long as the clothing on your chest doesn t contain metal. But, you do need to be able to hold your breath for at least 6 seconds during the exam.    What is  the goal of lung cancer screening?  The goal of lung cancer screening is to save lives. Many times, lung cancer is not found until a person starts having physical symptoms. Lung cancer screening can help detect lung cancer in the earliest stages when it may be easier to treat.    Who should be screened for lung cancer?  We suggest lung cancer screening for anyone who is at high-risk for lung cancer. You are in the high-risk group if you:      are between the ages of 55 and 79, and    have smoked at least 1 pack of cigarettes a day for 20 or more years, and    still smoke or have quit within the past 15 years.    However, if you have a new cough or shortness of breath, you should talk to your doctor before being screened.    Why does it matter if I have symptoms?  Certain symptoms can be a sign that you have a condition in your lungs that should be checked and treated by your doctor. These symptoms include fever, chest pain, a new or changing cough, shortness of breath that you have never felt before, coughing up blood or unexplained weight loss. Having any of these symptoms can greatly affect the results of lung cancer screening.       Should all smokers get an LDCT lung cancer screening exam?  It depends. Lung cancer screening is for a very specific group of men and women who have a history of heavy smoking over a long period of time (see  Who should be screened for lung cancer  above).  I am in the high-risk group, but have been diagnosed with cancer in the past. Is LDCT lung cancer screening right for me?  In some cases, you should not have LDCT lung screening, such as when your doctor is already following your cancer with CT scan studies. Your doctor will help you decide if LDCT lung screening is right for you.  Do I need to have a screening exam every year?  Yes. If you are in the high-risk group described earlier, you should get an LDCT lung cancer screening exam every year until you are 79, or are no longer  willing or able to undergo screening and possible procedures to diagnose and treat lung cancer.  How effective is LDCT at preventing death from lung cancer?  Studies have shown that LDCT lung cancer screening can lower the risk of death from lung cancer by 20 percent in people who are at high-risk.  What are the risks?  There are some risks and limitations of LDCT lung cancer screening. We want to make sure you understand the risks and benefits, so please let us know if you have any questions. Your doctor may want to talk with you more about these risks.    Radiation exposure: As with any exam that uses radiation, there is a very small increased risk of cancer. The amount of radiation in LDCT is small--about the same amount a person would get from a mammogram. Your doctor orders the exam when he or she feels the potential benefits outweigh the risks.    False negatives: No test is perfect, including LDCT. It is possible that you may have a medical condition, including lung cancer, that is not found during your exam. This is called a false negative result.    False positives and more testing: LDCT very often finds something in the lung that could be cancer, but in fact is not. This is called a false positive result. False positive tests often cause anxiety. To make sure these findings are not cancer, you may need to have more tests. These tests will be done only if you give us permission. Sometimes patients need a treatment that can have side effects, such as a biopsy. For more information on false positives, see  What can I expect from the results?     Findings not related to lung cancer: Your LDCT exam also takes pictures of areas of your body next to your lungs. In a very small number of cases, the CT scan will show an abnormal finding in one of these areas, such as your kidneys, adrenal glands, liver or thyroid. This finding may not be serious, but you may need more tests. Your doctor can help you decide what  other tests you may need, if any.  What can I expect from the results?  About 1 out of 4 LDCT exams will find something that may need more tests. Most of the time, these findings are lung nodules. Lung nodules are very small collections of tissue in the lung. These nodules are very common, and the vast majority--more than 97 percent--are not cancer (benign). Most are normal lymph nodes or small areas of scarring from past infections.  But, if a small lung nodule is found to be cancer, the cancer can be cured more than 90 percent of the time. To know if the nodule is cancer, we may need to get more images before your next yearly screening exam. If the nodule has suspicious features (for example, it is large, has an odd shape or grows over time), we will refer you to a specialist for further testing.  Will my doctor also get the results?  Yes. Your doctor will get a copy of your results.  Is it okay to keep smoking now that there s a cancer screening exam?  No. Tobacco is one of the strongest cancer-causing agents. It causes not only lung cancer, but other cancers and cardiovascular (heart) diseases as well. The damage caused by smoking builds over time. This means that the longer you smoke, the higher your risk of disease. While it is never too late to quit, the sooner you quit, the better.  Where can I find help to quit smoking?  The best way to prevent lung cancer is to stop smoking. If you have already quit smoking, congratulations and keep it up! For help on quitting smoking, please call [employer] at 3-320-QUITNOW (1-270.354.3438) or the [employer] at 1-364.500.7570 to find local resources near you.  One-on-one health coaching:  If you d prefer to work individually with a health care provider on tobacco cessation, we offer:      Medication Therapy Management:  Our specially trained pharmacists work closely with you and your doctor to help you quit smoking.  Call [phone] or [phone] (toll  free).

## 2022-03-11 NOTE — PROGRESS NOTES
"SUBJECTIVE:   Melquiades Morales is a 67 year old male who presents for Preventive Visit.    Patient has been advised of split billing requirements and indicates understanding: Yes  Are you in the first 12 months of your Medicare coverage?  No    Healthy Habits:    In general, how would you rate your overall health?  Poor    Frequency of exercise:  None    Do you usually eat at least 4 servings of fruit and vegetables a day, include whole grains    & fiber and avoid regularly eating high fat or \"junk\" foods?  No    Taking medications regularly:  Yes    Medication side effects:  None    Ability to successfully perform activities of daily living:  Shopping requires assistance, preparing meals requires assistance, housework requires assistance, laundry requires assistance and money management requires assistance    Home Safety:  No safety concerns identified    Hearing Impairment:  Difficulty following a conversation in a noisy restaurant or crowded room, feel that people are mumbling or not speaking clearly, need to ask people to speak up or repeat themselves, find that men's voices are easier to understand than woman's and difficulty understanding soft or whispered speech    In the past 6 months, have you been bothered by leaking of urine?  No    In general, how would you rate your overall mental or emotional health?  Fair      PHQ-2 Total Score:    Additional concerns today:  Yes (needs UA and blood work for his pain doctor in [place])    Do you feel safe in your environment? Yes    Have you ever done Advance Care Planning? (For example, a Health Directive, POLST, or a discussion with a medical provider or your loved ones about your wishes): No, advance care planning information given to patient to review.  Patient declined advance care planning discussion at this time.    Fall risk  Fallen 2 or more times in the past year?: Yes  Any fall with injury in the past year?: No    Cognitive Screening Not appropriate due to " mental handicap - patient declined    His prior PCA is no longer workign with him but he still does have a PCA through [facility].  Denies having any other issues that need dealing with today but interested in all checkup/maintenance that would be recommended    Patient says he likes to lay down in the sun daily - by his window  He does have a hx of many sun burns as a child  He doesn't know about skin cancer in FHx because adopted  No personal hx of skin cancer    Do you have sleep apnea, excessive snoring or daytime drowsiness?: yes    Reviewed and updated as needed this visit by clinical staff   Tobacco  Allergies  Meds  Problems  Med Hx  Surg Hx  Fam Hx  Soc   Hx      Reviewed and updated as needed this visit by Provider   Tobacco  Allergies  Meds  Problems  Med Hx  Surg Hx  Fam Hx         Social History     Tobacco Use     Smoking status: Current Every Day Smoker     Packs/day: 1.00     Years: 57.00     Pack years: 57.00     Types: Cigarettes     Start date: 1965     Smokeless tobacco: Former User   Substance Use Topics     Alcohol use: No   Started smoking around age 10. 59 years of smoking. Initially not much but now, average pack per day.    If you drink alcohol do you typically have >3 drinks per day or >7 drinks per week? No    Alcohol Use 3/11/2022   Prescreen: >3 drinks/day or >7 drinks/week? No     Current providers sharing in care for this patient include:   Patient Care Team:  Eliane Quinn MD as PCP - General (Family Medicine)  Hubert Byrd MD as Referring Physician (ENT)  Jessie Smith MD as MD (Otolaryngology)  Eliane Quinn MD as Assigned PCP    The following health maintenance items are reviewed in Epic and correct as of today:  Health Maintenance Due   Topic Date Due     LUNG CANCER SCREENING  08/05/2021     COLORECTAL CANCER SCREENING  11/01/2021     FALL RISK ASSESSMENT  Never done       Review of Systems  Constitutional, HEENT, cardiovascular, pulmonary, gi  "and gu systems are negative, except as otherwise noted.    OBJECTIVE:   /74   Pulse 100   Temp 98.2  F (36.8  C) (Tympanic)   Resp 18   Ht 1.81 m (5' 11.25\")   Wt 112.8 kg (248 lb 9.6 oz)   SpO2 (!) 87%   BMI 34.43 kg/m   Estimated body mass index is 34.43 kg/m  as calculated from the following:    Height as of this encounter: 1.81 m (5' 11.25\").    Weight as of this encounter: 112.8 kg (248 lb 9.6 oz).  Physical Exam  GENERAL: healthy, alert and no distress  EYES: Eyes grossly normal to inspection, PERRL and conjunctivae and sclerae normal  HENT: ear canals and TM's normal, nose and mouth without ulcers or lesions  NECK: no adenopathy, no asymmetry, masses, or scars and thyroid normal to palpation  RESP: lungs clear to auscultation - no rales, rhonchi or wheezes  CV: regular rate and rhythm, normal S1 S2, no S3 or S4, no murmur, click or rub, no peripheral edema and peripheral pulses strong  ABDOMEN: soft, nontender, no hepatosplenomegaly, no masses and bowel sounds normal   (male): deferred  MS: no gross musculoskeletal defects noted, no edema  SKIN: no suspicious lesions or rashes - multiple scattered lentigenes and dark SKs across face, neck, upper chest  NEURO: Normal strength and tone, mentation intact and speech normal, coarse action tremor, not at rest  PSYCH: mentation appears normal, affect normal/bright      ASSESSMENT / PLAN:   Melquiades was seen today for medicare visit.    Diagnoses and all orders for this visit:    Chronic pain syndrome  -     Drug Confirmation Panel Urine with Creat - lab collect; Future  -     Drug Confirmation Panel Urine with Creat - lab collect    Chronic, continuous use of opioids  Opioid dependence with opioid-induced disorder (H)  His pain clinic requested \"blood work\" but we don't know what was requested, so extra tubs drawn - we tried to call but couldn't reach anyone, tried again and reached someone who was only able to provide the fax number - took a while to " "call us back and still didn't provide any labs. I took my best guess of what labs they might be looking for in the context of therapeutic drug monitoring, see below, we will have that and UDS results faxed together when they result.  [facility] [address]  [place], [place]  [place]IP [address]  Phone: [phone]  Per PDMP review, has been appropriate. Prescriber is Yin Womack, NP  -     Drug Confirmation Panel Urine with Creat - lab collect; Future  -     Extra Tube; Future  -     Drug Confirmation Panel Urine with Creat - lab collect  -     Extra Tube    Smokes with greater than 30 pack year history  Tobacco use  Chronic respiratory failure with hypoxia, stable  History   Smoking Status     Current Every Day Smoker     Packs/day: 1.00     Years: 57.00     Types: Cigarettes     Start date: 1965   Smokeless Tobacco     Former User   Patient isn't bothered by his symptoms - denies SOB despite appearing to work to breathe on exam  Mixed contemplative/preparative about quitting smoking  - of course can add inhaled medications if patient having worse symptoms, but he has a very hard time with any adherence so since he is feeling OK, will not add meds on at this time  -     Prof fee: Shared Decisionmaking for Lung Cancer Screening  -     CT Chest Lung Cancer Scrn Low Dose wo; Future  -     [place] Quit Partner Referral; Future    Encounter for Medicare annual wellness exam  -     REVIEW OF HEALTH MAINTENANCE PROTOCOL ORDERS    Major depressive disorder, single episode, severe (H)  meds reviewed, continues with psychology    Morbid obesity (H)  Estimated body mass index is 34.43 kg/m  as calculated from the following:    Height as of this encounter: 1.81 m (5' 11.25\").    Weight as of this encounter: 112.8 kg (248 lb 9.6 oz).   We discussed HAES principles, including benefits of healthy diet and exercise regardless of body size, importance to quality of life    History of colonic polyps  -     " Adult Gastro Ref - Procedure Only; Future    Seborrheic keratoses  -     Cancel: Adult Dermatology Referral; Future  -     Adult Dermatology Referral; Future    Need for vaccination  -     Pneumococcal vaccine 23 valent PPSV23  (Pneumovax) [49602]  -     HEPATITIS B VACCINE,  ADULT  [[id number]]    Encounter for therapeutic drug monitoring  -     Comprehensive metabolic panel (BMP + Alb, Alk Phos, ALT, AST, Total. Bili, TP); Future  -     CBC with platelets and differential; Future    Leukocytosis, unspecified type  -     CBC with platelets and differential; Future          Patient has been advised of split billing requirements and indicates understanding: Yes    COUNSELING:  Reviewed preventive health counseling, as reflected in patient instructions  Special attention given to:       Consider lung cancer screening for ages 55-80 years (77 for Medicare) and >30 pack-year smoking history: see above.    Appropriate preventive services were discussed with this patient, including applicable screening as appropriate for cardiovascular disease, diabetes, osteopenia/osteoporosis, and glaucoma.  As appropriate for age/gender, discussed screening for colorectal cancer, prostate cancer, breast cancer, and cervical cancer. Checklist reviewing preventive services available has been given to the patient.    Reviewed patients plan of care and provided an AVS. The Basic Care Plan (routine screening as documented in Health Maintenance) for Melquiades meets the Care Plan requirement. This Care Plan has been established and reviewed with the Patient.    Return for Annual Wellness Visit, Follow up chronic conditions.    Eliane Quinn MD  [facility]      Identified Health Risks:    The patient was provided with suggestions to help him develop a healthy physical lifestyle.  He is at risk for lack of exercise and has been provided with information to increase physical activity for the benefit of his well-being.  Lung  Cancer Screening Shared Decision Making Visit     Melquiades Morales is eligible for lung cancer screening on the basis of the information provided in my signed lung cancer screening order.     I have discussed with patient the risks and benefits of screening for lung cancer with low-dose CT.     The risks include:  radiation exposure: one low dose chest CT has as much ionizing radiation as about 15 chest x-rays or 6 months of background radiation living in [place]    false positives: 96% of positive findings/nodules are NOT cancer, but some might still require additional diagnostic evaluation, including biopsy  over-diagnosis: some slow growing cancers that might never have been clinically significant will be detected and treated unnecessarily     The benefit of early detection of lung cancer is contingent upon adherence to annual screening or more frequent follow up if indicated.     Furthermore, reaping the benefits of screening requires Melquiades Morales to be willing and physically able to undergo diagnostic procedures, if indicated. Although no specific guide is available for determining severity of comorbidities, it is reasonable to withhold screening in patients who have greater mortality risk from other diseases.     We did discuss that the only way to prevent lung cancer is to not smoke. Smoking cessation counseling was given, duration 3-10 minutes.      I did not offer risk estimation using a calculator

## 2022-03-11 NOTE — PROGRESS NOTES
SUBJECTIVE:   CC: Melquiades Morales is an 67 year old male who presents for preventative health visit.       Patient has been advised of split billing requirements and indicates understanding: Yes  HPI        {additional problems to add (Optional):561898}    Today's PHQ-2 Score:   PHQ-2 ( 1999 [employer]) 1/28/2022   Q1: Little interest or pleasure in doing things 1   Q2: Feeling down, depressed or hopeless 1   PHQ-2 Score 2   PHQ-2 Total Score (12-17 Years)- Positive if 3 or more points; Administer PHQ-A if positive -   Q1: Little interest or pleasure in doing things Several days   Q2: Feeling down, depressed or hopeless Several days   PHQ-2 Score 2       Abuse: Current or Past(Physical, Sexual or Emotional)- { :132825}  Do you feel safe in your environment? { :919238}    Have you ever done Advance Care Planning? (For example, a Health Directive, POLST, or a discussion with a medical provider or your loved ones about your wishes): { :990323}    Social History     Tobacco Use     Smoking status: Current Every Day Smoker     Packs/day: 0.25     Years: 50.00     Pack years: 12.50     Types: Cigarettes     Smokeless tobacco: Former User   Substance Use Topics     Alcohol use: No     {Rooming Staff- Complete this question if Prescreen response is not shown below for today's visit. If you drink alcohol do you typically have >3 drinks per day or >7 drinks per week? (Optional):138835}    Alcohol Use 4/12/2017   Prescreen: >3 drinks/day or >7 drinks/week? The patient does not drink >3 drinks per day nor >7 drinks per week.   {add AUDIT responses (Optional) (A score of 7 for adult men is an indication of hazardous drinking; a score of 8 or more is an indication of an alcohol use disorder.  A score of 7 or more for adult women is an indication of hazardous drinking or an alchohol use disorder):360469}    Last PSA:   PSA   Date Value Ref Range Status   07/13/2009 0.64 0 - 4 ug/L Final       Reviewed orders with patient. Reviewed  "health maintenance and updated orders accordingly - { :332158::\"Yes\"}  {Chronicprobdata (optional):353646}    Reviewed and updated as needed this visit by clinical staff                  Reviewed and updated as needed this visit by Provider                 {HISTORY OPTIONS (Optional):597313}    Review of Systems  {MALE ROS (Optional):441805::\"CONSTITUTIONAL: NEGATIVE for fever, chills, change in weight\",\"INTEGUMENTARY/SKIN: NEGATIVE for worrisome rashes, moles or lesions\",\"EYES: NEGATIVE for vision changes or irritation\",\"ENT: NEGATIVE for ear, mouth and throat problems\",\"RESP: NEGATIVE for significant cough or SOB\",\"CV: NEGATIVE for chest pain, palpitations or peripheral edema\",\"GI: NEGATIVE for nausea, abdominal pain, heartburn, or change in bowel habits\",\" male: negative for dysuria, hematuria, decreased urinary stream, erectile dysfunction, urethral discharge\",\"MUSCULOSKELETAL: NEGATIVE for significant arthralgias or myalgia\",\"NEURO: NEGATIVE for weakness, dizziness or paresthesias\",\"PSYCHIATRIC: NEGATIVE for changes in mood or affect\"}    OBJECTIVE:   There were no vitals taken for this visit.    Physical Exam  {Exam Choices (Optional):019302}    {Diagnostic Test Results (Optional):604425::\"Diagnostic Test Results:\",\"Labs reviewed in Epic\"}    ASSESSMENT/PLAN:   {Diag Picklist:307926}    {Patient advised of split billing (Optional):457871}    COUNSELING:   {MALE COUNSELING MESSAGES:018305::\"Reviewed preventive health counseling, as reflected in patient instructions\"}    Estimated body mass index is 36.17 kg/m  as calculated from the following:    Height as of 1/20/22: 1.753 m (5' 9\").    Weight as of 1/20/22: 111.1 kg (244 lb 14.9 oz).     {Weight Management Plan (ACO) Complete if BMI is abnormal-  Ages 18-64  BMI >24.9.  Age 65+ with BMI <23 or >30 (Optional):321629}    He reports that he has been smoking cigarettes. He has a 12.50 pack-year smoking history. He has quit using smokeless tobacco.  Tobacco " Cessation Action Plan:   {TOBACCO CESSATION ACTION PLAN:792022}      Counseling Resources:  ATP IV Guidelines  Pooled Cohorts Equation Calculator  FRAX Risk Assessment  ICSI Preventive Guidelines  Dietary Guidelines for Americans, 2010  USDA's MyPlate  ASA Prophylaxis  Lung CA Screening    Eliane Quinn MD  [facility]

## 2022-03-15 LAB
HYDROMORPHONE UR CFM-MCNC: 640 NG/ML
HYDROMORPHONE/CREAT UR: 552 NG/MG {CREAT}
MORPHINE UR CFM-MCNC: >7000 NG/ML
MORPHINE/CREAT UR: ABNORMAL
OXYCODONE UR CFM-MCNC: 8120 NG/ML
OXYCODONE/CREAT UR: 7000 NG/MG {CREAT}
OXYMORPHONE UR CFM-MCNC: 1460 NG/ML
OXYMORPHONE/CREAT UR: 1259 NG/MG {CREAT}
PREGABALIN UR QL CFM: PRESENT

## 2022-03-18 ENCOUNTER — VIRTUAL VISIT (OUTPATIENT)
Dept: PSYCHOLOGY | Facility: CLINIC | Age: 67
End: 2022-03-18
Payer: COMMERCIAL

## 2022-03-18 DIAGNOSIS — F43.10 POSTTRAUMATIC STRESS DISORDER: ICD-10-CM

## 2022-03-18 DIAGNOSIS — F41.1 GENERALIZED ANXIETY DISORDER: ICD-10-CM

## 2022-03-18 DIAGNOSIS — F33.1 MAJOR DEPRESSIVE DISORDER, RECURRENT EPISODE, MODERATE (H): Primary | ICD-10-CM

## 2022-03-18 PROCEDURE — 90834 PSYTX W PT 45 MINUTES: CPT | Mod: 95 | Performed by: SOCIAL WORKER

## 2022-03-18 ASSESSMENT — ANXIETY QUESTIONNAIRES
3. WORRYING TOO MUCH ABOUT DIFFERENT THINGS: MORE THAN HALF THE DAYS
5. BEING SO RESTLESS THAT IT IS HARD TO SIT STILL: NOT AT ALL
6. BECOMING EASILY ANNOYED OR IRRITABLE: MORE THAN HALF THE DAYS
7. FEELING AFRAID AS IF SOMETHING AWFUL MIGHT HAPPEN: NOT AT ALL
GAD7 TOTAL SCORE: 10
1. FEELING NERVOUS, ANXIOUS, OR ON EDGE: NEARLY EVERY DAY
2. NOT BEING ABLE TO STOP OR CONTROL WORRYING: NEARLY EVERY DAY

## 2022-03-18 ASSESSMENT — PATIENT HEALTH QUESTIONNAIRE - PHQ9
SUM OF ALL RESPONSES TO PHQ QUESTIONS 1-9: 16
5. POOR APPETITE OR OVEREATING: NOT AT ALL

## 2022-03-18 NOTE — PROGRESS NOTES
"    [facility]                                     Progress Note    Patient Name: Melquiades Moralse  Date: 3/18/22         Service Type: Individual      Session Start Time: 9 am  Session End Time: 9:45 am     Session Length: 45 min    Session #: 3    Attendees: Client attended alone.    Service Modality:  Phone Visit:      Provider verified identity through the following two step process.  Patient provided:  Patient is known previously to provider    The patient has been notified of the following:      \"We have found that certain health care needs can be provided without the need for a face to face visit.  This service lets us provide the care you need with a phone conversation.       I will have full access to your [facility] medical record during this entire phone call.   I will be taking notes for your medical record.      Since this is like an office visit, we will bill your insurance company for this service.       There are potential benefits and risks of telephone visits (e.g. limits to patient confidentiality) that differ from in-person visits.?Confidentiality still applies for telephone services, and nobody will record the visit.  It is important to be in a quiet, private space that is free of distractions (including cell phone or other devices) during the visit.??      If during the course of the call I believe a telephone visit is not appropriate, you will not be charged for this service\"     Consent has been obtained for this service by care team member: Yes     DATA  Interactive Complexity: No  Crisis: No        Progress Since Last Session (Related to Symptoms / Goals / Homework):   Symptoms: improvement on the shailesh.    Homework: Partially completed- use a healthy coping idea.     Episode of Care Goals: Minimal progress - ACTION (Actively working towards change); Intervened by reinforcing change plan / affirming steps taken    Current / Ongoing Stressors and Concerns:  His PCA " worker whom he is very close to will be moving on to another job. His new one named Amanda is more aloof.     Treatment Objective(s) Addressed in This Session:   Use a healthy coping idea as needed.      Intervention:  Assessed functioning and for safety. Reviewed the phq/shailesh. Processed his feelings about his new PCA. Processed feelings about the 2 lumps on his lungs. Reviewed healthy coping ideas.        Assessments completed prior to visit:  The following assessments were completed by patient for this visit:  PHQ9:   PHQ-9 SCORE 2/19/2021 3/19/2021 7/19/2021 1/28/2022 1/28/2022 2/7/2022 3/1/2022   PHQ-9 Total Score - - - - - - -   PHQ-9 Total Score MyChart - - - - 14 (Moderate depression) - -   PHQ-9 Total Score 22 17 15 14 14 14 16     GAD7:   SHAILESH-7 SCORE 1/22/2021 2/5/2021 2/19/2021 3/19/2021 1/28/2022 2/7/2022 3/1/2022   Total Score - - - - - - -   Total Score - - - - 13 (moderate anxiety) - -   Total Score 13 13 18 16 13 10 16     CAGE-AID:   CAGE-AID Total Score 1/28/2022   Total Score 0   Total Score MyChart 0 (A total score of 2 or greater is considered clinically significant)     PROMIS 10-Global Health (all questions and answers displayed):   PROMIS 10 1/28/2022   In general, would you say your health is: Poor   In general, would you say your quality of life is: Fair   In general, how would you rate your physical health? Poor   In general, how would you rate your mental health, including your mood and your ability to think? Fair   In general, how would you rate your satisfaction with your social activities and relationships? Fair   In general, please rate how well you carry out your usual social activities and roles Poor   To what extent are you able to carry out your everyday physical activities such as walking, climbing stairs, carrying groceries, or moving a chair? A little   How often have you been bothered by emotional problems such as feeling anxious, depressed or irritable? Often   How would you  rate your fatigue on average? Moderate   How would you rate your pain on average?   0 = No Pain  to  10 = Worst Imaginable Pain 5   In general, would you say your health is: 1   In general, would you say your quality of life is: 2   In general, how would you rate your physical health? 1   In general, how would you rate your mental health, including your mood and your ability to think? 2   In general, how would you rate your satisfaction with your social activities and relationships? 2   In general, please rate how well you carry out your usual social activities and roles. (This includes activities at home, at work and in your community, and responsibilities as a parent, child, spouse, employee, friend, etc.) 1   To what extent are you able to carry out your everyday physical activities such as walking, climbing stairs, carrying groceries, or moving a chair? 2   In the past 7 days, how often have you been bothered by emotional problems such as feeling anxious, depressed, or irritable? 4   In the past 7 days, how would you rate your fatigue on average? 3   In the past 7 days, how would you rate your pain on average, where 0 means no pain, and 10 means worst imaginable pain? 5   Global Mental Health Score 8   Global Physical Health Score 9   PROMIS TOTAL - SUBSCORES 17   Some recent data might be hidden     [place] Suicide Severity Rating Scale (Lifetime/Recent)  [place] Suicide Severity Rating (Lifetime/Recent) 9/8/2018 1/28/2022   Wish to be Dead (Lifetime) - Yes   Non-Specific Active Suicidal Thoughts (Lifetime) - Yes   Most Severe Ideation Rating (Lifetime) - 5   Frequency (Lifetime) - 3   Duration (Lifetime) - 2   Controllability (Lifetime) - 5   Protective Factors  (Lifetime) - 5   Reasons for Ideation (Lifetime) - 4   RETIRED: 1. Wish to be Dead (Recent) - No   RETIRED: 2. Non-Specific Active Suicidal Thoughts (Recent) - No   3. Active Suicidal Ideation with any Methods (Not Plan) Without Intent to Act  (Lifetime) - Yes   RETIRED: 3. Active Suicidal Ideation with any Methods (Not Plan) Without Intent to Act (Recent) - No   RETIRE: 4. Active Suicidal Ideation with Some Intent to Act, Without Specific Plan (Lifetime) - Yes   4. Active Suicidal Ideation with Some Intent to Act, Without Specific Plan (Recent) - No   RETIRE: 5. Active Suicidal Ideation with Specific Plan and Intent (Lifetime) - Yes   RETIRED: 5. Active Suicidal Ideation with Specific Plan and Intent (Recent) - No   Most Severe Ideation Rating (Past Month) NA NA   Frequency (Past Month) - NA   Duration (Past Month) - NA   Controllability (Past Month) - NA   Protective Factors (Past Month) - NA   Reasons for Ideation (Past Month) - NA   Actual Attempt (Lifetime) - Yes   Actual Attempt Description (Lifetime) - 3 attempts with last one 15 years ago   Total Number of Actual Attempts (Lifetime) - 3   Actual Attempt (Past 3 Months) - No   Has subject engaged in non-suicidal self-injurious behavior? (Lifetime) - No   Has subject engaged in non-suicidal self-injurious behavior? (Past 3 Months) - No   Interrupted Attempts (Lifetime) - No   Interrupted Attempts (Past 3 Months) - No   Aborted or Self-Interrupted Attempt (Lifetime) - No   Aborted or Self-Interrupted Attempt (Past 3 Months) - No   Preparatory Acts or Behavior (Lifetime) - No   Preparatory Acts or Behavior (Past 3 Months) - No   Most Recent Attempt Actual Lethality Code - 3   Most Lethal Attempt Actual Lethality Code - 2   Initial/First Attempt Actual Lethality Code - 2         ASSESSMENT: Current Emotional / Mental Status (status of significant symptoms):   Risk status (Self / Other harm or suicidal ideation)   Patient denies current fears or concerns for personal safety.   Patient denies current or recent suicidal ideation or behaviors.   Patient denies current or recent homicidal ideation or behaviors.   Patient denies current or recent self injurious behavior or ideation.   Patient denies other  safety concerns.   Patient reports there has been no change in risk factors since their last session.     Patient reports there has been no change in protective factors since their last session.     a safety plan was completed several years ago     Appearance:   Unable to assess.   Eye Contact:   Unable to assess.   Psychomotor Behavior: Unable to assess.   Attitude:   Cooperative    Orientation:   All   Speech    Rate / Production: Normal     Volume:  Normal    Mood:    Depressed, anxious   Affect:    Appropriate    Thought Content:  Clear    Thought Form:  Coherent  Logical    Insight:    Good  and Fair      Medication Review:   No changes to current psychiatric medication(s). PCP Dr. Eliane Quinn prescribing cymbalta 60 mg and trazadone.     Medication Compliance:   Yes     Changes in Health Issues:   None reported     Chemical Use Review:   Substance Use: Chemical use reviewed, no active concerns identified      Tobacco Use: No change in amount of tobacco use since last session.  Contemplation    Diagnosis:  MDD; EVGENY; PTSD.    Collateral Reports Completed:   Routed note to PCP    PLAN: (Patient Tasks / Therapist Tasks / Other)  Biweekly as able.   Follow safety plan. Use a healthy coping idea as needed.  Update safety plan next time- add whole new one.    Goals due 5/1/22      Kleber Pollack, MOLINAS[facility]                                                         ______________________________________________________________________    Individual Treatment Plan    Patient's Name: Melquiades Morales  YOB: 1955    Date of Creation: 1/28/22  Date Treatment Plan Last Reviewed/Revised: 3/1/22    DSM5 Diagnoses: 296.32 (F33.1) Major Depressive Disorder, Recurrent Episode, Moderate _, 300.02 (F41.1) Generalized Anxiety Disorder or 309.81 (F43.10) Posttraumatic Stress Disorder (includes Posttraumatic Stress Disorder for Children 6 Years and Younger)  With dissociative symptoms  Psychosocial / Contextual Factors:  recent loss of wife.  PROMIS (reviewed every 90 days): 1/28/22    Referral / Collaboration:  Referral to another professional/service is not indicated at this time.    Anticipated number of session for this episode of care: 10+  Anticipation frequency of session: Weekly  Anticipated Duration of each session: 38-52 minutes  Treatment plan will be reviewed in 90 days or when goals have been changed.       MeasurableTreatment Goal(s) related to diagnosis / functional impairment(s)  Goal 1: Patient will report coping well with daily stressors.     I will know I've met my goal when each goal that we accomplish and I am having less of a problem in that area I know that you have helped me .       Objective #A (Patient Action)                          Patient will continue to follow his safety plan.  Status: New - Date: 1/28/22      Intervention(s)  Therapist will monitor for safety each visit and encourage use of safety plan.     Objective #B  Patient will use a healthy coping idea as needed 100% of trials for 1 week.  Status: New - Date: 1/28/22   IDEAS: napping; petting Snippy (dog); watching tv.     Intervention(s)  Therapist will provide ideas and encouragement to use them.     Objective #C  Patient will process the loss of his wife as needed.  Status: New - Date: 1/28/22      Intervention(s)  Therapist will consider EMDR.              Patient has reviewed and agreed to the above plan.        Kleber Pollack, [facility]                January 28, 2022

## 2022-03-19 ASSESSMENT — ANXIETY QUESTIONNAIRES: GAD7 TOTAL SCORE: 10

## 2022-03-21 ENCOUNTER — HOSPITAL ENCOUNTER (OUTPATIENT)
Dept: CT IMAGING | Facility: CLINIC | Age: 67
Discharge: HOME | End: 2022-03-21
Attending: FAMILY MEDICINE | Admitting: FAMILY MEDICINE
Payer: COMMERCIAL

## 2022-03-21 DIAGNOSIS — F17.210 SMOKES WITH GREATER THAN 30 PACK YEAR HISTORY: ICD-10-CM

## 2022-03-21 PROCEDURE — 71271 CT THORAX LUNG CANCER SCR C-: CPT

## 2022-03-25 ENCOUNTER — VIRTUAL VISIT (OUTPATIENT)
Dept: PSYCHOLOGY | Facility: CLINIC | Age: 67
End: 2022-03-25
Payer: COMMERCIAL

## 2022-03-25 DIAGNOSIS — F43.10 POSTTRAUMATIC STRESS DISORDER: ICD-10-CM

## 2022-03-25 DIAGNOSIS — F41.1 GENERALIZED ANXIETY DISORDER: ICD-10-CM

## 2022-03-25 DIAGNOSIS — F33.1 MAJOR DEPRESSIVE DISORDER, RECURRENT EPISODE, MODERATE (H): Primary | ICD-10-CM

## 2022-03-25 PROCEDURE — 90834 PSYTX W PT 45 MINUTES: CPT | Mod: 95 | Performed by: SOCIAL WORKER

## 2022-03-25 NOTE — PROGRESS NOTES
"    [facility]                                     Progress Note    Patient Name: Melquiades Morales  Date: 3/25/22         Service Type: Individual      Session Start Time:  9 am  Session End Time: 9:45 am     Session Length: 45 min    Session #: 4    Attendees: Client attended alone.    Service Modality:  Phone Visit:      Provider verified identity through the following two step process.  Patient provided:  Patient is known previously to provider    The patient has been notified of the following:      \"We have found that certain health care needs can be provided without the need for a face to face visit.  This service lets us provide the care you need with a phone conversation.       I will have full access to your [facility] medical record during this entire phone call.   I will be taking notes for your medical record.      Since this is like an office visit, we will bill your insurance company for this service.       There are potential benefits and risks of telephone visits (e.g. limits to patient confidentiality) that differ from in-person visits.?Confidentiality still applies for telephone services, and nobody will record the visit.  It is important to be in a quiet, private space that is free of distractions (including cell phone or other devices) during the visit.??      If during the course of the call I believe a telephone visit is not appropriate, you will not be charged for this service\"     Consent has been obtained for this service by care team member: Yes     DATA  Interactive Complexity: No  Crisis: No        Progress Since Last Session (Related to Symptoms / Goals / Homework):   Symptoms: improvement on the shailesh. Next time.    Homework: Partially completed- use a healthy coping idea.     Episode of Care Goals: Minimal progress - ACTION (Actively working towards change); Intervened by reinforcing change plan / affirming steps taken    Current / Ongoing Stressors and " Concerns:  His PCA worker whom he is very close to will be moving on to another job. His new one named Amanda is more aloof.     Treatment Objective(s) Addressed in This Session:   Use a healthy coping idea as needed.      Intervention:  Assessed functioning and for safety. Processed his feelings about his new PCA, loss of wife and grieving. Processed feelings about the 2 lumps on his lungs and awaiting results. Reviewed healthy coping ideas.        Assessments completed prior to visit:  The following assessments were completed by patient for this visit:  PHQ9:   PHQ-9 SCORE 3/19/2021 7/19/2021 1/28/2022 1/28/2022 2/7/2022 3/1/2022 3/18/2022   PHQ-9 Total Score - - - - - - -   PHQ-9 Total Score MyChart - - - 14 (Moderate depression) - - -   PHQ-9 Total Score 17 15 14 14 14 16 16     GAD7:   EVGENY-7 SCORE 2/5/2021 2/19/2021 3/19/2021 1/28/2022 2/7/2022 3/1/2022 3/18/2022   Total Score - - - - - - -   Total Score - - - 13 (moderate anxiety) - - -   Total Score 13 18 16 13 10 16 10     CAGE-AID:   CAGE-AID Total Score 1/28/2022   Total Score 0   Total Score MyChart 0 (A total score of 2 or greater is considered clinically significant)     PROMIS 10-Global Health (all questions and answers displayed):   PROMIS 10 1/28/2022   In general, would you say your health is: Poor   In general, would you say your quality of life is: Fair   In general, how would you rate your physical health? Poor   In general, how would you rate your mental health, including your mood and your ability to think? Fair   In general, how would you rate your satisfaction with your social activities and relationships? Fair   In general, please rate how well you carry out your usual social activities and roles Poor   To what extent are you able to carry out your everyday physical activities such as walking, climbing stairs, carrying groceries, or moving a chair? A little   How often have you been bothered by emotional problems such as feeling anxious,  depressed or irritable? Often   How would you rate your fatigue on average? Moderate   How would you rate your pain on average?   0 = No Pain  to  10 = Worst Imaginable Pain 5   In general, would you say your health is: 1   In general, would you say your quality of life is: 2   In general, how would you rate your physical health? 1   In general, how would you rate your mental health, including your mood and your ability to think? 2   In general, how would you rate your satisfaction with your social activities and relationships? 2   In general, please rate how well you carry out your usual social activities and roles. (This includes activities at home, at work and in your community, and responsibilities as a parent, child, spouse, employee, friend, etc.) 1   To what extent are you able to carry out your everyday physical activities such as walking, climbing stairs, carrying groceries, or moving a chair? 2   In the past 7 days, how often have you been bothered by emotional problems such as feeling anxious, depressed, or irritable? 4   In the past 7 days, how would you rate your fatigue on average? 3   In the past 7 days, how would you rate your pain on average, where 0 means no pain, and 10 means worst imaginable pain? 5   Global Mental Health Score 8   Global Physical Health Score 9   PROMIS TOTAL - SUBSCORES 17   Some recent data might be hidden     [place] Suicide Severity Rating Scale (Lifetime/Recent)  [place] Suicide Severity Rating (Lifetime/Recent) 9/8/2018 1/28/2022   Wish to be Dead (Lifetime) - Yes   Non-Specific Active Suicidal Thoughts (Lifetime) - Yes   Most Severe Ideation Rating (Lifetime) - 5   Frequency (Lifetime) - 3   Duration (Lifetime) - 2   Controllability (Lifetime) - 5   Protective Factors  (Lifetime) - 5   Reasons for Ideation (Lifetime) - 4   RETIRED: 1. Wish to be Dead (Recent) - No   RETIRED: 2. Non-Specific Active Suicidal Thoughts (Recent) - No   3. Active Suicidal Ideation with any  Methods (Not Plan) Without Intent to Act (Lifetime) - Yes   RETIRED: 3. Active Suicidal Ideation with any Methods (Not Plan) Without Intent to Act (Recent) - No   RETIRE: 4. Active Suicidal Ideation with Some Intent to Act, Without Specific Plan (Lifetime) - Yes   4. Active Suicidal Ideation with Some Intent to Act, Without Specific Plan (Recent) - No   RETIRE: 5. Active Suicidal Ideation with Specific Plan and Intent (Lifetime) - Yes   RETIRED: 5. Active Suicidal Ideation with Specific Plan and Intent (Recent) - No   Most Severe Ideation Rating (Past Month) NA NA   Frequency (Past Month) - NA   Duration (Past Month) - NA   Controllability (Past Month) - NA   Protective Factors (Past Month) - NA   Reasons for Ideation (Past Month) - NA   Actual Attempt (Lifetime) - Yes   Actual Attempt Description (Lifetime) - 3 attempts with last one 15 years ago   Total Number of Actual Attempts (Lifetime) - 3   Actual Attempt (Past 3 Months) - No   Has subject engaged in non-suicidal self-injurious behavior? (Lifetime) - No   Has subject engaged in non-suicidal self-injurious behavior? (Past 3 Months) - No   Interrupted Attempts (Lifetime) - No   Interrupted Attempts (Past 3 Months) - No   Aborted or Self-Interrupted Attempt (Lifetime) - No   Aborted or Self-Interrupted Attempt (Past 3 Months) - No   Preparatory Acts or Behavior (Lifetime) - No   Preparatory Acts or Behavior (Past 3 Months) - No   Most Recent Attempt Actual Lethality Code - 3   Most Lethal Attempt Actual Lethality Code - 2   Initial/First Attempt Actual Lethality Code - 2         ASSESSMENT: Current Emotional / Mental Status (status of significant symptoms):   Risk status (Self / Other harm or suicidal ideation)   Patient denies current fears or concerns for personal safety.   Patient denies current or recent suicidal ideation or behaviors.   Patient denies current or recent homicidal ideation or behaviors.   Patient denies current or recent self injurious  behavior or ideation.   Patient denies other safety concerns.   Patient reports there has been no change in risk factors since their last session.     Patient reports there has been no change in protective factors since their last session.     a safety plan was completed several years ago.      Appearance:   Unable to assess.   Eye Contact:   Unable to assess.   Psychomotor Behavior: Unable to assess.   Attitude:   Cooperative    Orientation:   All   Speech    Rate / Production: Normal     Volume:  Normal    Mood:    Depressed   Affect:    Appropriate    Thought Content:  Clear    Thought Form:  Coherent  Logical    Insight:    Good  and Fair      Medication Review:   No changes to current psychiatric medication(s). PCP Dr. Eliane Quinn prescribing cymbalta 60 mg and trazadone.     Medication Compliance:   Yes     Changes in Health Issues:   None reported     Chemical Use Review:   Substance Use: Chemical use reviewed, no active concerns identified      Tobacco Use: No change in amount of tobacco use since last session.  Contemplation    Diagnosis:  MDD; EVGENY; PTSD.    Collateral Reports Completed:   Routed note to PCP    PLAN: (Patient Tasks / Therapist Tasks / Other)  Biweekly as able.   Follow safety plan. Use a healthy coping idea as needed.  Update safety plan next time- add whole new one- not needed at this time.    Goals due 5/1/22      Kleber Pollack, [facility]                                                         ______________________________________________________________________    Individual Treatment Plan    Patient's Name: Melquiades Morales  YOB: 1955    Date of Creation: 1/28/22  Date Treatment Plan Last Reviewed/Revised: 3/1/22    DSM5 Diagnoses: 296.32 (F33.1) Major Depressive Disorder, Recurrent Episode, Moderate _, 300.02 (F41.1) Generalized Anxiety Disorder or 309.81 (F43.10) Posttraumatic Stress Disorder (includes Posttraumatic Stress Disorder for Children 6 Years and Younger)   With dissociative symptoms  Psychosocial / Contextual Factors: recent loss of wife.  PROMIS (reviewed every 90 days): 1/28/22    Referral / Collaboration:  Referral to another professional/service is not indicated at this time.    Anticipated number of session for this episode of care: 10+  Anticipation frequency of session: Weekly  Anticipated Duration of each session: 38-52 minutes  Treatment plan will be reviewed in 90 days or when goals have been changed.       MeasurableTreatment Goal(s) related to diagnosis / functional impairment(s)  Goal 1: Patient will report coping well with daily stressors.     I will know I've met my goal when each goal that we accomplish and I am having less of a problem in that area I know that you have helped me .       Objective #A (Patient Action)                          Patient will continue to follow his safety plan.  Status: New - Date: 1/28/22      Intervention(s)  Therapist will monitor for safety each visit and encourage use of safety plan.     Objective #B  Patient will use a healthy coping idea as needed 100% of trials for 1 week.  Status: New - Date: 1/28/22   IDEAS: napping; petting Snippy (dog); watching tv.     Intervention(s)  Therapist will provide ideas and encouragement to use them.     Objective #C  Patient will process the loss of his wife as needed.  Status: New - Date: 1/28/22      Intervention(s)  Therapist will consider EMDR.              Patient has reviewed and agreed to the above plan.        Kleber Pollack, [facility]                January 28, 2022

## 2022-04-01 ENCOUNTER — TELEPHONE (OUTPATIENT)
Dept: FAMILY MEDICINE | Facility: CLINIC | Age: 67
End: 2022-04-01
Payer: COMMERCIAL

## 2022-04-01 NOTE — TELEPHONE ENCOUNTER
Please communicate with patient, drug guidelines are against going any higher than 120mg/day due to risks of all side effects outweighing measured benefits. She might have meant a different drug? If he would like to discuss further we can have a phone visit, or in person if he had anything additional he wanted to discuss/review. Otherwise we can also follow up on it when I see him for follow-up in June    Thank you  Eliane Quinn MD

## 2022-04-01 NOTE — TELEPHONE ENCOUNTER
Pt was called regarding cymbalta dose increase suggested by pain clinic provider. Pt said Dr Quinn has prescribed the medication and she should be the one to manage it, furthermore he doesn't take it for pain. It was explained to pt that cymbalta is  also used for pain, he would like message forwarded to Dr Quinn.   Jyoti Turner RN

## 2022-04-01 NOTE — TELEPHONE ENCOUNTER
[facility]  [facility] to assist pt in getting form, filling out form and returning form to proper place.  He agrees Medical Marijuana would be appropriate for him.  Pt is [facility] by the [facility].  Spoke with Girish Navarrete RN,Care Coordination to address this concern.  Asha  
Left message on machine to call back  [facility] .  Milly MCGOVERN RN    
Reason for Call:  Other request     Detailed comments: [facility] is calling with questions regarding mental health services for this pt     Phone Number Patient can be reached at: Other phone number:  [facility]  [phone]    Best Time: any     Can we leave a detailed message on this number? YES    Call taken on 8/11/2017 at 11:35 AM by Leigh Fox      
Yes

## 2022-04-01 NOTE — TELEPHONE ENCOUNTER
Reason for Call:  Med question    Detailed comments: Patient calling stating his provider from [facility] in [place], Yin Womack told him to change his Cymbalta dose to 150 mg three times a day.  He said he had been taking 120 mg once a day. He feels primary should be in charge.  Please call.      Phone Number Patient can be reached at:   [phone]    Can we leave a detailed message on this number?  Yes    Call taken on 4/1/2022 at 11:02 AM by Marisel Cleary

## 2022-04-01 NOTE — TELEPHONE ENCOUNTER
Pt was called back and given Dr Quinn's message, he was reminded of appt in June with her. He will continue to take the Cymbalta 120 mg at bedtime as Dr Quinn has prescribed. Jyoti Turner RN

## 2022-04-07 ENCOUNTER — VIRTUAL VISIT (OUTPATIENT)
Dept: PSYCHOLOGY | Facility: CLINIC | Age: 67
End: 2022-04-07
Payer: COMMERCIAL

## 2022-04-07 DIAGNOSIS — F41.1 GENERALIZED ANXIETY DISORDER: ICD-10-CM

## 2022-04-07 DIAGNOSIS — F43.10 POSTTRAUMATIC STRESS DISORDER: ICD-10-CM

## 2022-04-07 DIAGNOSIS — F33.1 MAJOR DEPRESSIVE DISORDER, RECURRENT EPISODE, MODERATE (H): Primary | ICD-10-CM

## 2022-04-07 PROCEDURE — 90834 PSYTX W PT 45 MINUTES: CPT | Mod: 95 | Performed by: SOCIAL WORKER

## 2022-04-07 NOTE — PROGRESS NOTES
"    [facility]                                     Progress Note    Patient Name: Melquiades Morales  Date: 4/7/22         Service Type: Individual      Session Start Time:  9 am  Session End Time: 9:45 am     Session Length: 45 min    Session #: 5    Attendees: Client attended alone.    Service Modality:  Phone Visit:      Provider verified identity through the following two step process.  Patient provided:  Patient is known previously to provider    The patient has been notified of the following:      \"We have found that certain health care needs can be provided without the need for a face to face visit.  This service lets us provide the care you need with a phone conversation.       I will have full access to your [facility] medical record during this entire phone call.   I will be taking notes for your medical record.      Since this is like an office visit, we will bill your insurance company for this service.       There are potential benefits and risks of telephone visits (e.g. limits to patient confidentiality) that differ from in-person visits.?Confidentiality still applies for telephone services, and nobody will record the visit.  It is important to be in a quiet, private space that is free of distractions (including cell phone or other devices) during the visit.??      If during the course of the call I believe a telephone visit is not appropriate, you will not be charged for this service\"     Consent has been obtained for this service by care team member: Yes     DATA  Interactive Complexity: No  Crisis: No        Progress Since Last Session (Related to Symptoms / Goals / Homework):   Symptoms: improvement on the shailesh. Next time-he declined.    Homework: Partially completed- use a healthy coping idea.     Episode of Care Goals: Minimal progress - ACTION (Actively working towards change); Intervened by reinforcing change plan / affirming steps taken.    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 His new one named Amanda is more aloof. I believe she is his granddaughter. She also works at [employer].     Treatment Objective(s) Addressed in This Session:   Use a healthy coping idea as needed.      Intervention:  Assessed functioning and for safety. Processed his feelings about his new PCA, loss of wife and grieving. Processed feelings about the results of his lung scan being \"inconclusive\". Processed feelings about one of his son's opening up to him. Processed feelings about wanting to live in [place] closer to his daughter but not wanting to upset his sons. Reviewed healthy coping ideas.        Assessments completed prior to visit:  The following assessments were completed by patient for this visit:  PHQ9:   PHQ-9 SCORE 3/19/2021 7/19/2021 1/28/2022 1/28/2022 2/7/2022 3/1/2022 3/18/2022   PHQ-9 Total Score - - - - - - -   PHQ-9 Total Score MyChart - - - 14 (Moderate depression) - - -   PHQ-9 Total Score 17 15 14 14 14 16 16     GAD7:   EVGENY-7 SCORE 2/5/2021 2/19/2021 3/19/2021 1/28/2022 2/7/2022 3/1/2022 3/18/2022   Total Score - - - - - - -   Total Score - - - 13 (moderate anxiety) - - -   Total Score 13 18 16 13 10 16 10     CAGE-AID:   CAGE-AID Total Score 1/28/2022   Total Score 0   Total Score MyChart 0 (A total score of 2 or greater is considered clinically significant)     PROMIS 10-Global Health (all questions and answers displayed):   PROMIS 10 1/28/2022   In general, would you say your health is: Poor   In general, would you say your quality of life is: Fair   In general, how would you rate your physical health? Poor   In general, how would you rate your mental health, including your mood and your ability to think? Fair   In general, how would you rate your satisfaction with your social activities and relationships? Fair   In general, please rate how well you carry out your usual social activities and roles Poor "   To what extent are you able to carry out your everyday physical activities such as walking, climbing stairs, carrying groceries, or moving a chair? A little   How often have you been bothered by emotional problems such as feeling anxious, depressed or irritable? Often   How would you rate your fatigue on average? Moderate   How would you rate your pain on average?   0 = No Pain  to  10 = Worst Imaginable Pain 5   In general, would you say your health is: 1   In general, would you say your quality of life is: 2   In general, how would you rate your physical health? 1   In general, how would you rate your mental health, including your mood and your ability to think? 2   In general, how would you rate your satisfaction with your social activities and relationships? 2   In general, please rate how well you carry out your usual social activities and roles. (This includes activities at home, at work and in your community, and responsibilities as a parent, child, spouse, employee, friend, etc.) 1   To what extent are you able to carry out your everyday physical activities such as walking, climbing stairs, carrying groceries, or moving a chair? 2   In the past 7 days, how often have you been bothered by emotional problems such as feeling anxious, depressed, or irritable? 4   In the past 7 days, how would you rate your fatigue on average? 3   In the past 7 days, how would you rate your pain on average, where 0 means no pain, and 10 means worst imaginable pain? 5   Global Mental Health Score 8   Global Physical Health Score 9   PROMIS TOTAL - SUBSCORES 17   Some recent data might be hidden     [place] Suicide Severity Rating Scale (Lifetime/Recent)  [place] Suicide Severity Rating (Lifetime/Recent) 9/8/2018 1/28/2022   Wish to be Dead (Lifetime) - Yes   Non-Specific Active Suicidal Thoughts (Lifetime) - Yes   Most Severe Ideation Rating (Lifetime) - 5   Frequency (Lifetime) - 3   Duration (Lifetime) - 2   Controllability  (Lifetime) - 5   Protective Factors  (Lifetime) - 5   Reasons for Ideation (Lifetime) - 4   RETIRED: 1. Wish to be Dead (Recent) - No   RETIRED: 2. Non-Specific Active Suicidal Thoughts (Recent) - No   3. Active Suicidal Ideation with any Methods (Not Plan) Without Intent to Act (Lifetime) - Yes   RETIRED: 3. Active Suicidal Ideation with any Methods (Not Plan) Without Intent to Act (Recent) - No   RETIRE: 4. Active Suicidal Ideation with Some Intent to Act, Without Specific Plan (Lifetime) - Yes   4. Active Suicidal Ideation with Some Intent to Act, Without Specific Plan (Recent) - No   RETIRE: 5. Active Suicidal Ideation with Specific Plan and Intent (Lifetime) - Yes   RETIRED: 5. Active Suicidal Ideation with Specific Plan and Intent (Recent) - No   Most Severe Ideation Rating (Past Month) NA NA   Frequency (Past Month) - NA   Duration (Past Month) - NA   Controllability (Past Month) - NA   Protective Factors (Past Month) - NA   Reasons for Ideation (Past Month) - NA   Actual Attempt (Lifetime) - Yes   Actual Attempt Description (Lifetime) - 3 attempts with last one 15 years ago   Total Number of Actual Attempts (Lifetime) - 3   Actual Attempt (Past 3 Months) - No   Has subject engaged in non-suicidal self-injurious behavior? (Lifetime) - No   Has subject engaged in non-suicidal self-injurious behavior? (Past 3 Months) - No   Interrupted Attempts (Lifetime) - No   Interrupted Attempts (Past 3 Months) - No   Aborted or Self-Interrupted Attempt (Lifetime) - No   Aborted or Self-Interrupted Attempt (Past 3 Months) - No   Preparatory Acts or Behavior (Lifetime) - No   Preparatory Acts or Behavior (Past 3 Months) - No   Most Recent Attempt Actual Lethality Code - 3   Most Lethal Attempt Actual Lethality Code - 2   Initial/First Attempt Actual Lethality Code - 2         ASSESSMENT: Current Emotional / Mental Status (status of significant symptoms):   Risk status (Self / Other harm or suicidal ideation)   Patient denies  current fears or concerns for personal safety.   Patient denies current or recent suicidal ideation or behaviors. Denies.    Patient denies current or recent homicidal ideation or behaviors.   Patient denies current or recent self injurious behavior or ideation.   Patient denies other safety concerns.   Patient reports there has been no change in risk factors since their last session.     Patient reports there has been no change in protective factors since their last session.     a safety plan was completed several years ago.      Appearance:   Unable to assess.   Eye Contact:   Unable to assess.   Psychomotor Behavior: Unable to assess.   Attitude:   Cooperative    Orientation:   All   Speech    Rate / Production: Normal     Volume:  Normal    Mood:    Depressed   Affect:    Appropriate    Thought Content:  Clear    Thought Form:  Coherent  Logical    Insight:    Good      Medication Review:   No changes to current psychiatric medication(s). PCP Dr. Eliane Quinn prescribing cymbalta 60 mg and trazadone.     Medication Compliance:   Yes     Changes in Health Issues:   None reported     Chemical Use Review:   Substance Use: Chemical use reviewed, no active concerns identified      Tobacco Use: No change in amount of tobacco use since last session.  Contemplation    Diagnosis:  MDD; EVGENY; PTSD.    Collateral Reports Completed:   Routed note to PCP    PLAN: (Patient Tasks / Therapist Tasks / Other)  Biweekly as able.   Follow safety plan. Use a healthy coping idea as needed.  Update safety plan next time-  add whole new one- we concluded not needed at this time.    Goals due 5/1/22      Kleber Pollack, MOLINASW                                                         ______________________________________________________________________    Individual Treatment Plan    Patient's Name: Melquiades Morales  YOB: 1955    Date of Creation: 1/28/22  Date Treatment Plan Last Reviewed/Revised: 3/1/22    DSM5  Diagnoses: 296.32 (F33.1) Major Depressive Disorder, Recurrent Episode, Moderate _, 300.02 (F41.1) Generalized Anxiety Disorder or 309.81 (F43.10) Posttraumatic Stress Disorder (includes Posttraumatic Stress Disorder for Children 6 Years and Younger)  With dissociative symptoms.  Psychosocial / Contextual Factors: recent loss of wife.  PROMIS (reviewed every 90 days): 1/28/22    Referral / Collaboration:  Referral to another professional/service is not indicated at this time.    Anticipated number of session for this episode of care: 10+  Anticipation frequency of session: Weekly  Anticipated Duration of each session: 38-52 minutes  Treatment plan will be reviewed in 90 days or when goals have been changed.       MeasurableTreatment Goal(s) related to diagnosis / functional impairment(s)  Goal 1: Patient will report coping well with daily stressors.     I will know I've met my goal when each goal that we accomplish and I am having less of a problem in that area I know that you have helped me.       Objective #A (Patient Action)                          Patient will continue to follow his safety plan.  Status: New - Date: 1/28/22      Intervention(s)  Therapist will monitor for safety each visit and encourage use of safety plan.     Objective #B  Patient will use a healthy coping idea as needed 100% of trials for 1 week.  Status: New - Date: 1/28/22   IDEAS: napping; petting Snippy (pet dog); watching tv.     Intervention(s)  Therapist will provide ideas and encouragement to use them.     Objective #C  Patient will process the loss of his wife as needed.  Status: New - Date: 1/28/22      Intervention(s)  Therapist will consider EMDR.              Patient has reviewed and agreed to the above plan.        Kleber Pollack, [facility]                January 28, 2022

## 2022-04-25 ENCOUNTER — OFFICE VISIT (OUTPATIENT)
Dept: PSYCHOLOGY | Facility: CLINIC | Age: 67
End: 2022-04-25
Payer: COMMERCIAL

## 2022-04-25 DIAGNOSIS — F33.1 MAJOR DEPRESSIVE DISORDER, RECURRENT EPISODE, MODERATE (H): Primary | ICD-10-CM

## 2022-04-25 DIAGNOSIS — F41.1 GENERALIZED ANXIETY DISORDER: ICD-10-CM

## 2022-04-25 DIAGNOSIS — F43.10 POSTTRAUMATIC STRESS DISORDER: ICD-10-CM

## 2022-04-25 PROCEDURE — 90834 PSYTX W PT 45 MINUTES: CPT | Performed by: SOCIAL WORKER

## 2022-04-25 ASSESSMENT — PATIENT HEALTH QUESTIONNAIRE - PHQ9
SUM OF ALL RESPONSES TO PHQ QUESTIONS 1-9: 17
5. POOR APPETITE OR OVEREATING: NEARLY EVERY DAY

## 2022-04-25 ASSESSMENT — ANXIETY QUESTIONNAIRES
GAD7 TOTAL SCORE: 19
3. WORRYING TOO MUCH ABOUT DIFFERENT THINGS: NEARLY EVERY DAY
IF YOU CHECKED OFF ANY PROBLEMS ON THIS QUESTIONNAIRE, HOW DIFFICULT HAVE THESE PROBLEMS MADE IT FOR YOU TO DO YOUR WORK, TAKE CARE OF THINGS AT HOME, OR GET ALONG WITH OTHER PEOPLE: VERY DIFFICULT
7. FEELING AFRAID AS IF SOMETHING AWFUL MIGHT HAPPEN: MORE THAN HALF THE DAYS
5. BEING SO RESTLESS THAT IT IS HARD TO SIT STILL: MORE THAN HALF THE DAYS
2. NOT BEING ABLE TO STOP OR CONTROL WORRYING: NEARLY EVERY DAY
6. BECOMING EASILY ANNOYED OR IRRITABLE: NEARLY EVERY DAY
1. FEELING NERVOUS, ANXIOUS, OR ON EDGE: NEARLY EVERY DAY

## 2022-04-25 NOTE — PROGRESS NOTES
[facility]                                     Progress Note    Patient Name: Melquiades Morales  Date: 4/25/22         Service Type: Individual      Session Start Time:  11 am  Session End Time: 11:45 am     Session Length: 45 min    Session #: 6    Attendees: Client attended alone.    Service Modality:  In Person Visit:           DATA  Interactive Complexity: No  Crisis: No        Progress Since Last Session (Related to Symptoms / Goals / Homework):   Symptoms: worsening- he was evicted.    Homework: Partially completed- use a healthy coping idea. He has the grounding ideas handout.     Episode of Care Goals: Minimal progress - ACTION (Actively working towards change); Intervened by reinforcing change plan / affirming steps taken.    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 His new one named Amanda is more aloof. I believe she is his granddaughter. She also works at [employer].  He was evicted from his home and now has 4 days to find another place to live. He has 24 days to sale his trailer home.     Treatment Objective(s) Addressed in This Session:   Use a healthy coping idea as needed.      Intervention:  Assessed functioning and for safety. Reviewed the phq and evgeny. Processed his feelings about being evicted and problem solved. Emailed his PCP about med ideas for increased anxiety. Left a phone message with his [place] worker about [place] needing to know asap about whether accepted for the new apt. Reviewed healthy coping ideas. Promoted his ludin to produce hope.        Assessments completed prior to visit:  The following assessments were completed by patient for this visit:  PHQ9:   PHQ-9 SCORE 3/19/2021 7/19/2021 1/28/2022 1/28/2022 2/7/2022 3/1/2022 3/18/2022   PHQ-9 Total Score - - - - - - -   PHQ-9 Total Score MyChart - - - 14 (Moderate depression) - - -   PHQ-9 Total Score 17 15 14 14 14 16 16     GAD7:   EVGENY-7  SCORE 2/5/2021 2/19/2021 3/19/2021 1/28/2022 2/7/2022 3/1/2022 3/18/2022   Total Score - - - - - - -   Total Score - - - 13 (moderate anxiety) - - -   Total Score 13 18 16 13 10 16 10     CAGE-AID:   CAGE-AID Total Score 1/28/2022   Total Score 0   Total Score MyChart 0 (A total score of 2 or greater is considered clinically significant)     PROMIS 10-Global Health (all questions and answers displayed):   PROMIS 10 1/28/2022   In general, would you say your health is: Poor   In general, would you say your quality of life is: Fair   In general, how would you rate your physical health? Poor   In general, how would you rate your mental health, including your mood and your ability to think? Fair   In general, how would you rate your satisfaction with your social activities and relationships? Fair   In general, please rate how well you carry out your usual social activities and roles Poor   To what extent are you able to carry out your everyday physical activities such as walking, climbing stairs, carrying groceries, or moving a chair? A little   How often have you been bothered by emotional problems such as feeling anxious, depressed or irritable? Often   How would you rate your fatigue on average? Moderate   How would you rate your pain on average?   0 = No Pain  to  10 = Worst Imaginable Pain 5   In general, would you say your health is: 1   In general, would you say your quality of life is: 2   In general, how would you rate your physical health? 1   In general, how would you rate your mental health, including your mood and your ability to think? 2   In general, how would you rate your satisfaction with your social activities and relationships? 2   In general, please rate how well you carry out your usual social activities and roles. (This includes activities at home, at work and in your community, and responsibilities as a parent, child, spouse, employee, friend, etc.) 1   To what extent are you able to carry out  your everyday physical activities such as walking, climbing stairs, carrying groceries, or moving a chair? 2   In the past 7 days, how often have you been bothered by emotional problems such as feeling anxious, depressed, or irritable? 4   In the past 7 days, how would you rate your fatigue on average? 3   In the past 7 days, how would you rate your pain on average, where 0 means no pain, and 10 means worst imaginable pain? 5   Global Mental Health Score 8   Global Physical Health Score 9   PROMIS TOTAL - SUBSCORES 17   Some recent data might be hidden     [place] Suicide Severity Rating Scale (Lifetime/Recent)  [place] Suicide Severity Rating (Lifetime/Recent) 9/8/2018 1/28/2022   Wish to be Dead (Lifetime) - Yes   Non-Specific Active Suicidal Thoughts (Lifetime) - Yes   Most Severe Ideation Rating (Lifetime) - 5   Frequency (Lifetime) - 3   Duration (Lifetime) - 2   Controllability (Lifetime) - 5   Protective Factors  (Lifetime) - 5   Reasons for Ideation (Lifetime) - 4   RETIRED: 1. Wish to be Dead (Recent) - No   RETIRED: 2. Non-Specific Active Suicidal Thoughts (Recent) - No   3. Active Suicidal Ideation with any Methods (Not Plan) Without Intent to Act (Lifetime) - Yes   RETIRED: 3. Active Suicidal Ideation with any Methods (Not Plan) Without Intent to Act (Recent) - No   RETIRE: 4. Active Suicidal Ideation with Some Intent to Act, Without Specific Plan (Lifetime) - Yes   4. Active Suicidal Ideation with Some Intent to Act, Without Specific Plan (Recent) - No   RETIRE: 5. Active Suicidal Ideation with Specific Plan and Intent (Lifetime) - Yes   RETIRED: 5. Active Suicidal Ideation with Specific Plan and Intent (Recent) - No   Most Severe Ideation Rating (Past Month) NA NA   Frequency (Past Month) - NA   Duration (Past Month) - NA   Controllability (Past Month) - NA   Protective Factors (Past Month) - NA   Reasons for Ideation (Past Month) - NA   Actual Attempt (Lifetime) - Yes   Actual Attempt Description  (Lifetime) - 3 attempts with last one 15 years ago   Total Number of Actual Attempts (Lifetime) - 3   Actual Attempt (Past 3 Months) - No   Has subject engaged in non-suicidal self-injurious behavior? (Lifetime) - No   Has subject engaged in non-suicidal self-injurious behavior? (Past 3 Months) - No   Interrupted Attempts (Lifetime) - No   Interrupted Attempts (Past 3 Months) - No   Aborted or Self-Interrupted Attempt (Lifetime) - No   Aborted or Self-Interrupted Attempt (Past 3 Months) - No   Preparatory Acts or Behavior (Lifetime) - No   Preparatory Acts or Behavior (Past 3 Months) - No   Most Recent Attempt Actual Lethality Code - 3   Most Lethal Attempt Actual Lethality Code - 2   Initial/First Attempt Actual Lethality Code - 2         ASSESSMENT: Current Emotional / Mental Status (status of significant symptoms):   Risk status (Self / Other harm or suicidal ideation)   Patient denies current fears or concerns for personal safety.   Patient denies current or recent suicidal ideation or behaviors. Denies.    Patient denies current or recent homicidal ideation or behaviors.   Patient denies current or recent self injurious behavior or ideation.   Patient denies other safety concerns.   Patient reports there has been no change in risk factors since their last session.     Patient reports there has been no change in protective factors since their last session.     a safety plan was completed several years ago.      Appearance:   Appropriate.   Eye Contact:   Good.   Psychomotor Behavior: slowed.   Attitude:   Cooperative    Orientation:   All   Speech    Rate / Production: Normal     Volume:  Normal    Mood:    Depressed   Affect:    Appropriate    Thought Content:  Clear    Thought Form:  Coherent  Logical    Insight:    Good      Medication Review:   No changes to current psychiatric medication(s). PCP Dr. Eliane Quinn prescribing cymbalta 60 mg and trazadone.     Medication Compliance:   Yes     Changes in Health  Issues:   None reported     Chemical Use Review:   Substance Use: Chemical use reviewed, no active concerns identified      Tobacco Use: No change in amount of tobacco use since last session.  Contemplation    Diagnosis:  MDD; EVGENY; PTSD.    Collateral Reports Completed:   Routed note to PCP    PLAN: (Patient Tasks / Therapist Tasks / Other)  Biweekly as able.   Follow safety plan. Use a healthy coping idea as needed.  Update safety plan next time-  add whole new one- we concluded not needed at this time.    Goals due 5/1/22      Kleber Pollack, [facility]                                                         ______________________________________________________________________    Individual Treatment Plan    Patient's Name: Melquiades Morales  YOB: 1955    Date of Creation: 1/28/22  Date Treatment Plan Last Reviewed/Revised: 3/1/22    DSM5 Diagnoses: 296.32 (F33.1) Major Depressive Disorder, Recurrent Episode, Moderate _, 300.02 (F41.1) Generalized Anxiety Disorder or 309.81 (F43.10) Posttraumatic Stress Disorder (includes Posttraumatic Stress Disorder for Children 6 Years and Younger)  With dissociative symptoms.  Psychosocial / Contextual Factors: recent loss of wife.  PROMIS (reviewed every 90 days): 1/28/22    Referral / Collaboration:  Referral to another professional/service is not indicated at this time.    Anticipated number of session for this episode of care: 10+  Anticipation frequency of session: Weekly  Anticipated Duration of each session: 38-52 minutes  Treatment plan will be reviewed in 90 days or when goals have been changed.       MeasurableTreatment Goal(s) related to diagnosis / functional impairment(s)  Goal 1: Patient will report coping well with daily stressors.     I will know I've met my goal when each goal that we accomplish and I am having less of a problem in that area I know that you have helped me.       Objective #A (Patient Action)                          Patient will  continue to follow his safety plan.  Status: New - Date: 1/28/22      Intervention(s)  Therapist will monitor for safety each visit and encourage use of safety plan.     Objective #B  Patient will use a healthy coping idea as needed 100% of trials for 1 week.  Status: New - Date: 1/28/22   IDEAS: napping; petting Snippy (pet dog); watching tv.     Intervention(s)  Therapist will provide ideas and encouragement to use them.     Objective #C  Patient will process the loss of his wife as needed.  Status: New - Date: 1/28/22      Intervention(s)  Therapist will consider EMDR.              Patient has reviewed and agreed to the above plan.        Kleber Pollack, [facility]                January 28, 2022

## 2022-04-26 ASSESSMENT — ANXIETY QUESTIONNAIRES: GAD7 TOTAL SCORE: 19

## 2022-04-28 ENCOUNTER — TELEPHONE (OUTPATIENT)
Dept: FAMILY MEDICINE | Facility: CLINIC | Age: 67
End: 2022-04-28
Payer: COMMERCIAL

## 2022-04-28 NOTE — TELEPHONE ENCOUNTER
Patient called the clinic, he is requesting his medication list be faxed to [facility] at [phone].     Patient requests his medical records to be faxed as well, he is provided with the HIMS number.    Patient also requests his medical cannabis orders, he says he fills this through Dr. Yin Womack at [facility] in [place], he is told to contact this pain clinic to have them fax orders to the [facility] as this is not filled by [facility] .    Routing to Care team to have [facility] fax the medication list.      AUNDREA Schaeffer

## 2022-05-12 ENCOUNTER — VIRTUAL VISIT (OUTPATIENT)
Dept: PSYCHOLOGY | Facility: CLINIC | Age: 67
End: 2022-05-12
Payer: COMMERCIAL

## 2022-05-12 DIAGNOSIS — F43.10 POSTTRAUMATIC STRESS DISORDER: ICD-10-CM

## 2022-05-12 DIAGNOSIS — F41.1 GENERALIZED ANXIETY DISORDER: ICD-10-CM

## 2022-05-12 DIAGNOSIS — F33.1 MAJOR DEPRESSIVE DISORDER, RECURRENT EPISODE, MODERATE (H): Primary | ICD-10-CM

## 2022-05-12 PROCEDURE — 90834 PSYTX W PT 45 MINUTES: CPT | Mod: 95 | Performed by: SOCIAL WORKER

## 2022-05-12 ASSESSMENT — ANXIETY QUESTIONNAIRES
7. FEELING AFRAID AS IF SOMETHING AWFUL MIGHT HAPPEN: NOT AT ALL
IF YOU CHECKED OFF ANY PROBLEMS ON THIS QUESTIONNAIRE, HOW DIFFICULT HAVE THESE PROBLEMS MADE IT FOR YOU TO DO YOUR WORK, TAKE CARE OF THINGS AT HOME, OR GET ALONG WITH OTHER PEOPLE: VERY DIFFICULT
GAD7 TOTAL SCORE: 13
3. WORRYING TOO MUCH ABOUT DIFFERENT THINGS: MORE THAN HALF THE DAYS
5. BEING SO RESTLESS THAT IT IS HARD TO SIT STILL: MORE THAN HALF THE DAYS
2. NOT BEING ABLE TO STOP OR CONTROL WORRYING: MORE THAN HALF THE DAYS
6. BECOMING EASILY ANNOYED OR IRRITABLE: MORE THAN HALF THE DAYS
1. FEELING NERVOUS, ANXIOUS, OR ON EDGE: NEARLY EVERY DAY

## 2022-05-12 ASSESSMENT — COLUMBIA-SUICIDE SEVERITY RATING SCALE - C-SSRS
SUICIDE, SINCE LAST CONTACT: NO
TOTAL  NUMBER OF ABORTED OR SELF INTERRUPTED ATTEMPTS SINCE LAST CONTACT: NO
6. HAVE YOU EVER DONE ANYTHING, STARTED TO DO ANYTHING, OR PREPARED TO DO ANYTHING TO END YOUR LIFE?: NO
1. SINCE LAST CONTACT, HAVE YOU WISHED YOU WERE DEAD OR WISHED YOU COULD GO TO SLEEP AND NOT WAKE UP?: NO
TOTAL  NUMBER OF INTERRUPTED ATTEMPTS SINCE LAST CONTACT: NO
ATTEMPT SINCE LAST CONTACT: NO
2. HAVE YOU ACTUALLY HAD ANY THOUGHTS OF KILLING YOURSELF?: NO

## 2022-05-12 NOTE — PROGRESS NOTES
"    [facility]                                     Progress Note    Patient Name: Melquiades Morales  Date: 5/12/22         Service Type: Individual      Session Start Time:  10 am  Session End Time: 10:45 am     Session Length: 45 min    Session #: 7    Attendees: Client attended alone.    Service Modality:  Phone Visit:                  Provider verified identity through the following two step process.  Patient provided:  Patient is known previously to provider     The patient has been notified of the following:      \"We have found that certain health care needs can be provided without the need for a face to face visit.  This service lets us provide the care you need with a phone conversation.       I will have full access to your [facility] medical record during this entire phone call.   I will be taking notes for your medical record.      Since this is like an office visit, we will bill your insurance company for this service.       There are potential benefits and risks of telephone visits (e.g. limits to patient confidentiality) that differ from in-person visits.?Confidentiality still applies for telephone services, and nobody will record the visit.  It is important to be in a quiet, private space that is free of distractions (including cell phone or other devices) during the visit.??      If during the course of the call I believe a telephone visit is not appropriate, you will not be charged for this service\"     Consent has been obtained for this service by care team member: Yes      DATA  Interactive Complexity: No  Crisis: No        Progress Since Last Session (Related to Symptoms / Goals / Homework):   Symptoms: worsening- he was evicted.    Homework: Partially completed- use a healthy coping idea. He has the grounding ideas handout.     Episode of Care Goals: Minimal progress - ACTION (Actively working towards change); Intervened by reinforcing change plan / affirming steps " taken.    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   He was evicted from his home and now has 4 days to find another place to live. He has 24 days to sale his trailer home. His trailer sold. He now lives in a nursing home.       Treatment Objective(s) Addressed in This Session:   Use a healthy coping idea as needed.      Intervention:  Assessed functioning and for safety. Reviewed the phq and shailesh. Processed his feelings about living in assisted living. Processed feelings about daughter being pregnant. Reviewed healthy coping ideas. Promoted his ludin to produce hope.        Assessments completed prior to visit:  The following assessments were completed by patient for this visit:  PHQ9:   PHQ-9 SCORE 7/19/2021 1/28/2022 1/28/2022 2/7/2022 3/1/2022 3/18/2022 4/25/2022   PHQ-9 Total Score - - - - - - -   PHQ-9 Total Score MyChart - - 14 (Moderate depression) - - - -   PHQ-9 Total Score 15 14 14 14 16 16 17     GAD7:   SHAILESH-7 SCORE 2/19/2021 3/19/2021 1/28/2022 2/7/2022 3/1/2022 3/18/2022 4/25/2022   Total Score - - - - - - -   Total Score - - 13 (moderate anxiety) - - - -   Total Score 18 16 13 10 16 10 19     CAGE-AID:   CAGE-AID Total Score 1/28/2022   Total Score 0   Total Score MyChart 0 (A total score of 2 or greater is considered clinically significant)     PROMIS 10-Global Health (all questions and answers displayed):   PROMIS 10 1/28/2022   In general, would you say your health is: Poor   In general, would you say your quality of life is: Fair   In general, how would you rate your physical health? Poor   In general, how would you rate your mental health, including your mood and your ability to think? Fair   In general, how would you rate your satisfaction with your social activities and relationships? Fair   In general, please rate how well you carry out your usual social activities and roles Poor   To what extent are you able to carry  out your everyday physical activities such as walking, climbing stairs, carrying groceries, or moving a chair? A little   How often have you been bothered by emotional problems such as feeling anxious, depressed or irritable? Often   How would you rate your fatigue on average? Moderate   How would you rate your pain on average?   0 = No Pain  to  10 = Worst Imaginable Pain 5   In general, would you say your health is: 1   In general, would you say your quality of life is: 2   In general, how would you rate your physical health? 1   In general, how would you rate your mental health, including your mood and your ability to think? 2   In general, how would you rate your satisfaction with your social activities and relationships? 2   In general, please rate how well you carry out your usual social activities and roles. (This includes activities at home, at work and in your community, and responsibilities as a parent, child, spouse, employee, friend, etc.) 1   To what extent are you able to carry out your everyday physical activities such as walking, climbing stairs, carrying groceries, or moving a chair? 2   In the past 7 days, how often have you been bothered by emotional problems such as feeling anxious, depressed, or irritable? 4   In the past 7 days, how would you rate your fatigue on average? 3   In the past 7 days, how would you rate your pain on average, where 0 means no pain, and 10 means worst imaginable pain? 5   Global Mental Health Score 8   Global Physical Health Score 9   PROMIS TOTAL - SUBSCORES 17   Some recent data might be hidden     [place] Suicide Severity Rating Scale (Lifetime/Recent)  [place] Suicide Severity Rating (Lifetime/Recent) 9/8/2018 1/28/2022   Wish to be Dead (Lifetime) - Yes   Non-Specific Active Suicidal Thoughts (Lifetime) - Yes   Most Severe Ideation Rating (Lifetime) - 5   Frequency (Lifetime) - 3   Duration (Lifetime) - 2   Controllability (Lifetime) - 5   Protective Factors   (Lifetime) - 5   Reasons for Ideation (Lifetime) - 4   RETIRED: 1. Wish to be Dead (Recent) - No   RETIRED: 2. Non-Specific Active Suicidal Thoughts (Recent) - No   3. Active Suicidal Ideation with any Methods (Not Plan) Without Intent to Act (Lifetime) - Yes   RETIRED: 3. Active Suicidal Ideation with any Methods (Not Plan) Without Intent to Act (Recent) - No   RETIRE: 4. Active Suicidal Ideation with Some Intent to Act, Without Specific Plan (Lifetime) - Yes   4. Active Suicidal Ideation with Some Intent to Act, Without Specific Plan (Recent) - No   RETIRE: 5. Active Suicidal Ideation with Specific Plan and Intent (Lifetime) - Yes   RETIRED: 5. Active Suicidal Ideation with Specific Plan and Intent (Recent) - No   Most Severe Ideation Rating (Past Month) NA NA   Frequency (Past Month) - NA   Duration (Past Month) - NA   Controllability (Past Month) - NA   Protective Factors (Past Month) - NA   Reasons for Ideation (Past Month) - NA   Actual Attempt (Lifetime) - Yes   Actual Attempt Description (Lifetime) - 3 attempts with last one 15 years ago   Total Number of Actual Attempts (Lifetime) - 3   Actual Attempt (Past 3 Months) - No   Has subject engaged in non-suicidal self-injurious behavior? (Lifetime) - No   Has subject engaged in non-suicidal self-injurious behavior? (Past 3 Months) - No   Interrupted Attempts (Lifetime) - No   Interrupted Attempts (Past 3 Months) - No   Aborted or Self-Interrupted Attempt (Lifetime) - No   Aborted or Self-Interrupted Attempt (Past 3 Months) - No   Preparatory Acts or Behavior (Lifetime) - No   Preparatory Acts or Behavior (Past 3 Months) - No   Most Recent Attempt Actual Lethality Code - 3   Most Lethal Attempt Actual Lethality Code - 2   Initial/First Attempt Actual Lethality Code - 2         ASSESSMENT: Current Emotional / Mental Status (status of significant symptoms):   Risk status (Self / Other harm or suicidal ideation)   Patient denies current fears or concerns for  personal safety.   Patient denies current or recent suicidal ideation or behaviors.     Patient denies current or recent homicidal ideation or behaviors.   Patient denies current or recent self injurious behavior or ideation.   Patient denies other safety concerns.   Patient reports there has been no change in risk factors since their last session.     Patient reports there has been no change in protective factors since their last session.     a safety plan was completed several years ago.      Appearance:   Unable to assess   Eye Contact:   Unable to assess   Psychomotor Behavior: Unable to assess   Attitude:   Cooperative    Orientation:   All   Speech    Rate / Production: Normal     Volume:  Normal    Mood:    Depressed   Affect:    Appropriate    Thought Content:  Clear    Thought Form:  Coherent  Logical    Insight:    Good      Medication Review:   No changes to current psychiatric medication(s). PCP Dr. Eliane Quinn prescribing cymbalta 60 mg and trazadone.     Medication Compliance:   Yes     Changes in Health Issues:   None reported     Chemical Use Review:   Substance Use: Chemical use reviewed, no active concerns identified      Tobacco Use: No change in amount of tobacco use since last session.  Contemplation    Diagnosis:  MDD; EVGENY; PTSD.    Collateral Reports Completed:   Routed note to PCP    PLAN: (Patient Tasks / Therapist Tasks / Other)  Biweekly as able.   Follow safety plan. Use a healthy coping idea as needed.       Goals due 8/12/22      SHAVONNE Yao                                                         ______________________________________________________________________    Individual Treatment Plan    Patient's Name: Melquiades Morales  YOB: 1955    Date of Creation: 1/28/22  Date Treatment Plan Last Reviewed/Revised: 5/12/22    DSM5 Diagnoses: 296.32 (F33.1) Major Depressive Disorder, Recurrent Episode, Moderate _, 300.02 (F41.1) Generalized Anxiety Disorder  or 309.81 (F43.10) Posttraumatic Stress Disorder (includes Posttraumatic Stress Disorder for Children 6 Years and Younger)  With dissociative symptoms.  Psychosocial / Contextual Factors: recent loss of wife.  PROMIS (reviewed every 90 days):  512/22    Referral / Collaboration:  Referral to another professional/service is not indicated at this time.    Anticipated number of session for this episode of care: 10+  Anticipation frequency of session: Weekly  Anticipated Duration of each session: 38-52 minutes  Treatment plan will be reviewed in 90 days or when goals have been changed.       MeasurableTreatment Goal(s) related to diagnosis / functional impairment(s)  Goal 1: Patient will report coping well with daily stressors.     I will know I've met my goal when each goal that we accomplish and I am having less of a problem in that area I know that you have helped me.       Objective #A (Patient Action)                          Patient will continue to follow his safety plan.  Status: New - Date: 1/28/22; 5/12/22      Intervention(s)  Therapist will monitor for safety each visit and encourage use of safety plan.     Objective #B  Patient will use a healthy coping idea as needed 100% of trials for 1 week.  Status: New - Date: 1/28/22; 5/12/22  IDEAS: napping; petting Snippy (pet dog); watching tv.  Intervention(s)  Therapist will provide ideas and encouragement to use them.     Objective #C  Patient will process the loss of his wife as needed.  Status: New - Date: 1/28/22; 5/12/22      Intervention(s)  Therapist will consider EMDR.              Patient has reviewed and agreed to the above plan.        Kleber Pollack, [facility]                January 28, 2022

## 2022-05-13 ASSESSMENT — ANXIETY QUESTIONNAIRES: GAD7 TOTAL SCORE: 13

## 2022-05-16 ENCOUNTER — OFFICE VISIT (OUTPATIENT)
Dept: DERMATOLOGY | Facility: CLINIC | Age: 67
End: 2022-05-16
Attending: FAMILY MEDICINE
Payer: COMMERCIAL

## 2022-05-16 VITALS
BODY MASS INDEX: 34.62 KG/M2 | WEIGHT: 250 LBS | SYSTOLIC BLOOD PRESSURE: 108 MMHG | DIASTOLIC BLOOD PRESSURE: 79 MMHG | HEART RATE: 70 BPM

## 2022-05-16 DIAGNOSIS — D23.9 DERMAL NEVUS: ICD-10-CM

## 2022-05-16 DIAGNOSIS — L82.0 INFLAMED SEBORRHEIC KERATOSIS: ICD-10-CM

## 2022-05-16 DIAGNOSIS — L82.1 SEBORRHEIC KERATOSES: ICD-10-CM

## 2022-05-16 DIAGNOSIS — D18.01 ANGIOMA OF SKIN: ICD-10-CM

## 2022-05-16 DIAGNOSIS — L81.4 LENTIGO: Primary | ICD-10-CM

## 2022-05-16 PROCEDURE — 17110 DESTRUCTION B9 LES UP TO 14: CPT | Performed by: DERMATOLOGY

## 2022-05-16 PROCEDURE — 99203 OFFICE O/P NEW LOW 30 MIN: CPT | Mod: 25 | Performed by: DERMATOLOGY
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
## 2022-05-16 NOTE — PATIENT INSTRUCTIONS
WOUND CARE INSTRUCTIONS   FOR CRYOSURGERY   This area treated with liquid nitrogen should form a blister (areas treated may or may not blister-skin may just turn dark and slough off). You do not need to bandage the area unless a blister forms and breaks (which may be a few days). When the blister breaks, begin daily dressing changes as follows:  1) Clean and dry the area with tap water using clean Q-tip or sterile gauze pad.   2) Apply Polysporin ointment or Bacitracin ointment over entire wound. Do NOT use Neosporin ointment.   3) Cover the wound with a band-aid or sterile non-stick gauze pad and micropore paper tape.   REPEAT THESE INSTRUCTIONS AT LEAST ONCE A DAY UNTIL THE WOUND HAS COMPLETELY HEALED.   It is an old wives tale that a wound heals better when it is exposed to air and allowed to dry out. The wound will heal faster with a better cosmetic result if it is kept moist with ointment and covered with a bandage.   Do not let the wound dry out.   IMPORTANT INFORMATION ON REVERSE SIDE   Supplies Needed:   *Cotton tipped applicators (Q-tips)   *Polysporin ointment or Bacitracin ointment (NOT NEOSPORIN)   *Band-aids, or non stick gauze pads and micropore paper tape   PATIENT INFORMATION   During the healing process you will notice a number of changes. All wounds develop a small halo of redness surrounding the wound. This means healing is occurring. Severe itching with extensive redness usually indicates sensitivity to the ointment or bandage tape used to dress the wound. You should call our office if this develops.   Swelling and/or discoloration around your surgical site is common, particularly when performed around the eye.   All wounds normally drain. The larger the wound the more drainage there will be. After 7-10 days, you will notice the wound beginning to shrink and new skin will begin to grow. The wound is healed when you can see skin has formed over the entire area. A healed wound has a healthy, shiny  look to the surface and is red to dark pink in color to normalize. Wounds may take approximately 4-6 weeks to heal. Larger wounds may take 6-8 weeks. After the wound is healed you may discontinue dressing changes.   You may experience a sensation of tightness as your wound heals. This is normal and will gradually subside.   Your healed wound may be sensitive to temperature changes. This sensitivity improves with time, but if you re having a lot of discomfort, try to avoid temperature extremes.   Patients frequently experience itching after their wound appears to have healed because of the continue healing under the skin. Plain Vaseline will help relieve the itching.

## 2022-05-19 ENCOUNTER — VIRTUAL VISIT (OUTPATIENT)
Dept: PSYCHOLOGY | Facility: CLINIC | Age: 67
End: 2022-05-19
Payer: COMMERCIAL

## 2022-05-19 DIAGNOSIS — F33.1 MAJOR DEPRESSIVE DISORDER, RECURRENT EPISODE, MODERATE (H): ICD-10-CM

## 2022-05-19 DIAGNOSIS — F41.1 GENERALIZED ANXIETY DISORDER: ICD-10-CM

## 2022-05-19 DIAGNOSIS — F43.10 POSTTRAUMATIC STRESS DISORDER: Primary | ICD-10-CM

## 2022-05-19 PROCEDURE — 90832 PSYTX W PT 30 MINUTES: CPT | Mod: 95 | Performed by: SOCIAL WORKER

## 2022-05-19 NOTE — PROGRESS NOTES
"    [facility]                                     Progress Note    Patient Name: Melquiades Morales  Date: 5/19/22         Service Type: Individual      Session Start Time:  9:20 am  Session End Time: 9:50 am     Session Length: 30 min- He had been on his way to the office then decided to go to his trailer instead.    Session #: 8    Attendees: Client attended alone.    Service Modality:  Phone Visit:                  Provider verified identity through the following two step process.  Patient provided:  Patient is known previously to provider     The patient has been notified of the following:      \"We have found that certain health care needs can be provided without the need for a face to face visit.  This service lets us provide the care you need with a phone conversation.       I will have full access to your [facility] medical record during this entire phone call.   I will be taking notes for your medical record.      Since this is like an office visit, we will bill your insurance company for this service.       There are potential benefits and risks of telephone visits (e.g. limits to patient confidentiality) that differ from in-person visits.?Confidentiality still applies for telephone services, and nobody will record the visit.  It is important to be in a quiet, private space that is free of distractions (including cell phone or other devices) during the visit.??      If during the course of the call I believe a telephone visit is not appropriate, you will not be charged for this service\"     Consent has been obtained for this service by care team member: Yes      DATA  Interactive Complexity: No  Crisis: No        Progress Since Last Session (Related to Symptoms / Goals / Homework):   Symptoms: worsening.    Homework: Partially completed- use a healthy coping idea. He has the grounding ideas handout.     Episode of Care Goals: Minimal progress - ACTION (Actively working towards " change); Intervened by reinforcing change plan / affirming steps taken.    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   He was evicted from his home and now has 4 days to find another place to live. He has 24 days to sale his trailer home. His trailer sold. He now lives in a nursing home.       Treatment Objective(s) Addressed in This Session:   Use a healthy coping idea as needed.      Intervention:  Assessed functioning and for safety. Processed his feelings about living in assisted living. Reviewed healthy coping ideas.          Assessments completed prior to visit:  The following assessments were completed by patient for this visit:  PHQ9:   PHQ-9 SCORE 1/28/2022 1/28/2022 2/7/2022 3/1/2022 3/18/2022 4/25/2022 5/12/2022   PHQ-9 Total Score - - - - - - -   PHQ-9 Total Score MyChart - 14 (Moderate depression) - - - - -   PHQ-9 Total Score 14 14 14 16 16 17 15     GAD7:   EVGENY-7 SCORE 3/19/2021 1/28/2022 2/7/2022 3/1/2022 3/18/2022 4/25/2022 5/12/2022   Total Score - - - - - - -   Total Score - 13 (moderate anxiety) - - - - -   Total Score 16 13 10 16 10 19 13     CAGE-AID:   CAGE-AID Total Score 1/28/2022   Total Score 0   Total Score MyChart 0 (A total score of 2 or greater is considered clinically significant)     PROMIS 10-Global Health (all questions and answers displayed):   PROMIS 10 1/28/2022 5/12/2022   In general, would you say your health is: Poor -   In general, would you say your quality of life is: Fair -   In general, how would you rate your physical health? Poor -   In general, how would you rate your mental health, including your mood and your ability to think? Fair -   In general, how would you rate your satisfaction with your social activities and relationships? Fair -   In general, please rate how well you carry out your usual social activities and roles Poor -   To what extent are you able to carry out your everyday  physical activities such as walking, climbing stairs, carrying groceries, or moving a chair? A little -   How often have you been bothered by emotional problems such as feeling anxious, depressed or irritable? Often -   How would you rate your fatigue on average? Moderate -   How would you rate your pain on average?   0 = No Pain  to  10 = Worst Imaginable Pain 5 -   In general, would you say your health is: 1 3   In general, would you say your quality of life is: 2 2   In general, how would you rate your physical health? 1 2   In general, how would you rate your mental health, including your mood and your ability to think? 2 3   In general, how would you rate your satisfaction with your social activities and relationships? 2 2   In general, please rate how well you carry out your usual social activities and roles. (This includes activities at home, at work and in your community, and responsibilities as a parent, child, spouse, employee, friend, etc.) 1 2   To what extent are you able to carry out your everyday physical activities such as walking, climbing stairs, carrying groceries, or moving a chair? 2 1   In the past 7 days, how often have you been bothered by emotional problems such as feeling anxious, depressed, or irritable? 4 3   In the past 7 days, how would you rate your fatigue on average? 3 3   In the past 7 days, how would you rate your pain on average, where 0 means no pain, and 10 means worst imaginable pain? 5 6   Global Mental Health Score 8 10   Global Physical Health Score 9 9   PROMIS TOTAL - SUBSCORES 17 19   Some recent data might be hidden     [place] Suicide Severity Rating Scale (Lifetime/Recent)  [place] Suicide Severity Rating (Lifetime/Recent) 9/8/2018 1/28/2022   Wish to be Dead (Lifetime) - Yes   Non-Specific Active Suicidal Thoughts (Lifetime) - Yes   Most Severe Ideation Rating (Lifetime) - 5   Frequency (Lifetime) - 3   Duration (Lifetime) - 2   Controllability (Lifetime) - 5    Protective Factors  (Lifetime) - 5   Reasons for Ideation (Lifetime) - 4   RETIRED: 1. Wish to be Dead (Recent) - No   RETIRED: 2. Non-Specific Active Suicidal Thoughts (Recent) - No   3. Active Suicidal Ideation with any Methods (Not Plan) Without Intent to Act (Lifetime) - Yes   RETIRED: 3. Active Suicidal Ideation with any Methods (Not Plan) Without Intent to Act (Recent) - No   RETIRE: 4. Active Suicidal Ideation with Some Intent to Act, Without Specific Plan (Lifetime) - Yes   4. Active Suicidal Ideation with Some Intent to Act, Without Specific Plan (Recent) - No   RETIRE: 5. Active Suicidal Ideation with Specific Plan and Intent (Lifetime) - Yes   RETIRED: 5. Active Suicidal Ideation with Specific Plan and Intent (Recent) - No   Most Severe Ideation Rating (Past Month) NA NA   Frequency (Past Month) - NA   Duration (Past Month) - NA   Controllability (Past Month) - NA   Protective Factors (Past Month) - NA   Reasons for Ideation (Past Month) - NA   Actual Attempt (Lifetime) - Yes   Actual Attempt Description (Lifetime) - 3 attempts with last one 15 years ago   Total Number of Actual Attempts (Lifetime) - 3   Actual Attempt (Past 3 Months) - No   Has subject engaged in non-suicidal self-injurious behavior? (Lifetime) - No   Has subject engaged in non-suicidal self-injurious behavior? (Past 3 Months) - No   Interrupted Attempts (Lifetime) - No   Interrupted Attempts (Past 3 Months) - No   Aborted or Self-Interrupted Attempt (Lifetime) - No   Aborted or Self-Interrupted Attempt (Past 3 Months) - No   Preparatory Acts or Behavior (Lifetime) - No   Preparatory Acts or Behavior (Past 3 Months) - No   Most Recent Attempt Actual Lethality Code - 3   Most Lethal Attempt Actual Lethality Code - 2   Initial/First Attempt Actual Lethality Code - 2         ASSESSMENT: Current Emotional / Mental Status (status of significant symptoms):   Risk status (Self / Other harm or suicidal ideation)   Patient denies current fears  or concerns for personal safety.   Patient denies current or recent suicidal ideation or behaviors.     Patient denies current or recent homicidal ideation or behaviors.   Patient denies current or recent self injurious behavior or ideation.   Patient denies other safety concerns.   Patient reports there has been no change in risk factors since their last session.     Patient reports there has been no change in protective factors since their last session.     a safety plan was completed several years ago.      Appearance:   Unable to assess   Eye Contact:   Unable to assess   Psychomotor Behavior: Unable to assess   Attitude:   Cooperative    Orientation:   All   Speech    Rate / Production: Normal     Volume:  Normal    Mood:    Depressed   Affect:    Appropriate    Thought Content:  Clear    Thought Form:  Coherent  Logical    Insight:    Good      Medication Review:   No changes to current psychiatric medication(s). PCP Dr. Eliane Quinn prescribing cymbalta 60 mg and trazadone.     Medication Compliance:   Yes     Changes in Health Issues:   None reported     Chemical Use Review:   Substance Use: Chemical use reviewed, no active concerns identified      Tobacco Use: No change in amount of tobacco use since last session.  Contemplation    Diagnosis:  MDD; EVGENY; PTSD.    Collateral Reports Completed:   Routed note to PCP    PLAN: (Patient Tasks / Therapist Tasks / Other)  Biweekly as able.   Follow safety plan. Use a healthy coping idea as needed.       Goals due 8/12/22      SHAVONNE Yoa                                                         ______________________________________________________________________    Individual Treatment Plan    Patient's Name: Melquiades Morales  YOB: 1955    Date of Creation: 1/28/22  Date Treatment Plan Last Reviewed/Revised: 5/12/22    DSM5 Diagnoses: 296.32 (F33.1) Major Depressive Disorder, Recurrent Episode, Moderate _, 300.02 (F41.1) Generalized  Anxiety Disorder or 309.81 (F43.10) Posttraumatic Stress Disorder (includes Posttraumatic Stress Disorder for Children 6 Years and Younger)  With dissociative symptoms.  Psychosocial / Contextual Factors: recent loss of wife.  PROMIS (reviewed every 90 days):  512/22    Referral / Collaboration:  Referral to another professional/service is not indicated at this time.    Anticipated number of session for this episode of care: 10+  Anticipation frequency of session: Weekly  Anticipated Duration of each session: 38-52 minutes  Treatment plan will be reviewed in 90 days or when goals have been changed.       MeasurableTreatment Goal(s) related to diagnosis / functional impairment(s)  Goal 1: Patient will report coping well with daily stressors.     I will know I've met my goal when each goal that we accomplish and I am having less of a problem in that area I know that you have helped me.       Objective #A (Patient Action)                          Patient will continue to follow his safety plan.  Status: New - Date: 1/28/22; 5/12/22      Intervention(s)  Therapist will monitor for safety each visit and encourage use of safety plan.     Objective #B  Patient will use a healthy coping idea as needed 100% of trials for 1 week.  Status: New - Date: 1/28/22; 5/12/22  IDEAS: napping; petting Snippy (pet dog); watching tv.  Intervention(s)  Therapist will provide ideas and encouragement to use them.     Objective #C  Patient will process the loss of his wife as needed.  Status: New - Date: 1/28/22; 5/12/22      Intervention(s)  Therapist will consider EMDR.              Patient has reviewed and agreed to the above plan.        Kleber Pollack, [facility]                January 28, 2022

## 2022-05-26 ENCOUNTER — VIRTUAL VISIT (OUTPATIENT)
Dept: PSYCHOLOGY | Facility: CLINIC | Age: 67
End: 2022-05-26
Payer: COMMERCIAL

## 2022-05-26 DIAGNOSIS — F41.1 GENERALIZED ANXIETY DISORDER: ICD-10-CM

## 2022-05-26 DIAGNOSIS — F33.1 MAJOR DEPRESSIVE DISORDER, RECURRENT EPISODE, MODERATE (H): Primary | ICD-10-CM

## 2022-05-26 DIAGNOSIS — F43.10 POSTTRAUMATIC STRESS DISORDER: ICD-10-CM

## 2022-05-26 PROCEDURE — 90834 PSYTX W PT 45 MINUTES: CPT | Mod: 95 | Performed by: SOCIAL WORKER

## 2022-05-26 ASSESSMENT — ANXIETY QUESTIONNAIRES
3. WORRYING TOO MUCH ABOUT DIFFERENT THINGS: MORE THAN HALF THE DAYS
2. NOT BEING ABLE TO STOP OR CONTROL WORRYING: MORE THAN HALF THE DAYS
GAD7 TOTAL SCORE: 12
GAD7 TOTAL SCORE: 12
5. BEING SO RESTLESS THAT IT IS HARD TO SIT STILL: MORE THAN HALF THE DAYS
6. BECOMING EASILY ANNOYED OR IRRITABLE: MORE THAN HALF THE DAYS
IF YOU CHECKED OFF ANY PROBLEMS ON THIS QUESTIONNAIRE, HOW DIFFICULT HAVE THESE PROBLEMS MADE IT FOR YOU TO DO YOUR WORK, TAKE CARE OF THINGS AT HOME, OR GET ALONG WITH OTHER PEOPLE: VERY DIFFICULT
7. FEELING AFRAID AS IF SOMETHING AWFUL MIGHT HAPPEN: NOT AT ALL
1. FEELING NERVOUS, ANXIOUS, OR ON EDGE: MORE THAN HALF THE DAYS

## 2022-05-26 ASSESSMENT — PATIENT HEALTH QUESTIONNAIRE - PHQ9
SUM OF ALL RESPONSES TO PHQ QUESTIONS 1-9: 13
5. POOR APPETITE OR OVEREATING: MORE THAN HALF THE DAYS

## 2022-06-10 ENCOUNTER — VIRTUAL VISIT (OUTPATIENT)
Dept: PSYCHOLOGY | Facility: CLINIC | Age: 67
End: 2022-06-10
Payer: COMMERCIAL

## 2022-06-10 DIAGNOSIS — F41.1 GENERALIZED ANXIETY DISORDER: ICD-10-CM

## 2022-06-10 DIAGNOSIS — F33.1 MAJOR DEPRESSIVE DISORDER, RECURRENT EPISODE, MODERATE (H): Primary | ICD-10-CM

## 2022-06-10 DIAGNOSIS — F43.10 POSTTRAUMATIC STRESS DISORDER: ICD-10-CM

## 2022-06-10 PROCEDURE — 90834 PSYTX W PT 45 MINUTES: CPT | Mod: 95 | Performed by: SOCIAL WORKER

## 2022-06-10 ASSESSMENT — ANXIETY QUESTIONNAIRES
GAD7 TOTAL SCORE: 12
2. NOT BEING ABLE TO STOP OR CONTROL WORRYING: MORE THAN HALF THE DAYS
5. BEING SO RESTLESS THAT IT IS HARD TO SIT STILL: SEVERAL DAYS
GAD7 TOTAL SCORE: 12
1. FEELING NERVOUS, ANXIOUS, OR ON EDGE: MORE THAN HALF THE DAYS
7. FEELING AFRAID AS IF SOMETHING AWFUL MIGHT HAPPEN: NOT AT ALL
3. WORRYING TOO MUCH ABOUT DIFFERENT THINGS: MORE THAN HALF THE DAYS
6. BECOMING EASILY ANNOYED OR IRRITABLE: NEARLY EVERY DAY
IF YOU CHECKED OFF ANY PROBLEMS ON THIS QUESTIONNAIRE, HOW DIFFICULT HAVE THESE PROBLEMS MADE IT FOR YOU TO DO YOUR WORK, TAKE CARE OF THINGS AT HOME, OR GET ALONG WITH OTHER PEOPLE: VERY DIFFICULT

## 2022-06-10 NOTE — PROGRESS NOTES
"    [facility]                                     Progress Note    Patient Name: Melquiades Morales  Date: 6/10/22         Service Type: Individual      Session Start Time:  9 am  Session End Time: 9:45 am     Session Length: 45 min     Session #: 10    Attendees: Client attended alone.    Service Modality:  Phone Visit:                  Provider verified identity through the following two step process.  Patient provided:  Patient is known previously to provider     The patient has been notified of the following:      \"We have found that certain health care needs can be provided without the need for a face to face visit.  This service lets us provide the care you need with a phone conversation.       I will have full access to your [facility] medical record during this entire phone call.   I will be taking notes for your medical record.      Since this is like an office visit, we will bill your insurance company for this service.       There are potential benefits and risks of telephone visits (e.g. limits to patient confidentiality) that differ from in-person visits.?Confidentiality still applies for telephone services, and nobody will record the visit.  It is important to be in a quiet, private space that is free of distractions (including cell phone or other devices) during the visit.??      If during the course of the call I believe a telephone visit is not appropriate, you will not be charged for this service\"     Consent has been obtained for this service by care team member: Yes      DATA  Interactive Complexity: No  Crisis: No        Progress Since Last Session (Related to Symptoms / Goals / Homework):   Symptoms: Improvement.    Homework: Partially completed- use a healthy coping idea. He has the grounding ideas handout.     Episode of Care Goals: Minimal progress - ACTION (Actively working towards change); Intervened by reinforcing change plan / affirming steps taken.    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   He was evicted from his home and now has 4 days to find another place to live. He has 24 days to sale his trailer home. His trailer sold. He now lives in a nursing home. It is taking him time to get used to it.       Treatment Objective(s) Addressed in This Session:   Use a healthy coping idea as needed.      Intervention:  Assessed functioning and for safety. Reviewed the phq and shailesh. Processed his feelings about living in assisted living and worries about his daughter. Encouraged hobbies such as fishing. Reviewed healthy coping ideas.          Assessments completed prior to visit:  The following assessments were completed by patient for this visit:  PHQ9:   PHQ-9 SCORE 1/28/2022 2/7/2022 3/1/2022 3/18/2022 4/25/2022 5/12/2022 5/26/2022   PHQ-9 Total Score - - - - - - -   PHQ-9 Total Score MyChart 14 (Moderate depression) - - - - - -   PHQ-9 Total Score 14 14 16 16 17 15 13     GAD7:   SHAILESH-7 SCORE 1/28/2022 2/7/2022 3/1/2022 3/18/2022 4/25/2022 5/12/2022 5/26/2022   Total Score - - - - - - -   Total Score 13 (moderate anxiety) - - - - - -   Total Score 13 10 16 10 19 13 12     CAGE-AID:   CAGE-AID Total Score 1/28/2022   Total Score 0   Total Score MyChart 0 (A total score of 2 or greater is considered clinically significant)     PROMIS 10-Global Health (all questions and answers displayed):   PROMIS 10 1/28/2022 5/12/2022   In general, would you say your health is: Poor -   In general, would you say your quality of life is: Fair -   In general, how would you rate your physical health? Poor -   In general, how would you rate your mental health, including your mood and your ability to think? Fair -   In general, how would you rate your satisfaction with your social activities and relationships? Fair -   In general, please rate how well you carry out your usual social activities and roles Poor -   To what  extent are you able to carry out your everyday physical activities such as walking, climbing stairs, carrying groceries, or moving a chair? A little -   How often have you been bothered by emotional problems such as feeling anxious, depressed or irritable? Often -   How would you rate your fatigue on average? Moderate -   How would you rate your pain on average?   0 = No Pain  to  10 = Worst Imaginable Pain 5 -   In general, would you say your health is: 1 3   In general, would you say your quality of life is: 2 2   In general, how would you rate your physical health? 1 2   In general, how would you rate your mental health, including your mood and your ability to think? 2 3   In general, how would you rate your satisfaction with your social activities and relationships? 2 2   In general, please rate how well you carry out your usual social activities and roles. (This includes activities at home, at work and in your community, and responsibilities as a parent, child, spouse, employee, friend, etc.) 1 2   To what extent are you able to carry out your everyday physical activities such as walking, climbing stairs, carrying groceries, or moving a chair? 2 1   In the past 7 days, how often have you been bothered by emotional problems such as feeling anxious, depressed, or irritable? 4 3   In the past 7 days, how would you rate your fatigue on average? 3 3   In the past 7 days, how would you rate your pain on average, where 0 means no pain, and 10 means worst imaginable pain? 5 6   Global Mental Health Score 8 10   Global Physical Health Score 9 9   PROMIS TOTAL - SUBSCORES 17 19   Some recent data might be hidden     [place] Suicide Severity Rating Scale (Lifetime/Recent)  [place] Suicide Severity Rating (Lifetime/Recent) 9/8/2018 1/28/2022   Wish to be Dead (Lifetime) - Yes   Non-Specific Active Suicidal Thoughts (Lifetime) - Yes   Most Severe Ideation Rating (Lifetime) - 5   Frequency (Lifetime) - 3   Duration  (Lifetime) - 2   Controllability (Lifetime) - 5   Protective Factors  (Lifetime) - 5   Reasons for Ideation (Lifetime) - 4   RETIRED: 1. Wish to be Dead (Recent) - No   RETIRED: 2. Non-Specific Active Suicidal Thoughts (Recent) - No   3. Active Suicidal Ideation with any Methods (Not Plan) Without Intent to Act (Lifetime) - Yes   RETIRED: 3. Active Suicidal Ideation with any Methods (Not Plan) Without Intent to Act (Recent) - No   RETIRE: 4. Active Suicidal Ideation with Some Intent to Act, Without Specific Plan (Lifetime) - Yes   4. Active Suicidal Ideation with Some Intent to Act, Without Specific Plan (Recent) - No   RETIRE: 5. Active Suicidal Ideation with Specific Plan and Intent (Lifetime) - Yes   RETIRED: 5. Active Suicidal Ideation with Specific Plan and Intent (Recent) - No   Most Severe Ideation Rating (Past Month) NA NA   Frequency (Past Month) - NA   Duration (Past Month) - NA   Controllability (Past Month) - NA   Protective Factors (Past Month) - NA   Reasons for Ideation (Past Month) - NA   Actual Attempt (Lifetime) - Yes   Actual Attempt Description (Lifetime) - 3 attempts with last one 15 years ago   Total Number of Actual Attempts (Lifetime) - 3   Actual Attempt (Past 3 Months) - No   Has subject engaged in non-suicidal self-injurious behavior? (Lifetime) - No   Has subject engaged in non-suicidal self-injurious behavior? (Past 3 Months) - No   Interrupted Attempts (Lifetime) - No   Interrupted Attempts (Past 3 Months) - No   Aborted or Self-Interrupted Attempt (Lifetime) - No   Aborted or Self-Interrupted Attempt (Past 3 Months) - No   Preparatory Acts or Behavior (Lifetime) - No   Preparatory Acts or Behavior (Past 3 Months) - No   Most Recent Attempt Actual Lethality Code - 3   Most Lethal Attempt Actual Lethality Code - 2   Initial/First Attempt Actual Lethality Code - 2         ASSESSMENT: Current Emotional / Mental Status (status of significant symptoms):   Risk status (Self / Other harm or  suicidal ideation)   Patient denies current fears or concerns for personal safety.   Patient denies current or recent suicidal ideation or behaviors.     Patient denies current or recent homicidal ideation or behaviors.   Patient denies current or recent self injurious behavior or ideation.   Patient denies other safety concerns.   Patient reports there has been no change in risk factors since their last session.     Patient reports there has been no change in protective factors since their last session.     a safety plan was completed several years ago.      Appearance:   Unable to assess   Eye Contact:   Unable to assess   Psychomotor Behavior: Unable to assess   Attitude:   Cooperative    Orientation:   All   Speech    Rate / Production: Normal     Volume:  Normal    Mood:    Depressed   Affect:    Appropriate    Thought Content:  Clear    Thought Form:  Coherent  Logical    Insight:    Good      Medication Review:   No changes to current psychiatric medication(s). PCP Dr. Eliane Quinn prescribing cymbalta 60 mg and trazadone.     Medication Compliance:   Yes     Changes in Health Issues:   None reported     Chemical Use Review:   Substance Use: Chemical use reviewed, no active concerns identified      Tobacco Use: No change in amount of tobacco use since last session.  Contemplation    Diagnosis:  MDD; EVGENY; PTSD.    Collateral Reports Completed:   Routed note to PCP    PLAN: (Patient Tasks / Therapist Tasks / Other)  Biweekly.   Follow safety plan. Use a healthy coping idea as needed.       Goals due 8/12/22      SHAVONNE Yao                                                         ______________________________________________________________________    Individual Treatment Plan    Patient's Name: Melquiades Morales  YOB: 1955    Date of Creation: 1/28/22  Date Treatment Plan Last Reviewed/Revised: 5/12/22    DSM5 Diagnoses: 296.32 (F33.1) Major Depressive Disorder, Recurrent Episode,  Moderate _, 300.02 (F41.1) Generalized Anxiety Disorder or 309.81 (F43.10) Posttraumatic Stress Disorder (includes Posttraumatic Stress Disorder for Children 6 Years and Younger)  With dissociative symptoms.  Psychosocial / Contextual Factors: recent loss of wife.  PROMIS (reviewed every 90 days):  5/12/22    Referral / Collaboration:  Referral to another professional/service is not indicated at this time.    Anticipated number of session for this episode of care: 10+  Anticipation frequency of session: Weekly  Anticipated Duration of each session: 38-52 minutes.  Treatment plan will be reviewed in 90 days or when goals have been changed.       MeasurableTreatment Goal(s) related to diagnosis / functional impairment(s)  Goal 1: Patient will report coping well with daily stressors.     I will know I've met my goal when each goal that we accomplish and I am having less of a problem in that area I know that you have helped me.       Objective #A (Patient Action)                          Patient will continue to follow his Safety plan.  Status: New - Date: 1/28/22; 5/12/22      Intervention(s)  Therapist will monitor for safety each visit and encourage use of safety plan.     Objective #B  Patient will use a healthy coping idea as needed 100% of trials for 1 week.  Status: New - Date: 1/28/22; 5/12/22  IDEAS: napping; petting Snippy (pet dog); watching tv.  Intervention(s)  Therapist will provide ideas and encouragement to use them.     Objective #C  Patient will process the loss of his wife as needed.  Status: New - Date: 1/28/22; 5/12/22      Intervention(s)  Therapist will consider EMDR.              Patient has reviewed and agreed to the above plan.        Kleber Pollack, [facility]                January 28, 2022

## 2022-06-13 ENCOUNTER — OFFICE VISIT (OUTPATIENT)
Dept: FAMILY MEDICINE | Facility: CLINIC | Age: 67
End: 2022-06-13
Payer: COMMERCIAL

## 2022-06-13 VITALS
RESPIRATION RATE: 18 BRPM | SYSTOLIC BLOOD PRESSURE: 118 MMHG | HEIGHT: 71 IN | OXYGEN SATURATION: 94 % | HEART RATE: 77 BPM | TEMPERATURE: 97.8 F | DIASTOLIC BLOOD PRESSURE: 76 MMHG | WEIGHT: 248 LBS | BODY MASS INDEX: 34.72 KG/M2

## 2022-06-13 DIAGNOSIS — K63.5 POLYP OF ASCENDING COLON, UNSPECIFIED TYPE: Primary | ICD-10-CM

## 2022-06-13 DIAGNOSIS — R73.03 PREDIABETES: ICD-10-CM

## 2022-06-13 DIAGNOSIS — Z23 NEED FOR VACCINATION: ICD-10-CM

## 2022-06-13 DIAGNOSIS — H53.9 VISION ABNORMALITIES: ICD-10-CM

## 2022-06-13 PROBLEM — G92.9 TOXIC ENCEPHALOPATHY: Status: RESOLVED | Noted: 2019-10-28 | Resolved: 2022-06-13

## 2022-06-13 LAB — HBA1C MFR BLD: 6.4 % (ref 0–5.6)

## 2022-06-13 PROCEDURE — 83036 HEMOGLOBIN GLYCOSYLATED A1C: CPT | Performed by: FAMILY MEDICINE

## 2022-06-13 PROCEDURE — G0010 ADMIN HEPATITIS B VACCINE: HCPCS | Performed by: FAMILY MEDICINE

## 2022-06-13 PROCEDURE — 36415 COLL VENOUS BLD VENIPUNCTURE: CPT | Performed by: FAMILY MEDICINE

## 2022-06-13 PROCEDURE — 90746 HEPB VACCINE 3 DOSE ADULT IM: CPT | Performed by: FAMILY MEDICINE

## 2022-06-13 PROCEDURE — 91306 COVID-19,PF,MODERNA (18+ YRS BOOSTER .25ML): CPT | Performed by: FAMILY MEDICINE

## 2022-06-13 PROCEDURE — 0064A COVID-19,PF,MODERNA (18+ YRS BOOSTER .25ML): CPT | Performed by: FAMILY MEDICINE

## 2022-06-13 PROCEDURE — 99213 OFFICE O/P EST LOW 20 MIN: CPT | Mod: 25 | Performed by: FAMILY MEDICINE

## 2022-06-13 ASSESSMENT — PAIN SCALES - GENERAL: PAINLEVEL: SEVERE PAIN (6)
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
## 2022-06-15 ENCOUNTER — HOSPITAL ENCOUNTER (OUTPATIENT)
Facility: CLINIC | Age: 67
End: 2022-06-15
Attending: SURGERY | Admitting: SURGERY
Payer: COMMERCIAL

## 2022-06-24 ENCOUNTER — VIRTUAL VISIT (OUTPATIENT)
Dept: PSYCHOLOGY | Facility: CLINIC | Age: 67
End: 2022-06-24
Payer: COMMERCIAL

## 2022-06-24 DIAGNOSIS — F41.1 GENERALIZED ANXIETY DISORDER: ICD-10-CM

## 2022-06-24 DIAGNOSIS — F43.10 POSTTRAUMATIC STRESS DISORDER: ICD-10-CM

## 2022-06-24 DIAGNOSIS — F33.1 MAJOR DEPRESSIVE DISORDER, RECURRENT EPISODE, MODERATE (H): Primary | ICD-10-CM

## 2022-06-24 PROCEDURE — 90834 PSYTX W PT 45 MINUTES: CPT | Mod: 95 | Performed by: SOCIAL WORKER

## 2022-06-24 ASSESSMENT — ANXIETY QUESTIONNAIRES
7. FEELING AFRAID AS IF SOMETHING AWFUL MIGHT HAPPEN: SEVERAL DAYS
2. NOT BEING ABLE TO STOP OR CONTROL WORRYING: MORE THAN HALF THE DAYS
5. BEING SO RESTLESS THAT IT IS HARD TO SIT STILL: MORE THAN HALF THE DAYS
1. FEELING NERVOUS, ANXIOUS, OR ON EDGE: MORE THAN HALF THE DAYS
6. BECOMING EASILY ANNOYED OR IRRITABLE: NEARLY EVERY DAY
GAD7 TOTAL SCORE: 14
IF YOU CHECKED OFF ANY PROBLEMS ON THIS QUESTIONNAIRE, HOW DIFFICULT HAVE THESE PROBLEMS MADE IT FOR YOU TO DO YOUR WORK, TAKE CARE OF THINGS AT HOME, OR GET ALONG WITH OTHER PEOPLE: VERY DIFFICULT
3. WORRYING TOO MUCH ABOUT DIFFERENT THINGS: MORE THAN HALF THE DAYS
GAD7 TOTAL SCORE: 14

## 2022-06-24 NOTE — PROGRESS NOTES
"    [facility]                                     Progress Note    Patient Name: Melquiades Morales  Date: 6/24/22         Service Type: Individual      Session Start Time:  10 am  Session End Time: 10:45 am     Session Length: 45 min     Session #: 11    Attendees: Client attended alone.    Service Modality:  Phone Visit:                  Provider verified identity through the following two step process.  Patient provided:  Patient is known previously to provider     The patient has been notified of the following:      \"We have found that certain health care needs can be provided without the need for a face to face visit.  This service lets us provide the care you need with a phone conversation.       I will have full access to your [facility] medical record during this entire phone call.   I will be taking notes for your medical record.      Since this is like an office visit, we will bill your insurance company for this service.       There are potential benefits and risks of telephone visits (e.g. limits to patient confidentiality) that differ from in-person visits.?Confidentiality still applies for telephone services, and nobody will record the visit.  It is important to be in a quiet, private space that is free of distractions (including cell phone or other devices) during the visit.??      If during the course of the call I believe a telephone visit is not appropriate, you will not be charged for this service\"     Consent has been obtained for this service by care team member: Yes      DATA  Interactive Complexity: No  Crisis: No        Progress Since Last Session (Related to Symptoms / Goals / Homework):   Symptoms: worsening    Homework: Partially completed- use a healthy coping idea. He has the grounding ideas handout.     Episode of Care Goals: Minimal progress - ACTION (Actively working towards change); Intervened by reinforcing change plan / affirming steps taken.    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   He was evicted from his home and now has 4 days to find another place to live. He has 24 days to sale his trailer home. His trailer sold. He now lives in a nursing home. It is taking him time to get used to it.       Treatment Objective(s) Addressed in This Session:   Use a healthy coping idea as needed.      Intervention:  Assessed functioning and for safety. Reviewed the phq and shailesh. Processed his feelings about living in assisted living and worries about his daughter. Processed feelings about not hearing back from daughter and wondering how she and the baby are. Processed feelings about upcoming time with his sons. Encouraged hobbies such as fishing. Reviewed healthy coping ideas.          Assessments completed prior to visit:  The following assessments were completed by patient for this visit:  PHQ9:   PHQ-9 SCORE 2/7/2022 3/1/2022 3/18/2022 4/25/2022 5/12/2022 5/26/2022 6/10/2022   PHQ-9 Total Score - - - - - - -   PHQ-9 Total Score MyChart - - - - - - -   PHQ-9 Total Score 14 16 16 17 15 13 14     GAD7:   SHAILESH-7 SCORE 2/7/2022 3/1/2022 3/18/2022 4/25/2022 5/12/2022 5/26/2022 6/10/2022   Total Score - - - - - - -   Total Score - - - - - - -   Total Score 10 16 10 19 13 12 12     CAGE-AID:   CAGE-AID Total Score 1/28/2022   Total Score 0   Total Score MyChart 0 (A total score of 2 or greater is considered clinically significant)     PROMIS 10-Global Health (all questions and answers displayed):   PROMIS 10 1/28/2022 5/12/2022   In general, would you say your health is: Poor -   In general, would you say your quality of life is: Fair -   In general, how would you rate your physical health? Poor -   In general, how would you rate your mental health, including your mood and your ability to think? Fair -   In general, how would you rate your satisfaction with your social activities and relationships? Fair -    In general, please rate how well you carry out your usual social activities and roles Poor -   To what extent are you able to carry out your everyday physical activities such as walking, climbing stairs, carrying groceries, or moving a chair? A little -   How often have you been bothered by emotional problems such as feeling anxious, depressed or irritable? Often -   How would you rate your fatigue on average? Moderate -   How would you rate your pain on average?   0 = No Pain  to  10 = Worst Imaginable Pain 5 -   In general, would you say your health is: 1 3   In general, would you say your quality of life is: 2 2   In general, how would you rate your physical health? 1 2   In general, how would you rate your mental health, including your mood and your ability to think? 2 3   In general, how would you rate your satisfaction with your social activities and relationships? 2 2   In general, please rate how well you carry out your usual social activities and roles. (This includes activities at home, at work and in your community, and responsibilities as a parent, child, spouse, employee, friend, etc.) 1 2   To what extent are you able to carry out your everyday physical activities such as walking, climbing stairs, carrying groceries, or moving a chair? 2 1   In the past 7 days, how often have you been bothered by emotional problems such as feeling anxious, depressed, or irritable? 4 3   In the past 7 days, how would you rate your fatigue on average? 3 3   In the past 7 days, how would you rate your pain on average, where 0 means no pain, and 10 means worst imaginable pain? 5 6   Global Mental Health Score 8 10   Global Physical Health Score 9 9   PROMIS TOTAL - SUBSCORES 17 19   Some recent data might be hidden     Gates Suicide Severity Rating Scale (Lifetime/Recent)  Gates Suicide Severity Rating (Lifetime/Recent) 9/8/2018 1/28/2022   Wish to be Dead (Lifetime) - Yes   Non-Specific Active Suicidal Thoughts  (Lifetime) - Yes   Most Severe Ideation Rating (Lifetime) - 5   Frequency (Lifetime) - 3   Duration (Lifetime) - 2   Controllability (Lifetime) - 5   Protective Factors  (Lifetime) - 5   Reasons for Ideation (Lifetime) - 4   RETIRED: 1. Wish to be Dead (Recent) - No   RETIRED: 2. Non-Specific Active Suicidal Thoughts (Recent) - No   3. Active Suicidal Ideation with any Methods (Not Plan) Without Intent to Act (Lifetime) - Yes   RETIRED: 3. Active Suicidal Ideation with any Methods (Not Plan) Without Intent to Act (Recent) - No   RETIRE: 4. Active Suicidal Ideation with Some Intent to Act, Without Specific Plan (Lifetime) - Yes   4. Active Suicidal Ideation with Some Intent to Act, Without Specific Plan (Recent) - No   RETIRE: 5. Active Suicidal Ideation with Specific Plan and Intent (Lifetime) - Yes   RETIRED: 5. Active Suicidal Ideation with Specific Plan and Intent (Recent) - No   Most Severe Ideation Rating (Past Month) NA NA   Frequency (Past Month) - NA   Duration (Past Month) - NA   Controllability (Past Month) - NA   Protective Factors (Past Month) - NA   Reasons for Ideation (Past Month) - NA   Actual Attempt (Lifetime) - Yes   Actual Attempt Description (Lifetime) - 3 attempts with last one 15 years ago   Total Number of Actual Attempts (Lifetime) - 3   Actual Attempt (Past 3 Months) - No   Has subject engaged in non-suicidal self-injurious behavior? (Lifetime) - No   Has subject engaged in non-suicidal self-injurious behavior? (Past 3 Months) - No   Interrupted Attempts (Lifetime) - No   Interrupted Attempts (Past 3 Months) - No   Aborted or Self-Interrupted Attempt (Lifetime) - No   Aborted or Self-Interrupted Attempt (Past 3 Months) - No   Preparatory Acts or Behavior (Lifetime) - No   Preparatory Acts or Behavior (Past 3 Months) - No   Most Recent Attempt Actual Lethality Code - 3   Most Lethal Attempt Actual Lethality Code - 2   Initial/First Attempt Actual Lethality Code - 2         ASSESSMENT:  Current Emotional / Mental Status (status of significant symptoms):   Risk status (Self / Other harm or suicidal ideation)   Patient denies current fears or concerns for personal safety.   Patient denies current or recent suicidal ideation or behaviors.     Patient denies current or recent homicidal ideation or behaviors.   Patient denies current or recent self injurious behavior or ideation.   Patient denies other safety concerns.   Patient reports there has been no change in risk factors since their last session.     Patient reports there has been no change in protective factors since their last session.     a safety plan was completed several years ago.      Appearance:   Unable to assess   Eye Contact:   Unable to assess   Psychomotor Behavior: Unable to assess   Attitude:   Cooperative    Orientation:   All   Speech    Rate / Production: Normal     Volume:  Normal    Mood:    Depressed   Affect:    Appropriate    Thought Content:  Clear    Thought Form:  Coherent  Logical    Insight:    Good      Medication Review:   No changes to current psychiatric medication(s). PCP Dr. Eliane Quinn prescribing cymbalta 60 mg and trazadone.     Medication Compliance:   Yes     Changes in Health Issues:   None reported     Chemical Use Review:   Substance Use: Chemical use reviewed, no active concerns identified      Tobacco Use: No change in amount of tobacco use since last session.  Contemplation    Diagnosis:  MDD; EVGENY; PTSD.    Collateral Reports Completed:   Routed note to PCP    PLAN: (Patient Tasks / Therapist Tasks / Other)  Biweekly.   Follow safety plan. Use a healthy coping idea as needed.       Goals due 8/12/22      Kleber Pollack, LICSW                                                         ______________________________________________________________________    Individual Treatment Plan    Patient's Name: Melquiades Morales  YOB: 1955    Date of Creation: 1/28/22  Date Treatment Plan Last  Reviewed/Revised: 5/12/22    DSM5 Diagnoses: 296.32 (F33.1) Major Depressive Disorder, Recurrent Episode, Moderate _, 300.02 (F41.1) Generalized Anxiety Disorder or 309.81 (F43.10) Posttraumatic Stress Disorder (includes Posttraumatic Stress Disorder for Children 6 Years and Younger)  With dissociative symptoms.  Psychosocial / Contextual Factors: recent loss of wife.  PROMIS (reviewed every 90 days):  5/12/22    Referral / Collaboration:  Referral to another professional/service is not indicated at this time.    Anticipated number of session for this episode of care: 10+  Anticipation frequency of session: Weekly  Anticipated Duration of each session: 38-52 minutes.  Treatment plan will be reviewed in 90 days or when goals have been changed.       MeasurableTreatment Goal(s) related to diagnosis / functional impairment(s)  Goal 1: Patient will report coping well with daily stressors.     I will know I've met my goal when each goal that we accomplish and I am having less of a problem in that area I know that you have helped me.       Objective #A (Patient Action)                          Patient will continue to follow his Safety plan.  Status: New - Date: 1/28/22; 5/12/22      Intervention(s)  Therapist will monitor for safety each visit and encourage use of safety plan.     Objective #B  Patient will use a healthy coping idea as needed 100% of trials for 1 week.  Status: New - Date: 1/28/22; 5/12/22  IDEAS: napping; petting Snippy (pet dog); watching tv.  Intervention(s)  Therapist will provide ideas and encouragement to use them.     Objective #C  Patient will process the loss of his wife as needed.  Status: New - Date: 1/28/22; 5/12/22      Intervention(s)  Therapist will consider EMDR.              Patient has reviewed and agreed to the above plan.        Kleber Pollack, [facility]                January 28, 2022

## 2022-07-08 ENCOUNTER — VIRTUAL VISIT (OUTPATIENT)
Dept: PSYCHOLOGY | Facility: CLINIC | Age: 67
End: 2022-07-08
Payer: COMMERCIAL

## 2022-07-08 DIAGNOSIS — F43.10 POSTTRAUMATIC STRESS DISORDER: ICD-10-CM

## 2022-07-08 DIAGNOSIS — F33.1 MAJOR DEPRESSIVE DISORDER, RECURRENT EPISODE, MODERATE (H): Primary | ICD-10-CM

## 2022-07-08 DIAGNOSIS — F41.1 GENERALIZED ANXIETY DISORDER: ICD-10-CM

## 2022-07-08 PROCEDURE — 90834 PSYTX W PT 45 MINUTES: CPT | Mod: 95 | Performed by: SOCIAL WORKER

## 2022-07-08 ASSESSMENT — ANXIETY QUESTIONNAIRES
6. BECOMING EASILY ANNOYED OR IRRITABLE: MORE THAN HALF THE DAYS
IF YOU CHECKED OFF ANY PROBLEMS ON THIS QUESTIONNAIRE, HOW DIFFICULT HAVE THESE PROBLEMS MADE IT FOR YOU TO DO YOUR WORK, TAKE CARE OF THINGS AT HOME, OR GET ALONG WITH OTHER PEOPLE: VERY DIFFICULT
3. WORRYING TOO MUCH ABOUT DIFFERENT THINGS: MORE THAN HALF THE DAYS
2. NOT BEING ABLE TO STOP OR CONTROL WORRYING: MORE THAN HALF THE DAYS
GAD7 TOTAL SCORE: 12
GAD7 TOTAL SCORE: 12
5. BEING SO RESTLESS THAT IT IS HARD TO SIT STILL: MORE THAN HALF THE DAYS
7. FEELING AFRAID AS IF SOMETHING AWFUL MIGHT HAPPEN: NOT AT ALL
1. FEELING NERVOUS, ANXIOUS, OR ON EDGE: MORE THAN HALF THE DAYS

## 2022-07-08 NOTE — PROGRESS NOTES
"    [facility]                                     Progress Note    Patient Name: Melquiades Morales  Date: 7/8/22         Service Type: Individual      Session Start Time:  9 am  Session End Time: 9:45 am     Session Length: 45 min     Session #: 12    Attendees: Client attended alone.    Service Modality:  Phone Visit:                  Provider verified identity through the following two step process.  Patient provided:  Patient is known previously to provider     The patient has been notified of the following:      \"We have found that certain health care needs can be provided without the need for a face to face visit.  This service lets us provide the care you need with a phone conversation.       I will have full access to your [facility] medical record during this entire phone call.   I will be taking notes for your medical record.      Since this is like an office visit, we will bill your insurance company for this service.       There are potential benefits and risks of telephone visits (e.g. limits to patient confidentiality) that differ from in-person visits.?Confidentiality still applies for telephone services, and nobody will record the visit.  It is important to be in a quiet, private space that is free of distractions (including cell phone or other devices) during the visit.??      If during the course of the call I believe a telephone visit is not appropriate, you will not be charged for this service\"     Consent has been obtained for this service by care team member: Yes      DATA  Interactive Complexity: No  Crisis: No        Progress Since Last Session (Related to Symptoms / Goals / Homework):   Symptoms: improvement.    Homework: Partially completed- use a healthy coping idea. He has the grounding ideas handout and is to practice them.     Episode of Care Goals: Minimal progress - ACTION (Actively working towards change); Intervened by reinforcing change plan / affirming " steps taken.    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   He was evicted from his home and now has 4 days to find another place to live. He has 24 days to sale his trailer home. His trailer sold. He now lives in a nursing home. It is taking him time to get used to it.  He spoke with daughter Gene Mercado and she and her baby healthy. He hopes to visit her in [place] this August.  Went fishing with his son Nader and caught a nice walleye.       Treatment Objective(s) Addressed in This Session:   Use a healthy coping idea as needed.      Intervention:  Assessed functioning and for safety. Reviewed the phq and evgeny. Processed his feelings about finally getting to speak with his daughter. Processed feelings about fishing with his sons. Explored his hopes of making it to [place] to see her. Reinforced hobbies such as fishing. Reviewed healthy coping ideas.          Assessments completed prior to visit:  The following assessments were completed by patient for this visit:  PHQ9:   PHQ-9 SCORE 3/1/2022 3/18/2022 4/25/2022 5/12/2022 5/26/2022 6/10/2022 6/24/2022   PHQ-9 Total Score - - - - - - -   PHQ-9 Total Score MyChart - - - - - - -   PHQ-9 Total Score 16 16 17 15 13 14 17     GAD7:   EVGENY-7 SCORE 3/1/2022 3/18/2022 4/25/2022 5/12/2022 5/26/2022 6/10/2022 6/24/2022   Total Score - - - - - - -   Total Score - - - - - - -   Total Score 16 10 19 13 12 12 14     CAGE-AID:   CAGE-AID Total Score 1/28/2022   Total Score 0   Total Score MyChart 0 (A total score of 2 or greater is considered clinically significant)     PROMIS 10-Global Health (all questions and answers displayed):   PROMIS 10 1/28/2022 5/12/2022   In general, would you say your health is: Poor -   In general, would you say your quality of life is: Fair -   In general, how would you rate your physical health? Poor -   In general, how would you rate your mental health, including your mood and  your ability to think? Fair -   In general, how would you rate your satisfaction with your social activities and relationships? Fair -   In general, please rate how well you carry out your usual social activities and roles Poor -   To what extent are you able to carry out your everyday physical activities such as walking, climbing stairs, carrying groceries, or moving a chair? A little -   How often have you been bothered by emotional problems such as feeling anxious, depressed or irritable? Often -   How would you rate your fatigue on average? Moderate -   How would you rate your pain on average?   0 = No Pain  to  10 = Worst Imaginable Pain 5 -   In general, would you say your health is: 1 3   In general, would you say your quality of life is: 2 2   In general, how would you rate your physical health? 1 2   In general, how would you rate your mental health, including your mood and your ability to think? 2 3   In general, how would you rate your satisfaction with your social activities and relationships? 2 2   In general, please rate how well you carry out your usual social activities and roles. (This includes activities at home, at work and in your community, and responsibilities as a parent, child, spouse, employee, friend, etc.) 1 2   To what extent are you able to carry out your everyday physical activities such as walking, climbing stairs, carrying groceries, or moving a chair? 2 1   In the past 7 days, how often have you been bothered by emotional problems such as feeling anxious, depressed, or irritable? 4 3   In the past 7 days, how would you rate your fatigue on average? 3 3   In the past 7 days, how would you rate your pain on average, where 0 means no pain, and 10 means worst imaginable pain? 5 6   Global Mental Health Score 8 10   Global Physical Health Score 9 9   PROMIS TOTAL - SUBSCORES 17 19   Some recent data might be hidden     [place]bud Suicide Severity Rating Scale (Lifetime/Recent)  Rosebud  Suicide Severity Rating (Lifetime/Recent) 9/8/2018 1/28/2022   Wish to be Dead (Lifetime) - Yes   Non-Specific Active Suicidal Thoughts (Lifetime) - Yes   Most Severe Ideation Rating (Lifetime) - 5   Frequency (Lifetime) - 3   Duration (Lifetime) - 2   Controllability (Lifetime) - 5   Protective Factors  (Lifetime) - 5   Reasons for Ideation (Lifetime) - 4   RETIRED: 1. Wish to be Dead (Recent) - No   RETIRED: 2. Non-Specific Active Suicidal Thoughts (Recent) - No   3. Active Suicidal Ideation with any Methods (Not Plan) Without Intent to Act (Lifetime) - Yes   RETIRED: 3. Active Suicidal Ideation with any Methods (Not Plan) Without Intent to Act (Recent) - No   RETIRE: 4. Active Suicidal Ideation with Some Intent to Act, Without Specific Plan (Lifetime) - Yes   4. Active Suicidal Ideation with Some Intent to Act, Without Specific Plan (Recent) - No   RETIRE: 5. Active Suicidal Ideation with Specific Plan and Intent (Lifetime) - Yes   RETIRED: 5. Active Suicidal Ideation with Specific Plan and Intent (Recent) - No   Most Severe Ideation Rating (Past Month) NA NA   Frequency (Past Month) - NA   Duration (Past Month) - NA   Controllability (Past Month) - NA   Protective Factors (Past Month) - NA   Reasons for Ideation (Past Month) - NA   Actual Attempt (Lifetime) - Yes   Actual Attempt Description (Lifetime) - 3 attempts with last one 15 years ago   Total Number of Actual Attempts (Lifetime) - 3   Actual Attempt (Past 3 Months) - No   Has subject engaged in non-suicidal self-injurious behavior? (Lifetime) - No   Has subject engaged in non-suicidal self-injurious behavior? (Past 3 Months) - No   Interrupted Attempts (Lifetime) - No   Interrupted Attempts (Past 3 Months) - No   Aborted or Self-Interrupted Attempt (Lifetime) - No   Aborted or Self-Interrupted Attempt (Past 3 Months) - No   Preparatory Acts or Behavior (Lifetime) - No   Preparatory Acts or Behavior (Past 3 Months) - No   Most Recent Attempt Actual  Lethality Code - 3   Most Lethal Attempt Actual Lethality Code - 2   Initial/First Attempt Actual Lethality Code - 2         ASSESSMENT: Current Emotional / Mental Status (status of significant symptoms):   Risk status (Self / Other harm or suicidal ideation)   Patient denies current fears or concerns for personal safety.   Patient denies current or recent suicidal ideation or behaviors.     Patient denies current or recent homicidal ideation or behaviors.   Patient denies current or recent self injurious behavior or ideation.   Patient denies other safety concerns.   Patient reports there has been no change in risk factors since their last session.     Patient reports there has been no change in protective factors since their last session.     a safety plan was completed several years ago.      Appearance:   Unable to assess   Eye Contact:   Unable to assess   Psychomotor Behavior: Unable to assess   Attitude:   Cooperative    Orientation:   All   Speech    Rate / Production: Normal ; slower    Volume:  Normal    Mood:    Depressed   Affect:    Appropriate    Thought Content:  Clear    Thought Form:  Coherent  Logical    Insight:    Good      Medication Review:   No changes to current psychiatric medication(s). PCP Dr. Eliane Quinn prescribing cymbalta 60 mg and trazadone.     Medication Compliance:   Yes     Changes in Health Issues:   None reported     Chemical Use Review:   Substance Use: Chemical use reviewed, no active concerns identified      Tobacco Use: No change in amount of tobacco use since last session.  Contemplation    Diagnosis:  MDD; EVGENY; PTSD.    Collateral Reports Completed:   Routed note to PCP    PLAN: (Patient Tasks / Therapist Tasks / Other)  Biweekly.   Follow safety plan. Use a healthy coping idea as needed.       Goals due 8/12/22      SHAVONNE Yao                                                          ______________________________________________________________________    Individual Treatment Plan    Patient's Name: Melquiades Morales  YOB: 1955    Date of Creation: 1/28/22  Date Treatment Plan Last Reviewed/Revised: 5/12/22    DSM5 Diagnoses: 296.32 (F33.1) Major Depressive Disorder, Recurrent Episode, Moderate _, 300.02 (F41.1) Generalized Anxiety Disorder or 309.81 (F43.10) Posttraumatic Stress Disorder (includes Posttraumatic Stress Disorder for Children 6 Years and Younger)  With dissociative symptoms.  Psychosocial / Contextual Factors: recent loss of wife.  PROMIS (reviewed every 90 days):  5/12/22    Referral / Collaboration:  Referral to another professional/service is not indicated at this time.    Anticipated number of session for this episode of care: 10+  Anticipation frequency of session: Weekly  Anticipated Duration of each session: 38-52 minutes.  Treatment plan will be reviewed in 90 days or when goals have been changed.       MeasurableTreatment Goal(s) related to diagnosis / functional impairment(s)  Goal 1: Patient will report coping well with daily stressors.     I will know I've met my goal when each goal that we accomplish and I am having less of a problem in that area I know that you have helped me.       Objective #A (Patient Action)                          Patient will continue to follow his Safety plan.  Status: New - Date: 1/28/22; 5/12/22      Intervention(s)  Therapist will monitor for safety each visit and encourage use of safety plan.     Objective #B  Patient will use a healthy coping idea as needed 100% of trials for 1 week.  Status: New - Date: 1/28/22; 5/12/22  IDEAS: napping; petting Snippy (pet dog); watching tv.  Intervention(s)  Therapist will provide ideas and encouragement to use them.     Objective #C  Patient will process the loss of his wife as needed.  Status: New - Date: 1/28/22; 5/12/22      Intervention(s)  Therapist will consider  EMDR.              Patient has reviewed and agreed to the above plan.        Kleber Pollack, [facility]                January 28, 2022

## 2022-07-26 ENCOUNTER — APPOINTMENT (OUTPATIENT)
Dept: CT IMAGING | Facility: CLINIC | Age: 67
End: 2022-07-26
Attending: EMERGENCY MEDICINE
Payer: COMMERCIAL

## 2022-07-26 ENCOUNTER — HOSPITAL ENCOUNTER (OUTPATIENT)
Facility: CLINIC | Age: 67
Setting detail: OBSERVATION
Discharge: HOME | End: 2022-07-27
Attending: EMERGENCY MEDICINE | Admitting: INTERNAL MEDICINE
Payer: COMMERCIAL

## 2022-07-26 ENCOUNTER — ANESTHESIA EVENT (OUTPATIENT)
Dept: GASTROENTEROLOGY | Facility: CLINIC | Age: 67
End: 2022-07-26
Payer: COMMERCIAL

## 2022-07-26 DIAGNOSIS — G89.4 CHRONIC PAIN SYNDROME: Chronic | ICD-10-CM

## 2022-07-26 DIAGNOSIS — F11.29 OPIOID DEPENDENCE WITH OPIOID-INDUCED DISORDER (H): ICD-10-CM

## 2022-07-26 DIAGNOSIS — J42 CHRONIC BRONCHITIS, UNSPECIFIED CHRONIC BRONCHITIS TYPE (H): Chronic | ICD-10-CM

## 2022-07-26 DIAGNOSIS — R73.03 PREDIABETES: ICD-10-CM

## 2022-07-26 DIAGNOSIS — K56.609 SBO (SMALL BOWEL OBSTRUCTION) (H): Primary | ICD-10-CM

## 2022-07-26 DIAGNOSIS — R10.84 ABDOMINAL PAIN, GENERALIZED: ICD-10-CM

## 2022-07-26 DIAGNOSIS — Z11.52 ENCOUNTER FOR SCREENING LABORATORY TESTING FOR SEVERE ACUTE RESPIRATORY SYNDROME CORONAVIRUS 2 (SARS-COV-2): ICD-10-CM

## 2022-07-26 LAB
ALBUMIN SERPL-MCNC: 4 G/DL (ref 3.4–5)
ALBUMIN UR-MCNC: NEGATIVE MG/DL
ALP SERPL-CCNC: 66 U/L (ref 40–150)
ALT SERPL W P-5'-P-CCNC: 22 U/L (ref 0–70)
ANION GAP SERPL CALCULATED.3IONS-SCNC: 8 MMOL/L (ref 3–14)
APPEARANCE UR: CLEAR
AST SERPL W P-5'-P-CCNC: 19 U/L (ref 0–45)
BASOPHILS # BLD AUTO: 0 10E3/UL (ref 0–0.2)
BASOPHILS NFR BLD AUTO: 0 %
BILIRUB SERPL-MCNC: 1.2 MG/DL (ref 0.2–1.3)
BILIRUB UR QL STRIP: ABNORMAL
BUN SERPL-MCNC: 7 MG/DL (ref 7–30)
CALCIUM SERPL-MCNC: 9.6 MG/DL (ref 8.5–10.1)
CHLORIDE BLD-SCNC: 110 MMOL/L (ref 94–109)
CO2 SERPL-SCNC: 26 MMOL/L (ref 20–32)
COLOR UR AUTO: YELLOW
CREAT SERPL-MCNC: 0.7 MG/DL (ref 0.66–1.25)
EOSINOPHIL # BLD AUTO: 0.1 10E3/UL (ref 0–0.7)
EOSINOPHIL NFR BLD AUTO: 0 %
ERYTHROCYTE [DISTWIDTH] IN BLOOD BY AUTOMATED COUNT: 12.7 % (ref 10–15)
GFR SERPL CREATININE-BSD FRML MDRD: >90 ML/MIN/1.73M2
GLUCOSE BLD-MCNC: 130 MG/DL (ref 70–99)
GLUCOSE UR STRIP-MCNC: NEGATIVE MG/DL
HCT VFR BLD AUTO: 48.3 % (ref 40–53)
HGB BLD-MCNC: 16.4 G/DL (ref 13.3–17.7)
HGB UR QL STRIP: ABNORMAL
HYALINE CASTS: 1 /LPF
IMM GRANULOCYTES # BLD: 0.1 10E3/UL
IMM GRANULOCYTES NFR BLD: 0 %
KETONES UR STRIP-MCNC: 20 MG/DL
LEUKOCYTE ESTERASE UR QL STRIP: ABNORMAL
LIPASE SERPL-CCNC: 119 U/L (ref 73–393)
LYMPHOCYTES # BLD AUTO: 3.5 10E3/UL (ref 0.8–5.3)
LYMPHOCYTES NFR BLD AUTO: 21 %
MCH RBC QN AUTO: 31.7 PG (ref 26.5–33)
MCHC RBC AUTO-ENTMCNC: 34 G/DL (ref 31.5–36.5)
MCV RBC AUTO: 93 FL (ref 78–100)
MONOCYTES # BLD AUTO: 1.2 10E3/UL (ref 0–1.3)
MONOCYTES NFR BLD AUTO: 7 %
MUCOUS THREADS #/AREA URNS LPF: PRESENT /LPF
NEUTROPHILS # BLD AUTO: 11.9 10E3/UL (ref 1.6–8.3)
NEUTROPHILS NFR BLD AUTO: 72 %
NITRATE UR QL: NEGATIVE
NRBC # BLD AUTO: 0 10E3/UL
NRBC BLD AUTO-RTO: 0 /100
PH UR STRIP: 8 [PH] (ref 5–7)
PLATELET # BLD AUTO: 249 10E3/UL (ref 150–450)
POTASSIUM BLD-SCNC: 3.3 MMOL/L (ref 3.4–5.3)
PROT SERPL-MCNC: 7.7 G/DL (ref 6.8–8.8)
RBC # BLD AUTO: 5.18 10E6/UL (ref 4.4–5.9)
RBC URINE: 3 /HPF
SODIUM SERPL-SCNC: 144 MMOL/L (ref 133–144)
SP GR UR STRIP: 1.01 (ref 1–1.03)
SQUAMOUS EPITHELIAL: <1 /HPF
UROBILINOGEN UR STRIP-MCNC: 2 MG/DL
WBC # BLD AUTO: 16.7 10E3/UL (ref 4–11)
WBC URINE: 13 /HPF

## 2022-07-26 PROCEDURE — 36415 COLL VENOUS BLD VENIPUNCTURE: CPT | Performed by: EMERGENCY MEDICINE

## 2022-07-26 PROCEDURE — 250N000009 HC RX 250: Performed by: EMERGENCY MEDICINE

## 2022-07-26 PROCEDURE — 81001 URINALYSIS AUTO W/SCOPE: CPT | Performed by: EMERGENCY MEDICINE

## 2022-07-26 PROCEDURE — 250N000011 HC RX IP 250 OP 636: Performed by: EMERGENCY MEDICINE

## 2022-07-26 PROCEDURE — 74177 CT ABD & PELVIS W/CONTRAST: CPT

## 2022-07-26 PROCEDURE — 80053 COMPREHEN METABOLIC PANEL: CPT | Performed by: EMERGENCY MEDICINE

## 2022-07-26 PROCEDURE — 99285 EMERGENCY DEPT VISIT HI MDM: CPT | Mod: CS | Performed by: EMERGENCY MEDICINE

## 2022-07-26 PROCEDURE — 83690 ASSAY OF LIPASE: CPT | Performed by: EMERGENCY MEDICINE

## 2022-07-26 PROCEDURE — 96361 HYDRATE IV INFUSION ADD-ON: CPT | Performed by: EMERGENCY MEDICINE

## 2022-07-26 PROCEDURE — 85025 COMPLETE CBC W/AUTO DIFF WBC: CPT | Performed by: EMERGENCY MEDICINE

## 2022-07-26 PROCEDURE — 82040 ASSAY OF SERUM ALBUMIN: CPT | Performed by: EMERGENCY MEDICINE

## 2022-07-26 PROCEDURE — 87086 URINE CULTURE/COLONY COUNT: CPT | Performed by: EMERGENCY MEDICINE

## 2022-07-26 PROCEDURE — 96365 THER/PROPH/DIAG IV INF INIT: CPT | Mod: 59 | Performed by: EMERGENCY MEDICINE

## 2022-07-26 PROCEDURE — C9803 HOPD COVID-19 SPEC COLLECT: HCPCS | Performed by: EMERGENCY MEDICINE

## 2022-07-26 PROCEDURE — 96366 THER/PROPH/DIAG IV INF ADDON: CPT | Performed by: EMERGENCY MEDICINE

## 2022-07-26 PROCEDURE — 99285 EMERGENCY DEPT VISIT HI MDM: CPT | Mod: 25 | Performed by: EMERGENCY MEDICINE

## 2022-07-26 RX ORDER — LIDOCAINE 40 MG/G
CREAM TOPICAL
Status: CANCELLED | OUTPATIENT
Start: 2022-07-26

## 2022-07-26 RX ORDER — IOPAMIDOL 755 MG/ML
100 INJECTION, SOLUTION INTRAVASCULAR ONCE
Status: COMPLETED | OUTPATIENT
Start: 2022-07-26 | End: 2022-07-26

## 2022-07-26 RX ORDER — SIMVASTATIN 40 MG
80 TABLET ORAL DAILY
Status: DISCONTINUED | OUTPATIENT
Start: 2022-07-27 | End: 2022-07-27 | Stop reason: HOSPADM

## 2022-07-26 RX ORDER — MORPHINE SULFATE 15 MG/1
60 TABLET, FILM COATED, EXTENDED RELEASE ORAL EVERY MORNING
Status: DISCONTINUED | OUTPATIENT
Start: 2022-07-27 | End: 2022-07-27 | Stop reason: HOSPADM

## 2022-07-26 RX ORDER — DULOXETIN HYDROCHLORIDE 30 MG/1
120 CAPSULE, DELAYED RELEASE ORAL DAILY
Status: DISCONTINUED | OUTPATIENT
Start: 2022-07-27 | End: 2022-07-27 | Stop reason: HOSPADM

## 2022-07-26 RX ORDER — SODIUM CHLORIDE, SODIUM LACTATE, POTASSIUM CHLORIDE, CALCIUM CHLORIDE 600; 310; 30; 20 MG/100ML; MG/100ML; MG/100ML; MG/100ML
INJECTION, SOLUTION INTRAVENOUS CONTINUOUS
Status: DISCONTINUED | OUTPATIENT
Start: 2022-07-27 | End: 2022-07-27 | Stop reason: HOSPADM

## 2022-07-26 RX ORDER — SODIUM CHLORIDE, SODIUM LACTATE, POTASSIUM CHLORIDE, CALCIUM CHLORIDE 600; 310; 30; 20 MG/100ML; MG/100ML; MG/100ML; MG/100ML
INJECTION, SOLUTION INTRAVENOUS CONTINUOUS
Status: CANCELLED | OUTPATIENT
Start: 2022-07-26

## 2022-07-26 RX ORDER — PREGABALIN 75 MG/1
150 CAPSULE ORAL 2 TIMES DAILY
Status: DISCONTINUED | OUTPATIENT
Start: 2022-07-27 | End: 2022-07-27 | Stop reason: HOSPADM

## 2022-07-26 RX ORDER — ONDANSETRON 2 MG/ML
4 INJECTION INTRAMUSCULAR; INTRAVENOUS EVERY 30 MIN PRN
Status: DISCONTINUED | OUTPATIENT
Start: 2022-07-26 | End: 2022-07-27 | Stop reason: HOSPADM

## 2022-07-26 RX ORDER — CEFTRIAXONE 1 G/1
1 INJECTION, POWDER, FOR SOLUTION INTRAMUSCULAR; INTRAVENOUS EVERY 24 HOURS
Status: DISCONTINUED | OUTPATIENT
Start: 2022-07-27 | End: 2022-07-27 | Stop reason: HOSPADM

## 2022-07-26 RX ORDER — ONDANSETRON 2 MG/ML
4 INJECTION INTRAMUSCULAR; INTRAVENOUS
Status: CANCELLED | OUTPATIENT
Start: 2022-07-26

## 2022-07-26 RX ORDER — MORPHINE SULFATE 15 MG/1
30 TABLET, FILM COATED, EXTENDED RELEASE ORAL AT BEDTIME
Status: DISCONTINUED | OUTPATIENT
Start: 2022-07-27 | End: 2022-07-27 | Stop reason: HOSPADM

## 2022-07-26 RX ADMIN — SODIUM CHLORIDE 71 ML: 9 INJECTION, SOLUTION INTRAVENOUS at 23:18

## 2022-07-26 RX ADMIN — IOPAMIDOL 100 ML: 755 INJECTION, SOLUTION INTRAVENOUS at 23:18

## 2022-07-26 ASSESSMENT — COPD QUESTIONNAIRES: COPD: 1

## 2022-07-26 ASSESSMENT — ENCOUNTER SYMPTOMS: SEIZURES: 1
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
## 2022-07-27 ENCOUNTER — APPOINTMENT (OUTPATIENT)
Dept: GENERAL RADIOLOGY | Facility: CLINIC | Age: 67
End: 2022-07-27
Attending: EMERGENCY MEDICINE
Payer: COMMERCIAL

## 2022-07-27 ENCOUNTER — ANESTHESIA (OUTPATIENT)
Dept: GASTROENTEROLOGY | Facility: CLINIC | Age: 67
End: 2022-07-27
Payer: COMMERCIAL

## 2022-07-27 VITALS
DIASTOLIC BLOOD PRESSURE: 103 MMHG | BODY MASS INDEX: 33.86 KG/M2 | OXYGEN SATURATION: 95 % | RESPIRATION RATE: 16 BRPM | HEART RATE: 80 BPM | HEIGHT: 72 IN | WEIGHT: 250 LBS | TEMPERATURE: 98.9 F | SYSTOLIC BLOOD PRESSURE: 159 MMHG

## 2022-07-27 PROBLEM — R10.84 ABDOMINAL PAIN, GENERALIZED: Status: ACTIVE | Noted: 2022-07-27

## 2022-07-27 LAB
ANION GAP SERPL CALCULATED.3IONS-SCNC: 7 MMOL/L (ref 3–14)
BASOPHILS # BLD MANUAL: 0 10E3/UL (ref 0–0.2)
BASOPHILS NFR BLD MANUAL: 0 %
BUN SERPL-MCNC: 7 MG/DL (ref 7–30)
CALCIUM SERPL-MCNC: 9.3 MG/DL (ref 8.5–10.1)
CHLORIDE BLD-SCNC: 112 MMOL/L (ref 94–109)
CO2 SERPL-SCNC: 25 MMOL/L (ref 20–32)
CREAT SERPL-MCNC: 0.67 MG/DL (ref 0.66–1.25)
EOSINOPHIL # BLD MANUAL: 0 10E3/UL (ref 0–0.7)
EOSINOPHIL NFR BLD MANUAL: 0 %
ERYTHROCYTE [DISTWIDTH] IN BLOOD BY AUTOMATED COUNT: 13 % (ref 10–15)
GFR SERPL CREATININE-BSD FRML MDRD: >90 ML/MIN/1.73M2
GLUCOSE BLD-MCNC: 131 MG/DL (ref 70–99)
HCT VFR BLD AUTO: 46.3 % (ref 40–53)
HGB BLD-MCNC: 15.8 G/DL (ref 13.3–17.7)
LYMPHOCYTES # BLD MANUAL: 5.2 10E3/UL (ref 0.8–5.3)
LYMPHOCYTES NFR BLD MANUAL: 29 %
MCH RBC QN AUTO: 32 PG (ref 26.5–33)
MCHC RBC AUTO-ENTMCNC: 34.1 G/DL (ref 31.5–36.5)
MCV RBC AUTO: 94 FL (ref 78–100)
MONOCYTES # BLD MANUAL: 0.5 10E3/UL (ref 0–1.3)
MONOCYTES NFR BLD MANUAL: 3 %
NEUTROPHILS # BLD MANUAL: 12.3 10E3/UL (ref 1.6–8.3)
NEUTROPHILS NFR BLD MANUAL: 68 %
PLAT MORPH BLD: ABNORMAL
PLATELET # BLD AUTO: 233 10E3/UL (ref 150–450)
POTASSIUM BLD-SCNC: 3.7 MMOL/L (ref 3.4–5.3)
RBC # BLD AUTO: 4.94 10E6/UL (ref 4.4–5.9)
RBC MORPH BLD: ABNORMAL
SARS-COV-2 RNA RESP QL NAA+PROBE: NEGATIVE
SODIUM SERPL-SCNC: 144 MMOL/L (ref 133–144)
WBC # BLD AUTO: 18.1 10E3/UL (ref 4–11)

## 2022-07-27 PROCEDURE — 250N000013 HC RX MED GY IP 250 OP 250 PS 637: Performed by: EMERGENCY MEDICINE

## 2022-07-27 PROCEDURE — G0378 HOSPITAL OBSERVATION PER HR: HCPCS

## 2022-07-27 PROCEDURE — 96365 THER/PROPH/DIAG IV INF INIT: CPT | Mod: 59 | Performed by: EMERGENCY MEDICINE

## 2022-07-27 PROCEDURE — 96361 HYDRATE IV INFUSION ADD-ON: CPT | Performed by: EMERGENCY MEDICINE

## 2022-07-27 PROCEDURE — 96361 HYDRATE IV INFUSION ADD-ON: CPT

## 2022-07-27 PROCEDURE — 36415 COLL VENOUS BLD VENIPUNCTURE: CPT | Performed by: EMERGENCY MEDICINE

## 2022-07-27 PROCEDURE — 258N000003 HC RX IP 258 OP 636: Performed by: EMERGENCY MEDICINE

## 2022-07-27 PROCEDURE — 96366 THER/PROPH/DIAG IV INF ADDON: CPT | Performed by: EMERGENCY MEDICINE

## 2022-07-27 PROCEDURE — 250N000011 HC RX IP 250 OP 636: Performed by: EMERGENCY MEDICINE

## 2022-07-27 PROCEDURE — 85007 BL SMEAR W/DIFF WBC COUNT: CPT | Performed by: EMERGENCY MEDICINE

## 2022-07-27 PROCEDURE — 74019 RADEX ABDOMEN 2 VIEWS: CPT

## 2022-07-27 PROCEDURE — 85027 COMPLETE CBC AUTOMATED: CPT | Performed by: EMERGENCY MEDICINE

## 2022-07-27 PROCEDURE — 87635 SARS-COV-2 COVID-19 AMP PRB: CPT | Performed by: EMERGENCY MEDICINE

## 2022-07-27 PROCEDURE — 82310 ASSAY OF CALCIUM: CPT | Performed by: EMERGENCY MEDICINE

## 2022-07-27 RX ORDER — TRAZODONE HYDROCHLORIDE 50 MG/1
150 TABLET, FILM COATED ORAL AT BEDTIME
Status: DISCONTINUED | OUTPATIENT
Start: 2022-07-27 | End: 2022-07-27 | Stop reason: HOSPADM

## 2022-07-27 RX ORDER — TRAZODONE HYDROCHLORIDE 50 MG/1
200 TABLET, FILM COATED ORAL AT BEDTIME
Status: DISCONTINUED | OUTPATIENT
Start: 2022-07-27 | End: 2022-07-27 | Stop reason: HOSPADM

## 2022-07-27 RX ADMIN — DULOXETINE HYDROCHLORIDE 120 MG: 30 CAPSULE, DELAYED RELEASE ORAL at 08:50

## 2022-07-27 RX ADMIN — PREGABALIN 150 MG: 75 CAPSULE ORAL at 08:51

## 2022-07-27 RX ADMIN — MORPHINE SULFATE 30 MG: 15 TABLET, FILM COATED, EXTENDED RELEASE ORAL at 00:36

## 2022-07-27 RX ADMIN — MORPHINE SULFATE 60 MG: 15 TABLET, FILM COATED, EXTENDED RELEASE ORAL at 08:50

## 2022-07-27 RX ADMIN — CEFTRIAXONE SODIUM 1 G: 1 INJECTION, POWDER, FOR SOLUTION INTRAMUSCULAR; INTRAVENOUS at 00:36

## 2022-07-27 RX ADMIN — TRAZODONE HYDROCHLORIDE 200 MG: 50 TABLET ORAL at 01:58

## 2022-07-27 RX ADMIN — SIMVASTATIN 80 MG: 40 TABLET, FILM COATED ORAL at 08:51

## 2022-07-27 RX ADMIN — TRAZODONE HYDROCHLORIDE 150 MG: 50 TABLET ORAL at 01:57

## 2022-07-27 RX ADMIN — SODIUM CHLORIDE, POTASSIUM CHLORIDE, SODIUM LACTATE AND CALCIUM CHLORIDE: 600; 310; 30; 20 INJECTION, SOLUTION INTRAVENOUS at 00:38

## 2022-07-27 ASSESSMENT — ENCOUNTER SYMPTOMS
COLOR CHANGE: 0
FEVER: 0
ABDOMINAL PAIN: 1
EYE REDNESS: 0
NAUSEA: 1
SHORTNESS OF BREATH: 0

## 2022-07-27 NOTE — DISCHARGE INSTRUCTIONS
Return to be seen if you develop vomiting, fevers greater than 101, or other worrisome symptoms.  If your urine culture grows bacteria, you should start taking antibiotics.  You have been given a dose of a 24-hour antibiotic and so you do not need to continue on them now.  Check your urine culture results tomorrow.
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
## 2022-07-27 NOTE — ED PROVIDER NOTES
Emergency Department Patient Sign-out       Brief HPI:  This is a 67 year old male signed out to me by Dr. Winkler .  See initial ED Provider note for details of the presentation. He had a BM here. AXR pending. If able to eat and AXR re-assuring, can be discharged to assisted living; if not, admit. Treat UTI until culture results.          Significant Events prior to my assuming care: None      Exam:   Patient Vitals for the past 24 hrs:   BP Temp Temp src Pulse Resp SpO2 Height Weight   07/27/22 0600 114/83 -- -- -- -- -- -- --   07/27/22 0554 -- -- -- 101 -- 97 % -- --   07/27/22 0430 -- -- -- -- -- 96 % -- --   07/27/22 0320 -- -- -- -- -- 93 % -- --   07/27/22 0315 130/78 -- -- 80 -- (!) 84 % -- --   07/27/22 0200 -- -- -- -- -- 95 % -- --   07/27/22 0045 135/79 -- -- 52 -- 94 % -- --   07/26/22 2300 (!) 174/85 -- -- 57 -- 92 % -- --   07/26/22 2110 130/77 97  F (36.1  C) Tympanic 99 20 97 % 1.829 m (6') 113.4 kg (250 lb)           ED RESULTS:   Results for orders placed or performed during the hospital encounter of 07/26/22 (from the past 24 hour(s))   CBC with platelets differential     Status: Abnormal    Collection Time: 07/26/22 10:32 PM    Narrative    The following orders were created for panel order CBC with platelets differential.  Procedure                               Abnormality         Status                     ---------                               -----------         ------                     CBC with platelets and d...[[id number]]  Abnormal            Final result                 Please view results for these tests on the individual orders.   Comprehensive metabolic panel     Status: Abnormal    Collection Time: 07/26/22 10:32 PM   Result Value Ref Range    Sodium 144 133 - 144 mmol/L    Potassium 3.3 (L) 3.4 - 5.3 mmol/L    Chloride 110 (H) 94 - 109 mmol/L    Carbon Dioxide (CO2) 26 20 - 32 mmol/L    Anion Gap 8 3 - 14 mmol/L    Urea Nitrogen 7 7 - 30 mg/dL    Creatinine 0.70 0.66 - 1.25  mg/dL    Calcium 9.6 8.5 - 10.1 mg/dL    Glucose 130 (H) 70 - 99 mg/dL    Alkaline Phosphatase 66 40 - 150 U/L    AST 19 0 - 45 U/L    ALT 22 0 - 70 U/L    Protein Total 7.7 6.8 - 8.8 g/dL    Albumin 4.0 3.4 - 5.0 g/dL    Bilirubin Total 1.2 0.2 - 1.3 mg/dL    GFR Estimate >90 >60 mL/min/1.73m2   Lipase     Status: Normal    Collection Time: 07/26/22 10:32 PM   Result Value Ref Range    Lipase 119 73 - 393 U/L   CBC with platelets and differential     Status: Abnormal    Collection Time: 07/26/22 10:32 PM   Result Value Ref Range    WBC Count 16.7 (H) 4.0 - 11.0 10e3/uL    RBC Count 5.18 4.40 - 5.90 10e6/uL    Hemoglobin 16.4 13.3 - 17.7 g/dL    Hematocrit 48.3 40.0 - 53.0 %    MCV 93 78 - 100 fL    MCH 31.7 26.5 - 33.0 pg    MCHC 34.0 31.5 - 36.5 g/dL    RDW 12.7 10.0 - 15.0 %    Platelet Count 249 150 - 450 10e3/uL    % Neutrophils 72 %    % Lymphocytes 21 %    % Monocytes 7 %    % Eosinophils 0 %    % Basophils 0 %    % Immature Granulocytes 0 %    NRBCs per 100 WBC 0 <1 /100    Absolute Neutrophils 11.9 (H) 1.6 - 8.3 10e3/uL    Absolute Lymphocytes 3.5 0.8 - 5.3 10e3/uL    Absolute Monocytes 1.2 0.0 - 1.3 10e3/uL    Absolute Eosinophils 0.1 0.0 - 0.7 10e3/uL    Absolute Basophils 0.0 0.0 - 0.2 10e3/uL    Absolute Immature Granulocytes 0.1 <=0.4 10e3/uL    Absolute NRBCs 0.0 10e3/uL   UA with Microscopic reflex to Culture     Status: Abnormal    Collection Time: 07/26/22 10:40 PM    Specimen: Urine, Midstream   Result Value Ref Range    Color Urine Yellow Colorless, Straw, Light Yellow, Yellow    Appearance Urine Clear Clear    Glucose Urine Negative Negative mg/dL    Bilirubin Urine Small (A) Negative    Ketones Urine 20  (A) Negative mg/dL    Specific Gravity Urine 1.010 1.003 - 1.035    Blood Urine Trace (A) Negative    pH Urine 8.0 (H) 5.0 - 7.0    Protein Albumin Urine Negative Negative mg/dL    Urobilinogen Urine 2.0 Normal, 2.0 mg/dL    Nitrite Urine Negative Negative    Leukocyte Esterase Urine Trace (A)  Negative    Mucus Urine Present (A) None Seen /LPF    RBC Urine 3 (H) <=2 /HPF    WBC Urine 13 (H) <=5 /HPF    Squamous Epithelials Urine <1 <=1 /HPF    Hyaline Casts Urine 1 <=2 /LPF    Narrative    Urine Culture ordered based on laboratory criteria   CT Abdomen Pelvis w Contrast     Status: None    Collection Time: 07/26/22 11:33 PM    Narrative    EXAM: CT ABDOMEN PELVIS W CONTRAST  LOCATION: [facility]  DATE/TIME: 7/26/2022 11:17 PM    INDICATION: Diffuse abd pain with N V Diarrhea  COMPARISON: CT chest, abdomen and pelvis 3/12/2020  TECHNIQUE: CT scan of the abdomen and pelvis was performed following injection of IV contrast. Multiplanar reformats were obtained. Dose reduction techniques were used.  CONTRAST: 100 mL Isovue 370    FINDINGS:   LOWER CHEST: Normal.    HEPATOBILIARY: Diffuse fatty infiltration of the liver.    PANCREAS: Normal.    SPLEEN: Normal.    ADRENAL GLANDS: Normal.    KIDNEYS/BLADDER: Tiny bilateral renal cysts. No stones or hydronephrosis. Diffuse bladder wall thickening.    BOWEL: There are several fluid-filled distended loops of proximal small bowel the distal small bowel and colon are normal caliber. No other inflammatory changes. Normal appendix.    LYMPH NODES: Normal.    VASCULATURE: Atherosclerotic disease abdominal aorta and iliac arteries.    PELVIC ORGANS: Mild prostatic hypertrophy and calcifications.    MUSCULOSKELETAL: Advanced degenerative disc disease L4 level.      Impression    IMPRESSION:   1.  Several dilated, fluid-filled proximal small bowel loops could represent an ileus or developing obstruction. The distal small bowel and colon are unremarkable.  2.  Diffuse bladder wall thickening may represent hypertrophy from outlet obstruction secondary to an enlarged prostate gland.  3.  Fatty liver.   Asymptomatic COVID-19 Virus (Coronavirus) by PCR Nasopharyngeal     Status: Normal    Collection Time: 07/27/22  1:54 AM    Specimen: Nasopharyngeal;  Swab   Result Value Ref Range    SARS CoV2 PCR Negative Negative    Narrative    Testing was performed using the raphael  SARS-CoV-2 & Influenza A/B Assay on the raphael  Henny  System.  This test should be ordered for the detection of SARS-COV-2 in individuals who meet SARS-CoV-2 clinical and/or epidemiological criteria. Test performance is unknown in asymptomatic patients.  This test is for in vitro diagnostic use under the FDA EUA for laboratories certified under CLIA to perform moderate and/or high complexity testing. This test has not been FDA cleared or approved.  A negative test does not rule out the presence of PCR inhibitors in the specimen or target RNA in concentration below the limit of detection for the assay. The possibility of a false negative should be considered if the patient's recent exposure or clinical presentation suggests COVID-19.  [facility] are certified under the Clinical Laboratory Improvement Amendments of 1988 (CLIA-88) as qualified to perform moderate and/or high complexity laboratory testing.   CBC with platelets, differential     Status: Abnormal (In process)    Collection Time: 07/27/22  5:53 AM    Narrative    The following orders were created for panel order CBC with platelets, differential.  Procedure                               Abnormality         Status                     ---------                               -----------         ------                     CBC with platelets and d...[[id number]]  Abnormal            Preliminary result           Please view results for these tests on the individual orders.   CBC with platelets and differential     Status: Abnormal (Preliminary result)    Collection Time: 07/27/22  5:53 AM   Result Value Ref Range    WBC Count 18.1 (H) 4.0 - 11.0 10e3/uL    RBC Count 4.94 4.40 - 5.90 10e6/uL    Hemoglobin 15.8 13.3 - 17.7 g/dL    Hematocrit 46.3 40.0 - 53.0 %    MCV 94 78 - 100 fL    MCH 32.0 26.5 - 33.0 pg    MCHC 34.1 31.5 -  36.5 g/dL    RDW 13.0 10.0 - 15.0 %    Platelet Count 233 150 - 450 10e3/uL       ED MEDICATIONS:   Medications   ondansetron (ZOFRAN) injection 4 mg (has no administration in time range)   lactated ringers infusion ( Intravenous Rate/Dose Verify 7/27/22 0319)   DULoxetine (CYMBALTA) DR capsule 120 mg (has no administration in time range)   morphine (MS CONTIN) 12 hr tablet 30 mg (30 mg Oral Given 7/27/22 0036)   morphine (MS CONTIN) 12 hr tablet 60 mg (has no administration in time range)   pregabalin (LYRICA) capsule 150 mg (has no administration in time range)   simvastatin (ZOCOR) tablet 80 mg (has no administration in time range)   cefTRIAXone (ROCEPHIN) 1 g vial to attach to  mL bag for ADULTS or NS 50 mL bag for PEDS (0 g Intravenous Stopped 7/27/22 0318)   traZODone (DESYREL) tablet 200 mg (200 mg Oral Given 7/27/22 0158)     Or   traZODone (DESYREL) tablet 150 mg ( Oral See Alternative 7/27/22 0158)   iopamidol (ISOVUE-370) solution 100 mL (100 mLs Intravenous Given 7/26/22 2318)   sodium chloride 0.9 % bag 500mL for CT scan flush use (71 mLs Intravenous Given 7/26/22 2318)         Impression:    ICD-10-CM    1. SBO (small bowel obstruction) (H)  K56.609     vs. Ileus   2. Abdominal pain, generalized  R10.84    3. Chronic bronchitis, unspecified chronic bronchitis type (H)  J42    4. Opioid dependence with opioid-induced disorder (H)  F11.29    5. Chronic pain syndrome  G89.4    6. Prediabetes  R73.03        Plan:    Pending studies include urine culture.   On reevaluation this morning, the patient was feeling better.  He was able to take breakfast without difficulty and would like to be discharged home.  He received a single dose of Rocephin for possible UTI.  I think given the absence of symptoms, and having received Rocephin he can wait for culture results and if they are positive can be started back on an antibiotic but can hold off at this time.  He should return if he develops recurrent pain,  vomiting, fevers, or other worrisome symptoms.  He expressed understanding and his questions were answered.    MD Clark Solis Joseph, MD  07/27/22 1012

## 2022-07-27 NOTE — ED TRIAGE NOTES
"Pt presents via EMS with abdominal pain that started 2 days ago. Pt reports pain is \"sore.\" Pt has hx of COPD. Pt reports having pain before and was diagnosed with pneumonia. Pt lives in assisted living. Pt declined IV from EMS in route to ER.      Triage Assessment     Row Name 07/26/22 2111       Triage Assessment (Adult)    Airway WDL WDL       Respiratory WDL    Respiratory WDL WDL       Skin Circulation/Temperature WDL    Skin Circulation/Temperature WDL WDL       Cardiac WDL    Cardiac WDL WDL       Peripheral/Neurovascular WDL    Peripheral Neurovascular WDL WDL       Cognitive/Neuro/Behavioral WDL    Cognitive/Neuro/Behavioral WDL WDL              "

## 2022-07-28 ENCOUNTER — PATIENT OUTREACH (OUTPATIENT)
Dept: CARE COORDINATION | Facility: CLINIC | Age: 67
End: 2022-07-28

## 2022-07-28 DIAGNOSIS — Z71.89 OTHER SPECIFIED COUNSELING: ICD-10-CM

## 2022-07-28 LAB — BACTERIA UR CULT: NORMAL

## 2022-07-28 NOTE — PROGRESS NOTES
Clinic Care Coordination Contact  [facility]/Voicemail       Clinical Data: Care Coordinator Outreach  Outreach attempted x 1. Phone rings and then disconnects. Unable to leave message on patient's voicemail with call back information and requested return call.  Plan: Care Coordinator will try to reach patient again in 1-2 business days.        JORDAN Rivera  [phone]  [facility]

## 2022-07-28 NOTE — RESULT ENCOUNTER NOTE
Final urine culture report is negative.  Adult Negative Urine culture parameters per protocol: Any # Urogenital single or mixed organism, <10,000 col/ml single organism (cath/midstream), and > 3 organisms (No susceptibilities performed).  [facility] Emergency Dept discharge antibiotic prescribed (If applicable): None  Treatment recommendations per [facility] ED Lab Result Urine Culture protocol.

## 2022-07-29 NOTE — PROGRESS NOTES
Clinic Care Coordination Contact  [facility]/Voicemail       Clinical Data: Care Coordinator Outreach  Reason for referral: TCM outreach  Outreach attempted x 2.  Left message on patient's voicemail with main [facility] phone number to reach nurse advisors or scheduling department.  Plan:  Care Coordinator will do no further outreaches at this time.     Natali Flanagan  Community Health Worker  Connected Care Resource [place], [facility]  Ph: [phone]

## 2022-08-06 ENCOUNTER — APPOINTMENT (OUTPATIENT)
Dept: CT IMAGING | Facility: CLINIC | Age: 67
End: 2022-08-06
Attending: FAMILY MEDICINE
Payer: COMMERCIAL

## 2022-08-06 ENCOUNTER — HOSPITAL ENCOUNTER (OUTPATIENT)
Facility: CLINIC | Age: 67
Setting detail: OBSERVATION
Discharge: NURSING, REHAB OR LONG-TERM CARE | End: 2022-08-08
Attending: FAMILY MEDICINE | Admitting: INTERNAL MEDICINE
Payer: COMMERCIAL

## 2022-08-06 DIAGNOSIS — F19.10 POLYSUBSTANCE ABUSE (H): ICD-10-CM

## 2022-08-06 DIAGNOSIS — Z11.52 ENCOUNTER FOR SCREENING LABORATORY TESTING FOR SEVERE ACUTE RESPIRATORY SYNDROME CORONAVIRUS 2 (SARS-COV-2): ICD-10-CM

## 2022-08-06 DIAGNOSIS — F19.10 ANTIDEPRESSANT TYPE ABUSE, CONTINUOUS (H): ICD-10-CM

## 2022-08-06 DIAGNOSIS — G92.9 TOXIC ENCEPHALOPATHY: ICD-10-CM

## 2022-08-06 DIAGNOSIS — J96.02 ACUTE RESPIRATORY FAILURE WITH HYPERCAPNIA (H): ICD-10-CM

## 2022-08-06 PROBLEM — J44.1 COPD EXACERBATION (H): Status: RESOLVED | Noted: 2021-03-22 | Resolved: 2022-08-06

## 2022-08-06 PROBLEM — E87.29 RESPIRATORY ACIDOSIS: Status: RESOLVED | Noted: 2020-08-05 | Resolved: 2022-08-06

## 2022-08-06 PROBLEM — Z20.822 PERSON UNDER INVESTIGATION FOR COVID-19: Status: RESOLVED | Noted: 2020-08-05 | Resolved: 2022-08-06

## 2022-08-06 PROBLEM — J18.9 PNEUMONIA: Status: RESOLVED | Noted: 2020-03-12 | Resolved: 2022-08-06

## 2022-08-06 PROBLEM — F32.A DEPRESSION: Status: RESOLVED | Noted: 2020-08-05 | Resolved: 2022-08-06

## 2022-08-06 PROBLEM — R09.02 HYPOXIA: Status: RESOLVED | Noted: 2020-08-05 | Resolved: 2022-08-06

## 2022-08-06 PROBLEM — R41.0 CONFUSION: Status: RESOLVED | Noted: 2022-01-20 | Resolved: 2022-08-06

## 2022-08-06 LAB
ALBUMIN SERPL-MCNC: 3.1 G/DL (ref 3.4–5)
ALBUMIN UR-MCNC: NEGATIVE MG/DL
ALP SERPL-CCNC: 66 U/L (ref 40–150)
ALT SERPL W P-5'-P-CCNC: 28 U/L (ref 0–70)
AMMONIA PLAS-SCNC: 36 UMOL/L (ref 10–50)
AMPHETAMINES UR QL SCN: ABNORMAL
ANION GAP SERPL CALCULATED.3IONS-SCNC: 4 MMOL/L (ref 3–14)
APAP SERPL-MCNC: <2 MG/L (ref 10–30)
APPEARANCE UR: ABNORMAL
AST SERPL W P-5'-P-CCNC: 18 U/L (ref 0–45)
BARBITURATES UR QL: ABNORMAL
BASE EXCESS BLDV CALC-SCNC: 7.3 MMOL/L (ref -7.7–1.9)
BASE EXCESS BLDV CALC-SCNC: 8.1 MMOL/L (ref -7.7–1.9)
BASOPHILS # BLD AUTO: 0 10E3/UL (ref 0–0.2)
BASOPHILS NFR BLD AUTO: 0 %
BENZODIAZ UR QL: ABNORMAL
BILIRUB SERPL-MCNC: 0.4 MG/DL (ref 0.2–1.3)
BILIRUB UR QL STRIP: NEGATIVE
BUN SERPL-MCNC: 3 MG/DL (ref 7–30)
CALCIUM SERPL-MCNC: 8.6 MG/DL (ref 8.5–10.1)
CANNABINOIDS UR QL SCN: ABNORMAL
CHLORIDE BLD-SCNC: 104 MMOL/L (ref 94–109)
CO2 SERPL-SCNC: 32 MMOL/L (ref 20–32)
COCAINE UR QL: ABNORMAL
COLOR UR AUTO: YELLOW
CREAT SERPL-MCNC: 0.54 MG/DL (ref 0.66–1.25)
EOSINOPHIL # BLD AUTO: 0 10E3/UL (ref 0–0.7)
EOSINOPHIL NFR BLD AUTO: 0 %
ERYTHROCYTE [DISTWIDTH] IN BLOOD BY AUTOMATED COUNT: 12.9 % (ref 10–15)
ETHANOL SERPL-MCNC: <0.01 G/DL
GFR SERPL CREATININE-BSD FRML MDRD: >90 ML/MIN/1.73M2
GLUCOSE BLD-MCNC: 147 MG/DL (ref 70–99)
GLUCOSE UR STRIP-MCNC: NEGATIVE MG/DL
HCO3 BLDV-SCNC: 34 MMOL/L (ref 21–28)
HCO3 BLDV-SCNC: 37 MMOL/L (ref 21–28)
HCT VFR BLD AUTO: 47.1 % (ref 40–53)
HGB BLD-MCNC: 15.5 G/DL (ref 13.3–17.7)
HGB UR QL STRIP: NEGATIVE
HOLD SPECIMEN: NORMAL
IMM GRANULOCYTES # BLD: 0.1 10E3/UL
IMM GRANULOCYTES NFR BLD: 1 %
KETONES UR STRIP-MCNC: NEGATIVE MG/DL
LACTATE SERPL-SCNC: 1.2 MMOL/L (ref 0.7–2)
LEUKOCYTE ESTERASE UR QL STRIP: NEGATIVE
LIPASE SERPL-CCNC: 46 U/L (ref 73–393)
LYMPHOCYTES # BLD AUTO: 2.3 10E3/UL (ref 0.8–5.3)
LYMPHOCYTES NFR BLD AUTO: 23 %
MCH RBC QN AUTO: 31.5 PG (ref 26.5–33)
MCHC RBC AUTO-ENTMCNC: 32.9 G/DL (ref 31.5–36.5)
MCV RBC AUTO: 96 FL (ref 78–100)
MONOCYTES # BLD AUTO: 0.4 10E3/UL (ref 0–1.3)
MONOCYTES NFR BLD AUTO: 4 %
NEUTROPHILS # BLD AUTO: 7.3 10E3/UL (ref 1.6–8.3)
NEUTROPHILS NFR BLD AUTO: 72 %
NITRATE UR QL: NEGATIVE
NRBC # BLD AUTO: 0 10E3/UL
NRBC BLD AUTO-RTO: 0 /100
NT-PROBNP SERPL-MCNC: 422 PG/ML (ref 0–900)
O2/TOTAL GAS SETTING VFR VENT: 1 %
O2/TOTAL GAS SETTING VFR VENT: 21 %
OPIATES UR QL SCN: ABNORMAL
PCO2 BLDV: 51 MM HG (ref 40–50)
PCO2 BLDV: 74 MM HG (ref 40–50)
PCP UR QL SCN: ABNORMAL
PH BLDV: 7.31 [PH] (ref 7.32–7.43)
PH BLDV: 7.44 [PH] (ref 7.32–7.43)
PH UR STRIP: 5 [PH] (ref 5–7)
PLATELET # BLD AUTO: 238 10E3/UL (ref 150–450)
PO2 BLDV: 29 MM HG (ref 25–47)
PO2 BLDV: 42 MM HG (ref 25–47)
POTASSIUM BLD-SCNC: 4.3 MMOL/L (ref 3.4–5.3)
PROT SERPL-MCNC: 7 G/DL (ref 6.8–8.8)
RBC # BLD AUTO: 4.92 10E6/UL (ref 4.4–5.9)
RBC URINE: 1 /HPF
SALICYLATES SERPL-MCNC: <2 MG/DL
SARS-COV-2 RNA RESP QL NAA+PROBE: NEGATIVE
SODIUM SERPL-SCNC: 140 MMOL/L (ref 133–144)
SP GR UR STRIP: 1.01 (ref 1–1.03)
SQUAMOUS EPITHELIAL: <1 /HPF
TROPONIN I SERPL HS-MCNC: 21 NG/L
TSH SERPL DL<=0.005 MIU/L-ACNC: 0.4 MU/L (ref 0.4–4)
UROBILINOGEN UR STRIP-MCNC: NORMAL MG/DL
WBC # BLD AUTO: 10 10E3/UL (ref 4–11)
WBC URINE: 1 /HPF

## 2022-08-06 PROCEDURE — 93005 ELECTROCARDIOGRAM TRACING: CPT | Performed by: FAMILY MEDICINE

## 2022-08-06 PROCEDURE — 87635 SARS-COV-2 COVID-19 AMP PRB: CPT | Performed by: FAMILY MEDICINE

## 2022-08-06 PROCEDURE — 81001 URINALYSIS AUTO W/SCOPE: CPT | Performed by: FAMILY MEDICINE

## 2022-08-06 PROCEDURE — 36415 COLL VENOUS BLD VENIPUNCTURE: CPT | Performed by: FAMILY MEDICINE

## 2022-08-06 PROCEDURE — 99285 EMERGENCY DEPT VISIT HI MDM: CPT | Mod: 25 | Performed by: FAMILY MEDICINE

## 2022-08-06 PROCEDURE — 82140 ASSAY OF AMMONIA: CPT | Performed by: FAMILY MEDICINE

## 2022-08-06 PROCEDURE — 84484 ASSAY OF TROPONIN QUANT: CPT | Performed by: FAMILY MEDICINE

## 2022-08-06 PROCEDURE — 82803 BLOOD GASES ANY COMBINATION: CPT | Performed by: PHYSICIAN ASSISTANT

## 2022-08-06 PROCEDURE — 87040 BLOOD CULTURE FOR BACTERIA: CPT | Performed by: FAMILY MEDICINE

## 2022-08-06 PROCEDURE — 72131 CT LUMBAR SPINE W/O DYE: CPT

## 2022-08-06 PROCEDURE — 258N000003 HC RX IP 258 OP 636: Performed by: PHYSICIAN ASSISTANT

## 2022-08-06 PROCEDURE — 99223 1ST HOSP IP/OBS HIGH 75: CPT | Mod: AI | Performed by: PHYSICIAN ASSISTANT

## 2022-08-06 PROCEDURE — C9803 HOPD COVID-19 SPEC COLLECT: HCPCS | Performed by: FAMILY MEDICINE

## 2022-08-06 PROCEDURE — 87086 URINE CULTURE/COLONY COUNT: CPT | Performed by: FAMILY MEDICINE

## 2022-08-06 PROCEDURE — 86140 C-REACTIVE PROTEIN: CPT | Performed by: PHYSICIAN ASSISTANT

## 2022-08-06 PROCEDURE — 250N000011 HC RX IP 250 OP 636: Performed by: FAMILY MEDICINE

## 2022-08-06 PROCEDURE — G0378 HOSPITAL OBSERVATION PER HR: HCPCS

## 2022-08-06 PROCEDURE — 120N000001 HC R&B MED SURG/OB

## 2022-08-06 PROCEDURE — 83690 ASSAY OF LIPASE: CPT | Performed by: FAMILY MEDICINE

## 2022-08-06 PROCEDURE — 82040 ASSAY OF SERUM ALBUMIN: CPT | Performed by: FAMILY MEDICINE

## 2022-08-06 PROCEDURE — 93010 ELECTROCARDIOGRAM REPORT: CPT | Performed by: FAMILY MEDICINE

## 2022-08-06 PROCEDURE — 99291 CRITICAL CARE FIRST HOUR: CPT | Mod: 25 | Performed by: FAMILY MEDICINE

## 2022-08-06 PROCEDURE — 250N000013 HC RX MED GY IP 250 OP 250 PS 637: Performed by: PHYSICIAN ASSISTANT

## 2022-08-06 PROCEDURE — 80307 DRUG TEST PRSMV CHEM ANLYZR: CPT | Performed by: FAMILY MEDICINE

## 2022-08-06 PROCEDURE — 70450 CT HEAD/BRAIN W/O DYE: CPT

## 2022-08-06 PROCEDURE — 250N000009 HC RX 250: Performed by: PHYSICIAN ASSISTANT

## 2022-08-06 PROCEDURE — 80179 DRUG ASSAY SALICYLATE: CPT | Performed by: FAMILY MEDICINE

## 2022-08-06 PROCEDURE — 250N000012 HC RX MED GY IP 250 OP 636 PS 637: Performed by: PHYSICIAN ASSISTANT

## 2022-08-06 PROCEDURE — 250N000009 HC RX 250: Performed by: FAMILY MEDICINE

## 2022-08-06 PROCEDURE — 82077 ASSAY SPEC XCP UR&BREATH IA: CPT | Performed by: FAMILY MEDICINE

## 2022-08-06 PROCEDURE — 80053 COMPREHEN METABOLIC PANEL: CPT | Performed by: FAMILY MEDICINE

## 2022-08-06 PROCEDURE — 84443 ASSAY THYROID STIM HORMONE: CPT | Performed by: PHYSICIAN ASSISTANT

## 2022-08-06 PROCEDURE — 85025 COMPLETE CBC W/AUTO DIFF WBC: CPT | Performed by: FAMILY MEDICINE

## 2022-08-06 PROCEDURE — 80143 DRUG ASSAY ACETAMINOPHEN: CPT | Performed by: FAMILY MEDICINE

## 2022-08-06 PROCEDURE — 82803 BLOOD GASES ANY COMBINATION: CPT | Mod: 91 | Performed by: FAMILY MEDICINE

## 2022-08-06 PROCEDURE — 999N000105 HC STATISTIC NO DOCUMENTATION TO SUPPORT CHARGE

## 2022-08-06 PROCEDURE — 36415 COLL VENOUS BLD VENIPUNCTURE: CPT | Performed by: PHYSICIAN ASSISTANT

## 2022-08-06 PROCEDURE — 74177 CT ABD & PELVIS W/CONTRAST: CPT

## 2022-08-06 PROCEDURE — 83880 ASSAY OF NATRIURETIC PEPTIDE: CPT | Mod: GZ | Performed by: FAMILY MEDICINE

## 2022-08-06 PROCEDURE — 83605 ASSAY OF LACTIC ACID: CPT | Performed by: FAMILY MEDICINE

## 2022-08-06 RX ORDER — NAPROXEN 250 MG/1
500 TABLET ORAL 2 TIMES DAILY PRN
Status: DISCONTINUED | OUTPATIENT
Start: 2022-08-06 | End: 2022-08-08 | Stop reason: HOSPADM

## 2022-08-06 RX ORDER — OXYCODONE HYDROCHLORIDE 5 MG/1
10 TABLET ORAL 3 TIMES DAILY
Status: DISCONTINUED | OUTPATIENT
Start: 2022-08-06 | End: 2022-08-08 | Stop reason: HOSPADM

## 2022-08-06 RX ORDER — FLUMAZENIL 0.1 MG/ML
0.2 INJECTION, SOLUTION INTRAVENOUS
Status: DISCONTINUED | OUTPATIENT
Start: 2022-08-06 | End: 2022-08-06

## 2022-08-06 RX ORDER — ACETAMINOPHEN 325 MG/1
650 TABLET ORAL EVERY 6 HOURS PRN
Status: DISCONTINUED | OUTPATIENT
Start: 2022-08-06 | End: 2022-08-08 | Stop reason: HOSPADM

## 2022-08-06 RX ORDER — ONDANSETRON 2 MG/ML
4 INJECTION INTRAMUSCULAR; INTRAVENOUS EVERY 6 HOURS PRN
Status: DISCONTINUED | OUTPATIENT
Start: 2022-08-06 | End: 2022-08-08 | Stop reason: HOSPADM

## 2022-08-06 RX ORDER — PROCHLORPERAZINE 25 MG
12.5 SUPPOSITORY, RECTAL RECTAL EVERY 12 HOURS PRN
Status: DISCONTINUED | OUTPATIENT
Start: 2022-08-06 | End: 2022-08-08 | Stop reason: HOSPADM

## 2022-08-06 RX ORDER — SIMVASTATIN 40 MG
80 TABLET ORAL EVERY EVENING
Status: DISCONTINUED | OUTPATIENT
Start: 2022-08-06 | End: 2022-08-08 | Stop reason: HOSPADM

## 2022-08-06 RX ORDER — DULOXETIN HYDROCHLORIDE 30 MG/1
120 CAPSULE, DELAYED RELEASE ORAL DAILY
Status: DISCONTINUED | OUTPATIENT
Start: 2022-08-06 | End: 2022-08-07

## 2022-08-06 RX ORDER — IPRATROPIUM BROMIDE AND ALBUTEROL SULFATE 2.5; .5 MG/3ML; MG/3ML
3 SOLUTION RESPIRATORY (INHALATION)
Status: DISCONTINUED | OUTPATIENT
Start: 2022-08-06 | End: 2022-08-07

## 2022-08-06 RX ORDER — SODIUM CHLORIDE, SODIUM LACTATE, POTASSIUM CHLORIDE, CALCIUM CHLORIDE 600; 310; 30; 20 MG/100ML; MG/100ML; MG/100ML; MG/100ML
INJECTION, SOLUTION INTRAVENOUS CONTINUOUS
Status: DISCONTINUED | OUTPATIENT
Start: 2022-08-07 | End: 2022-08-07

## 2022-08-06 RX ORDER — NALOXONE HYDROCHLORIDE 0.4 MG/ML
0.4 INJECTION, SOLUTION INTRAMUSCULAR; INTRAVENOUS; SUBCUTANEOUS
Status: DISCONTINUED | OUTPATIENT
Start: 2022-08-06 | End: 2022-08-08 | Stop reason: HOSPADM

## 2022-08-06 RX ORDER — PROCHLORPERAZINE MALEATE 5 MG
5 TABLET ORAL EVERY 6 HOURS PRN
Status: DISCONTINUED | OUTPATIENT
Start: 2022-08-06 | End: 2022-08-08 | Stop reason: HOSPADM

## 2022-08-06 RX ORDER — LORAZEPAM 1 MG/1
1-2 TABLET ORAL EVERY 30 MIN PRN
Status: DISCONTINUED | OUTPATIENT
Start: 2022-08-06 | End: 2022-08-06

## 2022-08-06 RX ORDER — MORPHINE SULFATE 15 MG/1
30 TABLET, FILM COATED, EXTENDED RELEASE ORAL
Status: DISCONTINUED | OUTPATIENT
Start: 2022-08-06 | End: 2022-08-08 | Stop reason: HOSPADM

## 2022-08-06 RX ORDER — ONDANSETRON 4 MG/1
4 TABLET, ORALLY DISINTEGRATING ORAL EVERY 6 HOURS PRN
Status: DISCONTINUED | OUTPATIENT
Start: 2022-08-06 | End: 2022-08-08 | Stop reason: HOSPADM

## 2022-08-06 RX ORDER — OXYCODONE HYDROCHLORIDE 5 MG/1
5 TABLET ORAL EVERY 6 HOURS PRN
Status: DISCONTINUED | OUTPATIENT
Start: 2022-08-06 | End: 2022-08-07

## 2022-08-06 RX ORDER — NALOXONE HYDROCHLORIDE 0.4 MG/ML
0.2 INJECTION, SOLUTION INTRAMUSCULAR; INTRAVENOUS; SUBCUTANEOUS
Status: DISCONTINUED | OUTPATIENT
Start: 2022-08-06 | End: 2022-08-08 | Stop reason: HOSPADM

## 2022-08-06 RX ORDER — AMOXICILLIN 250 MG
1 CAPSULE ORAL 2 TIMES DAILY PRN
Status: DISCONTINUED | OUTPATIENT
Start: 2022-08-06 | End: 2022-08-08 | Stop reason: HOSPADM

## 2022-08-06 RX ORDER — POLYETHYLENE GLYCOL 3350 17 G/17G
17 POWDER, FOR SOLUTION ORAL DAILY PRN
Status: DISCONTINUED | OUTPATIENT
Start: 2022-08-06 | End: 2022-08-08 | Stop reason: HOSPADM

## 2022-08-06 RX ORDER — AMOXICILLIN 250 MG
2 CAPSULE ORAL 2 TIMES DAILY PRN
Status: DISCONTINUED | OUTPATIENT
Start: 2022-08-06 | End: 2022-08-08 | Stop reason: HOSPADM

## 2022-08-06 RX ORDER — LORAZEPAM 2 MG/ML
1-2 INJECTION INTRAMUSCULAR EVERY 30 MIN PRN
Status: DISCONTINUED | OUTPATIENT
Start: 2022-08-06 | End: 2022-08-06

## 2022-08-06 RX ORDER — LIDOCAINE 40 MG/G
1 CREAM TOPICAL
Status: DISCONTINUED | OUTPATIENT
Start: 2022-08-06 | End: 2022-08-08 | Stop reason: HOSPADM

## 2022-08-06 RX ORDER — ACETAMINOPHEN 650 MG/1
650 SUPPOSITORY RECTAL EVERY 6 HOURS PRN
Status: DISCONTINUED | OUTPATIENT
Start: 2022-08-06 | End: 2022-08-08 | Stop reason: HOSPADM

## 2022-08-06 RX ORDER — PREDNISONE 20 MG/1
40 TABLET ORAL DAILY
Status: DISCONTINUED | OUTPATIENT
Start: 2022-08-06 | End: 2022-08-07

## 2022-08-06 RX ORDER — IOPAMIDOL 755 MG/ML
89 INJECTION, SOLUTION INTRAVASCULAR ONCE
Status: COMPLETED | OUTPATIENT
Start: 2022-08-06 | End: 2022-08-06

## 2022-08-06 RX ORDER — MORPHINE SULFATE 15 MG/1
60 TABLET, FILM COATED, EXTENDED RELEASE ORAL DAILY
Status: DISCONTINUED | OUTPATIENT
Start: 2022-08-07 | End: 2022-08-07

## 2022-08-06 RX ADMIN — IPRATROPIUM BROMIDE AND ALBUTEROL SULFATE 3 ML: .5; 3 SOLUTION RESPIRATORY (INHALATION) at 17:05

## 2022-08-06 RX ADMIN — SIMVASTATIN 80 MG: 40 TABLET, FILM COATED ORAL at 17:05

## 2022-08-06 RX ADMIN — PREDNISONE 40 MG: 20 TABLET ORAL at 17:41

## 2022-08-06 RX ADMIN — IOPAMIDOL 89 ML: 755 INJECTION, SOLUTION INTRAVENOUS at 08:16

## 2022-08-06 RX ADMIN — SODIUM CHLORIDE 100 ML: 9 INJECTION, SOLUTION INTRAVENOUS at 08:17

## 2022-08-06 RX ADMIN — OXYCODONE HYDROCHLORIDE 5 MG: 5 TABLET ORAL at 23:56

## 2022-08-06 RX ADMIN — SODIUM CHLORIDE, POTASSIUM CHLORIDE, SODIUM LACTATE AND CALCIUM CHLORIDE: 600; 310; 30; 20 INJECTION, SOLUTION INTRAVENOUS at 23:56

## 2022-08-06 ASSESSMENT — ACTIVITIES OF DAILY LIVING (ADL)
ADLS_ACUITY_SCORE: 27
CONCENTRATING,_REMEMBERING_OR_MAKING_DECISIONS_DIFFICULTY: YES
NUMBER_OF_TIMES_PATIENT_HAS_FALLEN_WITHIN_LAST_SIX_MONTHS: 1
ADLS_ACUITY_SCORE: 27
CHANGE_IN_FUNCTIONAL_STATUS_SINCE_ONSET_OF_CURRENT_ILLNESS/INJURY: NO
ADLS_ACUITY_SCORE: 27
ADLS_ACUITY_SCORE: 37
DIFFICULTY_COMMUNICATING: NO
DIFFICULTY_EATING/SWALLOWING: NO
WALKING_OR_CLIMBING_STAIRS_DIFFICULTY: NO
TOILETING_ISSUES: NO
WEAR_GLASSES_OR_BLIND: YES
VISION_MANAGEMENT: GLASSES
HEARING_DIFFICULTY_OR_DEAF: NO
DRESSING/BATHING_DIFFICULTY: NO
ADLS_ACUITY_SCORE: 27
DOING_ERRANDS_INDEPENDENTLY_DIFFICULTY: YES
FALL_HISTORY_WITHIN_LAST_SIX_MONTHS: YES
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
## 2022-08-06 NOTE — ED TRIAGE NOTES
Pt arrived by EMS for AMS as reported by staff from pt's [facility]. Pt is lethargic and orientation fluctuates; pt is disoriented to place, time, and situation. He is able to answer simple one-answer questions and follow simple commands, but does not fully interact with staff.    Pt denies pain; pt denies fall.     EMS placed pt on 2L n/c for low O2sats; able to decrease O2 to 1L, and sats stable at 92%.     Call light within reach; bed alarm on, side rails up x2.   Triage Assessment     Row Name 08/06/22 0659       Triage Assessment (Adult)    Airway WDL WDL       Respiratory WDL    Respiratory WDL WDL       Skin Circulation/Temperature WDL    Skin Circulation/Temperature WDL WDL       Cardiac WDL    Cardiac WDL WDL       Peripheral/Neurovascular WDL    Peripheral Neurovascular WDL WDL       Cognitive/Neuro/Behavioral WDL    Cognitive/Neuro/Behavioral WDL X;arousability;level of consciousness    Level of Consciousness lethargic;confused    Arousal Level arouses to voice    Orientation disoriented to;place;time;situation    Speech slow       Pupils (CN II)    Pupil PERRLA yes    Pupil Size Left 3 mm    Pupil Size Right 3 mm       [place] Coma Scale    Best Eye Response 4-->(E4) spontaneous    Best Motor Response 6-->(M6) obeys commands    Best Verbal Response 4-->(V4) confused    [place] Coma Scale Score 14

## 2022-08-06 NOTE — PROGRESS NOTES
Admitting nurse completed full skin assessment. Jeremiah score and jeremiah interventions. This writer agrees with initial skin assessment findings.
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
## 2022-08-06 NOTE — PROGRESS NOTES
Reviewing chart due to high probability of Admit to Med/Surg unit.  Scott Brewer RN on 8/6/2022 at 10:50 AM

## 2022-08-06 NOTE — PROGRESS NOTES
"[facility] ADMISSION NOTE    Patient admitted to room 2403 at approximately 1130 via cart from emergency room. Patient was accompanied by transport tech.     Verbal SBAR report received from AUNDREA Milton prior to patient arrival.     Patient trasferred to bed via air karen. Patient alert and oriented X 2. Pain is controlled without any medications.  . Admission vital signs: Blood pressure 119/83, pulse 58, temperature 99.6  F (37.6  C), temperature source Oral, resp. rate 8, height 1.854 m (6' 1\"), weight 92.4 kg (203 lb 11.3 oz), SpO2 91 %. Patient was oriented to plan of care, call light, bed controls, tv, telephone, bathroom and visiting hours.     Risk Assessment    The following safety risks were identified during admission: fall. Yellow risk band applied: YES.     Skin Initial Assessment    This writer admitted this patient and completed a full skin assessment and Jeremiah score in the Adult PCS flowsheet. Appropriate interventions initiated as needed.     Secondary skin check completed by Natali DUNN RN.    Jeremiah Risk Assessment  Sensory Perception: 4-->no impairment  Moisture: 4-->rarely moist  Activity: 3-->walks occasionally  Mobility: 3-->slightly limited  Nutrition: 3-->adequate  Friction and Shear: 3-->no apparent problem  Jeremiah Score: 20  Mattress: Standard Hospital Mattress (Foam)  Bed Frame: Standard width and length    Education    Patient has a Kendall to Observation order: No  Observation education completed and documented: N/A      Candida Campbell RN    "

## 2022-08-06 NOTE — PROGRESS NOTES
"Patient has been sleeping since arriving to [facility]. He has not opened his eyes much, he has responded to a few questions. Denies having any recent alcohol ingestion, when asked when his last drink was he stated, \"long time ago.\" Patient was able to tell writer that he is at the hospital in [place] and that he is at the hospital because he \"is not feeling well.\"  Reports little pain but unable to tell writer where pain is.     Writer spoke to owner of [employer]. He reports that patient administers his own medications. They do help him with his ADLs (laundry, cleaning, help with showers, dressing) but to allow him to be as independent with them as he can be. They also provide food. Owner stated that he drinks a lot of energy drinks and usually eats 2 meals a day, breakfast and lunch. No ACP docs on file.     Oxygen saturation at 91%-93% on 1LPM.     /83   Pulse 58   Temp 99.6  F (37.6  C) (Oral)   Resp 8   Ht 1.854 m (6' 1\")   Wt 92.4 kg (203 lb 11.3 oz)   SpO2 91%   BMI 26.88 kg/m     "

## 2022-08-06 NOTE — ED PROVIDER NOTES
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       Medications   iopamidol (ISOVUE-370) solution 89 mL (89 mLs Intravenous Given 8/6/22 0816)   sodium chloride 0.9 % bag 500mL for CT scan flush use (100 mLs Intravenous Given 8/6/22 0817)     10:30 AM: Patient reassessed.  Clinically unchanged.  Remains with a significantly decreased of consciousness.  O2 sats are 92% on 2 L of O2 by nasal cannula.    Assessments & Plan (with Medical Decision Making)     67-year-old male with a history of polysubstance abuse, tobacco abuse, COPD, chronic pain, chronic opiate therapy with recurrent admissions for encephalopathy under similar circumstances including most recently in January presented via EMS with a depressed level of consciousness and hypoxia with O2 sats in the mid 80% range which improved to 92% on 2 L of O2 by nasal cannula.  His blood gas shows CO2 retention with CO2 at 74.  He has a very slight acidosis.  Work-up in the emergency department shows the above findings.  I think that his respiratory failure is primarily due to polypharmacy and polysubstance use with narcotics and cannabis being dominant.  No alcohol in his system at this time.  There could be a component of bronchitis and/or pneumonia and/or COPD contributing.  He does unfortunately continue to smoke.  He will require  hospitalization for further monitoring.  I do not think he needs BiPAP therapy to assist with CO2 he tends to run in the 50s and 60s at his baseline.  I suspect that his CO2 will return to baseline with metabolism of what ever drugs are causing his depressed level of consciousness.  We will put him on end-tidal CO2 monitoring and if he begins to creep up and gets above 100 then we will have to reconsider BiPAP but I do not think he would tolerate BiPAP at this time.  I will defer to the inpatient service about whether they wish to give antibiotics for the pulmonary findings.  He does not complain of abdominal pain and so the significance of the GI findings on CT likely relate more to under distention than to an inflammatory process such as gastritis or esophagitis but when he is more awake and alert this can be reassessed.  Salicylate and Tylenol levels are undetectable and he does not report any tensional self-harm so my suspicion for this is low.  Troponin, BNP, EKG are normal and I do not think that there is a cardiac component to his respiratory failure and encephalopathy.  He is complaining of low back pain and is his primary complaint on arrival and he is on chronic narcotic therapy and so it may be he has been overusing his narcotic medication contributing to the encephalopathy.  Given his obtundation and inability to obtain clear history he did undergo advanced imaging of his lumbar spine to assess for acute traumatic injury and this did not show an acute traumatic injury but significant degenerative disease.  This likely accounts for his chronic back pain.  I discussed his case with Pastor Rodriguez MD, the hospital service and they will assume care on admission    I have reviewed the nursing notes.    I have reviewed the findings, diagnosis, plan and need for follow up with the patient.          New Prescriptions    No medications on file       Final diagnoses:   Acute respiratory failure with hypercapnia (H)    Toxic encephalopathy   Polysubstance abuse (H)       8/6/2022   [facility] EMERGENCY DEPT     Mele Rodas MD  08/06/22 2894

## 2022-08-07 LAB
ANION GAP SERPL CALCULATED.3IONS-SCNC: 8 MMOL/L (ref 3–14)
BUN SERPL-MCNC: 7 MG/DL (ref 7–30)
CALCIUM SERPL-MCNC: 9.4 MG/DL (ref 8.5–10.1)
CHLORIDE BLD-SCNC: 108 MMOL/L (ref 94–109)
CO2 SERPL-SCNC: 26 MMOL/L (ref 20–32)
CREAT SERPL-MCNC: 0.53 MG/DL (ref 0.66–1.25)
CRP SERPL-MCNC: 37.2 MG/L (ref 0–8)
ERYTHROCYTE [DISTWIDTH] IN BLOOD BY AUTOMATED COUNT: 12.6 % (ref 10–15)
GFR SERPL CREATININE-BSD FRML MDRD: >90 ML/MIN/1.73M2
GLUCOSE BLD-MCNC: 150 MG/DL (ref 70–99)
HCT VFR BLD AUTO: 48.5 % (ref 40–53)
HGB BLD-MCNC: 16 G/DL (ref 13.3–17.7)
MCH RBC QN AUTO: 31.5 PG (ref 26.5–33)
MCHC RBC AUTO-ENTMCNC: 33 G/DL (ref 31.5–36.5)
MCV RBC AUTO: 96 FL (ref 78–100)
PLATELET # BLD AUTO: 304 10E3/UL (ref 150–450)
POTASSIUM BLD-SCNC: 3.7 MMOL/L (ref 3.4–5.3)
PROCALCITONIN SERPL IA-MCNC: 0.05 NG/ML
RBC # BLD AUTO: 5.08 10E6/UL (ref 4.4–5.9)
SODIUM SERPL-SCNC: 142 MMOL/L (ref 133–144)
WBC # BLD AUTO: 11.2 10E3/UL (ref 4–11)

## 2022-08-07 PROCEDURE — 999N000157 HC STATISTIC RCP TIME EA 10 MIN

## 2022-08-07 PROCEDURE — 80048 BASIC METABOLIC PNL TOTAL CA: CPT | Performed by: PHYSICIAN ASSISTANT

## 2022-08-07 PROCEDURE — 250N000012 HC RX MED GY IP 250 OP 636 PS 637: Performed by: PHYSICIAN ASSISTANT

## 2022-08-07 PROCEDURE — 85027 COMPLETE CBC AUTOMATED: CPT | Performed by: PHYSICIAN ASSISTANT

## 2022-08-07 PROCEDURE — 96361 HYDRATE IV INFUSION ADD-ON: CPT

## 2022-08-07 PROCEDURE — 36415 COLL VENOUS BLD VENIPUNCTURE: CPT | Performed by: PHYSICIAN ASSISTANT

## 2022-08-07 PROCEDURE — G0378 HOSPITAL OBSERVATION PER HR: HCPCS

## 2022-08-07 PROCEDURE — 96360 HYDRATION IV INFUSION INIT: CPT

## 2022-08-07 PROCEDURE — 84145 PROCALCITONIN (PCT): CPT | Performed by: PHYSICIAN ASSISTANT

## 2022-08-07 PROCEDURE — 250N000013 HC RX MED GY IP 250 OP 250 PS 637: Performed by: INTERNAL MEDICINE

## 2022-08-07 PROCEDURE — 120N000001 HC R&B MED SURG/OB

## 2022-08-07 PROCEDURE — 258N000003 HC RX IP 258 OP 636: Performed by: PHYSICIAN ASSISTANT

## 2022-08-07 PROCEDURE — 99232 SBSQ HOSP IP/OBS MODERATE 35: CPT | Performed by: INTERNAL MEDICINE

## 2022-08-07 PROCEDURE — 250N000013 HC RX MED GY IP 250 OP 250 PS 637: Performed by: PHYSICIAN ASSISTANT

## 2022-08-07 RX ORDER — OXYCODONE HYDROCHLORIDE 5 MG/1
5-10 TABLET ORAL EVERY 4 HOURS PRN
Status: DISCONTINUED | OUTPATIENT
Start: 2022-08-07 | End: 2022-08-08 | Stop reason: HOSPADM

## 2022-08-07 RX ORDER — MORPHINE SULFATE 15 MG/1
60 TABLET, FILM COATED, EXTENDED RELEASE ORAL DAILY
Status: DISCONTINUED | OUTPATIENT
Start: 2022-08-07 | End: 2022-08-08 | Stop reason: HOSPADM

## 2022-08-07 RX ORDER — ALBUTEROL SULFATE 0.83 MG/ML
2.5 SOLUTION RESPIRATORY (INHALATION)
Status: DISCONTINUED | OUTPATIENT
Start: 2022-08-07 | End: 2022-08-08 | Stop reason: HOSPADM

## 2022-08-07 RX ORDER — DULOXETIN HYDROCHLORIDE 30 MG/1
120 CAPSULE, DELAYED RELEASE ORAL DAILY
Status: DISCONTINUED | OUTPATIENT
Start: 2022-08-07 | End: 2022-08-08 | Stop reason: HOSPADM

## 2022-08-07 RX ADMIN — MORPHINE SULFATE 60 MG: 15 TABLET, FILM COATED, EXTENDED RELEASE ORAL at 10:28

## 2022-08-07 RX ADMIN — DULOXETINE HYDROCHLORIDE 120 MG: 30 CAPSULE, DELAYED RELEASE ORAL at 10:28

## 2022-08-07 RX ADMIN — PREGABALIN 150 MG: 100 CAPSULE ORAL at 10:28

## 2022-08-07 RX ADMIN — OXYCODONE HYDROCHLORIDE 10 MG: 5 TABLET ORAL at 04:22

## 2022-08-07 RX ADMIN — MORPHINE SULFATE 30 MG: 15 TABLET, FILM COATED, EXTENDED RELEASE ORAL at 21:11

## 2022-08-07 RX ADMIN — OXYCODONE HYDROCHLORIDE 10 MG: 5 TABLET ORAL at 08:08

## 2022-08-07 RX ADMIN — PREGABALIN 150 MG: 100 CAPSULE ORAL at 20:58

## 2022-08-07 RX ADMIN — SODIUM CHLORIDE, POTASSIUM CHLORIDE, SODIUM LACTATE AND CALCIUM CHLORIDE: 600; 310; 30; 20 INJECTION, SOLUTION INTRAVENOUS at 09:37

## 2022-08-07 RX ADMIN — OXYCODONE HYDROCHLORIDE 10 MG: 5 TABLET ORAL at 21:10

## 2022-08-07 RX ADMIN — SIMVASTATIN 80 MG: 40 TABLET, FILM COATED ORAL at 17:32

## 2022-08-07 RX ADMIN — PREDNISONE 40 MG: 20 TABLET ORAL at 08:08

## 2022-08-07 RX ADMIN — OXYCODONE HYDROCHLORIDE 10 MG: 5 TABLET ORAL at 14:31

## 2022-08-07 ASSESSMENT — ACTIVITIES OF DAILY LIVING (ADL)
ADLS_ACUITY_SCORE: 28
ADLS_ACUITY_SCORE: 28
ADLS_ACUITY_SCORE: 27
ADLS_ACUITY_SCORE: 28

## 2022-08-07 NOTE — PROGRESS NOTES
Dr. Rodriguez paged regarding resuming oxycodone and having PRN available. Will await response.

## 2022-08-07 NOTE — PROGRESS NOTES
"Care Management Note:    Received referral to assist with discharge planning.  Per EMR review, pt is own decision maker and lives at [facility].    Per [facility] website, \"[facility] is a 24 hours customized, mid-size, multi-level home specializing in medical and mental health care for clients in a private residential home-like atmosphere. [facility] accepts CADI, EW, Private Pay and Long-Term Insurance clients.    The smaller residential setting is an attractive choice for lots of our residents. It allows our residents the opportunity to have more individualized services..  Phone number is [phone].\"    SW attempted to meet with pt today, however, per IDT rounds and MD note, \"Pt now seems to be in possible opioid withdrawal. Yawning/ fidgity/ feeling cold.could also be THC withdrawal-utox was positive -resume all meds. Watch.\"      Pt on a hallway monitor & just fell asleep.  Staff requested writer not wake pt at this time.    Care Management team to follow for discharge plans.    KAROLINA De Leon  Care Transitions   Tele: [phone]    "

## 2022-08-07 NOTE — PLAN OF CARE
"Patient is alert, oriented to self, intermittently able to state that he is at the hospital. Patient pulled out IV earlier this morning. IV access regained. Patient is very impulsive. Uses call light intermittently, he gets up to use the bathroom. Voiding well. Multiple loose/watery stools. He is groaning, stating \"ow\". He is sweating, restless, reports that he is \"freezing.\" Patient is wanting to go home.     Dr. Rodriguez made aware of IV changes, patient's orientation as well as his symptoms. She resumed patient's medications.     BP (!) 193/95 (BP Location: Left arm)   Pulse 106   Temp 97.4  F (36.3  C) (Oral)   Resp 18   Ht 1.854 m (6' 1\")   Wt 92.4 kg (203 lb 11.3 oz)   SpO2 99%   BMI 26.88 kg/m     "

## 2022-08-07 NOTE — PLAN OF CARE
Patient is sleepy, oriented to self and place, restless at times, but redirectable. Up w/SBA, impulsive, does not use call light. Steady on feet. Diaphoretic, having loose stools/abdominal cramping, generalized pain, tremors, dry-heaves, etc. (see opiate withdrawal scale in flowsheets) Symptoms improving w/ PRN oxycodone. Patient removed both PIVs. New PIV placed in L arm, IVF infusing per order. Drinking fluids as well. Voiding. Tachycardic at times, BP somewhat elevated. Expiratory wheezing, however patient refused neb tx.

## 2022-08-07 NOTE — PROGRESS NOTES
[facility] Internal Medicine Progress Note     Date of Service (when I saw the patient): 08/07/2022    REASON FOR ADMISSION / INTERVAL HISTORY:  Melquiades Morales is a 67 year old male admitted on 8/6/2022. He has history of chronic pain syndrome, opioid dependence, polysubstance use, COPD, depression, anxiety, PTSD and hyperlipidemia.  He presents due to confusion noted at his assisted living facility.  He is awake/ alert. Denies any pain. Seems a little agitated/ constantly moving and feeling cold.     ASSESSMENT/PLAN:     Acute Encephalopathy, likely Toxic and Metabolic  On admission alert and watching TV, oriented to self and hospital though not situation or date. Conversant though unable to tell me medical problems or current medications. Imaging on presentation included CT head, CT lumbar spine, CT chest/abdomen/pelvis which was notable for bronchitis and minimal ground glass foci. Labs notable for hypercapnia as below. No signs of infectious source on imaging or UA. No other metabolic explanation outside of hypercapnia: normal ammonia, TSH, electrolytes, CBC.  Medications notable for high doses of chronic opioids and other sedating medications as below. Encephalopathy likely in part toxic related to medications (unclear if he is taking appropriately) as well as metabolic related to hypercapnia.  All meds were held on admit. Pt now seems to be in possible opioid withdrawal. Yawning/ fidgity/ feeling cold.could also be THC withdrawal-utox was positive  -resume all meds. Watch     Acute Respiratory Failure with Hypercapnia  Chronic intermittent hypoxia  COPD with possible mild exacerbation  Tobacco Use  VBG on presentation showed pH 7.31, CO2 74, Bicarb 27. Encephalopathy as above. CT chest without apparent acute findings. BNP, troponin, WBC normal.  As above, concern that this may be related to opioid and other sedative medications,  On admission it was concerningfor possible mild COPD exacerbation and  "prednisone was started. But now, with behaviour, agitation, and other sx, seems more drug related than COPD and prednisone might aggravate agitation  Will stop prednisone/ nebs scheduled. If any re-current hypercapnia, wheezing, will resume meds.     Chronic Pain Syndrome  Opioid dependence  Medical cannabis use  Followed by [facility] per PCP note. Current med rec list notes MS contin 60 mg in AM and 30 mg in PM, oxycodone 10 mg three times daily and pregabalin 150 mg BID. Based on  review, his total daily dose of MS contin is accurate on his med list however he has an additional tablet prescribed of both pregabalin (3 x 150mg daily) and oxycodone (4 x 10mg tabs daily) based on dispense report.  - resume meds today     Major depressive disorder  Generalized anxiety disorder  PTSD  Sleep disturbance  Follows with psychology for therapy. Unable assess mood on admission.  - resume meds     Abnormal CT Chest  CT showed \"Mild mid to distal esophageal wall thickening may reflect esophagitis. Borderline gastric wall hyperenhancement; gastritis not excluded.\"  - consider EGD, likely as outpatient  - assess symptoms when more alert, consider PPI     Hyperlipidemia  Chronic.  - continue home simvastatin     Polysubstance use  Current use of opioids and cannabis as above. History of alcohol dependence in remission and cocaine use. Denies current drug or alcohol use though made nonspecific reports of alcohol use to ED provider.  - resume meds as above  - monitor clinically for signs of alcohol withdrawal    DISPO  Will be in hospital 1-2 days         HOWARD SALGUERO MD   Pg [phone]    DVT Prhylaxis: Low Risk/Ambulatory with no VTE prophylaxis indicated  Code Status: Full Code    ROS:  As described in A/P and Exam.  Otherwise ALL are  negative.    PHYSICAL EXAM:  All vitals have been reviewed    Blood pressure (!) 143/72, pulse 98, temperature 98.5  F (36.9  C), temperature source Axillary, " "resp. rate 20, height 1.854 m (6' 1\"), weight 92.4 kg (203 lb 11.3 oz), SpO2 95 %.    No intake/output data recorded.    GENERAL APPEARANCE: healthy, alert and no distress. Fidgity, sweating, chills-cold, yawning  EYES: conjunctiva clear, eyes grossly normal  HENT: external ears and nose normal   RESP: lungs clear to auscultation - no rales, rhonchi or wheezes  CV: regular rate and rhythm, normal S1 S2, no S3 or S4 and no murmur, click or rub   ABDOMEN: soft, nontender, no HSM or masses and bowel sounds normal  MS: no clubbing, cyanosis; no edema  SKIN: clear without significant rashes or lesions  NEURO: -non-focal moves all 4 extr    ROUTINE  LABS (Last four results)  CMP  Recent Labs   Lab 08/07/22  0517 08/06/22  0708    140   POTASSIUM 3.7 4.3   CHLORIDE 108 104   CO2 26 32   ANIONGAP 8 4   * 147*   BUN 7 3*   CR 0.53* 0.54*   GFRESTIMATED >90 >90   JESSEE 9.4 8.6   PROTTOTAL  --  7.0   ALBUMIN  --  3.1*   BILITOTAL  --  0.4   ALKPHOS  --  66   AST  --  18   ALT  --  28     CBC  Recent Labs   Lab 08/07/22  0517 08/06/22  0708   WBC 11.2* 10.0   RBC 5.08 4.92   HGB 16.0 15.5   HCT 48.5 47.1   MCV 96 96   MCH 31.5 31.5   MCHC 33.0 32.9   RDW 12.6 12.9    238     INRNo lab results found in last 7 days.  Arterial Blood Gas  Recent Labs   Lab 08/06/22  1735 08/06/22  0751   O2PER 21 1       No results found for this or any previous visit (from the past 24 hour(s)).    "

## 2022-08-07 NOTE — UTILIZATION REVIEW
[facility] Utilization Review  Admission Status; Secondary Review Determination     Admission Date: 8/6/2022  6:54 AM      Under the authority of the Utilization Management Committee, the utilization review process indicated a secondary review on the above patient.  The review outcome is based on review of the medical records, discussions with staff, and applying clinical experience noted on the date of the review.        (X)      Inpatient Status Appropriate - This patient's medical care is consistent with medical management for inpatient care and reasonable inpatient medical practice.          RATIONALE FOR DETERMINATION   67-year-old male with history of chronic pain syndrome, opioid dependence, polysubstance use, COPD, depression, anxiety, PTSD, hyperlipidemia, admitted with confusion noted at assisted living facility with agitation.  Patient found to have acute toxic and metabolic encephalopathy, patient had extensive imaging with CT head, lumbar spine which showed no acute findings, CT chest abdomen pelvis showing bronchitis and minimal groundglass foci, found to have hypercapnia.  Medications held on admit, now seems to be in opioid withdrawal, possibly THC withdrawal, U tox was positive, resumed medications.  Elevated CRP, leukocytosis, hypercapnia.  Complex patient with toxic and metabolic encephalopathy, held medications on admission, then showed signs of withdrawal, now resumed and monitoring symptoms, is at risk of acute decompensation, need close monitoring in the hospital with ongoing interventions, recommend continue inpatient status      The severity of illness, intensity of service provided, expected LOS and risk for adverse outcome make the care complex, high risk and appropriate for hospital admission.The patient requires hospital based medical care which is anticipated to require a stay of 2 or more midnights; according to CMS guidelines the patient should be admitted as inpatient         The information on this document is developed by the utilization review team in order for the business office to ensure compliance.  This only denotes the appropriateness of proper admission status and does not reflect the quality of care rendered.         The definitions of Inpatient Status and Observation Status used in making the determination above are those provided in the CMS Coverage Manual, Chapter 1 and Chapter 6, section 70.4.      Sincerely,       Celestine Martin MD  Physician Advisor  Utilization Review-[place]    Phone: [phone]

## 2022-08-08 VITALS
OXYGEN SATURATION: 92 % | DIASTOLIC BLOOD PRESSURE: 86 MMHG | WEIGHT: 203.71 LBS | TEMPERATURE: 98.1 F | RESPIRATION RATE: 16 BRPM | SYSTOLIC BLOOD PRESSURE: 170 MMHG | BODY MASS INDEX: 27 KG/M2 | HEIGHT: 73 IN | HEART RATE: 85 BPM

## 2022-08-08 LAB — BACTERIA UR CULT: NO GROWTH

## 2022-08-08 PROCEDURE — 250N000013 HC RX MED GY IP 250 OP 250 PS 637: Performed by: INTERNAL MEDICINE

## 2022-08-08 PROCEDURE — 99239 HOSP IP/OBS DSCHRG MGMT >30: CPT | Performed by: INTERNAL MEDICINE

## 2022-08-08 PROCEDURE — 250N000013 HC RX MED GY IP 250 OP 250 PS 637: Performed by: PHYSICIAN ASSISTANT

## 2022-08-08 PROCEDURE — 999N000157 HC STATISTIC RCP TIME EA 10 MIN

## 2022-08-08 PROCEDURE — G0378 HOSPITAL OBSERVATION PER HR: HCPCS

## 2022-08-08 RX ADMIN — MORPHINE SULFATE 60 MG: 15 TABLET, FILM COATED, EXTENDED RELEASE ORAL at 09:00

## 2022-08-08 RX ADMIN — OXYCODONE HYDROCHLORIDE 10 MG: 5 TABLET ORAL at 08:38

## 2022-08-08 RX ADMIN — DULOXETINE HYDROCHLORIDE 120 MG: 30 CAPSULE, DELAYED RELEASE ORAL at 08:38

## 2022-08-08 RX ADMIN — PREGABALIN 150 MG: 100 CAPSULE ORAL at 08:39

## 2022-08-08 ASSESSMENT — ACTIVITIES OF DAILY LIVING (ADL)
ADLS_ACUITY_SCORE: 28

## 2022-08-08 NOTE — PROGRESS NOTES
Pt was sitting on bed requesting to go home, this writer noticed nasal canula was on bed, pt declined to have it put back on, O2 saturation 95% ORA, requested IV be removed, provided education and that provider will be in shortly to discuss discharge.

## 2022-08-08 NOTE — DISCHARGE SUMMARY
[facility]    Melquiades Morales MRN# [id number]   Age: 67 year old YOB: 1955     Date of Admission:  8/6/2022  Date of Discharge::  8/8/2022  Admitting Physician:  Pastor Rodriguez MD  Discharge Physician:  Pastor Rodriguez MD  Primary Physician: Eliane Quinn  Transferring Facility: N/A     Home clinic: [facility]          Admission Diagnoses:   Toxic encephalopathy [G92.9]  Polysubstance abuse (H) [F19.10]  Acute respiratory failure with hypercapnia (H) [J96.02]          Discharge Diagnosis:     Principle diagnosis: Acute Encephalopathy, likely Toxic and Metabolic  Secondary diagnoses:  Patient Active Problem List   Diagnosis     BACK DISORDER NOS     Tobacco use disorder     Major depressive disorder, single episode, severe (H)     Morbid obesity (H)     Erectile dysfunction     COPD (chronic obstructive pulmonary disease) (H)     Migraine headache     Hyperlipidemia LDL goal <130     Lumbosacral radiculitis     Health Care Home     Advanced directives, counseling/discussion     Non-specific colitis     Anxiety     Chronic maxillary sinusitis     Nasal polyp     Tubular adenoma of colon     Chronic pain syndrome     Spinal stenosis in cervical region     Inverted papilloma of nasal cavity     Lumbar radiculopathy     Adenomatous colon polyp     Cocaine abuse (H)     Alcohol abuse, episodic     Cannabis abuse     Generalized anxiety disorder     Opioid type dependence (H)     Polysubstance overdose     Bilateral dependent atelectasis     Dizziness     Medical cannabis use     Sleep disturbance     Prediabetes     COVID-19 virus infection     Abdominal pain, generalized     Toxic encephalopathy     Acute respiratory failure with hypercapnia (H)          Brief History of Presenting Illness:   As per admit hx  Melquiades Morales is a 67 year old male who presents with confusion.     He presents to the emergency department due to concerns of increased confusion  "from his baseline reported by the assisted-living staff.  He had reported lower back pain rating down his right leg to the emergency department physician.  He also reported using alcohol though unable to say how much are often.      On admission he denies current alcohol use or any other illicit drugs.  He is prescribed high doses of opioids and other sedating medications.  He is unable to tell me the specific medications that he takes or how he has been taking them recently.  During mission evaluation he is alert and oriented to self, [facility] hospital.  He is unable to say why he is in the hospital, the date or the current president.  He is sitting up watching TV and does not look to be in any distress.  He is not very engaged in discussions, and answering \"no\" to nearly all review of systems questions.  At times he answers \"no\" and appropriately to a question that is not yes or no question and when clarified he will attempt to give a more appropriate answer.     He states he has been having some fevers, chills and \"stomach issues\".  He states the fever chills have been going on for \"a while\" but is unable to give any sort of timeline.  When asked if he is having abdominal pain, nausea, vomiting or diarrhea he answers yes.  When clarified and asked each of these questions individually he answers no to all of them.  He does admit to being confused and having trouble thinking and processing things.            Hospital Course:   Acute Encephalopathy, likely Toxic and Metabolic  On admission alert and watching TV, oriented to self and hospital though not situation or date. Conversant though unable to tell me medical problems or current medications. Imaging on presentation included CT head, CT lumbar spine, CT chest/abdomen/pelvis which was notable for bronchitis and minimal ground glass foci. Labs notable for hypercapnia as below. No signs of infectious source on imaging or UA. No other metabolic explanation outside of " "hypercapnia: normal ammonia, TSH, electrolytes, CBC.  Medications notable for high doses of chronic opioids and other sedating medications as below. Encephalopathy likely in part toxic related to medications (unclear if he is taking appropriately) as well as metabolic related to hypercapnia.  All meds were held on admit. Pt seemed  to be in possible opioid withdrawal on 8/7. Yawning/ fidgity/ feeling cold.could also be THC withdrawal-utox was positive  Resumed all home meds. Doing well-ready to leave.     Acute Respiratory Failure with Hypercapnia  Chronic intermittent hypoxia  COPD with possible mild exacerbation  Tobacco Use  VBG on presentation showed pH 7.31, CO2 74, Bicarb 27. Encephalopathy as above. CT chest without apparent acute findings. BNP, troponin, WBC normal.  As above, concern that this may be related to opioid and other sedative medications,  On admission it was concerningfor possible mild COPD exacerbation and prednisone was started. But now, with behaviour, agitation, and other sx, seems more drug related than COPD and prednisone might aggravate agitation  Stopped prednisone/ nebs scheduled on 8/7.   Stable. Not hypoxic or wheezing.     Chronic Pain Syndrome  Opioid dependence  Medical cannabis use  Followed by [facility] per PCP note. Current med rec list notes MS contin 60 mg in AM and 30 mg in PM, oxycodone 10 mg three times daily and pregabalin 150 mg BID. Based on  review, his total daily dose of MS contin is accurate on his med list however he has an additional tablet prescribed of both pregabalin (3 x 150mg daily) and oxycodone (4 x 10mg tabs daily) based on dispense report.  Continue home meds     Major depressive disorder  Generalized anxiety disorder  PTSD  Sleep disturbance  Follows with psychology for therapy. Unable assess mood on admission.  Continue meds     Abnormal CT Chest  CT showed \"Mild mid to distal esophageal wall thickening may reflect " "esophagitis. Borderline gastric wall hyperenhancement; gastritis not excluded.\"  - consider EGD, likely as outpatient     Hyperlipidemia  Chronic.  - continue home simvastatin     Polysubstance use  Current use of opioids and cannabis as above. History of alcohol dependence in remission and cocaine use. Denies current drug or alcohol use though made nonspecific reports of alcohol use to ED provider.            Procedures:   No procedures performed during this admission         Allergies:      Allergies   Allergen Reactions     Abilify [Aripiprazole] Other (See Comments)     Altered metal status.  Was admitted to hospital 3/17/2015.     Asa [Aspirin] GI Disturbance     Upset stomach     Black Pepper [Piper] Swelling     Tongue swells up     Caffeine Other (See Comments)     Comment: GI problems, Description:      Robitussin Cough-Cold D Other (See Comments)     Bad dreams about killing people      Varenicline Other (See Comments)     Vivid dreams and suicidal thoughts             Medications Prior to Admission:     Medications Prior to Admission   Medication Sig Dispense Refill Last Dose     albuterol (PROAIR HFA) 108 (90 Base) MCG/ACT inhaler Inhale 2 puffs into the lungs every 4 hours as needed for shortness of breath / dyspnea or wheezing 18 g 3 Unknown at Unknown time     DULoxetine (CYMBALTA) 60 MG capsule Take 2 capsules (120 mg) by mouth daily TAKE 2 CAPSULES BY MOUTH EVERY DAY (Patient taking differently: Take 120 mg by mouth daily) 60 capsule 11 8/5/2022 at am     morphine (MS CONTIN) 30 MG CR tablet Take by mouth every 12 hours 60 mg in AM and 30 mg in HS  0 Unknown at Unknown time     naproxen (NAPROSYN) 500 MG tablet Take 1 tablet (500 mg) by mouth 2 times daily as needed for headaches 60 tablet 3 Unknown at Unknown time     NONFORMULARY Take 2 capsules by mouth daily Super Beta Prostate   Unknown at Unknown time     oxyCODONE IR (ROXICODONE) 10 MG tablet Take 1 tablet by mouth 3 times daily   8/5/2022 at " pm     pregabalin (LYRICA) 150 MG capsule Take 150 mg by mouth 2 times daily    8/5/2022 at pm     simvastatin (ZOCOR) 80 MG tablet Take 1 tablet (80 mg) by mouth daily 90 tablet 3 8/5/2022 at pm     traZODone (DESYREL) 100 MG tablet TAKE 2 TABLETS BY MOUTH AT BEDTIME AS NEEDED FOR SLEEP (Patient taking differently: Take 200 mg by mouth At Bedtime With 50 mg to = 250 mg nightly dose AS NEEDED FOR SLEEP) 180 tablet 3 Unknown at Unknown time     traZODone (DESYREL) 50 MG tablet Take 1 tablet (50 mg) by mouth At Bedtime Take along with the 100 mg tablets for total dose of 250 mg (Patient taking differently: Take 50 mg by mouth At Bedtime Take along with the 200 mg tablets for total dose of 250 mg) 90 tablet 3 Unknown at Unknown time     order for DME Equipment being ordered: Nebulizer.    Diagnosis: chronic obstructive bronchitis (COPD).    Duration of need:  99 months. 1 each 0      order for DME Equipment being ordered: Hospital Bed and mattress. 1 each 0      order for DME Equipment being ordered: Lift Chair 1 each 0      order for DME Equipment being ordered: Wheelchair. Electric, including adjustable back rest sitting to resting, leg elevation adjustment, approved for outdoor use 1 Units 0      ORDER FOR DME Semi electric hospital bed with side rails and mattress. Length of bed is for lifetime 1 Device 0      OVER-THE-COUNTER nereva Plus 1 tablet daily                Discharge Medications:     Current Discharge Medication List      CONTINUE these medications which have NOT CHANGED    Details   albuterol (PROAIR HFA) 108 (90 Base) MCG/ACT inhaler Inhale 2 puffs into the lungs every 4 hours as needed for shortness of breath / dyspnea or wheezing  Qty: 18 g, Refills: 3    Comments: Pharmacy may dispense brand covered by insurance (Proair, or proventil or ventolin or generic albuterol inhaler)  Associated Diagnoses: Chronic bronchitis, unspecified chronic bronchitis type (H)      DULoxetine (CYMBALTA) 60 MG capsule  Take 2 capsules (120 mg) by mouth daily TAKE 2 CAPSULES BY MOUTH EVERY DAY  Qty: 60 capsule, Refills: 11    Associated Diagnoses: Depression, unspecified depression type      morphine (MS CONTIN) 30 MG CR tablet Take by mouth every 12 hours 60 mg in AM and 30 mg in HS  Refills: 0      naproxen (NAPROSYN) 500 MG tablet Take 1 tablet (500 mg) by mouth 2 times daily as needed for headaches  Qty: 60 tablet, Refills: 3    Associated Diagnoses: Chronic pain syndrome      NONFORMULARY Take 2 capsules by mouth daily Super Beta Prostate      oxyCODONE IR (ROXICODONE) 10 MG tablet Take 1 tablet by mouth 3 times daily      pregabalin (LYRICA) 150 MG capsule Take 150 mg by mouth 2 times daily       simvastatin (ZOCOR) 80 MG tablet Take 1 tablet (80 mg) by mouth daily  Qty: 90 tablet, Refills: 3    Associated Diagnoses: Hyperlipidemia LDL goal <130      !! traZODone (DESYREL) 100 MG tablet TAKE 2 TABLETS BY MOUTH AT BEDTIME AS NEEDED FOR SLEEP  Qty: 180 tablet, Refills: 3    Associated Diagnoses: Depression, unspecified depression type      !! traZODone (DESYREL) 50 MG tablet Take 1 tablet (50 mg) by mouth At Bedtime Take along with the 100 mg tablets for total dose of 250 mg  Qty: 90 tablet, Refills: 3    Associated Diagnoses: Depression, unspecified depression type      !! order for DME Equipment being ordered: Nebulizer.    Diagnosis: chronic obstructive bronchitis (COPD).    Duration of need:  99 months.  Qty: 1 each, Refills: 0    Associated Diagnoses: Chronic bronchitis, unspecified chronic bronchitis type (H)      !! order for DME Equipment being ordered: Hospital Bed and mattress.  Qty: 1 each, Refills: 0    Associated Diagnoses: Chronic pain syndrome; Lumbosacral radiculitis; Chronic obstructive pulmonary disease, unspecified COPD type (H); Obese; Lumbar radiculopathy; Encephalopathy; Spinal stenosis in cervical region      !! order for DME Equipment being ordered: Lift Chair  Qty: 1 each, Refills: 0    Associated  "Diagnoses: Chronic pain syndrome; Lumbosacral radiculitis; Chronic obstructive pulmonary disease, unspecified COPD type (H); Obese; Lumbar radiculopathy; Encephalopathy; Spinal stenosis in cervical region; Unable to ambulate      !! order for DME Equipment being ordered: Wheelchair. Electric, including adjustable back rest sitting to resting, leg elevation adjustment, approved for outdoor use  Qty: 1 Units, Refills: 0    Associated Diagnoses: Chronic pain syndrome; Lumbosacral radiculitis      !! ORDER FOR DME Semi electric hospital bed with side rails and mattress. Length of bed is for lifetime  Qty: 1 Device, Refills: 0    Associated Diagnoses: Other unspecified back disorder; Obese; Lumbosacral radiculitis; COPD (chronic obstructive pulmonary disease) (H)      OVER-THE-COUNTER nereva Plus 1 tablet daily       !! - Potential duplicate medications found. Please discuss with provider.                Consultations:   No consultations were requested during this admission            Discharge Exam:   Blood pressure (!) 170/86, pulse 85, temperature 98.1  F (36.7  C), temperature source Oral, resp. rate 16, height 1.854 m (6' 1\"), weight 92.4 kg (203 lb 11.3 oz), SpO2 92 %.  GENERAL APPEARANCE: healthy, alert and no distress  EYES: conjunctiva clear, eyes grossly normal  HENT: external ears and nose normal   NECK: supple, no masses or adenopathy  RESP: lungs clear to auscultation - no rales, rhonchi or wheezes  CV: regular rate and rhythm, normal S1 S2, no S3 or S4 and no murmur, click or rub   ABDOMEN: soft, nontender, no HSM or masses and bowel sounds normal  MS: no clubbing, cyanosis; no edema  SKIN: clear without significant rashes or lesions  NEURO: Normal strength and tone, sensory exam grossly normal, mentation intact and speech normal    Unresulted Labs Ordered in the Past 30 Days of this Admission     Date and Time Order Name Status Description    8/6/2022  7:25 AM Blood Culture Peripheral Blood Preliminary     " 8/6/2022  7:25 AM Blood Culture Line, venous Preliminary           No results found for this or any previous visit (from the past 24 hour(s)).         Pending Tests at Discharge:   None         Discharge Instructions and Follow-Up:     Discharge diet: Regular   Discharge activity: Activity as tolerated   Discharge follow-up: Follow up with primary care provider in 1-2 weeks           Discharge Disposition:     Discharged to home      Attestation:  I have reviewed today's vital signs, notes, medications, labs and imaging.    Time Spent on this Encounter   I, Pastor Rodriguez MD, personally saw the patient today and spent greater than 30 minutes discharging this patient.    Pastor Rodriguez MD

## 2022-08-08 NOTE — PROGRESS NOTES
[facility] DISCHARGE NOTE    Patient discharged to assisted living at 10:10 AM via wheel chair. Accompanied by son and staff. Discharge instructions reviewed with patient, opportunity offered to ask questions. Prescriptions - None ordered for discharge. All belongings sent with patient that were recorded in chart. Pt claims to have knives that are missing, [facility] is calling security to confirm, no record of knives in chart.    Reyna Soto RN

## 2022-08-08 NOTE — CONSULTS
Care Management Note:     CM received referral to assist with discharge planning. Patient medically stable for discharge.     Patient lives at [facility] ([phone]).     Spoke with Linden,  at [facility] regarding patient discharge today. Per Etjacob they can take patient back at any time today.     Discussed with Etta recommendations for staff to assist with medication administration due to concerns of misuse of prescription meds. Per Etta, they typically manage medications for their residents however Melquiades has adamently refused in the past but they will discuss medication administration services with Melquiades when he returns.     Spoke with patient at bedside. Patient states he is ready to go and his son, Tc, is here to transport him.     No further CM needs identified.     PLAN: Return to [facility]

## 2022-08-08 NOTE — PLAN OF CARE
Patient is sleepy, rouses easily, oriented to self and place, confusion improving. Up w/SBA, impulsive at times. Steady on feet. Symptoms improving - less diaphoretic, no loose stools during shift, resting comfortably throughout night. Drinking fluids, PIV saline locked. VSS on room air.

## 2022-08-11 LAB
BACTERIA BLD CULT: NO GROWTH
BACTERIA BLD CULT: NO GROWTH

## 2022-08-12 ENCOUNTER — VIRTUAL VISIT (OUTPATIENT)
Dept: PSYCHOLOGY | Facility: CLINIC | Age: 67
End: 2022-08-12
Payer: COMMERCIAL

## 2022-08-12 DIAGNOSIS — F43.10 POSTTRAUMATIC STRESS DISORDER: ICD-10-CM

## 2022-08-12 DIAGNOSIS — F41.1 GENERALIZED ANXIETY DISORDER: ICD-10-CM

## 2022-08-12 DIAGNOSIS — F33.1 MAJOR DEPRESSIVE DISORDER, RECURRENT EPISODE, MODERATE (H): Primary | ICD-10-CM

## 2022-08-12 PROCEDURE — 90834 PSYTX W PT 45 MINUTES: CPT | Mod: 95 | Performed by: SOCIAL WORKER

## 2022-08-12 ASSESSMENT — ANXIETY QUESTIONNAIRES
2. NOT BEING ABLE TO STOP OR CONTROL WORRYING: NEARLY EVERY DAY
6. BECOMING EASILY ANNOYED OR IRRITABLE: NEARLY EVERY DAY
3. WORRYING TOO MUCH ABOUT DIFFERENT THINGS: MORE THAN HALF THE DAYS
5. BEING SO RESTLESS THAT IT IS HARD TO SIT STILL: MORE THAN HALF THE DAYS
GAD7 TOTAL SCORE: 14
1. FEELING NERVOUS, ANXIOUS, OR ON EDGE: MORE THAN HALF THE DAYS
GAD7 TOTAL SCORE: 14
IF YOU CHECKED OFF ANY PROBLEMS ON THIS QUESTIONNAIRE, HOW DIFFICULT HAVE THESE PROBLEMS MADE IT FOR YOU TO DO YOUR WORK, TAKE CARE OF THINGS AT HOME, OR GET ALONG WITH OTHER PEOPLE: VERY DIFFICULT
7. FEELING AFRAID AS IF SOMETHING AWFUL MIGHT HAPPEN: NOT AT ALL

## 2022-08-12 ASSESSMENT — COLUMBIA-SUICIDE SEVERITY RATING SCALE - C-SSRS
SUICIDE, SINCE LAST CONTACT: NO
TOTAL  NUMBER OF INTERRUPTED ATTEMPTS SINCE LAST CONTACT: NO
2. HAVE YOU ACTUALLY HAD ANY THOUGHTS OF KILLING YOURSELF?: NO
ATTEMPT SINCE LAST CONTACT: NO
1. SINCE LAST CONTACT, HAVE YOU WISHED YOU WERE DEAD OR WISHED YOU COULD GO TO SLEEP AND NOT WAKE UP?: NO
6. HAVE YOU EVER DONE ANYTHING, STARTED TO DO ANYTHING, OR PREPARED TO DO ANYTHING TO END YOUR LIFE?: NO
TOTAL  NUMBER OF ABORTED OR SELF INTERRUPTED ATTEMPTS SINCE LAST CONTACT: NO

## 2022-08-12 NOTE — PROGRESS NOTES
"    [facility]                                     Progress Note    Patient Name: Melquiades Morales  Date: 8/12/22         Service Type: Individual      Session Start Time:  9 am  Session End Time: 9:45 am     Session Length: 45 min     Session #: 13    Attendees: Client attended alone.    Service Modality:  Phone Visit:                  Provider verified identity through the following two step process.  Patient provided:  Patient is known previously to provider     The patient has been notified of the following:      \"We have found that certain health care needs can be provided without the need for a face to face visit.  This service lets us provide the care you need with a phone conversation.       I will have full access to your [facility] medical record during this entire phone call.   I will be taking notes for your medical record.      Since this is like an office visit, we will bill your insurance company for this service.       There are potential benefits and risks of telephone visits (e.g. limits to patient confidentiality) that differ from in-person visits.?Confidentiality still applies for telephone services, and nobody will record the visit.  It is important to be in a quiet, private space that is free of distractions (including cell phone or other devices) during the visit.??      If during the course of the call I believe a telephone visit is not appropriate, you will not be charged for this service\"     Consent has been obtained for this service by care team member: Yes      DATA  Interactive Complexity: No  Crisis: No        Progress Since Last Session (Related to Symptoms / Goals / Homework):   Symptoms: worsening.    Homework: Partially completed- use a healthy coping idea. He has the grounding ideas handout and is to practice them.     Episode of Care Goals: Minimal progress - ACTION (Actively working towards change); Intervened by reinforcing change plan / affirming " steps taken.    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   He was evicted from his home and now has 4 days to find another place to live. He has 24 days to sale his trailer home. His trailer sold. He now lives in a nursing home. It is taking him time to get used to it.  He spoke with daughter Gene Mercado and she and her baby healthy. He hopes to visit her in [place] this August. She is not ready to see him he reports.         Treatment Objective(s) Addressed in This Session:   Use a healthy coping idea as needed.      Intervention:  Assessed functioning and for safety. Reviewed the phq and shailesh. Reviewed goals and the promis and short [place]. Processed his feelings about his daughter reportedly being mad at him and not knowing why. Processed feelings about his new friends and buying a cake and gift for one of them for his birthday knowing none of his family would show up. Explored his hopes of making it to [place] to see her. Reinforced hobbies such as fishing and making new friends. Reviewed healthy coping ideas.          Assessments completed prior to visit:  The following assessments were completed by patient for this visit:  PHQ9:   PHQ-9 SCORE 3/18/2022 4/25/2022 5/12/2022 5/26/2022 6/10/2022 6/24/2022 7/8/2022   PHQ-9 Total Score - - - - - - -   PHQ-9 Total Score MyChart - - - - - - -   PHQ-9 Total Score 16 17 15 13 14 17 16     GAD7:   SHAILESH-7 SCORE 3/18/2022 4/25/2022 5/12/2022 5/26/2022 6/10/2022 6/24/2022 7/8/2022   Total Score - - - - - - -   Total Score - - - - - - -   Total Score 10 19 13 12 12 14 12     CAGE-AID:   CAGE-AID Total Score 1/28/2022   Total Score 0   Total Score MyChart 0 (A total score of 2 or greater is considered clinically significant)     PROMIS 10-Global Health (all questions and answers displayed):   PROMIS 10 1/28/2022 5/12/2022   In general, would you say your health is: Poor -   In general, would you say your  quality of life is: Fair -   In general, how would you rate your physical health? Poor -   In general, how would you rate your mental health, including your mood and your ability to think? Fair -   In general, how would you rate your satisfaction with your social activities and relationships? Fair -   In general, please rate how well you carry out your usual social activities and roles Poor -   To what extent are you able to carry out your everyday physical activities such as walking, climbing stairs, carrying groceries, or moving a chair? A little -   How often have you been bothered by emotional problems such as feeling anxious, depressed or irritable? Often -   How would you rate your fatigue on average? Moderate -   How would you rate your pain on average?   0 = No Pain  to  10 = Worst Imaginable Pain 5 -   In general, would you say your health is: 1 3   In general, would you say your quality of life is: 2 2   In general, how would you rate your physical health? 1 2   In general, how would you rate your mental health, including your mood and your ability to think? 2 3   In general, how would you rate your satisfaction with your social activities and relationships? 2 2   In general, please rate how well you carry out your usual social activities and roles. (This includes activities at home, at work and in your community, and responsibilities as a parent, child, spouse, employee, friend, etc.) 1 2   To what extent are you able to carry out your everyday physical activities such as walking, climbing stairs, carrying groceries, or moving a chair? 2 1   In the past 7 days, how often have you been bothered by emotional problems such as feeling anxious, depressed, or irritable? 4 3   In the past 7 days, how would you rate your fatigue on average? 3 3   In the past 7 days, how would you rate your pain on average, where 0 means no pain, and 10 means worst imaginable pain? 5 6   Global Mental Health Score 8 10   Global  Physical Health Score 9 9   PROMIS TOTAL - SUBSCORES 17 19   Some recent data might be hidden     [place] Suicide Severity Rating Scale (Lifetime/Recent)  [place] Suicide Severity Rating (Lifetime/Recent) 9/8/2018 1/28/2022   Wish to be Dead (Lifetime) - Yes   Non-Specific Active Suicidal Thoughts (Lifetime) - Yes   Most Severe Ideation Rating (Lifetime) - 5   Frequency (Lifetime) - 3   Duration (Lifetime) - 2   Controllability (Lifetime) - 5   Protective Factors  (Lifetime) - 5   Reasons for Ideation (Lifetime) - 4   RETIRED: 1. Wish to be Dead (Recent) - No   RETIRED: 2. Non-Specific Active Suicidal Thoughts (Recent) - No   3. Active Suicidal Ideation with any Methods (Not Plan) Without Intent to Act (Lifetime) - Yes   RETIRED: 3. Active Suicidal Ideation with any Methods (Not Plan) Without Intent to Act (Recent) - No   RETIRE: 4. Active Suicidal Ideation with Some Intent to Act, Without Specific Plan (Lifetime) - Yes   4. Active Suicidal Ideation with Some Intent to Act, Without Specific Plan (Recent) - No   RETIRE: 5. Active Suicidal Ideation with Specific Plan and Intent (Lifetime) - Yes   RETIRED: 5. Active Suicidal Ideation with Specific Plan and Intent (Recent) - No   Most Severe Ideation Rating (Past Month) NA NA   Frequency (Past Month) - NA   Duration (Past Month) - NA   Controllability (Past Month) - NA   Protective Factors (Past Month) - NA   Reasons for Ideation (Past Month) - NA   Actual Attempt (Lifetime) - Yes   Actual Attempt Description (Lifetime) - 3 attempts with last one 15 years ago   Total Number of Actual Attempts (Lifetime) - 3   Actual Attempt (Past 3 Months) - No   Has subject engaged in non-suicidal self-injurious behavior? (Lifetime) - No   Has subject engaged in non-suicidal self-injurious behavior? (Past 3 Months) - No   Interrupted Attempts (Lifetime) - No   Interrupted Attempts (Past 3 Months) - No   Aborted or Self-Interrupted Attempt (Lifetime) - No   Aborted or Self-Interrupted  Attempt (Past 3 Months) - No   Preparatory Acts or Behavior (Lifetime) - No   Preparatory Acts or Behavior (Past 3 Months) - No   Most Recent Attempt Actual Lethality Code - 3   Most Lethal Attempt Actual Lethality Code - 2   Initial/First Attempt Actual Lethality Code - 2         ASSESSMENT: Current Emotional / Mental Status (status of significant symptoms):   Risk status (Self / Other harm or suicidal ideation)   Patient denies current fears or concerns for personal safety.   Patient denies current or recent suicidal ideation or behaviors.     Patient denies current or recent homicidal ideation or behaviors.   Patient denies current or recent self injurious behavior or ideation.   Patient denies other safety concerns.   Patient reports there has been no change in risk factors since their last session.     Patient reports there has been no change in protective factors since their last session.     a safety plan was completed several years ago.      Appearance:   Unable to assess   Eye Contact:   Unable to assess   Psychomotor Behavior: Unable to assess   Attitude:   Cooperative    Orientation:   All   Speech    Rate / Production: Normal     Volume:  Normal    Mood:    Depressed, normal   Affect:    Appropriate    Thought Content:  Clear    Thought Form:  Coherent  Logical    Insight:    Good      Medication Review:   No changes to current psychiatric medication(s). PCP Dr. Eliane Quinn prescribing cymbalta 60 mg and trazadone.     Medication Compliance:   Yes     Changes in Health Issues:   None reported     Chemical Use Review:   Substance Use: Chemical use reviewed, no active concerns identified      Tobacco Use: No change in amount of tobacco use since last session.  Contemplation    Diagnosis:  MDD; EVGENY; PTSD.    Collateral Reports Completed:   Routed note to PCP    PLAN: (Patient Tasks / Therapist Tasks / Other)  Biweekly.   Follow safety plan. Use a healthy coping idea as needed.       Goals due  11/12/22      Kleber NELSONKeith Pollack, LICSW                                                         ______________________________________________________________________    Individual Treatment Plan    Patient's Name: Melquiades Morales  YOB: 1955    Date of Creation: 1/28/22  Date Treatment Plan Last Reviewed/Revised: 8/12/22    DSM5 Diagnoses: 296.32 (F33.1) Major Depressive Disorder, Recurrent Episode, Moderate _, 300.02 (F41.1) Generalized Anxiety Disorder or 309.81 (F43.10) Posttraumatic Stress Disorder (includes Posttraumatic Stress Disorder for Children 6 Years and Younger)  With dissociative symptoms.  Psychosocial / Contextual Factors: recent loss of wife.  PROMIS (reviewed every 90 days):  8/12/22    Referral / Collaboration:  Referral to another professional/service is not indicated at this time.    Anticipated number of session for this episode of care: 10+  Anticipation frequency of session: Weekly  Anticipated Duration of each session: 38-52 minutes.  Treatment plan will be reviewed in 90 days or when goals have been changed.       MeasurableTreatment Goal(s) related to diagnosis / functional impairment(s)  Goal 1: Patient will report coping well with daily stressors.     I will know I've met my goal when each goal that we accomplish and I am having less of a problem in that area I know that you have helped me.       Objective #A (Patient Action)                          Patient will continue to follow his Safety plan.  Status: New - Date: 1/28/22; 5/12/22; 8/12/22     Intervention(s)  Therapist will monitor for safety each visit and encourage use of safety plan.     Objective #B  Patient will use a healthy coping idea as needed 100% of trials for 1 week.  Status: New - Date: 1/28/22; 5/12/22  IDEAS: napping; petting Snippy (pet dog); watching tv.  Intervention(s)  Therapist will provide ideas and encouragement to use them.     Objective #C  Patient will process the loss of his wife as  needed.  Status: New - Date: 1/28/22; 5/12/22; 8/12/22      Intervention(s)  Therapist will consider EMDR.              Patient has reviewed and agreed to the above plan.        Kleber Pollack, [facility]                January 28, 2022

## 2022-09-09 ENCOUNTER — VIRTUAL VISIT (OUTPATIENT)
Dept: PSYCHOLOGY | Facility: CLINIC | Age: 67
End: 2022-09-09
Payer: COMMERCIAL

## 2022-09-09 DIAGNOSIS — F43.10 POSTTRAUMATIC STRESS DISORDER: ICD-10-CM

## 2022-09-09 DIAGNOSIS — F33.1 MAJOR DEPRESSIVE DISORDER, RECURRENT EPISODE, MODERATE (H): Primary | ICD-10-CM

## 2022-09-09 DIAGNOSIS — F41.1 GENERALIZED ANXIETY DISORDER: ICD-10-CM

## 2022-09-09 PROCEDURE — 90834 PSYTX W PT 45 MINUTES: CPT | Mod: 95 | Performed by: SOCIAL WORKER

## 2022-09-09 ASSESSMENT — ANXIETY QUESTIONNAIRES
IF YOU CHECKED OFF ANY PROBLEMS ON THIS QUESTIONNAIRE, HOW DIFFICULT HAVE THESE PROBLEMS MADE IT FOR YOU TO DO YOUR WORK, TAKE CARE OF THINGS AT HOME, OR GET ALONG WITH OTHER PEOPLE: SOMEWHAT DIFFICULT
1. FEELING NERVOUS, ANXIOUS, OR ON EDGE: MORE THAN HALF THE DAYS
7. FEELING AFRAID AS IF SOMETHING AWFUL MIGHT HAPPEN: NOT AT ALL
3. WORRYING TOO MUCH ABOUT DIFFERENT THINGS: MORE THAN HALF THE DAYS
GAD7 TOTAL SCORE: 11
GAD7 TOTAL SCORE: 11
5. BEING SO RESTLESS THAT IT IS HARD TO SIT STILL: MORE THAN HALF THE DAYS
6. BECOMING EASILY ANNOYED OR IRRITABLE: MORE THAN HALF THE DAYS
2. NOT BEING ABLE TO STOP OR CONTROL WORRYING: MORE THAN HALF THE DAYS

## 2022-09-09 ASSESSMENT — PATIENT HEALTH QUESTIONNAIRE - PHQ9
SUM OF ALL RESPONSES TO PHQ QUESTIONS 1-9: 14
5. POOR APPETITE OR OVEREATING: SEVERAL DAYS

## 2022-09-09 NOTE — PROGRESS NOTES
"    [facility]                                     Progress Note    Patient Name: Melquiades Morales  Date: 9/9/22         Service Type: Individual      Session Start Time:  9 am  Session End Time: 9:45 am     Session Length: 45 min     Session #: 14    Attendees: Client attended alone.    Service Modality:  Phone Visit:                  Provider verified identity through the following two step process.  Patient provided:  Patient is known previously to provider     The patient has been notified of the following:      \"We have found that certain health care needs can be provided without the need for a face to face visit.  This service lets us provide the care you need with a phone conversation.       I will have full access to your [facility] medical record during this entire phone call. I will be taking notes for your medical record.      Since this is like an office visit, we will bill your insurance company for this service.       There are potential benefits and risks of telephone visits (e.g. limits to patient confidentiality) that differ from in-person visits.?Confidentiality still applies for telephone services, and nobody will record the visit.  It is important to be in a quiet, private space that is free of distractions (including cell phone or other devices) during the visit.??      If during the course of the call I believe a telephone visit is not appropriate, you will not be charged for this service\"     Consent has been obtained for this service by care team member: Yes      DATA  Interactive Complexity: No  Crisis: No        Progress Since Last Session (Related to Symptoms / Goals / Homework):   Symptoms: improvement.    Homework: Partially completed- use a healthy coping idea. He has the grounding ideas handout and is to practice them.     Episode of Care Goals: Minimal progress - ACTION (Actively working towards change); Intervened by reinforcing change plan / affirming steps " taken.    Current / Ongoing Stressors and Concerns:   He now lives in a nursing home. It is taking him time to get used to it. He and Chiquis are planning on getting a place together in a couple of years.  He spoke with daughter Gene Mercado and she and her baby healthy. He hopes to visit her in [place] this August. She is not ready to see him he reports.         Treatment Objective(s) Addressed in This Session:   Use a healthy coping idea as needed.      Intervention:  Assessed functioning and for safety. Reviewed the phq and shailesh. Processed feelings about his living situation and about the prospect of he and love interest eventually moving in together. Processed feelings about people passing where he lives. Reinforced hobbies such as fishing and making new friends. Reviewed healthy coping ideas.          Assessments completed prior to visit:  The following assessments were completed by patient for this visit:  PHQ9:   PHQ-9 SCORE 4/25/2022 5/12/2022 5/26/2022 6/10/2022 6/24/2022 7/8/2022 8/12/2022   PHQ-9 Total Score - - - - - - -   PHQ-9 Total Score MyChart - - - - - - -   PHQ-9 Total Score 17 15 13 14 17 16 16     GAD7:   SHAILESH-7 SCORE 4/25/2022 5/12/2022 5/26/2022 6/10/2022 6/24/2022 7/8/2022 8/12/2022   Total Score - - - - - - -   Total Score - - - - - - -   Total Score 19 13 12 12 14 12 14     CAGE-AID:   CAGE-AID Total Score 1/28/2022   Total Score 0   Total Score MyChart 0 (A total score of 2 or greater is considered clinically significant)     PROMIS 10-Global Health (all questions and answers displayed):   PROMIS 10 1/28/2022 5/12/2022 8/12/2022   In general, would you say your health is: Poor - -   In general, would you say your quality of life is: Fair - -   In general, how would you rate your physical health? Poor - -   In general, how would you rate your mental health, including your mood and your ability to think? Fair - -   In general, how would you rate your satisfaction with your social activities and  relationships? Fair - -   In general, please rate how well you carry out your usual social activities and roles Poor - -   To what extent are you able to carry out your everyday physical activities such as walking, climbing stairs, carrying groceries, or moving a chair? A little - -   How often have you been bothered by emotional problems such as feeling anxious, depressed or irritable? Often - -   How would you rate your fatigue on average? Moderate - -   How would you rate your pain on average?   0 = No Pain  to  10 = Worst Imaginable Pain 5 - -   In general, would you say your health is: 1 3 3   In general, would you say your quality of life is: 2 2 3   In general, how would you rate your physical health? 1 2 3   In general, how would you rate your mental health, including your mood and your ability to think? 2 3 3   In general, how would you rate your satisfaction with your social activities and relationships? 2 2 4   In general, please rate how well you carry out your usual social activities and roles. (This includes activities at home, at work and in your community, and responsibilities as a parent, child, spouse, employee, friend, etc.) 1 2 3   To what extent are you able to carry out your everyday physical activities such as walking, climbing stairs, carrying groceries, or moving a chair? 2 1 2   In the past 7 days, how often have you been bothered by emotional problems such as feeling anxious, depressed, or irritable? 4 3 3   In the past 7 days, how would you rate your fatigue on average? 3 3 3   In the past 7 days, how would you rate your pain on average, where 0 means no pain, and 10 means worst imaginable pain? 5 6 5   Global Mental Health Score 8 10 13   Global Physical Health Score 9 9 11   PROMIS TOTAL - SUBSCORES 17 19 24   Some recent data might be hidden     [place] Suicide Severity Rating Scale (Lifetime/Recent)  [place] Suicide Severity Rating (Lifetime/Recent) 9/8/2018 1/28/2022   Wish to be  Dead (Lifetime) - Yes   Non-Specific Active Suicidal Thoughts (Lifetime) - Yes   Most Severe Ideation Rating (Lifetime) - 5   Frequency (Lifetime) - 3   Duration (Lifetime) - 2   Controllability (Lifetime) - 5   Protective Factors  (Lifetime) - 5   Reasons for Ideation (Lifetime) - 4   RETIRED: 1. Wish to be Dead (Recent) - No   RETIRED: 2. Non-Specific Active Suicidal Thoughts (Recent) - No   3. Active Suicidal Ideation with any Methods (Not Plan) Without Intent to Act (Lifetime) - Yes   RETIRED: 3. Active Suicidal Ideation with any Methods (Not Plan) Without Intent to Act (Recent) - No   RETIRE: 4. Active Suicidal Ideation with Some Intent to Act, Without Specific Plan (Lifetime) - Yes   4. Active Suicidal Ideation with Some Intent to Act, Without Specific Plan (Recent) - No   RETIRE: 5. Active Suicidal Ideation with Specific Plan and Intent (Lifetime) - Yes   RETIRED: 5. Active Suicidal Ideation with Specific Plan and Intent (Recent) - No   Most Severe Ideation Rating (Past Month) NA NA   Frequency (Past Month) - NA   Duration (Past Month) - NA   Controllability (Past Month) - NA   Protective Factors (Past Month) - NA   Reasons for Ideation (Past Month) - NA   Actual Attempt (Lifetime) - Yes   Actual Attempt Description (Lifetime) - 3 attempts with last one 15 years ago   Total Number of Actual Attempts (Lifetime) - 3   Actual Attempt (Past 3 Months) - No   Has subject engaged in non-suicidal self-injurious behavior? (Lifetime) - No   Has subject engaged in non-suicidal self-injurious behavior? (Past 3 Months) - No   Interrupted Attempts (Lifetime) - No   Interrupted Attempts (Past 3 Months) - No   Aborted or Self-Interrupted Attempt (Lifetime) - No   Aborted or Self-Interrupted Attempt (Past 3 Months) - No   Preparatory Acts or Behavior (Lifetime) - No   Preparatory Acts or Behavior (Past 3 Months) - No   Most Recent Attempt Actual Lethality Code - 3   Most Lethal Attempt Actual Lethality Code - 2    Initial/First Attempt Actual Lethality Code - 2         ASSESSMENT: Current Emotional / Mental Status (status of significant symptoms):   Risk status (Self / Other harm or suicidal ideation)   Patient denies current fears or concerns for personal safety.   Patient denies current or recent suicidal ideation or behaviors.     Patient denies current or recent homicidal ideation or behaviors.   Patient denies current or recent self injurious behavior or ideation.   Patient denies other safety concerns.   Patient reports there has been no change in risk factors since their last session.     Patient reports there has been no change in protective factors since their last session.     a safety plan was completed several years ago.      Appearance:   Unable to assess   Eye Contact:   Unable to assess   Psychomotor Behavior: Unable to assess   Attitude:   Cooperative    Orientation:   All   Speech    Rate / Production: Normal     Volume:  Normal    Mood:    Depressed, Normal   Affect:    Appropriate    Thought Content:  Clear    Thought Form:  Coherent  Logical    Insight:    Good      Medication Review:   No changes to current psychiatric medication(s). PCP Dr. Eliane Quinn prescribing cymbalta 60 mg and trazadone.     Medication Compliance:   Yes     Changes in Health Issues:   None reported     Chemical Use Review:   Substance Use: Chemical use reviewed, no active concerns identified      Tobacco Use: No change in amount of tobacco use since last session.  Contemplation    Diagnosis:  MDD; EVGENY; PTSD.    Collateral Reports Completed:   Routed note to PCP    PLAN: (Patient Tasks / Therapist Tasks / Other)  Biweekly.   Follow safety plan. Use a healthy coping idea as needed.       Goals due 11/12/22      SHAVONNE Yao                                                         ______________________________________________________________________    Individual Treatment Plan    Patient's Name: Melquiades CALEB  Carmen  YOB: 1955    Date of Creation: 1/28/22  Date Treatment Plan Last Reviewed/Revised: 8/12/22    DSM5 Diagnoses: 296.32 (F33.1) Major Depressive Disorder, Recurrent Episode, Moderate _, 300.02 (F41.1) Generalized Anxiety Disorder or 309.81 (F43.10) Posttraumatic Stress Disorder (includes Posttraumatic Stress Disorder for Children 6 Years and Younger)  With dissociative symptoms.  Psychosocial / Contextual Factors: recent loss of wife.  PROMIS (reviewed every 90 days):  8/12/22    Referral / Collaboration:  Referral to another professional/service is not indicated at this time.    Anticipated number of session for this episode of care: 10+  Anticipation frequency of session: Weekly  Anticipated Duration of each session: 38-52 minutes.  Treatment plan will be reviewed in 90 days or when goals have been changed.       MeasurableTreatment Goal(s) related to diagnosis / functional impairment(s)  Goal 1: Patient will report coping well with daily stressors.     I will know I've met my goal when each goal that we accomplish and I am having less of a problem in that area I know that you have helped me.       Objective #A (Patient Action)                          Patient will continue to follow his Safety plan.  Status: New - Date: 1/28/22; 5/12/22; 8/12/22     Intervention(s)  Therapist will monitor for safety each visit and encourage use of safety plan.     Objective #B  Patient will use a healthy coping idea as needed 100% of trials for 1 week.  Status: New - Date: 1/28/22; 5/12/22  IDEAS: napping; petting Snippy (pet dog); watching tv.  Intervention(s)  Therapist will provide ideas and encouragement to use them.     Objective #C  Patient will process the loss of his wife as needed.  Status: New - Date: 1/28/22; 5/12/22; 8/12/22      Intervention(s)  Therapist will consider EMDR.              Patient has reviewed and agreed to the above plan.        Kleber Pollack, [facility]                January  28, 2022

## 2022-09-29 ENCOUNTER — APPOINTMENT (OUTPATIENT)
Dept: GENERAL RADIOLOGY | Facility: CLINIC | Age: 67
DRG: 190 | End: 2022-09-29
Attending: FAMILY MEDICINE
Payer: COMMERCIAL

## 2022-09-29 ENCOUNTER — HOSPITAL ENCOUNTER (INPATIENT)
Facility: CLINIC | Age: 67
LOS: 2 days | Discharge: NURSING, REHAB OR LONG-TERM CARE | DRG: 190 | End: 2022-10-01
Attending: FAMILY MEDICINE | Admitting: INTERNAL MEDICINE
Payer: COMMERCIAL

## 2022-09-29 DIAGNOSIS — Z11.52 ENCOUNTER FOR SCREENING LABORATORY TESTING FOR SEVERE ACUTE RESPIRATORY SYNDROME CORONAVIRUS 2 (SARS-COV-2): ICD-10-CM

## 2022-09-29 DIAGNOSIS — R65.20 SEPSIS WITH ENCEPHALOPATHY WITHOUT SEPTIC SHOCK, DUE TO UNSPECIFIED ORGANISM (H): ICD-10-CM

## 2022-09-29 DIAGNOSIS — J96.02 ACUTE RESPIRATORY FAILURE WITH HYPERCAPNIA (H): Primary | ICD-10-CM

## 2022-09-29 DIAGNOSIS — A41.9 SEPSIS WITH ENCEPHALOPATHY WITHOUT SEPTIC SHOCK, DUE TO UNSPECIFIED ORGANISM (H): ICD-10-CM

## 2022-09-29 DIAGNOSIS — G93.41 SEPSIS WITH ENCEPHALOPATHY WITHOUT SEPTIC SHOCK, DUE TO UNSPECIFIED ORGANISM (H): ICD-10-CM

## 2022-09-29 PROBLEM — J98.11 BILATERAL ATELECTASIS: Status: ACTIVE | Noted: 2019-10-29

## 2022-09-29 LAB
ALBUMIN SERPL BCG-MCNC: 3.9 G/DL (ref 3.5–5.2)
ALBUMIN UR-MCNC: 30 MG/DL
ALP SERPL-CCNC: 72 U/L (ref 40–129)
ALT SERPL W P-5'-P-CCNC: 16 U/L (ref 10–50)
AMPHETAMINES UR QL SCN: ABNORMAL
ANION GAP SERPL CALCULATED.3IONS-SCNC: 8 MMOL/L (ref 7–15)
APPEARANCE UR: ABNORMAL
AST SERPL W P-5'-P-CCNC: 19 U/L (ref 10–50)
BARBITURATES UR QL SCN: ABNORMAL
BASE EXCESS BLDV CALC-SCNC: 4.7 MMOL/L (ref -7.7–1.9)
BASOPHILS # BLD AUTO: 0 10E3/UL (ref 0–0.2)
BASOPHILS NFR BLD AUTO: 0 %
BENZODIAZ UR QL SCN: ABNORMAL
BILIRUB SERPL-MCNC: 0.5 MG/DL
BILIRUB UR QL STRIP: NEGATIVE
BUN SERPL-MCNC: 7.5 MG/DL (ref 8–23)
BZE UR QL SCN: ABNORMAL
CALCIUM SERPL-MCNC: 9.2 MG/DL (ref 8.8–10.2)
CANNABINOIDS UR QL SCN: ABNORMAL
CHLORIDE SERPL-SCNC: 100 MMOL/L (ref 98–107)
COLOR UR AUTO: YELLOW
CREAT SERPL-MCNC: 0.64 MG/DL (ref 0.67–1.17)
DEPRECATED HCO3 PLAS-SCNC: 30 MMOL/L (ref 22–29)
EOSINOPHIL # BLD AUTO: 0 10E3/UL (ref 0–0.7)
EOSINOPHIL NFR BLD AUTO: 0 %
ERYTHROCYTE [DISTWIDTH] IN BLOOD BY AUTOMATED COUNT: 12.8 % (ref 10–15)
FLUAV RNA SPEC QL NAA+PROBE: NEGATIVE
FLUBV RNA RESP QL NAA+PROBE: NEGATIVE
GFR SERPL CREATININE-BSD FRML MDRD: >90 ML/MIN/1.73M2
GLUCOSE SERPL-MCNC: 147 MG/DL (ref 70–99)
GLUCOSE UR STRIP-MCNC: NEGATIVE MG/DL
HCO3 BLDV-SCNC: 32 MMOL/L (ref 21–28)
HCT VFR BLD AUTO: 47.2 % (ref 40–53)
HGB BLD-MCNC: 15.5 G/DL (ref 13.3–17.7)
HGB UR QL STRIP: NEGATIVE
IMM GRANULOCYTES # BLD: 0 10E3/UL
IMM GRANULOCYTES NFR BLD: 0 %
KETONES UR STRIP-MCNC: NEGATIVE MG/DL
LACTATE SERPL-SCNC: 1.1 MMOL/L (ref 0.7–2)
LEUKOCYTE ESTERASE UR QL STRIP: ABNORMAL
LYMPHOCYTES # BLD AUTO: 3.4 10E3/UL (ref 0.8–5.3)
LYMPHOCYTES NFR BLD AUTO: 27 %
MCH RBC QN AUTO: 31.8 PG (ref 26.5–33)
MCHC RBC AUTO-ENTMCNC: 32.8 G/DL (ref 31.5–36.5)
MCV RBC AUTO: 97 FL (ref 78–100)
MONOCYTES # BLD AUTO: 0.7 10E3/UL (ref 0–1.3)
MONOCYTES NFR BLD AUTO: 6 %
MRSA DNA SPEC QL NAA+PROBE: NEGATIVE
MUCOUS THREADS #/AREA URNS LPF: PRESENT /LPF
NEUTROPHILS # BLD AUTO: 8.2 10E3/UL (ref 1.6–8.3)
NEUTROPHILS NFR BLD AUTO: 67 %
NITRATE UR QL: POSITIVE
NRBC # BLD AUTO: 0 10E3/UL
NRBC BLD AUTO-RTO: 0 /100
O2/TOTAL GAS SETTING VFR VENT: 21 %
OPIATES UR QL SCN: ABNORMAL
PCO2 BLDV: 54 MM HG (ref 40–50)
PCP QUAL URINE (ROCHE): ABNORMAL
PH BLDV: 7.38 [PH] (ref 7.32–7.43)
PH UR STRIP: 8 [PH] (ref 5–7)
PLATELET # BLD AUTO: 238 10E3/UL (ref 150–450)
PO2 BLDV: 34 MM HG (ref 25–47)
POTASSIUM SERPL-SCNC: 4.1 MMOL/L (ref 3.4–5.3)
PROT SERPL-MCNC: 7 G/DL (ref 6.4–8.3)
RBC # BLD AUTO: 4.88 10E6/UL (ref 4.4–5.9)
RBC URINE: 3 /HPF
SA TARGET DNA: NEGATIVE
SARS-COV-2 RNA RESP QL NAA+PROBE: NEGATIVE
SODIUM SERPL-SCNC: 138 MMOL/L (ref 136–145)
SP GR UR STRIP: 1.01 (ref 1–1.03)
UROBILINOGEN UR STRIP-MCNC: NORMAL MG/DL
WBC # BLD AUTO: 12.3 10E3/UL (ref 4–11)
WBC URINE: 45 /HPF

## 2022-09-29 PROCEDURE — 87086 URINE CULTURE/COLONY COUNT: CPT | Performed by: FAMILY MEDICINE

## 2022-09-29 PROCEDURE — C9803 HOPD COVID-19 SPEC COLLECT: HCPCS | Performed by: FAMILY MEDICINE

## 2022-09-29 PROCEDURE — 999N000157 HC STATISTIC RCP TIME EA 10 MIN

## 2022-09-29 PROCEDURE — 80307 DRUG TEST PRSMV CHEM ANLYZR: CPT

## 2022-09-29 PROCEDURE — 96361 HYDRATE IV INFUSION ADD-ON: CPT | Performed by: FAMILY MEDICINE

## 2022-09-29 PROCEDURE — 87636 SARSCOV2 & INF A&B AMP PRB: CPT | Performed by: FAMILY MEDICINE

## 2022-09-29 PROCEDURE — 94640 AIRWAY INHALATION TREATMENT: CPT | Mod: 76

## 2022-09-29 PROCEDURE — 85014 HEMATOCRIT: CPT | Performed by: FAMILY MEDICINE

## 2022-09-29 PROCEDURE — L4360 PNEUMAT WALKING BOOT PRE CST: HCPCS

## 2022-09-29 PROCEDURE — 36415 COLL VENOUS BLD VENIPUNCTURE: CPT

## 2022-09-29 PROCEDURE — 36415 COLL VENOUS BLD VENIPUNCTURE: CPT | Performed by: FAMILY MEDICINE

## 2022-09-29 PROCEDURE — 250N000011 HC RX IP 250 OP 636

## 2022-09-29 PROCEDURE — 84520 ASSAY OF UREA NITROGEN: CPT | Performed by: FAMILY MEDICINE

## 2022-09-29 PROCEDURE — 999N000097 HC STATISTIC MECHANICAL IN-EXSUFFLATION TREATMENT

## 2022-09-29 PROCEDURE — 250N000011 HC RX IP 250 OP 636: Performed by: FAMILY MEDICINE

## 2022-09-29 PROCEDURE — 99207 PR APP CREDIT; MD BILLING SHARED VISIT: CPT

## 2022-09-29 PROCEDURE — 94640 AIRWAY INHALATION TREATMENT: CPT

## 2022-09-29 PROCEDURE — 96365 THER/PROPH/DIAG IV INF INIT: CPT | Performed by: FAMILY MEDICINE

## 2022-09-29 PROCEDURE — 99285 EMERGENCY DEPT VISIT HI MDM: CPT | Performed by: FAMILY MEDICINE

## 2022-09-29 PROCEDURE — 82803 BLOOD GASES ANY COMBINATION: CPT

## 2022-09-29 PROCEDURE — G0378 HOSPITAL OBSERVATION PER HR: HCPCS

## 2022-09-29 PROCEDURE — 99285 EMERGENCY DEPT VISIT HI MDM: CPT | Mod: 25 | Performed by: FAMILY MEDICINE

## 2022-09-29 PROCEDURE — 81001 URINALYSIS AUTO W/SCOPE: CPT | Performed by: FAMILY MEDICINE

## 2022-09-29 PROCEDURE — 87641 MR-STAPH DNA AMP PROBE: CPT

## 2022-09-29 PROCEDURE — 250N000009 HC RX 250: Performed by: INTERNAL MEDICINE

## 2022-09-29 PROCEDURE — 83605 ASSAY OF LACTIC ACID: CPT | Performed by: FAMILY MEDICINE

## 2022-09-29 PROCEDURE — 258N000003 HC RX IP 258 OP 636: Performed by: FAMILY MEDICINE

## 2022-09-29 PROCEDURE — 71045 X-RAY EXAM CHEST 1 VIEW: CPT

## 2022-09-29 PROCEDURE — 87040 BLOOD CULTURE FOR BACTERIA: CPT | Performed by: FAMILY MEDICINE

## 2022-09-29 PROCEDURE — 999N000123 HC STATISTIC OXYGEN O2DAILY TECH TIME

## 2022-09-29 PROCEDURE — 96368 THER/DIAG CONCURRENT INF: CPT | Performed by: FAMILY MEDICINE

## 2022-09-29 PROCEDURE — 250N000013 HC RX MED GY IP 250 OP 250 PS 637: Performed by: FAMILY MEDICINE

## 2022-09-29 PROCEDURE — 99223 1ST HOSP IP/OBS HIGH 75: CPT | Mod: AI | Performed by: INTERNAL MEDICINE

## 2022-09-29 PROCEDURE — 258N000003 HC RX IP 258 OP 636

## 2022-09-29 PROCEDURE — 73610 X-RAY EXAM OF ANKLE: CPT | Mod: LT

## 2022-09-29 PROCEDURE — 120N000001 HC R&B MED SURG/OB

## 2022-09-29 PROCEDURE — 250N000013 HC RX MED GY IP 250 OP 250 PS 637

## 2022-09-29 RX ORDER — ONDANSETRON 2 MG/ML
4 INJECTION INTRAMUSCULAR; INTRAVENOUS EVERY 6 HOURS PRN
Status: DISCONTINUED | OUTPATIENT
Start: 2022-09-29 | End: 2022-09-29

## 2022-09-29 RX ORDER — NICOTINE POLACRILEX 4 MG
15-30 LOZENGE BUCCAL
Status: DISCONTINUED | OUTPATIENT
Start: 2022-09-29 | End: 2022-09-29 | Stop reason: CLARIF

## 2022-09-29 RX ORDER — DULOXETIN HYDROCHLORIDE 30 MG/1
120 CAPSULE, DELAYED RELEASE ORAL DAILY
Status: DISCONTINUED | OUTPATIENT
Start: 2022-09-29 | End: 2022-10-01 | Stop reason: HOSPADM

## 2022-09-29 RX ORDER — IPRATROPIUM BROMIDE AND ALBUTEROL SULFATE 2.5; .5 MG/3ML; MG/3ML
1 SOLUTION RESPIRATORY (INHALATION) PRN
COMMUNITY

## 2022-09-29 RX ORDER — CEFTRIAXONE 2 G/1
2 INJECTION, POWDER, FOR SOLUTION INTRAMUSCULAR; INTRAVENOUS EVERY 24 HOURS
Status: DISCONTINUED | OUTPATIENT
Start: 2022-09-29 | End: 2022-10-01 | Stop reason: HOSPADM

## 2022-09-29 RX ORDER — IPRATROPIUM BROMIDE AND ALBUTEROL SULFATE 2.5; .5 MG/3ML; MG/3ML
3 SOLUTION RESPIRATORY (INHALATION)
Status: DISCONTINUED | OUTPATIENT
Start: 2022-09-29 | End: 2022-10-01 | Stop reason: HOSPADM

## 2022-09-29 RX ORDER — AMOXICILLIN 250 MG
1-2 CAPSULE ORAL 2 TIMES DAILY
Status: DISCONTINUED | OUTPATIENT
Start: 2022-09-29 | End: 2022-10-01 | Stop reason: HOSPADM

## 2022-09-29 RX ORDER — OXYCODONE AND ACETAMINOPHEN 5; 325 MG/1; MG/1
2 TABLET ORAL 2 TIMES DAILY
COMMUNITY
End: 2024-05-17

## 2022-09-29 RX ORDER — TRAZODONE HYDROCHLORIDE 50 MG/1
50 TABLET, FILM COATED ORAL AT BEDTIME
Status: DISCONTINUED | OUTPATIENT
Start: 2022-09-29 | End: 2022-10-01 | Stop reason: HOSPADM

## 2022-09-29 RX ORDER — MORPHINE SULFATE 15 MG/1
30 TABLET, FILM COATED, EXTENDED RELEASE ORAL 3 TIMES DAILY
Status: DISCONTINUED | OUTPATIENT
Start: 2022-09-29 | End: 2022-10-01 | Stop reason: HOSPADM

## 2022-09-29 RX ORDER — ACETAMINOPHEN 325 MG/1
650 TABLET ORAL EVERY 4 HOURS PRN
Status: DISCONTINUED | OUTPATIENT
Start: 2022-09-29 | End: 2022-10-01 | Stop reason: HOSPADM

## 2022-09-29 RX ORDER — ALBUTEROL SULFATE 0.83 MG/ML
2.5 SOLUTION RESPIRATORY (INHALATION) EVERY 6 HOURS PRN
Status: DISCONTINUED | OUTPATIENT
Start: 2022-09-29 | End: 2022-10-01 | Stop reason: HOSPADM

## 2022-09-29 RX ORDER — OXYCODONE AND ACETAMINOPHEN 5; 325 MG/1; MG/1
2 TABLET ORAL 2 TIMES DAILY
Status: DISCONTINUED | OUTPATIENT
Start: 2022-09-29 | End: 2022-10-01 | Stop reason: HOSPADM

## 2022-09-29 RX ORDER — CEFAZOLIN SODIUM 1 G/50ML
2000 SOLUTION INTRAVENOUS ONCE
Status: COMPLETED | OUTPATIENT
Start: 2022-09-29 | End: 2022-09-29

## 2022-09-29 RX ORDER — SIMVASTATIN 40 MG
80 TABLET ORAL DAILY
Status: DISCONTINUED | OUTPATIENT
Start: 2022-09-29 | End: 2022-10-01 | Stop reason: HOSPADM

## 2022-09-29 RX ORDER — NALOXONE HYDROCHLORIDE 0.4 MG/ML
0.2 INJECTION, SOLUTION INTRAMUSCULAR; INTRAVENOUS; SUBCUTANEOUS
Status: DISCONTINUED | OUTPATIENT
Start: 2022-09-29 | End: 2022-10-01 | Stop reason: HOSPADM

## 2022-09-29 RX ORDER — ONDANSETRON 4 MG/1
4 TABLET, ORALLY DISINTEGRATING ORAL EVERY 6 HOURS PRN
Status: DISCONTINUED | OUTPATIENT
Start: 2022-09-29 | End: 2022-09-29

## 2022-09-29 RX ORDER — SODIUM CHLORIDE 9 MG/ML
INJECTION, SOLUTION INTRAVENOUS CONTINUOUS
Status: DISCONTINUED | OUTPATIENT
Start: 2022-09-29 | End: 2022-09-29

## 2022-09-29 RX ORDER — ACETAMINOPHEN 325 MG/1
650 TABLET ORAL EVERY 4 HOURS PRN
Status: DISCONTINUED | OUTPATIENT
Start: 2022-09-29 | End: 2022-09-29

## 2022-09-29 RX ORDER — DEXTROSE MONOHYDRATE 25 G/50ML
25-50 INJECTION, SOLUTION INTRAVENOUS
Status: DISCONTINUED | OUTPATIENT
Start: 2022-09-29 | End: 2022-09-29 | Stop reason: CLARIF

## 2022-09-29 RX ORDER — ACETAMINOPHEN 500 MG
1000 TABLET ORAL ONCE
Status: COMPLETED | OUTPATIENT
Start: 2022-09-29 | End: 2022-09-29

## 2022-09-29 RX ORDER — NALOXONE HYDROCHLORIDE 0.4 MG/ML
0.4 INJECTION, SOLUTION INTRAMUSCULAR; INTRAVENOUS; SUBCUTANEOUS
Status: DISCONTINUED | OUTPATIENT
Start: 2022-09-29 | End: 2022-10-01 | Stop reason: HOSPADM

## 2022-09-29 RX ORDER — ONDANSETRON 2 MG/ML
4 INJECTION INTRAMUSCULAR; INTRAVENOUS EVERY 6 HOURS PRN
Status: DISCONTINUED | OUTPATIENT
Start: 2022-09-29 | End: 2022-10-01 | Stop reason: HOSPADM

## 2022-09-29 RX ORDER — TRAZODONE HYDROCHLORIDE 100 MG/1
200 TABLET ORAL AT BEDTIME
Status: DISCONTINUED | OUTPATIENT
Start: 2022-09-29 | End: 2022-10-01 | Stop reason: HOSPADM

## 2022-09-29 RX ORDER — ONDANSETRON 4 MG/1
4 TABLET, ORALLY DISINTEGRATING ORAL EVERY 6 HOURS PRN
Status: DISCONTINUED | OUTPATIENT
Start: 2022-09-29 | End: 2022-10-01 | Stop reason: HOSPADM

## 2022-09-29 RX ADMIN — MORPHINE SULFATE 30 MG: 15 TABLET, FILM COATED, EXTENDED RELEASE ORAL at 22:36

## 2022-09-29 RX ADMIN — OXYCODONE HYDROCHLORIDE AND ACETAMINOPHEN 2 TABLET: 5; 325 TABLET ORAL at 09:30

## 2022-09-29 RX ADMIN — DULOXETINE HYDROCHLORIDE 120 MG: 30 CAPSULE, DELAYED RELEASE ORAL at 09:29

## 2022-09-29 RX ADMIN — IPRATROPIUM BROMIDE AND ALBUTEROL SULFATE 3 ML: 2.5; .5 SOLUTION RESPIRATORY (INHALATION) at 16:03

## 2022-09-29 RX ADMIN — SODIUM CHLORIDE 1000 ML: 9 INJECTION, SOLUTION INTRAVENOUS at 04:09

## 2022-09-29 RX ADMIN — MORPHINE SULFATE 30 MG: 15 TABLET, FILM COATED, EXTENDED RELEASE ORAL at 09:30

## 2022-09-29 RX ADMIN — VANCOMYCIN HYDROCHLORIDE 1500 MG: 10 INJECTION, POWDER, LYOPHILIZED, FOR SOLUTION INTRAVENOUS at 16:41

## 2022-09-29 RX ADMIN — ACETAMINOPHEN 1000 MG: 500 TABLET, FILM COATED ORAL at 04:04

## 2022-09-29 RX ADMIN — IPRATROPIUM BROMIDE AND ALBUTEROL SULFATE 3 ML: 2.5; .5 SOLUTION RESPIRATORY (INHALATION) at 13:06

## 2022-09-29 RX ADMIN — PREGABALIN 150 MG: 100 CAPSULE ORAL at 20:47

## 2022-09-29 RX ADMIN — OXYCODONE HYDROCHLORIDE AND ACETAMINOPHEN 2 TABLET: 5; 325 TABLET ORAL at 20:48

## 2022-09-29 RX ADMIN — SIMVASTATIN 80 MG: 40 TABLET, FILM COATED ORAL at 09:29

## 2022-09-29 RX ADMIN — TRAZODONE HYDROCHLORIDE 50 MG: 50 TABLET ORAL at 22:37

## 2022-09-29 RX ADMIN — SODIUM CHLORIDE: 9 INJECTION, SOLUTION INTRAVENOUS at 08:00

## 2022-09-29 RX ADMIN — CEFTRIAXONE SODIUM 2 G: 2 INJECTION, POWDER, FOR SOLUTION INTRAMUSCULAR; INTRAVENOUS at 05:06

## 2022-09-29 RX ADMIN — SENNOSIDES AND DOCUSATE SODIUM 1 TABLET: 50; 8.6 TABLET ORAL at 20:48

## 2022-09-29 RX ADMIN — IPRATROPIUM BROMIDE AND ALBUTEROL SULFATE 3 ML: 2.5; .5 SOLUTION RESPIRATORY (INHALATION) at 21:02

## 2022-09-29 RX ADMIN — TRAZODONE HYDROCHLORIDE 200 MG: 100 TABLET ORAL at 22:36

## 2022-09-29 RX ADMIN — MORPHINE SULFATE 30 MG: 15 TABLET, FILM COATED, EXTENDED RELEASE ORAL at 13:03

## 2022-09-29 RX ADMIN — SENNOSIDES AND DOCUSATE SODIUM 1 TABLET: 50; 8.6 TABLET ORAL at 09:30

## 2022-09-29 RX ADMIN — PREGABALIN 150 MG: 100 CAPSULE ORAL at 09:29

## 2022-09-29 RX ADMIN — VANCOMYCIN HYDROCHLORIDE 2000 MG: 10 INJECTION, POWDER, LYOPHILIZED, FOR SOLUTION INTRAVENOUS at 05:19

## 2022-09-29 ASSESSMENT — COLUMBIA-SUICIDE SEVERITY RATING SCALE - C-SSRS
1. IN THE PAST MONTH, HAVE YOU WISHED YOU WERE DEAD OR WISHED YOU COULD GO TO SLEEP AND NOT WAKE UP?: NO
5. HAVE YOU STARTED TO WORK OUT OR WORKED OUT THE DETAILS OF HOW TO KILL YOURSELF? DO YOU INTEND TO CARRY OUT THIS PLAN?: NO
3. HAVE YOU BEEN THINKING ABOUT HOW YOU MIGHT KILL YOURSELF?: NO
2. HAVE YOU ACTUALLY HAD ANY THOUGHTS OF KILLING YOURSELF IN THE PAST MONTH?: NO
4. HAVE YOU HAD THESE THOUGHTS AND HAD SOME INTENTION OF ACTING ON THEM?: NO
6. HAVE YOU EVER DONE ANYTHING, STARTED TO DO ANYTHING, OR PREPARED TO DO ANYTHING TO END YOUR LIFE?: NO

## 2022-09-29 ASSESSMENT — ACTIVITIES OF DAILY LIVING (ADL)
ADLS_ACUITY_SCORE: 34
ADLS_ACUITY_SCORE: 37
ADLS_ACUITY_SCORE: 34
DEPENDENT_IADLS:: CLEANING;COOKING;LAUNDRY;SHOPPING;MEAL PREPARATION
ADLS_ACUITY_SCORE: 34
ADLS_ACUITY_SCORE: 39
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
## 2022-09-29 NOTE — PROGRESS NOTES
S:   Melquiades was seen in the [facility] room 2301 today for the assessment and fitting of a left tall CAM walking boot ordered from Vince Srinivasan PA-C.  The patient has a diagnosis of Left distal fibular fracture.      O:  Melquiades was asleep in bed when I arrived.  He did wake up and we were able to talk.  His left leg and foot is wrapped up.    A:   He was shown his left tall CAM walking boot.  He did not want to put it on and was not planning on being out of his bed any time soon.  Proper donning, doffing and care of the boot was discussed.  He understands how to put this on and referenced that he had one before.  Written instruction on care and use was also left with him.    P:   The patient will start wearing the boot whenever they are out of their bed.  The understand that their doctor will determine how long they will be wearing the boot in the future.  Any issues with the boot should be referred to the [facility] O&P department.        MARTIN Obando, ATC

## 2022-09-29 NOTE — CONSULTS
Care Management Initial Consult    General Information  Assessment completed with: Francie Montez, Caregiver at [facility]: [facility]fa  Type of CM/SW Visit: Offer D/C Planning    Primary Care Provider verified and updated as needed: Yes   Readmission within the last 30 days:  No     Reason for Consult: discharge planning    Communication Assessment  Patient's communication style: spoken language (English or Bilingual)    Hearing Difficulty or Deaf: no        Cognitive  Cognitive/Neuro/Behavioral: .WDL except  Level of Consciousness: confused  Arousal Level: arouses to voice  Orientation: disoriented to, place, time, situation  Mood/Behavior: flat affect  Best Language: 0 - No aphasia  Speech: clear    Living Environment:   People in home: facility resident     Current living Arrangements: [facility]      Able to return to prior arrangements: yes  Living Arrangement Comments: Moved into [facility] in April 2022    Family/Social Support:  Care provided by: self  Provides care for: no one  Marital Status:   Children, Facility resident(s)/Staff          Description of Support System: Supportive, Involved    Support Assessment: Adequate family and caregiver support, Limited social contact and support, Complicated family dynamics    Current Resources:   Patient receiving home care services: No     Community Resources: County Worker  Equipment currently used at home: walker, rolling  Supplies currently used at home: None    Employment/Financial:  Employment Status: disabled        Financial Concerns: No concerns identified (Currently on Medical Assistance)           Lifestyle & Psychosocial Needs:  Social Determinants of Health     Tobacco Use: High Risk     Smoking Tobacco Use: Current Every Day Smoker     Smokeless Tobacco Use: Former User   Alcohol Use: Not on file   Financial Resource Strain: Not on file   Food Insecurity: Not on file   Transportation Needs: Not on file    Physical Activity: Not on file   Stress: Not on file   Social Connections: Not on file   Intimate Partner Violence: Not on file   Depression: At risk     PHQ-2 Score: 4   Housing Stability: Not on file       Functional Status:  Prior to admission patient needed assistance:   Dependent ADLs:: Ambulation-walker  Dependent IADLs:: Cleaning, Cooking, Laundry, Shopping, Meal Preparation  Assesssment of Functional Status: Needs assistive devices after discharge    Mental Health Status:  Mental Health Status: Current Concern  Mental Health Management:  (Previously followed by Pscyhology)    Chemical Dependency Status:  Chemical Dependency Status: Current Concern  Chemical Dependency Management:  (Hx of Cannabis abuse, Opioid dependence)          Values/Beliefs:  Spiritual, Cultural Beliefs, Yazdanism Practices, Values that affect care: yes             Additional Information:  Referral received to assist with discharge planning and services.     Pt is familiar to the Care management department from previous admissions. Due to the Pt's current level of confusion, a call was placed to the Pt's Assisted Living. CM spoke with Ettafa Boka - # [phone] Fax: [phone].    karina stated the pt moved into their facility in April 2022. States they are a registered Assisted Living in [place] that can provide expanded services -included 24 hours staff, RN and Medication administration. The Pt however has been adamant about not allowing the staff at the [facility] to assist with his medications. The pt at baseline ambulates independently or with a walker/scooter if needed outdoors. Pt will dress, shower and perform his own ADL's. Pt has continued to drive and leaves the MILDRED as desired.     [facility] states the pt takes his own medications and is aware that he also uses daily cannabis and tobacco. The [facility] has offered to provide medication assistance to the pt on several occasions however the pt has refuses.     Pt's  "primary contact has been his son Tc # [phone]. The [facility] reported his son Tc will often be called to assist with transportation when needed. [facility] will also assist with transportation as the facility has a mobility van available.     CM placed a call to the Pt's son Tc to introduce self/role and to discuss discharge planning. Tc confirmed level of care and also voiced concern for the Pt's ability to manage his medications. Son stated the Pt can be very \"stubborn and difficult\". Discussed the possibility of a medication dispenser or having further discussion with the Pt regarding [facility] staff involvement with medications. Tc stated he would be open to having more conversation with the Pt about the issue. Tc stated he is also concerned that the Pt continues to drive while being confused on his medication. States he enjoys drinking several \"Rockstar energy\" drinks a day which he didn't feel helped the Pt's cognition.     MICHAEL attempted to reach out to the Pt's [facility]  : Alida STAFFORDKeith # [phone] Fax: [phone], however was unable to obtain information due to her request of a RAFITA. CM informed Alida that the Pt is currently confused and would follow up once the Pt is more clear to discuss further.   --CM will inquire if the [facility] is able to provide a medication dispensary through his waiver.     PLAN: Anticipating the Pt will return back to [facility]  Contact: Linden Roland - # [phone] Fax: [phone].     Son Tc # [phone] vs Linden at [facility] to assist with transportation    KAROLINA See      "

## 2022-09-29 NOTE — PHARMACY-VANCOMYCIN DOSING SERVICE
Pharmacy Vancomycin Initial Note  Date of Service 2022  Patient's  1955  67 year old, male    Indication: Sepsis and Urinary Tract Infection    Current estimated CrCl = Estimated Creatinine Clearance: 147.3 mL/min (A) (based on SCr of 0.64 mg/dL (L)).    Creatinine for last 3 days  2022:  3:59 AM Creatinine 0.64 mg/dL    Recent Vancomycin Level(s) for last 3 days  No results found for requested labs within last 72 hours.      Vancomycin IV Administrations (past 72 hours)      No vancomycin orders with administrations in past 72 hours.                Nephrotoxins and other renal medications (From now, onward)    Start     Dose/Rate Route Frequency Ordered Stop    22 1700  vancomycin (VANCOCIN) 1,500 mg in sodium chloride 0.9 % 250 mL intermittent infusion         1,500 mg  over 90 Minutes Intravenous EVERY 12 HOURS 22 0502      22 0505  vancomycin (VANCOCIN) 2,000 mg in sodium chloride 0.9 % 500 mL intermittent infusion         2,000 mg  over 2 Hours Intravenous ONCE 22 0502            Contrast Orders - past 72 hours (72h ago, onward)    None          Ins[employer] Prediction of Planned Initial Vancomycin Regimen  Loading dose: 2000 mg at 05:00 2022.  Regimen: 1500 mg IV every 12 hours.  Start time: 05:03 on 2022  Exposure target: AUC24 (range)400-600 mg/L.hr   AUC24,ss: 551 mg/L.hr  Probability of AUC24 > 400: 81 %  Ctrough,ss: 17.1 mg/L  Probability of Ctrough,ss > 20: 38 %  Probability of nephrotoxicity (Lodise SUSHMA ): 13 %          Plan:  1. Start vancomycin  2000 mg IV load then 1500mg IV  q12h.   2. Vancomycin monitoring method: AUC  3. Vancomycin therapeutic monitoring goal: 400-600 mg*h/L  4. Pharmacy will check vancomycin levels as appropriate in 1-3 Days.    5. Serum creatinine levels will be ordered daily for the first week of therapy and at least twice weekly for subsequent weeks.      Colleen Sood [facility]
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
## 2022-09-29 NOTE — ED TRIAGE NOTES
Pt resides at AL and requested staff call 911 for left leg pain.  Staff states that he is confused since yesterday. EMS found sats 88-89% w/o SOB.     Triage Assessment     Row Name 09/29/22 0346       Triage Assessment (Adult)    Airway WDL WDL       Respiratory WDL    Respiratory WDL WDL       Skin Circulation/Temperature WDL    Skin Circulation/Temperature WDL WDL       Cardiac WDL    Cardiac WDL WDL       Peripheral/Neurovascular WDL    Peripheral Neurovascular WDL WDL       Cognitive/Neuro/Behavioral WDL    Cognitive/Neuro/Behavioral WDL X;arousability;level of consciousness;mood/behavior;orientation    Level of Consciousness lethargic;confused    Arousal Level opens eyes spontaneously    Orientation disoriented to;time;situation

## 2022-09-29 NOTE — PROGRESS NOTES
"[facility] ADMISSION NOTE    Patient admitted to room 2301 at approximately 0610 via cart from emergency room. Patient was accompanied by transport tech.     Verbal SBAR report received from admission handoff note prior to patient arrival.     Patient trasferred to bed via air karen. Patient alert and oriented X 2. Pain is not well controlled.  Medication(s) being used: acetaminophen.  . Admission vital signs: Blood pressure 116/89, pulse 84, temperature 98.8  F (37.1  C), temperature source Oral, resp. rate 14, height 1.854 m (6' 1\"), weight 106.3 kg (234 lb 4.8 oz), SpO2 95 %. Patient was oriented to plan of care, call light, bed controls, tv, telephone, bathroom and visiting hours.     Risk Assessment    The following safety risks were identified during admission: fall. Yellow risk band applied: YES.     Skin Initial Assessment    This writer admitted this patient and will defer a full skin assessment to oncoming day nurse and Jeremiah score in the Adult PCS flowsheet. Appropriate interventions initiated as needed.            Education    Patient has a Una to Observation order: Yes  Observation education completed and documented: Yes      PROSPER CHRISTIANSON RN    "

## 2022-09-29 NOTE — PLAN OF CARE
Pt has been sleeping most of shift - appears comfortable. Tremors have diminished, and Pt is less impulsive now that home meds have resumed. MRSA swab completed - MRSA negative.  Scott Brewer RN on 9/29/2022 at 3:49 PM
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
## 2022-09-29 NOTE — PLAN OF CARE
Pt confused - pulled out PIV. PIV replaced. Incontinent of BM. Occasionally does not follow commands.  Scott Brewer RN on 9/29/2022 at 10:03 AM    Pt has pulled another PIV while getting up repeatedly to BR. Also visibly tremulous. Pt denies ETOH consumption at home. Dr. Garcia paged to consider [facility] protocol.  Scott Brewer RN on 9/29/2022 at 10:42 AM    Orders received for Telemetry. ETOH is not a consideration at this time.  Scott Brewer RN on 9/29/2022 at 10:49 AM

## 2022-09-29 NOTE — PLAN OF CARE
[facility] Remote Note    Discussed pt with , reviewed left ankle xrays and ED notes at his request. He is currently admitted for confusion; metabolic encephalopathy and sepsis. He was found to have a left distal fibular non-displaced fracture with an intact ankle mortise and a splint was placed in the ED.   Xrays reviewed, fracture is stable without evidence of significant displacement or joint widening on xray.   No indication for urgent surgical intervention at this time.   Will order a CAM boot for better protection of the fracture, given pt's confused state.   NWB on the LLE.  Follow up with a Foot and Ankle specialist in 1-2 weeks for repeat xrays and further evaluation.    Tyree Srinivasan PA-C..................9:52AM 9/29/22  [facility]

## 2022-09-30 ENCOUNTER — APPOINTMENT (OUTPATIENT)
Dept: OCCUPATIONAL THERAPY | Facility: CLINIC | Age: 67
DRG: 190 | End: 2022-09-30
Payer: COMMERCIAL

## 2022-09-30 ENCOUNTER — APPOINTMENT (OUTPATIENT)
Dept: GENERAL RADIOLOGY | Facility: CLINIC | Age: 67
DRG: 190 | End: 2022-09-30
Attending: INTERNAL MEDICINE
Payer: COMMERCIAL

## 2022-09-30 LAB
ALBUMIN SERPL BCG-MCNC: 3.1 G/DL (ref 3.5–5.2)
ALP SERPL-CCNC: 56 U/L (ref 40–129)
ALT SERPL W P-5'-P-CCNC: 13 U/L (ref 10–50)
ANION GAP SERPL CALCULATED.3IONS-SCNC: 9 MMOL/L (ref 7–15)
AST SERPL W P-5'-P-CCNC: 18 U/L (ref 10–50)
BILIRUB SERPL-MCNC: 0.3 MG/DL
BUN SERPL-MCNC: 8.3 MG/DL (ref 8–23)
CALCIUM SERPL-MCNC: 8.2 MG/DL (ref 8.8–10.2)
CHLORIDE SERPL-SCNC: 105 MMOL/L (ref 98–107)
CREAT SERPL-MCNC: 0.71 MG/DL (ref 0.67–1.17)
DEPRECATED HCO3 PLAS-SCNC: 28 MMOL/L (ref 22–29)
ERYTHROCYTE [DISTWIDTH] IN BLOOD BY AUTOMATED COUNT: 13.2 % (ref 10–15)
GFR SERPL CREATININE-BSD FRML MDRD: >90 ML/MIN/1.73M2
GLUCOSE SERPL-MCNC: 105 MG/DL (ref 70–99)
HCT VFR BLD AUTO: 41.9 % (ref 40–53)
HGB BLD-MCNC: 13.5 G/DL (ref 13.3–17.7)
MCH RBC QN AUTO: 31.5 PG (ref 26.5–33)
MCHC RBC AUTO-ENTMCNC: 32.2 G/DL (ref 31.5–36.5)
MCV RBC AUTO: 98 FL (ref 78–100)
PLATELET # BLD AUTO: 194 10E3/UL (ref 150–450)
POTASSIUM SERPL-SCNC: 4 MMOL/L (ref 3.4–5.3)
PROT SERPL-MCNC: 5.6 G/DL (ref 6.4–8.3)
RBC # BLD AUTO: 4.29 10E6/UL (ref 4.4–5.9)
SODIUM SERPL-SCNC: 142 MMOL/L (ref 136–145)
WBC # BLD AUTO: 7.9 10E3/UL (ref 4–11)

## 2022-09-30 PROCEDURE — 250N000013 HC RX MED GY IP 250 OP 250 PS 637

## 2022-09-30 PROCEDURE — 82040 ASSAY OF SERUM ALBUMIN: CPT

## 2022-09-30 PROCEDURE — 94640 AIRWAY INHALATION TREATMENT: CPT | Mod: 76

## 2022-09-30 PROCEDURE — 999N000157 HC STATISTIC RCP TIME EA 10 MIN

## 2022-09-30 PROCEDURE — 97165 OT EVAL LOW COMPLEX 30 MIN: CPT | Mod: GO

## 2022-09-30 PROCEDURE — 80053 COMPREHEN METABOLIC PANEL: CPT

## 2022-09-30 PROCEDURE — 999N000097 HC STATISTIC MECHANICAL IN-EXSUFFLATION TREATMENT

## 2022-09-30 PROCEDURE — 999N000123 HC STATISTIC OXYGEN O2DAILY TECH TIME

## 2022-09-30 PROCEDURE — 250N000009 HC RX 250: Performed by: INTERNAL MEDICINE

## 2022-09-30 PROCEDURE — 85027 COMPLETE CBC AUTOMATED: CPT

## 2022-09-30 PROCEDURE — 120N000001 HC R&B MED SURG/OB

## 2022-09-30 PROCEDURE — 258N000003 HC RX IP 258 OP 636

## 2022-09-30 PROCEDURE — 94640 AIRWAY INHALATION TREATMENT: CPT

## 2022-09-30 PROCEDURE — 250N000011 HC RX IP 250 OP 636

## 2022-09-30 PROCEDURE — 99233 SBSQ HOSP IP/OBS HIGH 50: CPT | Performed by: INTERNAL MEDICINE

## 2022-09-30 PROCEDURE — 71045 X-RAY EXAM CHEST 1 VIEW: CPT

## 2022-09-30 PROCEDURE — 36415 COLL VENOUS BLD VENIPUNCTURE: CPT

## 2022-09-30 RX ADMIN — SENNOSIDES AND DOCUSATE SODIUM 1 TABLET: 50; 8.6 TABLET ORAL at 07:54

## 2022-09-30 RX ADMIN — IPRATROPIUM BROMIDE AND ALBUTEROL SULFATE 3 ML: 2.5; .5 SOLUTION RESPIRATORY (INHALATION) at 06:43

## 2022-09-30 RX ADMIN — IPRATROPIUM BROMIDE AND ALBUTEROL SULFATE 3 ML: 2.5; .5 SOLUTION RESPIRATORY (INHALATION) at 11:24

## 2022-09-30 RX ADMIN — TRAZODONE HYDROCHLORIDE 50 MG: 50 TABLET ORAL at 22:26

## 2022-09-30 RX ADMIN — PREGABALIN 150 MG: 100 CAPSULE ORAL at 07:54

## 2022-09-30 RX ADMIN — MORPHINE SULFATE 30 MG: 15 TABLET, FILM COATED, EXTENDED RELEASE ORAL at 06:11

## 2022-09-30 RX ADMIN — CEFTRIAXONE SODIUM 2 G: 2 INJECTION, POWDER, FOR SOLUTION INTRAMUSCULAR; INTRAVENOUS at 05:10

## 2022-09-30 RX ADMIN — TRAZODONE HYDROCHLORIDE 200 MG: 100 TABLET ORAL at 19:07

## 2022-09-30 RX ADMIN — PREGABALIN 150 MG: 100 CAPSULE ORAL at 19:07

## 2022-09-30 RX ADMIN — SENNOSIDES AND DOCUSATE SODIUM 1 TABLET: 50; 8.6 TABLET ORAL at 19:07

## 2022-09-30 RX ADMIN — MORPHINE SULFATE 30 MG: 15 TABLET, FILM COATED, EXTENDED RELEASE ORAL at 22:25

## 2022-09-30 RX ADMIN — VANCOMYCIN HYDROCHLORIDE 1500 MG: 10 INJECTION, POWDER, LYOPHILIZED, FOR SOLUTION INTRAVENOUS at 05:55

## 2022-09-30 RX ADMIN — SIMVASTATIN 80 MG: 40 TABLET, FILM COATED ORAL at 07:55

## 2022-09-30 RX ADMIN — IPRATROPIUM BROMIDE AND ALBUTEROL SULFATE 3 ML: 2.5; .5 SOLUTION RESPIRATORY (INHALATION) at 20:32

## 2022-09-30 RX ADMIN — OXYCODONE HYDROCHLORIDE AND ACETAMINOPHEN 2 TABLET: 5; 325 TABLET ORAL at 19:06

## 2022-09-30 RX ADMIN — IPRATROPIUM BROMIDE AND ALBUTEROL SULFATE 3 ML: 2.5; .5 SOLUTION RESPIRATORY (INHALATION) at 15:50

## 2022-09-30 RX ADMIN — OXYCODONE HYDROCHLORIDE AND ACETAMINOPHEN 2 TABLET: 5; 325 TABLET ORAL at 07:55

## 2022-09-30 RX ADMIN — DULOXETINE HYDROCHLORIDE 120 MG: 30 CAPSULE, DELAYED RELEASE ORAL at 07:56

## 2022-09-30 RX ADMIN — MORPHINE SULFATE 30 MG: 15 TABLET, FILM COATED, EXTENDED RELEASE ORAL at 13:08

## 2022-09-30 ASSESSMENT — ACTIVITIES OF DAILY LIVING (ADL)
CONCENTRATING,_REMEMBERING_OR_MAKING_DECISIONS_DIFFICULTY: YES
ADLS_ACUITY_SCORE: 28
NUMBER_OF_TIMES_PATIENT_HAS_FALLEN_WITHIN_LAST_SIX_MONTHS: 1
ADLS_ACUITY_SCORE: 34
ADLS_ACUITY_SCORE: 34
ADLS_ACUITY_SCORE: 28
DIFFICULTY_COMMUNICATING: NO
DOING_ERRANDS_INDEPENDENTLY_DIFFICULTY: YES
VISION_MANAGEMENT: GLASSES
EQUIPMENT_CURRENTLY_USED_AT_HOME: WALKER, ROLLING;WHEELCHAIR, POWER
DRESSING/BATHING_DIFFICULTY: NO
HEARING_DIFFICULTY_OR_DEAF: NO
ADLS_ACUITY_SCORE: 34
ADLS_ACUITY_SCORE: 28
FALL_HISTORY_WITHIN_LAST_SIX_MONTHS: YES
ADLS_ACUITY_SCORE: 34
WALKING_OR_CLIMBING_STAIRS: AMBULATION DIFFICULTY, REQUIRES EQUIPMENT;STAIR CLIMBING DIFFICULTY, REQUIRES EQUIPMENT;TRANSFERRING DIFFICULTY, ASSISTANCE 1 PERSON
TRANSFERRING: 1-->ASSISTANCE (EQUIPMENT/PERSON) NEEDED
ADLS_ACUITY_SCORE: 34
ADLS_ACUITY_SCORE: 34
ADLS_ACUITY_SCORE: 28
TRANSFERRING: 0-->ASSISTANCE NEEDED (DEVELOPMENTALLY APPROPRIATE)
CHANGE_IN_FUNCTIONAL_STATUS_SINCE_ONSET_OF_CURRENT_ILLNESS/INJURY: NO
ADLS_ACUITY_SCORE: 34
ADLS_ACUITY_SCORE: 28
DIFFICULTY_EATING/SWALLOWING: NO
WALKING_OR_CLIMBING_STAIRS_DIFFICULTY: YES
TOILETING_ISSUES: NO
WEAR_GLASSES_OR_BLIND: YES

## 2022-09-30 NOTE — PROGRESS NOTES
09/30/22 1339   Quick Adds   Quick Adds Certification   Type of Visit Initial Occupational Therapy Evaluation   Living Environment   People in Home alone;facility resident   Current Living Arrangements assisted living   Transportation Anticipated family or friend will provide   Living Environment Comments Pt states ind with BADLS, meals provided and cleaning. states showers self   Self-Care   Equipment Currently Used at Home walker, rolling;shower chair;grab bar, toilet;grab bar, tub/shower  (pt states has power scooter in apt he uses)   Activity/Exercise/Self-Care Comment walk in shower with chair. p   General Information   Onset of Illness/Injury or Date of Surgery 09/29/22   Referring Physician Dr Garcia   Additional Occupational Profile Info/Pertinent History of Current Problem The patient is a 67-year-old male presenting by EMS transport for confusion.  The patient lives in an assisted living environment.  Apparently he manages his own medication.  He was hospitalized in early August, 2022 for confusion and toxic encephalopathy thought to be related to medication.  The patient has a known history of polysubstance abuse.  Known history of COPD and hypercapnia.  The patient apparently asked staff to call an ambulance because he had fallen and hurt his left knee   General Observations and Info pt admitted due to L LE pain and confusion   Cognitive Status Examination   Orientation Status orientation to person, place and time   Affect/Mental Status (Cognitive) flat/blunted affect   Follows Commands does not follow one-step commands;50-74% accuracy   Memory Deficit minimal deficit   Executive Function Deficit information processing;abstract thinking   Cognitive Status Comments Pt seen for cognitive assessment given SLUMS scoring 21/30 with GED completion inticating mild to low mild cogntive disorder   Clinical Impression   Criteria for Skilled Therapeutic Interventions Met (OT) Evaluation only   OT Diagnosis  altered mental status   Assessment of Occupational Performance 1-3 Performance Deficits   Clinical Decision Making Complexity (OT) low complexity   Risk & Benefits of therapy have been explained evaluation/treatment results reviewed;patient;participants included   OT Discharge Planning   OT Discharge Recommendation (DC Rec) home with assist   OT Rationale for DC Rec recommend d/c to [facility] with assist with med set up with SLUMS score 21/30 and slow processing noted. Pt able to recall 3/5 STm words and 4/8 story detail recall at this time as well as only recalling 8 animals in 1 min time   OT Brief overview of current status UMS 21/30   Therapy Certification   Start of Care Date 09/30/22   Certification date from 09/30/22   Certification date to 09/30/22   Medical Diagnosis sepsis   Total Evaluation Time (Minutes)   Total Evaluation Time (Minutes) 20

## 2022-09-30 NOTE — PROGRESS NOTES
Weaned to room air at this time with sats at 91%. Patient reports not using oxygen prior to admission. Will monitor pulse ox sat closely.    Update: returned to NC 1L, as sats drop to 85% at rest.

## 2022-09-30 NOTE — PLAN OF CARE
Pt continues to sleep most of the day. Reports moderate pain to LLE well controlled with scheduled pain medications. UOP slightly lower today than usual - continue to monitor. Good appetite.   Scott Brewer RN on 9/30/2022 at 5:18 PM

## 2022-09-30 NOTE — PLAN OF CARE
Pt has been very sleepy tonight. Awakens for cares & is cooperative. Continues on oxygen @ 2L/nc, sats 90-94%. Tele SR 60s. Receiving IV antibiotics as ordered. Pt rates back & ankle pain @ 7-8/10, gets good control w/ scheduled percocet & MS contin.

## 2022-09-30 NOTE — PLAN OF CARE
[facility]      OUTPATIENT OCCUPATIONAL THERAPY  EVALUATION  PLAN OF TREATMENT FOR OUTPATIENT REHABILITATION  (COMPLETE FOR INITIAL CLAIMS ONLY)  Patient's Last Name, First Name, M.I.  YOB: 1955  CarmenMelquiades ELLIS                          Provider's Name  [facility] Medical Record No.  [id number]                               Onset Date:  09/29/22   Start of Care Date:  09/30/22     Type:     ___PT   _X_OT   ___SLP Medical Diagnosis:  sepsis                        OT Diagnosis:  altered mental status   Visits from SOC:  1   _________________________________________________________________________________  Plan of Treatment/Functional Goals    Planned Interventions:     Goals: See Occupational Therapy Goals on Care Plan in [employer] electronic health record.    Therapy Frequency:    Predicted Duration of Therapy Intervention:    _________________________________________________________________________________  Giovanna MARIN/JASMIN    Certification date from: 09/30/22, Certification date to: 09/30/22    Referring Physician: Dr Garcia            Initial Assessment        See Occupational Therapy evaluation dated 09/30/22 in Epic electronic health record.

## 2022-10-01 VITALS
DIASTOLIC BLOOD PRESSURE: 69 MMHG | RESPIRATION RATE: 18 BRPM | BODY MASS INDEX: 30.49 KG/M2 | TEMPERATURE: 98.8 F | SYSTOLIC BLOOD PRESSURE: 157 MMHG | HEART RATE: 70 BPM | WEIGHT: 225.09 LBS | OXYGEN SATURATION: 92 % | HEIGHT: 72 IN

## 2022-10-01 LAB
ANION GAP SERPL CALCULATED.3IONS-SCNC: 9 MMOL/L (ref 7–15)
BACTERIA UR CULT: ABNORMAL
BUN SERPL-MCNC: 10.1 MG/DL (ref 8–23)
CALCIUM SERPL-MCNC: 9 MG/DL (ref 8.8–10.2)
CHLORIDE SERPL-SCNC: 104 MMOL/L (ref 98–107)
CREAT SERPL-MCNC: 0.71 MG/DL (ref 0.67–1.17)
DEPRECATED HCO3 PLAS-SCNC: 29 MMOL/L (ref 22–29)
ERYTHROCYTE [DISTWIDTH] IN BLOOD BY AUTOMATED COUNT: 13.3 % (ref 10–15)
GFR SERPL CREATININE-BSD FRML MDRD: >90 ML/MIN/1.73M2
GLUCOSE SERPL-MCNC: 101 MG/DL (ref 70–99)
HCT VFR BLD AUTO: 40.5 % (ref 40–53)
HGB BLD-MCNC: 13.3 G/DL (ref 13.3–17.7)
MCH RBC QN AUTO: 31.9 PG (ref 26.5–33)
MCHC RBC AUTO-ENTMCNC: 32.8 G/DL (ref 31.5–36.5)
MCV RBC AUTO: 97 FL (ref 78–100)
PLATELET # BLD AUTO: 198 10E3/UL (ref 150–450)
POTASSIUM SERPL-SCNC: 3.9 MMOL/L (ref 3.4–5.3)
RBC # BLD AUTO: 4.17 10E6/UL (ref 4.4–5.9)
SODIUM SERPL-SCNC: 142 MMOL/L (ref 136–145)
WBC # BLD AUTO: 8.2 10E3/UL (ref 4–11)

## 2022-10-01 PROCEDURE — 80048 BASIC METABOLIC PNL TOTAL CA: CPT | Performed by: INTERNAL MEDICINE

## 2022-10-01 PROCEDURE — 85027 COMPLETE CBC AUTOMATED: CPT | Performed by: INTERNAL MEDICINE

## 2022-10-01 PROCEDURE — 94640 AIRWAY INHALATION TREATMENT: CPT

## 2022-10-01 PROCEDURE — 250N000011 HC RX IP 250 OP 636

## 2022-10-01 PROCEDURE — 99239 HOSP IP/OBS DSCHRG MGMT >30: CPT | Performed by: INTERNAL MEDICINE

## 2022-10-01 PROCEDURE — 250N000013 HC RX MED GY IP 250 OP 250 PS 637

## 2022-10-01 PROCEDURE — 250N000009 HC RX 250: Performed by: INTERNAL MEDICINE

## 2022-10-01 PROCEDURE — 36415 COLL VENOUS BLD VENIPUNCTURE: CPT | Performed by: INTERNAL MEDICINE

## 2022-10-01 RX ORDER — CEFDINIR 300 MG/1
300 CAPSULE ORAL 2 TIMES DAILY
Qty: 4 CAPSULE | Refills: 0 | Status: SHIPPED | OUTPATIENT
Start: 2022-10-01 | End: 2022-10-03

## 2022-10-01 RX ADMIN — PREGABALIN 150 MG: 100 CAPSULE ORAL at 08:14

## 2022-10-01 RX ADMIN — SENNOSIDES AND DOCUSATE SODIUM 1 TABLET: 50; 8.6 TABLET ORAL at 08:14

## 2022-10-01 RX ADMIN — SIMVASTATIN 80 MG: 40 TABLET, FILM COATED ORAL at 08:14

## 2022-10-01 RX ADMIN — OXYCODONE HYDROCHLORIDE AND ACETAMINOPHEN 2 TABLET: 5; 325 TABLET ORAL at 08:13

## 2022-10-01 RX ADMIN — IPRATROPIUM BROMIDE AND ALBUTEROL SULFATE 3 ML: 2.5; .5 SOLUTION RESPIRATORY (INHALATION) at 06:45

## 2022-10-01 RX ADMIN — DULOXETINE HYDROCHLORIDE 120 MG: 30 CAPSULE, DELAYED RELEASE ORAL at 08:14

## 2022-10-01 RX ADMIN — CEFTRIAXONE SODIUM 2 G: 2 INJECTION, POWDER, FOR SOLUTION INTRAMUSCULAR; INTRAVENOUS at 05:51

## 2022-10-01 RX ADMIN — MORPHINE SULFATE 30 MG: 15 TABLET, FILM COATED, EXTENDED RELEASE ORAL at 05:51

## 2022-10-01 ASSESSMENT — ACTIVITIES OF DAILY LIVING (ADL)
ADLS_ACUITY_SCORE: 31

## 2022-10-01 NOTE — PROGRESS NOTES
PRIMARY DIAGNOSIS: SEPSIS WITH ENCEPHALOPATHY  OUTPATIENT/OBSERVATION GOALS TO BE MET BEFORE DISCHARGE:  1. ADLs back to baseline: Yes    2. Activity and level of assistance: Up with standby assistance.    3. Pain status: Improved-controlled with oral pain medications.    4. Return to near baseline physical activity: Yes     Discharge Planner Nurse   Safe discharge environment identified: Yes  Barriers to discharge: Yes. PT IS ON IV ANTIBIOTICS.       Entered by: Naima oLng RN 10/01/2022 12:07 AM     Please review provider order for any additional goals.   Nurse to notify provider when observation goals have been met and patient is ready for discharge.

## 2022-10-01 NOTE — PROGRESS NOTES
PRIMARY DIAGNOSIS: SEPSIS WITH ENCEPHALOPATHY  OUTPATIENT/OBSERVATION GOALS TO BE MET BEFORE DISCHARGE:  1. ADLs back to baseline: Yes    2. Activity and level of assistance: Up with standby assistance.    3. Pain status: Improved-controlled with oral pain medications.    4. Return to near baseline physical activity: Yes     Discharge Planner Nurse   Safe discharge environment identified: Yes  Barriers to discharge: Yes. PT IS STILL ON IV ANTIBIOTICS.       Entered by: Naima Long RN 10/01/2022 5:18 AM     Please review provider order for any additional goals.   Nurse to notify provider when observation goals have been met and patient is ready for discharge.

## 2022-10-01 NOTE — PLAN OF CARE
[facility] DISCHARGE NOTE    Patient discharged to [facility] at 11:06 AM via electric wheel chair. Accompanied by other:Facility staff and staff. Discharge instructions reviewed with patient, opportunity offered to ask questions. Prescriptions sent to patients preferred pharmacy. All belongings sent with patient. Pt reports wallet and 2 knives are missing - these items were never seen in room by staff. Both [facility], and staff at [facility] ([facility]), report they do not have these items. Pt advised to look around his room in [facility], and if he cannot find his belongings, to call Patient Relations. Pt given the business card for Patient Relations.    Scott Brewer RN on 10/1/2022 at 11:07 AM

## 2022-10-01 NOTE — PROGRESS NOTES
Care Management Discharge Note    Discharge Date: 10/01/2022     Discharge Disposition: Assisted Living : [facility]    Discharge Services: [facility] Worker: Alida LOAIZA -[facility] # [phone]    Discharge DME: Walker (Cam Boot)--NWB    Discharge Transportation: Group Home staff    Private pay costs discussed: Not applicable    Patient/family educated on Medicare website which has current facility and service quality ratings: yes    Education Provided on the Discharge Plan:  yes  Persons Notified of Discharge Plans: Group Home - [facility] - # [phone]   Patient/Family in Agreement with the Plan: yes    Handoff Referral Completed: Yes    Additional Information:  Update receive during IDT rounds. Informed Pt is pushing to discharge today. MD reported plan to order an Outpatient Cognitive Evaluation to determine Pt's ability to safely drive.     Pt has a follow-up Psychology appointment in [place] on Oct. 13th and Oct 27th.     Pt has been ambulating with SBA. Per nursing documentation, Pt has been back to his ADL baseline. SLUMS 21/30  Pt continues to want to resume ownership over his medical management, medication administration and appointments.     Yonny Montez # [phone] plans to have further conversation with the Pt once he return home to discuss the ongoing safety concerns regarding his health, medications and driving. [facility] staff available to provide services as Pt will allow.     Pt's [facility]  aware of Pt's hospitalization  Alida LOAIZA # [phone]. Pt refuses to sign a RAFITA so Alida would not relay information to the CM department.      Bedside RN placed call to the  [facility] to arrange transportation per Pt's request.     CM completed Handoff to clinic care coordination       KAROLINA See

## 2022-10-01 NOTE — DISCHARGE SUMMARY
[facility]  Hospitalist Discharge Summary       Date of Admission:  9/29/2022  Date of Discharge:  10/1/2022 11:19 AM  Discharging Provider: Rey Garcia MD      Discharge Diagnoses     Acute metabolic encephalopathy  COPD exacerbation  Leukocytosis  Acute respiratory distress with hypoxemia  Chronic respiratory distress with hypercapnia  Fibula fracture, mildly displaced, left  Chronic pain syndrome  Chronic, long-term opioid use  Prediabetes  Tobacco use disorder   Major depressive disorder, single episode, severe   Anxiety   Hyperlipidemia LDL goal <130  Obesity    Follow-ups Needed After Discharge   Follow-up Appointments     Follow-up and recommended labs and tests      Follow up with primary care provider, Eliane Quinn, within 7 days for   hospital follow- up.  The following labs/tests are recommended: BMP and   CBC.         Follow-up and recommended labs and tests       Follow up with a foot and ankle specialist for your left ankle in 7-10   days. Call [facility] scheduling at [phone] to make an   appointment.           Unresulted Labs Ordered in the Past 30 Days of this Admission     Date and Time Order Name Status Description    9/29/2022  3:51 AM Blood Culture Peripheral Blood Preliminary     9/29/2022  3:51 AM Blood Culture Peripheral Blood Preliminary       These results will be followed up by Rey Garcia or PCP    Discharge Disposition   Discharged to home  Condition at discharge: Fair    Hospital Course   Melquiades Morales is a 67 year old male who presents on 9/29/2022 with confusion. Has some confusion at baseline, but worsening confusion.      Acute metabolic encephalopathy  COPD exacerbation  Leukocytosis  Cognitive impairment at baseline, worsening. Urinalysis on presentation does not appear infected, cloudy with positive nitrates, small leukocyte esterase, 45 white blood cells, 3 red blood cells. On presentation: leukocytosis of 12.3, febrile  to 100.5. Patient denies urinary symptoms including dysuria. Lung sounds diminished, expiratory Rales present bilaterally.  Chest x-ray on presentation shows mild left bibasilar atelectasis, appears improved from August 2022 imaging.  PTA has albuterol inhaler and DuoNeb available as needed. Initiated on ceftriaxone & vancomcyin in ER, no history of MRSA or VRE from chart review.   -Urine culture >100K Staph epidermis, likely contaminant   -Blood culture x 2 9/29 NGTD  -VBG 7.38 / 54 / 34 / 32  -Continue ceftriaxone 2g Q24hrs  -MRSA nasal swab negative discontinued vancomycin  -Weaned off oxygen     Acute respiratory distress with hypoxemia  Chronic respiratory distress with hypercapnia  Requiring 3 L nasal cannula to maintain SPO2 >90% in ER.  Does not use oxygen at home.  Denies dyspnea, feels breathing is at baseline. Likely secondary to underlying COPD, possibly exacerbated by worsening encephalopathy.    -VBG 7.38 / 54 / 34 / 32  -Oxygen via nasal cannula available, titrate to SPO2 89-94%  -Continuous pulse oximetry  -CXR negative for acute disease  -Wean off O2 and maintained sats >90%  -Discharged to home     Fibula fracture, mildly displaced, left  Apparent fall prior to admission, patient cannot recall any details.  Unwitnessed.  No neck stiffness, headaches, focal neuro findings.  No evidence of additional trauma.  X-ray on presentation shows oblique, mildly displaced distal fibula fracture with intact ankle mortise.  On admission exam, leg is wrapped from knee distally with clean dry dressing.  -Orthopedic surgery consult, appreciate recommendations  -Fall precautions  -Pain control regimen as per chronic pain syndrome, below  -Follow up with [facility] in 1-2 weeks     Chronic pain syndrome  Chronic, long-term opioid use  Chronic generalized pain managed PTA with pregabalin 150 mg twice daily, oxycodone-acetaminophen  mg twice daily, morphine (MS Contin) 30 mg 3 times daily, trazodone 250 mg at bedtime.   Pain currently isolated to left leg at site of fracture.  -Continue PTA pain medications    Prediabetes  Glucose on presentation elevated to 147.  Hemoglobin A1c 6/13/2022 6.4.  Not on any outpatient glucose management.     Tobacco use disorder  Smokes 1 pack/day.  -Nicotine patch available     Major depressive disorder, single episode, severe   Anxiety  Managed PTA with duloxetine 120 mg daily.  Stable, no thoughts of harming self or others.  -Continue PTA duloxetine     Hyperlipidemia LDL goal <130  -Continue PTA simvastatin 80 mg daily    Clinically Significant Risk Factors Present on Admission                  # Obesity: Estimated body mass index is 30.53 kg/m  as calculated from the following:    Height as of this encounter: 1.829 m (6').    Weight as of this encounter: 102.1 kg (225 lb 1.4 oz).     Diet: Regular Diet Adult    DVT Prophylaxis: Ambulate every shift  Gaona Catheter: Not present  Code Status: Full Code    Lines: PIV    Disposition: Expected discharge in 1-2 days once hypoxia resolved.    Rey Garcia MD      Consultations This Hospital Stay   PHARMACY TO [facility]  ORTHOPEDIC SURGERY IP CONSULT  ORTHOSIS EXTREMITY LOWER REFERRAL IP CONSULT  CARE MANAGEMENT / SOCIAL WORK IP CONSULT  OCCUPATIONAL THERAPY ADULT IP CONSULT    Code Status   Full Code    Time Spent on this Encounter   I, Rey Garcia MD, personally saw the patient today and spent greater than 30 minutes discharging this patient.       Rey Garcia MD  [facility]  ______________________________________________________________________    Physical Exam   Vital Signs: Temp: 98.8  F (37.1  C) Temp src: Oral BP: (!) 157/69 Pulse: 70   Resp: 18 SpO2: 92 % O2 Device: Nasal cannula Oxygen Delivery: 2 LPM  Weight: 225 lbs 1.43 oz       Gen: Disheveled male, alert and oriented x 3, no acute distressed  HEENT: Atraumatic, normocephalic; sclera non-injected, anicterric; oral mucosa moist, no lesion,  no exudate  Lungs: Prolonged expiration with few wheezes, no rhonchi, no rales  Heart: Regular rate, regular rhythm, no gallops, no rubs, no murmurs  GI: Bowel sound normal, no hepatosplenomegaly, no masses, non-tender, non-distended, no guarding, no rebound tenderness  Lymph: No lymphadenopathy, no edema  Skin: No rashes, no chronic venous stasis     Primary Care Physician   Eliane Quinn    Discharge Orders      Occupational Therapy Referral      Follow-up and recommended labs and tests     Follow up with a foot and ankle specialist for your left ankle in 7-10 days. Call [facility] scheduling at [phone] to make an appointment.     Activity    Your activity upon discharge:   1. CAM boot to left ankle  2. No weight bearing on the left ankle     Reason for your hospital stay    This is a 67 year old male admitted with altered mental status and COPD exacerbation.     Follow-up and recommended labs and tests    Follow up with primary care provider, Eliane Quinn, within 7 days for hospital follow- up.  The following labs/tests are recommended: BMP and CBC.     Activity    Your activity upon discharge: activity as tolerated     Discharge Instructions    Patient should not drive until he has completed safety assessment by occupational therapy.     Diet    Follow this diet upon discharge: Orders Placed This Encounter      Regular Diet Adult       Significant Results and Procedures   Most Recent 3 CBC's:Recent Labs   Lab Test 10/01/22  0517 09/30/22  0612 09/29/22  0359   WBC 8.2 7.9 12.3*   HGB 13.3 13.5 15.5   MCV 97 98 97    194 238     Most Recent 3 BMP's:Recent Labs   Lab Test 10/01/22  0517 09/30/22  0612 09/29/22  0359    142 138   POTASSIUM 3.9 4.0 4.1   CHLORIDE 104 105 100   CO2 29 28 30*   BUN 10.1 8.3 7.5*   CR 0.71 0.71 0.64*   ANIONGAP 9 9 8   JESSEE 9.0 8.2* 9.2   * 105* 147*     7-Day Micro Results     Collected Updated Procedure Result Status      09/29/2022 1056  "09/29/2022 1546 MRSA MSSA PCR, Nasal Swab [[id number]]    Swab from Nare, Left    Final result Component Value   MRSA Target DNA Negative   SA Target DNA Negative            09/29/2022 0421 10/01/2022 0806 Urine Culture [[id number]]    (Abnormal)   Urine, Catheter    Final result Component Value   Culture >100,000 CFU/mL Staphylococcus epidermidis        Susceptibility      Staphylococcus epidermidis      SOL      Ciprofloxacin <=0.5 ug/mL Susceptible      Gentamicin <=0.5 ug/mL Susceptible      Levofloxacin <=0.12 ug/mL Susceptible      Nitrofurantoin <=16 ug/mL Susceptible      Oxacillin >=4 ug/mL Resistant  [1]      Tetracycline 2 ug/mL Susceptible      Trimethoprim/Sulfamethoxazole  Susceptible      Vancomycin 1 ug/mL Susceptible                   [1]  Oxacillin susceptible isolates are susceptible to cephalosporins (example: cefazolin and cephalexin) and beta lactam combination agents. Oxacillin resistant isolates are resistant to these agents.          Susceptibility Comments     Staphylococcus epidermidis    Antibiotics listed as \"No Interpretation\" have no regulatory guidelines for susceptibility/resistance available.             09/29/2022 0411 10/01/2022 1002 Blood Culture Peripheral Blood [[id number]]   Peripheral Blood    Preliminary result Component Value   Culture No growth after 2 days  [P]                09/29/2022 0359 10/01/2022 1002 Blood Culture Peripheral Blood [[id number]]   Peripheral Blood    Preliminary result Component Value   Culture No growth after 2 days  [P]                09/29/2022 0359 09/29/2022 0435 Symptomatic; Yes; 9/29/2022 Influenza A/B & SARS-CoV2 (COVID-19) Virus PCR Multiplex Nasopharyngeal [[id number]]    Swab from Nasopharyngeal    Final result Component Value   Influenza A PCR Negative   Influenza B PCR Negative   SARS CoV2 PCR Negative   NEGATIVE: SARS-CoV-2 (COVID-19) RNA not detected, presumed negative.              ,   Results for orders placed or performed " during the hospital encounter of 09/29/22   XR Chest Port 1 View    Narrative    EXAM: XR CHEST PORT 1 VIEW  LOCATION: [facility]  DATE/TIME: 9/29/2022 4:42 AM    INDICATION: fever, confused  COMPARISON: CTA chest dated/6/22.      Impression    IMPRESSION: Mild left basilar atelectasis. Lungs otherwise clear. No pleural fluid or pneumothorax. Normal heart size.   XR Ankle Left G/E 3 Views    Narrative    EXAM: XR ANKLE LEFT G/E 3 VIEWS  LOCATION: [facility]  DATE/TIME: 9/29/2022 4:43 AM    INDICATION: Pain and swelling.  COMPARISON: None.      Impression    IMPRESSION: Oblique, mildly displaced fracture the distal fibula. No other fracture. The ankle mortise is intact.   XR Chest Port 1 View    Narrative    CHEST ONE VIEW  9/30/2022 12:31 PM     HISTORY: Hypoxia.    COMPARISON: September 29, 2022      Impression    IMPRESSION: No acute disease.    ADALBERTO PARRA MD         SYSTEM ID:  [id number]       Discharge Medications   Current Discharge Medication List      START taking these medications    Details   cefdinir (OMNICEF) 300 MG capsule Take 1 capsule (300 mg) by mouth 2 times daily for 2 days  Qty: 4 capsule, Refills: 0    Associated Diagnoses: Acute respiratory failure with hypercapnia (H)         CONTINUE these medications which have NOT CHANGED    Details   albuterol (PROAIR HFA) 108 (90 Base) MCG/ACT inhaler Inhale 2 puffs into the lungs every 4 hours as needed for shortness of breath / dyspnea or wheezing  Qty: 18 g, Refills: 3    Comments: Pharmacy may dispense brand covered by insurance (Proair, or proventil or ventolin or generic albuterol inhaler)  Associated Diagnoses: Chronic bronchitis, unspecified chronic bronchitis type (H)      DULoxetine (CYMBALTA) 60 MG capsule Take 2 capsules (120 mg) by mouth daily  Qty: 60 capsule, Refills: 11    Associated Diagnoses: Depression, unspecified depression type      ipratropium - albuterol 0.5 mg/2.5 mg/3 mL  (DUONEB) 0.5-2.5 (3) MG/3ML neb solution Take 1 vial by nebulization as needed for shortness of breath / dyspnea or wheezing      morphine (MS CONTIN) 30 MG CR tablet Take by mouth every 12 hours 60 mg in AM and 30 mg in HS  Refills: 0      naproxen (NAPROSYN) 500 MG tablet Take 1 tablet (500 mg) by mouth 2 times daily as needed for headaches  Qty: 60 tablet, Refills: 3    Associated Diagnoses: Chronic pain syndrome      NONFORMULARY Take 2 capsules by mouth daily Super Beta Prostate      OVER-THE-COUNTER nereva Plus 1 tablet daily      oxyCODONE IR (ROXICODONE) 10 MG tablet Take 1 tablet by mouth 3 times daily      oxyCODONE-acetaminophen (PERCOCET) 5-325 MG tablet Take 2 tablets by mouth 2 times daily      pregabalin (LYRICA) 150 MG capsule Take 150 mg by mouth 2 times daily       simvastatin (ZOCOR) 80 MG tablet Take 1 tablet (80 mg) by mouth daily  Qty: 90 tablet, Refills: 3    Associated Diagnoses: Hyperlipidemia LDL goal <130      !! traZODone (DESYREL) 100 MG tablet TAKE 2 TABLETS BY MOUTH AT BEDTIME AS NEEDED FOR SLEEP  Qty: 180 tablet, Refills: 3    Associated Diagnoses: Depression, unspecified depression type      !! traZODone (DESYREL) 50 MG tablet Take 1 tablet (50 mg) by mouth At Bedtime Take along with the 100 mg tablets for total dose of 250 mg  Qty: 90 tablet, Refills: 3    Associated Diagnoses: Depression, unspecified depression type      !! order for DME Equipment being ordered: Nebulizer.    Diagnosis: chronic obstructive bronchitis (COPD).    Duration of need:  99 months.  Qty: 1 each, Refills: 0    Associated Diagnoses: Chronic bronchitis, unspecified chronic bronchitis type (H)      !! order for DME Equipment being ordered: Hospital Bed and mattress.  Qty: 1 each, Refills: 0    Associated Diagnoses: Chronic pain syndrome; Lumbosacral radiculitis; Chronic obstructive pulmonary disease, unspecified COPD type (H); Obese; Lumbar radiculopathy; Encephalopathy; Spinal stenosis in cervical region       !! order for DME Equipment being ordered: Lift Chair  Qty: 1 each, Refills: 0    Associated Diagnoses: Chronic pain syndrome; Lumbosacral radiculitis; Chronic obstructive pulmonary disease, unspecified COPD type (H); Obese; Lumbar radiculopathy; Encephalopathy; Spinal stenosis in cervical region; Unable to ambulate      !! order for DME Equipment being ordered: Wheelchair. Electric, including adjustable back rest sitting to resting, leg elevation adjustment, approved for outdoor use  Qty: 1 Units, Refills: 0    Associated Diagnoses: Chronic pain syndrome; Lumbosacral radiculitis      !! ORDER FOR DME Semi electric hospital bed with side rails and mattress. Length of bed is for lifetime  Qty: 1 Device, Refills: 0    Associated Diagnoses: Other unspecified back disorder; Obese; Lumbosacral radiculitis; COPD (chronic obstructive pulmonary disease) (H)       !! - Potential duplicate medications found. Please discuss with provider.        Allergies   Allergies   Allergen Reactions     Abilify [Aripiprazole] Other (See Comments)     Altered metal status.  Was admitted to hospital 3/17/2015.     Asa [Aspirin] GI Disturbance     Upset stomach     Black Pepper [Piper] Swelling     Tongue swells up     Caffeine Other (See Comments)     Comment: GI problems, Description:      Robitussin Cough-Cold D Other (See Comments)     Bad dreams about killing people      Varenicline Other (See Comments)     Vivid dreams and suicidal thoughts

## 2022-10-01 NOTE — PROGRESS NOTES
PRIMARY DIAGNOSIS: SEPSIS WITH ENCEPHALOPATHY  OUTPATIENT/OBSERVATION GOALS TO BE MET BEFORE DISCHARGE:  1. ADLs back to baseline: Yes    2. Activity and level of assistance: Up with standby assistance.    3. Pain status: Improved-controlled with oral pain medications.    4. Return to near baseline physical activity: Yes     Discharge Planner Nurse   Safe discharge environment identified: Yes  Barriers to discharge: Yes. PATIENT IS STILL ON IV ANTIBIOTICS AND IS REQUIRING SUPPLEMENTAL OXYGEN.       Entered by: Naima oLng RN 09/30/2022 9:00 PM     Please review provider order for any additional goals.   Nurse to notify provider when observation goals have been met and patient is ready for discharge.

## 2022-10-01 NOTE — PROGRESS NOTES
[facility]    Hospitalist Progress Note    Date of Service (when I saw the patient): 09/30/2022    Assessment & Plan   Melquiades Morales is a 67 year old male who presents on 9/29/2022 with confusion. Has some confusion at baseline, but worsening confusion.      Sepsis with encephalopathy  Urinary tract infection, acute complicated  Leukocytosis  Cognitive impairment at baseline, worsening. Urinalysis on presentation appears infected, cloudy with positive nitrates, leukocyte esterase, white blood cells, red blood cells. On presentation: leukocytosis of 12.3, febrile to 100.5. Patient denies urinary symptoms including dysuria. Initiated on ceftriaxone & vancomcyin in ER, no history of MRSA or VRE from chart review.   -Urine culture >100K Staph epidermis, likely contaminant   -Blood culture x 2 9/29 NGTD  -Continue ceftriaxone 2g Q24hrs  -MRSA nasal swab, consider discontinuing vancomycin if negative  -CBC in AM     Acute respiratory distress with hypoxemia  Chronic respiratory distress with hypercapnia  Requiring 3 L nasal cannula to maintain SPO2 >90% in ER.  Does not use oxygen at home.  Denies dyspnea, feels breathing is at baseline. Likely secondary to underlying COPD, possibly exacerbated by worsening encephalopathy.    -VBG 7.38 / 54 / 34 / 32  -Oxygen via nasal cannula available, titrate to SPO2 89-94%  -Continuous pulse oximetry  -CXR negative for acute disease  -Currently 96% on 2 L NC  -Wean O2 to maintain sats >90%     Fibula fracture, mildly displaced, left  Apparent fall prior to admission, patient cannot recall any details.  Unwitnessed.  No neck stiffness, headaches, focal neuro findings.  No evidence of additional trauma.  X-ray on presentation shows oblique, mildly displaced distal fibula fracture with intact ankle mortise.  On admission exam, leg is wrapped from knee distally with clean dry dressing.  -Orthopedic surgery consult, appreciate recommendations  -Fall  "precautions  -Pain control regimen as per chronic pain syndrome, below     Chronic pain syndrome  Chronic, long-term opioid use  Chronic generalized pain managed PTA with pregabalin 150 mg twice daily, oxycodone-acetaminophen  mg twice daily, morphine (MS Contin) 30 mg 3 times daily, trazodone 250 mg at bedtime.  Pain currently isolated to left leg at site of fracture.  -Continue PTA pain medications     Chronic obstructive pulmonary disease  Lung sounds diminished, expiratory Rales present bilaterally.  Chest x-ray on presentation shows mild left bibasilar atelectasis, appears improved from August 2022 imaging.  PTA has albuterol inhaler and DuoNeb available as needed.  Lower suspicion for COPD exacerbation.  -Albuterol nebulizer available Q6hr PRN for wheezing or dyspnea     Prediabetes  Glucose on presentation elevated to 147.  Hemoglobin A1c 6/13/2022 6.4.  Not on any outpatient glucose management.     Tobacco use disorder  Smokes 1 pack/day.  -Nicotine patch available     Major depressive disorder, single episode, severe   Anxiety  Managed PTA with duloxetine 120 mg daily.  Stable, no thoughts of harming self or others.  -Continue PTA duloxetine     Hyperlipidemia LDL goal <130  -Continue PTA simvastatin 80 mg daily    Clinically Significant Risk Factors Present on Admission                  # Obesity: Estimated body mass index is 30.53 kg/m  as calculated from the following:    Height as of this encounter: 1.829 m (6').    Weight as of this encounter: 102.1 kg (225 lb 1.4 oz).     Diet: Regular Diet Adult    DVT Prophylaxis: Ambulate every shift  Gaona Catheter: Not present  Code Status: Full Code    Lines: PIV    Disposition: Expected discharge in 1-2 days once hypoxia resolved.    Rey Garcia MD    Interval History   Patient asking to go home.  Tells me he will be \"fine\".  Discussed dangers of hypoxia.  Patient agreeable to remain in hospital overnight but insists he will be leaving " tomorrow.    -Data reviewed today: I reviewed all new labs and imaging results over the last 24 hours. I personally reviewed the chest x-ray image(s) showing no acute infiltrates.    Physical Exam   Temp: 98.1  F (36.7  C) Temp src: Oral BP: (!) 146/69 Pulse: 80   Resp: 17 SpO2: 96 % O2 Device: Nasal cannula Oxygen Delivery: 2 LPM  Vitals:    09/29/22 0345 09/29/22 0627   Weight: 106.3 kg (234 lb 4.8 oz) 102.1 kg (225 lb 1.4 oz)     Vital Signs with Ranges  Temp:  [97.6  F (36.4  C)-98.4  F (36.9  C)] 98.1  F (36.7  C)  Pulse:  [64-92] 80  Resp:  [14-20] 17  BP: (123-152)/(57-75) 146/69  SpO2:  [85 %-96 %] 96 %  I/O last 3 completed shifts:  In: 1000 [P.O.:1000]  Out: 600 [Urine:600]    Gen: Disheveled male, alert and oriented x 3, no acute distressed  HEENT: Atraumatic, normocephalic; sclera non-injected, anicterric; oral mucosa moist, no lesion, no exudate  Lungs: Bilateral wheezes, no rhonchi, no rales  Heart: Regular rate, regular rhythm, no gallops, no rubs, no murmurs  GI: Bowel sound normal, no hepatosplenomegaly, no masses, non-tender, non-distended, no guarding, no rebound tenderness  Lymph: No lymphadenopathy, no edema  Skin: No rashes, no chronic venous stasis     Medications       cefTRIAXone  2 g Intravenous Q24H     DULoxetine  120 mg Oral Daily     ipratropium - albuterol 0.5 mg/2.5 mg/3 mL  3 mL Nebulization 4x daily     morphine  30 mg Oral TID     nicotine  1 patch Transdermal Daily     nicotine   Transdermal Q8H     oxyCODONE-acetaminophen  2 tablet Oral BID     pregabalin  150 mg Oral BID     senna-docusate  1-2 tablet Oral BID     simvastatin  80 mg Oral Daily     traZODone  200 mg Oral At Bedtime     traZODone  50 mg Oral At Bedtime       Data   Recent Labs   Lab 09/30/22  0612 09/29/22  0359   WBC 7.9 12.3*   HGB 13.5 15.5   MCV 98 97    238    138   POTASSIUM 4.0 4.1   CHLORIDE 105 100   CO2 28 30*   BUN 8.3 7.5*   CR 0.71 0.64*   ANIONGAP 9 8   JESSEE 8.2* 9.2   * 147*    ALBUMIN 3.1* 3.9   PROTTOTAL 5.6* 7.0   BILITOTAL 0.3 0.5   ALKPHOS 56 72   ALT 13 16   AST 18 19       Recent Results (from the past 24 hour(s))   XR Chest Port 1 View    Narrative    CHEST ONE VIEW  9/30/2022 12:31 PM     HISTORY: Hypoxia.    COMPARISON: September 29, 2022      Impression    IMPRESSION: No acute disease.    ADALBERTO PARRA MD         SYSTEM ID:  [id number]

## 2022-10-04 LAB
BACTERIA BLD CULT: NO GROWTH
BACTERIA BLD CULT: NO GROWTH

## 2022-10-07 ENCOUNTER — OFFICE VISIT (OUTPATIENT)
Dept: FAMILY MEDICINE | Facility: CLINIC | Age: 67
End: 2022-10-07
Payer: COMMERCIAL

## 2022-10-07 VITALS
WEIGHT: 244 LBS | OXYGEN SATURATION: 90 % | HEIGHT: 71 IN | HEART RATE: 76 BPM | DIASTOLIC BLOOD PRESSURE: 78 MMHG | TEMPERATURE: 97 F | RESPIRATION RATE: 20 BRPM | BODY MASS INDEX: 34.16 KG/M2 | SYSTOLIC BLOOD PRESSURE: 146 MMHG

## 2022-10-07 DIAGNOSIS — R03.0 WHITE COAT SYNDROME WITH HIGH BLOOD PRESSURE BUT WITHOUT HYPERTENSION: ICD-10-CM

## 2022-10-07 DIAGNOSIS — Z86.0100 HISTORY OF COLONIC POLYPS: ICD-10-CM

## 2022-10-07 DIAGNOSIS — G89.4 CHRONIC PAIN SYNDROME: Chronic | ICD-10-CM

## 2022-10-07 DIAGNOSIS — Z12.11 SCREEN FOR COLON CANCER: ICD-10-CM

## 2022-10-07 DIAGNOSIS — Z09 HOSPITAL DISCHARGE FOLLOW-UP: Primary | ICD-10-CM

## 2022-10-07 DIAGNOSIS — E78.5 HYPERLIPIDEMIA LDL GOAL <130: ICD-10-CM

## 2022-10-07 DIAGNOSIS — F32.A DEPRESSION, UNSPECIFIED DEPRESSION TYPE: ICD-10-CM

## 2022-10-07 PROCEDURE — G0008 ADMIN INFLUENZA VIRUS VAC: HCPCS | Performed by: FAMILY MEDICINE

## 2022-10-07 PROCEDURE — 99214 OFFICE O/P EST MOD 30 MIN: CPT | Mod: 25 | Performed by: FAMILY MEDICINE

## 2022-10-07 PROCEDURE — 90662 IIV NO PRSV INCREASED AG IM: CPT | Performed by: FAMILY MEDICINE

## 2022-10-07 PROCEDURE — 91313 COVID-19,PF,MODERNA BIVALENT: CPT | Performed by: FAMILY MEDICINE

## 2022-10-07 PROCEDURE — 0134A COVID-19,PF,MODERNA BIVALENT: CPT | Performed by: FAMILY MEDICINE

## 2022-10-07 RX ORDER — TRAZODONE HYDROCHLORIDE 50 MG/1
50 TABLET, FILM COATED ORAL AT BEDTIME
Qty: 90 TABLET | Refills: 4 | Status: SHIPPED | OUTPATIENT
Start: 2022-10-07 | End: 2023-09-21

## 2022-10-07 RX ORDER — TRAZODONE HYDROCHLORIDE 100 MG/1
200 TABLET ORAL AT BEDTIME
Qty: 180 TABLET | Refills: 3 | Status: SHIPPED | OUTPATIENT
Start: 2022-10-07 | End: 2023-09-21

## 2022-10-07 RX ORDER — SIMVASTATIN 80 MG
80 TABLET ORAL DAILY
Qty: 90 TABLET | Refills: 4 | Status: SHIPPED | OUTPATIENT
Start: 2022-10-07 | End: 2023-09-21

## 2022-10-07 RX ORDER — NAPROXEN 500 MG/1
500 TABLET ORAL 2 TIMES DAILY PRN
Qty: 60 TABLET | Refills: 3 | Status: SHIPPED | OUTPATIENT
Start: 2022-10-07

## 2022-10-07 ASSESSMENT — PAIN SCALES - GENERAL: PAINLEVEL: SEVERE PAIN (6)

## 2022-10-07 NOTE — PROGRESS NOTES
Assessment & Plan     Hospital discharge follow-up  Doing well after encephalopathy, sepsis 2/2 UTI, and fibula fracture  No chronic meds nor monitoring needed - I did recommend f/u with [facility] and reminded him of this    Screen for colon cancer  - Colonoscopy Screening  Referral    Chronic pain syndrome  - naproxen (NAPROSYN) 500 MG tablet  Dispense: 60 tablet; Refill: 3    Hyperlipidemia LDL goal <130  - simvastatin (ZOCOR) 80 MG tablet  Dispense: 90 tablet; Refill: 4    Depression, unspecified depression type  - traZODone (DESYREL) 100 MG tablet  Dispense: 180 tablet; Refill: 3  - traZODone (DESYREL) 50 MG tablet  Dispense: 90 tablet; Refill: 4    History of colonic polyps  - Colonoscopy Screening  Referral    White coat syndrome with high blood pressure but without hypertension  Noted due to frequently above goal here in clinic but always normotensive in his home checks    Return if symptoms worsen or fail to improve.    Eliane Quinn MD  [facility]   Melquiades is a 67 year old accompanied by his self, presenting for the following health issues:  Hospital F/U and Health Maintenance (Advised patient of . Will get flu shot. COVID booster only if Moderna still available. )      HPI     Patient would like to discuss the Dr from the hospital taking his drivers license.    Hospital Follow-up Visit:    Hospital/Nursing Home/IP Rehab Facility: [facility]  Date of Admission: 9-29-22  Date of Discharge: 10/1/22   Reason(s) for Admission: Sepsis with encephalopathy without septic shock, due to unspecified organism (H)  Was your hospitalization related to COVID-19? No   Problems taking medications regularly:  No  Medication changes since discharge: finished the antibiotics for 2 days, now done  Problems adhering to non-medication therapy:  None  Summary of hospitalization:  [facility] discharge summary reviewed   He  "has improved   Diagnostic Tests/Treatments reviewed.  Follow up needed: none  Other Healthcare Providers Involved in Patient s Care:         None  - needs to follow up with [facility] - I gave him the information  Update since discharge: improved.     Post Medication Reconciliation Status:   reconciled.    Plan of care communicated with patient    Review of Systems   Constitutional, HEENT, cardiovascular, pulmonary, gi and gu systems are negative, except as otherwise noted.      Objective    BP (!) 146/78   Pulse 76   Temp 97  F (36.1  C) (Tympanic)   Resp 20   Ht 1.803 m (5' 11\")   Wt 110.7 kg (244 lb)   SpO2 90%   BMI 34.03 kg/m    Body mass index is 34.03 kg/m .  Physical Exam   GENERAL: healthy, alert and no distress  RESP: normal respiratory effort, speaking in complete sentences  MS: no gross musculoskeletal defects noted, no edema. left leg in immobilizer boot  ABD/: abd and suprapubic- no tenderness, soft.  PSYCH: mentation appears normal, affect normal/bright    Reviewed labs and imaging from the hospitalization            "

## 2022-10-07 NOTE — PATIENT INSTRUCTIONS
Follow up with a foot and ankle specialist for your left ankle in 7-10 days. Call [facility] scheduling at [phone] to make an appointment.

## 2022-10-09 PROBLEM — R03.0 WHITE COAT SYNDROME WITH HIGH BLOOD PRESSURE BUT WITHOUT HYPERTENSION: Status: ACTIVE | Noted: 2022-10-09

## 2022-10-13 ENCOUNTER — VIRTUAL VISIT (OUTPATIENT)
Dept: PSYCHOLOGY | Facility: CLINIC | Age: 67
End: 2022-10-13
Payer: COMMERCIAL

## 2022-10-13 DIAGNOSIS — F33.1 MAJOR DEPRESSIVE DISORDER, RECURRENT EPISODE, MODERATE (H): Primary | ICD-10-CM

## 2022-10-13 DIAGNOSIS — F41.1 GENERALIZED ANXIETY DISORDER: ICD-10-CM

## 2022-10-13 DIAGNOSIS — F43.10 POSTTRAUMATIC STRESS DISORDER: ICD-10-CM

## 2022-10-13 PROCEDURE — 90832 PSYTX W PT 30 MINUTES: CPT | Mod: 95 | Performed by: SOCIAL WORKER

## 2022-10-13 ASSESSMENT — ANXIETY QUESTIONNAIRES
7. FEELING AFRAID AS IF SOMETHING AWFUL MIGHT HAPPEN: SEVERAL DAYS
GAD7 TOTAL SCORE: 13
5. BEING SO RESTLESS THAT IT IS HARD TO SIT STILL: SEVERAL DAYS
IF YOU CHECKED OFF ANY PROBLEMS ON THIS QUESTIONNAIRE, HOW DIFFICULT HAVE THESE PROBLEMS MADE IT FOR YOU TO DO YOUR WORK, TAKE CARE OF THINGS AT HOME, OR GET ALONG WITH OTHER PEOPLE: SOMEWHAT DIFFICULT
2. NOT BEING ABLE TO STOP OR CONTROL WORRYING: MORE THAN HALF THE DAYS
3. WORRYING TOO MUCH ABOUT DIFFERENT THINGS: MORE THAN HALF THE DAYS
6. BECOMING EASILY ANNOYED OR IRRITABLE: MORE THAN HALF THE DAYS
GAD7 TOTAL SCORE: 13
1. FEELING NERVOUS, ANXIOUS, OR ON EDGE: NEARLY EVERY DAY

## 2022-10-13 NOTE — PROGRESS NOTES
"    [facility]                                     Progress Note    Patient Name: Melquiades Morales  Date: 10/13/22         Service Type: Individual      Session Start Time:  9 am  Session End Time: 9:30 am     Session Length: 30 min     Session #: 15    Attendees: Client attended alone.    Service Modality:  Phone Visit:                  Provider verified identity through the following two step process.  Patient provided:  Patient is known previously to provider     The patient has been notified of the following:      \"We have found that certain health care needs can be provided without the need for a face to face visit.  This service lets us provide the care you need with a phone conversation.       I will have full access to your [facility] medical record during this entire phone call. I will be taking notes for your medical record.      Since this is like an office visit, we will bill your insurance company for this service.       There are potential benefits and risks of telephone visits (e.g. limits to patient confidentiality) that differ from in-person visits.?Confidentiality still applies for telephone services, and nobody will record the visit.  It is important to be in a quiet, private space that is free of distractions (including cell phone or other devices) during the visit.??      If during the course of the call I believe a telephone visit is not appropriate, you will not be charged for this service\"     Consent has been obtained for this service by care team member: Yes      DATA  Interactive Complexity: No  Crisis: No        Progress Since Last Session (Related to Symptoms / Goals / Homework):   Symptoms: worsening.    Homework: Partially completed- use a healthy coping idea. He has the grounding ideas handout and is to practice them.     Episode of Care Goals: Minimal progress - ACTION (Actively working towards change); Intervened by reinforcing change plan / affirming steps " taken.    Current / Ongoing Stressors and Concerns:   He now lives in a nursing home. He is making friends.  He and Chiquis are planning on getting a place together in a couple of years.  He spoke with daughter Gene Mercado and she and her baby healthy. He hopes to visit her in [place] this August. She is not ready to see him he reports.  He broke his ankle and it is on the mend. He reports at a doctor visit he was told due to memory issues he would no longer be able to drive. His son called to let him know he received a call to remove patient's keys. Patient reports feeling relieved when his PCP allegedly told him another doctor cannot do that. His son was relieved as well patient reports.         Treatment Objective(s) Addressed in This Session:   Use a healthy coping idea as needed.      Intervention:  Assessed functioning and for safety. Reviewed the phq and shailesh. Processed feelings about his living situation and about the prospect of he and love interest eventually moving in together. Processed feelings about being told he could no longer drive. Processed feelings about daughter not ready to have visit. Reinforced hobbies such as fishing and making new friends.           Assessments completed prior to visit:  The following assessments were completed by patient for this visit:  PHQ9:   PHQ-9 SCORE 5/12/2022 5/26/2022 6/10/2022 6/24/2022 7/8/2022 8/12/2022 9/9/2022   PHQ-9 Total Score - - - - - - -   PHQ-9 Total Score MyChart - - - - - - -   PHQ-9 Total Score 15 13 14 17 16 16 14     GAD7:   SHAILESH-7 SCORE 5/12/2022 5/26/2022 6/10/2022 6/24/2022 7/8/2022 8/12/2022 9/9/2022   Total Score - - - - - - -   Total Score - - - - - - -   Total Score 13 12 12 14 12 14 11     CAGE-AID:   CAGE-AID Total Score 1/28/2022   Total Score 0   Total Score MyChart 0 (A total score of 2 or greater is considered clinically significant)     PROMIS 10-Global Health (all questions and answers displayed):   PROMIS 10 1/28/2022 5/12/2022 8/12/2022    In general, would you say your health is: Poor - -   In general, would you say your quality of life is: Fair - -   In general, how would you rate your physical health? Poor - -   In general, how would you rate your mental health, including your mood and your ability to think? Fair - -   In general, how would you rate your satisfaction with your social activities and relationships? Fair - -   In general, please rate how well you carry out your usual social activities and roles Poor - -   To what extent are you able to carry out your everyday physical activities such as walking, climbing stairs, carrying groceries, or moving a chair? A little - -   How often have you been bothered by emotional problems such as feeling anxious, depressed or irritable? Often - -   How would you rate your fatigue on average? Moderate - -   How would you rate your pain on average?   0 = No Pain  to  10 = Worst Imaginable Pain 5 - -   In general, would you say your health is: 1 3 3   In general, would you say your quality of life is: 2 2 3   In general, how would you rate your physical health? 1 2 3   In general, how would you rate your mental health, including your mood and your ability to think? 2 3 3   In general, how would you rate your satisfaction with your social activities and relationships? 2 2 4   In general, please rate how well you carry out your usual social activities and roles. (This includes activities at home, at work and in your community, and responsibilities as a parent, child, spouse, employee, friend, etc.) 1 2 3   To what extent are you able to carry out your everyday physical activities such as walking, climbing stairs, carrying groceries, or moving a chair? 2 1 2   In the past 7 days, how often have you been bothered by emotional problems such as feeling anxious, depressed, or irritable? 4 3 3   In the past 7 days, how would you rate your fatigue on average? 3 3 3   In the past 7 days, how would you rate your  pain on average, where 0 means no pain, and 10 means worst imaginable pain? 5 6 5   Global Mental Health Score 8 10 13   Global Physical Health Score 9 9 11   PROMIS TOTAL - SUBSCORES 17 19 24   Some recent data might be hidden     [place] Suicide Severity Rating Scale (Lifetime/Recent)  [place] Suicide Severity Rating (Lifetime/Recent) 9/8/2018 1/28/2022 9/29/2022   Wish to be Dead (Lifetime) - Yes -   Non-Specific Active Suicidal Thoughts (Lifetime) - Yes -   Most Severe Ideation Rating (Lifetime) - 5 -   Frequency (Lifetime) - 3 -   Duration (Lifetime) - 2 -   Controllability (Lifetime) - 5 -   Protective Factors  (Lifetime) - 5 -   Reasons for Ideation (Lifetime) - 4 -   Q1 Wished to be Dead (Past Month) - - no   Q2 Suicidal Thoughts (Past Month) - - no   Q3 Suicidal Thought Method - - no   Q4 Suicidal Intent without Specific Plan - - no   Q5 Suicide Intent with Specific Plan - - no   Q6 Suicide Behavior (Lifetime) - - no   Level of Risk per Screen - - low risk   RETIRED: 1. Wish to be Dead (Recent) - No -   RETIRED: 2. Non-Specific Active Suicidal Thoughts (Recent) - No -   3. Active Suicidal Ideation with any Methods (Not Plan) Without Intent to Act (Lifetime) - Yes -   RETIRED: 3. Active Suicidal Ideation with any Methods (Not Plan) Without Intent to Act (Recent) - No -   RETIRE: 4. Active Suicidal Ideation with Some Intent to Act, Without Specific Plan (Lifetime) - Yes -   4. Active Suicidal Ideation with Some Intent to Act, Without Specific Plan (Recent) - No -   RETIRE: 5. Active Suicidal Ideation with Specific Plan and Intent (Lifetime) - Yes -   RETIRED: 5. Active Suicidal Ideation with Specific Plan and Intent (Recent) - No -   Most Severe Ideation Rating (Past Month) NA NA -   Frequency (Past Month) - NA -   Duration (Past Month) - NA -   Controllability (Past Month) - NA -   Protective Factors (Past Month) - NA -   Reasons for Ideation (Past Month) - NA -   Actual Attempt (Lifetime) - Yes -   Actual  Attempt Description (Lifetime) - 3 attempts with last one 15 years ago -   Total Number of Actual Attempts (Lifetime) - 3 -   Actual Attempt (Past 3 Months) - No -   Has subject engaged in non-suicidal self-injurious behavior? (Lifetime) - No -   Has subject engaged in non-suicidal self-injurious behavior? (Past 3 Months) - No -   Interrupted Attempts (Lifetime) - No -   Interrupted Attempts (Past 3 Months) - No -   Aborted or Self-Interrupted Attempt (Lifetime) - No -   Aborted or Self-Interrupted Attempt (Past 3 Months) - No -   Preparatory Acts or Behavior (Lifetime) - No -   Preparatory Acts or Behavior (Past 3 Months) - No -   Most Recent Attempt Actual Lethality Code - 3 -   Most Lethal Attempt Actual Lethality Code - 2 -   Initial/First Attempt Actual Lethality Code - 2 -         ASSESSMENT: Current Emotional / Mental Status (status of significant symptoms):   Risk status (Self / Other harm or suicidal ideation)   Patient denies current fears or concerns for personal safety.   Patient denies current or recent suicidal ideation or behaviors.     Patient denies current or recent homicidal ideation or behaviors.   Patient denies current or recent self injurious behavior or ideation.   Patient denies other safety concerns.   Patient reports there has been no change in risk factors since their last session.     Patient reports there has been no change in protective factors since their last session.     a safety plan was completed several years ago.      Appearance:   Unable to assess   Eye Contact:   Unable to assess   Psychomotor Behavior: Unable to assess   Attitude:   Cooperative    Orientation:   All   Speech    Rate / Production: Normal     Volume:  Normal    Mood:    Depressed, Normal   Affect:    Appropriate    Thought Content:  Clear    Thought Form:  Coherent  Logical    Insight:    Good      Medication Review:   No changes to current psychiatric medication(s). PCP Dr. Eliane Quinn prescribing cymbalta 60  mg and trazadone.     Medication Compliance:   Yes     Changes in Health Issues:   None reported     Chemical Use Review:   Substance Use: Chemical use reviewed, no active concerns identified      Tobacco Use: No change in amount of tobacco use since last session.  Contemplation    Diagnosis:  MDD; EVGENY; PTSD.    Collateral Reports Completed:   Routed note to PCP    PLAN: (Patient Tasks / Therapist Tasks / Other)  Biweekly.   Follow safety plan. Use a healthy coping idea as needed.       Goals due 11/12/22      Kleber Pollack, [facility]                                                         ______________________________________________________________________    Individual Treatment Plan    Patient's Name: Melquiades Morales  YOB: 1955    Date of Creation: 1/28/22  Date Treatment Plan Last Reviewed/Revised: 8/12/22    DSM5 Diagnoses: 296.32 (F33.1) Major Depressive Disorder, Recurrent Episode, Moderate _, 300.02 (F41.1) Generalized Anxiety Disorder or 309.81 (F43.10) Posttraumatic Stress Disorder (includes Posttraumatic Stress Disorder for Children 6 Years and Younger)  With dissociative symptoms.  Psychosocial / Contextual Factors: recent loss of wife.  PROMIS (reviewed every 90 days):  8/12/22    Referral / Collaboration:  Referral to another professional/service is not indicated at this time.    Anticipated number of session for this episode of care: 10+  Anticipation frequency of session: Weekly  Anticipated Duration of each session: 38-52 minutes.  Treatment plan will be reviewed in 90 days or when goals have been changed.       MeasurableTreatment Goal(s) related to diagnosis / functional impairment(s)  Goal 1: Patient will report coping well with daily stressors.     I will know I've met my goal when each goal that we accomplish and I am having less of a problem in that area I know that you have helped me.       Objective #A (Patient Action)                          Patient will continue to  follow his Safety plan.  Status: New - Date: 1/28/22; 5/12/22; 8/12/22     Intervention(s)  Therapist will monitor for safety each visit and encourage use of safety plan.     Objective #B  Patient will use a healthy coping idea as needed 100% of trials for 1 week.  Status: New - Date: 1/28/22; 5/12/22  IDEAS: napping; petting Snippy (pet dog); watching tv.  Intervention(s)  Therapist will provide ideas and encouragement to use them.     Objective #C  Patient will process the loss of his wife as needed.  Status: New - Date: 1/28/22; 5/12/22; 8/12/22      Intervention(s)  Therapist will consider EMDR.              Patient has reviewed and agreed to the above plan.        Kleber Pollack, [facility]                January 28, 2022

## 2022-10-24 NOTE — PLAN OF CARE
"Pt is A/O, able to make needs known. Blood pressure (!) 169/83, pulse 75, temperature 98.8  F (37.1  C), temperature source Oral, resp. rate 18, height 1.803 m (5' 11\"), weight 113.1 kg (249 lb 5.4 oz), SpO2 93 %. Pt is currently on 3L of supplemental oxygen. No significant change to breathing per patient. Up with SBA to bathroom and very dyspneic after activity. Continues with IV antibiotics, Prednisone, and Neb treatments. Lactic acid improved this AM. Pt is resting appropriately. Continue to assess per POC.    " Head,  normocephalic,  atraumatic,  Face,  Face within normal limits,  Ears,  External ears within normal limits,  Nose/Nasopharynx,  External nose  normal appearance,  nares patent,  no nasal discharge,  Mouth and Throat,  Oral cavity appearance normal,  Breath odor normal,  Lips,  Appearance normal

## 2022-10-28 ENCOUNTER — VIRTUAL VISIT (OUTPATIENT)
Dept: PSYCHOLOGY | Facility: CLINIC | Age: 67
End: 2022-10-28
Payer: COMMERCIAL

## 2022-10-28 DIAGNOSIS — F41.1 GENERALIZED ANXIETY DISORDER: ICD-10-CM

## 2022-10-28 DIAGNOSIS — F43.10 POSTTRAUMATIC STRESS DISORDER: ICD-10-CM

## 2022-10-28 DIAGNOSIS — F33.1 MAJOR DEPRESSIVE DISORDER, RECURRENT EPISODE, MODERATE (H): Primary | ICD-10-CM

## 2022-10-28 PROCEDURE — 90832 PSYTX W PT 30 MINUTES: CPT | Mod: 95 | Performed by: SOCIAL WORKER

## 2022-10-28 ASSESSMENT — PATIENT HEALTH QUESTIONNAIRE - PHQ9: SUM OF ALL RESPONSES TO PHQ QUESTIONS 1-9: 16

## 2022-10-28 NOTE — PROGRESS NOTES
"    [facility]                                     Progress Note    Patient Name: Melquiades Morales  Date: 10/28/22         Service Type: Individual      Session Start Time:  9 am  Session End Time: 9:30 am     Session Length: 30 min     Session #: 16    Attendees: Client attended alone.    Service Modality:  Phone Visit:                  Provider verified identity through the following two step process.  Patient provided:  Patient is known previously to provider     The patient has been notified of the following:      \"We have found that certain health care needs can be provided without the need for a face to face visit.  This service lets us provide the care you need with a phone conversation.       I will have full access to your [facility] medical record during this entire phone call. I will be taking notes for your medical record.      Since this is like an office visit, we will bill your insurance company for this service.       There are potential benefits and risks of telephone visits (e.g. limits to patient confidentiality) that differ from in-person visits.?Confidentiality still applies for telephone services, and nobody will record the visit.  It is important to be in a quiet, private space that is free of distractions (including cell phone or other devices) during the visit.??      If during the course of the call I believe a telephone visit is not appropriate, you will not be charged for this service\"     Consent has been obtained for this service by care team member: Yes      DATA  Interactive Complexity: No  Crisis: No        Progress Since Last Session (Related to Symptoms / Goals / Homework):   Symptoms: stable    Homework: Partially completed- use a healthy coping idea. He has the grounding ideas handout and is to practice them.     Episode of Care Goals: Minimal progress - ACTION (Actively working towards change); Intervened by reinforcing change plan / affirming steps " taken.    Current / Ongoing Stressors and Concerns:   He now lives in a nursing home. He is making friends. It feels like home now. The staff are nice.  He and Chiquis are planning on getting a place together in a couple of years.  He spoke with daughter Gene Mercado and she and her baby healthy. He hopes to visit her in [place] this August. She is not ready to see him he reports.  He broke his ankle and it is on the mend. He reports at a doctor visit he was told due to memory issues he would no longer be able to drive. His son called to let him know he received a call to remove patient's keys. Patient reports feeling relieved when his PCP allegedly told him another doctor cannot do that. His son was relieved as well patient reports.         Treatment Objective(s) Addressed in This Session:   Use a healthy coping idea as needed.      Intervention:  Assessed functioning and for safety. Reviewed the phq and he asked to hold off on the shailesh. Processed feelings about how his sons are doing; ankle mending and adapting to new home. Reinforced use of healthy coping ideas.           Assessments completed prior to visit:  The following assessments were completed by patient for this visit:  PHQ9:   PHQ-9 SCORE 5/26/2022 6/10/2022 6/24/2022 7/8/2022 8/12/2022 9/9/2022 10/13/2022   PHQ-9 Total Score - - - - - - -   PHQ-9 Total Score MyChart - - - - - - -   PHQ-9 Total Score 13 14 17 16 16 14 16     GAD7:   SHAILESH-7 SCORE 5/26/2022 6/10/2022 6/24/2022 7/8/2022 8/12/2022 9/9/2022 10/13/2022   Total Score - - - - - - -   Total Score - - - - - - -   Total Score 12 12 14 12 14 11 13     CAGE-AID:   CAGE-AID Total Score 1/28/2022   Total Score 0   Total Score MyChart 0 (A total score of 2 or greater is considered clinically significant)     PROMIS 10-Global Health (all questions and answers displayed):   PROMIS 10 1/28/2022 5/12/2022 8/12/2022   In general, would you say your health is: Poor - -   In general, would you say your quality of life  is: Fair - -   In general, how would you rate your physical health? Poor - -   In general, how would you rate your mental health, including your mood and your ability to think? Fair - -   In general, how would you rate your satisfaction with your social activities and relationships? Fair - -   In general, please rate how well you carry out your usual social activities and roles Poor - -   To what extent are you able to carry out your everyday physical activities such as walking, climbing stairs, carrying groceries, or moving a chair? A little - -   How often have you been bothered by emotional problems such as feeling anxious, depressed or irritable? Often - -   How would you rate your fatigue on average? Moderate - -   How would you rate your pain on average?   0 = No Pain  to  10 = Worst Imaginable Pain 5 - -   In general, would you say your health is: 1 3 3   In general, would you say your quality of life is: 2 2 3   In general, how would you rate your physical health? 1 2 3   In general, how would you rate your mental health, including your mood and your ability to think? 2 3 3   In general, how would you rate your satisfaction with your social activities and relationships? 2 2 4   In general, please rate how well you carry out your usual social activities and roles. (This includes activities at home, at work and in your community, and responsibilities as a parent, child, spouse, employee, friend, etc.) 1 2 3   To what extent are you able to carry out your everyday physical activities such as walking, climbing stairs, carrying groceries, or moving a chair? 2 1 2   In the past 7 days, how often have you been bothered by emotional problems such as feeling anxious, depressed, or irritable? 4 3 3   In the past 7 days, how would you rate your fatigue on average? 3 3 3   In the past 7 days, how would you rate your pain on average, where 0 means no pain, and 10 means worst imaginable pain? 5 6 5   Global Mental Health  Score 8 10 13   Global Physical Health Score 9 9 11   PROMIS TOTAL - SUBSCORES 17 19 24   Some recent data might be hidden     [place] Suicide Severity Rating Scale (Lifetime/Recent)  [place] Suicide Severity Rating (Lifetime/Recent) 9/8/2018 1/28/2022 9/29/2022   Wish to be Dead (Lifetime) - Yes -   Non-Specific Active Suicidal Thoughts (Lifetime) - Yes -   Most Severe Ideation Rating (Lifetime) - 5 -   Frequency (Lifetime) - 3 -   Duration (Lifetime) - 2 -   Controllability (Lifetime) - 5 -   Protective Factors  (Lifetime) - 5 -   Reasons for Ideation (Lifetime) - 4 -   Q1 Wished to be Dead (Past Month) - - no   Q2 Suicidal Thoughts (Past Month) - - no   Q3 Suicidal Thought Method - - no   Q4 Suicidal Intent without Specific Plan - - no   Q5 Suicide Intent with Specific Plan - - no   Q6 Suicide Behavior (Lifetime) - - no   Level of Risk per Screen - - low risk   RETIRED: 1. Wish to be Dead (Recent) - No -   RETIRED: 2. Non-Specific Active Suicidal Thoughts (Recent) - No -   3. Active Suicidal Ideation with any Methods (Not Plan) Without Intent to Act (Lifetime) - Yes -   RETIRED: 3. Active Suicidal Ideation with any Methods (Not Plan) Without Intent to Act (Recent) - No -   RETIRE: 4. Active Suicidal Ideation with Some Intent to Act, Without Specific Plan (Lifetime) - Yes -   4. Active Suicidal Ideation with Some Intent to Act, Without Specific Plan (Recent) - No -   RETIRE: 5. Active Suicidal Ideation with Specific Plan and Intent (Lifetime) - Yes -   RETIRED: 5. Active Suicidal Ideation with Specific Plan and Intent (Recent) - No -   Most Severe Ideation Rating (Past Month) NA NA -   Frequency (Past Month) - NA -   Duration (Past Month) - NA -   Controllability (Past Month) - NA -   Protective Factors (Past Month) - NA -   Reasons for Ideation (Past Month) - NA -   Actual Attempt (Lifetime) - Yes -   Actual Attempt Description (Lifetime) - 3 attempts with last one 15 years ago -   Total Number of Actual  Attempts (Lifetime) - 3 -   Actual Attempt (Past 3 Months) - No -   Has subject engaged in non-suicidal self-injurious behavior? (Lifetime) - No -   Has subject engaged in non-suicidal self-injurious behavior? (Past 3 Months) - No -   Interrupted Attempts (Lifetime) - No -   Interrupted Attempts (Past 3 Months) - No -   Aborted or Self-Interrupted Attempt (Lifetime) - No -   Aborted or Self-Interrupted Attempt (Past 3 Months) - No -   Preparatory Acts or Behavior (Lifetime) - No -   Preparatory Acts or Behavior (Past 3 Months) - No -   Most Recent Attempt Actual Lethality Code - 3 -   Most Lethal Attempt Actual Lethality Code - 2 -   Initial/First Attempt Actual Lethality Code - 2 -         ASSESSMENT: Current Emotional / Mental Status (status of significant symptoms):   Risk status (Self / Other harm or suicidal ideation)   Patient denies current fears or concerns for personal safety.   Patient denies current or recent suicidal ideation or behaviors.     Patient denies current or recent homicidal ideation or behaviors.   Patient denies current or recent self injurious behavior or ideation.   Patient denies other safety concerns.   Patient reports there has been no change in risk factors since their last session.     Patient reports there has been no change in protective factors since their last session.     a safety plan was completed several years ago.      Appearance:   Unable to assess   Eye Contact:   Unable to assess   Psychomotor Behavior: Unable to assess   Attitude:   Cooperative    Orientation:   All   Speech    Rate / Production: Normal     Volume:  Normal    Mood:    Depressed, Normal   Affect:    Appropriate    Thought Content:  Clear    Thought Form:  Coherent  Logical    Insight:    Good      Medication Review:   No changes to current psychiatric medication(s). PCP Dr. Eliane Quinn prescribing cymbalta 60 mg and trazadone.     Medication Compliance:   Yes     Changes in Health Issues:   None  reported     Chemical Use Review:   Substance Use: Chemical use reviewed, no active concerns identified      Tobacco Use: No change in amount of tobacco use since last session.  Contemplation    Diagnosis:  MDD; EVGENY; PTSD.    Collateral Reports Completed:   Routed note to PCP    PLAN: (Patient Tasks / Therapist Tasks / Other)  Biweekly.   Follow safety plan. Use a healthy coping idea as needed.       Goals due 11/12/22      Kleber Pollack, [facility]                                                         ______________________________________________________________________    Individual Treatment Plan    Patient's Name: Melquiades Morales  YOB: 1955    Date of Creation: 1/28/22  Date Treatment Plan Last Reviewed/Revised: 8/12/22    DSM5 Diagnoses: 296.32 (F33.1) Major Depressive Disorder, Recurrent Episode, Moderate _, 300.02 (F41.1) Generalized Anxiety Disorder or 309.81 (F43.10) Posttraumatic Stress Disorder (includes Posttraumatic Stress Disorder for Children 6 Years and Younger)  With dissociative symptoms.  Psychosocial / Contextual Factors: recent loss of wife.  PROMIS (reviewed every 90 days):  8/12/22    Referral / Collaboration:  Referral to another professional/service is not indicated at this time.    Anticipated number of session for this episode of care: 10+  Anticipation frequency of session: Weekly  Anticipated Duration of each session: 38-52 minutes.  Treatment plan will be reviewed in 90 days or when goals have been changed.       MeasurableTreatment Goal(s) related to diagnosis / functional impairment(s)  Goal 1: Patient will report coping well with daily stressors.     I will know I've met my goal when each goal that we accomplish and I am having less of a problem in that area I know that you have helped me.       Objective #A (Patient Action)                          Patient will continue to follow his Safety plan.  Status: New - Date: 1/28/22; 5/12/22;  8/12/22     Intervention(s)  Therapist will monitor for safety each visit and encourage use of safety plan.     Objective #B  Patient will use a healthy coping idea as needed 100% of trials for 1 week.  Status: New - Date: 1/28/22; 5/12/22; 8/12/22  IDEAS: napping; petting Snippy (pet dog); watching tv.  Intervention(s)  Therapist will provide ideas and encouragement to use them.     Objective #C  Patient will process the loss of his wife as needed.  Status: New - Date: 1/28/22; 5/12/22; 8/12/22      Intervention(s)  Therapist will consider EMDR.              Patient has reviewed and agreed to the above plan.        Kleber Pollack, [facility]                January 28, 2022

## 2022-11-04 DIAGNOSIS — F32.A DEPRESSION, UNSPECIFIED DEPRESSION TYPE: ICD-10-CM

## 2022-11-04 NOTE — TELEPHONE ENCOUNTER
Medication Question or Refill        What medication are you calling about (include dose and sig)?: CYMBALTA    Controlled Substance Agreement on file:   CSA -- Patient Level:    CSA: None found at the patient level.       Who prescribed the medication?:PATRIA  Do you need a refill? Yes:   When did you use the medication last? Today  Took last pill. Need them today please as PHARMACY IS NOT open on the weekend     Do you have any questions or concerns?  Yes: out of pills    Preferred Pharmacy:   [place], [place] - [address]  [address]  [address] [facility] [address]  Phone: [phone] Fax: [phone] Alternate Fax: [phone]      Could we send this information to you in Meiyou or would you prefer to receive a phone call?:   Patient would prefer a phone call   Okay to leave a detailed message?: Yes at Home number on file [phone] (home)

## 2022-11-04 NOTE — TELEPHONE ENCOUNTER
Reason for Call:  Home Health Care    Yulia  with [facility] called regarding (reason for call): verbal orders    Orders are needed for this patient.     PT: 2x a week for 2 weeks  1x a week for 5 weeks    Phone Number Homecare Nurse can be reached at: [phone]    Can we leave a detailed message on this number? YES    Phone number patient can be reached at: Home number on file [phone] (home)    Best Time: any    Call taken on 7/14/2020 at 9:10 AM by Elizabeth Arita       H Plasty Text: Given the location of the defect, shape of the defect and the proximity to free margins a H-plasty was deemed most appropriate for repair.  Using a sterile surgical marker, the appropriate advancement arms of the H-plasty were drawn incorporating the defect and placing the expected incisions within the relaxed skin tension lines where possible. The area thus outlined was incised deep to adipose tissue with a #15 scalpel blade. The skin margins were undermined to an appropriate distance in all directions utilizing iris scissors.  The opposing advancement arms were then advanced into place in opposite direction and anchored with interrupted buried subcutaneous sutures.

## 2022-11-07 RX ORDER — DULOXETIN HYDROCHLORIDE 60 MG/1
120 CAPSULE, DELAYED RELEASE ORAL DAILY
Qty: 60 CAPSULE | Refills: 11 | OUTPATIENT
Start: 2022-11-07

## 2022-11-10 ENCOUNTER — OFFICE VISIT (OUTPATIENT)
Dept: PSYCHOLOGY | Facility: CLINIC | Age: 67
End: 2022-11-10
Payer: COMMERCIAL

## 2022-11-10 DIAGNOSIS — F41.1 GENERALIZED ANXIETY DISORDER: ICD-10-CM

## 2022-11-10 DIAGNOSIS — F43.10 POSTTRAUMATIC STRESS DISORDER: ICD-10-CM

## 2022-11-10 DIAGNOSIS — F33.1 MAJOR DEPRESSIVE DISORDER, RECURRENT EPISODE, MODERATE (H): Primary | ICD-10-CM

## 2022-11-10 PROCEDURE — 90834 PSYTX W PT 45 MINUTES: CPT | Performed by: SOCIAL WORKER

## 2022-11-10 ASSESSMENT — ANXIETY QUESTIONNAIRES
1. FEELING NERVOUS, ANXIOUS, OR ON EDGE: MORE THAN HALF THE DAYS
2. NOT BEING ABLE TO STOP OR CONTROL WORRYING: MORE THAN HALF THE DAYS
5. BEING SO RESTLESS THAT IT IS HARD TO SIT STILL: MORE THAN HALF THE DAYS
IF YOU CHECKED OFF ANY PROBLEMS ON THIS QUESTIONNAIRE, HOW DIFFICULT HAVE THESE PROBLEMS MADE IT FOR YOU TO DO YOUR WORK, TAKE CARE OF THINGS AT HOME, OR GET ALONG WITH OTHER PEOPLE: SOMEWHAT DIFFICULT
6. BECOMING EASILY ANNOYED OR IRRITABLE: NEARLY EVERY DAY
3. WORRYING TOO MUCH ABOUT DIFFERENT THINGS: MORE THAN HALF THE DAYS
GAD7 TOTAL SCORE: 13
7. FEELING AFRAID AS IF SOMETHING AWFUL MIGHT HAPPEN: NOT AT ALL
GAD7 TOTAL SCORE: 13

## 2022-11-10 ASSESSMENT — COLUMBIA-SUICIDE SEVERITY RATING SCALE - C-SSRS
6. HAVE YOU EVER DONE ANYTHING, STARTED TO DO ANYTHING, OR PREPARED TO DO ANYTHING TO END YOUR LIFE?: NO
ATTEMPT SINCE LAST CONTACT: NO
2. HAVE YOU ACTUALLY HAD ANY THOUGHTS OF KILLING YOURSELF?: NO
SUICIDE, SINCE LAST CONTACT: NO
TOTAL  NUMBER OF ABORTED OR SELF INTERRUPTED ATTEMPTS SINCE LAST CONTACT: NO
1. SINCE LAST CONTACT, HAVE YOU WISHED YOU WERE DEAD OR WISHED YOU COULD GO TO SLEEP AND NOT WAKE UP?: NO
TOTAL  NUMBER OF INTERRUPTED ATTEMPTS SINCE LAST CONTACT: NO

## 2022-11-10 NOTE — PROGRESS NOTES
[facility]                                     Progress Note    Patient Name: Melquiades Morales  Date: 11/10/22         Service Type: Individual      Session Start Time:  9 am  Session End Time: 9:45 am     Session Length: 45 min     Session #: 17    Attendees: Client attended alone.    Service Modality:  In Person Visit:                       DATA  Interactive Complexity: No  Crisis: No        Progress Since Last Session (Related to Symptoms / Goals / Homework):   Symptoms: stable    Homework: Partially completed- use a healthy coping idea. He has the grounding ideas handout and is to practice them.     Episode of Care Goals: Minimal progress - ACTION (Actively working towards change); Intervened by reinforcing change plan / affirming steps taken.    Current / Ongoing Stressors and Concerns:   He now lives in a nursing home. He is making friends. It feels like home now. The staff are nice.  He and Chiquis are planning on getting a place together in a couple of years.  He spoke with daughter Gene Mercado and she and her baby healthy. He hopes to visit her in [place] this August. She is not ready to see him he reports.  He broke his ankle and it is on the mend.    His van is not working so he received a ride to [employer] through insurance.         Treatment Objective(s) Addressed in This Session:   Use a healthy coping idea as needed.      Intervention:  Assessed functioning and for safety. Reviewed the phq and shailesh. Reviewed the short [place] and the promis-10. Reviewed goals. Processed feelings about how his sons are doing; ankle mending and adapting to new home. Processed feelings about his niece and problem solved. Reinforced use of healthy coping ideas.           Assessments completed prior to visit:  The following assessments were completed by patient for this visit:  PHQ9:   PHQ-9 SCORE 6/10/2022 6/24/2022 7/8/2022 8/12/2022 9/9/2022 10/13/2022 10/28/2022   PHQ-9 Total Score - - - - - - -   PHQ-9  Total Score MyChart - - - - - - -   PHQ-9 Total Score 14 17 16 16 14 16 16     GAD7:   EVGENY-7 SCORE 5/26/2022 6/10/2022 6/24/2022 7/8/2022 8/12/2022 9/9/2022 10/13/2022   Total Score - - - - - - -   Total Score - - - - - - -   Total Score 12 12 14 12 14 11 13     CAGE-AID:   CAGE-AID Total Score 1/28/2022   Total Score 0   Total Score MyChart 0 (A total score of 2 or greater is considered clinically significant)     PROMIS 10-Global Health (all questions and answers displayed):   PROMIS 10 1/28/2022 5/12/2022 8/12/2022   In general, would you say your health is: Poor - -   In general, would you say your quality of life is: Fair - -   In general, how would you rate your physical health? Poor - -   In general, how would you rate your mental health, including your mood and your ability to think? Fair - -   In general, how would you rate your satisfaction with your social activities and relationships? Fair - -   In general, please rate how well you carry out your usual social activities and roles Poor - -   To what extent are you able to carry out your everyday physical activities such as walking, climbing stairs, carrying groceries, or moving a chair? A little - -   How often have you been bothered by emotional problems such as feeling anxious, depressed or irritable? Often - -   How would you rate your fatigue on average? Moderate - -   How would you rate your pain on average?   0 = No Pain  to  10 = Worst Imaginable Pain 5 - -   In general, would you say your health is: 1 3 3   In general, would you say your quality of life is: 2 2 3   In general, how would you rate your physical health? 1 2 3   In general, how would you rate your mental health, including your mood and your ability to think? 2 3 3   In general, how would you rate your satisfaction with your social activities and relationships? 2 2 4   In general, please rate how well you carry out your usual social activities and roles. (This includes activities at  home, at work and in your community, and responsibilities as a parent, child, spouse, employee, friend, etc.) 1 2 3   To what extent are you able to carry out your everyday physical activities such as walking, climbing stairs, carrying groceries, or moving a chair? 2 1 2   In the past 7 days, how often have you been bothered by emotional problems such as feeling anxious, depressed, or irritable? 4 3 3   In the past 7 days, how would you rate your fatigue on average? 3 3 3   In the past 7 days, how would you rate your pain on average, where 0 means no pain, and 10 means worst imaginable pain? 5 6 5   Global Mental Health Score 8 10 13   Global Physical Health Score 9 9 11   PROMIS TOTAL - SUBSCORES 17 19 24   Some recent data might be hidden     [place] Suicide Severity Rating Scale (Lifetime/Recent)  [place] Suicide Severity Rating (Lifetime/Recent) 9/8/2018 1/28/2022 9/29/2022   Wish to be Dead (Lifetime) - Yes -   Non-Specific Active Suicidal Thoughts (Lifetime) - Yes -   Most Severe Ideation Rating (Lifetime) - 5 -   Frequency (Lifetime) - 3 -   Duration (Lifetime) - 2 -   Controllability (Lifetime) - 5 -   Protective Factors  (Lifetime) - 5 -   Reasons for Ideation (Lifetime) - 4 -   Q1 Wished to be Dead (Past Month) - - no   Q2 Suicidal Thoughts (Past Month) - - no   Q3 Suicidal Thought Method - - no   Q4 Suicidal Intent without Specific Plan - - no   Q5 Suicide Intent with Specific Plan - - no   Q6 Suicide Behavior (Lifetime) - - no   Level of Risk per Screen - - low risk   RETIRED: 1. Wish to be Dead (Recent) - No -   RETIRED: 2. Non-Specific Active Suicidal Thoughts (Recent) - No -   3. Active Suicidal Ideation with any Methods (Not Plan) Without Intent to Act (Lifetime) - Yes -   RETIRED: 3. Active Suicidal Ideation with any Methods (Not Plan) Without Intent to Act (Recent) - No -   RETIRE: 4. Active Suicidal Ideation with Some Intent to Act, Without Specific Plan (Lifetime) - Yes -   4. Active Suicidal  Ideation with Some Intent to Act, Without Specific Plan (Recent) - No -   RETIRE: 5. Active Suicidal Ideation with Specific Plan and Intent (Lifetime) - Yes -   RETIRED: 5. Active Suicidal Ideation with Specific Plan and Intent (Recent) - No -   Most Severe Ideation Rating (Past Month) NA NA -   Frequency (Past Month) - NA -   Duration (Past Month) - NA -   Controllability (Past Month) - NA -   Protective Factors (Past Month) - NA -   Reasons for Ideation (Past Month) - NA -   Actual Attempt (Lifetime) - Yes -   Actual Attempt Description (Lifetime) - 3 attempts with last one 15 years ago -   Total Number of Actual Attempts (Lifetime) - 3 -   Actual Attempt (Past 3 Months) - No -   Has subject engaged in non-suicidal self-injurious behavior? (Lifetime) - No -   Has subject engaged in non-suicidal self-injurious behavior? (Past 3 Months) - No -   Interrupted Attempts (Lifetime) - No -   Interrupted Attempts (Past 3 Months) - No -   Aborted or Self-Interrupted Attempt (Lifetime) - No -   Aborted or Self-Interrupted Attempt (Past 3 Months) - No -   Preparatory Acts or Behavior (Lifetime) - No -   Preparatory Acts or Behavior (Past 3 Months) - No -   Most Recent Attempt Actual Lethality Code - 3 -   Most Lethal Attempt Actual Lethality Code - 2 -   Initial/First Attempt Actual Lethality Code - 2 -         ASSESSMENT: Current Emotional / Mental Status (status of significant symptoms):   Risk status (Self / Other harm or suicidal ideation)   Patient denies current fears or concerns for personal safety.   Patient denies current or recent suicidal ideation or behaviors.     Patient denies current or recent homicidal ideation or behaviors.   Patient denies current or recent self injurious behavior or ideation.   Patient denies other safety concerns.   Patient reports there has been no change in risk factors since their last session.     Patient reports there has been no change in protective factors since their last session.      a safety plan was completed several years ago.      Appearance:   Appropriate     Eye Contact:   Good     Psychomotor Behavior: Normal    Attitude:   Cooperative    Orientation:   All   Speech    Rate / Production: Normal     Volume:  Normal    Mood:    Depressed    Affect:    Appropriate    Thought Content:  Clear    Thought Form:  Coherent  Logical    Insight:    Good      Medication Review:   No changes to current psychiatric medication(s). PCP Dr. Eliane Quinn prescribing cymbalta 60 mg and trazadone.     Medication Compliance:   Yes     Changes in Health Issues:   None reported     Chemical Use Review:   Substance Use: Chemical use reviewed, no active concerns identified      Tobacco Use: No change in amount of tobacco use since last session.  Contemplation    Diagnosis:  MDD; EVGENY; PTSD.    Collateral Reports Completed:   Routed note to PCP    PLAN: (Patient Tasks / Therapist Tasks / Other)  Biweekly.   Follow safety plan. Use a healthy coping idea as needed.       Goals due 2/10/23      Kleber Pollack, LICSW                                                         ______________________________________________________________________    Individual Treatment Plan    Patient's Name: Melquiades Morales  YOB: 1955    Date of Creation: 1/28/22  Date Treatment Plan Last Reviewed/Revised: 11/10/22    DSM5 Diagnoses: 296.32 (F33.1) Major Depressive Disorder, Recurrent Episode, Moderate _, 300.02 (F41.1) Generalized Anxiety Disorder or 309.81 (F43.10) Posttraumatic Stress Disorder (includes Posttraumatic Stress Disorder for Children 6 Years and Younger)  With dissociative symptoms.  Psychosocial / Contextual Factors: recent loss of wife.  PROMIS (reviewed every 90 days):  11/10/22    Referral / Collaboration:  Referral to another professional/service is not indicated at this time.    Anticipated number of session for this episode of care: 10+  Anticipation frequency of session: Weekly  Anticipated  Duration of each session: 38-52 minutes.  Treatment plan will be reviewed in 90 days or when goals have been changed.       MeasurableTreatment Goal(s) related to diagnosis / functional impairment(s)  Goal 1: Patient will report coping well with daily stressors.     I will know I've met my goal when each goal that we accomplish and I am having less of a problem in that area I know that you have helped me.       Objective #A (Patient Action)                          Patient will continue to follow his Safety plan.  Status: New - Date: 1/28/22; 5/12/22; 8/12/22; 11/10/22     Intervention(s)  Therapist will monitor for safety each visit and encourage use of safety plan.     Objective #B  Patient will use a healthy coping idea as needed 100% of trials for 1 week.  Status: New - Date: 1/28/22; 5/12/22; 8/12/22; 11/10/22  IDEAS: napping; petting Snippy (pet dog); watching tv.  Intervention(s)  Therapist will provide ideas and encouragement to use them.     Objective #C  Patient will process the loss of his wife as needed.  Status: New - Date: 1/28/22; 5/12/22; 8/12/22; 11/10/22      Intervention(s)  Therapist will consider EMDR.              Patient has reviewed and agreed to the above plan.        Kleber Pollack, [facility]                January 28, 2022

## 2022-11-11 NOTE — TELEPHONE ENCOUNTER
Home care RN saw him 2 days ago.  Looks better.  Not spreading.  The little red spots are now the size of a grain of salt.  Still painful.  Using regular lotion.  Recommended to patient to come in for appointment.  Since it looking better homecare RN will have him come in.      Anai Savage RN     rsv positive 8

## 2022-12-01 ENCOUNTER — VIRTUAL VISIT (OUTPATIENT)
Dept: PSYCHOLOGY | Facility: CLINIC | Age: 67
End: 2022-12-01
Payer: COMMERCIAL

## 2022-12-01 DIAGNOSIS — F33.1 MAJOR DEPRESSIVE DISORDER, RECURRENT EPISODE, MODERATE (H): Primary | ICD-10-CM

## 2022-12-01 DIAGNOSIS — F41.1 GENERALIZED ANXIETY DISORDER: ICD-10-CM

## 2022-12-01 DIAGNOSIS — F43.10 POSTTRAUMATIC STRESS DISORDER: ICD-10-CM

## 2022-12-01 PROCEDURE — 90832 PSYTX W PT 30 MINUTES: CPT | Mod: 95 | Performed by: SOCIAL WORKER

## 2022-12-01 ASSESSMENT — ANXIETY QUESTIONNAIRES
6. BECOMING EASILY ANNOYED OR IRRITABLE: MORE THAN HALF THE DAYS
7. FEELING AFRAID AS IF SOMETHING AWFUL MIGHT HAPPEN: MORE THAN HALF THE DAYS
IF YOU CHECKED OFF ANY PROBLEMS ON THIS QUESTIONNAIRE, HOW DIFFICULT HAVE THESE PROBLEMS MADE IT FOR YOU TO DO YOUR WORK, TAKE CARE OF THINGS AT HOME, OR GET ALONG WITH OTHER PEOPLE: SOMEWHAT DIFFICULT
5. BEING SO RESTLESS THAT IT IS HARD TO SIT STILL: MORE THAN HALF THE DAYS
1. FEELING NERVOUS, ANXIOUS, OR ON EDGE: MORE THAN HALF THE DAYS
GAD7 TOTAL SCORE: 14
2. NOT BEING ABLE TO STOP OR CONTROL WORRYING: MORE THAN HALF THE DAYS
GAD7 TOTAL SCORE: 14
3. WORRYING TOO MUCH ABOUT DIFFERENT THINGS: MORE THAN HALF THE DAYS

## 2022-12-01 NOTE — PROGRESS NOTES
"    [facility]                                     Progress Note    Patient Name: Melquiades Morales  Date: 12/1/22         Service Type: Individual      Session Start Time:  9 am  Session End Time: 9:30 am     Session Length: 30 min     Session #: 18    Attendees: Client attended alone.    Service Modality:  Phone Visit:       Provider verified identity through the following two step process.  Patient provided:  Patient is known previously to provider     The patient has been notified of the following:      \"We have found that certain health care needs can be provided without the need for a face to face visit.  This service lets us provide the care you need with a phone conversation.       I will have full access to your [facility] medical record during this entire phone call.   I will be taking notes for your medical record.      Since this is like an office visit, we will bill your insurance company for this service.       There are potential benefits and risks of telephone visits (e.g. limits to patient confidentiality) that differ from in-person visits.?Confidentiality still applies for telephone services, and nobody will record the visit.  It is important to be in a quiet, private space that is free of distractions (including cell phone or other devices) during the visit.??      If during the course of the call I believe a telephone visit is not appropriate, you will not be charged for this service\"     Consent has been obtained for this service by care team member: Yes                  DATA  Interactive Complexity: No  Crisis: No        Progress Since Last Session (Related to Symptoms / Goals / Homework):   Symptoms: stable    Homework: Partially completed- use a healthy coping idea. He has the grounding ideas handout and is to practice them.     Episode of Care Goals: Minimal progress - ACTION (Actively working towards change); Intervened by reinforcing change plan / affirming steps " taken.    Current / Ongoing Stressors and Concerns:   He now lives in a nursing home. He is making friends. It feels like home now. The staff are nice.  He and Chiquis are planning on getting a place together in a couple of years.   His van is not working.  Had thanksgiving at his son's.         Treatment Objective(s) Addressed in This Session:   Use a healthy coping idea as needed.      Intervention:  Assessed functioning and for safety. Reviewed the phq and shailesh. Processed feelings about how his time at sons was for thanksgiving; niece situation; feeling bad about himself; girlfriend not feeling well. Reinforced use of healthy coping ideas- time with his dog Tam helps.           Assessments completed prior to visit:  The following assessments were completed by patient for this visit:  PHQ9:   PHQ-9 SCORE 6/24/2022 7/8/2022 8/12/2022 9/9/2022 10/13/2022 10/28/2022 11/10/2022   PHQ-9 Total Score - - - - - - -   PHQ-9 Total Score MyChart - - - - - - -   PHQ-9 Total Score 17 16 16 14 16 16 17     GAD7:   SHAILESH-7 SCORE 6/10/2022 6/24/2022 7/8/2022 8/12/2022 9/9/2022 10/13/2022 11/10/2022   Total Score - - - - - - -   Total Score - - - - - - -   Total Score 12 14 12 14 11 13 13     CAGE-AID:   CAGE-AID Total Score 1/28/2022   Total Score 0   Total Score MyChart 0 (A total score of 2 or greater is considered clinically significant)     PROMIS 10-Global Health (all questions and answers displayed):   PROMIS 10 1/28/2022 5/12/2022 8/12/2022 11/10/2022   In general, would you say your health is: Poor - - -   In general, would you say your quality of life is: Fair - - -   In general, how would you rate your physical health? Poor - - -   In general, how would you rate your mental health, including your mood and your ability to think? Fair - - -   In general, how would you rate your satisfaction with your social activities and relationships? Fair - - -   In general, please rate how well you carry out your usual social activities  and roles Poor - - -   To what extent are you able to carry out your everyday physical activities such as walking, climbing stairs, carrying groceries, or moving a chair? A little - - -   How often have you been bothered by emotional problems such as feeling anxious, depressed or irritable? Often - - -   How would you rate your fatigue on average? Moderate - - -   How would you rate your pain on average?   0 = No Pain  to  10 = Worst Imaginable Pain 5 - - -   In general, would you say your health is: 1 3 3 2   In general, would you say your quality of life is: 2 2 3 2   In general, how would you rate your physical health? 1 2 3 1   In general, how would you rate your mental health, including your mood and your ability to think? 2 3 3 2   In general, how would you rate your satisfaction with your social activities and relationships? 2 2 4 2   In general, please rate how well you carry out your usual social activities and roles. (This includes activities at home, at work and in your community, and responsibilities as a parent, child, spouse, employee, friend, etc.) 1 2 3 2   To what extent are you able to carry out your everyday physical activities such as walking, climbing stairs, carrying groceries, or moving a chair? 2 1 2 1   In the past 7 days, how often have you been bothered by emotional problems such as feeling anxious, depressed, or irritable? 4 3 3 4   In the past 7 days, how would you rate your fatigue on average? 3 3 3 3   In the past 7 days, how would you rate your pain on average, where 0 means no pain, and 10 means worst imaginable pain? 5 6 5 5   Global Mental Health Score 8 10 13 8   Global Physical Health Score 9 9 11 8   PROMIS TOTAL - SUBSCORES 17 19 24 16   Some recent data might be hidden     [place] Suicide Severity Rating Scale (Lifetime/Recent)  [place] Suicide Severity Rating (Lifetime/Recent) 9/8/2018 1/28/2022 9/29/2022   Wish to be Dead (Lifetime) - Yes -   Non-Specific Active Suicidal  Thoughts (Lifetime) - Yes -   Most Severe Ideation Rating (Lifetime) - 5 -   Frequency (Lifetime) - 3 -   Duration (Lifetime) - 2 -   Controllability (Lifetime) - 5 -   Protective Factors  (Lifetime) - 5 -   Reasons for Ideation (Lifetime) - 4 -   Q1 Wished to be Dead (Past Month) - - no   Q2 Suicidal Thoughts (Past Month) - - no   Q3 Suicidal Thought Method - - no   Q4 Suicidal Intent without Specific Plan - - no   Q5 Suicide Intent with Specific Plan - - no   Q6 Suicide Behavior (Lifetime) - - no   Level of Risk per Screen - - low risk   RETIRED: 1. Wish to be Dead (Recent) - No -   RETIRED: 2. Non-Specific Active Suicidal Thoughts (Recent) - No -   3. Active Suicidal Ideation with any Methods (Not Plan) Without Intent to Act (Lifetime) - Yes -   RETIRED: 3. Active Suicidal Ideation with any Methods (Not Plan) Without Intent to Act (Recent) - No -   RETIRE: 4. Active Suicidal Ideation with Some Intent to Act, Without Specific Plan (Lifetime) - Yes -   4. Active Suicidal Ideation with Some Intent to Act, Without Specific Plan (Recent) - No -   RETIRE: 5. Active Suicidal Ideation with Specific Plan and Intent (Lifetime) - Yes -   RETIRED: 5. Active Suicidal Ideation with Specific Plan and Intent (Recent) - No -   Most Severe Ideation Rating (Past Month) NA NA -   Frequency (Past Month) - NA -   Duration (Past Month) - NA -   Controllability (Past Month) - NA -   Protective Factors (Past Month) - NA -   Reasons for Ideation (Past Month) - NA -   Actual Attempt (Lifetime) - Yes -   Actual Attempt Description (Lifetime) - 3 attempts with last one 15 years ago -   Total Number of Actual Attempts (Lifetime) - 3 -   Actual Attempt (Past 3 Months) - No -   Has subject engaged in non-suicidal self-injurious behavior? (Lifetime) - No -   Has subject engaged in non-suicidal self-injurious behavior? (Past 3 Months) - No -   Interrupted Attempts (Lifetime) - No -   Interrupted Attempts (Past 3 Months) - No -   Aborted or  Self-Interrupted Attempt (Lifetime) - No -   Aborted or Self-Interrupted Attempt (Past 3 Months) - No -   Preparatory Acts or Behavior (Lifetime) - No -   Preparatory Acts or Behavior (Past 3 Months) - No -   Most Recent Attempt Actual Lethality Code - 3 -   Most Lethal Attempt Actual Lethality Code - 2 -   Initial/First Attempt Actual Lethality Code - 2 -         ASSESSMENT: Current Emotional / Mental Status (status of significant symptoms):   Risk status (Self / Other harm or suicidal ideation)   Patient denies current fears or concerns for personal safety.   Patient denies current or recent suicidal ideation or behaviors.     Patient denies current or recent homicidal ideation or behaviors.   Patient denies current or recent self injurious behavior or ideation.   Patient denies other safety concerns.   Patient reports there has been no change in risk factors since their last session.     Patient reports there has been no change in protective factors since their last session.     a safety plan was completed several years ago.      Appearance:   Unable to assess     Eye Contact:   Unable to assess     Psychomotor Behavior: Unable to assess    Attitude:   Cooperative    Orientation:   All   Speech    Rate / Production: Normal     Volume:  Normal    Mood:    Depressed    Affect:    Appropriate    Thought Content:  Clear    Thought Form:  Coherent  Logical    Insight:    Good      Medication Review:   No changes to current psychiatric medication(s). PCP Dr. Eliane Quinn prescribing cymbalta 60 mg and trazadone.     Medication Compliance:   Yes     Changes in Health Issues:   None reported     Chemical Use Review:   Substance Use: Chemical use reviewed, no active concerns identified      Tobacco Use: No change in amount of tobacco use since last session.  Contemplation    Diagnosis:  MDD; EVGENY; PTSD.    Collateral Reports Completed:   Routed note to PCP    PLAN: (Patient Tasks / Therapist Tasks / Other)  Biweekly.    Follow safety plan. Use a healthy coping idea as needed.       Goals due 2/10/23      Kleber SKeith Pollack, LICSW                                                         ______________________________________________________________________    Individual Treatment Plan    Patient's Name: Melquiades Morales  YOB: 1955    Date of Creation: 1/28/22  Date Treatment Plan Last Reviewed/Revised: 11/10/22    DSM5 Diagnoses: 296.32 (F33.1) Major Depressive Disorder, Recurrent Episode, Moderate _, 300.02 (F41.1) Generalized Anxiety Disorder or 309.81 (F43.10) Posttraumatic Stress Disorder (includes Posttraumatic Stress Disorder for Children 6 Years and Younger)  With dissociative symptoms.  Psychosocial / Contextual Factors: recent loss of wife.  PROMIS (reviewed every 90 days):  11/10/22    Referral / Collaboration:  Referral to another professional/service is not indicated at this time.    Anticipated number of session for this episode of care: 10+  Anticipation frequency of session: Weekly  Anticipated Duration of each session: 38-52 minutes.  Treatment plan will be reviewed in 90 days or when goals have been changed.       MeasurableTreatment Goal(s) related to diagnosis / functional impairment(s)  Goal 1: Patient will report coping well with daily stressors.     I will know I've met my goal when each goal that we accomplish and I am having less of a problem in that area I know that you have helped me.       Objective #A (Patient Action)                          Patient will continue to follow his Safety plan.  Status: New - Date: 1/28/22; 5/12/22; 8/12/22; 11/10/22     Intervention(s)  Therapist will monitor for safety each visit and encourage use of safety plan.     Objective #B  Patient will use a healthy coping idea as needed 100% of trials for 1 week.  Status: New - Date: 1/28/22; 5/12/22; 8/12/22; 11/10/22  IDEAS: napping; petting Snippy (pet dog); watching tv.  Intervention(s)  Therapist will provide  ideas and encouragement to use them.     Objective #C  Patient will process the loss of his wife as needed.  Status: New - Date: 1/28/22; 5/12/22; 8/12/22; 11/10/22      Intervention(s)  Therapist will consider EMDR.              Patient has reviewed and agreed to the above plan.        Kleber Pollack, [facility]                January 28, 2022

## 2022-12-15 ENCOUNTER — VIRTUAL VISIT (OUTPATIENT)
Dept: PSYCHOLOGY | Facility: CLINIC | Age: 67
End: 2022-12-15
Payer: COMMERCIAL

## 2022-12-15 DIAGNOSIS — F33.1 MAJOR DEPRESSIVE DISORDER, RECURRENT EPISODE, MODERATE (H): Primary | ICD-10-CM

## 2022-12-15 DIAGNOSIS — F43.10 POSTTRAUMATIC STRESS DISORDER: ICD-10-CM

## 2022-12-15 DIAGNOSIS — F41.1 GENERALIZED ANXIETY DISORDER: ICD-10-CM

## 2022-12-15 PROCEDURE — 90832 PSYTX W PT 30 MINUTES: CPT | Mod: 95 | Performed by: SOCIAL WORKER

## 2022-12-15 ASSESSMENT — ANXIETY QUESTIONNAIRES
5. BEING SO RESTLESS THAT IT IS HARD TO SIT STILL: MORE THAN HALF THE DAYS
6. BECOMING EASILY ANNOYED OR IRRITABLE: MORE THAN HALF THE DAYS
GAD7 TOTAL SCORE: 12
7. FEELING AFRAID AS IF SOMETHING AWFUL MIGHT HAPPEN: NOT AT ALL
1. FEELING NERVOUS, ANXIOUS, OR ON EDGE: MORE THAN HALF THE DAYS
3. WORRYING TOO MUCH ABOUT DIFFERENT THINGS: MORE THAN HALF THE DAYS
2. NOT BEING ABLE TO STOP OR CONTROL WORRYING: MORE THAN HALF THE DAYS
GAD7 TOTAL SCORE: 12
IF YOU CHECKED OFF ANY PROBLEMS ON THIS QUESTIONNAIRE, HOW DIFFICULT HAVE THESE PROBLEMS MADE IT FOR YOU TO DO YOUR WORK, TAKE CARE OF THINGS AT HOME, OR GET ALONG WITH OTHER PEOPLE: SOMEWHAT DIFFICULT

## 2022-12-15 NOTE — PROGRESS NOTES
"    [facility]                                     Progress Note    Patient Name: Melquiades Morales  Date: 12/15/22         Service Type: Individual      Session Start Time:  9 am  Session End Time: 9:30 am     Session Length: 30 min     Session #: 19    Attendees: Client attended alone.    Service Modality:  Phone Visit:       Provider verified identity through the following two step process.  Patient provided:  Patient is known previously to provider     The patient has been notified of the following:      \"We have found that certain health care needs can be provided without the need for a face to face visit.  This service lets us provide the care you need with a phone conversation.       I will have full access to your [facility] medical record during this entire phone call.   I will be taking notes for your medical record.      Since this is like an office visit, we will bill your insurance company for this service.       There are potential benefits and risks of telephone visits (e.g. limits to patient confidentiality) that differ from in-person visits.?Confidentiality still applies for telephone services, and nobody will record the visit.  It is important to be in a quiet, private space that is free of distractions (including cell phone or other devices) during the visit.??      If during the course of the call I believe a telephone visit is not appropriate, you will not be charged for this service\"     Consent has been obtained for this service by care team member: Yes                  DATA  Interactive Complexity: No  Crisis: No        Progress Since Last Session (Related to Symptoms / Goals / Homework):   Symptoms: stable    Homework: Partially completed- use a healthy coping idea. He has the grounding ideas handout and is to practice them.     Episode of Care Goals: Minimal progress - ACTION (Actively working towards change); Intervened by reinforcing change plan / affirming steps " taken.    Current / Ongoing Stressors and Concerns:   He now lives in a nursing home. He is making friends. It feels like home now. The staff are nice.  He and Chiquis are planning on getting a place together in a couple of years.   He plans of going to one of his son's homes for Job.         Treatment Objective(s) Addressed in This Session:   Use a healthy coping idea as needed.      Intervention:  Assessed functioning and for safety. Reviewed the phq and evgeny. Processed feelings about upcoming holiday and the sadness it can bring. Reinforced use of healthy coping ideas- time with his dog Tam helps.           Assessments completed prior to visit:  The following assessments were completed by patient for this visit:  PHQ9:   PHQ-9 SCORE 7/8/2022 8/12/2022 9/9/2022 10/13/2022 10/28/2022 11/10/2022 12/1/2022   PHQ-9 Total Score - - - - - - -   PHQ-9 Total Score MyChart - - - - - - -   PHQ-9 Total Score 16 16 14 16 16 17 16     GAD7:   EVGENY-7 SCORE 6/24/2022 7/8/2022 8/12/2022 9/9/2022 10/13/2022 11/10/2022 12/1/2022   Total Score - - - - - - -   Total Score - - - - - - -   Total Score 14 12 14 11 13 13 14     CAGE-AID:   CAGE-AID Total Score 1/28/2022   Total Score 0   Total Score MyChart 0 (A total score of 2 or greater is considered clinically significant)     PROMIS 10-Global Health (all questions and answers displayed):   PROMIS 10 1/28/2022 5/12/2022 8/12/2022 11/10/2022   In general, would you say your health is: Poor - - -   In general, would you say your quality of life is: Fair - - -   In general, how would you rate your physical health? Poor - - -   In general, how would you rate your mental health, including your mood and your ability to think? Fair - - -   In general, how would you rate your satisfaction with your social activities and relationships? Fair - - -   In general, please rate how well you carry out your usual social activities and roles Poor - - -   To what extent are you able to carry out  your everyday physical activities such as walking, climbing stairs, carrying groceries, or moving a chair? A little - - -   How often have you been bothered by emotional problems such as feeling anxious, depressed or irritable? Often - - -   How would you rate your fatigue on average? Moderate - - -   How would you rate your pain on average?   0 = No Pain  to  10 = Worst Imaginable Pain 5 - - -   In general, would you say your health is: 1 3 3 2   In general, would you say your quality of life is: 2 2 3 2   In general, how would you rate your physical health? 1 2 3 1   In general, how would you rate your mental health, including your mood and your ability to think? 2 3 3 2   In general, how would you rate your satisfaction with your social activities and relationships? 2 2 4 2   In general, please rate how well you carry out your usual social activities and roles. (This includes activities at home, at work and in your community, and responsibilities as a parent, child, spouse, employee, friend, etc.) 1 2 3 2   To what extent are you able to carry out your everyday physical activities such as walking, climbing stairs, carrying groceries, or moving a chair? 2 1 2 1   In the past 7 days, how often have you been bothered by emotional problems such as feeling anxious, depressed, or irritable? 4 3 3 4   In the past 7 days, how would you rate your fatigue on average? 3 3 3 3   In the past 7 days, how would you rate your pain on average, where 0 means no pain, and 10 means worst imaginable pain? 5 6 5 5   Global Mental Health Score 8 10 13 8   Global Physical Health Score 9 9 11 8   PROMIS TOTAL - SUBSCORES 17 19 24 16   Some recent data might be hidden     [place] Suicide Severity Rating Scale (Lifetime/Recent)  [place] Suicide Severity Rating (Lifetime/Recent) 9/8/2018 1/28/2022 9/29/2022   Wish to be Dead (Lifetime) - Yes -   Non-Specific Active Suicidal Thoughts (Lifetime) - Yes -   Most Severe Ideation Rating  (Lifetime) - 5 -   Frequency (Lifetime) - 3 -   Duration (Lifetime) - 2 -   Controllability (Lifetime) - 5 -   Protective Factors  (Lifetime) - 5 -   Reasons for Ideation (Lifetime) - 4 -   Q1 Wished to be Dead (Past Month) - - no   Q2 Suicidal Thoughts (Past Month) - - no   Q3 Suicidal Thought Method - - no   Q4 Suicidal Intent without Specific Plan - - no   Q5 Suicide Intent with Specific Plan - - no   Q6 Suicide Behavior (Lifetime) - - no   Level of Risk per Screen - - low risk   RETIRED: 1. Wish to be Dead (Recent) - No -   RETIRED: 2. Non-Specific Active Suicidal Thoughts (Recent) - No -   3. Active Suicidal Ideation with any Methods (Not Plan) Without Intent to Act (Lifetime) - Yes -   RETIRED: 3. Active Suicidal Ideation with any Methods (Not Plan) Without Intent to Act (Recent) - No -   RETIRE: 4. Active Suicidal Ideation with Some Intent to Act, Without Specific Plan (Lifetime) - Yes -   4. Active Suicidal Ideation with Some Intent to Act, Without Specific Plan (Recent) - No -   RETIRE: 5. Active Suicidal Ideation with Specific Plan and Intent (Lifetime) - Yes -   RETIRED: 5. Active Suicidal Ideation with Specific Plan and Intent (Recent) - No -   Most Severe Ideation Rating (Past Month) NA NA -   Frequency (Past Month) - NA -   Duration (Past Month) - NA -   Controllability (Past Month) - NA -   Protective Factors (Past Month) - NA -   Reasons for Ideation (Past Month) - NA -   Actual Attempt (Lifetime) - Yes -   Actual Attempt Description (Lifetime) - 3 attempts with last one 15 years ago -   Total Number of Actual Attempts (Lifetime) - 3 -   Actual Attempt (Past 3 Months) - No -   Has subject engaged in non-suicidal self-injurious behavior? (Lifetime) - No -   Has subject engaged in non-suicidal self-injurious behavior? (Past 3 Months) - No -   Interrupted Attempts (Lifetime) - No -   Interrupted Attempts (Past 3 Months) - No -   Aborted or Self-Interrupted Attempt (Lifetime) - No -   Aborted or  Self-Interrupted Attempt (Past 3 Months) - No -   Preparatory Acts or Behavior (Lifetime) - No -   Preparatory Acts or Behavior (Past 3 Months) - No -   Most Recent Attempt Actual Lethality Code - 3 -   Most Lethal Attempt Actual Lethality Code - 2 -   Initial/First Attempt Actual Lethality Code - 2 -         ASSESSMENT: Current Emotional / Mental Status (status of significant symptoms):   Risk status (Self / Other harm or suicidal ideation)   Patient denies current fears or concerns for personal safety.   Patient denies current or recent suicidal ideation or behaviors.     Patient denies current or recent homicidal ideation or behaviors.   Patient denies current or recent self injurious behavior or ideation.   Patient denies other safety concerns.   Patient reports there has been no change in risk factors since their last session.     Patient reports there has been no change in protective factors since their last session.     a safety plan was completed several years ago.      Appearance:   Unable to assess     Eye Contact:   Unable to assess     Psychomotor Behavior: Unable to assess    Attitude:   Cooperative    Orientation:   All   Speech    Rate / Production: Normal     Volume:  Normal    Mood:    Down    Affect:    Appropriate    Thought Content:  Clear    Thought Form:  Coherent  Logical    Insight:    Good      Medication Review:   No changes to current psychiatric medication(s). PCP Dr. Eliane Quinn prescribing cymbalta 60 mg and trazadone.     Medication Compliance:   Yes     Changes in Health Issues:   None reported     Chemical Use Review:   Substance Use: Chemical use reviewed, no active concerns identified      Tobacco Use: No change in amount of tobacco use since last session.  Contemplation    Diagnosis:  MDD; EVGENY; PTSD.    Collateral Reports Completed:   Routed note to PCP    PLAN: (Patient Tasks / Therapist Tasks / Other)  Biweekly.   Follow safety plan. Use a healthy coping idea as  needed.  Homework: use a healthy coping idea as needed.    Goals due 2/10/23      Kleber NELSONKeith Pollack, LICSW                                                         ______________________________________________________________________    Individual Treatment Plan    Patient's Name: Melquiades Morales  YOB: 1955    Date of Creation: 1/28/22  Date Treatment Plan Last Reviewed/Revised: 11/10/22    DSM5 Diagnoses: 296.32 (F33.1) Major Depressive Disorder, Recurrent Episode, Moderate _, 300.02 (F41.1) Generalized Anxiety Disorder or 309.81 (F43.10) Posttraumatic Stress Disorder (includes Posttraumatic Stress Disorder for Children 6 Years and Younger)  With dissociative symptoms.  Psychosocial / Contextual Factors: recent loss of wife.  PROMIS (reviewed every 90 days):  11/10/22    Referral / Collaboration:  Referral to another professional/service is not indicated at this time.    Anticipated number of session for this episode of care: 10+  Anticipation frequency of session: Weekly  Anticipated Duration of each session: 38-52 minutes.  Treatment plan will be reviewed in 90 days or when goals have been changed.       MeasurableTreatment Goal(s) related to diagnosis / functional impairment(s)  Goal 1: Patient will report coping well with daily stressors.     I will know I've met my goal when each goal that we accomplish and I am having less of a problem in that area I know that you have helped me.       Objective #A (Patient Action)                          Patient will continue to follow his Safety plan.  Status: New - Date: 1/28/22; 5/12/22; 8/12/22; 11/10/22     Intervention(s)  Therapist will monitor for safety each visit and encourage use of safety plan.     Objective #B  Patient will use a healthy coping idea as needed 100% of trials for 1 week.  Status: New - Date: 1/28/22; 5/12/22; 8/12/22; 11/10/22  IDEAS: napping; petting Snippy (pet dog); watching tv.  Intervention(s)  Therapist will provide ideas  and encouragement to use them.     Objective #C  Patient will process the loss of his wife as needed.  Status: New - Date: 1/28/22; 5/12/22; 8/12/22; 11/10/22      Intervention(s)  Therapist will consider EMDR.              Patient has reviewed and agreed to the above plan.        Kleber Pollack, [facility]                January 28, 2022

## 2022-12-29 ENCOUNTER — NURSE TRIAGE (OUTPATIENT)
Dept: FAMILY MEDICINE | Facility: CLINIC | Age: 67
End: 2022-12-29

## 2022-12-29 NOTE — TELEPHONE ENCOUNTER
"Patient calling states he needs \"a UA\" for his pain clinic (outside of [facility])    Advised patient to call pain clinic and have them fax order to  [facility].     Likely needs urine drug screen    Given fax number.     Kami Roman RN on 12/29/2022 at 1:59 PM    "

## 2023-01-05 ENCOUNTER — VIRTUAL VISIT (OUTPATIENT)
Dept: PSYCHOLOGY | Facility: CLINIC | Age: 68
End: 2023-01-05
Payer: COMMERCIAL

## 2023-01-05 DIAGNOSIS — F43.10 POSTTRAUMATIC STRESS DISORDER: ICD-10-CM

## 2023-01-05 DIAGNOSIS — F33.1 MAJOR DEPRESSIVE DISORDER, RECURRENT EPISODE, MODERATE (H): Primary | ICD-10-CM

## 2023-01-05 DIAGNOSIS — F41.1 GENERALIZED ANXIETY DISORDER: ICD-10-CM

## 2023-01-05 PROCEDURE — 90832 PSYTX W PT 30 MINUTES: CPT | Mod: 95 | Performed by: SOCIAL WORKER

## 2023-01-05 ASSESSMENT — ANXIETY QUESTIONNAIRES
GAD7 TOTAL SCORE: 12
6. BECOMING EASILY ANNOYED OR IRRITABLE: MORE THAN HALF THE DAYS
IF YOU CHECKED OFF ANY PROBLEMS ON THIS QUESTIONNAIRE, HOW DIFFICULT HAVE THESE PROBLEMS MADE IT FOR YOU TO DO YOUR WORK, TAKE CARE OF THINGS AT HOME, OR GET ALONG WITH OTHER PEOPLE: SOMEWHAT DIFFICULT
3. WORRYING TOO MUCH ABOUT DIFFERENT THINGS: MORE THAN HALF THE DAYS
7. FEELING AFRAID AS IF SOMETHING AWFUL MIGHT HAPPEN: SEVERAL DAYS
GAD7 TOTAL SCORE: 12
5. BEING SO RESTLESS THAT IT IS HARD TO SIT STILL: MORE THAN HALF THE DAYS
2. NOT BEING ABLE TO STOP OR CONTROL WORRYING: MORE THAN HALF THE DAYS
1. FEELING NERVOUS, ANXIOUS, OR ON EDGE: MORE THAN HALF THE DAYS

## 2023-01-05 ASSESSMENT — PATIENT HEALTH QUESTIONNAIRE - PHQ9
SUM OF ALL RESPONSES TO PHQ QUESTIONS 1-9: 16
5. POOR APPETITE OR OVEREATING: SEVERAL DAYS

## 2023-01-05 NOTE — PROGRESS NOTES
"    [facility]                                     Progress Note    Patient Name: Melquiades Morales  Date: 1/5/23         Service Type: Individual      Session Start Time:  9 am  Session End Time: 9:30 am     Session Length: 30 min     Session #: 20    Attendees: Client attended alone.    Service Modality:  Phone Visit:       Provider verified identity through the following two step process.  Patient provided:  Patient is known previously to provider     The patient has been notified of the following:      \"We have found that certain health care needs can be provided without the need for a face to face visit.  This service lets us provide the care you need with a phone conversation.       I will have full access to your [facility] medical record during this entire phone call.   I will be taking notes for your medical record.      Since this is like an office visit, we will bill your insurance company for this service.       There are potential benefits and risks of telephone visits (e.g. limits to patient confidentiality) that differ from in-person visits.?Confidentiality still applies for telephone services, and nobody will record the visit.  It is important to be in a quiet, private space that is free of distractions (including cell phone or other devices) during the visit.??      If during the course of the call I believe a telephone visit is not appropriate, you will not be charged for this service\"     Consent has been obtained for this service by care team member: Yes                  DATA  Interactive Complexity: No  Crisis: No        Progress Since Last Session (Related to Symptoms / Goals / Homework):   Symptoms: stable    Homework: Partially completed- use a healthy coping idea. He has the grounding ideas handout and is to practice them.     Episode of Care Goals: Minimal progress - ACTION (Actively working towards change); Intervened by reinforcing change plan / affirming steps " taken.    Current / Ongoing Stressors and Concerns:   He now lives in a nursing home. He is making friends. It feels like home now. The staff are nice.  He and Chiquis are planning on getting a place together in a couple of years.   He plans of going to one of his son's homes for Job.         Treatment Objective(s) Addressed in This Session:   Use a healthy coping idea as needed.      Intervention:  Assessed functioning and for safety. Reviewed the phq and evgeny. Processed feelings about upcoming holidays without his wife. Reinforced use of healthy coping ideas- time with his dog Tam helps.           Assessments completed prior to visit:  The following assessments were completed by patient for this visit:  PHQ9:   PHQ-9 SCORE 8/12/2022 9/9/2022 10/13/2022 10/28/2022 11/10/2022 12/1/2022 12/15/2022   PHQ-9 Total Score - - - - - - -   PHQ-9 Total Score MyChart - - - - - - -   PHQ-9 Total Score 16 14 16 16 17 16 17     GAD7:   EVGENY-7 SCORE 7/8/2022 8/12/2022 9/9/2022 10/13/2022 11/10/2022 12/1/2022 12/15/2022   Total Score - - - - - - -   Total Score - - - - - - -   Total Score 12 14 11 13 13 14 12     CAGE-AID:   CAGE-AID Total Score 1/28/2022   Total Score 0   Total Score MyChart 0 (A total score of 2 or greater is considered clinically significant)     PROMIS 10-Global Health (all questions and answers displayed):   PROMIS 10 1/28/2022 5/12/2022 8/12/2022 11/10/2022   In general, would you say your health is: Poor - - -   In general, would you say your quality of life is: Fair - - -   In general, how would you rate your physical health? Poor - - -   In general, how would you rate your mental health, including your mood and your ability to think? Fair - - -   In general, how would you rate your satisfaction with your social activities and relationships? Fair - - -   In general, please rate how well you carry out your usual social activities and roles Poor - - -   To what extent are you able to carry out your  everyday physical activities such as walking, climbing stairs, carrying groceries, or moving a chair? A little - - -   How often have you been bothered by emotional problems such as feeling anxious, depressed or irritable? Often - - -   How would you rate your fatigue on average? Moderate - - -   How would you rate your pain on average?   0 = No Pain  to  10 = Worst Imaginable Pain 5 - - -   In general, would you say your health is: 1 3 3 2   In general, would you say your quality of life is: 2 2 3 2   In general, how would you rate your physical health? 1 2 3 1   In general, how would you rate your mental health, including your mood and your ability to think? 2 3 3 2   In general, how would you rate your satisfaction with your social activities and relationships? 2 2 4 2   In general, please rate how well you carry out your usual social activities and roles. (This includes activities at home, at work and in your community, and responsibilities as a parent, child, spouse, employee, friend, etc.) 1 2 3 2   To what extent are you able to carry out your everyday physical activities such as walking, climbing stairs, carrying groceries, or moving a chair? 2 1 2 1   In the past 7 days, how often have you been bothered by emotional problems such as feeling anxious, depressed, or irritable? 4 3 3 4   In the past 7 days, how would you rate your fatigue on average? 3 3 3 3   In the past 7 days, how would you rate your pain on average, where 0 means no pain, and 10 means worst imaginable pain? 5 6 5 5   Global Mental Health Score 8 10 13 8   Global Physical Health Score 9 9 11 8   PROMIS TOTAL - SUBSCORES 17 19 24 16   Some recent data might be hidden     [place] Suicide Severity Rating Scale (Lifetime/Recent)  [place] Suicide Severity Rating (Lifetime/Recent) 9/8/2018 1/28/2022 9/29/2022   Wish to be Dead (Lifetime) - Yes -   Non-Specific Active Suicidal Thoughts (Lifetime) - Yes -   Most Severe Ideation Rating (Lifetime) -  5 -   Frequency (Lifetime) - 3 -   Duration (Lifetime) - 2 -   Controllability (Lifetime) - 5 -   Protective Factors  (Lifetime) - 5 -   Reasons for Ideation (Lifetime) - 4 -   Q1 Wished to be Dead (Past Month) - - no   Q2 Suicidal Thoughts (Past Month) - - no   Q3 Suicidal Thought Method - - no   Q4 Suicidal Intent without Specific Plan - - no   Q5 Suicide Intent with Specific Plan - - no   Q6 Suicide Behavior (Lifetime) - - no   Level of Risk per Screen - - low risk   RETIRED: 1. Wish to be Dead (Recent) - No -   RETIRED: 2. Non-Specific Active Suicidal Thoughts (Recent) - No -   3. Active Suicidal Ideation with any Methods (Not Plan) Without Intent to Act (Lifetime) - Yes -   RETIRED: 3. Active Suicidal Ideation with any Methods (Not Plan) Without Intent to Act (Recent) - No -   RETIRE: 4. Active Suicidal Ideation with Some Intent to Act, Without Specific Plan (Lifetime) - Yes -   4. Active Suicidal Ideation with Some Intent to Act, Without Specific Plan (Recent) - No -   RETIRE: 5. Active Suicidal Ideation with Specific Plan and Intent (Lifetime) - Yes -   RETIRED: 5. Active Suicidal Ideation with Specific Plan and Intent (Recent) - No -   Most Severe Ideation Rating (Past Month) NA NA -   Frequency (Past Month) - NA -   Duration (Past Month) - NA -   Controllability (Past Month) - NA -   Protective Factors (Past Month) - NA -   Reasons for Ideation (Past Month) - NA -   Actual Attempt (Lifetime) - Yes -   Actual Attempt Description (Lifetime) - 3 attempts with last one 15 years ago -   Total Number of Actual Attempts (Lifetime) - 3 -   Actual Attempt (Past 3 Months) - No -   Has subject engaged in non-suicidal self-injurious behavior? (Lifetime) - No -   Has subject engaged in non-suicidal self-injurious behavior? (Past 3 Months) - No -   Interrupted Attempts (Lifetime) - No -   Interrupted Attempts (Past 3 Months) - No -   Aborted or Self-Interrupted Attempt (Lifetime) - No -   Aborted or Self-Interrupted  Attempt (Past 3 Months) - No -   Preparatory Acts or Behavior (Lifetime) - No -   Preparatory Acts or Behavior (Past 3 Months) - No -   Most Recent Attempt Actual Lethality Code - 3 -   Most Lethal Attempt Actual Lethality Code - 2 -   Initial/First Attempt Actual Lethality Code - 2 -         ASSESSMENT: Current Emotional / Mental Status (status of significant symptoms):   Risk status (Self / Other harm or suicidal ideation)   Patient denies current fears or concerns for personal safety.   Patient denies current or recent suicidal ideation or behaviors.     Patient denies current or recent homicidal ideation or behaviors.   Patient denies current or recent self injurious behavior or ideation.   Patient denies other safety concerns.   Patient reports there has been no change in risk factors since their last session.     Patient reports there has been no change in protective factors since their last session.     a safety plan was completed several years ago.      Appearance:   Unable to assess     Eye Contact:   Unable to assess     Psychomotor Behavior: Unable to assess    Attitude:   Cooperative    Orientation:   All   Speech    Rate / Production: Normal     Volume:  Normal    Mood:    Down    Affect:    Appropriate    Thought Content:  Clear    Thought Form:  Coherent  Logical    Insight:    Good      Medication Review:   No changes to current psychiatric medication(s). PCP Dr. Eliane Quinn prescribing cymbalta 60 mg and trazadone.     Medication Compliance:   Yes     Changes in Health Issues:   None reported     Chemical Use Review:   Substance Use: Chemical use reviewed, no active concerns identified      Tobacco Use: No change in amount of tobacco use since last session.  Contemplation    Diagnosis:  MDD; EVGENY; PTSD.    Collateral Reports Completed:   Routed note to PCP    PLAN: (Patient Tasks / Therapist Tasks / Other)  Biweekly.   Follow safety plan. Use a healthy coping idea as needed.  Homework: use a healthy  coping idea as needed.    Goals due 2/10/23      Kleber SKeith Pollack, LICSW                                                         ______________________________________________________________________    Individual Treatment Plan    Patient's Name: Melquiades Morales  YOB: 1955    Date of Creation: 1/28/22  Date Treatment Plan Last Reviewed/Revised: 11/10/22    DSM5 Diagnoses: 296.32 (F33.1) Major Depressive Disorder, Recurrent Episode, Moderate _, 300.02 (F41.1) Generalized Anxiety Disorder or 309.81 (F43.10) Posttraumatic Stress Disorder (includes Posttraumatic Stress Disorder for Children 6 Years and Younger)  With dissociative symptoms.  Psychosocial / Contextual Factors: recent loss of wife.  PROMIS (reviewed every 90 days):  11/10/22    Referral / Collaboration:  Referral to another professional/service is not indicated at this time.    Anticipated number of session for this episode of care: 10+  Anticipation frequency of session: Weekly  Anticipated Duration of each session: 38-52 minutes.  Treatment plan will be reviewed in 90 days or when goals have been changed.       MeasurableTreatment Goal(s) related to diagnosis / functional impairment(s)  Goal 1: Patient will report coping well with daily stressors.     I will know I've met my goal when each goal that we accomplish and I am having less of a problem in that area I know that you have helped me.       Objective #A (Patient Action)                          Patient will continue to follow his Safety plan.  Status: New - Date: 1/28/22; 5/12/22; 8/12/22; 11/10/22     Intervention(s)  Therapist will monitor for safety each visit and encourage use of safety plan.     Objective #B  Patient will use a healthy coping idea as needed 100% of trials for 1 week.  Status: New - Date: 1/28/22; 5/12/22; 8/12/22; 11/10/22  IDEAS: napping; petting Snippy (pet dog); watching tv.  Intervention(s)  Therapist will provide ideas and encouragement to use  them.     Objective #C  Patient will process the loss of his wife as needed.  Status: New - Date: 1/28/22; 5/12/22; 8/12/22; 11/10/22      Intervention(s)  Therapist will consider EMDR.              Patient has reviewed and agreed to the above plan.        Kleber Pollack, [facility]                January 28, 2022

## 2023-01-19 ENCOUNTER — OFFICE VISIT (OUTPATIENT)
Dept: PSYCHOLOGY | Facility: CLINIC | Age: 68
End: 2023-01-19
Payer: COMMERCIAL

## 2023-01-19 DIAGNOSIS — F41.1 GENERALIZED ANXIETY DISORDER: ICD-10-CM

## 2023-01-19 DIAGNOSIS — F33.1 MAJOR DEPRESSIVE DISORDER, RECURRENT EPISODE, MODERATE (H): Primary | ICD-10-CM

## 2023-01-19 DIAGNOSIS — F43.10 POSTTRAUMATIC STRESS DISORDER: ICD-10-CM

## 2023-01-19 PROCEDURE — 90834 PSYTX W PT 45 MINUTES: CPT | Performed by: SOCIAL WORKER

## 2023-01-19 ASSESSMENT — ANXIETY QUESTIONNAIRES
GAD7 TOTAL SCORE: 13
6. BECOMING EASILY ANNOYED OR IRRITABLE: NEARLY EVERY DAY
1. FEELING NERVOUS, ANXIOUS, OR ON EDGE: MORE THAN HALF THE DAYS
3. WORRYING TOO MUCH ABOUT DIFFERENT THINGS: MORE THAN HALF THE DAYS
5. BEING SO RESTLESS THAT IT IS HARD TO SIT STILL: MORE THAN HALF THE DAYS
GAD7 TOTAL SCORE: 13
IF YOU CHECKED OFF ANY PROBLEMS ON THIS QUESTIONNAIRE, HOW DIFFICULT HAVE THESE PROBLEMS MADE IT FOR YOU TO DO YOUR WORK, TAKE CARE OF THINGS AT HOME, OR GET ALONG WITH OTHER PEOPLE: SOMEWHAT DIFFICULT
7. FEELING AFRAID AS IF SOMETHING AWFUL MIGHT HAPPEN: NOT AT ALL
2. NOT BEING ABLE TO STOP OR CONTROL WORRYING: MORE THAN HALF THE DAYS

## 2023-01-19 NOTE — PROGRESS NOTES
[facility]                                     Progress Note    Patient Name: Melquiades Morales  Date: 1/19/23         Service Type: Individual      Session Start Time:  10:50 am  Session End Time: 11:30 am     Session Length: 40 min     Session #: 21    Attendees: Client attended alone.    Service Modality:  In person Visit:                          DATA  Interactive Complexity: No  Crisis: No        Progress Since Last Session (Related to Symptoms / Goals / Homework):   Symptoms: stable    Homework: Partially completed- use a healthy coping idea. He has the grounding ideas handout and is to practice them.     Episode of Care Goals: Minimal progress - ACTION (Actively working towards change); Intervened by reinforcing change plan / affirming steps taken.    Current / Ongoing Stressors and Concerns:   He now lives in a nursing home. He is making friends. It feels like home now. The staff are nice.  He and Chiquis are planning on getting a place together in a couple of years.       Treatment Objective(s) Addressed in This Session:  Use a healthy coping idea as needed.      Intervention:  Assessed functioning and for safety. Reviewed the phq and evgeny. Processed feelings about the holidays; son being in prison; friends moving away. Reinforced use of healthy coping ideas- time with his dog Tam cool.         Assessments completed prior to visit:  The following assessments were completed by patient for this visit:  PHQ9:   PHQ-9 SCORE 10/13/2022 10/28/2022 11/10/2022 12/1/2022 12/15/2022 1/5/2023 1/19/2023   PHQ-9 Total Score - - - - - - -   PHQ-9 Total Score MyChart - - - - - - -   PHQ-9 Total Score 16 16 17 16 17 16 16     GAD7:   EVGENY-7 SCORE 9/9/2022 10/13/2022 11/10/2022 12/1/2022 12/15/2022 1/5/2023 1/19/2023   Total Score - - - - - - -   Total Score - - - - - - -   Total Score 11 13 13 14 12 12 13     CAGE-AID:   CAGE-AID Total Score 1/28/2022   Total Score 0   Total Score MyChart 0 (A total  score of 2 or greater is considered clinically significant)     PROMIS 10-Global Health (all questions and answers displayed):   PROMIS 10 1/28/2022 5/12/2022 8/12/2022 11/10/2022   In general, would you say your health is: Poor - - -   In general, would you say your quality of life is: Fair - - -   In general, how would you rate your physical health? Poor - - -   In general, how would you rate your mental health, including your mood and your ability to think? Fair - - -   In general, how would you rate your satisfaction with your social activities and relationships? Fair - - -   In general, please rate how well you carry out your usual social activities and roles Poor - - -   To what extent are you able to carry out your everyday physical activities such as walking, climbing stairs, carrying groceries, or moving a chair? A little - - -   How often have you been bothered by emotional problems such as feeling anxious, depressed or irritable? Often - - -   How would you rate your fatigue on average? Moderate - - -   How would you rate your pain on average?   0 = No Pain  to  10 = Worst Imaginable Pain 5 - - -   In general, would you say your health is: 1 3 3 2   In general, would you say your quality of life is: 2 2 3 2   In general, how would you rate your physical health? 1 2 3 1   In general, how would you rate your mental health, including your mood and your ability to think? 2 3 3 2   In general, how would you rate your satisfaction with your social activities and relationships? 2 2 4 2   In general, please rate how well you carry out your usual social activities and roles. (This includes activities at home, at work and in your community, and responsibilities as a parent, child, spouse, employee, friend, etc.) 1 2 3 2   To what extent are you able to carry out your everyday physical activities such as walking, climbing stairs, carrying groceries, or moving a chair? 2 1 2 1   In the past 7 days, how often have you  been bothered by emotional problems such as feeling anxious, depressed, or irritable? 4 3 3 4   In the past 7 days, how would you rate your fatigue on average? 3 3 3 3   In the past 7 days, how would you rate your pain on average, where 0 means no pain, and 10 means worst imaginable pain? 5 6 5 5   Global Mental Health Score 8 10 13 8   Global Physical Health Score 9 9 11 8   PROMIS TOTAL - SUBSCORES 17 19 24 16   Some recent data might be hidden     [place] Suicide Severity Rating Scale (Lifetime/Recent)  [place] Suicide Severity Rating (Lifetime/Recent) 9/8/2018 1/28/2022 9/29/2022   Wish to be Dead (Lifetime) - Yes -   Non-Specific Active Suicidal Thoughts (Lifetime) - Yes -   Most Severe Ideation Rating (Lifetime) - 5 -   Frequency (Lifetime) - 3 -   Duration (Lifetime) - 2 -   Controllability (Lifetime) - 5 -   Protective Factors  (Lifetime) - 5 -   Reasons for Ideation (Lifetime) - 4 -   Q1 Wished to be Dead (Past Month) - - no   Q2 Suicidal Thoughts (Past Month) - - no   Q3 Suicidal Thought Method - - no   Q4 Suicidal Intent without Specific Plan - - no   Q5 Suicide Intent with Specific Plan - - no   Q6 Suicide Behavior (Lifetime) - - no   Level of Risk per Screen - - low risk   RETIRED: 1. Wish to be Dead (Recent) - No -   RETIRED: 2. Non-Specific Active Suicidal Thoughts (Recent) - No -   3. Active Suicidal Ideation with any Methods (Not Plan) Without Intent to Act (Lifetime) - Yes -   RETIRED: 3. Active Suicidal Ideation with any Methods (Not Plan) Without Intent to Act (Recent) - No -   RETIRE: 4. Active Suicidal Ideation with Some Intent to Act, Without Specific Plan (Lifetime) - Yes -   4. Active Suicidal Ideation with Some Intent to Act, Without Specific Plan (Recent) - No -   RETIRE: 5. Active Suicidal Ideation with Specific Plan and Intent (Lifetime) - Yes -   RETIRED: 5. Active Suicidal Ideation with Specific Plan and Intent (Recent) - No -   Most Severe Ideation Rating (Past Month) NA NA -    Frequency (Past Month) - NA -   Duration (Past Month) - NA -   Controllability (Past Month) - NA -   Protective Factors (Past Month) - NA -   Reasons for Ideation (Past Month) - NA -   Actual Attempt (Lifetime) - Yes -   Actual Attempt Description (Lifetime) - 3 attempts with last one 15 years ago -   Total Number of Actual Attempts (Lifetime) - 3 -   Actual Attempt (Past 3 Months) - No -   Has subject engaged in non-suicidal self-injurious behavior? (Lifetime) - No -   Has subject engaged in non-suicidal self-injurious behavior? (Past 3 Months) - No -   Interrupted Attempts (Lifetime) - No -   Interrupted Attempts (Past 3 Months) - No -   Aborted or Self-Interrupted Attempt (Lifetime) - No -   Aborted or Self-Interrupted Attempt (Past 3 Months) - No -   Preparatory Acts or Behavior (Lifetime) - No -   Preparatory Acts or Behavior (Past 3 Months) - No -   Most Recent Attempt Actual Lethality Code - 3 -   Most Lethal Attempt Actual Lethality Code - 2 -   Initial/First Attempt Actual Lethality Code - 2 -         ASSESSMENT: Current Emotional / Mental Status (status of significant symptoms):   Risk status (Self / Other harm or suicidal ideation)   Patient denies current fears or concerns for personal safety.   Patient denies current or recent suicidal ideation or behaviors.     Patient denies current or recent homicidal ideation or behaviors.   Patient denies current or recent self injurious behavior or ideation.   Patient denies other safety concerns.   Patient reports there has been no change in risk factors since their last session.     Patient reports there has been no change in protective factors since their last session.     a safety plan was completed several years ago.      Appearance:   Appropriate    Eye Contact:   Good     Psychomotor Behavior: Normal     Attitude:   Cooperative    Orientation:   All   Speech    Rate / Production: Normal     Volume:  Normal    Mood:    Depressed     Affect:    Appropriate     Thought Content:  Clear    Thought Form:  Coherent  Logical    Insight:    Good      Medication Review:   No changes to current psychiatric medication(s). PCP Dr. Eliane Quinn prescribing cymbalta 60 mg and trazadone.     Medication Compliance:   Yes     Changes in Health Issues:   None reported     Chemical Use Review:   Substance Use: Chemical use reviewed, no active concerns identified      Tobacco Use: No change in amount of tobacco use since last session.  Contemplation    Diagnosis:  MDD; EVGENY; PTSD.    Collateral Reports Completed:   Routed note to PCP    PLAN: (Patient Tasks / Therapist Tasks / Other)  Biweekly.   Follow safety plan. Use a healthy coping idea as needed.  Homework: use a healthy coping idea as needed.    Goals due 2/10/23      Kleber Pollack, [facility]                                                         ______________________________________________________________________    Individual Treatment Plan    Patient's Name: Melquiades Morales  YOB: 1955    Date of Creation: 1/28/22  Date Treatment Plan Last Reviewed/Revised: 11/10/22    DSM5 Diagnoses: 296.32 (F33.1) Major Depressive Disorder, Recurrent Episode, Moderate _, 300.02 (F41.1) Generalized Anxiety Disorder or 309.81 (F43.10) Posttraumatic Stress Disorder (includes Posttraumatic Stress Disorder for Children 6 Years and Younger)  With dissociative symptoms.  Psychosocial / Contextual Factors: recent loss of wife.  PROMIS (reviewed every 90 days):  11/10/22    Referral / Collaboration:  Referral to another professional/service is not indicated at this time.    Anticipated number of session for this episode of care: 10+  Anticipation frequency of session: Weekly  Anticipated Duration of each session: 38-52 minutes.  Treatment plan will be reviewed in 90 days or when goals have been changed.       MeasurableTreatment Goal(s) related to diagnosis / functional impairment(s)  Goal 1: Patient will report coping well with  daily stressors.     I will know I've met my goal when each goal that we accomplish and I am having less of a problem in that area I know that you have helped me.       Objective #A (Patient Action)                          Patient will continue to follow his Safety plan.  Status: New - Date: 1/28/22; 5/12/22; 8/12/22; 11/10/22     Intervention(s)  Therapist will monitor for safety each visit and encourage use of safety plan.     Objective #B  Patient will use a healthy coping idea as needed 100% of trials for 1 week.  Status: New - Date: 1/28/22; 5/12/22; 8/12/22; 11/10/22  IDEAS: napping; petting Snippy (pet dog); watching tv.  Intervention(s)  Therapist will provide ideas and encouragement to use them.     Objective #C  Patient will process the loss of his wife as needed.  Status: New - Date: 1/28/22; 5/12/22; 8/12/22; 11/10/22      Intervention(s)  Therapist will consider EMDR.              Patient has reviewed and agreed to the above plan.        Kleber Pollack, [facility]                January 28, 2022

## 2023-01-28 ENCOUNTER — APPOINTMENT (OUTPATIENT)
Dept: URGENT CARE | Facility: CLINIC | Age: 68
End: 2023-01-28
Payer: COMMERCIAL

## 2023-02-02 ENCOUNTER — OFFICE VISIT (OUTPATIENT)
Dept: PSYCHOLOGY | Facility: CLINIC | Age: 68
End: 2023-02-02
Payer: COMMERCIAL

## 2023-02-02 DIAGNOSIS — F43.10 POSTTRAUMATIC STRESS DISORDER: ICD-10-CM

## 2023-02-02 DIAGNOSIS — F33.1 MAJOR DEPRESSIVE DISORDER, RECURRENT EPISODE, MODERATE (H): Primary | ICD-10-CM

## 2023-02-02 DIAGNOSIS — F41.1 GENERALIZED ANXIETY DISORDER: ICD-10-CM

## 2023-02-02 PROCEDURE — 90834 PSYTX W PT 45 MINUTES: CPT | Performed by: SOCIAL WORKER

## 2023-02-02 ASSESSMENT — ANXIETY QUESTIONNAIRES
IF YOU CHECKED OFF ANY PROBLEMS ON THIS QUESTIONNAIRE, HOW DIFFICULT HAVE THESE PROBLEMS MADE IT FOR YOU TO DO YOUR WORK, TAKE CARE OF THINGS AT HOME, OR GET ALONG WITH OTHER PEOPLE: SOMEWHAT DIFFICULT
3. WORRYING TOO MUCH ABOUT DIFFERENT THINGS: NEARLY EVERY DAY
6. BECOMING EASILY ANNOYED OR IRRITABLE: NEARLY EVERY DAY
1. FEELING NERVOUS, ANXIOUS, OR ON EDGE: NEARLY EVERY DAY
2. NOT BEING ABLE TO STOP OR CONTROL WORRYING: NEARLY EVERY DAY
GAD7 TOTAL SCORE: 17
GAD7 TOTAL SCORE: 17
5. BEING SO RESTLESS THAT IT IS HARD TO SIT STILL: MORE THAN HALF THE DAYS
7. FEELING AFRAID AS IF SOMETHING AWFUL MIGHT HAPPEN: SEVERAL DAYS

## 2023-02-02 NOTE — PROGRESS NOTES
[facility]                                     Progress Note    Patient Name: Melquiades Morales  Date: 2/2/23         Service Type: Individual      Session Start Time:  11 am  Session End Time: 11:45 am     Session Length: 40 min     Session #: 22    Attendees: Client attended alone.    Service Modality:  In person Visit:                          DATA  Interactive Complexity: No  Crisis: No        Progress Since Last Session (Related to Symptoms / Goals / Homework):   Symptoms: stable    Homework: Partially completed- use a healthy coping idea. He has the grounding ideas handout and is to practice them.     Episode of Care Goals: Minimal progress - ACTION (Actively working towards change); Intervened by reinforcing change plan / affirming steps taken.    Current / Ongoing Stressors and Concerns:   He now lives in a nursing home. He is making friends. It feels like home now. The staff are nice.  He and Chiquis are planning on getting a place together in a couple of years.       Treatment Objective(s) Addressed in This Session:  Use a healthy coping idea as needed.      Intervention:  Assessed functioning and for safety. Reviewed the phq and evgeny. Processed feelings about recent incidents of anger and problem solved. Gave verbal praise for handling it better than he could have. Used tapping to telling his story about one of them; DARLEEN was 10 and dropped to 5. Had him practice 5 Things Grounding exercise. Reinforced use of healthy coping ideas- time with his dog Tam helps.         Assessments completed prior to visit:  The following assessments were completed by patient for this visit:  PHQ9:   PHQ-9 SCORE 10/13/2022 10/28/2022 11/10/2022 12/1/2022 12/15/2022 1/5/2023 1/19/2023   PHQ-9 Total Score - - - - - - -   PHQ-9 Total Score MyChart - - - - - - -   PHQ-9 Total Score 16 16 17 16 17 16 16     GAD7:   EVGENY-7 SCORE 9/9/2022 10/13/2022 11/10/2022 12/1/2022 12/15/2022 1/5/2023 1/19/2023   Total Score  - - - - - - -   Total Score - - - - - - -   Total Score 11 13 13 14 12 12 13     CAGE-AID:   CAGE-AID Total Score 1/28/2022   Total Score 0   Total Score MyChart 0 (A total score of 2 or greater is considered clinically significant)     PROMIS 10-Global Health (all questions and answers displayed):   PROMIS 10 1/28/2022 5/12/2022 8/12/2022 11/10/2022   In general, would you say your health is: Poor - - -   In general, would you say your quality of life is: Fair - - -   In general, how would you rate your physical health? Poor - - -   In general, how would you rate your mental health, including your mood and your ability to think? Fair - - -   In general, how would you rate your satisfaction with your social activities and relationships? Fair - - -   In general, please rate how well you carry out your usual social activities and roles Poor - - -   To what extent are you able to carry out your everyday physical activities such as walking, climbing stairs, carrying groceries, or moving a chair? A little - - -   How often have you been bothered by emotional problems such as feeling anxious, depressed or irritable? Often - - -   How would you rate your fatigue on average? Moderate - - -   How would you rate your pain on average?   0 = No Pain  to  10 = Worst Imaginable Pain 5 - - -   In general, would you say your health is: 1 3 3 2   In general, would you say your quality of life is: 2 2 3 2   In general, how would you rate your physical health? 1 2 3 1   In general, how would you rate your mental health, including your mood and your ability to think? 2 3 3 2   In general, how would you rate your satisfaction with your social activities and relationships? 2 2 4 2   In general, please rate how well you carry out your usual social activities and roles. (This includes activities at home, at work and in your community, and responsibilities as a parent, child, spouse, employee, friend, etc.) 1 2 3 2   To what extent are you  able to carry out your everyday physical activities such as walking, climbing stairs, carrying groceries, or moving a chair? 2 1 2 1   In the past 7 days, how often have you been bothered by emotional problems such as feeling anxious, depressed, or irritable? 4 3 3 4   In the past 7 days, how would you rate your fatigue on average? 3 3 3 3   In the past 7 days, how would you rate your pain on average, where 0 means no pain, and 10 means worst imaginable pain? 5 6 5 5   Global Mental Health Score 8 10 13 8   Global Physical Health Score 9 9 11 8   PROMIS TOTAL - SUBSCORES 17 19 24 16   Some recent data might be hidden     [place] Suicide Severity Rating Scale (Lifetime/Recent)  [place] Suicide Severity Rating (Lifetime/Recent) 9/8/2018 1/28/2022 9/29/2022   Wish to be Dead (Lifetime) - Yes -   Non-Specific Active Suicidal Thoughts (Lifetime) - Yes -   Most Severe Ideation Rating (Lifetime) - 5 -   Frequency (Lifetime) - 3 -   Duration (Lifetime) - 2 -   Controllability (Lifetime) - 5 -   Protective Factors  (Lifetime) - 5 -   Reasons for Ideation (Lifetime) - 4 -   Q1 Wished to be Dead (Past Month) - - no   Q2 Suicidal Thoughts (Past Month) - - no   Q3 Suicidal Thought Method - - no   Q4 Suicidal Intent without Specific Plan - - no   Q5 Suicide Intent with Specific Plan - - no   Q6 Suicide Behavior (Lifetime) - - no   Level of Risk per Screen - - low risk   RETIRED: 1. Wish to be Dead (Recent) - No -   RETIRED: 2. Non-Specific Active Suicidal Thoughts (Recent) - No -   3. Active Suicidal Ideation with any Methods (Not Plan) Without Intent to Act (Lifetime) - Yes -   RETIRED: 3. Active Suicidal Ideation with any Methods (Not Plan) Without Intent to Act (Recent) - No -   RETIRE: 4. Active Suicidal Ideation with Some Intent to Act, Without Specific Plan (Lifetime) - Yes -   4. Active Suicidal Ideation with Some Intent to Act, Without Specific Plan (Recent) - No -   RETIRE: 5. Active Suicidal Ideation with Specific  Plan and Intent (Lifetime) - Yes -   RETIRED: 5. Active Suicidal Ideation with Specific Plan and Intent (Recent) - No -   Most Severe Ideation Rating (Past Month) NA NA -   Frequency (Past Month) - NA -   Duration (Past Month) - NA -   Controllability (Past Month) - NA -   Protective Factors (Past Month) - NA -   Reasons for Ideation (Past Month) - NA -   Actual Attempt (Lifetime) - Yes -   Actual Attempt Description (Lifetime) - 3 attempts with last one 15 years ago -   Total Number of Actual Attempts (Lifetime) - 3 -   Actual Attempt (Past 3 Months) - No -   Has subject engaged in non-suicidal self-injurious behavior? (Lifetime) - No -   Has subject engaged in non-suicidal self-injurious behavior? (Past 3 Months) - No -   Interrupted Attempts (Lifetime) - No -   Interrupted Attempts (Past 3 Months) - No -   Aborted or Self-Interrupted Attempt (Lifetime) - No -   Aborted or Self-Interrupted Attempt (Past 3 Months) - No -   Preparatory Acts or Behavior (Lifetime) - No -   Preparatory Acts or Behavior (Past 3 Months) - No -   Most Recent Attempt Actual Lethality Code - 3 -   Most Lethal Attempt Actual Lethality Code - 2 -   Initial/First Attempt Actual Lethality Code - 2 -         ASSESSMENT: Current Emotional / Mental Status (status of significant symptoms):   Risk status (Self / Other harm or suicidal ideation)   Patient denies current fears or concerns for personal safety.   Patient denies current or recent suicidal ideation or behaviors.     Patient denies current or recent homicidal ideation or behaviors.   Patient denies current or recent self injurious behavior or ideation.   Patient denies other safety concerns.   Patient reports there has been no change in risk factors since their last session.     Patient reports there has been no change in protective factors since their last session.     a safety plan was completed several years ago.      Appearance:   Appropriate    Eye Contact:   Good     Psychomotor  Behavior: Normal     Attitude:   Cooperative    Orientation:   All   Speech    Rate / Production: Normal     Volume:  Normal    Mood:    Depressed     Affect:    Appropriate    Thought Content:  Clear    Thought Form:  Coherent  Logical    Insight:    Good      Medication Review:   No changes to current psychiatric medication(s). PCP Dr. Eliane Quinn prescribing cymbalta 60 mg and trazadone.     Medication Compliance:   Yes     Changes in Health Issues:   None reported     Chemical Use Review:   Substance Use: Chemical use reviewed, no active concerns identified      Tobacco Use: No change in amount of tobacco use since last session.  Contemplation    Diagnosis:  MDD; EVGENY; PTSD.    Collateral Reports Completed:   Routed note to PCP    PLAN: (Patient Tasks / Therapist Tasks / Other)  Biweekly.   Follow safety plan. Use a healthy coping idea as needed.  Homework: use a healthy coping idea as needed.    Goals due 2/10/23      Kleber Pollack, LICSW                                                         ______________________________________________________________________    Individual Treatment Plan    Patient's Name: Melquiades Morales  YOB: 1955    Date of Creation: 1/28/22  Date Treatment Plan Last Reviewed/Revised: 11/10/22    DSM5 Diagnoses: 296.32 (F33.1) Major Depressive Disorder, Recurrent Episode, Moderate _, 300.02 (F41.1) Generalized Anxiety Disorder or 309.81 (F43.10) Posttraumatic Stress Disorder (includes Posttraumatic Stress Disorder for Children 6 Years and Younger)  With dissociative symptoms.  Psychosocial / Contextual Factors: recent loss of wife.  PROMIS (reviewed every 90 days):  11/10/22    Referral / Collaboration:  Referral to another professional/service is not indicated at this time.    Anticipated number of session for this episode of care: 10+  Anticipation frequency of session: Weekly  Anticipated Duration of each session: 38-52 minutes.  Treatment plan will be reviewed  in 90 days or when goals have been changed.       MeasurableTreatment Goal(s) related to diagnosis / functional impairment(s)  Goal 1: Patient will report coping well with daily stressors.     I will know I've met my goal when each goal that we accomplish and I am having less of a problem in that area I know that you have helped me.       Objective #A (Patient Action)                          Patient will continue to follow his Safety plan.  Status: New - Date: 1/28/22; 5/12/22; 8/12/22; 11/10/22     Intervention(s)  Therapist will monitor for safety each visit and encourage use of safety plan.     Objective #B  Patient will use a healthy coping idea as needed 100% of trials for 1 week.  Status: New - Date: 1/28/22; 5/12/22; 8/12/22; 11/10/22  IDEAS: napping; petting Snippy (pet dog); watching tv.  Intervention(s)  Therapist will provide ideas and encouragement to use them.     Objective #C  Patient will process the loss of his wife as needed.  Status: New - Date: 1/28/22; 5/12/22; 8/12/22; 11/10/22      Intervention(s)  Therapist will consider EMDR.              Patient has reviewed and agreed to the above plan.        Kleber Pollack, [facility]                January 28, 2022

## 2023-02-09 ENCOUNTER — MEDICAL CORRESPONDENCE (OUTPATIENT)
Dept: HEALTH INFORMATION MANAGEMENT | Facility: CLINIC | Age: 68
End: 2023-02-09
Payer: COMMERCIAL

## 2023-02-14 DIAGNOSIS — G89.4 CHRONIC PAIN SYNDROME: Primary | ICD-10-CM

## 2023-02-14 DIAGNOSIS — F11.288 OPIOID DEPENDENCE WITH OTHER OPIOID-INDUCED DISORDER (H): ICD-10-CM

## 2023-02-14 NOTE — PROGRESS NOTES
Encounter created to place orders requested by Ludmila Pennington who manages patient's chronic opioids, chronic pain meds  Please fax results to: [facility] [phone]    Eliane Quinn MD

## 2023-02-16 ENCOUNTER — OFFICE VISIT (OUTPATIENT)
Dept: PSYCHOLOGY | Facility: CLINIC | Age: 68
End: 2023-02-16
Payer: COMMERCIAL

## 2023-02-16 DIAGNOSIS — F43.10 POSTTRAUMATIC STRESS DISORDER: ICD-10-CM

## 2023-02-16 DIAGNOSIS — F33.1 MAJOR DEPRESSIVE DISORDER, RECURRENT EPISODE, MODERATE (H): Primary | ICD-10-CM

## 2023-02-16 DIAGNOSIS — F41.1 GENERALIZED ANXIETY DISORDER: ICD-10-CM

## 2023-02-16 PROCEDURE — 90834 PSYTX W PT 45 MINUTES: CPT | Performed by: SOCIAL WORKER

## 2023-02-16 ASSESSMENT — COLUMBIA-SUICIDE SEVERITY RATING SCALE - C-SSRS
2. HAVE YOU ACTUALLY HAD ANY THOUGHTS OF KILLING YOURSELF?: NO
6. HAVE YOU EVER DONE ANYTHING, STARTED TO DO ANYTHING, OR PREPARED TO DO ANYTHING TO END YOUR LIFE?: NO
TOTAL  NUMBER OF INTERRUPTED ATTEMPTS SINCE LAST CONTACT: NO
ATTEMPT SINCE LAST CONTACT: NO
SUICIDE, SINCE LAST CONTACT: NO
TOTAL  NUMBER OF ABORTED OR SELF INTERRUPTED ATTEMPTS SINCE LAST CONTACT: NO
1. SINCE LAST CONTACT, HAVE YOU WISHED YOU WERE DEAD OR WISHED YOU COULD GO TO SLEEP AND NOT WAKE UP?: NO

## 2023-02-16 ASSESSMENT — ANXIETY QUESTIONNAIRES
2. NOT BEING ABLE TO STOP OR CONTROL WORRYING: MORE THAN HALF THE DAYS
1. FEELING NERVOUS, ANXIOUS, OR ON EDGE: MORE THAN HALF THE DAYS
GAD7 TOTAL SCORE: 12
5. BEING SO RESTLESS THAT IT IS HARD TO SIT STILL: MORE THAN HALF THE DAYS
6. BECOMING EASILY ANNOYED OR IRRITABLE: MORE THAN HALF THE DAYS
IF YOU CHECKED OFF ANY PROBLEMS ON THIS QUESTIONNAIRE, HOW DIFFICULT HAVE THESE PROBLEMS MADE IT FOR YOU TO DO YOUR WORK, TAKE CARE OF THINGS AT HOME, OR GET ALONG WITH OTHER PEOPLE: SOMEWHAT DIFFICULT
3. WORRYING TOO MUCH ABOUT DIFFERENT THINGS: MORE THAN HALF THE DAYS
7. FEELING AFRAID AS IF SOMETHING AWFUL MIGHT HAPPEN: NOT AT ALL
GAD7 TOTAL SCORE: 12

## 2023-02-16 NOTE — PROGRESS NOTES
[facility]                                     Progress Note    Patient Name: Melquiades Morales  Date: 2/16/23         Service Type: Individual      Session Start Time:  11 am  Session End Time: 11:45 am     Session Length: 45 min     Session #: 23    Attendees: Client attended alone.    Service Modality:  In person Visit:                          DATA  Interactive Complexity: No  Crisis: No        Progress Since Last Session (Related to Symptoms / Goals / Homework):   Symptoms: improvement.    Homework: Partially completed- use a healthy coping idea. He has the grounding ideas handout and is to practice them.     Episode of Care Goals: Minimal progress - ACTION (Actively working towards change); Intervened by reinforcing change plan / affirming steps taken.    Current / Ongoing Stressors and Concerns:   He now lives in a nursing home. He is making friends. It feels like home now. The staff are nice.  He and Chiquis are planning on getting a place together in a couple of years.       Treatment Objective(s) Addressed in This Session:  Use a healthy coping idea as needed.      Intervention:  Assessed functioning and for safety. Reviewed the phq and evgeny. Reviewed goals, the short [place] and the promis. Processed feelings about not seeing his girlfriend on [facility]. Encouraged him to practice 5 Things Grounding exercise. Reinforced use of healthy coping ideas- time with his dog Tam cool.         Assessments completed prior to visit:  The following assessments were completed by patient for this visit:  PHQ9:   PHQ-9 SCORE 10/28/2022 11/10/2022 12/1/2022 12/15/2022 1/5/2023 1/19/2023 2/2/2023   PHQ-9 Total Score - - - - - - -   PHQ-9 Total Score MyChart - - - - - - -   PHQ-9 Total Score 16 17 16 17 16 16 16     GAD7:   EVGENY-7 SCORE 10/13/2022 11/10/2022 12/1/2022 12/15/2022 1/5/2023 1/19/2023 2/2/2023   Total Score - - - - - - -   Total Score - - - - - - -   Total Score 13 13 14 12 12 13 17      CAGE-AID:   CAGE-AID Total Score 1/28/2022   Total Score 0   Total Score MyChart 0 (A total score of 2 or greater is considered clinically significant)     PROMIS 10-Global Health (all questions and answers displayed):   PROMIS 10 1/28/2022 5/12/2022 8/12/2022 11/10/2022   In general, would you say your health is: Poor - - -   In general, would you say your quality of life is: Fair - - -   In general, how would you rate your physical health? Poor - - -   In general, how would you rate your mental health, including your mood and your ability to think? Fair - - -   In general, how would you rate your satisfaction with your social activities and relationships? Fair - - -   In general, please rate how well you carry out your usual social activities and roles Poor - - -   To what extent are you able to carry out your everyday physical activities such as walking, climbing stairs, carrying groceries, or moving a chair? A little - - -   How often have you been bothered by emotional problems such as feeling anxious, depressed or irritable? Often - - -   How would you rate your fatigue on average? Moderate - - -   How would you rate your pain on average?   0 = No Pain  to  10 = Worst Imaginable Pain 5 - - -   In general, would you say your health is: 1 3 3 2   In general, would you say your quality of life is: 2 2 3 2   In general, how would you rate your physical health? 1 2 3 1   In general, how would you rate your mental health, including your mood and your ability to think? 2 3 3 2   In general, how would you rate your satisfaction with your social activities and relationships? 2 2 4 2   In general, please rate how well you carry out your usual social activities and roles. (This includes activities at home, at work and in your community, and responsibilities as a parent, child, spouse, employee, friend, etc.) 1 2 3 2   To what extent are you able to carry out your everyday physical activities such as walking, climbing  stairs, carrying groceries, or moving a chair? 2 1 2 1   In the past 7 days, how often have you been bothered by emotional problems such as feeling anxious, depressed, or irritable? 4 3 3 4   In the past 7 days, how would you rate your fatigue on average? 3 3 3 3   In the past 7 days, how would you rate your pain on average, where 0 means no pain, and 10 means worst imaginable pain? 5 6 5 5   Global Mental Health Score 8 10 13 8   Global Physical Health Score 9 9 11 8   PROMIS TOTAL - SUBSCORES 17 19 24 16   Some recent data might be hidden     [place] Suicide Severity Rating Scale (Lifetime/Recent)  [place] Suicide Severity Rating (Lifetime/Recent) 9/8/2018 1/28/2022 9/29/2022   Wish to be Dead (Lifetime) - Yes -   Non-Specific Active Suicidal Thoughts (Lifetime) - Yes -   Most Severe Ideation Rating (Lifetime) - 5 -   Frequency (Lifetime) - 3 -   Duration (Lifetime) - 2 -   Controllability (Lifetime) - 5 -   Protective Factors  (Lifetime) - 5 -   Reasons for Ideation (Lifetime) - 4 -   Q1 Wished to be Dead (Past Month) - - no   Q2 Suicidal Thoughts (Past Month) - - no   Q3 Suicidal Thought Method - - no   Q4 Suicidal Intent without Specific Plan - - no   Q5 Suicide Intent with Specific Plan - - no   Q6 Suicide Behavior (Lifetime) - - no   Level of Risk per Screen - - low risk   RETIRED: 1. Wish to be Dead (Recent) - No -   RETIRED: 2. Non-Specific Active Suicidal Thoughts (Recent) - No -   3. Active Suicidal Ideation with any Methods (Not Plan) Without Intent to Act (Lifetime) - Yes -   RETIRED: 3. Active Suicidal Ideation with any Methods (Not Plan) Without Intent to Act (Recent) - No -   RETIRE: 4. Active Suicidal Ideation with Some Intent to Act, Without Specific Plan (Lifetime) - Yes -   4. Active Suicidal Ideation with Some Intent to Act, Without Specific Plan (Recent) - No -   RETIRE: 5. Active Suicidal Ideation with Specific Plan and Intent (Lifetime) - Yes -   RETIRED: 5. Active Suicidal Ideation with  Specific Plan and Intent (Recent) - No -   Most Severe Ideation Rating (Past Month) NA NA -   Frequency (Past Month) - NA -   Duration (Past Month) - NA -   Controllability (Past Month) - NA -   Protective Factors (Past Month) - NA -   Reasons for Ideation (Past Month) - NA -   Actual Attempt (Lifetime) - Yes -   Actual Attempt Description (Lifetime) - 3 attempts with last one 15 years ago -   Total Number of Actual Attempts (Lifetime) - 3 -   Actual Attempt (Past 3 Months) - No -   Has subject engaged in non-suicidal self-injurious behavior? (Lifetime) - No -   Has subject engaged in non-suicidal self-injurious behavior? (Past 3 Months) - No -   Interrupted Attempts (Lifetime) - No -   Interrupted Attempts (Past 3 Months) - No -   Aborted or Self-Interrupted Attempt (Lifetime) - No -   Aborted or Self-Interrupted Attempt (Past 3 Months) - No -   Preparatory Acts or Behavior (Lifetime) - No -   Preparatory Acts or Behavior (Past 3 Months) - No -   Most Recent Attempt Actual Lethality Code - 3 -   Most Lethal Attempt Actual Lethality Code - 2 -   Initial/First Attempt Actual Lethality Code - 2 -         ASSESSMENT: Current Emotional / Mental Status (status of significant symptoms):   Risk status (Self / Other harm or suicidal ideation)   Patient denies current fears or concerns for personal safety.   Patient denies current or recent suicidal ideation or behaviors.     Patient denies current or recent homicidal ideation or behaviors.   Patient denies current or recent self injurious behavior or ideation.   Patient denies other safety concerns.   Patient reports there has been no change in risk factors since their last session.     Patient reports there has been no change in protective factors since their last session.     a safety plan was completed several years ago.      Appearance:   Appropriate    Eye Contact:   Good     Psychomotor Behavior: Normal     Attitude:   Cooperative     Orientation:   All   Speech    Rate / Production: Normal     Volume:  Normal    Mood:    Down     Affect:    Appropriate    Thought Content:  Clear    Thought Form:  Coherent  Logical    Insight:    Good      Medication Review:   No changes to current psychiatric medication(s). PCP Dr. Eliane Quinn prescribing cymbalta 60 mg and trazadone.     Medication Compliance:   Yes     Changes in Health Issues:   None reported     Chemical Use Review:   Substance Use: Chemical use reviewed, no active concerns identified      Tobacco Use: No change in amount of tobacco use since last session.  Contemplation    Diagnosis:  MDD; EVGENY; PTSD.    Collateral Reports Completed:   Routed note to PCP    PLAN: (Patient Tasks / Therapist Tasks / Other)  Biweekly.   Follow safety plan. Use a healthy coping idea as needed.       Goals due 5/16/23      Kleber Pollack, [facility]                                                         ______________________________________________________________________    Individual Treatment Plan    Patient's Name: Melquiades Morales  YOB: 1955    Date of Creation: 1/28/22  Date Treatment Plan Last Reviewed/Revised: 2/16/23    DSM5 Diagnoses: 296.32 (F33.1) Major Depressive Disorder, Recurrent Episode, Moderate _, 300.02 (F41.1) Generalized Anxiety Disorder or 309.81 (F43.10) Posttraumatic Stress Disorder (includes Posttraumatic Stress Disorder for Children 6 Years and Younger)  With dissociative symptoms.  Psychosocial / Contextual Factors: recent loss of wife.  PROMIS (reviewed every 90 days):  2/16/23    Referral / Collaboration:  Referral to another professional/service is not indicated at this time.    Anticipated number of session for this episode of care: 10+  Anticipation frequency of session: Weekly  Anticipated Duration of each session: 38-52 minutes.  Treatment plan will be reviewed in 90 days or when goals have been changed.       MeasurableTreatment Goal(s) related to  diagnosis / functional impairment(s)  Goal 1: Patient will report coping well with daily stressors.     I will know I've met my goal when each goal that we accomplish and I am having less of a problem in that area I know that you have helped me.       Objective #A (Patient Action)                          Patient will continue to follow his Safety plan.  Status: New - Date: 1/28/22; 5/12/22; 8/12/22; 11/10/22; 2/16/23  Intervention(s)  Therapist will monitor for safety each visit and encourage use of safety plan.     Objective #B  Patient will use a healthy coping idea as needed 100% of trials for 1 week.  Status: New - Date: 1/28/22; 5/12/22; 8/12/22; 11/10/22; 2/16/23  IDEAS: napping; petting Snippy (pet dog); watching tv.  Intervention(s)  Therapist will provide ideas and encouragement to use them.     Objective #C  Patient will process the loss of his wife as needed.  Status: New - Date: 1/28/22; 5/12/22; 8/12/22; 11/10/22; 2/16/23   Intervention(s)  Therapist will consider EMDR.              Patient has reviewed and agreed to the above plan.        Kleber Pollack, [facility]                January 28, 2022

## 2023-03-02 ENCOUNTER — VIRTUAL VISIT (OUTPATIENT)
Dept: PSYCHOLOGY | Facility: CLINIC | Age: 68
End: 2023-03-02
Payer: COMMERCIAL

## 2023-03-02 DIAGNOSIS — F41.1 GENERALIZED ANXIETY DISORDER: ICD-10-CM

## 2023-03-02 DIAGNOSIS — F33.1 MAJOR DEPRESSIVE DISORDER, RECURRENT EPISODE, MODERATE (H): Primary | ICD-10-CM

## 2023-03-02 DIAGNOSIS — F43.10 POSTTRAUMATIC STRESS DISORDER: ICD-10-CM

## 2023-03-02 PROCEDURE — 90832 PSYTX W PT 30 MINUTES: CPT | Mod: 95 | Performed by: SOCIAL WORKER

## 2023-03-02 ASSESSMENT — ANXIETY QUESTIONNAIRES
1. FEELING NERVOUS, ANXIOUS, OR ON EDGE: MORE THAN HALF THE DAYS
IF YOU CHECKED OFF ANY PROBLEMS ON THIS QUESTIONNAIRE, HOW DIFFICULT HAVE THESE PROBLEMS MADE IT FOR YOU TO DO YOUR WORK, TAKE CARE OF THINGS AT HOME, OR GET ALONG WITH OTHER PEOPLE: SOMEWHAT DIFFICULT
GAD7 TOTAL SCORE: 12
3. WORRYING TOO MUCH ABOUT DIFFERENT THINGS: MORE THAN HALF THE DAYS
5. BEING SO RESTLESS THAT IT IS HARD TO SIT STILL: MORE THAN HALF THE DAYS
6. BECOMING EASILY ANNOYED OR IRRITABLE: MORE THAN HALF THE DAYS
GAD7 TOTAL SCORE: 12
7. FEELING AFRAID AS IF SOMETHING AWFUL MIGHT HAPPEN: NOT AT ALL
2. NOT BEING ABLE TO STOP OR CONTROL WORRYING: MORE THAN HALF THE DAYS

## 2023-03-02 NOTE — PROGRESS NOTES
"    [facility]                                     Progress Note    Patient Name: Melquiades Morales  Date: 3/2/23         Service Type: Individual      Session Start Time:  11 am  Session End Time: 11:30 am     Session Length: 30 min     Session #: 24    Attendees: Client attended alone.    Service Modality:  Phone Visit:  Phone Visit:      Provider verified identity through the following two step process.  Patient provided:  Patient is known previously to provider    Telephone Visit: The patient's condition can be safely assessed and treated via synchronous audio telemedicine encounter.      Reason for Audio Telemedicine Visit: Patient has requested telehealth visit    Originating Site (Patient Location): Patient's home    Distant Site (Provider Location): [facility] MENTAL [facility] & ADDICTION [place]    Consent:  The patient/guardian has verbally consented to:     1. The potential risks and benefits of telemedicine (telephone visit) versus in person care;    The patient has been notified of the following:      \"We have found that certain health care needs can be provided without the need for a face to face visit.  This service lets us provide the care you need with a phone conversation.       I will have full access to your [facility] medical record during this entire phone call.   I will be taking notes for your medical record.      Since this is like an office visit, we will bill your insurance company for this service.       There are potential benefits and risks of telephone visits (e.g. limits to patient confidentiality) that differ from in-person visits.?Confidentiality still applies for telephone services, and nobody will record the visit.  It is important to be in a quiet, private space that is free of distractions (including cell phone or other devices) during the visit.??      If during the course of the call I believe a telephone visit is not " "appropriate, you will not be charged for this service\"     Consent has been obtained for this service by care team member: Yes                         DATA  Interactive Complexity: No  Crisis: No        Progress Since Last Session (Related to Symptoms / Goals / Homework):   Symptoms: stable    Homework: Partially completed- use a healthy coping idea. He has the grounding ideas handout and is to practice them.     Episode of Care Goals: Minimal progress - ACTION (Actively working towards change); Intervened by reinforcing change plan / affirming steps taken.    Current / Ongoing Stressors and Concerns:   He now lives in a nursing home. He is making friends. It feels like home now. The staff are nice.  He and Chiquis are planning on getting a place together in a couple of years.       Treatment Objective(s) Addressed in This Session:  Use a healthy coping idea as needed.      Intervention:  Assessed functioning and for safety. Reviewed the phq and shailesh. Processed feelings about current relationship with Chiquis and made some comparisons between her and his ex wife. Reinforced use of healthy coping ideas- time with his dog Tam cool.         Assessments completed prior to visit:  The following assessments were completed by patient for this visit:  PHQ9:   PHQ-9 SCORE 11/10/2022 12/1/2022 12/15/2022 1/5/2023 1/19/2023 2/2/2023 2/16/2023   PHQ-9 Total Score - - - - - - -   PHQ-9 Total Score MyChart - - - - - - -   PHQ-9 Total Score 17 16 17 16 16 16 16     GAD7:   SHAILESH-7 SCORE 11/10/2022 12/1/2022 12/15/2022 1/5/2023 1/19/2023 2/2/2023 2/16/2023   Total Score - - - - - - -   Total Score - - - - - - -   Total Score 13 14 12 12 13 17 12     CAGE-AID:   CAGE-AID Total Score 1/28/2022   Total Score 0   Total Score MyChart 0 (A total score of 2 or greater is considered clinically significant)     PROMIS 10-Global Health (all questions and answers displayed):   PROMIS 10 1/28/2022 5/12/2022 8/12/2022 11/10/2022 2/16/2023   In " general, would you say your health is: Poor - - - -   In general, would you say your quality of life is: Fair - - - -   In general, how would you rate your physical health? Poor - - - -   In general, how would you rate your mental health, including your mood and your ability to think? Fair - - - -   In general, how would you rate your satisfaction with your social activities and relationships? Fair - - - -   In general, please rate how well you carry out your usual social activities and roles Poor - - - -   To what extent are you able to carry out your everyday physical activities such as walking, climbing stairs, carrying groceries, or moving a chair? A little - - - -   How often have you been bothered by emotional problems such as feeling anxious, depressed or irritable? Often - - - -   How would you rate your fatigue on average? Moderate - - - -   How would you rate your pain on average?   0 = No Pain  to  10 = Worst Imaginable Pain 5 - - - -   In general, would you say your health is: 1 3 3 2 2   In general, would you say your quality of life is: 2 2 3 2 2   In general, how would you rate your physical health? 1 2 3 1 2   In general, how would you rate your mental health, including your mood and your ability to think? 2 3 3 2 2   In general, how would you rate your satisfaction with your social activities and relationships? 2 2 4 2 2   In general, please rate how well you carry out your usual social activities and roles. (This includes activities at home, at work and in your community, and responsibilities as a parent, child, spouse, employee, friend, etc.) 1 2 3 2 2   To what extent are you able to carry out your everyday physical activities such as walking, climbing stairs, carrying groceries, or moving a chair? 2 1 2 1 3   In the past 7 days, how often have you been bothered by emotional problems such as feeling anxious, depressed, or irritable? 4 3 3 4 3   In the past 7 days, how would you rate your fatigue  on average? 3 3 3 3 3   In the past 7 days, how would you rate your pain on average, where 0 means no pain, and 10 means worst imaginable pain? 5 6 5 5 5   Global Mental Health Score 8 10 13 8 9   Global Physical Health Score 9 9 11 8 11   PROMIS TOTAL - SUBSCORES 17 19 24 16 20   Some recent data might be hidden     [place] Suicide Severity Rating Scale (Lifetime/Recent)  [place] Suicide Severity Rating (Lifetime/Recent) 9/8/2018 1/28/2022 9/29/2022   Wish to be Dead (Lifetime) - Yes -   Non-Specific Active Suicidal Thoughts (Lifetime) - Yes -   Most Severe Ideation Rating (Lifetime) - 5 -   Frequency (Lifetime) - 3 -   Duration (Lifetime) - 2 -   Controllability (Lifetime) - 5 -   Protective Factors  (Lifetime) - 5 -   Reasons for Ideation (Lifetime) - 4 -   Q1 Wished to be Dead (Past Month) - - no   Q2 Suicidal Thoughts (Past Month) - - no   Q3 Suicidal Thought Method - - no   Q4 Suicidal Intent without Specific Plan - - no   Q5 Suicide Intent with Specific Plan - - no   Q6 Suicide Behavior (Lifetime) - - no   Level of Risk per Screen - - low risk   RETIRED: 1. Wish to be Dead (Recent) - No -   RETIRED: 2. Non-Specific Active Suicidal Thoughts (Recent) - No -   3. Active Suicidal Ideation with any Methods (Not Plan) Without Intent to Act (Lifetime) - Yes -   RETIRED: 3. Active Suicidal Ideation with any Methods (Not Plan) Without Intent to Act (Recent) - No -   RETIRE: 4. Active Suicidal Ideation with Some Intent to Act, Without Specific Plan (Lifetime) - Yes -   4. Active Suicidal Ideation with Some Intent to Act, Without Specific Plan (Recent) - No -   RETIRE: 5. Active Suicidal Ideation with Specific Plan and Intent (Lifetime) - Yes -   RETIRED: 5. Active Suicidal Ideation with Specific Plan and Intent (Recent) - No -   Most Severe Ideation Rating (Past Month) NA NA -   Frequency (Past Month) - NA -   Duration (Past Month) - NA -   Controllability (Past Month) - NA -   Protective Factors (Past Month) - NA -    Reasons for Ideation (Past Month) - NA -   Actual Attempt (Lifetime) - Yes -   Actual Attempt Description (Lifetime) - 3 attempts with last one 15 years ago -   Total Number of Actual Attempts (Lifetime) - 3 -   Actual Attempt (Past 3 Months) - No -   Has subject engaged in non-suicidal self-injurious behavior? (Lifetime) - No -   Has subject engaged in non-suicidal self-injurious behavior? (Past 3 Months) - No -   Interrupted Attempts (Lifetime) - No -   Interrupted Attempts (Past 3 Months) - No -   Aborted or Self-Interrupted Attempt (Lifetime) - No -   Aborted or Self-Interrupted Attempt (Past 3 Months) - No -   Preparatory Acts or Behavior (Lifetime) - No -   Preparatory Acts or Behavior (Past 3 Months) - No -   Most Recent Attempt Actual Lethality Code - 3 -   Most Lethal Attempt Actual Lethality Code - 2 -   Initial/First Attempt Actual Lethality Code - 2 -         ASSESSMENT: Current Emotional / Mental Status (status of significant symptoms):   Risk status (Self / Other harm or suicidal ideation)   Patient denies current fears or concerns for personal safety.   Patient denies current or recent suicidal ideation or behaviors.     Patient denies current or recent homicidal ideation or behaviors.   Patient denies current or recent self injurious behavior or ideation.   Patient denies other safety concerns.   Patient reports there has been no change in risk factors since their last session.     Patient reports there has been no change in protective factors since their last session.     a safety plan was completed several years ago.      Appearance:   Appropriate    Eye Contact:   Good     Psychomotor Behavior: Normal     Attitude:   Cooperative    Orientation:   All   Speech    Rate / Production: Normal     Volume:  Normal    Mood:    Down     Affect:    Appropriate    Thought Content:  Clear    Thought Form:  Coherent  Logical    Insight:    Good      Medication Review:   No changes to current psychiatric  medication(s). PCP Dr. Eliane Quinn prescribing cymbalta 60 mg and trazadone.     Medication Compliance:   Yes     Changes in Health Issues:   None reported     Chemical Use Review:   Substance Use: Chemical use reviewed, no active concerns identified      Tobacco Use: No change in amount of tobacco use since last session.  Contemplation    Diagnosis:  MDD; EVGENY; PTSD.    Collateral Reports Completed:   Routed note to PCP    PLAN: (Patient Tasks / Therapist Tasks / Other)  Biweekly.   Follow safety plan. Use a healthy coping idea as needed.       Goals due 5/16/23      Kleber Pollack, [facility]                                                         ______________________________________________________________________    Individual Treatment Plan    Patient's Name: Melquiades Morales  YOB: 1955    Date of Creation: 1/28/22  Date Treatment Plan Last Reviewed/Revised: 2/16/23    DSM5 Diagnoses: 296.32 (F33.1) Major Depressive Disorder, Recurrent Episode, Moderate _, 300.02 (F41.1) Generalized Anxiety Disorder or 309.81 (F43.10) Posttraumatic Stress Disorder (includes Posttraumatic Stress Disorder for Children 6 Years and Younger)  With dissociative symptoms.  Psychosocial / Contextual Factors: recent loss of wife.  PROMIS (reviewed every 90 days):  2/16/23    Referral / Collaboration:  Referral to another professional/service is not indicated at this time.    Anticipated number of session for this episode of care: 10+  Anticipation frequency of session: Weekly  Anticipated Duration of each session: 38-52 minutes.  Treatment plan will be reviewed in 90 days or when goals have been changed.       MeasurableTreatment Goal(s) related to diagnosis / functional impairment(s)  Goal 1: Patient will report coping well with daily stressors.     I will know I've met my goal when each goal that we accomplish and I am having less of a problem in that area I know that you have helped me.       Objective #A (Patient  Action)                          Patient will continue to follow his Safety plan.  Status: New - Date: 1/28/22; 5/12/22; 8/12/22; 11/10/22; 2/16/23  Intervention(s)  Therapist will monitor for safety each visit and encourage use of safety plan.     Objective #B  Patient will use a healthy coping idea as needed 100% of trials for 1 week.  Status: New - Date: 1/28/22; 5/12/22; 8/12/22; 11/10/22; 2/16/23  IDEAS: napping; petting Snippy (pet dog); watching tv.  Intervention(s)  Therapist will provide ideas and encouragement to use them.     Objective #C  Patient will process the loss of his wife as needed.  Status: New - Date: 1/28/22; 5/12/22; 8/12/22; 11/10/22; 2/16/23   Intervention(s)  Therapist will consider EMDR.              Patient has reviewed and agreed to the above plan.        Kleber Pollack, [facility]                January 28, 2022

## 2023-03-07 ENCOUNTER — LAB (OUTPATIENT)
Dept: LAB | Facility: CLINIC | Age: 68
End: 2023-03-07
Payer: COMMERCIAL

## 2023-03-07 DIAGNOSIS — F11.288 OPIOID DEPENDENCE WITH OTHER OPIOID-INDUCED DISORDER (H): ICD-10-CM

## 2023-03-07 DIAGNOSIS — G89.4 CHRONIC PAIN SYNDROME: ICD-10-CM

## 2023-03-10 ENCOUNTER — TELEPHONE (OUTPATIENT)
Dept: FAMILY MEDICINE | Facility: CLINIC | Age: 68
End: 2023-03-10
Payer: COMMERCIAL

## 2023-03-10 LAB — DRUGS UR: NORMAL

## 2023-03-10 NOTE — TELEPHONE ENCOUNTER
Narcan dispensed at pharmacy per Opiate Antagonist Protocol and Collaborative Practice Agreement.    Patient requested Narcan for:  own use in the event of an opioid overdose.    Screening Questions:  I currently use or have a history of using opioids.  Yes   I am in contact with a person who has a history of using opioids.  No  The person to whom Narcan will be administered has an allergy to naloxone.  No    B[facility] Lara [facility]  Pharmacy   [facility]  phone: [phone]  fax: [phone]

## 2023-03-17 ENCOUNTER — TELEPHONE (OUTPATIENT)
Dept: FAMILY MEDICINE | Facility: CLINIC | Age: 68
End: 2023-03-17
Payer: COMMERCIAL

## 2023-03-17 NOTE — TELEPHONE ENCOUNTER
Received a call from Shanon (call back is [phone]) from [facility] out of [place], [place]. Need most current drug screen lab faxed to [phone].      Will ask [facility] to fax this.      AUNDREA Schaeffer

## 2023-03-20 ENCOUNTER — VIRTUAL VISIT (OUTPATIENT)
Dept: PSYCHOLOGY | Facility: CLINIC | Age: 68
End: 2023-03-20
Payer: COMMERCIAL

## 2023-03-20 DIAGNOSIS — F33.1 MAJOR DEPRESSIVE DISORDER, RECURRENT EPISODE, MODERATE (H): Primary | ICD-10-CM

## 2023-03-20 DIAGNOSIS — F41.1 GENERALIZED ANXIETY DISORDER: ICD-10-CM

## 2023-03-20 DIAGNOSIS — F43.10 POSTTRAUMATIC STRESS DISORDER: ICD-10-CM

## 2023-03-20 PROCEDURE — 90834 PSYTX W PT 45 MINUTES: CPT | Mod: 95 | Performed by: SOCIAL WORKER

## 2023-03-20 ASSESSMENT — ANXIETY QUESTIONNAIRES
GAD7 TOTAL SCORE: 12
6. BECOMING EASILY ANNOYED OR IRRITABLE: SEVERAL DAYS
7. FEELING AFRAID AS IF SOMETHING AWFUL MIGHT HAPPEN: NOT AT ALL
5. BEING SO RESTLESS THAT IT IS HARD TO SIT STILL: MORE THAN HALF THE DAYS
1. FEELING NERVOUS, ANXIOUS, OR ON EDGE: MORE THAN HALF THE DAYS
IF YOU CHECKED OFF ANY PROBLEMS ON THIS QUESTIONNAIRE, HOW DIFFICULT HAVE THESE PROBLEMS MADE IT FOR YOU TO DO YOUR WORK, TAKE CARE OF THINGS AT HOME, OR GET ALONG WITH OTHER PEOPLE: SOMEWHAT DIFFICULT
2. NOT BEING ABLE TO STOP OR CONTROL WORRYING: MORE THAN HALF THE DAYS
3. WORRYING TOO MUCH ABOUT DIFFERENT THINGS: NEARLY EVERY DAY
GAD7 TOTAL SCORE: 12

## 2023-03-20 NOTE — PROGRESS NOTES
"    [facility]                                     Progress Note    Patient Name: Melquiades Morales  Date: 3/20/23         Service Type: Individual      Session Start Time:  12 pm  Session End Time: 12:45 pm     Session Length: 45 min     Session #: 25    Attendees: Client attended alone.    Service Modality:  Phone Visit:         Provider verified identity through the following two step process.  Patient provided:  Patient is known previously to provider    Telephone Visit: The patient's condition can be safely assessed and treated via synchronous audio telemedicine encounter.      Reason for Audio Telemedicine Visit: Patient has requested telehealth visit    Originating Site (Patient Location): Patient's home    Distant Site (Provider Location): [facility]    Consent:  The patient/guardian has verbally consented to:     1. The potential risks and benefits of telemedicine (telephone visit) versus in person care;    The patient has been notified of the following:      \"We have found that certain health care needs can be provided without the need for a face to face visit.  This service lets us provide the care you need with a phone conversation.       I will have full access to your [facility] medical record during this entire phone call.   I will be taking notes for your medical record.      Since this is like an office visit, we will bill your insurance company for this service.       There are potential benefits and risks of telephone visits (e.g. limits to patient confidentiality) that differ from in-person visits.?Confidentiality still applies for telephone services, and nobody will record the visit.  It is important to be in a quiet, private space that is free of distractions (including cell phone or other devices) during the visit.??      If during the course of the call I believe a telephone visit is not appropriate, you " "will not be charged for this service\"     Consent has been obtained for this service by care team member: Yes                         DATA  Interactive Complexity: No  Crisis: No        Progress Since Last Session (Related to Symptoms / Goals / Homework):   Symptoms: stable.    Homework: Partially completed- use a healthy coping idea. He has the grounding ideas handout and is to practice them.     Episode of Care Goals: Minimal progress - ACTION (Actively working towards change); Intervened by reinforcing change plan / affirming steps taken.    Current / Ongoing Stressors and Concerns:   He now lives in a nursing home. He is making friends. It feels like home now. The staff are nice.  He and Chiquis were planning on getting a place together in a couple of years. He is now dating a woman named Emily.      Treatment Objective(s) Addressed in This Session:  Use a healthy coping idea as needed.      Intervention:  Assessed functioning and for safety. Reviewed the phq and evgeny. Processed feelings about current relationship with Chiquis and reason for beginning to date Emily. Processed feelings about running into his dog with his electric scooter by accident. Reinforced use of healthy coping ideas- time with his dog Tam helps.         Assessments completed prior to visit:  The following assessments were completed by patient for this visit:  PHQ9:   PHQ-9 SCORE 12/1/2022 12/15/2022 1/5/2023 1/19/2023 2/2/2023 2/16/2023 3/2/2023   PHQ-9 Total Score - - - - - - -   PHQ-9 Total Score MyChart - - - - - - -   PHQ-9 Total Score 16 17 16 16 16 16 16     GAD7:   EVGENY-7 SCORE 12/1/2022 12/15/2022 1/5/2023 1/19/2023 2/2/2023 2/16/2023 3/2/2023   Total Score - - - - - - -   Total Score - - - - - - -   Total Score 14 12 12 13 17 12 12     CAGE-AID:   CAGE-AID Total Score 1/28/2022   Total Score 0   Total Score MyChart 0 (A total score of 2 or greater is considered clinically significant)     PROMIS 10-Global Health (all questions and " answers displayed):   PROMIS 10 1/28/2022 5/12/2022 8/12/2022 11/10/2022 2/16/2023   In general, would you say your health is: Poor - - - -   In general, would you say your quality of life is: Fair - - - -   In general, how would you rate your physical health? Poor - - - -   In general, how would you rate your mental health, including your mood and your ability to think? Fair - - - -   In general, how would you rate your satisfaction with your social activities and relationships? Fair - - - -   In general, please rate how well you carry out your usual social activities and roles Poor - - - -   To what extent are you able to carry out your everyday physical activities such as walking, climbing stairs, carrying groceries, or moving a chair? A little - - - -   How often have you been bothered by emotional problems such as feeling anxious, depressed or irritable? Often - - - -   How would you rate your fatigue on average? Moderate - - - -   How would you rate your pain on average?   0 = No Pain  to  10 = Worst Imaginable Pain 5 - - - -   In general, would you say your health is: 1 3 3 2 2   In general, would you say your quality of life is: 2 2 3 2 2   In general, how would you rate your physical health? 1 2 3 1 2   In general, how would you rate your mental health, including your mood and your ability to think? 2 3 3 2 2   In general, how would you rate your satisfaction with your social activities and relationships? 2 2 4 2 2   In general, please rate how well you carry out your usual social activities and roles. (This includes activities at home, at work and in your community, and responsibilities as a parent, child, spouse, employee, friend, etc.) 1 2 3 2 2   To what extent are you able to carry out your everyday physical activities such as walking, climbing stairs, carrying groceries, or moving a chair? 2 1 2 1 3   In the past 7 days, how often have you been bothered by emotional problems such as feeling anxious,  depressed, or irritable? 4 3 3 4 3   In the past 7 days, how would you rate your fatigue on average? 3 3 3 3 3   In the past 7 days, how would you rate your pain on average, where 0 means no pain, and 10 means worst imaginable pain? 5 6 5 5 5   Global Mental Health Score 8 10 13 8 9   Global Physical Health Score 9 9 11 8 11   PROMIS TOTAL - SUBSCORES 17 19 24 16 20   Some recent data might be hidden     [place] Suicide Severity Rating Scale (Lifetime/Recent)  [place] Suicide Severity Rating (Lifetime/Recent) 9/8/2018 1/28/2022 9/29/2022   Wish to be Dead (Lifetime) - Yes -   Non-Specific Active Suicidal Thoughts (Lifetime) - Yes -   Most Severe Ideation Rating (Lifetime) - 5 -   Frequency (Lifetime) - 3 -   Duration (Lifetime) - 2 -   Controllability (Lifetime) - 5 -   Protective Factors  (Lifetime) - 5 -   Reasons for Ideation (Lifetime) - 4 -   Q1 Wished to be Dead (Past Month) - - no   Q2 Suicidal Thoughts (Past Month) - - no   Q3 Suicidal Thought Method - - no   Q4 Suicidal Intent without Specific Plan - - no   Q5 Suicide Intent with Specific Plan - - no   Q6 Suicide Behavior (Lifetime) - - no   Level of Risk per Screen - - low risk   RETIRED: 1. Wish to be Dead (Recent) - No -   RETIRED: 2. Non-Specific Active Suicidal Thoughts (Recent) - No -   3. Active Suicidal Ideation with any Methods (Not Plan) Without Intent to Act (Lifetime) - Yes -   RETIRED: 3. Active Suicidal Ideation with any Methods (Not Plan) Without Intent to Act (Recent) - No -   RETIRE: 4. Active Suicidal Ideation with Some Intent to Act, Without Specific Plan (Lifetime) - Yes -   4. Active Suicidal Ideation with Some Intent to Act, Without Specific Plan (Recent) - No -   RETIRE: 5. Active Suicidal Ideation with Specific Plan and Intent (Lifetime) - Yes -   RETIRED: 5. Active Suicidal Ideation with Specific Plan and Intent (Recent) - No -   Most Severe Ideation Rating (Past Month) NA NA -   Frequency (Past Month) - NA -   Duration (Past  Month) - NA -   Controllability (Past Month) - NA -   Protective Factors (Past Month) - NA -   Reasons for Ideation (Past Month) - NA -   Actual Attempt (Lifetime) - Yes -   Actual Attempt Description (Lifetime) - 3 attempts with last one 15 years ago -   Total Number of Actual Attempts (Lifetime) - 3 -   Actual Attempt (Past 3 Months) - No -   Has subject engaged in non-suicidal self-injurious behavior? (Lifetime) - No -   Has subject engaged in non-suicidal self-injurious behavior? (Past 3 Months) - No -   Interrupted Attempts (Lifetime) - No -   Interrupted Attempts (Past 3 Months) - No -   Aborted or Self-Interrupted Attempt (Lifetime) - No -   Aborted or Self-Interrupted Attempt (Past 3 Months) - No -   Preparatory Acts or Behavior (Lifetime) - No -   Preparatory Acts or Behavior (Past 3 Months) - No -   Most Recent Attempt Actual Lethality Code - 3 -   Most Lethal Attempt Actual Lethality Code - 2 -   Initial/First Attempt Actual Lethality Code - 2 -         ASSESSMENT: Current Emotional / Mental Status (status of significant symptoms):   Risk status (Self / Other harm or suicidal ideation)   Patient denies current fears or concerns for personal safety.   Patient denies current or recent suicidal ideation or behaviors.     Patient denies current or recent homicidal ideation or behaviors.   Patient denies current or recent self injurious behavior or ideation.   Patient denies other safety concerns.   Patient reports there has been no change in risk factors since their last session.     Patient reports there has been no change in protective factors since their last session.     a safety plan was completed several years ago.      Appearance:   Appropriate    Eye Contact:   Good     Psychomotor Behavior: Normal     Attitude:   Cooperative    Orientation:   All   Speech    Rate / Production: Normal     Volume:  Normal    Mood:    Down     Affect:    Appropriate    Thought Content:  Clear    Thought Form:  Coherent   Logical    Insight:    Good      Medication Review:   No changes to current psychiatric medication(s). PCP Dr. Eliane Quinn prescribing cymbalta 60 mg and trazadone.     Medication Compliance:   Yes     Changes in Health Issues:   None reported     Chemical Use Review:   Substance Use: Chemical use reviewed, no active concerns identified      Tobacco Use: No change in amount of tobacco use since last session.  Contemplation    Diagnosis:  MDD; EVGENY; PTSD.    Collateral Reports Completed:   Routed note to PCP    PLAN: (Patient Tasks / Therapist Tasks / Other)  Biweekly.   Follow safety plan. Use a healthy coping idea as needed.       Goals due 5/16/23      Kleber Pollack, LICSW                                                         ______________________________________________________________________    Individual Treatment Plan    Patient's Name: Melquiades Morales  YOB: 1955    Date of Creation: 1/28/22  Date Treatment Plan Last Reviewed/Revised: 2/16/23    DSM5 Diagnoses: 296.32 (F33.1) Major Depressive Disorder, Recurrent Episode, Moderate _, 300.02 (F41.1) Generalized Anxiety Disorder or 309.81 (F43.10) Posttraumatic Stress Disorder (includes Posttraumatic Stress Disorder for Children 6 Years and Younger)  With dissociative symptoms.  Psychosocial / Contextual Factors: recent loss of wife.  PROMIS (reviewed every 90 days):  2/16/23    Referral / Collaboration:  Referral to another professional/service is not indicated at this time.    Anticipated number of session for this episode of care: 10+  Anticipation frequency of session: Weekly  Anticipated Duration of each session: 38-52 minutes.  Treatment plan will be reviewed in 90 days or when goals have been changed.       MeasurableTreatment Goal(s) related to diagnosis / functional impairment(s)  Goal 1: Patient will report coping well with daily stressors.     I will know I've met my goal when each goal that we accomplish and I am having less  of a problem in that area I know that you have helped me.       Objective #A (Patient Action)                          Patient will continue to follow his Safety plan.  Status: New - Date: 1/28/22; 5/12/22; 8/12/22; 11/10/22; 2/16/23  Intervention(s)  Therapist will monitor for safety each visit and encourage use of safety plan.     Objective #B  Patient will use a healthy coping idea as needed 100% of trials for 1 week.  Status: New - Date: 1/28/22; 5/12/22; 8/12/22; 11/10/22; 2/16/23  IDEAS: napping; petting Snippy (pet dog); watching tv.  Intervention(s)  Therapist will provide ideas and encouragement to use them.     Objective #C  Patient will process the loss of his wife as needed.  Status: New - Date: 1/28/22; 5/12/22; 8/12/22; 11/10/22; 2/16/23   Intervention(s)  Therapist will consider EMDR.              Patient has reviewed and agreed to the above plan.        Kleber Pollack, [facility]                January 28, 2022

## 2023-04-03 ENCOUNTER — OFFICE VISIT (OUTPATIENT)
Dept: PSYCHOLOGY | Facility: CLINIC | Age: 68
End: 2023-04-03
Payer: COMMERCIAL

## 2023-04-03 DIAGNOSIS — F33.1 MAJOR DEPRESSIVE DISORDER, RECURRENT EPISODE, MODERATE (H): Primary | ICD-10-CM

## 2023-04-03 DIAGNOSIS — F43.10 POSTTRAUMATIC STRESS DISORDER: ICD-10-CM

## 2023-04-03 DIAGNOSIS — F41.1 GENERALIZED ANXIETY DISORDER: ICD-10-CM

## 2023-04-03 PROCEDURE — 90834 PSYTX W PT 45 MINUTES: CPT | Performed by: SOCIAL WORKER

## 2023-04-03 ASSESSMENT — PATIENT HEALTH QUESTIONNAIRE - PHQ9
SUM OF ALL RESPONSES TO PHQ QUESTIONS 1-9: 18
5. POOR APPETITE OR OVEREATING: MORE THAN HALF THE DAYS

## 2023-04-03 ASSESSMENT — ANXIETY QUESTIONNAIRES
1. FEELING NERVOUS, ANXIOUS, OR ON EDGE: MORE THAN HALF THE DAYS
2. NOT BEING ABLE TO STOP OR CONTROL WORRYING: MORE THAN HALF THE DAYS
6. BECOMING EASILY ANNOYED OR IRRITABLE: NEARLY EVERY DAY
3. WORRYING TOO MUCH ABOUT DIFFERENT THINGS: NEARLY EVERY DAY
IF YOU CHECKED OFF ANY PROBLEMS ON THIS QUESTIONNAIRE, HOW DIFFICULT HAVE THESE PROBLEMS MADE IT FOR YOU TO DO YOUR WORK, TAKE CARE OF THINGS AT HOME, OR GET ALONG WITH OTHER PEOPLE: SOMEWHAT DIFFICULT
7. FEELING AFRAID AS IF SOMETHING AWFUL MIGHT HAPPEN: NOT AT ALL
GAD7 TOTAL SCORE: 14
5. BEING SO RESTLESS THAT IT IS HARD TO SIT STILL: MORE THAN HALF THE DAYS
GAD7 TOTAL SCORE: 14

## 2023-04-03 NOTE — PROGRESS NOTES
"    [facility]                                     Progress Note    Patient Name: Melquiades Morales  Date: 4/3/23         Service Type: Individual      Session Start Time:  12 pm  Session End Time: 12:45 pm     Session Length: 45 min     Session #: 26    Attendees: Client attended alone.    Service Modality:  In Person Visit:         Provider verified identity through the following two step process.  Patient provided:  Patient is known previously to provider    Telephone Visit: The patient's condition can be safely assessed and treated via synchronous audio telemedicine encounter.      Reason for Audio Telemedicine Visit: Patient has requested telehealth visit    Originating Site (Patient Location): Patient's home    Distant Site (Provider Location): [facility]DI[facility]    Consent:  The patient/guardian has verbally consented to:     1. The potential risks and benefits of telemedicine (telephone visit) versus in person care;    The patient has been notified of the following:      \"We have found that certain health care needs can be provided without the need for a face to face visit.  This service lets us provide the care you need with a phone conversation.       I will have full access to your [facility] medical record during this entire phone call.   I will be taking notes for your medical record.      Since this is like an office visit, we will bill your insurance company for this service.       There are potential benefits and risks of telephone visits (e.g. limits to patient confidentiality) that differ from in-person visits.?Confidentiality still applies for telephone services, and nobody will record the visit.  It is important to be in a quiet, private space that is free of distractions (including cell phone or other devices) during the visit.??      If during the course of the call I believe a telephone visit is not appropriate, " "you will not be charged for this service\"     Consent has been obtained for this service by care team member: Yes                         DATA  Interactive Complexity: No  Crisis: No        Progress Since Last Session (Related to Symptoms / Goals / Homework):   Symptoms: worsening.    Homework: Partially completed- use a healthy coping idea. He has the grounding ideas handout and is to practice them.     Episode of Care Goals: Minimal progress - ACTION (Actively working towards change); Intervened by reinforcing change plan / affirming steps taken.    Current / Ongoing Stressors and Concerns:   He now lives in a nursing home. He is making friends. It feels like home now. The staff are nice.  He and Chiquis were planning on getting a place together in a couple of years. He is now dating a woman named Emily.      Treatment Objective(s) Addressed in This Session:  Use a healthy coping idea as needed.      Intervention:  Assessed functioning and for safety. Reviewed the phq and evgeny. Processed feelings about current relationship with Emily. Reinforced use of healthy coping ideas- time with his dog Tam cool.         Assessments completed prior to visit:  The following assessments were completed by patient for this visit:  PHQ9:       12/15/2022     9:10 AM 1/5/2023     9:10 AM 1/19/2023    10:57 AM 2/2/2023    11:06 AM 2/16/2023    11:08 AM 3/2/2023    11:08 AM 3/20/2023    12:10 PM   PHQ-9 SCORE   PHQ-9 Total Score 17 16 16 16 16 16 15     GAD7:       12/15/2022     9:10 AM 1/5/2023     9:10 AM 1/19/2023    10:57 AM 2/2/2023    11:06 AM 2/16/2023    11:08 AM 3/2/2023    11:08 AM 3/20/2023    12:10 PM   EVGENY-7 SCORE   Total Score 12 12 13 17 12 12 12     CAGE-AID:       1/28/2022    12:02 PM   CAGE-AID Total Score   Total Score 0   Total Score MyChart 0 (A total score of 2 or greater is considered clinically significant)     PROMIS 10-Global Health (all questions and answers displayed):       1/28/2022    12:02 PM " 5/12/2022    10:23 AM 8/12/2022     9:13 AM 11/10/2022     8:50 AM 2/16/2023    11:22 AM   PROMIS 10   In general, would you say your health is: Poor       In general, would you say your quality of life is: Fair       In general, how would you rate your physical health? Poor       In general, how would you rate your mental health, including your mood and your ability to think? Fair       In general, how would you rate your satisfaction with your social activities and relationships? Fair       In general, please rate how well you carry out your usual social activities and roles Poor       To what extent are you able to carry out your everyday physical activities such as walking, climbing stairs, carrying groceries, or moving a chair? A little       In the past 7 days, how often have you been bothered by emotional problems such as feeling anxious, depressed, or irritable? Often       In the past 7 days, how would you rate your fatigue on average? Moderate       In the past 7 days, how would you rate your pain on average, where 0 means no pain, and 10 means worst imaginable pain? 5       In general, would you say your health is: 1 3 3 2 2   In general, would you say your quality of life is: 2 2 3 2 2   In general, how would you rate your physical health? 1 2 3 1 2   In general, how would you rate your mental health, including your mood and your ability to think? 2 3 3 2 2   In general, how would you rate your satisfaction with your social activities and relationships? 2 2 4 2 2   In general, please rate how well you carry out your usual social activities and roles. (This includes activities at home, at work and in your community, and responsibilities as a parent, child, spouse, employee, friend, etc.) 1 2 3 2 2   To what extent are you able to carry out your everyday physical activities such as walking, climbing stairs, carrying groceries, or moving a chair? 2 1 2 1 3   In the past 7 days, how often have you been  bothered by emotional problems such as feeling anxious, depressed, or irritable? 4 3 3 4 3   In the past 7 days, how would you rate your fatigue on average? 3 3 3 3 3   In the past 7 days, how would you rate your pain on average, where 0 means no pain, and 10 means worst imaginable pain? 5 6 5 5 5   Global Mental Health Score 8 10 13 8 9   Global Physical Health Score 9 9 11 8 11   PROMIS TOTAL - SUBSCORES 17 19 24 16 20     [place] Suicide Severity Rating Scale (Lifetime/Recent)      9/8/2018     5:00 PM 1/28/2022    12:14 PM 9/29/2022    10:00 AM   [place] Suicide Severity Rating (Lifetime/Recent)   Wish to be Dead (Lifetime)  Yes    Non-Specific Active Suicidal Thoughts (Lifetime)  Yes    Most Severe Ideation Rating (Lifetime)  5    Frequency (Lifetime)  3    Duration (Lifetime)  2    Controllability (Lifetime)  5    Protective Factors  (Lifetime)  5    Reasons for Ideation (Lifetime)  4    Q1 Wished to be Dead (Past Month)   no   Q2 Suicidal Thoughts (Past Month)   no   Q3 Suicidal Thought Method   no   Q4 Suicidal Intent without Specific Plan   no   Q5 Suicide Intent with Specific Plan   no   Q6 Suicide Behavior (Lifetime)   no   Level of Risk per Screen   low risk   RETIRED: 1. Wish to be Dead (Recent)  No    RETIRED: 2. Non-Specific Active Suicidal Thoughts (Recent)  No    3. Active Suicidal Ideation with any Methods (Not Plan) Without Intent to Act (Lifetime)  Yes    RETIRED: 3. Active Suicidal Ideation with any Methods (Not Plan) Without Intent to Act (Recent)  No    RETIRE: 4. Active Suicidal Ideation with Some Intent to Act, Without Specific Plan (Lifetime)  Yes    4. Active Suicidal Ideation with Some Intent to Act, Without Specific Plan (Recent)  No    RETIRE: 5. Active Suicidal Ideation with Specific Plan and Intent (Lifetime)  Yes    RETIRED: 5. Active Suicidal Ideation with Specific Plan and Intent (Recent)  No    Most Severe Ideation Rating (Past Month) NA NA    Frequency (Past Month)  NA     Duration (Past Month)  NA    Controllability (Past Month)  NA    Protective Factors (Past Month)  NA    Reasons for Ideation (Past Month)  NA    Actual Attempt (Lifetime)  Yes    Actual Attempt Description (Lifetime)  3 attempts with last one 15 years ago    Total Number of Actual Attempts (Lifetime)  3    Actual Attempt (Past 3 Months)  No    Has subject engaged in non-suicidal self-injurious behavior? (Lifetime)  No    Has subject engaged in non-suicidal self-injurious behavior? (Past 3 Months)  No    Interrupted Attempts (Lifetime)  No    Interrupted Attempts (Past 3 Months)  No    Aborted or Self-Interrupted Attempt (Lifetime)  No    Aborted or Self-Interrupted Attempt (Past 3 Months)  No    Preparatory Acts or Behavior (Lifetime)  No    Preparatory Acts or Behavior (Past 3 Months)  No    Most Recent Attempt Actual Lethality Code  3    Most Lethal Attempt Actual Lethality Code  2    Initial/First Attempt Actual Lethality Code  2          ASSESSMENT: Current Emotional / Mental Status (status of significant symptoms):   Risk status (Self / Other harm or suicidal ideation)   Patient denies current fears or concerns for personal safety.   Patient denies current or recent suicidal ideation or behaviors.     Patient denies current or recent homicidal ideation or behaviors.   Patient denies current or recent self injurious behavior or ideation.   Patient denies other safety concerns.   Patient reports there has been no change in risk factors since their last session.     Patient reports there has been no change in protective factors since their last session.     a safety plan was completed several years ago.      Appearance:   Appropriate    Eye Contact:   Good     Psychomotor Behavior: Normal     Attitude:   Cooperative    Orientation:   All   Speech    Rate / Production: Normal     Volume:  Normal    Mood:    Down     Affect:    Appropriate    Thought Content:  Clear    Thought Form:  Coherent  Logical     Insight:    Good      Medication Review:   No changes to current psychiatric medication(s). PCP Dr. Eliane Quinn prescribing cymbalta 60 mg and trazadone.     Medication Compliance:   Yes     Changes in Health Issues:   None reported     Chemical Use Review:   Substance Use: Chemical use reviewed, no active concerns identified      Tobacco Use: No change in amount of tobacco use since last session.  Contemplation    Diagnosis:  MDD; EVGENY; PTSD.    Collateral Reports Completed:   Routed note to PCP    PLAN: (Patient Tasks / Therapist Tasks / Other)  Biweekly.   Follow safety plan. Use a healthy coping idea as needed.       Goals due 5/16/23      Kleber Pollack, LICSW                                                         ______________________________________________________________________    Individual Treatment Plan    Patient's Name: Melquiades Morales  YOB: 1955    Date of Creation: 1/28/22  Date Treatment Plan Last Reviewed/Revised: 2/16/23    DSM5 Diagnoses: 296.32 (F33.1) Major Depressive Disorder, Recurrent Episode, Moderate _, 300.02 (F41.1) Generalized Anxiety Disorder or 309.81 (F43.10) Posttraumatic Stress Disorder (includes Posttraumatic Stress Disorder for Children 6 Years and Younger)  With dissociative symptoms.  Psychosocial / Contextual Factors: recent loss of wife.  PROMIS (reviewed every 90 days):  2/16/23    Referral / Collaboration:  Referral to another professional/service is not indicated at this time.    Anticipated number of session for this episode of care: 10+  Anticipation frequency of session: Weekly  Anticipated Duration of each session: 38-52 minutes.  Treatment plan will be reviewed in 90 days or when goals have been changed.       MeasurableTreatment Goal(s) related to diagnosis / functional impairment(s)  Goal 1: Patient will report coping well with daily stressors.     I will know I've met my goal when each goal that we accomplish and I am having less of a  problem in that area I know that you have helped me.       Objective #A (Patient Action)                          Patient will continue to follow his Safety plan.  Status: New - Date: 1/28/22; 5/12/22; 8/12/22; 11/10/22; 2/16/23  Intervention(s)  Therapist will monitor for safety each visit and encourage use of safety plan.     Objective #B  Patient will use a healthy coping idea as needed 100% of trials for 1 week.  Status: New - Date: 1/28/22; 5/12/22; 8/12/22; 11/10/22; 2/16/23  IDEAS: napping; petting Snippy (pet dog); watching tv.  Intervention(s)  Therapist will provide ideas and encouragement to use them.     Objective #C  Patient will process the loss of his wife as needed.  Status: New - Date: 1/28/22; 5/12/22; 8/12/22; 11/10/22; 2/16/23   Intervention(s)  Therapist will consider EMDR.              Patient has reviewed and agreed to the above plan.        Kleber Pollack, [facility]                January 28, 2022

## 2023-04-17 ENCOUNTER — VIRTUAL VISIT (OUTPATIENT)
Dept: PSYCHOLOGY | Facility: CLINIC | Age: 68
End: 2023-04-17
Payer: COMMERCIAL

## 2023-04-17 DIAGNOSIS — F43.10 POSTTRAUMATIC STRESS DISORDER: ICD-10-CM

## 2023-04-17 DIAGNOSIS — F33.1 MAJOR DEPRESSIVE DISORDER, RECURRENT EPISODE, MODERATE (H): Primary | ICD-10-CM

## 2023-04-17 DIAGNOSIS — F41.1 GENERALIZED ANXIETY DISORDER: ICD-10-CM

## 2023-04-17 PROCEDURE — 90832 PSYTX W PT 30 MINUTES: CPT | Mod: 95 | Performed by: SOCIAL WORKER

## 2023-04-17 ASSESSMENT — PATIENT HEALTH QUESTIONNAIRE - PHQ9
SUM OF ALL RESPONSES TO PHQ QUESTIONS 1-9: 18
5. POOR APPETITE OR OVEREATING: NEARLY EVERY DAY

## 2023-04-17 ASSESSMENT — ANXIETY QUESTIONNAIRES
3. WORRYING TOO MUCH ABOUT DIFFERENT THINGS: MORE THAN HALF THE DAYS
GAD7 TOTAL SCORE: 13
7. FEELING AFRAID AS IF SOMETHING AWFUL MIGHT HAPPEN: NOT AT ALL
5. BEING SO RESTLESS THAT IT IS HARD TO SIT STILL: MORE THAN HALF THE DAYS
6. BECOMING EASILY ANNOYED OR IRRITABLE: MORE THAN HALF THE DAYS
1. FEELING NERVOUS, ANXIOUS, OR ON EDGE: MORE THAN HALF THE DAYS
2. NOT BEING ABLE TO STOP OR CONTROL WORRYING: MORE THAN HALF THE DAYS
GAD7 TOTAL SCORE: 13
IF YOU CHECKED OFF ANY PROBLEMS ON THIS QUESTIONNAIRE, HOW DIFFICULT HAVE THESE PROBLEMS MADE IT FOR YOU TO DO YOUR WORK, TAKE CARE OF THINGS AT HOME, OR GET ALONG WITH OTHER PEOPLE: SOMEWHAT DIFFICULT

## 2023-04-17 NOTE — PROGRESS NOTES
"    [facility] Counseling                                     Progress Note    Patient Name: Melquiades Morales  Date: 4/17/23         Service Type: Individual      Session Start Time:  11 am  Session End Time: 11:25 am     Session Length: 25 min     Session #: 27    Attendees: Client attended alone.    Service Modality:  Phone Visit:     Phone Visit:      Provider verified identity through the following two step process.  Patient provided:  Patient is known previously to provider    Telephone Visit: The patient's condition can be safely assessed and treated via synchronous audio telemedicine encounter.      Reason for Audio Telemedicine Visit: Patient has requested telehealth visit    Originating Site (Patient Location): Patient's home    Distant Site (Provider Location): [facility]    Consent:  The patient/guardian has verbally consented to:     1. The potential risks and benefits of telemedicine (telephone visit) versus in person care;    The patient has been notified of the following:      \"We have found that certain health care needs can be provided without the need for a face to face visit.  This service lets us provide the care you need with a phone conversation.       I will have full access to your [facility] medical record during this entire phone call.   I will be taking notes for your medical record.      Since this is like an office visit, we will bill your insurance company for this service.       There are potential benefits and risks of telephone visits (e.g. limits to patient confidentiality) that differ from in-person visits.?Confidentiality still applies for telephone services, and nobody will record the visit.  It is important to be in a quiet, private space that is free of distractions (including cell phone or other devices) during the visit.??      If during the course of the call I believe a telephone visit is not " "appropriate, you will not be charged for this service\"     Consent has been obtained for this service by care team member: Yes                         DATA  Interactive Complexity: No  Crisis: No        Progress Since Last Session (Related to Symptoms / Goals / Homework):   Symptoms: stable    Homework: Partially completed- use a healthy coping idea. He has the grounding ideas handout and is to practice them.     Episode of Care Goals: Minimal progress - ACTION (Actively working towards change); Intervened by reinforcing change plan / affirming steps taken.    Current / Ongoing Stressors and Concerns:   He now lives in a nursing home. He is making friends. It feels like home now. The staff are nice.  He and Chiquis were planning on getting a place together in a couple of years. He is now dating a woman named Emily. The latest visit again did not work out.      Treatment Objective(s) Addressed in This Session:  Use a healthy coping idea as needed.      Intervention:  Assessed functioning and for safety. Reviewed the phq and evgeny. Processed feelings about current relationship with Emily and most recent visit not working out. Encouraging him to practice caution as he has not met her and we have some questions about is she the person in the picture. Processed feelings about running his dog over a couple weeks ago with his power wheel chair. Reinforced use of healthy coping ideas- time with his dog Tam helps.         Assessments completed prior to visit:  The following assessments were completed by patient for this visit:  PHQ9:       1/5/2023     9:10 AM 1/19/2023    10:57 AM 2/2/2023    11:06 AM 2/16/2023    11:08 AM 3/2/2023    11:08 AM 3/20/2023    12:10 PM 4/3/2023    12:07 PM   PHQ-9 SCORE   PHQ-9 Total Score 16 16 16 16 16 15 18     GAD7:       1/5/2023     9:10 AM 1/19/2023    10:57 AM 2/2/2023    11:06 AM 2/16/2023    11:08 AM 3/2/2023    11:08 AM 3/20/2023    12:10 PM 4/3/2023    12:07 PM   EVGENY-7 SCORE   Total " Score 12 13 17 12 12 12 14     CAGE-AID:       1/28/2022    12:02 PM   CAGE-AID Total Score   Total Score 0   Total Score MyChart 0 (A total score of 2 or greater is considered clinically significant)     PROMIS 10-Global Health (all questions and answers displayed):       1/28/2022    12:02 PM 5/12/2022    10:23 AM 8/12/2022     9:13 AM 11/10/2022     8:50 AM 2/16/2023    11:22 AM   PROMIS 10   In general, would you say your health is: Poor       In general, would you say your quality of life is: Fair       In general, how would you rate your physical health? Poor       In general, how would you rate your mental health, including your mood and your ability to think? Fair       In general, how would you rate your satisfaction with your social activities and relationships? Fair       In general, please rate how well you carry out your usual social activities and roles Poor       To what extent are you able to carry out your everyday physical activities such as walking, climbing stairs, carrying groceries, or moving a chair? A little       In the past 7 days, how often have you been bothered by emotional problems such as feeling anxious, depressed, or irritable? Often       In the past 7 days, how would you rate your fatigue on average? Moderate       In the past 7 days, how would you rate your pain on average, where 0 means no pain, and 10 means worst imaginable pain? 5       In general, would you say your health is: 1 3 3 2 2   In general, would you say your quality of life is: 2 2 3 2 2   In general, how would you rate your physical health? 1 2 3 1 2   In general, how would you rate your mental health, including your mood and your ability to think? 2 3 3 2 2   In general, how would you rate your satisfaction with your social activities and relationships? 2 2 4 2 2   In general, please rate how well you carry out your usual social activities and roles. (This includes activities at home, at work and in your  community, and responsibilities as a parent, child, spouse, employee, friend, etc.) 1 2 3 2 2   To what extent are you able to carry out your everyday physical activities such as walking, climbing stairs, carrying groceries, or moving a chair? 2 1 2 1 3   In the past 7 days, how often have you been bothered by emotional problems such as feeling anxious, depressed, or irritable? 4 3 3 4 3   In the past 7 days, how would you rate your fatigue on average? 3 3 3 3 3   In the past 7 days, how would you rate your pain on average, where 0 means no pain, and 10 means worst imaginable pain? 5 6 5 5 5   Global Mental Health Score 8 10 13 8 9   Global Physical Health Score 9 9 11 8 11   PROMIS TOTAL - SUBSCORES 17 19 24 16 20     [place] Suicide Severity Rating Scale (Lifetime/Recent)      9/8/2018     5:00 PM 1/28/2022    12:14 PM 9/29/2022    10:00 AM   [place] Suicide Severity Rating (Lifetime/Recent)   Wish to be Dead (Lifetime)  Yes    Non-Specific Active Suicidal Thoughts (Lifetime)  Yes    Most Severe Ideation Rating (Lifetime)  5    Frequency (Lifetime)  3    Duration (Lifetime)  2    Controllability (Lifetime)  5    Protective Factors  (Lifetime)  5    Reasons for Ideation (Lifetime)  4    Q1 Wished to be Dead (Past Month)   no   Q2 Suicidal Thoughts (Past Month)   no   Q3 Suicidal Thought Method   no   Q4 Suicidal Intent without Specific Plan   no   Q5 Suicide Intent with Specific Plan   no   Q6 Suicide Behavior (Lifetime)   no   Level of Risk per Screen   low risk   RETIRED: 1. Wish to be Dead (Recent)  No    RETIRED: 2. Non-Specific Active Suicidal Thoughts (Recent)  No    3. Active Suicidal Ideation with any Methods (Not Plan) Without Intent to Act (Lifetime)  Yes    RETIRED: 3. Active Suicidal Ideation with any Methods (Not Plan) Without Intent to Act (Recent)  No    RETIRE: 4. Active Suicidal Ideation with Some Intent to Act, Without Specific Plan (Lifetime)  Yes    4. Active Suicidal Ideation with Some Intent  to Act, Without Specific Plan (Recent)  No    RETIRE: 5. Active Suicidal Ideation with Specific Plan and Intent (Lifetime)  Yes    RETIRED: 5. Active Suicidal Ideation with Specific Plan and Intent (Recent)  No    Most Severe Ideation Rating (Past Month) NA NA    Frequency (Past Month)  NA    Duration (Past Month)  NA    Controllability (Past Month)  NA    Protective Factors (Past Month)  NA    Reasons for Ideation (Past Month)  NA    Actual Attempt (Lifetime)  Yes    Actual Attempt Description (Lifetime)  3 attempts with last one 15 years ago    Total Number of Actual Attempts (Lifetime)  3    Actual Attempt (Past 3 Months)  No    Has subject engaged in non-suicidal self-injurious behavior? (Lifetime)  No    Has subject engaged in non-suicidal self-injurious behavior? (Past 3 Months)  No    Interrupted Attempts (Lifetime)  No    Interrupted Attempts (Past 3 Months)  No    Aborted or Self-Interrupted Attempt (Lifetime)  No    Aborted or Self-Interrupted Attempt (Past 3 Months)  No    Preparatory Acts or Behavior (Lifetime)  No    Preparatory Acts or Behavior (Past 3 Months)  No    Most Recent Attempt Actual Lethality Code  3    Most Lethal Attempt Actual Lethality Code  2    Initial/First Attempt Actual Lethality Code  2          ASSESSMENT: Current Emotional / Mental Status (status of significant symptoms):   Risk status (Self / Other harm or suicidal ideation)   Patient denies current fears or concerns for personal safety.   Patient denies current or recent suicidal ideation or behaviors.     Patient denies current or recent homicidal ideation or behaviors.   Patient denies current or recent self injurious behavior or ideation.   Patient denies other safety concerns.   Patient reports there has been no change in risk factors since their last session.     Patient reports there has been no change in protective factors since their last session.     a safety plan was completed several years ago.       Appearance:   Unable to assess.    Eye Contact:   Unable to assess.     Psychomotor Behavior: Unable to assess.   Attitude:   Cooperative    Orientation:   All   Speech    Rate / Production: Normal     Volume:  Normal    Mood:    Down     Affect:    Appropriate    Thought Content:  Clear    Thought Form:  Coherent  Logical    Insight:    Good      Medication Review:   No changes to current psychiatric medication(s). PCP Dr. Eliane Quinn prescribing cymbalta 60 mg and trazadone.     Medication Compliance:   Yes     Changes in Health Issues:   None reported     Chemical Use Review:   Substance Use: Chemical use reviewed, no active concerns identified      Tobacco Use: No change in amount of tobacco use since last session.  Contemplation    Diagnosis:  MDD; EVGENY; PTSD.    Collateral Reports Completed:   Routed note to PCP    PLAN: (Patient Tasks / Therapist Tasks / Other)  Biweekly.   Follow safety plan. Use a healthy coping idea as needed.       Goals due 5/16/23      Kleber Pollack, LICSW                                                         ______________________________________________________________________    Individual Treatment Plan    Patient's Name: Melquiades Morales  YOB: 1955    Date of Creation: 1/28/22  Date Treatment Plan Last Reviewed/Revised: 2/16/23    DSM5 Diagnoses: 296.32 (F33.1) Major Depressive Disorder, Recurrent Episode, Moderate _, 300.02 (F41.1) Generalized Anxiety Disorder or 309.81 (F43.10) Posttraumatic Stress Disorder (includes Posttraumatic Stress Disorder for Children 6 Years and Younger)  With dissociative symptoms.  Psychosocial / Contextual Factors: recent loss of wife.  PROMIS (reviewed every 90 days):  2/16/23    Referral / Collaboration:  Referral to another professional/service is not indicated at this time.    Anticipated number of session for this episode of care: 10+  Anticipation frequency of session: Weekly  Anticipated Duration of each session:  38-52 minutes.  Treatment plan will be reviewed in 90 days or when goals have been changed.       MeasurableTreatment Goal(s) related to diagnosis / functional impairment(s)  Goal 1: Patient will report coping well with daily stressors.     I will know I've met my goal when each goal that we accomplish and I am having less of a problem in that area I know that you have helped me.       Objective #A (Patient Action)                          Patient will continue to follow his Safety plan.  Status: New - Date: 1/28/22; 5/12/22; 8/12/22; 11/10/22; 2/16/23  Intervention(s)  Therapist will monitor for safety each visit and encourage use of safety plan.     Objective #B  Patient will use a healthy coping idea as needed 100% of trials for 1 week.  Status: New - Date: 1/28/22; 5/12/22; 8/12/22; 11/10/22; 2/16/23  IDEAS: napping; petting Snippy (pet dog); watching tv.  Intervention(s)  Therapist will provide ideas and encouragement to use them.     Objective #C  Patient will process the loss of his wife as needed.  Status: New - Date: 1/28/22; 5/12/22; 8/12/22; 11/10/22; 2/16/23   Intervention(s)  Therapist will consider EMDR.              Patient has reviewed and agreed to the above plan.        Kleber Pollack, [facility]                January 28, 2022

## 2023-04-19 ENCOUNTER — LAB (OUTPATIENT)
Dept: LAB | Facility: CLINIC | Age: 68
End: 2023-04-19
Payer: COMMERCIAL

## 2023-04-19 DIAGNOSIS — G89.4 CHRONIC PAIN SYNDROME: ICD-10-CM

## 2023-04-19 DIAGNOSIS — F11.288 OPIOID DEPENDENCE WITH OTHER OPIOID-INDUCED DISORDER (H): ICD-10-CM

## 2023-04-19 LAB
CANNABINOIDS UR QL SCN: ABNORMAL
CREAT UR-MCNC: 79 MG/DL

## 2023-04-19 PROCEDURE — 80307 DRUG TEST PRSMV CHEM ANLYZR: CPT

## 2023-04-19 PROCEDURE — 2894A URINE DRUG CONFIRMATION PANEL: CPT

## 2023-04-19 PROCEDURE — G0480 DRUG TEST DEF 1-7 CLASSES: HCPCS

## 2023-04-21 LAB
HYDROMORPHONE UR CFM-MCNC: 253 NG/ML
HYDROMORPHONE/CREAT UR: 320 NG/MG {CREAT}
MORPHINE UR CFM-MCNC: >7000 NG/ML
MORPHINE/CREAT UR: ABNORMAL
OXYCODONE UR CFM-MCNC: 720 NG/ML
OXYCODONE/CREAT UR: 911 NG/MG {CREAT}
OXYMORPHONE UR CFM-MCNC: 595 NG/ML
OXYMORPHONE/CREAT UR: 753 NG/MG {CREAT}
PREGABALIN UR QL CFM: PRESENT

## 2023-05-01 ENCOUNTER — OFFICE VISIT (OUTPATIENT)
Dept: PSYCHOLOGY | Facility: CLINIC | Age: 68
End: 2023-05-01
Payer: COMMERCIAL

## 2023-05-01 DIAGNOSIS — F41.1 GENERALIZED ANXIETY DISORDER: ICD-10-CM

## 2023-05-01 DIAGNOSIS — F33.1 MAJOR DEPRESSIVE DISORDER, RECURRENT EPISODE, MODERATE (H): Primary | ICD-10-CM

## 2023-05-01 DIAGNOSIS — F43.10 POSTTRAUMATIC STRESS DISORDER: ICD-10-CM

## 2023-05-01 PROCEDURE — 90834 PSYTX W PT 45 MINUTES: CPT | Performed by: SOCIAL WORKER

## 2023-05-01 ASSESSMENT — ANXIETY QUESTIONNAIRES
5. BEING SO RESTLESS THAT IT IS HARD TO SIT STILL: MORE THAN HALF THE DAYS
GAD7 TOTAL SCORE: 12
3. WORRYING TOO MUCH ABOUT DIFFERENT THINGS: MORE THAN HALF THE DAYS
1. FEELING NERVOUS, ANXIOUS, OR ON EDGE: MORE THAN HALF THE DAYS
2. NOT BEING ABLE TO STOP OR CONTROL WORRYING: MORE THAN HALF THE DAYS
GAD7 TOTAL SCORE: 12
7. FEELING AFRAID AS IF SOMETHING AWFUL MIGHT HAPPEN: NOT AT ALL
6. BECOMING EASILY ANNOYED OR IRRITABLE: MORE THAN HALF THE DAYS
IF YOU CHECKED OFF ANY PROBLEMS ON THIS QUESTIONNAIRE, HOW DIFFICULT HAVE THESE PROBLEMS MADE IT FOR YOU TO DO YOUR WORK, TAKE CARE OF THINGS AT HOME, OR GET ALONG WITH OTHER PEOPLE: SOMEWHAT DIFFICULT

## 2023-05-01 NOTE — PROGRESS NOTES
[facility]                                     Progress Note    Patient Name: Melquiades Morales  Date: 5/1/23         Service Type: Individual      Session Start Time:  11 am  Session End Time: 11:45 am     Session Length: 45 min     Session #: 28    Attendees: Client attended alone.    Service Modality:  In Person Visit:                             DATA  Interactive Complexity: No  Crisis: No        Progress Since Last Session (Related to Symptoms / Goals / Homework):   Symptoms: stable    Homework: Partially completed- use a healthy coping idea. He has the grounding ideas handout and is to practice them.     Episode of Care Goals: Minimal progress - ACTION (Actively working towards change); Intervened by reinforcing change plan / affirming steps taken.    Current / Ongoing Stressors and Concerns:   He now lives in a nursing home. He is making friends. It feels like home now. The staff are nice.  He and Chiquis were planning on getting a place together in a couple of years. He is now dating a woman named Emily. The latest visit again did not work out.      Treatment Objective(s) Addressed in This Session:  Use a healthy coping idea as needed.      Intervention:  Assessed functioning and for safety. Reviewed the phq and shailesh. Processed feelings about current relationship with Emily and most recent visit not working out. Encouraging him to practice caution as he has not met her and we have some questions about is she the person in the picture. Processed feelings about running his dog over a couple weeks ago with his power wheel chair. Reinforced use of healthy coping ideas- time with his dog Tam helps.         Assessments completed prior to visit:  The following assessments were completed by patient for this visit:  PHQ9:       1/19/2023    10:57 AM 2/2/2023    11:06 AM 2/16/2023    11:08 AM 3/2/2023    11:08 AM 3/20/2023    12:10 PM 4/3/2023    12:07 PM 4/17/2023    11:04 AM   PHQ-9 SCORE    PHQ-9 Total Score 16 16 16 16 15 18 18     GAD7:       1/19/2023    10:57 AM 2/2/2023    11:06 AM 2/16/2023    11:08 AM 3/2/2023    11:08 AM 3/20/2023    12:10 PM 4/3/2023    12:07 PM 4/17/2023    11:04 AM   EVGENY-7 SCORE   Total Score 13 17 12 12 12 14 13     CAGE-AID:       1/28/2022    12:02 PM   CAGE-AID Total Score   Total Score 0   Total Score MyChart 0 (A total score of 2 or greater is considered clinically significant)     PROMIS 10-Global Health (all questions and answers displayed):       1/28/2022    12:02 PM 5/12/2022    10:23 AM 8/12/2022     9:13 AM 11/10/2022     8:50 AM 2/16/2023    11:22 AM   PROMIS 10   In general, would you say your health is: Poor       In general, would you say your quality of life is: Fair       In general, how would you rate your physical health? Poor       In general, how would you rate your mental health, including your mood and your ability to think? Fair       In general, how would you rate your satisfaction with your social activities and relationships? Fair       In general, please rate how well you carry out your usual social activities and roles Poor       To what extent are you able to carry out your everyday physical activities such as walking, climbing stairs, carrying groceries, or moving a chair? A little       In the past 7 days, how often have you been bothered by emotional problems such as feeling anxious, depressed, or irritable? Often       In the past 7 days, how would you rate your fatigue on average? Moderate       In the past 7 days, how would you rate your pain on average, where 0 means no pain, and 10 means worst imaginable pain? 5       In general, would you say your health is: 1 3 3 2 2   In general, would you say your quality of life is: 2 2 3 2 2   In general, how would you rate your physical health? 1 2 3 1 2   In general, how would you rate your mental health, including your mood and your ability to think? 2 3 3 2 2   In general, how would you  rate your satisfaction with your social activities and relationships? 2 2 4 2 2   In general, please rate how well you carry out your usual social activities and roles. (This includes activities at home, at work and in your community, and responsibilities as a parent, child, spouse, employee, friend, etc.) 1 2 3 2 2   To what extent are you able to carry out your everyday physical activities such as walking, climbing stairs, carrying groceries, or moving a chair? 2 1 2 1 3   In the past 7 days, how often have you been bothered by emotional problems such as feeling anxious, depressed, or irritable? 4 3 3 4 3   In the past 7 days, how would you rate your fatigue on average? 3 3 3 3 3   In the past 7 days, how would you rate your pain on average, where 0 means no pain, and 10 means worst imaginable pain? 5 6 5 5 5   Global Mental Health Score 8 10 13 8 9   Global Physical Health Score 9 9 11 8 11   PROMIS TOTAL - SUBSCORES 17 19 24 16 20     [place] Suicide Severity Rating Scale (Lifetime/Recent)      9/8/2018     5:00 PM 1/28/2022    12:14 PM 9/29/2022    10:00 AM   [place] Suicide Severity Rating (Lifetime/Recent)   Wish to be Dead (Lifetime)  Yes    Non-Specific Active Suicidal Thoughts (Lifetime)  Yes    Most Severe Ideation Rating (Lifetime)  5    Frequency (Lifetime)  3    Duration (Lifetime)  2    Controllability (Lifetime)  5    Protective Factors  (Lifetime)  5    Reasons for Ideation (Lifetime)  4    Q1 Wished to be Dead (Past Month)   no   Q2 Suicidal Thoughts (Past Month)   no   Q3 Suicidal Thought Method   no   Q4 Suicidal Intent without Specific Plan   no   Q5 Suicide Intent with Specific Plan   no   Q6 Suicide Behavior (Lifetime)   no   Level of Risk per Screen   low risk   RETIRED: 1. Wish to be Dead (Recent)  No    RETIRED: 2. Non-Specific Active Suicidal Thoughts (Recent)  No    3. Active Suicidal Ideation with any Methods (Not Plan) Without Intent to Act (Lifetime)  Yes    RETIRED: 3. Active  Suicidal Ideation with any Methods (Not Plan) Without Intent to Act (Recent)  No    RETIRE: 4. Active Suicidal Ideation with Some Intent to Act, Without Specific Plan (Lifetime)  Yes    4. Active Suicidal Ideation with Some Intent to Act, Without Specific Plan (Recent)  No    RETIRE: 5. Active Suicidal Ideation with Specific Plan and Intent (Lifetime)  Yes    RETIRED: 5. Active Suicidal Ideation with Specific Plan and Intent (Recent)  No    Most Severe Ideation Rating (Past Month) NA NA    Frequency (Past Month)  NA    Duration (Past Month)  NA    Controllability (Past Month)  NA    Protective Factors (Past Month)  NA    Reasons for Ideation (Past Month)  NA    Actual Attempt (Lifetime)  Yes    Actual Attempt Description (Lifetime)  3 attempts with last one 15 years ago    Total Number of Actual Attempts (Lifetime)  3    Actual Attempt (Past 3 Months)  No    Has subject engaged in non-suicidal self-injurious behavior? (Lifetime)  No    Has subject engaged in non-suicidal self-injurious behavior? (Past 3 Months)  No    Interrupted Attempts (Lifetime)  No    Interrupted Attempts (Past 3 Months)  No    Aborted or Self-Interrupted Attempt (Lifetime)  No    Aborted or Self-Interrupted Attempt (Past 3 Months)  No    Preparatory Acts or Behavior (Lifetime)  No    Preparatory Acts or Behavior (Past 3 Months)  No    Most Recent Attempt Actual Lethality Code  3    Most Lethal Attempt Actual Lethality Code  2    Initial/First Attempt Actual Lethality Code  2          ASSESSMENT: Current Emotional / Mental Status (status of significant symptoms):   Risk status (Self / Other harm or suicidal ideation)   Patient denies current fears or concerns for personal safety.   Patient denies current or recent suicidal ideation or behaviors.     Patient denies current or recent homicidal ideation or behaviors.   Patient denies current or recent self injurious behavior or ideation.   Patient denies other safety concerns.   Patient reports  there has been no change in risk factors since their last session.     Patient reports there has been no change in protective factors since their last session.     a safety plan was completed several years ago.      Appearance:   Appropriate    Eye Contact:   Good      Psychomotor Behavior: Normal    Attitude:   Cooperative    Orientation:   All   Speech    Rate / Production: Normal     Volume:  Normal    Mood:    Down, anxious     Affect:    Appropriate    Thought Content:  Clear    Thought Form:  Coherent  Logical    Insight:    Good      Medication Review:   No changes to current psychiatric medication(s). PCP Dr. Eliane Quinn prescribing cymbalta 60 mg and trazadone.     Medication Compliance:   Yes     Changes in Health Issues:   None reported     Chemical Use Review:   Substance Use: Chemical use reviewed, no active concerns identified      Tobacco Use: No change in amount of tobacco use since last session.  Contemplation    Diagnosis:  MDD; EVGENY; PTSD.    Collateral Reports Completed:   Routed note to PCP    PLAN: (Patient Tasks / Therapist Tasks / Other)  Biweekly.   Follow safety plan. Use a healthy coping idea as needed.       Goals due 5/16/23      Kleber Pollack, [facility]                                                         ______________________________________________________________________    Individual Treatment Plan    Patient's Name: Melquiades Morales  YOB: 1955    Date of Creation: 1/28/22  Date Treatment Plan Last Reviewed/Revised: 2/16/23    DSM5 Diagnoses: 296.32 (F33.1) Major Depressive Disorder, Recurrent Episode, Moderate _, 300.02 (F41.1) Generalized Anxiety Disorder or 309.81 (F43.10) Posttraumatic Stress Disorder (includes Posttraumatic Stress Disorder for Children 6 Years and Younger)  With dissociative symptoms.  Psychosocial / Contextual Factors: recent loss of wife.  PROMIS (reviewed every 90 days):  2/16/23    Referral / Collaboration:  Referral to another  professional/service is not indicated at this time.    Anticipated number of session for this episode of care: 10+  Anticipation frequency of session: Weekly  Anticipated Duration of each session: 38-52 minutes.  Treatment plan will be reviewed in 90 days or when goals have been changed.       MeasurableTreatment Goal(s) related to diagnosis / functional impairment(s)  Goal 1: Patient will report coping well with daily stressors.     I will know I've met my goal when each goal that we accomplish and I am having less of a problem in that area I know that you have helped me.       Objective #A (Patient Action)                          Patient will continue to follow his Safety plan.  Status: New - Date: 1/28/22; 5/12/22; 8/12/22; 11/10/22; 2/16/23  Intervention(s)  Therapist will monitor for safety each visit and encourage use of safety plan.     Objective #B  Patient will use a healthy coping idea as needed 100% of trials for 1 week.  Status: New - Date: 1/28/22; 5/12/22; 8/12/22; 11/10/22; 2/16/23  IDEAS: napping; petting Snippy (pet dog); watching tv.  Intervention(s)  Therapist will provide ideas and encouragement to use them.     Objective #C  Patient will process the loss of his wife as needed.  Status: New - Date: 1/28/22; 5/12/22; 8/12/22; 11/10/22; 2/16/23   Intervention(s)  Therapist will consider EMDR.              Patient has reviewed and agreed to the above plan.        Kleber Pollack, [facility]                January 28, 2022

## 2023-05-16 ENCOUNTER — APPOINTMENT (OUTPATIENT)
Dept: LAB | Facility: CLINIC | Age: 68
End: 2023-05-16
Payer: COMMERCIAL

## 2023-05-18 ENCOUNTER — OFFICE VISIT (OUTPATIENT)
Dept: PSYCHOLOGY | Facility: CLINIC | Age: 68
End: 2023-05-18
Payer: COMMERCIAL

## 2023-05-18 DIAGNOSIS — F43.10 POSTTRAUMATIC STRESS DISORDER: ICD-10-CM

## 2023-05-18 DIAGNOSIS — F33.1 MAJOR DEPRESSIVE DISORDER, RECURRENT EPISODE, MODERATE (H): Primary | ICD-10-CM

## 2023-05-18 DIAGNOSIS — F41.1 GENERALIZED ANXIETY DISORDER: ICD-10-CM

## 2023-05-18 PROCEDURE — 90834 PSYTX W PT 45 MINUTES: CPT | Performed by: SOCIAL WORKER

## 2023-05-18 ASSESSMENT — COLUMBIA-SUICIDE SEVERITY RATING SCALE - C-SSRS
2. HAVE YOU ACTUALLY HAD ANY THOUGHTS OF KILLING YOURSELF?: NO
1. SINCE LAST CONTACT, HAVE YOU WISHED YOU WERE DEAD OR WISHED YOU COULD GO TO SLEEP AND NOT WAKE UP?: NO
6. HAVE YOU EVER DONE ANYTHING, STARTED TO DO ANYTHING, OR PREPARED TO DO ANYTHING TO END YOUR LIFE?: NO
ATTEMPT SINCE LAST CONTACT: NO
TOTAL  NUMBER OF INTERRUPTED ATTEMPTS SINCE LAST CONTACT: NO
SUICIDE, SINCE LAST CONTACT: NO
TOTAL  NUMBER OF ABORTED OR SELF INTERRUPTED ATTEMPTS SINCE LAST CONTACT: NO

## 2023-05-18 ASSESSMENT — ANXIETY QUESTIONNAIRES
IF YOU CHECKED OFF ANY PROBLEMS ON THIS QUESTIONNAIRE, HOW DIFFICULT HAVE THESE PROBLEMS MADE IT FOR YOU TO DO YOUR WORK, TAKE CARE OF THINGS AT HOME, OR GET ALONG WITH OTHER PEOPLE: SOMEWHAT DIFFICULT
5. BEING SO RESTLESS THAT IT IS HARD TO SIT STILL: MORE THAN HALF THE DAYS
7. FEELING AFRAID AS IF SOMETHING AWFUL MIGHT HAPPEN: NOT AT ALL
1. FEELING NERVOUS, ANXIOUS, OR ON EDGE: MORE THAN HALF THE DAYS
GAD7 TOTAL SCORE: 12
2. NOT BEING ABLE TO STOP OR CONTROL WORRYING: MORE THAN HALF THE DAYS
3. WORRYING TOO MUCH ABOUT DIFFERENT THINGS: MORE THAN HALF THE DAYS
6. BECOMING EASILY ANNOYED OR IRRITABLE: MORE THAN HALF THE DAYS
GAD7 TOTAL SCORE: 12

## 2023-05-18 NOTE — PROGRESS NOTES
[facility]                                     Progress Note    Patient Name: Melquiades Morales  Date: 5/18/23         Service Type: Individual      Session Start Time:  10 am  Session End Time: 10:45 am     Session Length: 45 min     Session #: 29    Attendees: Client attended alone.    Service Modality:  In Person Visit:                             DATA  Interactive Complexity: No  Crisis: No        Progress Since Last Session (Related to Symptoms / Goals / Homework):   Symptoms: stable    Homework: Partially completed- use a healthy coping idea. He has the grounding ideas handout and is to practice them.     Episode of Care Goals: Minimal progress - ACTION (Actively working towards change); Intervened by reinforcing change plan / affirming steps taken.    Current / Ongoing Stressors and Concerns:   He now lives in a nursing home. He is making friends. It feels like home now. The staff are nice.  He and Chiquis were planning on getting a place together in a couple of years. He is now dating a woman named Emily. The latest visit again did not work out. This again happened. He has given up on this relationship he tells me.      Treatment Objective(s) Addressed in This Session:  Use a healthy coping idea as needed.      Intervention:  Assessed functioning and for safety. Reviewed the phq and shailesh. Reviewed goals, short [place] and the promis. Processed feelings about relationship not working out. Reinforced use of healthy coping ideas- time with his dog Tam helps.         Assessments completed prior to visit:  The following assessments were completed by patient for this visit:  PHQ9:       2/2/2023    11:06 AM 2/16/2023    11:08 AM 3/2/2023    11:08 AM 3/20/2023    12:10 PM 4/3/2023    12:07 PM 4/17/2023    11:04 AM 5/1/2023    10:58 AM   PHQ-9 SCORE   PHQ-9 Total Score 16 16 16 15 18 18 17     GAD7:       2/2/2023    11:06 AM 2/16/2023    11:08 AM 3/2/2023    11:08 AM 3/20/2023    12:10 PM  4/3/2023    12:07 PM 4/17/2023    11:04 AM 5/1/2023    10:58 AM   EVGENY-7 SCORE   Total Score 17 12 12 12 14 13 12     CAGE-AID:       1/28/2022    12:02 PM   CAGE-AID Total Score   Total Score 0   Total Score MyChart 0 (A total score of 2 or greater is considered clinically significant)     PROMIS 10-Global Health (all questions and answers displayed):       1/28/2022    12:02 PM 5/12/2022    10:23 AM 8/12/2022     9:13 AM 11/10/2022     8:50 AM 2/16/2023    11:22 AM   PROMIS 10   In general, would you say your health is: Poor       In general, would you say your quality of life is: Fair       In general, how would you rate your physical health? Poor       In general, how would you rate your mental health, including your mood and your ability to think? Fair       In general, how would you rate your satisfaction with your social activities and relationships? Fair       In general, please rate how well you carry out your usual social activities and roles Poor       To what extent are you able to carry out your everyday physical activities such as walking, climbing stairs, carrying groceries, or moving a chair? A little       In the past 7 days, how often have you been bothered by emotional problems such as feeling anxious, depressed, or irritable? Often       In the past 7 days, how would you rate your fatigue on average? Moderate       In the past 7 days, how would you rate your pain on average, where 0 means no pain, and 10 means worst imaginable pain? 5       In general, would you say your health is: 1 3 3 2 2   In general, would you say your quality of life is: 2 2 3 2 2   In general, how would you rate your physical health? 1 2 3 1 2   In general, how would you rate your mental health, including your mood and your ability to think? 2 3 3 2 2   In general, how would you rate your satisfaction with your social activities and relationships? 2 2 4 2 2   In general, please rate how well you carry out your usual social  activities and roles. (This includes activities at home, at work and in your community, and responsibilities as a parent, child, spouse, employee, friend, etc.) 1 2 3 2 2   To what extent are you able to carry out your everyday physical activities such as walking, climbing stairs, carrying groceries, or moving a chair? 2 1 2 1 3   In the past 7 days, how often have you been bothered by emotional problems such as feeling anxious, depressed, or irritable? 4 3 3 4 3   In the past 7 days, how would you rate your fatigue on average? 3 3 3 3 3   In the past 7 days, how would you rate your pain on average, where 0 means no pain, and 10 means worst imaginable pain? 5 6 5 5 5   Global Mental Health Score 8 10 13 8 9   Global Physical Health Score 9 9 11 8 11   PROMIS TOTAL - SUBSCORES 17 19 24 16 20     [place] Suicide Severity Rating Scale (Lifetime/Recent)      9/8/2018     5:00 PM 1/28/2022    12:14 PM 9/29/2022    10:00 AM   [place] Suicide Severity Rating (Lifetime/Recent)   Wish to be Dead (Lifetime)  Yes    Non-Specific Active Suicidal Thoughts (Lifetime)  Yes    Most Severe Ideation Rating (Lifetime)  5    Frequency (Lifetime)  3    Duration (Lifetime)  2    Controllability (Lifetime)  5    Protective Factors  (Lifetime)  5    Reasons for Ideation (Lifetime)  4    Q1 Wished to be Dead (Past Month)   no   Q2 Suicidal Thoughts (Past Month)   no   Q3 Suicidal Thought Method   no   Q4 Suicidal Intent without Specific Plan   no   Q5 Suicide Intent with Specific Plan   no   Q6 Suicide Behavior (Lifetime)   no   Level of Risk per Screen   low risk   RETIRED: 1. Wish to be Dead (Recent)  No    RETIRED: 2. Non-Specific Active Suicidal Thoughts (Recent)  No    3. Active Suicidal Ideation with any Methods (Not Plan) Without Intent to Act (Lifetime)  Yes    RETIRED: 3. Active Suicidal Ideation with any Methods (Not Plan) Without Intent to Act (Recent)  No    RETIRE: 4. Active Suicidal Ideation with Some Intent to Act, Without  Specific Plan (Lifetime)  Yes    4. Active Suicidal Ideation with Some Intent to Act, Without Specific Plan (Recent)  No    RETIRE: 5. Active Suicidal Ideation with Specific Plan and Intent (Lifetime)  Yes    RETIRED: 5. Active Suicidal Ideation with Specific Plan and Intent (Recent)  No    Most Severe Ideation Rating (Past Month) NA NA    Frequency (Past Month)  NA    Duration (Past Month)  NA    Controllability (Past Month)  NA    Protective Factors (Past Month)  NA    Reasons for Ideation (Past Month)  NA    Actual Attempt (Lifetime)  Yes    Actual Attempt Description (Lifetime)  3 attempts with last one 15 years ago    Total Number of Actual Attempts (Lifetime)  3    Actual Attempt (Past 3 Months)  No    Has subject engaged in non-suicidal self-injurious behavior? (Lifetime)  No    Has subject engaged in non-suicidal self-injurious behavior? (Past 3 Months)  No    Interrupted Attempts (Lifetime)  No    Interrupted Attempts (Past 3 Months)  No    Aborted or Self-Interrupted Attempt (Lifetime)  No    Aborted or Self-Interrupted Attempt (Past 3 Months)  No    Preparatory Acts or Behavior (Lifetime)  No    Preparatory Acts or Behavior (Past 3 Months)  No    Most Recent Attempt Actual Lethality Code  3    Most Lethal Attempt Actual Lethality Code  2    Initial/First Attempt Actual Lethality Code  2          ASSESSMENT: Current Emotional / Mental Status (status of significant symptoms):   Risk status (Self / Other harm or suicidal ideation)   Patient denies current fears or concerns for personal safety.   Patient denies current or recent suicidal ideation or behaviors.     Patient denies current or recent homicidal ideation or behaviors.   Patient denies current or recent self injurious behavior or ideation.   Patient denies other safety concerns.   Patient reports there has been no change in risk factors since their last session.     Patient reports there has been no change in protective factors since their last  session.     a safety plan was completed several years ago.      Appearance:   Appropriate    Eye Contact:   Good      Psychomotor Behavior: Normal    Attitude:   Cooperative    Orientation:   All   Speech    Rate / Production: Normal     Volume:  Normal    Mood:    Down      Affect:    Appropriate    Thought Content:  Clear    Thought Form:  Coherent  Logical    Insight:    Good      Medication Review:   No changes to current psychiatric medication(s). PCP Dr. Eliane Quinn prescribing cymbalta 60 mg and trazadone.     Medication Compliance:   Yes     Changes in Health Issues:   None reported     Chemical Use Review:   Substance Use: Chemical use reviewed, no active concerns identified      Tobacco Use: No change in amount of tobacco use since last session.  Contemplation    Diagnosis:  MDD; EVGENY; PTSD.    Collateral Reports Completed:   Routed note to PCP    PLAN: (Patient Tasks / Therapist Tasks / Other)  Biweekly.   Follow safety plan. Use a healthy coping idea as needed.       Goals due 8/18/23      Kleber Pollack, [facility]                                                         ______________________________________________________________________    Individual Treatment Plan    Patient's Name: Melquiades Morales  YOB: 1955    Date of Creation: 1/28/22  Date Treatment Plan Last Reviewed/Revised: 5/18/23    DSM5 Diagnoses: 296.32 (F33.1) Major Depressive Disorder, Recurrent Episode, Moderate _, 300.02 (F41.1) Generalized Anxiety Disorder or 309.81 (F43.10) Posttraumatic Stress Disorder (includes Posttraumatic Stress Disorder for Children 6 Years and Younger)  With dissociative symptoms.  Psychosocial / Contextual Factors: recent loss of wife.  PROMIS (reviewed every 90 days):  5/18/23    Referral / Collaboration:  Referral to another professional/service is not indicated at this time.    Anticipated number of session for this episode of care: 10+  Anticipation frequency of session:  Weekly  Anticipated Duration of each session: 38-52 minutes.  Treatment plan will be reviewed in 90 days or when goals have been changed.       MeasurableTreatment Goal(s) related to diagnosis / functional impairment(s)  Goal 1: Patient will report coping well with daily stressors.     I will know I've met my goal when each goal that we accomplish and I am having less of a problem in that area I know that you have helped me.       Objective #A (Patient Action)                          Patient will continue to follow his Safety plan.  Status: New - Date: 1/28/22; 5/12/22; 8/12/22; 11/10/22; 2/16/23; 5/18/23  Intervention(s)  Therapist will monitor for safety each visit and encourage use of safety plan.     Objective #B  Patient will use a healthy coping idea as needed 100% of trials for 1 week.  Status: New - Date: 1/28/22; 5/12/22; 8/12/22; 11/10/22; 2/16/23; 5/18/23  IDEAS: napping; petting Snippy (pet dog); watching tv.  Intervention(s)  Therapist will provide ideas and encouragement to use them.     Objective #C  Patient will process the loss of his wife as needed.  Status: New - Date: 1/28/22; 5/12/22; 8/12/22; 11/10/22; 2/16/23; 5/18/23   Intervention(s)  Therapist will consider EMDR.              Patient has reviewed and agreed to the above plan.        Kleber Pollack, [facility]                January 28, 2022

## 2023-06-01 ENCOUNTER — HEALTH MAINTENANCE LETTER (OUTPATIENT)
Age: 68
End: 2023-06-01

## 2023-06-01 ENCOUNTER — OFFICE VISIT (OUTPATIENT)
Dept: PSYCHOLOGY | Facility: CLINIC | Age: 68
End: 2023-06-01
Payer: COMMERCIAL

## 2023-06-01 DIAGNOSIS — F41.1 GENERALIZED ANXIETY DISORDER: ICD-10-CM

## 2023-06-01 DIAGNOSIS — F43.10 POSTTRAUMATIC STRESS DISORDER: ICD-10-CM

## 2023-06-01 DIAGNOSIS — F33.1 MAJOR DEPRESSIVE DISORDER, RECURRENT EPISODE, MODERATE (H): Primary | ICD-10-CM

## 2023-06-01 PROCEDURE — 90832 PSYTX W PT 30 MINUTES: CPT | Performed by: SOCIAL WORKER

## 2023-06-01 NOTE — PROGRESS NOTES
[facility]                                     Progress Note    Patient Name: Melquiades Morales  Date: 6/1/23         Service Type: Individual      Session Start Time:  10 am  Session End Time: 10:20 am     Session Length: 20 min     Session #: 30    Attendees: Client attended alone.    Service Modality:  In Person Visit:                             DATA  Interactive Complexity: No  Crisis: No        Progress Since Last Session (Related to Symptoms / Goals / Homework):   Symptoms: stable    Homework: Partially completed- use a healthy coping idea. He has the grounding ideas handout and is to practice them.     Episode of Care Goals: Minimal progress - ACTION (Actively working towards change); Intervened by reinforcing change plan / affirming steps taken.    Current / Ongoing Stressors and Concerns:   He now lives in a nursing home. He is making friends. It feels like home now. The staff are nice.  He and Chiquis were planning on getting a place together in a couple of years. He is now dating a woman named Emily. The latest visit again did not work out. This again happened. He has given up on this relationship he tells me.      Treatment Objective(s) Addressed in This Session:  Use a healthy coping idea as needed.      Intervention:  Assessed functioning and for safety. Denies safety concerns. Processed feelings about relationship not working out but continuing to communicate. Not feeling well so called for his ride to bring him home early. Reinforced use of healthy coping ideas- time with his dog Tam cool.         Assessments completed prior to visit:  The following assessments were completed by patient for this visit:  PHQ9:       2/16/2023    11:08 AM 3/2/2023    11:08 AM 3/20/2023    12:10 PM 4/3/2023    12:07 PM 4/17/2023    11:04 AM 5/1/2023    10:58 AM 5/18/2023    10:06 AM   PHQ-9 SCORE   PHQ-9 Total Score 16 16 15 18 18 17 16     GAD7:       2/16/2023    11:08 AM 3/2/2023    11:08 AM  3/20/2023    12:10 PM 4/3/2023    12:07 PM 4/17/2023    11:04 AM 5/1/2023    10:58 AM 5/18/2023    10:06 AM   EVGENY-7 SCORE   Total Score 12 12 12 14 13 12 12     CAGE-AID:       1/28/2022    12:02 PM   CAGE-AID Total Score   Total Score 0   Total Score MyChart 0 (A total score of 2 or greater is considered clinically significant)     PROMIS 10-Global Health (all questions and answers displayed):       1/28/2022    12:02 PM 5/12/2022    10:23 AM 8/12/2022     9:13 AM 11/10/2022     8:50 AM 2/16/2023    11:22 AM 5/18/2023    10:08 AM   PROMIS 10   In general, would you say your health is: Poor        In general, would you say your quality of life is: Fair        In general, how would you rate your physical health? Poor        In general, how would you rate your mental health, including your mood and your ability to think? Fair        In general, how would you rate your satisfaction with your social activities and relationships? Fair        In general, please rate how well you carry out your usual social activities and roles Poor        To what extent are you able to carry out your everyday physical activities such as walking, climbing stairs, carrying groceries, or moving a chair? A little        In the past 7 days, how often have you been bothered by emotional problems such as feeling anxious, depressed, or irritable? Often        In the past 7 days, how would you rate your fatigue on average? Moderate        In the past 7 days, how would you rate your pain on average, where 0 means no pain, and 10 means worst imaginable pain? 5        In general, would you say your health is: 1 3 3 2 2 2   In general, would you say your quality of life is: 2 2 3 2 2 2   In general, how would you rate your physical health? 1 2 3 1 2 2   In general, how would you rate your mental health, including your mood and your ability to think? 2 3 3 2 2 2   In general, how would you rate your satisfaction with your social activities and  relationships? 2 2 4 2 2 2   In general, please rate how well you carry out your usual social activities and roles. (This includes activities at home, at work and in your community, and responsibilities as a parent, child, spouse, employee, friend, etc.) 1 2 3 2 2 2   To what extent are you able to carry out your everyday physical activities such as walking, climbing stairs, carrying groceries, or moving a chair? 2 1 2 1 3 2   In the past 7 days, how often have you been bothered by emotional problems such as feeling anxious, depressed, or irritable? 4 3 3 4 3 4   In the past 7 days, how would you rate your fatigue on average? 3 3 3 3 3 3   In the past 7 days, how would you rate your pain on average, where 0 means no pain, and 10 means worst imaginable pain? 5 6 5 5 5 6   Global Mental Health Score 8 10 13 8 9 8   Global Physical Health Score 9 9 11 8 11 10   PROMIS TOTAL - SUBSCORES 17 19 24 16 20 18     Braxton Suicide Severity Rating Scale (Lifetime/Recent)      9/8/2018     5:00 PM 1/28/2022    12:14 PM 9/29/2022    10:00 AM   Braxton Suicide Severity Rating (Lifetime/Recent)   Wish to be Dead (Lifetime)  Yes    Non-Specific Active Suicidal Thoughts (Lifetime)  Yes    Most Severe Ideation Rating (Lifetime)  5    Frequency (Lifetime)  3    Duration (Lifetime)  2    Controllability (Lifetime)  5    Protective Factors  (Lifetime)  5    Reasons for Ideation (Lifetime)  4    Q1 Wished to be Dead (Past Month)   no   Q2 Suicidal Thoughts (Past Month)   no   Q3 Suicidal Thought Method   no   Q4 Suicidal Intent without Specific Plan   no   Q5 Suicide Intent with Specific Plan   no   Q6 Suicide Behavior (Lifetime)   no   Level of Risk per Screen   low risk   RETIRED: 1. Wish to be Dead (Recent)  No    RETIRED: 2. Non-Specific Active Suicidal Thoughts (Recent)  No    3. Active Suicidal Ideation with any Methods (Not Plan) Without Intent to Act (Lifetime)  Yes    RETIRED: 3. Active Suicidal Ideation with any Methods (Not  Plan) Without Intent to Act (Recent)  No    RETIRE: 4. Active Suicidal Ideation with Some Intent to Act, Without Specific Plan (Lifetime)  Yes    4. Active Suicidal Ideation with Some Intent to Act, Without Specific Plan (Recent)  No    RETIRE: 5. Active Suicidal Ideation with Specific Plan and Intent (Lifetime)  Yes    RETIRED: 5. Active Suicidal Ideation with Specific Plan and Intent (Recent)  No    Most Severe Ideation Rating (Past Month) NA NA    Frequency (Past Month)  NA    Duration (Past Month)  NA    Controllability (Past Month)  NA    Protective Factors (Past Month)  NA    Reasons for Ideation (Past Month)  NA    Actual Attempt (Lifetime)  Yes    Actual Attempt Description (Lifetime)  3 attempts with last one 15 years ago    Total Number of Actual Attempts (Lifetime)  3    Actual Attempt (Past 3 Months)  No    Has subject engaged in non-suicidal self-injurious behavior? (Lifetime)  No    Has subject engaged in non-suicidal self-injurious behavior? (Past 3 Months)  No    Interrupted Attempts (Lifetime)  No    Interrupted Attempts (Past 3 Months)  No    Aborted or Self-Interrupted Attempt (Lifetime)  No    Aborted or Self-Interrupted Attempt (Past 3 Months)  No    Preparatory Acts or Behavior (Lifetime)  No    Preparatory Acts or Behavior (Past 3 Months)  No    Most Recent Attempt Actual Lethality Code  3    Most Lethal Attempt Actual Lethality Code  2    Initial/First Attempt Actual Lethality Code  2          ASSESSMENT: Current Emotional / Mental Status (status of significant symptoms):   Risk status (Self / Other harm or suicidal ideation)   Patient denies current fears or concerns for personal safety.   Patient denies current or recent suicidal ideation or behaviors.     Patient denies current or recent homicidal ideation or behaviors.   Patient denies current or recent self injurious behavior or ideation.   Patient denies other safety concerns.   Patient reports there has been no change in risk factors  since their last session.     Patient reports there has been no change in protective factors since their last session.     a safety plan was completed several years ago.      Appearance:   Appropriate    Eye Contact:   Good/fair      Psychomotor Behavior: Normal    Attitude:   Cooperative    Orientation:   All   Speech    Rate / Production: Normal     Volume:  Normal    Mood:    Down      Affect:    Appropriate    Thought Content:  Clear    Thought Form:  Coherent  Logical    Insight:    Good      Medication Review:   No changes to current psychiatric medication(s). PCP Dr. Eliane Quinn prescribing cymbalta 60 mg and trazadone.     Medication Compliance:   Yes     Changes in Health Issues:   None reported     Chemical Use Review:   Substance Use: Chemical use reviewed, no active concerns identified      Tobacco Use: No change in amount of tobacco use since last session.  Contemplation    Diagnosis:  MDD; EVGENY; PTSD.    Collateral Reports Completed:   Routed note to PCP    PLAN: (Patient Tasks / Therapist Tasks / Other)  Biweekly.   Follow safety plan. Use a healthy coping idea as needed.       Goals due 8/18/23      Kleber Pollack, [facility]                                                         ______________________________________________________________________    Individual Treatment Plan    Patient's Name: Melquiades Morales  YOB: 1955    Date of Creation: 1/28/22  Date Treatment Plan Last Reviewed/Revised: 5/18/23    DSM5 Diagnoses: 296.32 (F33.1) Major Depressive Disorder, Recurrent Episode, Moderate _, 300.02 (F41.1) Generalized Anxiety Disorder or 309.81 (F43.10) Posttraumatic Stress Disorder (includes Posttraumatic Stress Disorder for Children 6 Years and Younger)  With dissociative symptoms.  Psychosocial / Contextual Factors: recent loss of wife.  PROMIS (reviewed every 90 days):  5/18/23    Referral / Collaboration:  Referral to another professional/service is not indicated at this  time.    Anticipated number of session for this episode of care: 10+  Anticipation frequency of session: Weekly  Anticipated Duration of each session: 38-52 minutes.  Treatment plan will be reviewed in 90 days or when goals have been changed.       MeasurableTreatment Goal(s) related to diagnosis / functional impairment(s)  Goal 1: Patient will report coping well with daily stressors.     I will know I've met my goal when each goal that we accomplish and I am having less of a problem in that area I know that you have helped me.       Objective #A (Patient Action)                          Patient will continue to follow his Safety plan.  Status: New - Date: 1/28/22; 5/12/22; 8/12/22; 11/10/22; 2/16/23; 5/18/23  Intervention(s)  Therapist will monitor for safety each visit and encourage use of safety plan.     Objective #B  Patient will use a healthy coping idea as needed 100% of trials for 1 week.  Status: New - Date: 1/28/22; 5/12/22; 8/12/22; 11/10/22; 2/16/23; 5/18/23  IDEAS: napping; petting Snippy (pet dog); watching tv.  Intervention(s)  Therapist will provide ideas and encouragement to use them.     Objective #C  Patient will process the loss of his wife as needed.  Status: New - Date: 1/28/22; 5/12/22; 8/12/22; 11/10/22; 2/16/23; 5/18/23   Intervention(s)  Therapist will consider EMDR.              Patient has reviewed and agreed to the above plan.        Kleber Pollack, [facility]                January 28, 2022

## 2023-07-06 ENCOUNTER — LAB (OUTPATIENT)
Dept: LAB | Facility: CLINIC | Age: 68
End: 2023-07-06
Payer: COMMERCIAL

## 2023-07-06 DIAGNOSIS — F11.20 OPIOID TYPE DEPENDENCE, CONTINUOUS (H): Primary | ICD-10-CM

## 2023-07-06 DIAGNOSIS — F11.20 OPIOID TYPE DEPENDENCE, CONTINUOUS (H): ICD-10-CM

## 2023-07-06 LAB
AMPHETAMINES UR QL: NOT DETECTED
BARBITURATES UR QL SCN: NOT DETECTED
BENZODIAZ UR QL SCN: NOT DETECTED
BUPRENORPHINE UR QL: NOT DETECTED
CANNABINOIDS UR QL: DETECTED
COCAINE UR QL SCN: NOT DETECTED
CREAT UR-MCNC: 126 MG/DL
D-METHAMPHET UR QL: NOT DETECTED
ETHANOL UR QL SCN: NORMAL
METHADONE UR QL SCN: NOT DETECTED
OPIATES UR QL SCN: DETECTED
OXYCODONE UR QL SCN: DETECTED
PCP UR QL SCN: NOT DETECTED
PROPOXYPH UR QL: NOT DETECTED
TRICYCLICS UR QL SCN: NOT DETECTED

## 2023-07-06 PROCEDURE — 80306 DRUG TEST PRSMV INSTRMNT: CPT | Mod: XU

## 2023-07-06 PROCEDURE — 99000 SPECIMEN HANDLING OFFICE-LAB: CPT

## 2023-07-06 PROCEDURE — G0480 DRUG TEST DEF 1-7 CLASSES: HCPCS | Mod: 59

## 2023-07-07 DIAGNOSIS — F11.20 OPIOID DEPENDENCE (H): Primary | ICD-10-CM

## 2023-07-09 LAB
FENTANYL UR-MCNC: <1 NG/ML
NORFENTANYL UR-MCNC: <1 NG/ML

## 2023-07-12 LAB
CANNABINOIDS UR CFM-MCNC: 636 NG/ML
CARBOXYTHC/CREAT UR: 505 NG/MG CREAT

## 2023-07-13 ENCOUNTER — VIRTUAL VISIT (OUTPATIENT)
Dept: PSYCHOLOGY | Facility: CLINIC | Age: 68
End: 2023-07-13
Payer: COMMERCIAL

## 2023-07-13 DIAGNOSIS — F41.1 GENERALIZED ANXIETY DISORDER: ICD-10-CM

## 2023-07-13 DIAGNOSIS — F33.1 MAJOR DEPRESSIVE DISORDER, RECURRENT EPISODE, MODERATE (H): Primary | ICD-10-CM

## 2023-07-13 DIAGNOSIS — F43.10 POSTTRAUMATIC STRESS DISORDER: ICD-10-CM

## 2023-07-13 PROCEDURE — 90834 PSYTX W PT 45 MINUTES: CPT | Mod: 95 | Performed by: SOCIAL WORKER

## 2023-07-13 ASSESSMENT — ANXIETY QUESTIONNAIRES
2. NOT BEING ABLE TO STOP OR CONTROL WORRYING: NEARLY EVERY DAY
7. FEELING AFRAID AS IF SOMETHING AWFUL MIGHT HAPPEN: NOT AT ALL
1. FEELING NERVOUS, ANXIOUS, OR ON EDGE: MORE THAN HALF THE DAYS
6. BECOMING EASILY ANNOYED OR IRRITABLE: MORE THAN HALF THE DAYS
IF YOU CHECKED OFF ANY PROBLEMS ON THIS QUESTIONNAIRE, HOW DIFFICULT HAVE THESE PROBLEMS MADE IT FOR YOU TO DO YOUR WORK, TAKE CARE OF THINGS AT HOME, OR GET ALONG WITH OTHER PEOPLE: SOMEWHAT DIFFICULT
5. BEING SO RESTLESS THAT IT IS HARD TO SIT STILL: MORE THAN HALF THE DAYS
GAD7 TOTAL SCORE: 14
GAD7 TOTAL SCORE: 14
3. WORRYING TOO MUCH ABOUT DIFFERENT THINGS: NEARLY EVERY DAY

## 2023-07-13 NOTE — PROGRESS NOTES
"    [facility] Counseling                                     Progress Note    Patient Name: Melquiades Morales  Date: 7/13/23         Service Type: Individual      Session Start Time:  11 am  Session End Time: 11:45 am     Session Length: 45 min     Session #: 31    Attendees: Client attended alone.    Service Modality:  Phone Visit:         Phone Visit:      Provider verified identity through the following two step process.  Patient provided:  Patient is known previously to provider    Telephone Visit: The patient's condition can be safely assessed and treated via synchronous audio telemedicine encounter.      Reason for Audio Telemedicine Visit: Patient has requested telehealth visit    Originating Site (Patient Location): Patient's home    Distant Site (Provider Location): [facility] MENTAL [facility] [place] [facility]    Consent:  The patient/guardian has verbally consented to:     1. The potential risks and benefits of telemedicine (telephone visit) versus in person care;    The patient has been notified of the following:      \"We have found that certain health care needs can be provided without the need for a face to face visit.  This service lets us provide the care you need with a phone conversation.       I will have full access to your [facility] medical record during this entire phone call.   I will be taking notes for your medical record.      Since this is like an office visit, we will bill your insurance company for this service.       There are potential benefits and risks of telephone visits (e.g. limits to patient confidentiality) that differ from in-person visits.?Confidentiality still applies for telephone services, and nobody will record the visit.  It is important to be in a quiet, private space that is free of distractions (including cell phone or other devices) during the visit.??      If during the course of the call I believe a telephone visit is not " "appropriate, you will not be charged for this service\"     Consent has been obtained for this service by care team member: Yes                     DATA  Interactive Complexity: No  Crisis: No        Progress Since Last Session (Related to Symptoms / Goals / Homework):   Symptoms: stable.    Homework: Partially completed- use a healthy coping idea. He has the grounding ideas handout and is to practice them.     Episode of Care Goals: Minimal progress - ACTION (Actively working towards change); Intervened by reinforcing change plan / affirming steps taken.    Current / Ongoing Stressors and Concerns:   He now lives in a nursing home. He is making friends. It feels like home now. The staff are nice.  He and Chiquis were planning on getting a place together in a couple of years. He is now dating a woman named Emily. The latest visit again did not work out. This again happened. He has given up on this relationship he tells me.  He forgot his last appt and his phone hasn't been set up yet for voice mail.      Treatment Objective(s) Addressed in This Session:  Use a healthy coping idea as needed.      Intervention:  Assessed functioning and for safety. Reviewed the phq and evgeny. Processed feelings about finding out his wife didn't want to share a headstone and finding this out from one of their sons. Discussed missed appointment. Reinforced use of healthy coping ideas- time with his dog Tam cool.         Assessments completed prior to visit:  The following assessments were completed by patient for this visit:  PHQ9:       2/16/2023    11:08 AM 3/2/2023    11:08 AM 3/20/2023    12:10 PM 4/3/2023    12:07 PM 4/17/2023    11:04 AM 5/1/2023    10:58 AM 5/18/2023    10:06 AM   PHQ-9 SCORE   PHQ-9 Total Score 16 16 15 18 18 17 16     GAD7:       2/16/2023    11:08 AM 3/2/2023    11:08 AM 3/20/2023    12:10 PM 4/3/2023    12:07 PM 4/17/2023    11:04 AM 5/1/2023    10:58 AM 5/18/2023    10:06 AM   EVGENY-7 SCORE   Total Score 12 12 " 12 14 13 12 12     CAGE-AID:       1/28/2022    12:02 PM   CAGE-AID Total Score   Total Score 0   Total Score MyChart 0 (A total score of 2 or greater is considered clinically significant)     PROMIS 10-Global Health (all questions and answers displayed):       1/28/2022    12:02 PM 5/12/2022    10:23 AM 8/12/2022     9:13 AM 11/10/2022     8:50 AM 2/16/2023    11:22 AM 5/18/2023    10:08 AM   PROMIS 10   In general, would you say your health is: Poor        In general, would you say your quality of life is: Fair        In general, how would you rate your physical health? Poor        In general, how would you rate your mental health, including your mood and your ability to think? Fair        In general, how would you rate your satisfaction with your social activities and relationships? Fair        In general, please rate how well you carry out your usual social activities and roles Poor        To what extent are you able to carry out your everyday physical activities such as walking, climbing stairs, carrying groceries, or moving a chair? A little        In the past 7 days, how often have you been bothered by emotional problems such as feeling anxious, depressed, or irritable? Often        In the past 7 days, how would you rate your fatigue on average? Moderate        In the past 7 days, how would you rate your pain on average, where 0 means no pain, and 10 means worst imaginable pain? 5        In general, would you say your health is: 1 3 3 2 2 2   In general, would you say your quality of life is: 2 2 3 2 2 2   In general, how would you rate your physical health? 1 2 3 1 2 2   In general, how would you rate your mental health, including your mood and your ability to think? 2 3 3 2 2 2   In general, how would you rate your satisfaction with your social activities and relationships? 2 2 4 2 2 2   In general, please rate how well you carry out your usual social activities and roles. (This includes activities at home,  at work and in your community, and responsibilities as a parent, child, spouse, employee, friend, etc.) 1 2 3 2 2 2   To what extent are you able to carry out your everyday physical activities such as walking, climbing stairs, carrying groceries, or moving a chair? 2 1 2 1 3 2   In the past 7 days, how often have you been bothered by emotional problems such as feeling anxious, depressed, or irritable? 4 3 3 4 3 4   In the past 7 days, how would you rate your fatigue on average? 3 3 3 3 3 3   In the past 7 days, how would you rate your pain on average, where 0 means no pain, and 10 means worst imaginable pain? 5 6 5 5 5 6   Global Mental Health Score 8 10 13 8 9 8   Global Physical Health Score 9 9 11 8 11 10   PROMIS TOTAL - SUBSCORES 17 19 24 16 20 18     [place] Suicide Severity Rating Scale (Lifetime/Recent)      9/8/2018     5:00 PM 1/28/2022    12:14 PM 9/29/2022    10:00 AM   [place] Suicide Severity Rating (Lifetime/Recent)   Wish to be Dead (Lifetime)  Yes    Non-Specific Active Suicidal Thoughts (Lifetime)  Yes    Most Severe Ideation Rating (Lifetime)  5    Frequency (Lifetime)  3    Duration (Lifetime)  2    Controllability (Lifetime)  5    Protective Factors  (Lifetime)  5    Reasons for Ideation (Lifetime)  4    Q1 Wished to be Dead (Past Month)   no   Q2 Suicidal Thoughts (Past Month)   no   Q3 Suicidal Thought Method   no   Q4 Suicidal Intent without Specific Plan   no   Q5 Suicide Intent with Specific Plan   no   Q6 Suicide Behavior (Lifetime)   no   Level of Risk per Screen   low risk   RETIRED: 1. Wish to be Dead (Recent)  No    RETIRED: 2. Non-Specific Active Suicidal Thoughts (Recent)  No    3. Active Suicidal Ideation with any Methods (Not Plan) Without Intent to Act (Lifetime)  Yes    RETIRED: 3. Active Suicidal Ideation with any Methods (Not Plan) Without Intent to Act (Recent)  No    RETIRE: 4. Active Suicidal Ideation with Some Intent to Act, Without Specific Plan (Lifetime)  Yes    4.  Active Suicidal Ideation with Some Intent to Act, Without Specific Plan (Recent)  No    RETIRE: 5. Active Suicidal Ideation with Specific Plan and Intent (Lifetime)  Yes    RETIRED: 5. Active Suicidal Ideation with Specific Plan and Intent (Recent)  No    Most Severe Ideation Rating (Past Month) NA NA    Frequency (Past Month)  NA    Duration (Past Month)  NA    Controllability (Past Month)  NA    Protective Factors (Past Month)  NA    Reasons for Ideation (Past Month)  NA    Actual Attempt (Lifetime)  Yes    Actual Attempt Description (Lifetime)  3 attempts with last one 15 years ago    Total Number of Actual Attempts (Lifetime)  3    Actual Attempt (Past 3 Months)  No    Has subject engaged in non-suicidal self-injurious behavior? (Lifetime)  No    Has subject engaged in non-suicidal self-injurious behavior? (Past 3 Months)  No    Interrupted Attempts (Lifetime)  No    Interrupted Attempts (Past 3 Months)  No    Aborted or Self-Interrupted Attempt (Lifetime)  No    Aborted or Self-Interrupted Attempt (Past 3 Months)  No    Preparatory Acts or Behavior (Lifetime)  No    Preparatory Acts or Behavior (Past 3 Months)  No    Most Recent Attempt Actual Lethality Code  3    Most Lethal Attempt Actual Lethality Code  2    Initial/First Attempt Actual Lethality Code  2          ASSESSMENT: Current Emotional / Mental Status (status of significant symptoms):   Risk status (Self / Other harm or suicidal ideation)   Patient denies current fears or concerns for personal safety.   Patient denies current or recent suicidal ideation or behaviors.     Patient denies current or recent homicidal ideation or behaviors.   Patient denies current or recent self injurious behavior or ideation.   Patient denies other safety concerns.   Patient reports there has been no change in risk factors since their last session.     Patient reports there has been no change in protective factors since their last session.     a safety plan was completed  several years ago.      Appearance:   Unable to assess.    Eye Contact:   Unable to assess.      Psychomotor Behavior: Unable to assess.   Attitude:   Cooperative    Orientation:   All   Speech    Rate / Production: Normal     Volume:  Normal    Mood:    Depressed       Affect:    Appropriate    Thought Content:  Clear    Thought Form:  Coherent  Logical    Insight:    Good      Medication Review:   No changes to current psychiatric medication(s). PCP Dr. Eliane Quinn prescribing cymbalta 60 mg and trazadone.     Medication Compliance:   Yes     Changes in Health Issues:   None reported     Chemical Use Review:   Substance Use: Chemical use reviewed, no active concerns identified      Tobacco Use: No change in amount of tobacco use since last session.  Contemplation    Diagnosis:  MDD; EVGENY; PTSD.    Collateral Reports Completed:   Routed note to PCP    PLAN: (Patient Tasks / Therapist Tasks / Other)  Biweekly.   Follow safety plan. Use a healthy coping idea as needed.       Goals due 8/18/23      Kleber Pollack, [facility]                                                         ______________________________________________________________________    Individual Treatment Plan    Patient's Name: Melquiades Morales  YOB: 1955    Date of Creation: 1/28/22  Date Treatment Plan Last Reviewed/Revised: 5/18/23    DSM5 Diagnoses: 296.32 (F33.1) Major Depressive Disorder, Recurrent Episode, Moderate _, 300.02 (F41.1) Generalized Anxiety Disorder or 309.81 (F43.10) Posttraumatic Stress Disorder (includes Posttraumatic Stress Disorder for Children 6 Years and Younger)  With dissociative symptoms.  Psychosocial / Contextual Factors: recent loss of wife.  PROMIS (reviewed every 90 days):  5/18/23    Referral / Collaboration:  Referral to another professional/service is not indicated at this time.    Anticipated number of session for this episode of care: 10+  Anticipation frequency of session: Weekly  Anticipated  Duration of each session: 38-52 minutes.  Treatment plan will be reviewed in 90 days or when goals have been changed.       MeasurableTreatment Goal(s) related to diagnosis / functional impairment(s)  Goal 1: Patient will report coping well with daily stressors.     I will know I've met my goal when each goal that we accomplish and I am having less of a problem in that area I know that you have helped me.       Objective #A (Patient Action)                          Patient will continue to follow his Safety plan.  Status: New - Date: 1/28/22; 5/12/22; 8/12/22; 11/10/22; 2/16/23; 5/18/23  Intervention(s)  Therapist will monitor for safety each visit and encourage use of safety plan.     Objective #B  Patient will use a healthy coping idea as needed 100% of trials for 1 week.  Status: New - Date: 1/28/22; 5/12/22; 8/12/22; 11/10/22; 2/16/23; 5/18/23  IDEAS: napping; petting Snippy (pet dog); watching tv.  Intervention(s)  Therapist will provide ideas and encouragement to use them.     Objective #C  Patient will process the loss of his wife as needed.  Status: New - Date: 1/28/22; 5/12/22; 8/12/22; 11/10/22; 2/16/23; 5/18/23   Intervention(s)  Therapist will consider EMDR.              Patient has reviewed and agreed to the above plan.        Kleber Pollack, [facility]                January 28, 2022

## 2023-07-27 ENCOUNTER — VIRTUAL VISIT (OUTPATIENT)
Dept: PSYCHOLOGY | Facility: CLINIC | Age: 68
End: 2023-07-27
Payer: COMMERCIAL

## 2023-07-27 DIAGNOSIS — F43.10 POSTTRAUMATIC STRESS DISORDER: ICD-10-CM

## 2023-07-27 DIAGNOSIS — F33.1 MAJOR DEPRESSIVE DISORDER, RECURRENT EPISODE, MODERATE (H): Primary | ICD-10-CM

## 2023-07-27 DIAGNOSIS — F41.1 GENERALIZED ANXIETY DISORDER: ICD-10-CM

## 2023-07-27 PROCEDURE — 90834 PSYTX W PT 45 MINUTES: CPT | Mod: 95 | Performed by: SOCIAL WORKER

## 2023-07-27 ASSESSMENT — ANXIETY QUESTIONNAIRES
1. FEELING NERVOUS, ANXIOUS, OR ON EDGE: MORE THAN HALF THE DAYS
3. WORRYING TOO MUCH ABOUT DIFFERENT THINGS: NEARLY EVERY DAY
5. BEING SO RESTLESS THAT IT IS HARD TO SIT STILL: MORE THAN HALF THE DAYS
IF YOU CHECKED OFF ANY PROBLEMS ON THIS QUESTIONNAIRE, HOW DIFFICULT HAVE THESE PROBLEMS MADE IT FOR YOU TO DO YOUR WORK, TAKE CARE OF THINGS AT HOME, OR GET ALONG WITH OTHER PEOPLE: SOMEWHAT DIFFICULT
7. FEELING AFRAID AS IF SOMETHING AWFUL MIGHT HAPPEN: MORE THAN HALF THE DAYS
2. NOT BEING ABLE TO STOP OR CONTROL WORRYING: NEARLY EVERY DAY
6. BECOMING EASILY ANNOYED OR IRRITABLE: NEARLY EVERY DAY
GAD7 TOTAL SCORE: 18
GAD7 TOTAL SCORE: 18

## 2023-07-27 ASSESSMENT — PATIENT HEALTH QUESTIONNAIRE - PHQ9
SUM OF ALL RESPONSES TO PHQ QUESTIONS 1-9: 18
5. POOR APPETITE OR OVEREATING: NEARLY EVERY DAY

## 2023-07-27 NOTE — PROGRESS NOTES
"    [facility]                                     Progress Note    Patient Name: Melquiades Morales  Date: 7/27/23         Service Type: Individual      Session Start Time:  10 am  Session End Time: 10:45 am     Session Length: 40 min     Session #: 32    Attendees: Client attended alone.    Service Modality:  Phone Visit:         Phone Visit:      Provider verified identity through the following two step process.  Patient provided:  Patient is known previously to provider    Telephone Visit: The patient's condition can be safely assessed and treated via synchronous audio telemedicine encounter.      Reason for Audio Telemedicine Visit: Patient has requested telehealth visit    Originating Site (Patient Location): Patient's home    Distant Site (Provider Location): [facility]    Consent:  The patient/guardian has verbally consented to:     1. The potential risks and benefits of telemedicine (telephone visit) versus in person care;    The patient has been notified of the following:      \"We have found that certain health care needs can be provided without the need for a face to face visit.  This service lets us provide the care you need with a phone conversation.       I will have full access to your [facility] medical record during this entire phone call.   I will be taking notes for your medical record.      Since this is like an office visit, we will bill your insurance company for this service.       There are potential benefits and risks of telephone visits (e.g. limits to patient confidentiality) that differ from in-person visits.?Confidentiality still applies for telephone services, and nobody will record the visit.  It is important to be in a quiet, private space that is free of distractions (including cell phone or other devices) during the visit.??      If during the course of the call I believe a telephone visit is not " "appropriate, you will not be charged for this service\"     Consent has been obtained for this service by care team member: Yes                     DATA  Interactive Complexity: No  Crisis: No        Progress Since Last Session (Related to Symptoms / Goals / Homework):   Symptoms: stable.    Homework: Partially completed- use a healthy coping idea. He has the grounding ideas handout and is to practice them.     Episode of Care Goals: Minimal progress - ACTION (Actively working towards change); Intervened by reinforcing change plan / affirming steps taken.    Current / Ongoing Stressors and Concerns:   He now lives in a nursing home. He is making friends. It feels like home now. The staff are nice.  He and Chiquis were planning on getting a place together in a couple of years. He is now dating a woman named Emily. The latest visit again did not work out. This again happened. He has given up on this relationship he tells me. Now he reports he has seen her twice now. He reports she is in the hospital for blood loss from her menses.  His bank let him know there is an issue; he doesn't have any money left in either account. Sounds like he has been scammed and someone cleaned out his accounts.      Treatment Objective(s) Addressed in This Session:  Use a healthy coping idea as needed.      Intervention:  Assessed functioning and for safety. Reviewed the phq and shailesh. Processed feelings about finding out he may owe the state money for overpayment. Reinforced use of healthy coping ideas- time with his dog Tam helps. Fishing also helps.        Assessments completed prior to visit:  The following assessments were completed by patient for this visit:  PHQ9:       2/16/2023    11:08 AM 3/2/2023    11:08 AM 3/20/2023    12:10 PM 4/3/2023    12:07 PM 4/17/2023    11:04 AM 5/1/2023    10:58 AM 5/18/2023    10:06 AM   PHQ-9 SCORE   PHQ-9 Total Score 16 16 15 18 18 17 16     GAD7:       2/16/2023    11:08 AM 3/2/2023    11:08 AM " 3/20/2023    12:10 PM 4/3/2023    12:07 PM 4/17/2023    11:04 AM 5/1/2023    10:58 AM 5/18/2023    10:06 AM   EVGENY-7 SCORE   Total Score 12 12 12 14 13 12 12     CAGE-AID:       1/28/2022    12:02 PM   CAGE-AID Total Score   Total Score 0   Total Score MyChart 0 (A total score of 2 or greater is considered clinically significant)     PROMIS 10-Global Health (all questions and answers displayed):       1/28/2022    12:02 PM 5/12/2022    10:23 AM 8/12/2022     9:13 AM 11/10/2022     8:50 AM 2/16/2023    11:22 AM 5/18/2023    10:08 AM   PROMIS 10   In general, would you say your health is: Poor        In general, would you say your quality of life is: Fair        In general, how would you rate your physical health? Poor        In general, how would you rate your mental health, including your mood and your ability to think? Fair        In general, how would you rate your satisfaction with your social activities and relationships? Fair        In general, please rate how well you carry out your usual social activities and roles Poor        To what extent are you able to carry out your everyday physical activities such as walking, climbing stairs, carrying groceries, or moving a chair? A little        In the past 7 days, how often have you been bothered by emotional problems such as feeling anxious, depressed, or irritable? Often        In the past 7 days, how would you rate your fatigue on average? Moderate        In the past 7 days, how would you rate your pain on average, where 0 means no pain, and 10 means worst imaginable pain? 5        In general, would you say your health is: 1 3 3 2 2 2   In general, would you say your quality of life is: 2 2 3 2 2 2   In general, how would you rate your physical health? 1 2 3 1 2 2   In general, how would you rate your mental health, including your mood and your ability to think? 2 3 3 2 2 2   In general, how would you rate your satisfaction with your social activities and  relationships? 2 2 4 2 2 2   In general, please rate how well you carry out your usual social activities and roles. (This includes activities at home, at work and in your community, and responsibilities as a parent, child, spouse, employee, friend, etc.) 1 2 3 2 2 2   To what extent are you able to carry out your everyday physical activities such as walking, climbing stairs, carrying groceries, or moving a chair? 2 1 2 1 3 2   In the past 7 days, how often have you been bothered by emotional problems such as feeling anxious, depressed, or irritable? 4 3 3 4 3 4   In the past 7 days, how would you rate your fatigue on average? 3 3 3 3 3 3   In the past 7 days, how would you rate your pain on average, where 0 means no pain, and 10 means worst imaginable pain? 5 6 5 5 5 6   Global Mental Health Score 8 10 13 8 9 8   Global Physical Health Score 9 9 11 8 11 10   PROMIS TOTAL - SUBSCORES 17 19 24 16 20 18     [place] Suicide Severity Rating Scale (Lifetime/Recent)      9/8/2018     5:00 PM 1/28/2022    12:14 PM 9/29/2022    10:00 AM   [place] Suicide Severity Rating (Lifetime/Recent)   Wish to be Dead (Lifetime)  Yes    Non-Specific Active Suicidal Thoughts (Lifetime)  Yes    Most Severe Ideation Rating (Lifetime)  5    Frequency (Lifetime)  3    Duration (Lifetime)  2    Controllability (Lifetime)  5    Protective Factors  (Lifetime)  5    Reasons for Ideation (Lifetime)  4    Q1 Wished to be Dead (Past Month)   no   Q2 Suicidal Thoughts (Past Month)   no   Q3 Suicidal Thought Method   no   Q4 Suicidal Intent without Specific Plan   no   Q5 Suicide Intent with Specific Plan   no   Q6 Suicide Behavior (Lifetime)   no   Level of Risk per Screen   low risk   RETIRED: 1. Wish to be Dead (Recent)  No    RETIRED: 2. Non-Specific Active Suicidal Thoughts (Recent)  No    3. Active Suicidal Ideation with any Methods (Not Plan) Without Intent to Act (Lifetime)  Yes    RETIRED: 3. Active Suicidal Ideation with any Methods (Not  Plan) Without Intent to Act (Recent)  No    RETIRE: 4. Active Suicidal Ideation with Some Intent to Act, Without Specific Plan (Lifetime)  Yes    4. Active Suicidal Ideation with Some Intent to Act, Without Specific Plan (Recent)  No    RETIRE: 5. Active Suicidal Ideation with Specific Plan and Intent (Lifetime)  Yes    RETIRED: 5. Active Suicidal Ideation with Specific Plan and Intent (Recent)  No    Most Severe Ideation Rating (Past Month) NA NA    Frequency (Past Month)  NA    Duration (Past Month)  NA    Controllability (Past Month)  NA    Protective Factors (Past Month)  NA    Reasons for Ideation (Past Month)  NA    Actual Attempt (Lifetime)  Yes    Actual Attempt Description (Lifetime)  3 attempts with last one 15 years ago    Total Number of Actual Attempts (Lifetime)  3    Actual Attempt (Past 3 Months)  No    Has subject engaged in non-suicidal self-injurious behavior? (Lifetime)  No    Has subject engaged in non-suicidal self-injurious behavior? (Past 3 Months)  No    Interrupted Attempts (Lifetime)  No    Interrupted Attempts (Past 3 Months)  No    Aborted or Self-Interrupted Attempt (Lifetime)  No    Aborted or Self-Interrupted Attempt (Past 3 Months)  No    Preparatory Acts or Behavior (Lifetime)  No    Preparatory Acts or Behavior (Past 3 Months)  No    Most Recent Attempt Actual Lethality Code  3    Most Lethal Attempt Actual Lethality Code  2    Initial/First Attempt Actual Lethality Code  2          ASSESSMENT: Current Emotional / Mental Status (status of significant symptoms):   Risk status (Self / Other harm or suicidal ideation)   Patient denies current fears or concerns for personal safety.   Patient denies current or recent suicidal ideation or behaviors.     Patient denies current or recent homicidal ideation or behaviors.   Patient denies current or recent self injurious behavior or ideation.   Patient denies other safety concerns.   Patient reports there has been no change in risk factors  since their last session.     Patient reports there has been no change in protective factors since their last session.     a safety plan was completed several years ago .      Appearance:   Unable to assess.    Eye Contact:   Unable to assess.      Psychomotor Behavior: Unable to assess.   Attitude:   Cooperative    Orientation:   All   Speech    Rate / Production: Normal     Volume:  Normal    Mood:    Depressed       Affect:    Appropriate    Thought Content:  Clear    Thought Form:  Coherent  Logical    Insight:    Good      Medication Review:   No changes to current psychiatric medication(s). PCP Dr. Eliane Quinn prescribing cymbalta 60 mg and trazadone.     Medication Compliance:   Yes     Changes in Health Issues:   None reported     Chemical Use Review:   Substance Use: Chemical use reviewed, no active concerns identified      Tobacco Use: No change in amount of tobacco use since last session.  Contemplation    Diagnosis:  MDD; EVGENY; PTSD.    Collateral Reports Completed:   Routed note to PCP    PLAN: (Patient Tasks / Therapist Tasks / Other)  Biweekly.   Follow safety plan. Use a healthy coping idea as needed.       Goals due 8/18/23      Kleber Pollack, [facility]                                                         ______________________________________________________________________    Individual Treatment Plan    Patient's Name: Melquiades Morales  YOB: 1955    Date of Creation: 1/28/22  Date Treatment Plan Last Reviewed/Revised: 5/18/23    DSM5 Diagnoses: 296.32 (F33.1) Major Depressive Disorder, Recurrent Episode, Moderate _, 300.02 (F41.1) Generalized Anxiety Disorder or 309.81 (F43.10) Posttraumatic Stress Disorder (includes Posttraumatic Stress Disorder for Children 6 Years and Younger)  With dissociative symptoms.  Psychosocial / Contextual Factors: recent loss of wife.  PROMIS (reviewed every 90 days):  5/18/23    Referral / Collaboration:  Referral to another professional/service  is not indicated at this time.    Anticipated number of session for this episode of care: 10+  Anticipation frequency of session: Weekly  Anticipated Duration of each session: 38-52 minutes.  Treatment plan will be reviewed in 90 days or when goals have been changed.       MeasurableTreatment Goal(s) related to diagnosis / functional impairment(s)  Goal 1: Patient will report coping well with daily stressors.     I will know I've met my goal when each goal that we accomplish and I am having less of a problem in that area I know that you have helped me.       Objective #A (Patient Action)                          Patient will continue to follow his Safety plan.  Status: New - Date: 1/28/22; 5/12/22; 8/12/22; 11/10/22; 2/16/23; 5/18/23  Intervention(s)  Therapist will monitor for safety each visit and encourage use of safety plan.     Objective #B  Patient will use a healthy coping idea as needed 100% of trials for 1 week.  Status: New - Date: 1/28/22; 5/12/22; 8/12/22; 11/10/22; 2/16/23; 5/18/23  IDEAS: napping; petting Snippy (pet dog); watching tv; fishing.  Intervention(s)  Therapist will provide ideas and encouragement to use them.     Objective #C  Patient will process the loss of his wife as needed.  Status: New - Date: 1/28/22; 5/12/22; 8/12/22; 11/10/22; 2/16/23; 5/18/23   Intervention(s)  Therapist will consider EMDR.              Patient has reviewed and agreed to the above plan.        Kleber Pollack, [facility]                January 28, 2022

## 2023-08-10 ENCOUNTER — LAB (OUTPATIENT)
Dept: LAB | Facility: CLINIC | Age: 68
End: 2023-08-10
Payer: COMMERCIAL

## 2023-08-10 DIAGNOSIS — F11.20 OPIOID TYPE DEPENDENCE, CONTINUOUS (H): ICD-10-CM

## 2023-08-10 DIAGNOSIS — F11.20 OPIOID DEPENDENCE (H): ICD-10-CM

## 2023-08-10 LAB
AMPHETAMINES UR QL: NOT DETECTED
BARBITURATES UR QL SCN: NOT DETECTED
BENZODIAZ UR QL SCN: NOT DETECTED
BUPRENORPHINE UR QL: NOT DETECTED
CANNABINOIDS UR QL: DETECTED
COCAINE UR QL SCN: NOT DETECTED
CREAT UR-MCNC: 109 MG/DL
D-METHAMPHET UR QL: NOT DETECTED
ETHANOL UR QL SCN: NORMAL
FENTANYL UR QL: NORMAL
Lab: NORMAL
METHADONE UR QL SCN: NOT DETECTED
OPIATES UR QL SCN: DETECTED
OXYCODONE UR QL SCN: DETECTED
PCP UR QL SCN: NOT DETECTED
PERFORMING LABORATORY: NORMAL
PROPOXYPH UR QL: NOT DETECTED
TEST NAME: NORMAL
TRICYCLICS UR QL SCN: NOT DETECTED

## 2023-08-10 PROCEDURE — 80307 DRUG TEST PRSMV CHEM ANLYZR: CPT | Mod: 90

## 2023-08-10 PROCEDURE — 99000 SPECIMEN HANDLING OFFICE-LAB: CPT

## 2023-08-13 LAB
FENTANYL UR-MCNC: <1 NG/ML
NORFENTANYL UR-MCNC: <1 NG/ML

## 2023-08-14 LAB — MISCELLANEOUS TEST 1 (ARUP): NORMAL

## 2023-08-15 LAB
CANNABINOIDS UR CFM-MCNC: 540 NG/ML
CARBOXYTHC/CREAT UR: 495 NG/MG CREAT

## 2023-08-31 ENCOUNTER — VIRTUAL VISIT (OUTPATIENT)
Dept: PSYCHOLOGY | Facility: CLINIC | Age: 68
End: 2023-08-31
Payer: COMMERCIAL

## 2023-08-31 DIAGNOSIS — F43.10 POSTTRAUMATIC STRESS DISORDER: ICD-10-CM

## 2023-08-31 DIAGNOSIS — F41.1 GENERALIZED ANXIETY DISORDER: ICD-10-CM

## 2023-08-31 DIAGNOSIS — F33.1 MAJOR DEPRESSIVE DISORDER, RECURRENT EPISODE, MODERATE (H): Primary | ICD-10-CM

## 2023-08-31 PROCEDURE — 90832 PSYTX W PT 30 MINUTES: CPT | Mod: TEL | Performed by: SOCIAL WORKER

## 2023-08-31 ASSESSMENT — COLUMBIA-SUICIDE SEVERITY RATING SCALE - C-SSRS
TOTAL  NUMBER OF INTERRUPTED ATTEMPTS SINCE LAST CONTACT: NO
2. HAVE YOU ACTUALLY HAD ANY THOUGHTS OF KILLING YOURSELF?: NO
1. SINCE LAST CONTACT, HAVE YOU WISHED YOU WERE DEAD OR WISHED YOU COULD GO TO SLEEP AND NOT WAKE UP?: NO
ATTEMPT SINCE LAST CONTACT: NO
TOTAL  NUMBER OF ABORTED OR SELF INTERRUPTED ATTEMPTS SINCE LAST CONTACT: NO
SUICIDE, SINCE LAST CONTACT: NO
6. HAVE YOU EVER DONE ANYTHING, STARTED TO DO ANYTHING, OR PREPARED TO DO ANYTHING TO END YOUR LIFE?: NO

## 2023-08-31 ASSESSMENT — ANXIETY QUESTIONNAIRES
2. NOT BEING ABLE TO STOP OR CONTROL WORRYING: MORE THAN HALF THE DAYS
5. BEING SO RESTLESS THAT IT IS HARD TO SIT STILL: MORE THAN HALF THE DAYS
7. FEELING AFRAID AS IF SOMETHING AWFUL MIGHT HAPPEN: SEVERAL DAYS
IF YOU CHECKED OFF ANY PROBLEMS ON THIS QUESTIONNAIRE, HOW DIFFICULT HAVE THESE PROBLEMS MADE IT FOR YOU TO DO YOUR WORK, TAKE CARE OF THINGS AT HOME, OR GET ALONG WITH OTHER PEOPLE: SOMEWHAT DIFFICULT
6. BECOMING EASILY ANNOYED OR IRRITABLE: MORE THAN HALF THE DAYS
GAD7 TOTAL SCORE: 13
3. WORRYING TOO MUCH ABOUT DIFFERENT THINGS: MORE THAN HALF THE DAYS
1. FEELING NERVOUS, ANXIOUS, OR ON EDGE: MORE THAN HALF THE DAYS
GAD7 TOTAL SCORE: 13

## 2023-08-31 NOTE — PROGRESS NOTES
"    [facility]                                     Progress Note    Patient Name: Melquiades Morales  Date: 8/31/23         Service Type: Individual      Session Start Time:  10 am  Session End Time: 10:30 am     Session Length: 30 min     Session #: 33    Attendees: Client attended alone.    Service Modality:  Phone Visit:         Phone Visit:      Provider verified identity through the following two step process.  Patient provided:  Patient is known previously to provider    Telephone Visit: The patient's condition can be safely assessed and treated via synchronous audio telemedicine encounter.      Reason for Audio Telemedicine Visit: Patient has requested telehealth visit    Originating Site (Patient Location): Patient's home    Distant Site (Provider Location): [facility]    Consent:  The patient/guardian has verbally consented to:     1. The potential risks and benefits of telemedicine (telephone visit) versus in person care;    The patient has been notified of the following:      \"We have found that certain health care needs can be provided without the need for a face to face visit.  This service lets us provide the care you need with a phone conversation.       I will have full access to your [facility] medical record during this entire phone call.   I will be taking notes for your medical record.      Since this is like an office visit, we will bill your insurance company for this service.       There are potential benefits and risks of telephone visits (e.g. limits to patient confidentiality) that differ from in-person visits.?Confidentiality still applies for telephone services, and nobody will record the visit.  It is important to be in a quiet, private space that is free of distractions (including cell phone or other devices) during the visit.??      If during the course of the call I believe a telephone visit is not " "appropriate, you will not be charged for this service\"     Consent has been obtained for this service by care team member: Yes                     DATA  Interactive Complexity: No  Crisis: No        Progress Since Last Session (Related to Symptoms / Goals / Homework):   Symptoms: stable.    Homework: Partially completed- use a healthy coping idea. He has the grounding ideas handout and is to practice them.     Episode of Care Goals: Minimal progress - ACTION (Actively working towards change); Intervened by reinforcing change plan / affirming steps taken.    Current / Ongoing Stressors and Concerns:   He now lives in a nursing home. He is making friends. It feels like home now. The staff are nice.  He and Chiquis were planning on getting a place together in a couple of years. He is now dating a woman named Emily. The latest visit again did not work out. This again happened. He has given up on this relationship he tells me. Now he reports he has seen her twice now. He reports she is in the hospital for blood loss from her menses.  His bank let him know there is an issue; he doesn't have any money left in either account. Sounds like he has been scammed and someone cleaned out his accounts.      Treatment Objective(s) Addressed in This Session:  Use a healthy coping idea as needed.      Intervention:  Assessed functioning and for safety. Reviewed the phq and shailesh. Reviewed goals, short [place] and the promis. Processed feelings about friends and family. Reinforced use of healthy coping ideas- time with his dog Tam helps. Fishing also helps when he is able to participate in it.        Assessments completed prior to visit:  The following assessments were completed by patient for this visit:  PHQ9:       2/16/2023    11:08 AM 3/2/2023    11:08 AM 3/20/2023    12:10 PM 4/3/2023    12:07 PM 4/17/2023    11:04 AM 5/1/2023    10:58 AM 5/18/2023    10:06 AM   PHQ-9 SCORE   PHQ-9 Total Score 16 16 15 18 18 17 16     GAD7:       " 2/16/2023    11:08 AM 3/2/2023    11:08 AM 3/20/2023    12:10 PM 4/3/2023    12:07 PM 4/17/2023    11:04 AM 5/1/2023    10:58 AM 5/18/2023    10:06 AM   EVGENY-7 SCORE   Total Score 12 12 12 14 13 12 12     CAGE-AID:       1/28/2022    12:02 PM   CAGE-AID Total Score   Total Score 0   Total Score MyChart 0 (A total score of 2 or greater is considered clinically significant)     PROMIS 10-Global Health (all questions and answers displayed):       1/28/2022    12:02 PM 5/12/2022    10:23 AM 8/12/2022     9:13 AM 11/10/2022     8:50 AM 2/16/2023    11:22 AM 5/18/2023    10:08 AM   PROMIS 10   In general, would you say your health is: Poor        In general, would you say your quality of life is: Fair        In general, how would you rate your physical health? Poor        In general, how would you rate your mental health, including your mood and your ability to think? Fair        In general, how would you rate your satisfaction with your social activities and relationships? Fair        In general, please rate how well you carry out your usual social activities and roles Poor        To what extent are you able to carry out your everyday physical activities such as walking, climbing stairs, carrying groceries, or moving a chair? A little        In the past 7 days, how often have you been bothered by emotional problems such as feeling anxious, depressed, or irritable? Often        In the past 7 days, how would you rate your fatigue on average? Moderate        In the past 7 days, how would you rate your pain on average, where 0 means no pain, and 10 means worst imaginable pain? 5        In general, would you say your health is: 1 3 3 2 2 2   In general, would you say your quality of life is: 2 2 3 2 2 2   In general, how would you rate your physical health? 1 2 3 1 2 2   In general, how would you rate your mental health, including your mood and your ability to think? 2 3 3 2 2 2   In general, how would you rate your  satisfaction with your social activities and relationships? 2 2 4 2 2 2   In general, please rate how well you carry out your usual social activities and roles. (This includes activities at home, at work and in your community, and responsibilities as a parent, child, spouse, employee, friend, etc.) 1 2 3 2 2 2   To what extent are you able to carry out your everyday physical activities such as walking, climbing stairs, carrying groceries, or moving a chair? 2 1 2 1 3 2   In the past 7 days, how often have you been bothered by emotional problems such as feeling anxious, depressed, or irritable? 4 3 3 4 3 4   In the past 7 days, how would you rate your fatigue on average? 3 3 3 3 3 3   In the past 7 days, how would you rate your pain on average, where 0 means no pain, and 10 means worst imaginable pain? 5 6 5 5 5 6   Global Mental Health Score 8 10 13 8 9 8   Global Physical Health Score 9 9 11 8 11 10   PROMIS TOTAL - SUBSCORES 17 19 24 16 20 18     [place] Suicide Severity Rating Scale (Lifetime/Recent)      9/8/2018     5:00 PM 1/28/2022    12:14 PM 9/29/2022    10:00 AM   [place] Suicide Severity Rating (Lifetime/Recent)   Wish to be Dead (Lifetime)  Yes    Non-Specific Active Suicidal Thoughts (Lifetime)  Yes    Most Severe Ideation Rating (Lifetime)  5    Frequency (Lifetime)  3    Duration (Lifetime)  2    Controllability (Lifetime)  5    Protective Factors  (Lifetime)  5    Reasons for Ideation (Lifetime)  4    Q1 Wished to be Dead (Past Month)   no   Q2 Suicidal Thoughts (Past Month)   no   Q3 Suicidal Thought Method   no   Q4 Suicidal Intent without Specific Plan   no   Q5 Suicide Intent with Specific Plan   no   Q6 Suicide Behavior (Lifetime)   no   Level of Risk per Screen   low risk   RETIRED: 1. Wish to be Dead (Recent)  No    RETIRED: 2. Non-Specific Active Suicidal Thoughts (Recent)  No    3. Active Suicidal Ideation with any Methods (Not Plan) Without Intent to Act (Lifetime)  Yes    RETIRED: 3.  Active Suicidal Ideation with any Methods (Not Plan) Without Intent to Act (Recent)  No    RETIRE: 4. Active Suicidal Ideation with Some Intent to Act, Without Specific Plan (Lifetime)  Yes    4. Active Suicidal Ideation with Some Intent to Act, Without Specific Plan (Recent)  No    RETIRE: 5. Active Suicidal Ideation with Specific Plan and Intent (Lifetime)  Yes    RETIRED: 5. Active Suicidal Ideation with Specific Plan and Intent (Recent)  No    Most Severe Ideation Rating (Past Month) NA NA    Frequency (Past Month)  NA    Duration (Past Month)  NA    Controllability (Past Month)  NA    Protective Factors (Past Month)  NA    Reasons for Ideation (Past Month)  NA    Actual Attempt (Lifetime)  Yes    Actual Attempt Description (Lifetime)  3 attempts with last one 15 years ago    Total Number of Actual Attempts (Lifetime)  3    Actual Attempt (Past 3 Months)  No    Has subject engaged in non-suicidal self-injurious behavior? (Lifetime)  No    Has subject engaged in non-suicidal self-injurious behavior? (Past 3 Months)  No    Interrupted Attempts (Lifetime)  No    Interrupted Attempts (Past 3 Months)  No    Aborted or Self-Interrupted Attempt (Lifetime)  No    Aborted or Self-Interrupted Attempt (Past 3 Months)  No    Preparatory Acts or Behavior (Lifetime)  No    Preparatory Acts or Behavior (Past 3 Months)  No    Most Recent Attempt Actual Lethality Code  3    Most Lethal Attempt Actual Lethality Code  2    Initial/First Attempt Actual Lethality Code  2          ASSESSMENT: Current Emotional / Mental Status (status of significant symptoms):   Risk status (Self / Other harm or suicidal ideation)   Patient denies current fears or concerns for personal safety.   Patient denies current or recent suicidal ideation or behaviors.     Patient denies current or recent homicidal ideation or behaviors.   Patient denies current or recent self injurious behavior or ideation.   Patient denies other safety concerns.   Patient  reports there has been no change in risk factors since their last session.     Patient reports there has been no change in protective factors since their last session.     a safety plan was completed several years ago .      Appearance:   Unable to assess.    Eye Contact:   Unable to assess.      Psychomotor Behavior: Unable to assess.   Attitude:   Cooperative    Orientation:   All   Speech    Rate / Production: Normal     Volume:  Normal    Mood:    Depressed       Affect:    Appropriate    Thought Content:  Clear    Thought Form:  Coherent  Logical    Insight:    Good      Medication Review:   No changes to current psychiatric medication(s). PCP Dr. Eliane Quinn prescribing cymbalta 60 mg and trazadone.     Medication Compliance:   Yes     Changes in Health Issues:   None reported     Chemical Use Review:   Substance Use: Chemical use reviewed, no active concerns identified      Tobacco Use: No change in amount of tobacco use since last session.  Contemplation    Diagnosis:  MDD; EVGENY; PTSD.    Collateral Reports Completed:   Routed note to PCP    PLAN: (Patient Tasks / Therapist Tasks / Other)  Biweekly.   Follow safety plan. Use a healthy coping idea as needed.       Goals due 12/1/23      Kleber Pollack, [facility]                                                         ______________________________________________________________________    Individual Treatment Plan    Patient's Name: Melquiades Morales  YOB: 1955    Date of Creation: 1/28/22  Date Treatment Plan Last Reviewed/Revised: 8/31/23    DSM5 Diagnoses: 296.32 (F33.1) Major Depressive Disorder, Recurrent Episode, Moderate _, 300.02 (F41.1) Generalized Anxiety Disorder or 309.81 (F43.10) Posttraumatic Stress Disorder (includes Posttraumatic Stress Disorder for Children 6 Years and Younger)  With dissociative symptoms.  Psychosocial / Contextual Factors: recent loss of wife.  PROMIS (reviewed every 90 days):  8/31/23    Referral /  Collaboration:  Referral to another professional/service is not indicated at this time.    Anticipated number of session for this episode of care: 10+  Anticipation frequency of session: Weekly  Anticipated Duration of each session: 38-52 minutes.  Treatment plan will be reviewed in 90 days or when goals have been changed.       MeasurableTreatment Goal(s) related to diagnosis / functional impairment(s)  Goal 1: Patient will report coping well with daily stressors.     I will know I've met my goal when each goal that we accomplish and I am having less of a problem in that area I know that you have helped me.       Objective #A (Patient Action)                          Patient will continue to follow his safety plan.  Status: New - Date: 1/28/22; 5/12/22; 8/12/22; 11/10/22; 2/16/23; 5/18/23; 8/31/23  Intervention(s)  Therapist will monitor for safety each visit and encourage use of safety plan.     Objective #B  Patient will use a healthy coping idea as needed 100% of trials for 1 week.  Status: New - Date: 1/28/22; 5/12/22; 8/12/22; 11/10/22; 2/16/23; 5/18/23; 8/31/23  IDEAS: napping; petting Snippy (pet dog); watching tv; fishing; socializing with friends.  Intervention(s)  Therapist will provide ideas and encouragement to use them.     Objective #C  Patient will process the loss of his wife as needed.  Status: New - Date: 1/28/22; 5/12/22; 8/12/22; 11/10/22; 2/16/23; 5/18/23; 8/31/23  Intervention(s)  Therapist will consider EMDR.              Patient has reviewed and agreed to the above plan.        Kleber Pollack, [facility]                January 28, 2022

## 2023-09-14 ENCOUNTER — VIRTUAL VISIT (OUTPATIENT)
Dept: PSYCHOLOGY | Facility: CLINIC | Age: 68
End: 2023-09-14
Payer: COMMERCIAL

## 2023-09-14 DIAGNOSIS — F41.1 GENERALIZED ANXIETY DISORDER: ICD-10-CM

## 2023-09-14 DIAGNOSIS — F43.10 POSTTRAUMATIC STRESS DISORDER: ICD-10-CM

## 2023-09-14 DIAGNOSIS — F33.1 MAJOR DEPRESSIVE DISORDER, RECURRENT EPISODE, MODERATE (H): Primary | ICD-10-CM

## 2023-09-14 PROCEDURE — 90832 PSYTX W PT 30 MINUTES: CPT | Mod: TEL | Performed by: SOCIAL WORKER

## 2023-09-14 ASSESSMENT — ANXIETY QUESTIONNAIRES
3. WORRYING TOO MUCH ABOUT DIFFERENT THINGS: MORE THAN HALF THE DAYS
5. BEING SO RESTLESS THAT IT IS HARD TO SIT STILL: SEVERAL DAYS
IF YOU CHECKED OFF ANY PROBLEMS ON THIS QUESTIONNAIRE, HOW DIFFICULT HAVE THESE PROBLEMS MADE IT FOR YOU TO DO YOUR WORK, TAKE CARE OF THINGS AT HOME, OR GET ALONG WITH OTHER PEOPLE: SOMEWHAT DIFFICULT
GAD7 TOTAL SCORE: 10
1. FEELING NERVOUS, ANXIOUS, OR ON EDGE: MORE THAN HALF THE DAYS
GAD7 TOTAL SCORE: 10
6. BECOMING EASILY ANNOYED OR IRRITABLE: MORE THAN HALF THE DAYS
7. FEELING AFRAID AS IF SOMETHING AWFUL MIGHT HAPPEN: NOT AT ALL
2. NOT BEING ABLE TO STOP OR CONTROL WORRYING: MORE THAN HALF THE DAYS

## 2023-09-14 ASSESSMENT — PATIENT HEALTH QUESTIONNAIRE - PHQ9
5. POOR APPETITE OR OVEREATING: SEVERAL DAYS
SUM OF ALL RESPONSES TO PHQ QUESTIONS 1-9: 12

## 2023-09-14 NOTE — PROGRESS NOTES
"    [facility]                                     Progress Note    Patient Name: Melquiades Morales  Date: 9/14/23         Service Type: Individual      Session Start Time:  11 am  Session End Time: 11:20 am     Session Length: 20 min     Session #: 34    Attendees: Client attended alone.    Service Modality:  Phone Visit:         Phone Visit:      Provider verified identity through the following two step process.  Patient provided:  Patient is known previously to provider    Telephone Visit: The patient's condition can be safely assessed and treated via synchronous audio telemedicine encounter.      Reason for Audio Telemedicine Visit: Patient has requested telehealth visit    Originating Site (Patient Location): Patient's home    Distant Site (Provider Location): [facility] MENT[facility]    Consent:  The patient/guardian has verbally consented to:     1. The potential risks and benefits of telemedicine (telephone visit) versus in person care;    The patient has been notified of the following:      \"We have found that certain health care needs can be provided without the need for a face to face visit.  This service lets us provide the care you need with a phone conversation.       I will have full access to your [facility] medical record during this entire phone call.   I will be taking notes for your medical record.      Since this is like an office visit, we will bill your insurance company for this service.       There are potential benefits and risks of telephone visits (e.g. limits to patient confidentiality) that differ from in-person visits.?Confidentiality still applies for telephone services, and nobody will record the visit.  It is important to be in a quiet, private space that is free of distractions (including cell phone or other devices) during the visit.??      If during the course of the call I believe a telephone visit is not " "appropriate, you will not be charged for this service\"     Consent has been obtained for this service by care team member: Yes                     DATA  Interactive Complexity: No  Crisis: No        Progress Since Last Session (Related to Symptoms / Goals / Homework):   Symptoms: stable.    Homework: Partially completed- use a healthy coping idea. He has the grounding ideas handout and is to practice them.     Episode of Care Goals: Minimal progress - ACTION (Actively working towards change); Intervened by reinforcing change plan / affirming steps taken.    Current / Ongoing Stressors and Concerns:   He now lives in a nursing home. He is making friends. It feels like home now. The staff are nice.  He and Chiquis were planning on getting a place together in a couple of years. He is now dating a woman named Emily. The latest visit again did not work out. This again happened. He has given up on this relationship he tells me. Now he reports he has seen her twice now. He reports she is in the hospital for blood loss from her menses.  His bank let him know there is an issue; he doesn't have any money left in either account. Sounds like he has been scammed and someone cleaned out his accounts.      Treatment Objective(s) Addressed in This Session:  Use a healthy coping idea as needed.      Intervention:  Assessed functioning and for safety. Reviewed the phq and shailesh. Processed feelings about beginning physical therapy. Discussed his feeling he is doing well enough to move to \"once per month\". Reinforced use of healthy coping ideas- time with his dog Tam cool.        Assessments completed prior to visit:  The following assessments were completed by patient for this visit:  PHQ9:       2/16/2023    11:08 AM 3/2/2023    11:08 AM 3/20/2023    12:10 PM 4/3/2023    12:07 PM 4/17/2023    11:04 AM 5/1/2023    10:58 AM 5/18/2023    10:06 AM   PHQ-9 SCORE   PHQ-9 Total Score 16 16 15 18 18 17 16     GAD7:       2/16/2023    11:08 AM " 3/2/2023    11:08 AM 3/20/2023    12:10 PM 4/3/2023    12:07 PM 4/17/2023    11:04 AM 5/1/2023    10:58 AM 5/18/2023    10:06 AM   EVGENY-7 SCORE   Total Score 12 12 12 14 13 12 12     CAGE-AID:       1/28/2022    12:02 PM   CAGE-AID Total Score   Total Score 0   Total Score MyChart 0 (A total score of 2 or greater is considered clinically significant)     PROMIS 10-Global Health (all questions and answers displayed):       1/28/2022    12:02 PM 5/12/2022    10:23 AM 8/12/2022     9:13 AM 11/10/2022     8:50 AM 2/16/2023    11:22 AM 5/18/2023    10:08 AM   PROMIS 10   In general, would you say your health is: Poor        In general, would you say your quality of life is: Fair        In general, how would you rate your physical health? Poor        In general, how would you rate your mental health, including your mood and your ability to think? Fair        In general, how would you rate your satisfaction with your social activities and relationships? Fair        In general, please rate how well you carry out your usual social activities and roles Poor        To what extent are you able to carry out your everyday physical activities such as walking, climbing stairs, carrying groceries, or moving a chair? A little        In the past 7 days, how often have you been bothered by emotional problems such as feeling anxious, depressed, or irritable? Often        In the past 7 days, how would you rate your fatigue on average? Moderate        In the past 7 days, how would you rate your pain on average, where 0 means no pain, and 10 means worst imaginable pain? 5        In general, would you say your health is: 1 3 3 2 2 2   In general, would you say your quality of life is: 2 2 3 2 2 2   In general, how would you rate your physical health? 1 2 3 1 2 2   In general, how would you rate your mental health, including your mood and your ability to think? 2 3 3 2 2 2   In general, how would you rate your satisfaction with your social  activities and relationships? 2 2 4 2 2 2   In general, please rate how well you carry out your usual social activities and roles. (This includes activities at home, at work and in your community, and responsibilities as a parent, child, spouse, employee, friend, etc.) 1 2 3 2 2 2   To what extent are you able to carry out your everyday physical activities such as walking, climbing stairs, carrying groceries, or moving a chair? 2 1 2 1 3 2   In the past 7 days, how often have you been bothered by emotional problems such as feeling anxious, depressed, or irritable? 4 3 3 4 3 4   In the past 7 days, how would you rate your fatigue on average? 3 3 3 3 3 3   In the past 7 days, how would you rate your pain on average, where 0 means no pain, and 10 means worst imaginable pain? 5 6 5 5 5 6   Global Mental Health Score 8 10 13 8 9 8   Global Physical Health Score 9 9 11 8 11 10   PROMIS TOTAL - SUBSCORES 17 19 24 16 20 18     [place] Suicide Severity Rating Scale (Lifetime/Recent)      9/8/2018     5:00 PM 1/28/2022    12:14 PM 9/29/2022    10:00 AM   [place] Suicide Severity Rating (Lifetime/Recent)   Wish to be Dead (Lifetime)  Yes    Non-Specific Active Suicidal Thoughts (Lifetime)  Yes    Most Severe Ideation Rating (Lifetime)  5    Frequency (Lifetime)  3    Duration (Lifetime)  2    Controllability (Lifetime)  5    Protective Factors  (Lifetime)  5    Reasons for Ideation (Lifetime)  4    Q1 Wished to be Dead (Past Month)   no   Q2 Suicidal Thoughts (Past Month)   no   Q3 Suicidal Thought Method   no   Q4 Suicidal Intent without Specific Plan   no   Q5 Suicide Intent with Specific Plan   no   Q6 Suicide Behavior (Lifetime)   no   Level of Risk per Screen   low risk   RETIRED: 1. Wish to be Dead (Recent)  No    RETIRED: 2. Non-Specific Active Suicidal Thoughts (Recent)  No    3. Active Suicidal Ideation with any Methods (Not Plan) Without Intent to Act (Lifetime)  Yes    RETIRED: 3. Active Suicidal Ideation with any  Methods (Not Plan) Without Intent to Act (Recent)  No    RETIRE: 4. Active Suicidal Ideation with Some Intent to Act, Without Specific Plan (Lifetime)  Yes    4. Active Suicidal Ideation with Some Intent to Act, Without Specific Plan (Recent)  No    RETIRE: 5. Active Suicidal Ideation with Specific Plan and Intent (Lifetime)  Yes    RETIRED: 5. Active Suicidal Ideation with Specific Plan and Intent (Recent)  No    Most Severe Ideation Rating (Past Month) NA NA    Frequency (Past Month)  NA    Duration (Past Month)  NA    Controllability (Past Month)  NA    Protective Factors (Past Month)  NA    Reasons for Ideation (Past Month)  NA    Actual Attempt (Lifetime)  Yes    Actual Attempt Description (Lifetime)  3 attempts with last one 15 years ago    Total Number of Actual Attempts (Lifetime)  3    Actual Attempt (Past 3 Months)  No    Has subject engaged in non-suicidal self-injurious behavior? (Lifetime)  No    Has subject engaged in non-suicidal self-injurious behavior? (Past 3 Months)  No    Interrupted Attempts (Lifetime)  No    Interrupted Attempts (Past 3 Months)  No    Aborted or Self-Interrupted Attempt (Lifetime)  No    Aborted or Self-Interrupted Attempt (Past 3 Months)  No    Preparatory Acts or Behavior (Lifetime)  No    Preparatory Acts or Behavior (Past 3 Months)  No    Most Recent Attempt Actual Lethality Code  3    Most Lethal Attempt Actual Lethality Code  2    Initial/First Attempt Actual Lethality Code  2          ASSESSMENT: Current Emotional / Mental Status (status of significant symptoms):   Risk status (Self / Other harm or suicidal ideation)   Patient denies current fears or concerns for personal safety.   Patient denies current or recent suicidal ideation or behaviors.     Patient denies current or recent homicidal ideation or behaviors.   Patient denies current or recent self injurious behavior or ideation.   Patient denies other safety concerns.   Patient reports there has been no change in  risk factors since their last session.     Patient reports there has been no change in protective factors since their last session.     a safety plan was completed several years ago .      Appearance:   Unable to assess.    Eye Contact:   Unable to assess.      Psychomotor Behavior: Unable to assess.   Attitude:   Cooperative    Orientation:   All   Speech    Rate / Production: Normal     Volume:  Normal    Mood:    Normal        Affect:    Appropriate    Thought Content:  Clear    Thought Form:  Coherent  Logical    Insight:    Good      Medication Review:   No changes to current psychiatric medication(s). PCP Dr. Eliane Quinn prescribing cymbalta 60 mg and trazadone.     Medication Compliance:   Yes     Changes in Health Issues:   None reported     Chemical Use Review:   Substance Use: Chemical use reviewed, no active concerns identified      Tobacco Use: No change in amount of tobacco use since last session.  Contemplation    Diagnosis:  MDD; EVGENY; PTSD.    Collateral Reports Completed:   Routed note to PCP    PLAN: (Patient Tasks / Therapist Tasks / Other)  Biweekly.   Follow safety plan. Use a healthy coping idea as needed.       Goals due 12/1/23      Kleber Pollack, [facility]                                                         ______________________________________________________________________    Individual Treatment Plan    Patient's Name: Melquiades Morales  YOB: 1955    Date of Creation: 1/28/22  Date Treatment Plan Last Reviewed/Revised: 8/31/23    DSM5 Diagnoses: 296.32 (F33.1) Major Depressive Disorder, Recurrent Episode, Moderate _, 300.02 (F41.1) Generalized Anxiety Disorder or 309.81 (F43.10) Posttraumatic Stress Disorder (includes Posttraumatic Stress Disorder for Children 6 Years and Younger)  With dissociative symptoms.  Psychosocial / Contextual Factors: recent loss of wife.  PROMIS (reviewed every 90 days):  8/31/23    Referral / Collaboration:  Referral to another  professional/service is not indicated at this time.    Anticipated number of session for this episode of care: 10+  Anticipation frequency of session: Weekly  Anticipated Duration of each session: 38-52 minutes.  Treatment plan will be reviewed in 90 days or when goals have been changed.       MeasurableTreatment Goal(s) related to diagnosis / functional impairment(s)  Goal 1: Patient will report coping well with daily stressors.     I will know I've met my goal when each goal that we accomplish and I am having less of a problem in that area I know that you have helped me.       Objective #A (Patient Action)                          Patient will continue to follow his safety plan.  Status: New - Date: 1/28/22; 5/12/22; 8/12/22; 11/10/22; 2/16/23; 5/18/23; 8/31/23  Intervention(s)  Therapist will monitor for safety each visit and encourage use of safety plan.     Objective #B  Patient will use a healthy coping idea as needed 100% of trials for 1 week.  Status: New - Date: 1/28/22; 5/12/22; 8/12/22; 11/10/22; 2/16/23; 5/18/23; 8/31/23  IDEAS: napping; petting Snippy (pet dog); watching tv; fishing; socializing with friends.  Intervention(s)  Therapist will provide ideas and encouragement to use them.     Objective #C  Patient will process the loss of his wife as needed.  Status: New - Date: 1/28/22; 5/12/22; 8/12/22; 11/10/22; 2/16/23; 5/18/23; 8/31/23  Intervention(s)  Therapist will consider EMDR.              Patient has reviewed and agreed to the above plan.        Kleber Pollack, [facility]                January 28, 2022

## 2023-09-21 ENCOUNTER — LAB (OUTPATIENT)
Dept: LAB | Facility: CLINIC | Age: 68
End: 2023-09-21
Payer: COMMERCIAL

## 2023-09-21 ENCOUNTER — OFFICE VISIT (OUTPATIENT)
Dept: FAMILY MEDICINE | Facility: CLINIC | Age: 68
End: 2023-09-21
Payer: COMMERCIAL

## 2023-09-21 VITALS
HEART RATE: 95 BPM | TEMPERATURE: 98.5 F | BODY MASS INDEX: 30.52 KG/M2 | OXYGEN SATURATION: 91 % | DIASTOLIC BLOOD PRESSURE: 80 MMHG | WEIGHT: 218 LBS | HEIGHT: 71 IN | RESPIRATION RATE: 18 BRPM | SYSTOLIC BLOOD PRESSURE: 137 MMHG

## 2023-09-21 DIAGNOSIS — Z12.11 SCREEN FOR COLON CANCER: ICD-10-CM

## 2023-09-21 DIAGNOSIS — F33.1 MAJOR DEPRESSIVE DISORDER, RECURRENT EPISODE, MODERATE (H): ICD-10-CM

## 2023-09-21 DIAGNOSIS — F11.29 OPIOID DEPENDENCE WITH OPIOID-INDUCED DISORDER (H): ICD-10-CM

## 2023-09-21 DIAGNOSIS — J44.9 CHRONIC OBSTRUCTIVE PULMONARY DISEASE, UNSPECIFIED COPD TYPE (H): ICD-10-CM

## 2023-09-21 DIAGNOSIS — Z00.00 MEDICARE ANNUAL WELLNESS VISIT, SUBSEQUENT: Primary | ICD-10-CM

## 2023-09-21 DIAGNOSIS — E87.5 HYPERKALEMIA: ICD-10-CM

## 2023-09-21 DIAGNOSIS — E78.5 HYPERLIPIDEMIA LDL GOAL <130: Chronic | ICD-10-CM

## 2023-09-21 DIAGNOSIS — R25.1 TREMOR: ICD-10-CM

## 2023-09-21 PROBLEM — J96.02 ACUTE RESPIRATORY FAILURE WITH HYPERCAPNIA (H): Status: RESOLVED | Noted: 2022-08-06 | Resolved: 2023-09-21

## 2023-09-21 PROBLEM — R10.84 ABDOMINAL PAIN, GENERALIZED: Status: RESOLVED | Noted: 2022-07-27 | Resolved: 2023-09-21

## 2023-09-21 PROBLEM — G92.9 TOXIC ENCEPHALOPATHY: Status: RESOLVED | Noted: 2022-08-06 | Resolved: 2023-09-21

## 2023-09-21 PROBLEM — R65.20 SEPSIS WITH ENCEPHALOPATHY WITHOUT SEPTIC SHOCK, DUE TO UNSPECIFIED ORGANISM (H): Status: RESOLVED | Noted: 2022-09-29 | Resolved: 2023-09-21

## 2023-09-21 PROBLEM — G93.41 SEPSIS WITH ENCEPHALOPATHY WITHOUT SEPTIC SHOCK, DUE TO UNSPECIFIED ORGANISM (H): Status: RESOLVED | Noted: 2022-09-29 | Resolved: 2023-09-21

## 2023-09-21 PROBLEM — U07.1 COVID-19 VIRUS INFECTION: Status: RESOLVED | Noted: 2022-01-20 | Resolved: 2023-09-21

## 2023-09-21 PROBLEM — A41.9 SEPSIS WITH ENCEPHALOPATHY WITHOUT SEPTIC SHOCK, DUE TO UNSPECIFIED ORGANISM (H): Status: RESOLVED | Noted: 2022-09-29 | Resolved: 2023-09-21

## 2023-09-21 LAB
ALBUMIN SERPL BCG-MCNC: 4.5 G/DL (ref 3.5–5.2)
ALP SERPL-CCNC: 76 U/L (ref 40–129)
ALT SERPL W P-5'-P-CCNC: 13 U/L (ref 0–70)
ANION GAP SERPL CALCULATED.3IONS-SCNC: 9 MMOL/L (ref 7–15)
AST SERPL W P-5'-P-CCNC: 22 U/L (ref 0–45)
BILIRUB SERPL-MCNC: 0.7 MG/DL
BUN SERPL-MCNC: 8.6 MG/DL (ref 8–23)
CALCIUM SERPL-MCNC: 10.5 MG/DL (ref 8.8–10.2)
CHLORIDE SERPL-SCNC: 103 MMOL/L (ref 98–107)
CHOLEST SERPL-MCNC: 128 MG/DL
CREAT SERPL-MCNC: 0.88 MG/DL (ref 0.67–1.17)
DEPRECATED HCO3 PLAS-SCNC: 31 MMOL/L (ref 22–29)
EGFRCR SERPLBLD CKD-EPI 2021: >90 ML/MIN/1.73M2
GLUCOSE SERPL-MCNC: 162 MG/DL (ref 70–99)
HDLC SERPL-MCNC: 32 MG/DL
LDLC SERPL CALC-MCNC: 54 MG/DL
NONHDLC SERPL-MCNC: 96 MG/DL
POTASSIUM SERPL-SCNC: 5.6 MMOL/L (ref 3.4–5.3)
PROT SERPL-MCNC: 7.5 G/DL (ref 6.4–8.3)
SODIUM SERPL-SCNC: 143 MMOL/L (ref 136–145)
TRIGL SERPL-MCNC: 212 MG/DL
TSH SERPL DL<=0.005 MIU/L-ACNC: 1.1 UIU/ML (ref 0.3–4.2)

## 2023-09-21 PROCEDURE — G0296 VISIT TO DETERM LDCT ELIG: HCPCS | Performed by: NURSE PRACTITIONER

## 2023-09-21 PROCEDURE — G0008 ADMIN INFLUENZA VIRUS VAC: HCPCS | Performed by: NURSE PRACTITIONER

## 2023-09-21 PROCEDURE — G0439 PPPS, SUBSEQ VISIT: HCPCS | Performed by: NURSE PRACTITIONER

## 2023-09-21 PROCEDURE — 80061 LIPID PANEL: CPT

## 2023-09-21 PROCEDURE — 80053 COMPREHEN METABOLIC PANEL: CPT

## 2023-09-21 PROCEDURE — 84443 ASSAY THYROID STIM HORMONE: CPT

## 2023-09-21 PROCEDURE — 99214 OFFICE O/P EST MOD 30 MIN: CPT | Mod: 25 | Performed by: NURSE PRACTITIONER

## 2023-09-21 PROCEDURE — 90662 IIV NO PRSV INCREASED AG IM: CPT | Performed by: NURSE PRACTITIONER

## 2023-09-21 PROCEDURE — 36415 COLL VENOUS BLD VENIPUNCTURE: CPT

## 2023-09-21 RX ORDER — TRAZODONE HYDROCHLORIDE 50 MG/1
50 TABLET, FILM COATED ORAL AT BEDTIME
Qty: 90 TABLET | Refills: 4 | Status: SHIPPED | OUTPATIENT
Start: 2023-09-21

## 2023-09-21 RX ORDER — ALBUTEROL SULFATE 90 UG/1
2 AEROSOL, METERED RESPIRATORY (INHALATION) EVERY 4 HOURS PRN
Qty: 18 G | Refills: 3 | Status: SHIPPED | OUTPATIENT
Start: 2023-09-21

## 2023-09-21 RX ORDER — PROPRANOLOL HCL 60 MG
60 CAPSULE, EXTENDED RELEASE 24HR ORAL DAILY
Qty: 90 CAPSULE | Refills: 0 | Status: SHIPPED | OUTPATIENT
Start: 2023-09-21 | End: 2023-12-19

## 2023-09-21 RX ORDER — SIMVASTATIN 80 MG
80 TABLET ORAL DAILY
Qty: 90 TABLET | Refills: 4 | Status: SHIPPED | OUTPATIENT
Start: 2023-09-21

## 2023-09-21 RX ORDER — TRAZODONE HYDROCHLORIDE 100 MG/1
200 TABLET ORAL AT BEDTIME
Qty: 180 TABLET | Refills: 3 | Status: SHIPPED | OUTPATIENT
Start: 2023-09-21

## 2023-09-21 ASSESSMENT — ACTIVITIES OF DAILY LIVING (ADL): CURRENT_FUNCTION: NEEDS ASSISTANCE

## 2023-09-21 NOTE — PROGRESS NOTES
"  Assessment & Plan     Medicare annual wellness visit, subsequent      Tremor  Trial:  - propranolol ER (INDERAL LA) 60 MG 24 hr capsule; Take 1 capsule (60 mg) by mouth daily  - TSH with free T4 reflex; Future    Hyperlipidemia LDL goal <130    - Lipid panel reflex to direct LDL Fasting; Future  - Comprehensive metabolic panel (BMP + Alb, Alk Phos, ALT, AST, Total. Bili, TP); Future  - simvastatin (ZOCOR) 80 MG tablet; Take 1 tablet (80 mg) by mouth daily    Chronic obstructive pulmonary disease, unspecified COPD type (H)  Denies frequent albuterol use, continue;  - albuterol (PROAIR HFA) 108 (90 Base) MCG/ACT inhaler; Inhale 2 puffs into the lungs every 4 hours as needed for shortness of breath or wheezing    Opioid dependence with opioid-induced disorder (H)  Managed by pain provider    Major depressive disorder, recurrent episode, moderate (H)  Stable, continue:  - traZODone (DESYREL) 100 MG tablet; Take 2 tablets (200 mg) by mouth At Bedtime In addition to the 50mg tablet for a total of 250mg per day  - traZODone (DESYREL) 50 MG tablet; Take 1 tablet (50 mg) by mouth At Bedtime Take along with the two 100 mg tablets for total dose of 250 mg    Screen for colon cancer    - Colonoscopy Screening  Referral; Future    Prescription drug management         Nicotine/Tobacco Cessation:  He reports that he has been smoking cigarettes. He started smoking about 58 years ago. He has a 57.00 pack-year smoking history. He has quit using smokeless tobacco.  Nicotine/Tobacco Cessation Plan:   Information offered: Patient not interested at this time      BMI:   Estimated body mass index is 30.4 kg/m  as calculated from the following:    Height as of this encounter: 1.803 m (5' 11\").    Weight as of this encounter: 98.9 kg (218 lb).   Weight management plan: Discussed healthy diet and exercise guidelines    FUTURE APPOINTMENTS:       - Follow-up for annual visit or as needed    KONSTANTIN Sethi [facility] " "[facility]    Carlos Arnett is a 68 year old, presenting for the following health issues:  Establish Care        9/21/2023     7:38 AM   Additional Questions   Roomed by Naima STAFFORD       History of Present Illness       Reason for visit:  Shots, handicap sticker and tremors have worsened over the last 5 months    He eats 0-1 servings of fruits and vegetables daily.He consumes 3 sweetened beverage(s) daily.He exercises with enough effort to increase his heart rate 9 or less minutes per day.  He exercises with enough effort to increase his heart rate 3 or less days per week.   He is taking medications regularly.           Annual Wellness Visit    Patient has been advised of split billing requirements and indicates understanding: Yes     Are you in the first 12 months of your Medicare Part B coverage?  No    Physical Health:  In general, how would you rate your overall physical health? fair  Outside of work, how many days during the week do you exercise?2-3 days/week  Outside of work, approximately how many minutes a day do you exercise?45-60 minutes  If you drink alcohol do you typically have >3 drinks per day or >7 drinks per week? No  Do you usually eat at least 4 servings of fruit and vegetables a day, include whole grains & fiber and avoid regularly eating high fat or \"junk\" foods? No  Do you have any problems taking medications regularly? No  Do you have any side effects from medications?  Unsure   Needs assistance for the following daily activities: shopping, preparing meals, housework, and laundry  Which of the following safety concerns are present in your home?  none identified   Hearing impairment: Yes, Difficulty following a conversation in a noisy restaurant or crowded room.  Feel that people are mumbling or not speaking clearly.  Need to ask people to speak up or repeat themselves.  Difficulty understanding soft or whispered speech.  In the past 6 months, have you been bothered by " leaking of urine? no    Mental Health:  In general, how would you rate your overall mental or emotional health? fair      Today's PHQ-9 Score:       2023    11:10 AM   PHQ-9 SCORE   PHQ-9 Total Score 12         Do you feel safe in your environment? Yes    Have you ever done Advance Care Planning? (For example, a Health Directive, POLST, or a discussion with a medical provider or your loved ones about your wishes)? No, advance care planning information given to patient to review.  Patient declined advance care planning discussion at this time.    Fall risk:  Fallen 2 or more times in the past year?: No  Any fall with injury in the past year?: No    Cognitive Screenin) Repeat 3 items (Leader, Season, Table)    2) Clock draw: NORMAL  3) 3 item recall: Recalls 3 objects  Results: 3 items recalled: COGNITIVE IMPAIRMENT LESS LIKELY    Mini-CogTM Copyright OMAR Valdez. Licensed by the author for use in [facility]; reprinted with permission (harpreet@.[email or web address]). All rights reserved.          Depression and Anxiety Follow-Up  How are you doing with your depression since your last visit? No change  How are you doing with your anxiety since your last visit?  No change  Are you having other symptoms that might be associated with depression or anxiety? No  Have you had a significant life event? No   Do you have any concerns with your use of alcohol or other drugs? No    Social History     Tobacco Use    Smoking status: Every Day     Packs/day: 1.00     Years: 57.00     Pack years: 57.00     Types: Cigarettes     Start date:     Smokeless tobacco: Former   Vaping Use    Vaping Use: Some days    Substances: THC    Devices: RefImaggable tank   Substance Use Topics    Alcohol use: No    Drug use: Yes     Types: Marijuana     Comment: vaping marijuana since 2019 for medicinal purposes, buys from unauthorized seller. recommended cessation in light of lung injury/illness associated with vaping.         2023     10:08 AM 8/31/2023    11:07 AM 9/14/2023    11:10 AM   PHQ   PHQ-9 Total Score 18 14 12   Q9: Thoughts of better off dead/self-harm past 2 weeks Not at all Not at all Not at all         7/27/2023    10:08 AM 8/31/2023    11:07 AM 9/14/2023    11:10 AM   EVGENY-7 SCORE   Total Score 18 13 10         Suicide Assessment Five-step Evaluation and Treatment (SAFE-T)    COPD Follow-Up  Overall, how are your COPD symptoms since your last clinic visit?  No change  How much fatigue or shortness of breath do you have when you are walking?  Same as usual  How much shortness of breath do you have when you are resting?  None  How often do you cough? Rarely  Have you noticed any change in your sputum/phlegm?  No  Have you experienced a recent fever? No  Have you had any Emergency Room Visits, Urgent Care Visits, or Hospital Admissions because of your COPD since your last office visit?  No    History   Smoking Status    Every Day    Packs/day: 1.00    Years: 57.00    Types: Cigarettes    Start date: 1965   Smokeless Tobacco    Former     No results found for: FEV1, PEV3NGT       Do you have sleep apnea, excessive snoring or daytime drowsiness? : daytime drowsiness    Social History     Tobacco Use    Smoking status: Every Day     Packs/day: 1.00     Years: 57.00     Pack years: 57.00     Types: Cigarettes     Start date: 1965    Smokeless tobacco: Former   Substance Use Topics    Alcohol use: No             9/21/2023     7:56 AM   Alcohol Use   Prescreen: >3 drinks/day or >7 drinks/week? No     Do you have a current opioid prescription? Yes   How severe is your pain on a scale from 1-10? 6/10         Do you use any other controlled substances or medications that are not prescribed by a provider? None    Current providers sharing in care for this patient include:   Patient Care Team:  No Ref-Primary, Physician as PCP - General  Hubert Byrd MD as Referring Physician (ENT)  Jessie Smith MD as MD (Otolaryngology)  Lauren  "Bravo Jefferson MD as Assigned Surgical Provider  Ty Wagoner PA-C as Assigned PCP    The following health maintenance items are reviewed in Epic and correct as of today:  Health Maintenance   Topic Date Due    COLORECTAL CANCER SCREENING  11/01/2021    COVID-19 Vaccine (6 - Moderna series) 02/07/2023    NICOTINE/TOBACCO CESSATION COUNSELING Q 1 YR  03/11/2023    LUNG CANCER SCREENING  08/06/2023    INFLUENZA VACCINE (1) 09/01/2023    DTAP/TDAP/TD IMMUNIZATION (2 - Td or Tdap) 10/21/2023    PHQ-9  03/14/2024    MEDICARE ANNUAL WELLNESS VISIT  09/21/2024    ANNUAL REVIEW OF HM ORDERS  09/21/2024    FALL RISK ASSESSMENT  09/21/2024    LIPID  10/05/2026    ADVANCE CARE PLANNING  09/21/2028    SPIROMETRY  Completed    HEPATITIS C SCREENING  Completed    COPD ACTION PLAN  Completed    DEPRESSION ACTION PLAN  Completed    Pneumococcal Vaccine: 65+ Years  Completed    ZOSTER IMMUNIZATION  Completed    AORTIC ANEURYSM SCREENING (SYSTEM ASSIGNED)  Completed    IPV IMMUNIZATION  Aged Out    HPV IMMUNIZATION  Aged Out    MENINGITIS IMMUNIZATION  Aged Out    URINE DRUG SCREEN  Discontinued    MIGRAINE ACTION PLAN  Discontinued    HEPATITIS A IMMUNIZATION  Discontinued       Patient has been advised of split billing requirements and indicates understanding: Yes    Appropriate preventive services were discussed with this patient, including applicable screening as appropriate for fall prevention, nutrition, physical activity, Tobacco-use cessation, weight loss and cognition.  Checklist reviewing preventive services available has been given to the patient.        Review of Systems   Constitutional, HEENT, cardiovascular, pulmonary, gi and gu systems are negative, except as otherwise noted.      Objective    /80   Pulse 95   Temp 98.5  F (36.9  C) (Tympanic)   Resp 18   Ht 1.803 m (5' 11\")   Wt 98.9 kg (218 lb)   SpO2 91%   BMI 30.40 kg/m    Body mass index is 30.4 kg/m .  Physical Exam   GENERAL: alert and no " distress  EYES: Eyes grossly normal to inspection and conjunctivae and sclerae normal  HENT: normal cephalic/atraumatic, ear canals and TM's normal, and oropharynx clear  RESP: lungs clear to auscultation - no rales, rhonchi or wheezes  CV: regular rates and rhythm, normal S1 S2, no S3 or S4, no murmur, click or rub, and no peripheral edema  MS: no gross musculoskeletal defects noted, no edema  NEURO: tremor - bilateral hands and mentation intact  PSYCH: affect flat

## 2023-09-21 NOTE — RESULT ENCOUNTER NOTE
Jefferson Arnett    Your lipids are at goal, the triglycerides are high because you were not fasting. The potassium and calcium are likely because of the energy drink you were drinking, recommend to schedule a recheck with the lab. Thyroid function is normal. Please let us know if you have any questions.     Take care,    KONSTANTIN Tompkins CNP

## 2023-09-27 DIAGNOSIS — F11.20 OPIOID TYPE DEPENDENCE, CONTINUOUS (H): Primary | ICD-10-CM

## 2023-09-28 ENCOUNTER — VIRTUAL VISIT (OUTPATIENT)
Dept: PSYCHOLOGY | Facility: CLINIC | Age: 68
End: 2023-09-28
Payer: COMMERCIAL

## 2023-09-28 DIAGNOSIS — F43.10 POSTTRAUMATIC STRESS DISORDER: ICD-10-CM

## 2023-09-28 DIAGNOSIS — F41.1 GENERALIZED ANXIETY DISORDER: ICD-10-CM

## 2023-09-28 DIAGNOSIS — F33.1 MAJOR DEPRESSIVE DISORDER, RECURRENT EPISODE, MODERATE (H): Primary | ICD-10-CM

## 2023-09-28 PROCEDURE — 90834 PSYTX W PT 45 MINUTES: CPT | Mod: 95 | Performed by: SOCIAL WORKER

## 2023-09-28 NOTE — PROGRESS NOTES
"    [facility] Counseling                                     Progress Note    Patient Name: Melquiades Morales  Date: 9/28/23         Service Type: Individual      Session Start Time:  11 am  Session End Time: 11:45 am     Session Length: 45 min     Session #: 35    Attendees: Client attended alone.    Service Modality:  Phone Visit:         Phone Visit:      Provider verified identity through the following two step process.  Patient provided:  Patient is known previously to provider.    Telephone Visit: The patient's condition can be safely assessed and treated via synchronous audio telemedicine encounter.      Reason for Audio Telemedicine Visit: Patient has requested telehealth visit.    Originating Site (Patient Location): Patient's home.    Distant Site (Provider Location): [facility] & ADDI[facility].    Consent:  The patient/guardian has verbally consented to:     1. The potential risks and benefits of telemedicine (telephone visit) versus in person care;    The patient has been notified of the following:      \"We have found that certain health care needs can be provided without the need for a face to face visit.  This service lets us provide the care you need with a phone conversation.       I will have full access to your [facility] medical record during this entire phone call.   I will be taking notes for your medical record.      Since this is like an office visit, we will bill your insurance company for this service.       There are potential benefits and risks of telephone visits (e.g. limits to patient confidentiality) that differ from in-person visits.?Confidentiality still applies for telephone services, and nobody will record the visit.  It is important to be in a quiet, private space that is free of distractions (including cell phone or other devices) during the visit.??      If during the course of the call I believe a telephone visit is " "not appropriate, you will not be charged for this service\"     Consent has been obtained for this service by care team member: Yes                     DATA  Interactive Complexity: No  Crisis: No        Progress Since Last Session (Related to Symptoms / Goals / Homework):   Symptoms: stable.    Homework: Partially completed- use a healthy coping idea. He has the grounding ideas handout and is to practice them.     Episode of Care Goals: Minimal progress - ACTION (Actively working towards change); Intervened by reinforcing change plan / affirming steps taken.    Current / Ongoing Stressors and Concerns:   He now lives in a nursing home. He is making friends. It feels like home now. The staff are nice.  He and Chiquis were planning on getting a place together in a couple of years. He is now dating a woman named Emily. The latest visit again did not work out. This again happened. He has given up on this relationship he tells me. Now he reports he has seen her twice now. He reports she is in the hospital for blood loss from her menses.  His bank let him know there is an issue; he doesn't have any money left in either account. Sounds like he has been scammed and someone cleaned out his accounts.      Treatment Objective(s) Addressed in This Session:  Use a healthy coping idea as needed.      Intervention:  Assessed functioning and for safety. Reviewed the phq and shailesh- he requested we hold off today and reports not having safety concerns. Processed feelings about not having a ride to our sessions. Processed feelings about missing his wife. Reinforced use of healthy coping ideas- time with his dog Tam helps.        Assessments completed prior to visit:  The following assessments were completed by patient for this visit:  PHQ9:       2/16/2023    11:08 AM 3/2/2023    11:08 AM 3/20/2023    12:10 PM 4/3/2023    12:07 PM 4/17/2023    11:04 AM 5/1/2023    10:58 AM 5/18/2023    10:06 AM   PHQ-9 SCORE   PHQ-9 Total Score 16 16 15 " 18 18 17 16     GAD7:       2/16/2023    11:08 AM 3/2/2023    11:08 AM 3/20/2023    12:10 PM 4/3/2023    12:07 PM 4/17/2023    11:04 AM 5/1/2023    10:58 AM 5/18/2023    10:06 AM   EVGENY-7 SCORE   Total Score 12 12 12 14 13 12 12     CAGE-AID:       1/28/2022    12:02 PM   CAGE-AID Total Score   Total Score 0   Total Score MyChart 0 (A total score of 2 or greater is considered clinically significant)     PROMIS 10-Global Health (all questions and answers displayed):       1/28/2022    12:02 PM 5/12/2022    10:23 AM 8/12/2022     9:13 AM 11/10/2022     8:50 AM 2/16/2023    11:22 AM 5/18/2023    10:08 AM   PROMIS 10   In general, would you say your health is: Poor        In general, would you say your quality of life is: Fair        In general, how would you rate your physical health? Poor        In general, how would you rate your mental health, including your mood and your ability to think? Fair        In general, how would you rate your satisfaction with your social activities and relationships? Fair        In general, please rate how well you carry out your usual social activities and roles Poor        To what extent are you able to carry out your everyday physical activities such as walking, climbing stairs, carrying groceries, or moving a chair? A little        In the past 7 days, how often have you been bothered by emotional problems such as feeling anxious, depressed, or irritable? Often        In the past 7 days, how would you rate your fatigue on average? Moderate        In the past 7 days, how would you rate your pain on average, where 0 means no pain, and 10 means worst imaginable pain? 5        In general, would you say your health is: 1 3 3 2 2 2   In general, would you say your quality of life is: 2 2 3 2 2 2   In general, how would you rate your physical health? 1 2 3 1 2 2   In general, how would you rate your mental health, including your mood and your ability to think? 2 3 3 2 2 2   In general, how  would you rate your satisfaction with your social activities and relationships? 2 2 4 2 2 2   In general, please rate how well you carry out your usual social activities and roles. (This includes activities at home, at work and in your community, and responsibilities as a parent, child, spouse, employee, friend, etc.) 1 2 3 2 2 2   To what extent are you able to carry out your everyday physical activities such as walking, climbing stairs, carrying groceries, or moving a chair? 2 1 2 1 3 2   In the past 7 days, how often have you been bothered by emotional problems such as feeling anxious, depressed, or irritable? 4 3 3 4 3 4   In the past 7 days, how would you rate your fatigue on average? 3 3 3 3 3 3   In the past 7 days, how would you rate your pain on average, where 0 means no pain, and 10 means worst imaginable pain? 5 6 5 5 5 6   Global Mental Health Score 8 10 13 8 9 8   Global Physical Health Score 9 9 11 8 11 10   PROMIS TOTAL - SUBSCORES 17 19 24 16 20 18     [place] Suicide Severity Rating Scale (Lifetime/Recent)      9/8/2018     5:00 PM 1/28/2022    12:14 PM 9/29/2022    10:00 AM   [place] Suicide Severity Rating (Lifetime/Recent)   Wish to be Dead (Lifetime)  Yes    Non-Specific Active Suicidal Thoughts (Lifetime)  Yes    Most Severe Ideation Rating (Lifetime)  5    Frequency (Lifetime)  3    Duration (Lifetime)  2    Controllability (Lifetime)  5    Protective Factors  (Lifetime)  5    Reasons for Ideation (Lifetime)  4    Q1 Wished to be Dead (Past Month)   no   Q2 Suicidal Thoughts (Past Month)   no   Q3 Suicidal Thought Method   no   Q4 Suicidal Intent without Specific Plan   no   Q5 Suicide Intent with Specific Plan   no   Q6 Suicide Behavior (Lifetime)   no   Level of Risk per Screen   low risk   RETIRED: 1. Wish to be Dead (Recent)  No    RETIRED: 2. Non-Specific Active Suicidal Thoughts (Recent)  No    3. Active Suicidal Ideation with any Methods (Not Plan) Without Intent to Act (Lifetime)   Yes    RETIRED: 3. Active Suicidal Ideation with any Methods (Not Plan) Without Intent to Act (Recent)  No    RETIRE: 4. Active Suicidal Ideation with Some Intent to Act, Without Specific Plan (Lifetime)  Yes    4. Active Suicidal Ideation with Some Intent to Act, Without Specific Plan (Recent)  No    RETIRE: 5. Active Suicidal Ideation with Specific Plan and Intent (Lifetime)  Yes    RETIRED: 5. Active Suicidal Ideation with Specific Plan and Intent (Recent)  No    Most Severe Ideation Rating (Past Month) NA NA    Frequency (Past Month)  NA    Duration (Past Month)  NA    Controllability (Past Month)  NA    Protective Factors (Past Month)  NA    Reasons for Ideation (Past Month)  NA    Actual Attempt (Lifetime)  Yes    Actual Attempt Description (Lifetime)  3 attempts with last one 15 years ago    Total Number of Actual Attempts (Lifetime)  3    Actual Attempt (Past 3 Months)  No    Has subject engaged in non-suicidal self-injurious behavior? (Lifetime)  No    Has subject engaged in non-suicidal self-injurious behavior? (Past 3 Months)  No    Interrupted Attempts (Lifetime)  No    Interrupted Attempts (Past 3 Months)  No    Aborted or Self-Interrupted Attempt (Lifetime)  No    Aborted or Self-Interrupted Attempt (Past 3 Months)  No    Preparatory Acts or Behavior (Lifetime)  No    Preparatory Acts or Behavior (Past 3 Months)  No    Most Recent Attempt Actual Lethality Code  3    Most Lethal Attempt Actual Lethality Code  2    Initial/First Attempt Actual Lethality Code  2          ASSESSMENT: Current Emotional / Mental Status (status of significant symptoms):   Risk status (Self / Other harm or suicidal ideation)   Patient denies current fears or concerns for personal safety.   Patient denies current or recent suicidal ideation or behaviors.     Patient denies current or recent homicidal ideation or behaviors.   Patient denies current or recent self injurious behavior or ideation.   Patient denies other safety  concerns.   Patient reports there has been no change in risk factors since their last session.     Patient reports there has been no change in protective factors since their last session.     a safety plan was completed several years ago .      Appearance:   Unable to assess.    Eye Contact:   Unable to assess.      Psychomotor Behavior: Unable to assess.   Attitude:   Cooperative    Orientation:   All   Speech    Rate / Production: Normal     Volume:  Normal    Mood:    Normal        Affect:    Appropriate    Thought Content:  Clear    Thought Form:  Coherent  Logical    Insight:    Good      Medication Review:   No changes to current psychiatric medication(s). PCP Dr. Eliane Quinn prescribing cymbalta 60 mg and trazadone.     Medication Compliance:   Yes     Changes in Health Issues:   None reported     Chemical Use Review:   Substance Use: Chemical use reviewed, no active concerns identified      Tobacco Use: No change in amount of tobacco use since last session.  Contemplation    Diagnosis:  MDD; EVGENY; PTSD.    Collateral Reports Completed:   Routed note to PCP    PLAN: (Patient Tasks / Therapist Tasks / Other)  Monthly.   Follow safety plan. Use a healthy coping idea as needed.       Goals due 12/1/23      Kleber Pollack, [facility]                                                         ______________________________________________________________________    Individual Treatment Plan    Patient's Name: Melquiades Morales  YOB: 1955    Date of Creation: 1/28/22  Date Treatment Plan Last Reviewed/Revised: 8/31/23    DSM5 Diagnoses: 296.32 (F33.1) Major Depressive Disorder, Recurrent Episode, Moderate _, 300.02 (F41.1) Generalized Anxiety Disorder or 309.81 (F43.10) Posttraumatic Stress Disorder (includes Posttraumatic Stress Disorder for Children 6 Years and Younger)  With dissociative symptoms.  Psychosocial / Contextual Factors: recent loss of wife.  PROMIS (reviewed every 90 days):   8/31/23    Referral / Collaboration:  Referral to another professional/service is not indicated at this time.    Anticipated number of session for this episode of care: 10+  Anticipation frequency of session: Weekly  Anticipated Duration of each session: 38-52 minutes.  Treatment plan will be reviewed in 90 days or when goals have been changed.       MeasurableTreatment Goal(s) related to diagnosis / functional impairment(s)  Goal 1: Patient will report coping well with daily stressors.     I will know I've met my goal when each goal that we accomplish and I am having less of a problem in that area I know that you have helped me.       Objective #A (Patient Action)                          Patient will continue to follow his safety plan.  Status: New - Date: 1/28/22; 5/12/22; 8/12/22; 11/10/22; 2/16/23; 5/18/23; 8/31/23  Intervention(s)  Therapist will monitor for safety each visit and encourage use of safety plan.     Objective #B  Patient will use a healthy coping idea as needed 100% of trials for 1 week.  Status: New - Date: 1/28/22; 5/12/22; 8/12/22; 11/10/22; 2/16/23; 5/18/23; 8/31/23  IDEAS: napping; petting Snippy (pet dog); watching tv; fishing; socializing with friends.  Intervention(s)  Therapist will provide ideas and encouragement to use them.     Objective #C  Patient will process the loss of his wife as needed.  Status: New - Date: 1/28/22; 5/12/22; 8/12/22; 11/10/22; 2/16/23; 5/18/23; 8/31/23  Intervention(s)  Therapist will consider EMDR.              Patient has reviewed and agreed to the above plan.        Kleber Pollack, [facility]                January 28, 2022

## 2023-10-05 ENCOUNTER — LAB (OUTPATIENT)
Dept: LAB | Facility: CLINIC | Age: 68
End: 2023-10-05
Payer: COMMERCIAL

## 2023-10-05 DIAGNOSIS — F11.20 OPIOID TYPE DEPENDENCE, CONTINUOUS (H): ICD-10-CM

## 2023-10-05 DIAGNOSIS — F11.20 OPIOID DEPENDENCE (H): ICD-10-CM

## 2023-10-05 LAB
AMPHETAMINES UR QL SCN: ABNORMAL
BARBITURATES UR QL SCN: ABNORMAL
BENZODIAZ UR QL SCN: ABNORMAL
BZE UR QL SCN: ABNORMAL
CANNABINOIDS UR QL SCN: ABNORMAL
CREAT UR-MCNC: 107 MG/DL
FENTANYL UR QL: ABNORMAL
FENTANYL UR QL: NORMAL
Lab: NORMAL
OPIATES UR QL SCN: ABNORMAL
PCP QUAL URINE (ROCHE): ABNORMAL
PERFORMING LABORATORY: NORMAL
SPECIMEN STATUS: NORMAL
TEST NAME: NORMAL

## 2023-10-05 PROCEDURE — 80299 QUANTITATIVE ASSAY DRUG: CPT | Mod: 90

## 2023-10-05 PROCEDURE — G0480 DRUG TEST DEF 1-7 CLASSES: HCPCS | Mod: 59

## 2023-10-05 PROCEDURE — 2894A FENTANYL, QUALITATIVE, WITH REFLEX TO QUANT URINE: CPT | Mod: 59

## 2023-10-05 PROCEDURE — 80307 DRUG TEST PRSMV CHEM ANLYZR: CPT

## 2023-10-05 PROCEDURE — 99000 SPECIMEN HANDLING OFFICE-LAB: CPT

## 2023-10-08 LAB
BUPRENORPHINE UR-MCNC: <2 NG/ML
BUPRENORPHINE UR-MCNC: <5 NG/ML
NALOXONE UR CFM-MCNC: <100 NG/ML
NORBUPRENORPHINE UR CFM-MCNC: <5 NG/ML
NORBUPRENORPHINE UR-MCNC: <2 NG/ML

## 2023-10-09 DIAGNOSIS — F11.20 OPIOID TYPE DEPENDENCE, CONTINUOUS (H): Primary | ICD-10-CM

## 2023-10-11 LAB
Lab: NORMAL
PERFORMING LABORATORY: NORMAL
TEST NAME: NORMAL

## 2023-10-13 LAB
CANNABINOIDS UR CFM-MCNC: 362 NG/ML
CARBOXYTHC/CREAT UR: 338 NG/MG CREAT
MISCELLANEOUS TEST 1 (ARUP): NORMAL

## 2023-10-14 LAB — MISCELLANEOUS TEST 1 (ARUP): NORMAL

## 2023-10-26 ENCOUNTER — LAB (OUTPATIENT)
Dept: LAB | Facility: CLINIC | Age: 68
End: 2023-10-26
Payer: COMMERCIAL

## 2023-10-26 ENCOUNTER — VIRTUAL VISIT (OUTPATIENT)
Dept: PSYCHOLOGY | Facility: CLINIC | Age: 68
End: 2023-10-26
Payer: COMMERCIAL

## 2023-10-26 DIAGNOSIS — F43.10 POSTTRAUMATIC STRESS DISORDER: Primary | ICD-10-CM

## 2023-10-26 DIAGNOSIS — F41.1 GENERALIZED ANXIETY DISORDER: ICD-10-CM

## 2023-10-26 DIAGNOSIS — F11.20 OPIOID TYPE DEPENDENCE, CONTINUOUS (H): ICD-10-CM

## 2023-10-26 DIAGNOSIS — F33.1 MAJOR DEPRESSIVE DISORDER, RECURRENT EPISODE, MODERATE (H): ICD-10-CM

## 2023-10-26 LAB
AMPHETAMINES UR QL SCN: ABNORMAL
BARBITURATES UR QL SCN: ABNORMAL
BENZODIAZ UR QL SCN: ABNORMAL
BZE UR QL SCN: ABNORMAL
CANNABINOIDS UR QL SCN: ABNORMAL
FENTANYL UR QL: ABNORMAL
OPIATES UR QL SCN: ABNORMAL
PCP QUAL URINE (ROCHE): ABNORMAL

## 2023-10-26 PROCEDURE — 80307 DRUG TEST PRSMV CHEM ANLYZR: CPT

## 2023-10-26 PROCEDURE — 90832 PSYTX W PT 30 MINUTES: CPT | Mod: 95 | Performed by: SOCIAL WORKER

## 2023-10-26 ASSESSMENT — ANXIETY QUESTIONNAIRES
5. BEING SO RESTLESS THAT IT IS HARD TO SIT STILL: MORE THAN HALF THE DAYS
1. FEELING NERVOUS, ANXIOUS, OR ON EDGE: MORE THAN HALF THE DAYS
2. NOT BEING ABLE TO STOP OR CONTROL WORRYING: MORE THAN HALF THE DAYS
3. WORRYING TOO MUCH ABOUT DIFFERENT THINGS: MORE THAN HALF THE DAYS
GAD7 TOTAL SCORE: 12
7. FEELING AFRAID AS IF SOMETHING AWFUL MIGHT HAPPEN: SEVERAL DAYS
6. BECOMING EASILY ANNOYED OR IRRITABLE: MORE THAN HALF THE DAYS
GAD7 TOTAL SCORE: 12
IF YOU CHECKED OFF ANY PROBLEMS ON THIS QUESTIONNAIRE, HOW DIFFICULT HAVE THESE PROBLEMS MADE IT FOR YOU TO DO YOUR WORK, TAKE CARE OF THINGS AT HOME, OR GET ALONG WITH OTHER PEOPLE: SOMEWHAT DIFFICULT

## 2023-10-26 ASSESSMENT — PATIENT HEALTH QUESTIONNAIRE - PHQ9
SUM OF ALL RESPONSES TO PHQ QUESTIONS 1-9: 13
5. POOR APPETITE OR OVEREATING: SEVERAL DAYS

## 2023-10-26 NOTE — PROGRESS NOTES
"    [facility]                                     Progress Note    Patient Name: Melquiades Morales  Date: 10/26/23         Service Type: Individual      Session Start Time:  12 pm  Session End Time: 12:30 pm     Session Length: 30 min     Session #: 36    Attendees: Client attended alone.    Service Modality:  Phone Visit:         Phone Visit:      Provider verified identity through the following two step process.  Patient provided:  Patient is known previously to provider.    Telephone Visit: The patient's condition can be safely assessed and treated via synchronous audio telemedicine encounter.      Reason for Audio Telemedicine Visit: Patient has requested telehealth visit.    Originating Site (Patient Location): Patient's home.    Distant Site (Provider Location): [facility].    Consent:  The patient/guardian has verbally consented to:     1. The potential risks and benefits of telemedicine (telephone visit) versus in person care;    The patient has been notified of the following:      \"We have found that certain health care needs can be provided without the need for a face to face visit.  This service lets us provide the care you need with a phone conversation.       I will have full access to your [facility] medical record during this entire phone call.   I will be taking notes for your medical record.      Since this is like an office visit, we will bill your insurance company for this service.       There are potential benefits and risks of telephone visits (e.g. limits to patient confidentiality) that differ from in-person visits.?Confidentiality still applies for telephone services, and nobody will record the visit.  It is important to be in a quiet, private space that is free of distractions (including cell phone or other devices) during the visit.??      If during the course of the call I believe a telephone visit is " "not appropriate, you will not be charged for this service\"     Consent has been obtained for this service by care team member: Yes                     DATA  Interactive Complexity: No  Crisis: No        Progress Since Last Session (Related to Symptoms / Goals / Homework):   Symptoms: worsening.    Homework: Partially completed- use a healthy coping idea. He has the grounding ideas handout and is to practice them.     Episode of Care Goals: Minimal progress - ACTION (Actively working towards change); Intervened by reinforcing change plan / affirming steps taken.    Current / Ongoing Stressors and Concerns:   He now lives in a nursing home. He is making friends. It feels like home now. The staff are nice.  He and Chiquis were planning on getting a place together in a couple of years. He is now dating a woman named Emily. The latest visit again did not work out. This again happened. He has given up on this relationship he tells me. Now he reports he has seen her twice now. He reports she is in the hospital for blood loss from her menses.  His bank let him know there is an issue; he doesn't have any money left in either account. Sounds like he has been scammed and someone cleaned out his accounts.      Treatment Objective(s) Addressed in This Session:  Use a healthy coping idea as needed.      Intervention:  Assessed functioning and for safety. Reviewed the phq and shailesh. Processed feelings about county saying he owes money; problem solved. Reinforced use of healthy coping ideas- time with his dog Tam helps.        Assessments completed prior to visit:  The following assessments were completed by patient for this visit:  PHQ9:       4/17/2023    11:04 AM 5/1/2023    10:58 AM 5/18/2023    10:06 AM 7/13/2023    11:10 AM 7/27/2023    10:08 AM 8/31/2023    11:07 AM 9/14/2023    11:10 AM   PHQ-9 SCORE   PHQ-9 Total Score 18 17 16 16 18 14 12     GAD7:       4/17/2023    11:04 AM 5/1/2023    10:58 AM 5/18/2023    10:06 AM " 7/13/2023    11:10 AM 7/27/2023    10:08 AM 8/31/2023    11:07 AM 9/14/2023    11:10 AM   EVGENY-7 SCORE   Total Score 13 12 12 14 18 13 10     CAGE-AID:       1/28/2022    12:02 PM   CAGE-AID Total Score   Total Score 0   Total Score MyChart 0 (A total score of 2 or greater is considered clinically significant)     PROMIS 10-Global Health (all questions and answers displayed):       1/28/2022    12:02 PM 5/12/2022    10:23 AM 8/12/2022     9:13 AM 11/10/2022     8:50 AM 2/16/2023    11:22 AM 5/18/2023    10:08 AM 8/31/2023    11:10 AM   PROMIS 10   In general, would you say your health is: Poor         In general, would you say your quality of life is: Fair         In general, how would you rate your physical health? Poor         In general, how would you rate your mental health, including your mood and your ability to think? Fair         In general, how would you rate your satisfaction with your social activities and relationships? Fair         In general, please rate how well you carry out your usual social activities and roles Poor         To what extent are you able to carry out your everyday physical activities such as walking, climbing stairs, carrying groceries, or moving a chair? A little         In the past 7 days, how often have you been bothered by emotional problems such as feeling anxious, depressed, or irritable? Often         In the past 7 days, how would you rate your fatigue on average? Moderate         In the past 7 days, how would you rate your pain on average, where 0 means no pain, and 10 means worst imaginable pain? 5         In general, would you say your health is: 1 3 3 2 2 2 2   In general, would you say your quality of life is: 2 2 3 2 2 2 2   In general, how would you rate your physical health? 1 2 3 1 2 2 1   In general, how would you rate your mental health, including your mood and your ability to think? 2 3 3 2 2 2 2   In general, how would you rate your satisfaction with your social  activities and relationships? 2 2 4 2 2 2 3   In general, please rate how well you carry out your usual social activities and roles. (This includes activities at home, at work and in your community, and responsibilities as a parent, child, spouse, employee, friend, etc.) 1 2 3 2 2 2 3   To what extent are you able to carry out your everyday physical activities such as walking, climbing stairs, carrying groceries, or moving a chair? 2 1 2 1 3 2 3   In the past 7 days, how often have you been bothered by emotional problems such as feeling anxious, depressed, or irritable? 4 3 3 4 3 4 3   In the past 7 days, how would you rate your fatigue on average? 3 3 3 3 3 3 2   In the past 7 days, how would you rate your pain on average, where 0 means no pain, and 10 means worst imaginable pain? 5 6 5 5 5 6 6   Global Mental Health Score 8 10 13 8 9 8 10   Global Physical Health Score 9 9 11 8 11 10 11   PROMIS TOTAL - SUBSCORES 17 19 24 16 20 18 21     Runge Suicide Severity Rating Scale (Lifetime/Recent)      9/8/2018     5:00 PM 1/28/2022    12:14 PM 9/29/2022    10:00 AM   Runge Suicide Severity Rating (Lifetime/Recent)   Q1 Wish to be Dead (Lifetime)  Yes    Q2 Non-Specific Active Suicidal Thoughts (Lifetime)  Yes    Most Severe Ideation Rating (Lifetime)  5    Frequency (Lifetime)  3    Duration (Lifetime)  2    Controllability (Lifetime)  5    Protective Factors  (Lifetime)  5    Reasons for Ideation (Lifetime)  4    Q1 Wished to be Dead (Past Month)   no   Q2 Suicidal Thoughts (Past Month)   no   Q3 Suicidal Thought Method   no   Q4 Suicidal Intent without Specific Plan   no   Q5 Suicide Intent with Specific Plan   no   Q6 Suicide Behavior (Lifetime)   no   Level of Risk per Screen   low risk   RETIRED: 1. Wish to be Dead (Recent)  No    RETIRED: 2. Non-Specific Active Suicidal Thoughts (Recent)  No    3. Active Suicidal Ideation with any Methods (Not Plan) Without Intent to Act (Lifetime)  Yes    RETIRED: 3. Active  Suicidal Ideation with any Methods (Not Plan) Without Intent to Act (Recent)  No    RETIRE: 4. Active Suicidal Ideation with Some Intent to Act, Without Specific Plan (Lifetime)  Yes    4. Active Suicidal Ideation with Some Intent to Act, Without Specific Plan (Recent)  No    RETIRE: 5. Active Suicidal Ideation with Specific Plan and Intent (Lifetime)  Yes    RETIRED: 5. Active Suicidal Ideation with Specific Plan and Intent (Recent)  No    Most Severe Ideation Rating (Past Month) NA NA    Frequency (Past Month)  NA    Duration (Past Month)  NA    Controllability (Past Month)  NA    Protective Factors (Past Month)  NA    Reasons for Ideation (Past Month)  NA    Actual Attempt (Lifetime)  Yes    Actual Attempt Description (Lifetime)  3 attempts with last one 15 years ago    Total Number of Actual Attempts (Lifetime)  3    Actual Attempt (Past 3 Months)  No    Has subject engaged in non-suicidal self-injurious behavior? (Lifetime)  No    Has subject engaged in non-suicidal self-injurious behavior? (Past 3 Months)  No    Interrupted Attempts (Lifetime)  No    Interrupted Attempts (Past 3 Months)  No    Aborted or Self-Interrupted Attempt (Lifetime)  No    Aborted or Self-Interrupted Attempt (Past 3 Months)  No    Preparatory Acts or Behavior (Lifetime)  No    Preparatory Acts or Behavior (Past 3 Months)  No    Most Recent Attempt Actual Lethality Code  3    Most Lethal Attempt Actual Lethality Code  2    Initial/First Attempt Actual Lethality Code  2          ASSESSMENT: Current Emotional / Mental Status (status of significant symptoms):   Risk status (Self / Other harm or suicidal ideation)   Patient denies current fears or concerns for personal safety.   Patient denies current or recent suicidal ideation or behaviors.     Patient denies current or recent homicidal ideation or behaviors.   Patient denies current or recent self injurious behavior or ideation.   Patient denies other safety concerns.   Patient reports  there has been no change in risk factors since their last session.     Patient reports there has been no change in protective factors since their last session.     a safety plan was completed several years ago .      Appearance:   Unable to assess.    Eye Contact:   Unable to assess.      Psychomotor Behavior: Unable to assess.   Attitude:   Cooperative    Orientation:   All   Speech    Rate / Production: Normal     Volume:  Normal    Mood:    Normal, depressed         Affect:    Appropriate    Thought Content:  Clear    Thought Form:  Coherent  Logical    Insight:    Good      Medication Review:   No changes to current psychiatric medication(s). PCP Dr. Eliane Quinn prescribing cymbalta 60 mg and trazadone.     Medication Compliance:   Yes     Changes in Health Issues:   None reported     Chemical Use Review:   Substance Use: Chemical use reviewed, no active concerns identified      Tobacco Use: No change in amount of tobacco use since last session.  Contemplation    Diagnosis:  MDD; EVGENY; PTSD.    Collateral Reports Completed:   Routed note to PCP    PLAN: (Patient Tasks / Therapist Tasks / Other)  Monthly.   Follow safety plan. Use a healthy coping idea as needed.       Goals due 12/1/23      Kleber Pollack, [facility]                                                         ______________________________________________________________________    Individual Treatment Plan    Patient's Name: Melquiades Morales  YOB: 1955    Date of Creation: 1/28/22  Date Treatment Plan Last Reviewed/Revised: 8/31/23    DSM5 Diagnoses: 296.32 (F33.1) Major Depressive Disorder, Recurrent Episode, Moderate _, 300.02 (F41.1) Generalized Anxiety Disorder or 309.81 (F43.10) Posttraumatic Stress Disorder (includes Posttraumatic Stress Disorder for Children 6 Years and Younger)  With dissociative symptoms.  Psychosocial / Contextual Factors: recent loss of wife.  PROMIS (reviewed every 90 days):  8/31/23    Referral /  Collaboration:  Referral to another professional/service is not indicated at this time.    Anticipated number of session for this episode of care: 10+  Anticipation frequency of session: Weekly  Anticipated Duration of each session: 38-52 minutes.  Treatment plan will be reviewed in 90 days or when goals have been changed.       MeasurableTreatment Goal(s) related to diagnosis / functional impairment(s)  Goal 1: Patient will report coping well with daily stressors.     I will know I've met my goal when each goal that we accomplish and I am having less of a problem in that area I know that you have helped me.       Objective #A (Patient Action)                          Patient will continue to follow his safety plan.  Status: New - Date: 1/28/22; 5/12/22; 8/12/22; 11/10/22; 2/16/23; 5/18/23; 8/31/23  Intervention(s)  Therapist will monitor for safety each visit and encourage use of safety plan.     Objective #B  Patient will use a healthy coping idea as needed 100% of trials for 1 week.  Status: New - Date: 1/28/22; 5/12/22; 8/12/22; 11/10/22; 2/16/23; 5/18/23; 8/31/23  IDEAS: napping; petting Snippy (pet dog); watching tv; fishing; socializing with friends.  Intervention(s)  Therapist will provide ideas and encouragement to use them.     Objective #C  Patient will process the loss of his wife as needed.  Status: New - Date: 1/28/22; 5/12/22; 8/12/22; 11/10/22; 2/16/23; 5/18/23; 8/31/23  Intervention(s)  Therapist will consider EMDR.              Patient has reviewed and agreed to the above plan.        Kleber Pollack, [facility]                January 28, 2022

## 2023-10-30 ENCOUNTER — PATIENT OUTREACH (OUTPATIENT)
Dept: GASTROENTEROLOGY | Facility: CLINIC | Age: 68
End: 2023-10-30
Payer: COMMERCIAL

## 2023-11-21 ENCOUNTER — ALLIED HEALTH/NURSE VISIT (OUTPATIENT)
Dept: FAMILY MEDICINE | Facility: CLINIC | Age: 68
End: 2023-11-21
Payer: COMMERCIAL

## 2023-11-21 DIAGNOSIS — Z23 NEED FOR VACCINE FOR TD (TETANUS-DIPHTHERIA): ICD-10-CM

## 2023-11-21 DIAGNOSIS — Z23 NEED FOR VACCINATION: Primary | ICD-10-CM

## 2023-11-21 PROCEDURE — 90471 IMMUNIZATION ADMIN: CPT

## 2023-11-21 PROCEDURE — 99207 PR NO CHARGE LOS: CPT

## 2023-11-21 PROCEDURE — 90714 TD VACC NO PRESV 7 YRS+ IM: CPT

## 2023-11-21 NOTE — PROGRESS NOTES
Tetanus vaccine given today in clinic per patient. He was informed that vaccine is only covered through Medicare if given at a pharmacy when checked in at  and by myself prior to injection. He stated that he would like to get in clinic today anyway and he believes this will be covered under his [employer] insurance.  Ariadna Bright CMA

## 2023-11-30 ENCOUNTER — VIRTUAL VISIT (OUTPATIENT)
Dept: PSYCHOLOGY | Facility: CLINIC | Age: 68
End: 2023-11-30
Payer: COMMERCIAL

## 2023-11-30 DIAGNOSIS — F43.10 POSTTRAUMATIC STRESS DISORDER: Primary | ICD-10-CM

## 2023-11-30 DIAGNOSIS — F41.1 GENERALIZED ANXIETY DISORDER: ICD-10-CM

## 2023-11-30 DIAGNOSIS — F33.1 MAJOR DEPRESSIVE DISORDER, RECURRENT EPISODE, MODERATE (H): ICD-10-CM

## 2023-11-30 PROCEDURE — 90832 PSYTX W PT 30 MINUTES: CPT | Performed by: SOCIAL WORKER

## 2023-11-30 ASSESSMENT — PATIENT HEALTH QUESTIONNAIRE - PHQ9
5. POOR APPETITE OR OVEREATING: SEVERAL DAYS
SUM OF ALL RESPONSES TO PHQ QUESTIONS 1-9: 19

## 2023-11-30 ASSESSMENT — ANXIETY QUESTIONNAIRES
7. FEELING AFRAID AS IF SOMETHING AWFUL MIGHT HAPPEN: NOT AT ALL
1. FEELING NERVOUS, ANXIOUS, OR ON EDGE: MORE THAN HALF THE DAYS
IF YOU CHECKED OFF ANY PROBLEMS ON THIS QUESTIONNAIRE, HOW DIFFICULT HAVE THESE PROBLEMS MADE IT FOR YOU TO DO YOUR WORK, TAKE CARE OF THINGS AT HOME, OR GET ALONG WITH OTHER PEOPLE: SOMEWHAT DIFFICULT
3. WORRYING TOO MUCH ABOUT DIFFERENT THINGS: MORE THAN HALF THE DAYS
GAD7 TOTAL SCORE: 9
2. NOT BEING ABLE TO STOP OR CONTROL WORRYING: MORE THAN HALF THE DAYS
6. BECOMING EASILY ANNOYED OR IRRITABLE: SEVERAL DAYS
5. BEING SO RESTLESS THAT IT IS HARD TO SIT STILL: SEVERAL DAYS
GAD7 TOTAL SCORE: 9

## 2023-11-30 NOTE — PROGRESS NOTES
"    [facility]                                     Progress Note    Patient Name: Melquiades Morales  Date: 11/30/23         Service Type: Individual      Session Start Time:  11 am  Session End Time: 11:20 am     Session Length: 20 min     Session #: 37    Attendees: Client attended alone.    Service Modality:  Phone Visit:         Phone Visit:      Provider verified identity through the following two step process.  Patient provided:  Patient is known previously to provider.    Telephone Visit: The patient's condition can be safely assessed and treated via synchronous audio telemedicine encounter.      Reason for Audio Telemedicine Visit: Patient has requested telehealth visit.    Originating Site (Patient Location): Patient's home.    Distant Site (Provider Location): [facility] MENTAL [facility] & ADDICTION [facility].    Consent:  The patient/guardian has verbally consented to:     1. The potential risks and benefits of telemedicine (telephone visit) versus in person care;    The patient has been notified of the following:      \"We have found that certain health care needs can be provided without the need for a face to face visit.  This service lets us provide the care you need with a phone conversation.       I will have full access to your [facility] medical record during this entire phone call.   I will be taking notes for your medical record.      Since this is like an office visit, we will bill your insurance company for this service.       There are potential benefits and risks of telephone visits (e.g. limits to patient confidentiality) that differ from in-person visits.?Confidentiality still applies for telephone services, and nobody will record the visit.  It is important to be in a quiet, private space that is free of distractions (including cell phone or other devices) during the visit.??      If during the course of the call I believe a telephone visit is " "not appropriate, you will not be charged for this service\"     Consent has been obtained for this service by care team member: Yes                     DATA  Interactive Complexity: No  Crisis: No        Progress Since Last Session (Related to Symptoms / Goals / Homework):   Symptoms: overall improvement.    Homework: Partially completed- use a healthy coping idea. He has the grounding ideas handout and is to practice them.     Episode of Care Goals: Minimal progress - ACTION (Actively working towards change); Intervened by reinforcing change plan / affirming steps taken.    Current / Ongoing Stressors and Concerns:   He now lives in a nursing home. He is making friends. It feels like home now. The staff are nice.  He and Chiquis were planning on getting a place together in a couple of years. He is now dating a woman named Emily. The latest visit again did not work out. This again happened. He has given up on this relationship he tells me. Now he reports he has seen her twice now. He reports she is in the hospital for blood loss from her menses.  His bank let him know there is an issue; he doesn't have any money left in either account. Sounds like he has been scammed and someone cleaned out his accounts.      Treatment Objective(s) Addressed in This Session:  Use a healthy coping idea as needed.      Intervention:  Assessed functioning and for safety. Reviewed the phq and shailesh. Processed feelings about thanksgiving and physical pain; problem solved. Reinforced use of healthy coping ideas- time with his dog Tam helps.        Assessments completed prior to visit:  The following assessments were completed by patient for this visit:  PHQ9:       5/1/2023    10:58 AM 5/18/2023    10:06 AM 7/13/2023    11:10 AM 7/27/2023    10:08 AM 8/31/2023    11:07 AM 9/14/2023    11:10 AM 10/26/2023    12:05 PM   PHQ-9 SCORE   PHQ-9 Total Score 17 16 16 18 14 12 13     GAD7:       5/1/2023    10:58 AM 5/18/2023    10:06 AM 7/13/2023    " 11:10 AM 7/27/2023    10:08 AM 8/31/2023    11:07 AM 9/14/2023    11:10 AM 10/26/2023    12:05 PM   EVGENY-7 SCORE   Total Score 12 12 14 18 13 10 12     CAGE-AID:       1/28/2022    12:02 PM   CAGE-AID Total Score   Total Score 0   Total Score MyChart 0 (A total score of 2 or greater is considered clinically significant)     PROMIS 10-Global Health (all questions and answers displayed):       1/28/2022    12:02 PM 5/12/2022    10:23 AM 8/12/2022     9:13 AM 11/10/2022     8:50 AM 2/16/2023    11:22 AM 5/18/2023    10:08 AM 8/31/2023    11:10 AM   PROMIS 10   In general, would you say your health is: Poor         In general, would you say your quality of life is: Fair         In general, how would you rate your physical health? Poor         In general, how would you rate your mental health, including your mood and your ability to think? Fair         In general, how would you rate your satisfaction with your social activities and relationships? Fair         In general, please rate how well you carry out your usual social activities and roles Poor         To what extent are you able to carry out your everyday physical activities such as walking, climbing stairs, carrying groceries, or moving a chair? A little         In the past 7 days, how often have you been bothered by emotional problems such as feeling anxious, depressed, or irritable? Often         In the past 7 days, how would you rate your fatigue on average? Moderate         In the past 7 days, how would you rate your pain on average, where 0 means no pain, and 10 means worst imaginable pain? 5         In general, would you say your health is: 1 3 3 2 2 2 2   In general, would you say your quality of life is: 2 2 3 2 2 2 2   In general, how would you rate your physical health? 1 2 3 1 2 2 1   In general, how would you rate your mental health, including your mood and your ability to think? 2 3 3 2 2 2 2   In general, how would you rate your satisfaction with your  social activities and relationships? 2 2 4 2 2 2 3   In general, please rate how well you carry out your usual social activities and roles. (This includes activities at home, at work and in your community, and responsibilities as a parent, child, spouse, employee, friend, etc.) 1 2 3 2 2 2 3   To what extent are you able to carry out your everyday physical activities such as walking, climbing stairs, carrying groceries, or moving a chair? 2 1 2 1 3 2 3   In the past 7 days, how often have you been bothered by emotional problems such as feeling anxious, depressed, or irritable? 4 3 3 4 3 4 3   In the past 7 days, how would you rate your fatigue on average? 3 3 3 3 3 3 2   In the past 7 days, how would you rate your pain on average, where 0 means no pain, and 10 means worst imaginable pain? 5 6 5 5 5 6 6   Global Mental Health Score 8 10 13 8 9 8 10   Global Physical Health Score 9 9 11 8 11 10 11   PROMIS TOTAL - SUBSCORES 17 19 24 16 20 18 21     Traver Suicide Severity Rating Scale (Lifetime/Recent)      9/8/2018     5:00 PM 1/28/2022    12:14 PM 9/29/2022    10:00 AM   Traver Suicide Severity Rating (Lifetime/Recent)   Q1 Wish to be Dead (Lifetime)  Yes    Q2 Non-Specific Active Suicidal Thoughts (Lifetime)  Yes    Most Severe Ideation Rating (Lifetime)  5    Frequency (Lifetime)  3    Duration (Lifetime)  2    Controllability (Lifetime)  5    Protective Factors  (Lifetime)  5    Reasons for Ideation (Lifetime)  4    Q1 Wished to be Dead (Past Month)   no   Q2 Suicidal Thoughts (Past Month)   no   Q3 Suicidal Thought Method   no   Q4 Suicidal Intent without Specific Plan   no   Q5 Suicide Intent with Specific Plan   no   Q6 Suicide Behavior (Lifetime)   no   Level of Risk per Screen   low risk   RETIRED: 1. Wish to be Dead (Recent)  No    RETIRED: 2. Non-Specific Active Suicidal Thoughts (Recent)  No    3. Active Suicidal Ideation with any Methods (Not Plan) Without Intent to Act (Lifetime)  Yes    RETIRED: 3.  Active Suicidal Ideation with any Methods (Not Plan) Without Intent to Act (Recent)  No    RETIRE: 4. Active Suicidal Ideation with Some Intent to Act, Without Specific Plan (Lifetime)  Yes    4. Active Suicidal Ideation with Some Intent to Act, Without Specific Plan (Recent)  No    RETIRE: 5. Active Suicidal Ideation with Specific Plan and Intent (Lifetime)  Yes    RETIRED: 5. Active Suicidal Ideation with Specific Plan and Intent (Recent)  No    Most Severe Ideation Rating (Past Month) NA NA    Frequency (Past Month)  NA    Duration (Past Month)  NA    Controllability (Past Month)  NA    Protective Factors (Past Month)  NA    Reasons for Ideation (Past Month)  NA    Actual Attempt (Lifetime)  Yes    Actual Attempt Description (Lifetime)  3 attempts with last one 15 years ago    Total Number of Actual Attempts (Lifetime)  3    Actual Attempt (Past 3 Months)  No    Has subject engaged in non-suicidal self-injurious behavior? (Lifetime)  No    Has subject engaged in non-suicidal self-injurious behavior? (Past 3 Months)  No    Interrupted Attempts (Lifetime)  No    Interrupted Attempts (Past 3 Months)  No    Aborted or Self-Interrupted Attempt (Lifetime)  No    Aborted or Self-Interrupted Attempt (Past 3 Months)  No    Preparatory Acts or Behavior (Lifetime)  No    Preparatory Acts or Behavior (Past 3 Months)  No    Most Recent Attempt Actual Lethality Code  3    Most Lethal Attempt Actual Lethality Code  2    Initial/First Attempt Actual Lethality Code  2          ASSESSMENT: Current Emotional / Mental Status (status of significant symptoms):   Risk status (Self / Other harm or suicidal ideation)   Patient denies current fears or concerns for personal safety.   Patient denies current or recent suicidal ideation or behaviors.     Patient denies current or recent homicidal ideation or behaviors.   Patient denies current or recent self injurious behavior or ideation.   Patient denies other safety concerns.   Patient  reports there has been no change in risk factors since their last session.     Patient reports there has been no change in protective factors since their last session.     a safety plan was completed several years ago .      Appearance:   Unable to assess.    Eye Contact:   Unable to assess.      Psychomotor Behavior: Unable to assess.   Attitude:   Cooperative    Orientation:   All   Speech    Rate / Production: Normal     Volume:  Normal    Mood:    Normal, depressed         Affect:    Appropriate    Thought Content:  Clear    Thought Form:  Coherent  Logical    Insight:    Good      Medication Review:   No changes to current psychiatric medication(s). PCP Dr. Eliane Quinn prescribing cymbalta 60 mg and trazadone.     Medication Compliance:   Yes     Changes in Health Issues:   None reported     Chemical Use Review:   Substance Use: Chemical use reviewed, no active concerns identified      Tobacco Use: No change in amount of tobacco use since last session.  Contemplation    Diagnosis:  MDD; EVGENY; PTSD.    Collateral Reports Completed:   Routed note to PCP    PLAN: (Patient Tasks / Therapist Tasks / Other)  Monthly.   Follow safety plan. Use a healthy coping idea as needed.       Goals due 12/1/23      Kleber Pollack, [facility]                                                         ______________________________________________________________________    Individual Treatment Plan    Patient's Name: Melquiades Morales  YOB: 1955    Date of Creation: 1/28/22  Date Treatment Plan Last Reviewed/Revised: 8/31/23    DSM5 Diagnoses: 296.32 (F33.1) Major Depressive Disorder, Recurrent Episode, Moderate _, 300.02 (F41.1) Generalized Anxiety Disorder or 309.81 (F43.10) Posttraumatic Stress Disorder (includes Posttraumatic Stress Disorder for Children 6 Years and Younger)  With dissociative symptoms.  Psychosocial / Contextual Factors: recent loss of wife.  PROMIS (reviewed every 90 days):  8/31/23    Referral  / Collaboration:  Referral to another professional/service is not indicated at this time.    Anticipated number of session for this episode of care: 10+  Anticipation frequency of session: Weekly  Anticipated Duration of each session: 38-52 minutes.  Treatment plan will be reviewed in 90 days or when goals have been changed.       MeasurableTreatment Goal(s) related to diagnosis / functional impairment(s)  Goal 1: Patient will report coping well with daily stressors.     I will know I've met my goal when each goal that we accomplish and I am having less of a problem in that area I know that you have helped me.       Objective #A (Patient Action)                          Patient will continue to follow his safety plan.  Status: New - Date: 1/28/22; 5/12/22; 8/12/22; 11/10/22; 2/16/23; 5/18/23; 8/31/23  Intervention(s)  Therapist will monitor for safety each visit and encourage use of safety plan.     Objective #B  Patient will use a healthy coping idea as needed 100% of trials for 1 week.  Status: New - Date: 1/28/22; 5/12/22; 8/12/22; 11/10/22; 2/16/23; 5/18/23; 8/31/23  IDEAS: napping; petting Snippy (pet dog); watching tv; fishing; socializing with friends.  Intervention(s)  Therapist will provide ideas and encouragement to use them.     Objective #C  Patient will process the loss of his wife as needed.  Status: New - Date: 1/28/22; 5/12/22; 8/12/22; 11/10/22; 2/16/23; 5/18/23; 8/31/23  Intervention(s)  Therapist will consider EMDR.              Patient has reviewed and agreed to the above plan.        Kleber Pollack, [facility]                January 28, 2022

## 2023-12-19 DIAGNOSIS — R25.1 TREMOR: ICD-10-CM

## 2023-12-19 RX ORDER — PROPRANOLOL HCL 60 MG
60 CAPSULE, EXTENDED RELEASE 24HR ORAL DAILY
Qty: 90 CAPSULE | Refills: 1 | Status: SHIPPED | OUTPATIENT
Start: 2023-12-19 | End: 2024-05-17

## 2023-12-22 ENCOUNTER — LAB (OUTPATIENT)
Dept: LAB | Facility: CLINIC | Age: 68
End: 2023-12-22
Payer: COMMERCIAL

## 2023-12-22 DIAGNOSIS — F11.20 OPIOID TYPE DEPENDENCE, CONTINUOUS (H): ICD-10-CM

## 2023-12-22 PROCEDURE — 80307 DRUG TEST PRSMV CHEM ANLYZR: CPT

## 2024-01-23 ENCOUNTER — LAB (OUTPATIENT)
Dept: LAB | Facility: CLINIC | Age: 69
End: 2024-01-23
Payer: COMMERCIAL

## 2024-01-23 DIAGNOSIS — F11.20 OPIOID DEPENDENCE (H): ICD-10-CM

## 2024-01-23 DIAGNOSIS — F11.20 OPIOID TYPE DEPENDENCE, CONTINUOUS (H): ICD-10-CM

## 2024-01-23 LAB
AMPHETAMINES UR QL SCN: ABNORMAL
BARBITURATES UR QL SCN: ABNORMAL
BENZODIAZ UR QL SCN: ABNORMAL
BZE UR QL SCN: ABNORMAL
CANNABINOIDS UR QL SCN: ABNORMAL
CREAT UR-MCNC: 133 MG/DL
FENTANYL UR QL: ABNORMAL
FENTANYL UR QL: NORMAL
OPIATES UR QL SCN: ABNORMAL
PCP QUAL URINE (ROCHE): ABNORMAL

## 2024-01-23 PROCEDURE — G0480 DRUG TEST DEF 1-7 CLASSES: HCPCS | Mod: 90

## 2024-01-23 PROCEDURE — 80307 DRUG TEST PRSMV CHEM ANLYZR: CPT

## 2024-01-23 PROCEDURE — G0481 DRUG TEST DEF 8-14 CLASSES: HCPCS | Mod: 59

## 2024-01-26 LAB
BUPRENORPHINE UR-MCNC: <2 NG/ML
BUPRENORPHINE UR-MCNC: <5 NG/ML
CANNABINOIDS UR CFM-MCNC: 513 NG/ML
CARBOXYTHC/CREAT UR: 386 NG/MG CREAT
NALOXONE UR CFM-MCNC: <100 NG/ML
NORBUPRENORPHINE UR CFM-MCNC: <5 NG/ML
NORBUPRENORPHINE UR-MCNC: <2 NG/ML

## 2024-01-31 NOTE — TELEPHONE ENCOUNTER
Central Prior Authorization Team   Phone: [phone]      PA Initiation    Medication: nicotine lozenge-Initiated  Insurance Company: [employer] - Phone [phone] Fax [phone]  Pharmacy Filling the Rx: [facility] - [place] - [id number] [address]  Filling Pharmacy Phone: [phone]  Filling Pharmacy Fax:    Start Date: 10/7/2021        
PRIOR AUTHORIZATION DENIED    Medication: nicotine lozenge-DENIED    Denial Date: 10/7/2021    Denial Rational: Medicare has excluded OTC drug products from Part D coverage.              
TOM Guzman for nicotine lozenge has been denied.    Denial Rational: Medicare has excluded OTC drug products from Part D coverage.    Patient notified of denial via My Chart.      
Thank you - I messaged him about free resources. I hope he is able to acces them.  Eliane Quinn MD  
patient

## 2024-02-07 ENCOUNTER — VIRTUAL VISIT (OUTPATIENT)
Dept: FAMILY MEDICINE | Facility: CLINIC | Age: 69
End: 2024-02-07
Payer: COMMERCIAL

## 2024-02-07 ENCOUNTER — TELEPHONE (OUTPATIENT)
Dept: FAMILY MEDICINE | Facility: CLINIC | Age: 69
End: 2024-02-07

## 2024-02-07 DIAGNOSIS — U07.1 INFECTION DUE TO 2019 NOVEL CORONAVIRUS: ICD-10-CM

## 2024-02-07 DIAGNOSIS — Z12.11 SCREEN FOR COLON CANCER: Primary | ICD-10-CM

## 2024-02-07 PROCEDURE — 99441 PR PHYSICIAN TELEPHONE EVALUATION 5-10 MIN: CPT | Mod: 93 | Performed by: FAMILY MEDICINE

## 2024-02-07 NOTE — PROGRESS NOTES
Melquiades is a 68 year old who is being evaluated via a billable telephone visit.      What phone number would you like to be contacted at? [phone]  How would you like to obtain your AVS? MyChart    Distant Location (provider location):  On-site    Assessment & Plan       Infection due to 2019 novel coronavirus  He is high risk,  I discussed the drug interactions with paxlovid and he does not want to hold his medications.  Discussed the less effective molnupiravir.  He would like to try that.  Discussed that is is clearly less helpful than paxlovid but does not have known interactions.    - molnupiravir (LAGEVRIO) 200 MG capsule; Take 4 capsules (800 mg) by mouth every 12 hours      Subjective   Melquiades is a 68 year old, presenting for the following health issues:  Covid Concern      2/7/2024     5:21 PM   Additional Questions   Roomed by Allie Vang CMA     HPI     -Tested positive for Covid on Monday. Symptoms started Monday as well. He is wanting to discuss treatment options.          Review of Systems  Constitutional, HEENT, cardiovascular, pulmonary, gi and gu systems are negative, except as otherwise noted.      Objective           Vitals:  No vitals were obtained today due to virtual visit.    Physical Exam   General: Alert and no distress //Respiratory: No audible wheeze, cough, or shortness of breath // Psychiatric:  Appropriate affect, tone, and pace of words          Phone call duration: 9 minutes  Signed Electronically by: Rey Moore MD

## 2024-02-07 NOTE — TELEPHONE ENCOUNTER
RN COVID TREATMENT VISIT  02/07/24      The patient has been triaged and does not require a higher level of care.    Melquiades Moraels  68 year old  Current weight? 220 lb    Has the patient been seen by a primary care provider at an [facility] or [facility] within the past two years? Yes.   Have you been in close proximity to/do you have a known exposure to a person with a confirmed case of influenza? No.     General treatment eligibility:  Date of positive COVID test (PCR or at home)?  2/5/24    Are you or have you been hospitalized for this COVID-19 infection? No.   Have you received monoclonal antibodies or antiviral treatment for COVID-19 since this positive test? No.   Do you have any of the following conditions that place you at risk of being very sick from COVID-19?   - Age 50 years or older  - Mental health disorders including mood disorders, depression, schizophrenia spectrum disorders   Yes, patient has at least one high risk condition as noted above.     Current COVID symptoms:   - fever or chills  - cough  - shortness of breath or difficulty breathing  - fatigue  - muscle or body aches  - headache  - sore throat  - congestion or runny nose  Yes. Patient has at least one symptom as selected.     How many days since symptoms started? 5 days or less. Established patient, 12 years or older weighing at least 88.2 lbs, who has symptoms that started in the past 5 days, has not been hospitalized nor received treatment already, and is at risk for being very sick from COVID-19.     Treatment eligibility by RN:  Are you currently pregnant or nursing? No  Do you have a clinically significant hypersensitivity to nirmatrelvir or ritonavir, or toxic epidermal necrolysis (TEN) or Long-Abel Syndrome? No  Do you have a history of hepatitis, any hepatic impairment on the Problem List (such as Child-Zapata Class C, cirrhosis, fatty liver disease, alcoholic liver disease), or was the last  liver lab (hepatic panel, ALT, AST, ALK Phos, bilirubin) elevated in the past 6 months? No  Do you have any history of severe renal impairment (eGFR < 30mL/min)? No    Is patient eligible to continue? Yes, patient meets all eligibility requirements for the RN COVID treatment (as denoted by all no responses above).     Current Outpatient Medications   Medication Sig Dispense Refill    albuterol (PROAIR HFA) 108 (90 Base) MCG/ACT inhaler Inhale 2 puffs into the lungs every 4 hours as needed for shortness of breath or wheezing 18 g 3    DULoxetine (CYMBALTA) 60 MG capsule Take 2 capsules (120 mg) by mouth daily 60 capsule 5    ipratropium - albuterol 0.5 mg/2.5 mg/3 mL (DUONEB) 0.5-2.5 (3) MG/3ML neb solution Take 1 vial by nebulization as needed for shortness of breath / dyspnea or wheezing (Patient not taking: Reported on 9/21/2023)      morphine (MS CONTIN) 30 MG CR tablet Take by mouth every 12 hours 60 mg in AM and 30 mg in HS  0    naloxone (NARCAN) 4 MG/0.1ML nasal spray Spray 1 spray (4 mg) into one nostril alternating nostrils as needed for opioid reversal every 2-3 minutes until assistance arrives (Patient not taking: Reported on 9/21/2023) 0.2 mL 0    naproxen (NAPROSYN) 500 MG tablet Take 1 tablet (500 mg) by mouth 2 times daily as needed for headaches 60 tablet 3    order for DME Equipment being ordered: Nebulizer.    Diagnosis: chronic obstructive bronchitis (COPD).    Duration of need:  99 months. (Patient not taking: Reported on 9/21/2023) 1 each 0    order for DME Equipment being ordered: Hospital Bed and mattress. 1 each 0    order for DME Equipment being ordered: Lift Chair 1 each 0    order for DME Equipment being ordered: Wheelchair. Electric, including adjustable back rest sitting to resting, leg elevation adjustment, approved for outdoor use 1 Units 0    ORDER FOR DME Semi electric hospital bed with side rails and mattress. Length of bed is for lifetime 1 Device 0    OVER-THE-COUNTER nereva Plus  1 tablet daily (Patient not taking: Reported on 9/21/2023)      oxyCODONE IR (ROXICODONE) 10 MG tablet Take 1 tablet by mouth 3 times daily      oxyCODONE-acetaminophen (PERCOCET) 5-325 MG tablet Take 2 tablets by mouth 2 times daily (Patient not taking: Reported on 9/21/2023)      pregabalin (LYRICA) 150 MG capsule Take 150 mg by mouth 2 times daily       propranolol ER (INDERAL LA) 60 MG 24 hr capsule TAKE ONE CAPSULE BY MOUTH ONCE DAILY 90 capsule 1    simvastatin (ZOCOR) 80 MG tablet Take 1 tablet (80 mg) by mouth daily 90 tablet 4    traZODone (DESYREL) 100 MG tablet Take 2 tablets (200 mg) by mouth At Bedtime In addition to the 50mg tablet for a total of 250mg per day 180 tablet 3    traZODone (DESYREL) 50 MG tablet Take 1 tablet (50 mg) by mouth At Bedtime Take along with the two 100 mg tablets for total dose of 250 mg 90 tablet 4       Medications from List 1 of the standing order (on medications that exclude the use of Paxlovid) that patient is taking: NONE. Is patient taking [place]'s Wort? No  Is patient taking [place]'s Wort or any meds from List 1? No.   Medications from List 2 of the standing order (on meds that provider needs to adjust) that patient is taking: codeine, explained a provider visit is necessary to discuss medication adjustments while taking Paxlovid.  morphine (MS Contin, Lauren, Duramorph), explained a provider visit is necessary to discuss medication adjustments while taking Paxlovid.  oxycodone (Oxycontin, Roxicodone, Percocet, Endocet, Nalocet), explained a provider visit is necessary to discuss medication adjustments while taking Paxlovid. Is patient on any of the meds from List 2? Yes. Patient will be scheduled or transferred to a  at the end of this call.     RN Scheduled patient for a virtual visit with Dr. Moore today.    Marla Tadeo RN

## 2024-02-07 NOTE — H&P (VIEW-ONLY)
Melquiades is a 68 year old who is being evaluated via a billable telephone visit.      What phone number would you like to be contacted at? [phone]  How would you like to obtain your AVS? MyChart    Distant Location (provider location):  On-site    Assessment & Plan       Infection due to 2019 novel coronavirus  He is high risk,  I discussed the drug interactions with paxlovid and he does not want to hold his medications.  Discussed the less effective molnupiravir.  He would like to try that.  Discussed that is is clearly less helpful than paxlovid but does not have known interactions.    - molnupiravir (LAGEVRIO) 200 MG capsule; Take 4 capsules (800 mg) by mouth every 12 hours      Subjective   Melquiades is a 68 year old, presenting for the following health issues:  Covid Concern      2/7/2024     5:21 PM   Additional Questions   Roomed by Allie Vang CMA     HPI     -Tested positive for Covid on Monday. Symptoms started Monday as well. He is wanting to discuss treatment options.          Review of Systems  Constitutional, HEENT, cardiovascular, pulmonary, gi and gu systems are negative, except as otherwise noted.      Objective           Vitals:  No vitals were obtained today due to virtual visit.    Physical Exam   General: Alert and no distress //Respiratory: No audible wheeze, cough, or shortness of breath // Psychiatric:  Appropriate affect, tone, and pace of words          Phone call duration: 9 minutes  Signed Electronically by: Rey Moore MD

## 2024-02-07 NOTE — TELEPHONE ENCOUNTER
COVID Positive/Requesting COVID treatment    Patient is positive for COVID and requesting treatment options.    Date of positive COVID test (PCR or at home)? 2/5   Current COVID symptoms: cough, shortness of breath or difficulty breathing, muscle or body aches, and congestion or runny nose  Date COVID symptoms began: 2/5    Message should be routed to clinic RN pool. Best phone number to use for call back: [phone]

## 2024-02-08 ENCOUNTER — VIRTUAL VISIT (OUTPATIENT)
Dept: PSYCHOLOGY | Facility: CLINIC | Age: 69
End: 2024-02-08
Payer: COMMERCIAL

## 2024-02-08 DIAGNOSIS — F33.1 MAJOR DEPRESSIVE DISORDER, RECURRENT EPISODE, MODERATE (H): Primary | ICD-10-CM

## 2024-02-08 DIAGNOSIS — F43.10 POSTTRAUMATIC STRESS DISORDER: ICD-10-CM

## 2024-02-08 DIAGNOSIS — F41.1 GENERALIZED ANXIETY DISORDER: ICD-10-CM

## 2024-02-08 PROCEDURE — 90832 PSYTX W PT 30 MINUTES: CPT | Mod: 93 | Performed by: SOCIAL WORKER

## 2024-02-08 ASSESSMENT — ANXIETY QUESTIONNAIRES
3. WORRYING TOO MUCH ABOUT DIFFERENT THINGS: MORE THAN HALF THE DAYS
IF YOU CHECKED OFF ANY PROBLEMS ON THIS QUESTIONNAIRE, HOW DIFFICULT HAVE THESE PROBLEMS MADE IT FOR YOU TO DO YOUR WORK, TAKE CARE OF THINGS AT HOME, OR GET ALONG WITH OTHER PEOPLE: SOMEWHAT DIFFICULT
GAD7 TOTAL SCORE: 12
1. FEELING NERVOUS, ANXIOUS, OR ON EDGE: MORE THAN HALF THE DAYS
2. NOT BEING ABLE TO STOP OR CONTROL WORRYING: MORE THAN HALF THE DAYS
GAD7 TOTAL SCORE: 12
7. FEELING AFRAID AS IF SOMETHING AWFUL MIGHT HAPPEN: NOT AT ALL
6. BECOMING EASILY ANNOYED OR IRRITABLE: MORE THAN HALF THE DAYS
5. BEING SO RESTLESS THAT IT IS HARD TO SIT STILL: MORE THAN HALF THE DAYS

## 2024-02-08 ASSESSMENT — COLUMBIA-SUICIDE SEVERITY RATING SCALE - C-SSRS
2. HAVE YOU ACTUALLY HAD ANY THOUGHTS OF KILLING YOURSELF?: NO
TOTAL  NUMBER OF ABORTED OR SELF INTERRUPTED ATTEMPTS SINCE LAST CONTACT: NO
1. SINCE LAST CONTACT, HAVE YOU WISHED YOU WERE DEAD OR WISHED YOU COULD GO TO SLEEP AND NOT WAKE UP?: NO
SUICIDE, SINCE LAST CONTACT: NO
TOTAL  NUMBER OF INTERRUPTED ATTEMPTS SINCE LAST CONTACT: NO
ATTEMPT SINCE LAST CONTACT: NO
6. HAVE YOU EVER DONE ANYTHING, STARTED TO DO ANYTHING, OR PREPARED TO DO ANYTHING TO END YOUR LIFE?: NO

## 2024-02-26 ENCOUNTER — ANESTHESIA EVENT (OUTPATIENT)
Dept: GASTROENTEROLOGY | Facility: CLINIC | Age: 69
End: 2024-02-26
Payer: COMMERCIAL

## 2024-02-26 RX ORDER — OXYCODONE HYDROCHLORIDE 5 MG/1
10 TABLET ORAL
Status: CANCELLED | OUTPATIENT
Start: 2024-02-26

## 2024-02-26 RX ORDER — NALOXONE HYDROCHLORIDE 0.4 MG/ML
0.1 INJECTION, SOLUTION INTRAMUSCULAR; INTRAVENOUS; SUBCUTANEOUS
Status: CANCELLED | OUTPATIENT
Start: 2024-02-26

## 2024-02-26 RX ORDER — ONDANSETRON 2 MG/ML
4 INJECTION INTRAMUSCULAR; INTRAVENOUS EVERY 30 MIN PRN
Status: CANCELLED | OUTPATIENT
Start: 2024-02-26

## 2024-02-26 RX ORDER — ONDANSETRON 4 MG/1
4 TABLET, ORALLY DISINTEGRATING ORAL EVERY 30 MIN PRN
Status: CANCELLED | OUTPATIENT
Start: 2024-02-26

## 2024-02-26 RX ORDER — OXYCODONE HYDROCHLORIDE 5 MG/1
5 TABLET ORAL
Status: CANCELLED | OUTPATIENT
Start: 2024-02-26

## 2024-02-26 ASSESSMENT — COPD QUESTIONNAIRES: COPD: 1

## 2024-02-26 ASSESSMENT — ENCOUNTER SYMPTOMS: SEIZURES: 1

## 2024-02-26 ASSESSMENT — LIFESTYLE VARIABLES: TOBACCO_USE: 1
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
## 2024-02-27 ENCOUNTER — HOSPITAL ENCOUNTER (OUTPATIENT)
Facility: CLINIC | Age: 69
Discharge: HOME | End: 2024-02-27
Attending: SURGERY | Admitting: SURGERY
Payer: COMMERCIAL

## 2024-02-27 ENCOUNTER — ANESTHESIA (OUTPATIENT)
Dept: GASTROENTEROLOGY | Facility: CLINIC | Age: 69
End: 2024-02-27
Payer: COMMERCIAL

## 2024-02-27 VITALS
TEMPERATURE: 98.2 F | SYSTOLIC BLOOD PRESSURE: 105 MMHG | HEART RATE: 69 BPM | HEIGHT: 71 IN | RESPIRATION RATE: 16 BRPM | BODY MASS INDEX: 30.52 KG/M2 | OXYGEN SATURATION: 97 % | WEIGHT: 218 LBS | DIASTOLIC BLOOD PRESSURE: 70 MMHG

## 2024-02-27 DIAGNOSIS — Z86.0100 HISTORY OF COLONIC POLYPS: Primary | ICD-10-CM

## 2024-02-27 LAB — COLONOSCOPY: NORMAL

## 2024-02-27 PROCEDURE — 45380 COLONOSCOPY AND BIOPSY: CPT | Performed by: SURGERY

## 2024-02-27 PROCEDURE — 258N000003 HC RX IP 258 OP 636: Performed by: NURSE ANESTHETIST, CERTIFIED REGISTERED

## 2024-02-27 PROCEDURE — 250N000009 HC RX 250: Performed by: NURSE ANESTHETIST, CERTIFIED REGISTERED

## 2024-02-27 PROCEDURE — 258N000003 HC RX IP 258 OP 636: Performed by: PHYSICIAN ASSISTANT

## 2024-02-27 PROCEDURE — 88305 TISSUE EXAM BY PATHOLOGIST: CPT | Mod: TC | Performed by: SURGERY

## 2024-02-27 PROCEDURE — 250N000011 HC RX IP 250 OP 636

## 2024-02-27 PROCEDURE — 88305 TISSUE EXAM BY PATHOLOGIST: CPT | Mod: 26 | Performed by: PATHOLOGY

## 2024-02-27 PROCEDURE — 45385 COLONOSCOPY W/LESION REMOVAL: CPT | Mod: PT

## 2024-02-27 PROCEDURE — 258N000003 HC RX IP 258 OP 636

## 2024-02-27 PROCEDURE — 370N000017 HC ANESTHESIA TECHNICAL FEE, PER MIN: Performed by: SURGERY

## 2024-02-27 RX ORDER — PROPOFOL 10 MG/ML
INJECTION, EMULSION INTRAVENOUS PRN
Status: DISCONTINUED | OUTPATIENT
Start: 2024-02-27 | End: 2024-02-27

## 2024-02-27 RX ORDER — SODIUM CHLORIDE, SODIUM LACTATE, POTASSIUM CHLORIDE, CALCIUM CHLORIDE 600; 310; 30; 20 MG/100ML; MG/100ML; MG/100ML; MG/100ML
INJECTION, SOLUTION INTRAVENOUS CONTINUOUS
Status: DISCONTINUED | OUTPATIENT
Start: 2024-02-27 | End: 2024-02-27 | Stop reason: HOSPADM

## 2024-02-27 RX ORDER — LIDOCAINE 40 MG/G
CREAM TOPICAL
Status: DISCONTINUED | OUTPATIENT
Start: 2024-02-27 | End: 2024-02-27 | Stop reason: HOSPADM

## 2024-02-27 RX ADMIN — SODIUM CHLORIDE, POTASSIUM CHLORIDE, SODIUM LACTATE AND CALCIUM CHLORIDE: 600; 310; 30; 20 INJECTION, SOLUTION INTRAVENOUS at 07:36

## 2024-02-27 RX ADMIN — SODIUM CHLORIDE, POTASSIUM CHLORIDE, SODIUM LACTATE AND CALCIUM CHLORIDE: 600; 310; 30; 20 INJECTION, SOLUTION INTRAVENOUS at 07:28

## 2024-02-27 RX ADMIN — PROPOFOL 50 MG: 10 INJECTION, EMULSION INTRAVENOUS at 07:38

## 2024-02-27 RX ADMIN — PHENYLEPHRINE HYDROCHLORIDE 50 MCG: 10 INJECTION INTRAVENOUS at 07:45

## 2024-02-27 RX ADMIN — PROPOFOL 150 MCG/KG/MIN: 10 INJECTION, EMULSION INTRAVENOUS at 07:40

## 2024-02-27 RX ADMIN — LIDOCAINE HYDROCHLORIDE 0.2 ML: 10 INJECTION, SOLUTION EPIDURAL; INFILTRATION; INTRACAUDAL; PERINEURAL at 07:28

## 2024-02-27 ASSESSMENT — ACTIVITIES OF DAILY LIVING (ADL)
ADLS_ACUITY_SCORE: 40
ADLS_ACUITY_SCORE: 40

## 2024-02-27 NOTE — INTERVAL H&P NOTE
"I have reviewed the surgical (or preoperative) H&P that is linked to this encounter, and examined the patient. There are no significant changes    Colon 2018 (TAs 5x); hx of colon polyps; no blood thinner; no famhx of colon cancer    Clinical Conditions Present on Arrival:  Clinically Significant Risk Factors Present on Admission                  # Obesity: Estimated body mass index is 30.42 kg/m  as calculated from the following:    Height as of this encounter: 1.803 m (5' 10.98\").    Weight as of this encounter: 98.9 kg (218 lb).       "

## 2024-02-27 NOTE — ANESTHESIA POSTPROCEDURE EVALUATION
Patient: Melquiades Morales    Procedure: Procedure(s):  COLONOSCOPY, WITH POLYPECTOMY AND BIOPSY       Anesthesia Type:  General    Note:  Disposition: Outpatient   Postop Pain Control: Uneventful            Sign Out: Well controlled pain   PONV: No   Neuro/Psych: Uneventful            Sign Out: Acceptable/Baseline neuro status   Airway/Respiratory: Uneventful            Sign Out: Acceptable/Baseline resp. status   CV/Hemodynamics: Uneventful            Sign Out: Acceptable CV status; No obvious hypovolemia; No obvious fluid overload   Other NRE:    DID A NON-ROUTINE EVENT OCCUR?            Last vitals:  Vitals Value Taken Time   /70 02/27/24 0830   Temp 36.8  C (98.2  F) 02/27/24 0812   Pulse 69 02/27/24 0830   Resp 16 02/27/24 0812   SpO2 92 % 02/27/24 0845   Vitals shown include unfiled device data.    Electronically Signed By: KONSTANTIN Haney CRNA  February 27, 2024  8:56 AM

## 2024-02-27 NOTE — LETTER
Melquiades Morales  [address] [facility] [address]    March 4, 2024    Dear Melquiades,  This letter is written to inform you of the results of your recent colonoscopy.  Your examination showed polyp(s) in your cecum, ascending colon, transverse colon, and sigmoid colon. All polyps were removed in their entirety and sent for review by a pathologist. As you will see on the pathology report below, the tissue(s) were tubular adenomatous polyps. Your examination was otherwise without abnormality.    Final Diagnosis   A(1).  Colon, Transverse, polyps x2, polypectomy:  -Tubular adenomas  -Negative for high-grade dysplasia and malignancy.        B(2).   Colon, Cecum, polyps x2, polypectomy:  -Tubular adenomas  -Negative for high-grade dysplasia and malignancy.        C(3).   Colon, Ascending, polyps x3, polypectomy:  -Tubular adenomas  -Negative for high-grade dysplasia and malignancy.        D(4).  Colon, Sigmoid, polyps x3, polypectomy:  -Tubular adenomas  -Negative for high-grade dysplasia and malignancy.          Adenomatous polyps are entirely benign (non-cancerous); however, patients who have developed these polyps are at an increased risk for developing additional polyps in the future. If these are not eventually removed, there is a risk of developing colon cancer. We will advise more frequent examinations with you because of the risk associated with this type of polyp.    Given these findings, your personal history of tubular adenomas, I recommend that you undergo a repeat colonoscopy in 6 months for surveillance. We will enter you into a recall system so you receive a reminder closer to the time that you are due for repeat examination.     Please remember that this recommendation is made with the understanding that you are not experiencing persistent changes in bowel function, bleeding per rectum, and/or significant abdominal pain. If you experience these symptoms, please contact your primary care  provider for a further evaluation.     If you have any questions or concerns about the results of your colonoscopy or the appropriate follow-up, please contact my assistant at [phone].    Sincerely,        Former[facility], DEVANTE HAMM FACOS [facility]  ___

## 2024-02-27 NOTE — PROGRESS NOTES
[facility] DISCHARGE NOTE    Patient discharged to home at 8:57 AM via wheel chair. Accompanied by friend and staff. Discharge instructions reviewed with patient and other, opportunity offered to ask questions. Prescriptions - None ordered for discharge. All belongings sent with patient.    Emily Trevizo RN

## 2024-02-27 NOTE — ANESTHESIA CARE TRANSFER NOTE
Patient: Melquiades Morales    Procedure: Procedure(s):  COLONOSCOPY, WITH POLYPECTOMY AND BIOPSY       Diagnosis: Screen for colon cancer [Z12.11]  Diagnosis Additional Information: No value filed.    Anesthesia Type:   General     Note:    Oropharynx: oropharynx clear of all foreign objects  Level of Consciousness: awake      Independent Airway: airway patency satisfactory and stable  Dentition: dentition unchanged  Vital Signs Stable: post-procedure vital signs reviewed and stable  Report to RN Given: handoff report given  Patient transferred to: Phase II    Handoff Report: Identifed the Patient, Identified the Reponsible Provider, Reviewed the pertinent medical history, Discussed the surgical course, Reviewed Intra-OP anesthesia mangement and issues during anesthesia, Set expectations for post-procedure period and Allowed opportunity for questions and acknowledgement of understanding      Vitals:  Vitals Value Taken Time   /70 02/27/24 0830   Temp 36.8  C (98.2  F) 02/27/24 0812   Pulse 69 02/27/24 0830   Resp 16 02/27/24 0812   SpO2 92 % 02/27/24 0845   Vitals shown include unfiled device data.    Electronically Signed By: KONSTANTIN Haney CRNA  February 27, 2024  8:56 AM

## 2024-02-28 LAB
PATH REPORT.COMMENTS IMP SPEC: NORMAL
PATH REPORT.COMMENTS IMP SPEC: NORMAL
PATH REPORT.FINAL DX SPEC: NORMAL
PATH REPORT.GROSS SPEC: NORMAL
PATH REPORT.MICROSCOPIC SPEC OTHER STN: NORMAL
PATH REPORT.RELEVANT HX SPEC: NORMAL
PHOTO IMAGE: NORMAL

## 2024-03-13 ENCOUNTER — VIRTUAL VISIT (OUTPATIENT)
Dept: PSYCHOLOGY | Facility: CLINIC | Age: 69
End: 2024-03-13
Payer: COMMERCIAL

## 2024-03-13 DIAGNOSIS — F41.1 GENERALIZED ANXIETY DISORDER: ICD-10-CM

## 2024-03-13 DIAGNOSIS — F43.10 POSTTRAUMATIC STRESS DISORDER: ICD-10-CM

## 2024-03-13 DIAGNOSIS — F33.1 MAJOR DEPRESSIVE DISORDER, RECURRENT EPISODE, MODERATE (H): Primary | ICD-10-CM

## 2024-03-13 PROCEDURE — 90832 PSYTX W PT 30 MINUTES: CPT | Mod: 93 | Performed by: SOCIAL WORKER

## 2024-03-13 ASSESSMENT — ANXIETY QUESTIONNAIRES
GAD7 TOTAL SCORE: 12
GAD7 TOTAL SCORE: 12
IF YOU CHECKED OFF ANY PROBLEMS ON THIS QUESTIONNAIRE, HOW DIFFICULT HAVE THESE PROBLEMS MADE IT FOR YOU TO DO YOUR WORK, TAKE CARE OF THINGS AT HOME, OR GET ALONG WITH OTHER PEOPLE: SOMEWHAT DIFFICULT
6. BECOMING EASILY ANNOYED OR IRRITABLE: MORE THAN HALF THE DAYS
3. WORRYING TOO MUCH ABOUT DIFFERENT THINGS: MORE THAN HALF THE DAYS
5. BEING SO RESTLESS THAT IT IS HARD TO SIT STILL: MORE THAN HALF THE DAYS
1. FEELING NERVOUS, ANXIOUS, OR ON EDGE: MORE THAN HALF THE DAYS
7. FEELING AFRAID AS IF SOMETHING AWFUL MIGHT HAPPEN: NOT AT ALL
2. NOT BEING ABLE TO STOP OR CONTROL WORRYING: MORE THAN HALF THE DAYS

## 2024-03-13 NOTE — PROGRESS NOTES
"    [facility]                                     Progress Note    Patient Name: Melquiades Morales  Date: 3/13/24         Service Type: Individual      Session Start Time:  10 am  Session End Time: 10:20 am     Session Length: 20 min     Session #: 39    Attendees: Client attended alone.    Service Modality:  Phone Visit:         Phone Visit:      Provider verified identity through the following two step process.  Patient provided:  Patient is known previously to provider.    Telephone Visit: The patient's condition can be safely assessed and treated via synchronous audio telemedicine encounter.      Reason for Audio Telemedicine Visit: Patient has requested telehealth visit.    Originating Site (Patient Location): Patient's home.    Distant Site (Provider Location): [facility] MENTAL [facility]DI[facility].    Consent:  The patient/guardian has verbally consented to:     1. The potential risks and benefits of telemedicine (telephone visit) versus in person care;    The patient has been notified of the following:      \"We have found that certain health care needs can be provided without the need for a face to face visit.  This service lets us provide the care you need with a phone conversation.       I will have full access to your [facility] medical record during this entire phone call.   I will be taking notes for your medical record.      Since this is like an office visit, we will bill your insurance company for this service.       There are potential benefits and risks of telephone visits (e.g. limits to patient confidentiality) that differ from in-person visits.?Confidentiality still applies for telephone services, and nobody will record the visit.  It is important to be in a quiet, private space that is free of distractions (including cell phone or other devices) during the visit.??      If during the course of the call I believe a telephone visit is " "not appropriate, you will not be charged for this service\"     Consent has been obtained for this service by care team member: Yes                     DATA  Interactive Complexity: No  Crisis: No        Progress Since Last Session (Related to Symptoms / Goals / Homework):   Symptoms: stable.    Homework: Partially completed- use a healthy coping idea. He has the grounding ideas handout and is to practice them.     Episode of Care Goals: Minimal progress - ACTION (Actively working towards change); Intervened by reinforcing change plan / affirming steps taken.    Current / Ongoing Stressors and Concerns:   He now lives in a nursing home. He is making friends. It feels like home now. The staff are nice.  He and Chiquis were planning on getting a place together in a couple of years.  Chiquis's only grand child passed about 4 months ago, age 12, biking accident. She has been in [place] with her daughter since and is expected to return home to [place] in a couple of weeks.      Treatment Objective(s) Addressed in This Session:  Use a healthy coping idea as needed.      Intervention:  Assessed functioning and for safety. Reviewed the phq and shailesh. Processed feelings about recent time with sons and good friend Chiquis coming home soon. Reinforced use of healthy coping ideas- time with his dog Tam helps.  He also enjoys bonfire with friends.      Assessments completed prior to visit:  The following assessments were completed by patient for this visit:  PHQ9:       7/13/2023    11:10 AM 7/27/2023    10:08 AM 8/31/2023    11:07 AM 9/14/2023    11:10 AM 10/26/2023    12:05 PM 11/30/2023    11:10 AM 2/8/2024     9:08 AM   PHQ-9 SCORE   PHQ-9 Total Score 16 18 14 12 13 19 16     GAD7:       7/13/2023    11:10 AM 7/27/2023    10:08 AM 8/31/2023    11:07 AM 9/14/2023    11:10 AM 10/26/2023    12:05 PM 11/30/2023    11:10 AM 2/8/2024     9:08 AM   SHAILESH-7 SCORE   Total Score 14 18 13 10 12 9 12     CAGE-AID:       1/28/2022    12:02 PM   CAGE-AID " Total Score   Total Score 0   Total Score MyChart 0 (A total score of 2 or greater is considered clinically significant)     PROMIS 10-Global Health (all questions and answers displayed):       5/12/2022    10:23 AM 8/12/2022     9:13 AM 11/10/2022     8:50 AM 2/16/2023    11:22 AM 5/18/2023    10:08 AM 8/31/2023    11:10 AM 2/8/2024     9:08 AM   PROMIS 10   In general, would you say your health is: 3 3 2 2 2 2 3   In general, would you say your quality of life is: 2 3 2 2 2 2 3   In general, how would you rate your physical health? 2 3 1 2 2 1 2   In general, how would you rate your mental health, including your mood and your ability to think? 3 3 2 2 2 2 2   In general, how would you rate your satisfaction with your social activities and relationships? 2 4 2 2 2 3 3   In general, please rate how well you carry out your usual social activities and roles. (This includes activities at home, at work and in your community, and responsibilities as a parent, child, spouse, employee, friend, etc.) 2 3 2 2 2 3 3   To what extent are you able to carry out your everyday physical activities such as walking, climbing stairs, carrying groceries, or moving a chair? 1 2 1 3 2 3 3   In the past 7 days, how often have you been bothered by emotional problems such as feeling anxious, depressed, or irritable? 3 3 4 3 4 3 3   In the past 7 days, how would you rate your fatigue on average? 3 3 3 3 3 2 2   In the past 7 days, how would you rate your pain on average, where 0 means no pain, and 10 means worst imaginable pain? 6 5 5 5 6 6 6   Global Mental Health Score 10 13 8 9 8 10 11   Global Physical Health Score 9 11 8 11 10 11 12   PROMIS TOTAL - SUBSCORES 19 24 16 20 18 21 23     [place] Suicide Severity Rating Scale (Lifetime/Recent)      9/8/2018     5:00 PM 1/28/2022    12:14 PM 9/29/2022    10:00 AM 2/27/2024     7:22 AM   [place] Suicide Severity Rating (Lifetime/Recent)   Q1 Wish to be Dead (Lifetime)  Yes     Q2  Non-Specific Active Suicidal Thoughts (Lifetime)  Yes     Most Severe Ideation Rating (Lifetime)  5     Frequency (Lifetime)  3     Duration (Lifetime)  2     Controllability (Lifetime)  5     Protective Factors  (Lifetime)  5     Reasons for Ideation (Lifetime)  4     Q1 Wished to be Dead (Past Month)   no 0-->no   Q2 Suicidal Thoughts (Past Month)   no 0-->no   Q3 Suicidal Thought Method   no    Q4 Suicidal Intent without Specific Plan   no    Q5 Suicide Intent with Specific Plan   no    Q6 Suicide Behavior (Lifetime)   no 0-->no   Level of Risk per Screen   low risk no risks indicated   RETIRED: 1. Wish to be Dead (Recent)  No     RETIRED: 2. Non-Specific Active Suicidal Thoughts (Recent)  No     3. Active Suicidal Ideation with any Methods (Not Plan) Without Intent to Act (Lifetime)  Yes     RETIRED: 3. Active Suicidal Ideation with any Methods (Not Plan) Without Intent to Act (Recent)  No     RETIRE: 4. Active Suicidal Ideation with Some Intent to Act, Without Specific Plan (Lifetime)  Yes     4. Active Suicidal Ideation with Some Intent to Act, Without Specific Plan (Recent)  No     RETIRE: 5. Active Suicidal Ideation with Specific Plan and Intent (Lifetime)  Yes     RETIRED: 5. Active Suicidal Ideation with Specific Plan and Intent (Recent)  No     Most Severe Ideation Rating (Past Month) NA NA     Frequency (Past Month)  NA     Duration (Past Month)  NA     Controllability (Past Month)  NA     Protective Factors (Past Month)  NA     Reasons for Ideation (Past Month)  NA     Actual Attempt (Lifetime)  Yes     Actual Attempt Description (Lifetime)  3 attempts with last one 15 years ago     Total Number of Actual Attempts (Lifetime)  3     Actual Attempt (Past 3 Months)  No     Has subject engaged in non-suicidal self-injurious behavior? (Lifetime)  No     Has subject engaged in non-suicidal self-injurious behavior? (Past 3 Months)  No     Interrupted Attempts (Lifetime)  No     Interrupted Attempts (Past 3  Months)  No     Aborted or Self-Interrupted Attempt (Lifetime)  No     Aborted or Self-Interrupted Attempt (Past 3 Months)  No     Preparatory Acts or Behavior (Lifetime)  No     Preparatory Acts or Behavior (Past 3 Months)  No     Most Recent Attempt Actual Lethality Code  3     Most Lethal Attempt Actual Lethality Code  2     Initial/First Attempt Actual Lethality Code  2           ASSESSMENT: Current Emotional / Mental Status (status of significant symptoms):   Risk status (Self / Other harm or suicidal ideation)   Patient denies current fears or concerns for personal safety.   Patient denies current or recent suicidal ideation or behaviors.     Patient denies current or recent homicidal ideation or behaviors.   Patient denies current or recent self injurious behavior or ideation.   Patient denies other safety concerns.   Patient reports there has been no change in risk factors since their last session.     Patient reports there has been no change in protective factors since their last session.     a safety plan was completed several years ago .      Appearance:   Unable to assess.    Eye Contact:   Unable to assess.      Psychomotor Behavior: Unable to assess.   Attitude:   Cooperative    Orientation:   All   Speech    Rate / Production: Normal     Volume:  Normal    Mood:    Normal, depressed         Affect:    Appropriate    Thought Content:  Clear    Thought Form:  Coherent  Logical    Insight:    Good      Medication Review:   No changes to current psychiatric medication(s). PCP Dr. Eliane Quinn prescribing cymbalta 60 mg and trazadone.     Medication Compliance:   Yes     Changes in Health Issues:   None reported     Chemical Use Review:   Substance Use: Chemical use reviewed, no active concerns identified      Tobacco Use: No change in amount of tobacco use since last session.  Contemplation    Diagnosis:  MDD; EVGENY; PTSD.    Collateral Reports Completed:   Routed note to PCP    PLAN: (Patient Tasks /  Therapist Tasks / Other)  Monthly.   Follow safety plan. Use a healthy coping idea as needed.       Goals due 5/8/24      Kleber Pollack, LICSW                                                         ______________________________________________________________________    Individual Treatment Plan    Patient's Name: Melquiades Morales  YOB: 1955    Date of Creation: 1/28/22  Date Treatment Plan Last Reviewed/Revised: 2/8/24    DSM5 Diagnoses: 296.32 (F33.1) Major Depressive Disorder, Recurrent Episode, Moderate _, 300.02 (F41.1) Generalized Anxiety Disorder or 309.81 (F43.10) Posttraumatic Stress Disorder (includes Posttraumatic Stress Disorder for Children 6 Years and Younger)  With dissociative symptoms.  Psychosocial / Contextual Factors: recent loss of wife.  PROMIS (reviewed every 90 days):  2/8/24    Referral / Collaboration:  Referral to another professional/service is not indicated at this time.    Anticipated number of session for this episode of care: 10+  Anticipation frequency of session: Weekly  Anticipated Duration of each session: 38-52 minutes.  Treatment plan will be reviewed in 90 days or when goals have been changed.       MeasurableTreatment Goal(s) related to diagnosis / functional impairment(s)  Goal 1: Patient will report coping well with daily stressors.     I will know I've met my goal when each goal that we accomplish and I am having less of a problem in that area I know that you have helped me.       Objective #A (Patient Action)                          Patient will continue to follow his safety plan.  Status: New - Date: 1/28/22; 5/12/22; 8/12/22; 11/10/22; 2/16/23; 5/18/23; 8/31/23; 2/8/24  Intervention(s)  Therapist will monitor for safety each visit and encourage use of safety plan.     Objective #B  Patient will use a healthy coping idea as needed 100% of trials for 1 week.  Status: New - Date: 1/28/22; 5/12/22; 8/12/22; 11/10/22; 2/16/23; 5/18/23; 8/31/23;  2/8/24  IDEAS: napping; petting Snippy (pet dog); watching tv; fishing; socializing with friends.  Intervention(s)  Therapist will provide ideas and encouragement to use them.     Objective #C  Patient will process the loss of his wife as needed.  Status: New - Date: 1/28/22; 5/12/22; 8/12/22; 11/10/22; 2/16/23; 5/18/23; 8/31/23; 2/8/24  Intervention(s)  Therapist will consider EMDR.              Patient has reviewed and agreed to the above plan.        Kleber Pollack, Northern [facility] Mayo HospitalSW                January 28, 2022

## 2024-03-15 ENCOUNTER — TELEPHONE (OUTPATIENT)
Dept: FAMILY MEDICINE | Facility: CLINIC | Age: 69
End: 2024-03-15
Payer: COMMERCIAL

## 2024-03-15 NOTE — TELEPHONE ENCOUNTER
Test Results        Who ordered the test:  CAYETANO VALDEZ    Type of test:  COLONOSCOPY    Date of test:  2/27/24    Where was the test performed:  [place]    What are your questions/concerns?:  Jeffrey says he has not gotten results from his colonoscopy.     Could we send this information to you in [facility] or would you prefer to receive a phone call?:   Patient would prefer a phone call   Okay to leave a detailed message?: Yes at Cell number on file:    Telephone Information:   Mobile [phone]     After he hung up, I see they sent him a letter. Please call in case he has questions.  Thank you.

## 2024-03-15 NOTE — TELEPHONE ENCOUNTER
Forwarding to Dr. Salas for review please.   Patient had colonoscopy completed on 2/27/24 with polyps that were sent to pathology.   It appears results indicated no malignancy, and patient to return for repeat in one year.   Please confirm results/recommendations and forward to team to notify patient.     Thanks,  Linda Tirado RN  [facility]

## 2024-03-15 NOTE — TELEPHONE ENCOUNTER
Provider's note is pasted below and sent back directly to writing RN.  See letter in Epic.  This and follow-up recommendations for repeat colonoscopy in 6 months was reviewed with patient with understanding voiced, and he was instructed that the letter was mailed to him as well.  No further questions at this time.     Linda Tirado RN  [facility]        Salas, MD Alvin  You52 minutes ago (2:24 PM)     NN  There's a letter with the appropriate recommendation for his next colonoscopy and final pathology in the letter section of epic.   It was also forwarded to the PCP and referral. Also a print out version of this letter was mail to the pt's address    Thanks    [facility] DO Salas FACOS

## 2024-03-18 ENCOUNTER — LAB (OUTPATIENT)
Dept: LAB | Facility: CLINIC | Age: 69
End: 2024-03-18
Payer: COMMERCIAL

## 2024-03-18 DIAGNOSIS — F11.20 OPIOID TYPE DEPENDENCE, CONTINUOUS (H): ICD-10-CM

## 2024-03-18 DIAGNOSIS — F11.20 OPIOID DEPENDENCE (H): ICD-10-CM

## 2024-03-18 LAB
AMPHETAMINES UR QL SCN: ABNORMAL
BARBITURATES UR QL SCN: ABNORMAL
BENZODIAZ UR QL SCN: ABNORMAL
BZE UR QL SCN: ABNORMAL
CANNABINOIDS UR QL SCN: ABNORMAL
CREAT UR-MCNC: 107 MG/DL
FENTANYL UR QL: ABNORMAL
FENTANYL UR QL: NORMAL
OPIATES UR QL SCN: ABNORMAL
PCP QUAL URINE (ROCHE): ABNORMAL

## 2024-03-18 PROCEDURE — 80307 DRUG TEST PRSMV CHEM ANLYZR: CPT

## 2024-03-20 LAB
BUPRENORPHINE UR-MCNC: <2 NG/ML
BUPRENORPHINE UR-MCNC: <5 NG/ML
CANNABINOIDS UR CFM-MCNC: 215 NG/ML
CARBOXYTHC/CREAT UR: 201 NG/MG CREAT
NALOXONE UR CFM-MCNC: <100 NG/ML
NORBUPRENORPHINE UR CFM-MCNC: <5 NG/ML
NORBUPRENORPHINE UR-MCNC: <2 NG/ML

## 2024-04-10 ENCOUNTER — VIRTUAL VISIT (OUTPATIENT)
Dept: PSYCHOLOGY | Facility: CLINIC | Age: 69
End: 2024-04-10
Payer: COMMERCIAL

## 2024-04-10 DIAGNOSIS — F41.1 GENERALIZED ANXIETY DISORDER: ICD-10-CM

## 2024-04-10 DIAGNOSIS — F33.1 MAJOR DEPRESSIVE DISORDER, RECURRENT EPISODE, MODERATE (H): Primary | ICD-10-CM

## 2024-04-10 DIAGNOSIS — F43.10 POSTTRAUMATIC STRESS DISORDER: ICD-10-CM

## 2024-04-10 PROCEDURE — 90832 PSYTX W PT 30 MINUTES: CPT | Mod: 93 | Performed by: SOCIAL WORKER

## 2024-04-10 ASSESSMENT — ANXIETY QUESTIONNAIRES
6. BECOMING EASILY ANNOYED OR IRRITABLE: MORE THAN HALF THE DAYS
3. WORRYING TOO MUCH ABOUT DIFFERENT THINGS: MORE THAN HALF THE DAYS
1. FEELING NERVOUS, ANXIOUS, OR ON EDGE: MORE THAN HALF THE DAYS
5. BEING SO RESTLESS THAT IT IS HARD TO SIT STILL: MORE THAN HALF THE DAYS
7. FEELING AFRAID AS IF SOMETHING AWFUL MIGHT HAPPEN: NOT AT ALL
GAD7 TOTAL SCORE: 12
GAD7 TOTAL SCORE: 12
2. NOT BEING ABLE TO STOP OR CONTROL WORRYING: MORE THAN HALF THE DAYS
IF YOU CHECKED OFF ANY PROBLEMS ON THIS QUESTIONNAIRE, HOW DIFFICULT HAVE THESE PROBLEMS MADE IT FOR YOU TO DO YOUR WORK, TAKE CARE OF THINGS AT HOME, OR GET ALONG WITH OTHER PEOPLE: SOMEWHAT DIFFICULT

## 2024-04-10 NOTE — PROGRESS NOTES
"    [facility]                                     Progress Note    Patient Name: Melquiades Morales  Date: 4/10/24         Service Type: Individual      Session Start Time:  10 am  Session End Time: 10:30 am     Session Length: 30    min     Session #: 40    Attendees: Client attended alone.    Service Modality:  Phone Visit:         Phone Visit:      Provider verified identity through the following two step process.  Patient provided:  Patient is known previously to provider.    Telephone Visit: The patient's condition can be safely assessed and treated via synchronous audio telemedicine encounter.      Reason for Audio Telemedicine Visit: Patient has requested telehealth visit.    Originating Site (Patient Location): Patient's home.    Distant Site (Provider Location): [facility].    Consent:  The patient/guardian has verbally consented to:     1. The potential risks and benefits of telemedicine (telephone visit) versus in person care;    The patient has been notified of the following:      \"We have found that certain health care needs can be provided without the need for a face to face visit.  This service lets us provide the care you need with a phone conversation.       I will have full access to your [facility] medical record during this entire phone call.   I will be taking notes for your medical record.      Since this is like an office visit, we will bill your insurance company for this service.       There are potential benefits and risks of telephone visits (e.g. limits to patient confidentiality) that differ from in-person visits.?Confidentiality still applies for telephone services, and nobody will record the visit.  It is important to be in a quiet, private space that is free of distractions (including cell phone or other devices) during the visit.??      If during the course of the call I believe a telephone visit " "is not appropriate, you will not be charged for this service\"     Consent has been obtained for this service by care team member: Yes                     DATA  Interactive Complexity: No  Crisis: No        Progress Since Last Session (Related to Symptoms / Goals / Homework):   Symptoms: stable.    Homework: Partially completed- use a healthy coping idea. He has the grounding ideas handout and is to practice them.     Episode of Care Goals: Minimal progress - ACTION (Actively working towards change); Intervened by reinforcing change plan / affirming steps taken.    Current / Ongoing Stressors and Concerns:   He now lives in a nursing home. He is making friends. It feels like home now. The staff are nice.  He and Chiquis were planning on getting a place together in a couple of years.  Chiquis's only grand child passed about 4 months ago, age 12, biking accident. She has been in [place] with her daughter since and is expected to return home to [place] in a couple of weeks.      Treatment Objective(s) Addressed in This Session:  Use a healthy coping idea as needed.      Intervention:  Assessed functioning and for safety. Reviewed the phq and evgeny. Completed the sandra brown safety plan. Processed feelings about time with his son [place]oacano and having a new girlfriend. Reinforced use of healthy coping ideas- time with his dog Tam helps.  He also enjoys time with friends visiting around shared community wood burning stove.      Assessments completed prior to visit:  The following assessments were completed by patient for this visit:  PHQ9:       7/27/2023    10:08 AM 8/31/2023    11:07 AM 9/14/2023    11:10 AM 10/26/2023    12:05 PM 11/30/2023    11:10 AM 2/8/2024     9:08 AM 3/13/2024    10:04 AM   PHQ-9 SCORE   PHQ-9 Total Score 18 14 12 13 19 16 16     GAD7:       7/27/2023    10:08 AM 8/31/2023    11:07 AM 9/14/2023    11:10 AM 10/26/2023    12:05 PM 11/30/2023    11:10 AM 2/8/2024     9:08 AM 3/13/2024    10:04 AM   EVGENY-7 SCORE "   Total Score 18 13 10 12 9 12 12     CAGE-AID:       1/28/2022    12:02 PM   CAGE-AID Total Score   Total Score 0   Total Score MyChart 0 (A total score of 2 or greater is considered clinically significant)     PROMIS 10-Global Health (all questions and answers displayed):       5/12/2022    10:23 AM 8/12/2022     9:13 AM 11/10/2022     8:50 AM 2/16/2023    11:22 AM 5/18/2023    10:08 AM 8/31/2023    11:10 AM 2/8/2024     9:08 AM   PROMIS 10   In general, would you say your health is: 3 3 2 2 2 2 3   In general, would you say your quality of life is: 2 3 2 2 2 2 3   In general, how would you rate your physical health? 2 3 1 2 2 1 2   In general, how would you rate your mental health, including your mood and your ability to think? 3 3 2 2 2 2 2   In general, how would you rate your satisfaction with your social activities and relationships? 2 4 2 2 2 3 3   In general, please rate how well you carry out your usual social activities and roles. (This includes activities at home, at work and in your community, and responsibilities as a parent, child, spouse, employee, friend, etc.) 2 3 2 2 2 3 3   To what extent are you able to carry out your everyday physical activities such as walking, climbing stairs, carrying groceries, or moving a chair? 1 2 1 3 2 3 3   In the past 7 days, how often have you been bothered by emotional problems such as feeling anxious, depressed, or irritable? 3 3 4 3 4 3 3   In the past 7 days, how would you rate your fatigue on average? 3 3 3 3 3 2 2   In the past 7 days, how would you rate your pain on average, where 0 means no pain, and 10 means worst imaginable pain? 6 5 5 5 6 6 6   Global Mental Health Score 10 13 8 9 8 10 11   Global Physical Health Score 9 11 8 11 10 11 12   PROMIS TOTAL - SUBSCORES 19 24 16 20 18 21 23     [facility] Suicide Severity Rating Scale (Lifetime/Recent)      9/8/2018     5:00 PM 1/28/2022    12:14 PM 9/29/2022    10:00 AM 2/27/2024     7:22 AM   [facility]  Severity Rating (Lifetime/Recent)   Q1 Wish to be Dead (Lifetime)  Yes     Q2 Non-Specific Active Suicidal Thoughts (Lifetime)  Yes     Most Severe Ideation Rating (Lifetime)  5     Frequency (Lifetime)  3     Duration (Lifetime)  2     Controllability (Lifetime)  5     Protective Factors  (Lifetime)  5     Reasons for Ideation (Lifetime)  4     Q1 Wished to be Dead (Past Month)   no 0-->no   Q2 Suicidal Thoughts (Past Month)   no 0-->no   Q3 Suicidal Thought Method   no    Q4 Suicidal Intent without Specific Plan   no    Q5 Suicide Intent with Specific Plan   no    Q6 Suicide Behavior (Lifetime)   no 0-->no   Level of Risk per Screen   low risk no risks indicated   RETIRED: 1. Wish to be Dead (Recent)  No     RETIRED: 2. Non-Specific Active Suicidal Thoughts (Recent)  No     3. Active Suicidal Ideation with any Methods (Not Plan) Without Intent to Act (Lifetime)  Yes     RETIRED: 3. Active Suicidal Ideation with any Methods (Not Plan) Without Intent to Act (Recent)  No     RETIRE: 4. Active Suicidal Ideation with Some Intent to Act, Without Specific Plan (Lifetime)  Yes     4. Active Suicidal Ideation with Some Intent to Act, Without Specific Plan (Recent)  No     RETIRE: 5. Active Suicidal Ideation with Specific Plan and Intent (Lifetime)  Yes     RETIRED: 5. Active Suicidal Ideation with Specific Plan and Intent (Recent)  No     Most Severe Ideation Rating (Past Month) NA NA     Frequency (Past Month)  NA     Duration (Past Month)  NA     Controllability (Past Month)  NA     Protective Factors (Past Month)  NA     Reasons for Ideation (Past Month)  NA     Actual Attempt (Lifetime)  Yes     Actual Attempt Description (Lifetime)  3 attempts with last one 15 years ago     Total Number of Actual Attempts (Lifetime)  3     Actual Attempt (Past 3 Months)  No     Has subject engaged in non-suicidal self-injurious behavior? (Lifetime)  No     Has subject engaged in non-suicidal self-injurious behavior? (Past 3 Months)   "No     Interrupted Attempts (Lifetime)  No     Interrupted Attempts (Past 3 Months)  No     Aborted or Self-Interrupted Attempt (Lifetime)  No     Aborted or Self-Interrupted Attempt (Past 3 Months)  No     Preparatory Acts or Behavior (Lifetime)  No     Preparatory Acts or Behavior (Past 3 Months)  No     Most Recent Attempt Actual Lethality Code  3     Most Lethal Attempt Actual Lethality Code  2     Initial/First Attempt Actual Lethality Code  2           ASSESSMENT: Current Emotional / Mental Status (status of significant symptoms):   Risk status (Self / Other harm or suicidal ideation)   Patient denies current fears or concerns for personal safety.   Patient denies current or recent suicidal ideation or behaviors.     Patient denies current or recent homicidal ideation or behaviors.   Patient denies current or recent self injurious behavior or ideation.   Patient denies other safety concerns.   Patient reports there has been no change in risk factors since their last session.     Patient reports there has been no change in protective factors since their last session.     a safety plan was completed several years ago .    [facility] Safety Plan      Creation Date: 4/10/24       Step 1: Warning signs:    Warning Signs    \"i don't know.\"      Step 2: Internal coping strategies - Things I can do to take my mind off my problems without contacting another person:    Strategies    \"i could talk to my new girlfriend; go fishing; be with sndennis (dog).\"      Step 3: People and social settings that provide distraction:    Name Contact Information    [place]ano [phone]    Chiquis [phone]       Places    fishing; time around the iron stove in the community garage.      Step 4: People whom I can ask for help during a crisis:    Name Contact Information    [place] see above.    Chiquis \"  \"    Riya she lives upstairs      Step 5: Professionals or agencies I can contact during a crisis:    Clinician/Agency Name Phone " "Emergency Contact    Shamar Pollack [phone]       Local Emergency Department Emergency Department Address Emergency Department Phone    [place] [facility]  [phone]      Suicide Prevention Lifeline Phone: Call or Text 960  Crisis Text Line: Text HOME to 700213     Step 6: Making the environment safer (plan for lethal means safety):   Did not identify any lethal methods     Optional: What is most important to me and worth living for?:   \"My family; Chiquis and Riya; edy (pet dog).\"     [facility] Safety Plan. Jami Giles and Jay Mart. Used with permission of the authors.            Appearance:   Unable to assess.    Eye Contact:   Unable to assess.      Psychomotor Behavior: Unable to assess.   Attitude:   Cooperative    Orientation:   All   Speech    Rate / Production: Normal     Volume:  Normal    Mood:    Normal, depressed         Affect:    Appropriate    Thought Content:  Clear    Thought Form:  Coherent  Logical    Insight:    Good      Medication Review:   No changes to current psychiatric medication(s). PCP Dr. Eliane Quinn prescribing cymbalta 60 mg and trazadone.     Medication Compliance:   Yes     Changes in Health Issues:   None reported     Chemical Use Review:   Substance Use: Chemical use reviewed, no active concerns identified      Tobacco Use: No change in amount of tobacco use since last session.  Contemplation    Diagnosis:  MDD; EVGENY; PTSD.    Collateral Reports Completed:   Routed note to PCP    PLAN: (Patient Tasks / Therapist Tasks / Other)  Monthly.   Follow safety plan. Use a healthy coping idea as needed.       Goals due 5/8/24      Kleber Pollack, LICSW                                                         ______________________________________________________________________    Individual Treatment Plan    Patient's Name: Melquiades Morales  YOB: 1955    Date of Creation: 1/28/22  Date Treatment Plan Last Reviewed/Revised: 2/8/24    DSM5 " Diagnoses: 296.32 (F33.1) Major Depressive Disorder, Recurrent Episode, Moderate _, 300.02 (F41.1) Generalized Anxiety Disorder or 309.81 (F43.10) Posttraumatic Stress Disorder (includes Posttraumatic Stress Disorder for Children 6 Years and Younger)  With dissociative symptoms.  Psychosocial / Contextual Factors: recent loss of wife.  PROMIS (reviewed every 90 days):  2/8/24    Referral / Collaboration:  Referral to another professional/service is not indicated at this time.    Anticipated number of session for this episode of care: 10+  Anticipation frequency of session: Weekly  Anticipated Duration of each session: 38-52 minutes.  Treatment plan will be reviewed in 90 days or when goals have been changed.       MeasurableTreatment Goal(s) related to diagnosis / functional impairment(s)  Goal 1: Patient will report coping well with daily stressors.     I will know I've met my goal when each goal that we accomplish and I am having less of a problem in that area I know that you have helped me.       Objective #A (Patient Action)                          Patient will continue to follow his safety plan.  Status: New - Date: 1/28/22; 5/12/22; 8/12/22; 11/10/22; 2/16/23; 5/18/23; 8/31/23; 2/8/24  Intervention(s)  Therapist will monitor for safety each visit and encourage use of safety plan.     Objective #B  Patient will use a healthy coping idea as needed 100% of trials for 1 week.  Status: New - Date: 1/28/22; 5/12/22; 8/12/22; 11/10/22; 2/16/23; 5/18/23; 8/31/23; 2/8/24  IDEAS: napping; petting Snippy (pet dog); watching tv; fishing; socializing with friends.  Intervention(s)  Therapist will provide ideas and encouragement to use them.     Objective #C  Patient will process the loss of his wife as needed.  Status: New - Date: 1/28/22; 5/12/22; 8/12/22; 11/10/22; 2/16/23; 5/18/23; 8/31/23; 2/8/24  Intervention(s)  Therapist will consider EMDR.              Patient has reviewed and agreed to the above plan.         Kleber Pollack, [facility]                January 28, 2022

## 2024-04-11 ENCOUNTER — LAB (OUTPATIENT)
Dept: LAB | Facility: CLINIC | Age: 69
End: 2024-04-11
Payer: COMMERCIAL

## 2024-04-11 DIAGNOSIS — F11.20 OPIOID TYPE DEPENDENCE, CONTINUOUS (H): ICD-10-CM

## 2024-04-11 DIAGNOSIS — F11.20 OPIOID DEPENDENCE (H): ICD-10-CM

## 2024-04-11 LAB
AMPHETAMINES UR QL SCN: ABNORMAL
BARBITURATES UR QL SCN: ABNORMAL
BENZODIAZ UR QL SCN: ABNORMAL
BZE UR QL SCN: ABNORMAL
CANNABINOIDS UR QL SCN: ABNORMAL
CREAT UR-MCNC: 99 MG/DL
FENTANYL UR QL: ABNORMAL
FENTANYL UR QL: NORMAL
Lab: NORMAL
OPIATES UR QL SCN: ABNORMAL
PCP QUAL URINE (ROCHE): ABNORMAL
PERFORMING LABORATORY: NORMAL
SPECIMEN STATUS: NORMAL
TEST NAME: NORMAL

## 2024-04-11 PROCEDURE — 80307 DRUG TEST PRSMV CHEM ANLYZR: CPT

## 2024-04-11 PROCEDURE — G0480 DRUG TEST DEF 1-7 CLASSES: HCPCS | Mod: 59

## 2024-04-16 LAB
BUPRENORPHINE UR-MCNC: <2 NG/ML
BUPRENORPHINE UR-MCNC: <5 NG/ML
MISCELLANEOUS TEST 1 (ARUP): NORMAL
NALOXONE UR CFM-MCNC: <100 NG/ML
NORBUPRENORPHINE UR CFM-MCNC: <5 NG/ML
NORBUPRENORPHINE UR-MCNC: <2 NG/ML

## 2024-04-18 LAB
CANNABINOIDS UR CFM-MCNC: 267 NG/ML
CARBOXYTHC/CREAT UR: 270 NG/MG CREAT

## 2024-05-02 DIAGNOSIS — F32.A DEPRESSION, UNSPECIFIED DEPRESSION TYPE: ICD-10-CM

## 2024-05-02 NOTE — TELEPHONE ENCOUNTER
Pending Prescriptions:                       Disp   Refills    DULoxetine (CYMBALTA) 60 MG capsule       60 cap*5            Sig: Take 2 capsules (120 mg) by mouth daily    Ilana Rodriguez on 5/2/2024 at 12:56 PM

## 2024-05-03 RX ORDER — DULOXETIN HYDROCHLORIDE 60 MG/1
120 CAPSULE, DELAYED RELEASE ORAL DAILY
Qty: 60 CAPSULE | Refills: 2 | Status: SHIPPED | OUTPATIENT
Start: 2024-05-03 | End: 2024-05-17

## 2024-05-03 NOTE — TELEPHONE ENCOUNTER
Due for follow up. Please establish a primary.  Sent 90 days.  Notify patient will need appt for refills.  Thank you,  Tyree Naranjo MD

## 2024-05-17 ENCOUNTER — OFFICE VISIT (OUTPATIENT)
Dept: FAMILY MEDICINE | Facility: CLINIC | Age: 69
End: 2024-05-17
Payer: COMMERCIAL

## 2024-05-17 VITALS
TEMPERATURE: 97.1 F | HEIGHT: 71 IN | HEART RATE: 69 BPM | SYSTOLIC BLOOD PRESSURE: 138 MMHG | DIASTOLIC BLOOD PRESSURE: 86 MMHG | WEIGHT: 213.4 LBS | OXYGEN SATURATION: 92 % | BODY MASS INDEX: 29.88 KG/M2 | RESPIRATION RATE: 18 BRPM

## 2024-05-17 DIAGNOSIS — R25.1 TREMOR: ICD-10-CM

## 2024-05-17 DIAGNOSIS — G89.4 CHRONIC PAIN SYNDROME: ICD-10-CM

## 2024-05-17 DIAGNOSIS — J44.9 CHRONIC OBSTRUCTIVE PULMONARY DISEASE, UNSPECIFIED COPD TYPE (H): ICD-10-CM

## 2024-05-17 DIAGNOSIS — F33.1 MAJOR DEPRESSIVE DISORDER, RECURRENT EPISODE, MODERATE (H): Primary | ICD-10-CM

## 2024-05-17 PROBLEM — T50.901A POLYSUBSTANCE OVERDOSE: Status: RESOLVED | Noted: 2019-10-29 | Resolved: 2024-05-17

## 2024-05-17 PROCEDURE — 99213 OFFICE O/P EST LOW 20 MIN: CPT | Performed by: NURSE PRACTITIONER

## 2024-05-17 RX ORDER — PROPRANOLOL HYDROCHLORIDE 120 MG/1
120 CAPSULE, EXTENDED RELEASE ORAL DAILY
Qty: 90 CAPSULE | Refills: 3 | Status: SHIPPED | OUTPATIENT
Start: 2024-05-17

## 2024-05-17 RX ORDER — DULOXETIN HYDROCHLORIDE 60 MG/1
120 CAPSULE, DELAYED RELEASE ORAL DAILY
Qty: 180 CAPSULE | Refills: 3 | Status: SHIPPED | OUTPATIENT
Start: 2024-05-17

## 2024-05-17 SDOH — HEALTH STABILITY: PHYSICAL HEALTH: ON AVERAGE, HOW MANY DAYS PER WEEK DO YOU ENGAGE IN MODERATE TO STRENUOUS EXERCISE (LIKE A BRISK WALK)?: 1 DAY

## 2024-05-17 SDOH — HEALTH STABILITY: PHYSICAL HEALTH: ON AVERAGE, HOW MANY MINUTES DO YOU ENGAGE IN EXERCISE AT THIS LEVEL?: 10 MIN

## 2024-05-17 ASSESSMENT — PATIENT HEALTH QUESTIONNAIRE - PHQ9
SUM OF ALL RESPONSES TO PHQ QUESTIONS 1-9: 13
SUM OF ALL RESPONSES TO PHQ QUESTIONS 1-9: 13
10. IF YOU CHECKED OFF ANY PROBLEMS, HOW DIFFICULT HAVE THESE PROBLEMS MADE IT FOR YOU TO DO YOUR WORK, TAKE CARE OF THINGS AT HOME, OR GET ALONG WITH OTHER PEOPLE: SOMEWHAT DIFFICULT

## 2024-05-17 ASSESSMENT — SOCIAL DETERMINANTS OF HEALTH (SDOH): HOW OFTEN DO YOU GET TOGETHER WITH FRIENDS OR RELATIVES?: ONCE A WEEK

## 2024-05-17 NOTE — PROGRESS NOTES
"  Assessment & Plan     Major depressive disorder, recurrent episode, moderate (H)  Stable, continue;  - DULoxetine (CYMBALTA) 60 MG capsule; Take 2 capsules (120 mg) by mouth daily    Tremor  Trial increase in dose, instructed to watch for bradycardia and lightheadedness  - propranolol ER (INDERAL LA) 120 MG 24 hr capsule; Take 1 capsule (120 mg) by mouth daily    Chronic obstructive pulmonary disease, unspecified COPD type (H)  Stable on current regimen. Asymptomatic today. Continues to smoke    Chronic pain syndrome  Would benefit from new power scooter, the uneven and cracking of the frame presents hazard of falling out/tipping.   - Physical Therapy  Referral; Future      BMI  Estimated body mass index is 29.76 kg/m  as calculated from the following:    Height as of this encounter: 1.803 m (5' 11\").    Weight as of this encounter: 96.8 kg (213 lb 6.4 oz).       Counseling  Appropriate preventive services were discussed with this patient, including applicable screening as appropriate for fall prevention, nutrition, physical activity, Tobacco-use cessation, weight loss and cognition.  Checklist reviewing preventive services available has been given to the patient.  Reviewed patient's diet, addressing concerns and/or questions.   He is at risk for lack of exercise and has been provided with information to increase physical activity for the benefit of his well-being.   The patient was instructed to see the dentist every 6 months.   The patient was provided with written information regarding signs of hearing loss.   The patient's PHQ-9 score is consistent with moderate depression. He was provided with information regarding depression.   I have reviewed Opioid Use Disorder and Substance Use Disorder risk factors and made any needed referrals.       CONSULTATION/REFERRAL to wheelchair mobility assessment if needed for insurance purposes    FUTURE APPOINTMENTS:       - Follow-up visit in 6 months, sooner " BREANN Gonzalez   Melquiades is a 69 year old, presenting for the following health issues:  Depression and Anxiety        5/17/2024     8:19 AM   Additional Questions   Roomed by Naima LOWRY       Depression and Anxiety   How are you doing with your depression since your last visit? No change  How are you doing with your anxiety since your last visit?  No change  Are you having other symptoms that might be associated with depression or anxiety? No  Have you had a significant life event? No   Do you have any concerns with your use of alcohol or other drugs? No    Social History     Tobacco Use    Smoking status: Every Day     Current packs/day: 1.00     Average packs/day: 1 pack/day for 59.4 years (59.4 ttl pk-yrs)     Types: Cigarettes     Start date: 1965    Smokeless tobacco: Former   Vaping Use    Vaping status: Former    Substances: THC    Devices: [employer]   Substance Use Topics    Alcohol use: No    Drug use: Yes     Types: Marijuana     Comment: vaping marijuana since 7/2019 for medicinal purposes, buys from unauthorized seller. recommended cessation in light of lung injury/illness associated with vaping.         3/13/2024    10:04 AM 4/10/2024    10:07 AM 5/17/2024     8:12 AM   PHQ   PHQ-9 Total Score 16 15 13   Q9: Thoughts of better off dead/self-harm past 2 weeks Not at all Not at all Not at all         2/8/2024     9:08 AM 3/13/2024    10:04 AM 4/10/2024    10:07 AM   EVGENY-7 SCORE   Total Score 12 12 12         Suicide Assessment Five-step Evaluation and Treatment (SAFE-T)    How many servings of fruits and vegetables do you eat daily?  2-3  On average, how many sweetened beverages do you drink each day (Examples: soda, juice, sweet tea, etc.  Do NOT count diet or artificially sweetened beverages)?   2  How many days per week do you exercise enough to make your heart beat faster? 3 or less  How many minutes a day do you exercise enough to make your heart beat faster? 9 or less  How many days  "per week do you miss taking your medication? 0    Medication Followup of duloxetine  Taking Medication as prescribed: yes  Side Effects:  None  Medication Helping Symptoms:  yes    Medication Followup of propranolol  Taking Medication as prescribed: yes  Side Effects:  None  Medication Helping Symptoms:  tremor improved, still has some tremor         Review of Systems  Constitutional, HEENT, cardiovascular, pulmonary, gi and gu systems are negative, except as otherwise noted.      Objective    /86   Pulse 69   Temp 97.1  F (36.2  C) (Tympanic)   Resp 18   Ht 1.803 m (5' 11\")   Wt 96.8 kg (213 lb 6.4 oz)   SpO2 92%   BMI 29.76 kg/m    Body mass index is 29.76 kg/m .  Physical Exam   GENERAL: alert and no distress  MS: in power scooter, power scooter appears cracked and uneven  NEURO: weakness of BLE, decreased tone BLE, and mentation intact  PSYCH: mentation appears normal and affect flat            Signed Electronically by: KONSTANTIN Sethi CNP    "

## 2024-05-17 NOTE — LETTER
May 17, 2024      Melquiades ELLIS Carmen  [address] [address] [facility] [id number]        To Whom It May Concern,       Melquiades was seen in clinic 5/17/24. He is in need of a replacement for his power scooter due to cracking of the base on both sides, bald tires (uses outdoor), headlight is not working, back tires are close to rubbing on the frame on the left due to uneven base and lowering of the rear fender.     Sincerely,          KONSTANTIN Sethi CNP

## 2024-06-07 ENCOUNTER — VIRTUAL VISIT (OUTPATIENT)
Dept: PSYCHOLOGY | Facility: CLINIC | Age: 69
End: 2024-06-07
Payer: COMMERCIAL

## 2024-06-07 DIAGNOSIS — F33.1 MAJOR DEPRESSIVE DISORDER, RECURRENT EPISODE, MODERATE (H): Primary | ICD-10-CM

## 2024-06-07 DIAGNOSIS — F41.1 GENERALIZED ANXIETY DISORDER: ICD-10-CM

## 2024-06-07 DIAGNOSIS — F43.10 POSTTRAUMATIC STRESS DISORDER: ICD-10-CM

## 2024-06-07 PROCEDURE — 90832 PSYTX W PT 30 MINUTES: CPT | Mod: 93 | Performed by: SOCIAL WORKER

## 2024-06-07 ASSESSMENT — ANXIETY QUESTIONNAIRES
6. BECOMING EASILY ANNOYED OR IRRITABLE: MORE THAN HALF THE DAYS
3. WORRYING TOO MUCH ABOUT DIFFERENT THINGS: MORE THAN HALF THE DAYS
IF YOU CHECKED OFF ANY PROBLEMS ON THIS QUESTIONNAIRE, HOW DIFFICULT HAVE THESE PROBLEMS MADE IT FOR YOU TO DO YOUR WORK, TAKE CARE OF THINGS AT HOME, OR GET ALONG WITH OTHER PEOPLE: SOMEWHAT DIFFICULT
5. BEING SO RESTLESS THAT IT IS HARD TO SIT STILL: MORE THAN HALF THE DAYS
1. FEELING NERVOUS, ANXIOUS, OR ON EDGE: MORE THAN HALF THE DAYS
2. NOT BEING ABLE TO STOP OR CONTROL WORRYING: MORE THAN HALF THE DAYS
GAD7 TOTAL SCORE: 14
GAD7 TOTAL SCORE: 14
7. FEELING AFRAID AS IF SOMETHING AWFUL MIGHT HAPPEN: MORE THAN HALF THE DAYS

## 2024-06-07 NOTE — PROGRESS NOTES
"    [facility]                                     Progress Note    Patient Name: Melquiades Morales  Date: 6/7/24         Service Type: Individual      Session Start Time:  10 am  Session End Time: 10:20 am     Session Length: 20 min     Session #: 41    Attendees: Client attended alone.    Service Modality:  Phone Visit:         Phone Visit:      Provider verified identity through the following two step process.  Patient provided:  Patient is known previously to provider.    Telephone Visit: The patient's condition can be safely assessed and treated via synchronous audio telemedicine encounter.      Reason for Audio Telemedicine Visit: Patient has requested telehealth visit.    Originating Site (Patient Location): Patient's home.    Distant Site (Provider Location): [facility]DI[facility].    Consent:  The patient/guardian has verbally consented to:     1. The potential risks and benefits of telemedicine (telephone visit) versus in person care;    The patient has been notified of the following:      \"We have found that certain health care needs can be provided without the need for a face to face visit.  This service lets us provide the care you need with a phone conversation.       I will have full access to your [facility] medical record during this entire phone call.   I will be taking notes for your medical record.      Since this is like an office visit, we will bill your insurance company for this service.       There are potential benefits and risks of telephone visits (e.g. limits to patient confidentiality) that differ from in-person visits.?Confidentiality still applies for telephone services, and nobody will record the visit.  It is important to be in a quiet, private space that is free of distractions (including cell phone or other devices) during the visit.??      If during the course of the call I believe a telephone visit is " "not appropriate, you will not be charged for this service\"     Consent has been obtained for this service by care team member: Yes                     DATA  Interactive Complexity: No  Crisis: No        Progress Since Last Session (Related to Symptoms / Goals / Homework):   Symptoms: stable.    Homework: Partially completed- use a healthy coping idea. He has the grounding ideas handout and is to practice them.     Episode of Care Goals: Minimal progress - ACTION (Actively working towards change); Intervened by reinforcing change plan / affirming steps taken.    Current / Ongoing Stressors and Concerns:   He now lives in a nursing home. He is making friends. It feels like home now. The staff are nice.  He and Chiquis were planning on getting a place together in a couple of years.  Chiquis's only grand child passed about 4 months ago, age 12, biking accident. She has been in [place] with her daughter since and is expected to return home to [place] in a couple of weeks.      Treatment Objective(s) Addressed in This Session:  Use a healthy coping idea as needed.      Intervention:  Assessed functioning and for safety. Reviewed the phq and shailesh. Reviewed goals and the promis. Processed feelings about relationships. Reinforced use of healthy coping ideas- time with his dog Tam helps.  He also enjoys time with friends visiting around shared community wood burning stove.      Assessments completed prior to visit:  The following assessments were completed by patient for this visit:  PHQ9:       9/14/2023    11:10 AM 10/26/2023    12:05 PM 11/30/2023    11:10 AM 2/8/2024     9:08 AM 3/13/2024    10:04 AM 4/10/2024    10:07 AM 5/17/2024     8:12 AM   PHQ-9 SCORE   PHQ-9 Total Score MyChart       13 (Moderate depression)   PHQ-9 Total Score 12 13 19 16 16 15 13     GAD7:       8/31/2023    11:07 AM 9/14/2023    11:10 AM 10/26/2023    12:05 PM 11/30/2023    11:10 AM 2/8/2024     9:08 AM 3/13/2024    10:04 AM 4/10/2024    10:07 AM   SHAILESH-7 " SCORE   Total Score 13 10 12 9 12 12 12     CAGE-AID:       1/28/2022    12:02 PM   CAGE-AID Total Score   Total Score 0   Total Score MyChart 0 (A total score of 2 or greater is considered clinically significant)     PROMIS 10-Global Health (all questions and answers displayed):       5/12/2022    10:23 AM 8/12/2022     9:13 AM 11/10/2022     8:50 AM 2/16/2023    11:22 AM 5/18/2023    10:08 AM 8/31/2023    11:10 AM 2/8/2024     9:08 AM   PROMIS 10   In general, would you say your health is: 3 3 2 2 2 2 3   In general, would you say your quality of life is: 2 3 2 2 2 2 3   In general, how would you rate your physical health? 2 3 1 2 2 1 2   In general, how would you rate your mental health, including your mood and your ability to think? 3 3 2 2 2 2 2   In general, how would you rate your satisfaction with your social activities and relationships? 2 4 2 2 2 3 3   In general, please rate how well you carry out your usual social activities and roles. (This includes activities at home, at work and in your community, and responsibilities as a parent, child, spouse, employee, friend, etc.) 2 3 2 2 2 3 3   To what extent are you able to carry out your everyday physical activities such as walking, climbing stairs, carrying groceries, or moving a chair? 1 2 1 3 2 3 3   In the past 7 days, how often have you been bothered by emotional problems such as feeling anxious, depressed, or irritable? 3 3 4 3 4 3 3   In the past 7 days, how would you rate your fatigue on average? 3 3 3 3 3 2 2   In the past 7 days, how would you rate your pain on average, where 0 means no pain, and 10 means worst imaginable pain? 6 5 5 5 6 6 6   Global Mental Health Score 10 13 8 9 8 10 11   Global Physical Health Score 9 11 8 11 10 11 12   PROMIS TOTAL - SUBSCORES 19 24 16 20 18 21 23     Steele Suicide Severity Rating Scale (Lifetime/Recent)      9/8/2018     5:00 PM 1/28/2022    12:14 PM 9/29/2022    10:00 AM 2/27/2024     7:22 AM   Steele  Suicide Severity Rating (Lifetime/Recent)   Q1 Wish to be Dead (Lifetime)  Yes     Q2 Non-Specific Active Suicidal Thoughts (Lifetime)  Yes     Most Severe Ideation Rating (Lifetime)  5     Frequency (Lifetime)  3     Duration (Lifetime)  2     Controllability (Lifetime)  5     Protective Factors  (Lifetime)  5     Reasons for Ideation (Lifetime)  4     Q1 Wished to be Dead (Past Month)   no 0-->no   Q2 Suicidal Thoughts (Past Month)   no 0-->no   Q3 Suicidal Thought Method   no    Q4 Suicidal Intent without Specific Plan   no    Q5 Suicide Intent with Specific Plan   no    Q6 Suicide Behavior (Lifetime)   no 0-->no   Level of Risk per Screen   low risk no risks indicated   RETIRED: 1. Wish to be Dead (Recent)  No     RETIRED: 2. Non-Specific Active Suicidal Thoughts (Recent)  No     3. Active Suicidal Ideation with any Methods (Not Plan) Without Intent to Act (Lifetime)  Yes     RETIRED: 3. Active Suicidal Ideation with any Methods (Not Plan) Without Intent to Act (Recent)  No     RETIRE: 4. Active Suicidal Ideation with Some Intent to Act, Without Specific Plan (Lifetime)  Yes     4. Active Suicidal Ideation with Some Intent to Act, Without Specific Plan (Recent)  No     RETIRE: 5. Active Suicidal Ideation with Specific Plan and Intent (Lifetime)  Yes     RETIRED: 5. Active Suicidal Ideation with Specific Plan and Intent (Recent)  No     Most Severe Ideation Rating (Past Month) NA NA     Frequency (Past Month)  NA     Duration (Past Month)  NA     Controllability (Past Month)  NA     Protective Factors (Past Month)  NA     Reasons for Ideation (Past Month)  NA     Actual Attempt (Lifetime)  Yes     Actual Attempt Description (Lifetime)  3 attempts with last one 15 years ago     Total Number of Actual Attempts (Lifetime)  3     Actual Attempt (Past 3 Months)  No     Has subject engaged in non-suicidal self-injurious behavior? (Lifetime)  No     Has subject engaged in non-suicidal self-injurious behavior? (Past 3  "Months)  No     Interrupted Attempts (Lifetime)  No     Interrupted Attempts (Past 3 Months)  No     Aborted or Self-Interrupted Attempt (Lifetime)  No     Aborted or Self-Interrupted Attempt (Past 3 Months)  No     Preparatory Acts or Behavior (Lifetime)  No     Preparatory Acts or Behavior (Past 3 Months)  No     Most Recent Attempt Actual Lethality Code  3     Most Lethal Attempt Actual Lethality Code  2     Initial/First Attempt Actual Lethality Code  2           ASSESSMENT: Current Emotional / Mental Status (status of significant symptoms):   Risk status (Self / Other harm or suicidal ideation)   Patient denies current fears or concerns for personal safety.   Patient denies current or recent suicidal ideation or behaviors.     Patient denies current or recent homicidal ideation or behaviors.   Patient denies current or recent self injurious behavior or ideation.   Patient denies other safety concerns.   Patient reports there has been no change in risk factors since their last session.     Patient reports there has been no change in protective factors since their last session.     a safety plan was completed several years ago .    [facility] Safety Plan      Creation Date: 4/10/24       Step 1: Warning signs:    Warning Signs    \"i don't know.\"      Step 2: Internal coping strategies - Things I can do to take my mind off my problems without contacting another person:    Strategies    \"i could talk to my new girlfriend; go fishing; be with sndennis (dog).\"      Step 3: People and social settings that provide distraction:    Name Contact Information    [place] [phone]    Chiquis [phone]       Places    fishing; time around the iron stove in the community garage.      Step 4: People whom I can ask for help during a crisis:    Name Contact Information    [place] see above.    Chiquis \"  \"    Riya she lives upstairs      Step 5: Professionals or agencies I can contact during a crisis:    Clinician/Agency Name Phone " "Emergency Contact    Shamar Pollack [phone]       Local Emergency Department Emergency Department Address Emergency Department Phone    [facility]  [phone]      Suicide Prevention Lifeline Phone: Call or Text 003  Crisis Text Line: Text HOME to 092434     Step 6: Making the environment safer (plan for lethal means safety):   Did not identify any lethal methods     Optional: What is most important to me and worth living for?:   \"My family; Chiquis and Riya; edy (pet dog).\"     [facility] Safety Plan. Jami Giles and Jay Mart. Used with permission of the authors.            Appearance:   Unable to assess.    Eye Contact:   Unable to assess.      Psychomotor Behavior: Unable to assess.   Attitude:   Cooperative    Orientation:   All   Speech    Rate / Production: Normal     Volume:  Normal    Mood:    Normal, down         Affect:    Appropriate    Thought Content:  Clear    Thought Form:  Coherent  Logical    Insight:    Good      Medication Review:   No changes to current psychiatric medication(s). PCP Dr. Eliane Quinn prescribing cymbalta 60 mg and trazadone.     Medication Compliance:   Yes     Changes in Health Issues:   None reported     Chemical Use Review:   Substance Use: Chemical use reviewed, no active concerns identified      Tobacco Use: No change in amount of tobacco use since last session.  Contemplation    Diagnosis:  MDD; EVGENY; PTSD.    Collateral Reports Completed:   Routed note to PCP    PLAN: (Patient Tasks / Therapist Tasks / Other)  Monthly.   Follow safety plan. Use a healthy coping idea as needed.       Goals due 9/7/24      Kleber Pollack, LICSW                                                         ______________________________________________________________________    Individual Treatment Plan    Patient's Name: Melquiades Morales  YOB: 1955    Date of Creation: 1/28/22  Date Treatment Plan Last Reviewed/Revised: 6/7/24    DSM5 Diagnoses: " 296.32 (F33.1) Major Depressive Disorder, Recurrent Episode, Moderate _, 300.02 (F41.1) Generalized Anxiety Disorder or 309.81 (F43.10) Posttraumatic Stress Disorder (includes Posttraumatic Stress Disorder for Children 6 Years and Younger)  With dissociative symptoms.  Psychosocial / Contextual Factors: recent loss of wife.  PROMIS (reviewed every 90 days):  6/7/24    Referral / Collaboration:  Referral to another professional/service is not indicated at this time.    Anticipated number of session for this episode of care: 10+  Anticipation frequency of session: Weekly  Anticipated Duration of each session: 38-52 minutes.  Treatment plan will be reviewed in 90 days or when goals have been changed.       MeasurableTreatment Goal(s) related to diagnosis / functional impairment(s)  Goal 1: Patient will report coping well with daily stressors.     I will know I've met my goal when each goal that we accomplish and I am having less of a problem in that area I know that you have helped me.       Objective #A (Patient Action)                          Patient will continue to follow his safety plan.  Status: New - Date: 1/28/22; 5/12/22; 8/12/22; 11/10/22; 2/16/23; 5/18/23; 8/31/23; 2/8/24; 6/7/24  Intervention(s)  Therapist will monitor for safety each visit and encourage use of safety plan.     Objective #B  Patient will use a healthy coping idea as needed 100% of trials for 1 week.  Status: New - Date: 1/28/22; 5/12/22; 8/12/22; 11/10/22; 2/16/23; 5/18/23; 8/31/23; 2/8/24; 6/7/24  IDEAS: napping; petting Snippy (pet dog); watching tv; fishing; socializing with friends.  Intervention(s)  Therapist will provide ideas and encouragement to use them.     Objective #C  Patient will process the loss of his wife as needed.  Status: New - Date: 1/28/22; 5/12/22; 8/12/22; 11/10/22; 2/16/23; 5/18/23; 8/31/23; 2/8/24; 6/7/24  Intervention(s)  Therapist will consider EMDR.              Patient has reviewed and agreed to the above  plan.        Kleber Pollack, [facility]                January 28, 2022

## 2024-06-24 ENCOUNTER — TELEPHONE (OUTPATIENT)
Dept: OCCUPATIONAL THERAPY | Facility: CLINIC | Age: 69
End: 2024-06-24
Payer: COMMERCIAL

## 2024-07-08 ENCOUNTER — TELEPHONE (OUTPATIENT)
Dept: OCCUPATIONAL THERAPY | Facility: CLINIC | Age: 69
End: 2024-07-08
Payer: COMMERCIAL

## 2024-07-11 ENCOUNTER — HOSPITAL ENCOUNTER (EMERGENCY)
Facility: CLINIC | Age: 69
Discharge: HOME | End: 2024-07-11
Attending: FAMILY MEDICINE | Admitting: FAMILY MEDICINE
Payer: COMMERCIAL

## 2024-07-11 VITALS
RESPIRATION RATE: 18 BRPM | HEIGHT: 72 IN | TEMPERATURE: 97.1 F | WEIGHT: 213 LBS | OXYGEN SATURATION: 97 % | BODY MASS INDEX: 28.85 KG/M2 | HEART RATE: 70 BPM

## 2024-07-11 DIAGNOSIS — D72.829 LEUKOCYTOSIS, UNSPECIFIED TYPE: ICD-10-CM

## 2024-07-11 DIAGNOSIS — M54.16 LUMBAR BACK PAIN WITH RADICULOPATHY AFFECTING LEFT LOWER EXTREMITY: ICD-10-CM

## 2024-07-11 LAB
ALBUMIN SERPL BCG-MCNC: 4 G/DL (ref 3.5–5.2)
ALBUMIN UR-MCNC: NEGATIVE MG/DL
ALP SERPL-CCNC: 60 U/L (ref 40–150)
ALT SERPL W P-5'-P-CCNC: 13 U/L (ref 0–70)
ANION GAP SERPL CALCULATED.3IONS-SCNC: 13 MMOL/L (ref 7–15)
APPEARANCE UR: CLEAR
AST SERPL W P-5'-P-CCNC: 31 U/L (ref 0–45)
BASOPHILS # BLD AUTO: 0.1 10E3/UL (ref 0–0.2)
BASOPHILS NFR BLD AUTO: 0 %
BILIRUB SERPL-MCNC: 1 MG/DL
BILIRUB UR QL STRIP: NEGATIVE
BUN SERPL-MCNC: 10.3 MG/DL (ref 8–23)
CALCIUM SERPL-MCNC: 9.5 MG/DL (ref 8.8–10.2)
CHLORIDE SERPL-SCNC: 105 MMOL/L (ref 98–107)
COLOR UR AUTO: YELLOW
CREAT SERPL-MCNC: 0.67 MG/DL (ref 0.67–1.17)
DEPRECATED HCO3 PLAS-SCNC: 26 MMOL/L (ref 22–29)
EGFRCR SERPLBLD CKD-EPI 2021: >90 ML/MIN/1.73M2
EOSINOPHIL # BLD AUTO: 0.1 10E3/UL (ref 0–0.7)
EOSINOPHIL NFR BLD AUTO: 0 %
ERYTHROCYTE [DISTWIDTH] IN BLOOD BY AUTOMATED COUNT: 13.1 % (ref 10–15)
GLUCOSE SERPL-MCNC: 133 MG/DL (ref 70–99)
GLUCOSE UR STRIP-MCNC: NEGATIVE MG/DL
HCT VFR BLD AUTO: 50 % (ref 40–53)
HGB BLD-MCNC: 17 G/DL (ref 13.3–17.7)
HGB UR QL STRIP: NEGATIVE
IMM GRANULOCYTES # BLD: 0.1 10E3/UL
IMM GRANULOCYTES NFR BLD: 1 %
KETONES UR STRIP-MCNC: 20 MG/DL
LEUKOCYTE ESTERASE UR QL STRIP: NEGATIVE
LYMPHOCYTES # BLD AUTO: 3.6 10E3/UL (ref 0.8–5.3)
LYMPHOCYTES NFR BLD AUTO: 21 %
MCH RBC QN AUTO: 32 PG (ref 26.5–33)
MCHC RBC AUTO-ENTMCNC: 34 G/DL (ref 31.5–36.5)
MCV RBC AUTO: 94 FL (ref 78–100)
MONOCYTES # BLD AUTO: 1.1 10E3/UL (ref 0–1.3)
MONOCYTES NFR BLD AUTO: 6 %
MUCOUS THREADS #/AREA URNS LPF: PRESENT /LPF
NEUTROPHILS # BLD AUTO: 12.5 10E3/UL (ref 1.6–8.3)
NEUTROPHILS NFR BLD AUTO: 72 %
NITRATE UR QL: NEGATIVE
NRBC # BLD AUTO: 0 10E3/UL
NRBC BLD AUTO-RTO: 0 /100
PH UR STRIP: 7 [PH] (ref 5–7)
PLATELET # BLD AUTO: 264 10E3/UL (ref 150–450)
POTASSIUM SERPL-SCNC: 4.3 MMOL/L (ref 3.4–5.3)
PROT SERPL-MCNC: 7.2 G/DL (ref 6.4–8.3)
RBC # BLD AUTO: 5.31 10E6/UL (ref 4.4–5.9)
RBC URINE: 1 /HPF
SODIUM SERPL-SCNC: 144 MMOL/L (ref 135–145)
SP GR UR STRIP: 1.02 (ref 1–1.03)
UROBILINOGEN UR STRIP-MCNC: 4 MG/DL
WBC # BLD AUTO: 17.4 10E3/UL (ref 4–11)
WBC URINE: 4 /HPF

## 2024-07-11 PROCEDURE — 80053 COMPREHEN METABOLIC PANEL: CPT | Performed by: FAMILY MEDICINE

## 2024-07-11 PROCEDURE — 81001 URINALYSIS AUTO W/SCOPE: CPT | Performed by: FAMILY MEDICINE

## 2024-07-11 PROCEDURE — 36415 COLL VENOUS BLD VENIPUNCTURE: CPT | Performed by: FAMILY MEDICINE

## 2024-07-11 PROCEDURE — 96374 THER/PROPH/DIAG INJ IV PUSH: CPT | Performed by: FAMILY MEDICINE

## 2024-07-11 PROCEDURE — 250N000013 HC RX MED GY IP 250 OP 250 PS 637: Performed by: FAMILY MEDICINE

## 2024-07-11 PROCEDURE — 85004 AUTOMATED DIFF WBC COUNT: CPT | Performed by: FAMILY MEDICINE

## 2024-07-11 PROCEDURE — 99284 EMERGENCY DEPT VISIT MOD MDM: CPT | Performed by: FAMILY MEDICINE

## 2024-07-11 PROCEDURE — 250N000011 HC RX IP 250 OP 636: Performed by: FAMILY MEDICINE

## 2024-07-11 PROCEDURE — 99284 EMERGENCY DEPT VISIT MOD MDM: CPT | Mod: 25 | Performed by: FAMILY MEDICINE

## 2024-07-11 RX ORDER — MORPHINE SULFATE 15 MG/1
30 TABLET ORAL EVERY 4 HOURS PRN
Status: DISCONTINUED | OUTPATIENT
Start: 2024-07-11 | End: 2024-07-11

## 2024-07-11 RX ORDER — KETOROLAC TROMETHAMINE 15 MG/ML
15 INJECTION, SOLUTION INTRAMUSCULAR; INTRAVENOUS ONCE
Status: COMPLETED | OUTPATIENT
Start: 2024-07-11 | End: 2024-07-11

## 2024-07-11 RX ORDER — LIDOCAINE 4 G/G
1 PATCH TOPICAL
Status: DISCONTINUED | OUTPATIENT
Start: 2024-07-12 | End: 2024-07-11

## 2024-07-11 RX ORDER — LIDOCAINE 4 G/G
1 PATCH TOPICAL
Status: DISCONTINUED | OUTPATIENT
Start: 2024-07-11 | End: 2024-07-11 | Stop reason: HOSPADM

## 2024-07-11 RX ORDER — MORPHINE SULFATE 30 MG/1
30 TABLET, FILM COATED, EXTENDED RELEASE ORAL ONCE
Status: COMPLETED | OUTPATIENT
Start: 2024-07-11 | End: 2024-07-11

## 2024-07-11 RX ADMIN — KETOROLAC TROMETHAMINE 15 MG: 15 INJECTION, SOLUTION INTRAMUSCULAR; INTRAVENOUS at 11:09

## 2024-07-11 RX ADMIN — MORPHINE SULFATE 30 MG: 30 TABLET, FILM COATED, EXTENDED RELEASE ORAL at 10:27

## 2024-07-11 RX ADMIN — LIDOCAINE 1 PATCH: 4 PATCH TOPICAL at 11:21

## 2024-07-11 ASSESSMENT — ACTIVITIES OF DAILY LIVING (ADL)
ADLS_ACUITY_SCORE: 40

## 2024-07-11 ASSESSMENT — COLUMBIA-SUICIDE SEVERITY RATING SCALE - C-SSRS
2. HAVE YOU ACTUALLY HAD ANY THOUGHTS OF KILLING YOURSELF IN THE PAST MONTH?: NO
1. IN THE PAST MONTH, HAVE YOU WISHED YOU WERE DEAD OR WISHED YOU COULD GO TO SLEEP AND NOT WAKE UP?: NO
6. HAVE YOU EVER DONE ANYTHING, STARTED TO DO ANYTHING, OR PREPARED TO DO ANYTHING TO END YOUR LIFE?: NO

## 2024-07-11 NOTE — DISCHARGE INSTRUCTIONS
ICD-10-CM    1. Leukocytosis, unspecified type  D72.829     recheck in clinic with repeat white count and exam in 5-7 days      2. Lumbar back pain with radiculopathy affecting left lower extremity  M54.16     treated here. return immed for inner thigh numbness, foot drop, urine/stool incontinence, fever. new weakness.,  follow-up clinic

## 2024-07-11 NOTE — ED NOTES
Pt walked out and MD met patient on way out of department.  RN will call group home to advise pt to have WBC rechecked in a couple days.
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
## 2024-07-12 ENCOUNTER — PATIENT OUTREACH (OUTPATIENT)
Dept: FAMILY MEDICINE | Facility: CLINIC | Age: 69
End: 2024-07-12
Payer: COMMERCIAL

## 2024-07-12 NOTE — TELEPHONE ENCOUNTER
What type of discharge? Emergency Department    Is a TCM episode required? No  When should the patient follow up with PCP? within 30 days of discharge.    Marla Tadeo RN  [facility]

## 2024-07-12 NOTE — TELEPHONE ENCOUNTER
Transitions of Care Outreach  Chief Complaint   Patient presents with    ER F/U       Most Recent Admission Date: 7/11/2024   Most Recent Admission Diagnosis:      Most Recent Discharge Date: 7/11/2024   Most Recent Discharge Diagnosis: Leukocytosis, unspecified type - D72.829  Lumbar back pain with radiculopathy affecting left lower extremity - M54.16     Transitions of Care Assessment    Discharge Assessment  How are you doing now that you are home?: good  How are your symptoms? (Red Flag symptoms escalate to triage hotline per guidelines): Improved  Do you know how to contact your clinic care team if you have future questions or changes to your health status? : Yes  Does the patient have their discharge instructions? : Yes  Does the patient have questions regarding their discharge instructions? : No  Were you started on any new medications or were there changes to any of your previous medications? : Yes  Does the patient have all of their medications?: Yes  Do you have questions regarding any of your medications? : No  Do you have all of your needed medical supplies or equipment (DME)?  (i.e. oxygen tank, CPAP, cane, etc.): Yes    Follow up Plan     Discharge Follow-Up  Discharge follow up appointment scheduled in alignment with recommended follow up timeframe or Transitions of Risk Category? (Low = within 30 days; Moderate= within 14 days; High= within 7 days): Yes  Discharge Follow Up Appointment Date: 07/16/24  Discharge Follow Up Appointment Scheduled with?: Primary Care Provider    Future Appointments   Date Time Provider Department Center   7/16/2024 11:30 AM Krupa Breen APRN CNP [facility]   7/24/2024 10:00 AM Kleber Pollack [facility]   8/21/2024 10:00 AM Kleber Pollack [facility] [place]       Outpatient Plan as outlined on AVS reviewed with patient.    For any urgent concerns, please contact our 24 hour nurse triage line: [phone] ([phone])        Marla Tadeo, RN

## 2024-08-08 ENCOUNTER — OFFICE VISIT (OUTPATIENT)
Dept: FAMILY MEDICINE | Facility: CLINIC | Age: 69
End: 2024-08-08
Payer: COMMERCIAL

## 2024-08-08 VITALS
DIASTOLIC BLOOD PRESSURE: 72 MMHG | HEIGHT: 71 IN | WEIGHT: 219.8 LBS | RESPIRATION RATE: 16 BRPM | HEART RATE: 65 BPM | BODY MASS INDEX: 30.77 KG/M2 | SYSTOLIC BLOOD PRESSURE: 118 MMHG | TEMPERATURE: 97.9 F | OXYGEN SATURATION: 92 %

## 2024-08-08 DIAGNOSIS — R73.01 IMPAIRED FASTING GLUCOSE: ICD-10-CM

## 2024-08-08 DIAGNOSIS — F11.20 UNCOMPLICATED OPIOID DEPENDENCE (H): ICD-10-CM

## 2024-08-08 DIAGNOSIS — F33.1 MAJOR DEPRESSIVE DISORDER, RECURRENT EPISODE, MODERATE (H): ICD-10-CM

## 2024-08-08 DIAGNOSIS — E78.5 HYPERLIPIDEMIA LDL GOAL <130: Chronic | ICD-10-CM

## 2024-08-08 DIAGNOSIS — D72.829 LEUKOCYTOSIS, UNSPECIFIED TYPE: Primary | ICD-10-CM

## 2024-08-08 DIAGNOSIS — F10.21 ALCOHOL DEPENDENCE IN REMISSION (H): ICD-10-CM

## 2024-08-08 DIAGNOSIS — R25.1 TREMOR, UNSPECIFIED: ICD-10-CM

## 2024-08-08 PROBLEM — R03.0 WHITE COAT SYNDROME WITH HIGH BLOOD PRESSURE BUT WITHOUT HYPERTENSION: Status: RESOLVED | Noted: 2022-10-09 | Resolved: 2024-08-08

## 2024-08-08 LAB
ALBUMIN SERPL BCG-MCNC: 3.8 G/DL (ref 3.5–5.2)
ALP SERPL-CCNC: 59 U/L (ref 40–150)
ALT SERPL W P-5'-P-CCNC: 15 U/L (ref 0–70)
ANION GAP SERPL CALCULATED.3IONS-SCNC: 7 MMOL/L (ref 7–15)
AST SERPL W P-5'-P-CCNC: 18 U/L (ref 0–45)
BASOPHILS # BLD AUTO: 0 10E3/UL (ref 0–0.2)
BASOPHILS NFR BLD AUTO: 0 %
BILIRUB SERPL-MCNC: 0.4 MG/DL
BUN SERPL-MCNC: 8.3 MG/DL (ref 8–23)
CALCIUM SERPL-MCNC: 9.4 MG/DL (ref 8.8–10.4)
CHLORIDE SERPL-SCNC: 104 MMOL/L (ref 98–107)
CHOLEST SERPL-MCNC: 123 MG/DL
CREAT SERPL-MCNC: 0.86 MG/DL (ref 0.67–1.17)
CRP SERPL-MCNC: <3 MG/L
EGFRCR SERPLBLD CKD-EPI 2021: >90 ML/MIN/1.73M2
EOSINOPHIL # BLD AUTO: 0.3 10E3/UL (ref 0–0.7)
EOSINOPHIL NFR BLD AUTO: 3 %
ERYTHROCYTE [DISTWIDTH] IN BLOOD BY AUTOMATED COUNT: 13 % (ref 10–15)
GLUCOSE SERPL-MCNC: 147 MG/DL (ref 70–99)
HBA1C MFR BLD: 5.9 % (ref 0–5.6)
HCO3 SERPL-SCNC: 34 MMOL/L (ref 22–29)
HCT VFR BLD AUTO: 48.9 % (ref 40–53)
HDLC SERPL-MCNC: 35 MG/DL
HGB BLD-MCNC: 15.7 G/DL (ref 13.3–17.7)
IMM GRANULOCYTES # BLD: 0 10E3/UL
IMM GRANULOCYTES NFR BLD: 0 %
LDLC SERPL CALC-MCNC: 56 MG/DL
LYMPHOCYTES # BLD AUTO: 4.1 10E3/UL (ref 0.8–5.3)
LYMPHOCYTES NFR BLD AUTO: 49 %
MCH RBC QN AUTO: 31.7 PG (ref 26.5–33)
MCHC RBC AUTO-ENTMCNC: 32.1 G/DL (ref 31.5–36.5)
MCV RBC AUTO: 99 FL (ref 78–100)
MONOCYTES # BLD AUTO: 0.6 10E3/UL (ref 0–1.3)
MONOCYTES NFR BLD AUTO: 7 %
NEUTROPHILS # BLD AUTO: 3.5 10E3/UL (ref 1.6–8.3)
NEUTROPHILS NFR BLD AUTO: 42 %
NONHDLC SERPL-MCNC: 88 MG/DL
PLATELET # BLD AUTO: 199 10E3/UL (ref 150–450)
POTASSIUM SERPL-SCNC: 4.5 MMOL/L (ref 3.4–5.3)
PROT SERPL-MCNC: 6.5 G/DL (ref 6.4–8.3)
RBC # BLD AUTO: 4.96 10E6/UL (ref 4.4–5.9)
SODIUM SERPL-SCNC: 145 MMOL/L (ref 135–145)
TRIGL SERPL-MCNC: 161 MG/DL
TSH SERPL DL<=0.005 MIU/L-ACNC: 1.83 UIU/ML (ref 0.3–4.2)
WBC # BLD AUTO: 8.4 10E3/UL (ref 4–11)

## 2024-08-08 PROCEDURE — 96127 BRIEF EMOTIONAL/BEHAV ASSMT: CPT | Performed by: NURSE PRACTITIONER

## 2024-08-08 PROCEDURE — 86140 C-REACTIVE PROTEIN: CPT | Performed by: NURSE PRACTITIONER

## 2024-08-08 PROCEDURE — 85025 COMPLETE CBC W/AUTO DIFF WBC: CPT | Performed by: NURSE PRACTITIONER

## 2024-08-08 PROCEDURE — 84443 ASSAY THYROID STIM HORMONE: CPT | Performed by: NURSE PRACTITIONER

## 2024-08-08 PROCEDURE — 99214 OFFICE O/P EST MOD 30 MIN: CPT | Performed by: NURSE PRACTITIONER

## 2024-08-08 PROCEDURE — 36415 COLL VENOUS BLD VENIPUNCTURE: CPT | Performed by: NURSE PRACTITIONER

## 2024-08-08 PROCEDURE — 80053 COMPREHEN METABOLIC PANEL: CPT | Performed by: NURSE PRACTITIONER

## 2024-08-08 PROCEDURE — 80061 LIPID PANEL: CPT | Performed by: NURSE PRACTITIONER

## 2024-08-08 PROCEDURE — G2211 COMPLEX E/M VISIT ADD ON: HCPCS | Performed by: NURSE PRACTITIONER

## 2024-08-08 PROCEDURE — 83036 HEMOGLOBIN GLYCOSYLATED A1C: CPT | Performed by: NURSE PRACTITIONER

## 2024-08-08 RX ORDER — BUPROPION HYDROCHLORIDE 150 MG/1
150 TABLET ORAL EVERY MORNING
Qty: 90 TABLET | Refills: 0 | Status: SHIPPED | OUTPATIENT
Start: 2024-08-08 | End: 2024-09-09 | Stop reason: SINTOL

## 2024-08-08 NOTE — LETTER
"August 8, 2024      Melquiades Morales  [id number] [facility] [address]        Dear ,    We are writing to inform you of your test results. Your WBC count is normal. Blood sugar is in the pre-diabetes range. The rest of your labs look good WBC count is normal. Blood sugar in the pre-diabetes range. Rest of labs look good .    TEST DESCRIPTIONS   CBC (Complete Blood Count) includes hemoglobin, hematocrit, white blood cells, etc. This test can be used to detect anemia, infection, and abnormalities in blood cells.   Cholesterol is one of the blood fats (lipids) and is the building block used by the body for cell wall and hormone production. Increased levels of cholesterol have been proven to directly contribute to heart disease and strokes.   Triglycerides are one of the blood fats (lipids) and are thought to be associated with heart disease. If you have had anything to eat or drink other than water for 12 hours before the test and your level is high, you should have the test repeated after a 12 hour fast. Abnormally high results may also be associated with diabetes, kidney and liver diseases.   LDL is the low density lipoprotein component of the cholesterol. It is the harmful substance that deposits cholesterol on the artery walls contributing to heart attacks and strokes. A high LDL level is associated with higher risk of coronary heart disease.   HDL stands for high density lipoprotein and refers to the so-called \"good\" cholesterol. HDL picks up excess cholesterol in the bloodstream and carries it back to the liver for disposal. Individuals with higher than average HDL seem to have a lower risk of coronary disease. Vigorous exercise will help increase the blood levels of HDL.   Risk Factor (Total cholesterol/HDL) is a commonly used ratio for cardiac risk assessment. Less than 5.0 for men and 4.4 for women is ideal.   Sodium is an electrolyte useful in diagnosis of dehydration, diabetes, " hypertension, or other diseases involving electrolyte imbalance. It also preserves the balance between calcium and potassium to maintain normal heart action and equilibrium of the body.   Potassium is also an electrolyte that works with sodium to regulate the body's water balance and normalize heart rhythm.   Glucose is a measure of blood sugar and is one of the tests for diabetes. If you have not been fasting, your level will often be high. A low glucose level may be a cause of weakness or dizziness. Blood sugar ranks with cholesterol as a causative factor in arteriosclerosis and heart attacks.   BUN & Creatinine are waste products excreted by the kidneys. A high BUN can be related to a high protein diet, heavy exercise, fever and infections, dehydration, kidney stones, and kidney disease. Creatinine elevation is less dependent on diet or exercise and better represents kidney impairment. Low values are not generally significant.   Calcium is a mineral in the blood controlled by the parathyroid glands and kidneys. It is important in the formation of bone, in muscle and nerve function, and in blood clotting. Disease of the parathyroid gland, diseased bones or kidneys, or defective absorption of calcium may cause abnormal levels from the intestine.   ALT, AST, Alk Phos are liver tests. Enzymes found in the liver as well as skeletal and cardiac muscle. Elevations can often be seen in alcoholism, liver or heart disease. Slightly abnormal values are not considered significant.   TSH stands for Thyroid Stimulating Hormone. A sensitive test used to determine how the thyroid gland is functioning. The thyroid gland produces hormones that control the body's metabolism. When too few hormones are produced (increased TSH), hypothyroidism occurs which can cause fatigue, sensitivity to cold, and weight gain - an overall slowing down of bodily functions. When too many thyroid hormones are produces (or decreased TSH), it creates a  condition call hyperthyroidism (such as Graves' disease) which causes rapid heartbeat, weight loss, and dizziness, among other symptoms.   PSA stands for Prostate Specific Antigen. Elevated levels of PSA can increase with trauma, infection, inflammation, or disease processes in the prostate such as BPH (Benigh Prostatic Hypertrophy) or cancer.   Glycohemoglobin or HgBA1C is a 3-month average of blood sugar     Resulted Orders   Hemoglobin A1c   Result Value Ref Range    Hemoglobin A1C 5.9 (H) 0.0 - 5.6 %      Comment:      Normal <5.7%   Prediabetes 5.7-6.4%    Diabetes 6.5% or higher     Note: Adopted from ADA consensus guidelines.   Comprehensive metabolic panel (BMP + Alb, Alk Phos, ALT, AST, Total. Bili, TP)   Result Value Ref Range    Sodium 145 135 - 145 mmol/L    Potassium 4.5 3.4 - 5.3 mmol/L    Carbon Dioxide (CO2) 34 (H) 22 - 29 mmol/L    Anion Gap 7 7 - 15 mmol/L    Urea Nitrogen 8.3 8.0 - 23.0 mg/dL    Creatinine 0.86 0.67 - 1.17 mg/dL    GFR Estimate >90 >60 mL/min/1.73m2      Comment:      eGFR calculated using 2021 CKD-EPI equation.    Calcium 9.4 8.8 - 10.4 mg/dL      Comment:      Reference intervals for this test were updated on 7/16/2024 to reflect our healthy population more accurately. There may be differences in the flagging of prior results with similar values performed with this method. Those prior results can be interpreted in the context of the updated reference intervals.    Chloride 104 98 - 107 mmol/L    Glucose 147 (H) 70 - 99 mg/dL    Alkaline Phosphatase 59 40 - 150 U/L    AST 18 0 - 45 U/L    ALT 15 0 - 70 U/L    Protein Total 6.5 6.4 - 8.3 g/dL    Albumin 3.8 3.5 - 5.2 g/dL    Bilirubin Total 0.4 <=1.2 mg/dL   TSH with free T4 reflex   Result Value Ref Range    TSH 1.83 0.30 - 4.20 uIU/mL   CRP, inflammation   Result Value Ref Range    CRP Inflammation <3.00 <5.00 mg/L   CBC with platelets and differential   Result Value Ref Range    WBC Count 8.4 4.0 - 11.0 10e3/uL    RBC Count  4.96 4.40 - 5.90 10e6/uL    Hemoglobin 15.7 13.3 - 17.7 g/dL    Hematocrit 48.9 40.0 - 53.0 %    MCV 99 78 - 100 fL    MCH 31.7 26.5 - 33.0 pg    MCHC 32.1 31.5 - 36.5 g/dL    RDW 13.0 10.0 - 15.0 %    Platelet Count 199 150 - 450 10e3/uL    % Neutrophils 42 %    % Lymphocytes 49 %    % Monocytes 7 %    % Eosinophils 3 %    % Basophils 0 %    % Immature Granulocytes 0 %    Absolute Neutrophils 3.5 1.6 - 8.3 10e3/uL    Absolute Lymphocytes 4.1 0.8 - 5.3 10e3/uL    Absolute Monocytes 0.6 0.0 - 1.3 10e3/uL    Absolute Eosinophils 0.3 0.0 - 0.7 10e3/uL    Absolute Basophils 0.0 0.0 - 0.2 10e3/uL    Absolute Immature Granulocytes 0.0 <=0.4 10e3/uL   Lipid panel reflex to direct LDL Non-fasting   Result Value Ref Range    Cholesterol 123 <200 mg/dL    Triglycerides 161 (H) <150 mg/dL    Direct Measure HDL 35 (L) >=40 mg/dL    LDL Cholesterol Calculated 56 <=100 mg/dL    Non HDL Cholesterol 88 <130 mg/dL    Narrative    Cholesterol  Desirable:  <200 mg/dL    Triglycerides  Normal:  Less than 150 mg/dL  Borderline High:  150-199 mg/dL  High:  200-499 mg/dL  Very High:  Greater than or equal to 500 mg/dL    Direct Measure HDL  Female:  Greater than or equal to 50 mg/dL   Male:  Greater than or equal to 40 mg/dL    LDL Cholesterol  Desirable:  <100mg/dL  Above Desirable:  100-129 mg/dL   Borderline High:  130-159 mg/dL   High:  160-189 mg/dL   Very High:  >= 190 mg/dL    Non HDL Cholesterol  Desirable:  130 mg/dL  Above Desirable:  130-159 mg/dL  Borderline High:  160-189 mg/dL  High:  190-219 mg/dL  Very High:  Greater than or equal to 220 mg/dL       If you have any questions or concerns, please call the clinic at the number listed above.       Sincerely,      KONSTANTIN Sethi CNP

## 2024-08-08 NOTE — PROGRESS NOTES
Assessment & Plan     Leukocytosis, unspecified type    - CBC with platelets and differential; Future  - TSH with free T4 reflex; Future  - CRP, inflammation; Future  - CBC with platelets and differential  - TSH with free T4 reflex  - CRP, inflammation    Impaired fasting glucose    - Hemoglobin A1c; Future  - Comprehensive metabolic panel (BMP + Alb, Alk Phos, ALT, AST, Total. Bili, TP); Future  - TSH with free T4 reflex; Future  - Hemoglobin A1c  - Comprehensive metabolic panel (BMP + Alb, Alk Phos, ALT, AST, Total. Bili, TP)  - TSH with free T4 reflex    Tremor, unspecified  Improved with Propranolol, continue;  - TSH with free T4 reflex; Future  - TSH with free T4 reflex    Major depressive disorder, recurrent episode, moderate (H)  Not well controlled, continue therapy and duloxetine, trial adding;  - buPROPion (WELLBUTRIN XL) 150 MG 24 hr tablet; Take 1 tablet (150 mg) by mouth every morning    Alcohol dependence in remission (H)  Continues sobriety    Uncomplicated opioid dependence (H)  Managed by Dr. Pennington    Hyperlipidemia LDL goal <130    - Lipid panel reflex to direct LDL Non-fasting; Future        MED REC REQUIRED  Post Medication Reconciliation Status: patient was not discharged from an inpatient facility or TCU    FUTURE APPOINTMENTS:       - Follow-up for annual visit or as needed    See Patient Instructions    Time spent in chart review in preparation to see patient, time with patient for interview/exam, ordering medications/tests/and/or procedures, and time spent in charting and coordinating care: 30 minutes.       Carlos Arnett is a 69 year old, presenting for the following health issues:  ER F/U        8/8/2024     7:48 AM   Additional Questions   Roomed by Naima STAFFORD     [facility]       ED/UC Followup:    Facility:  [facility]   Date of visit: 7/11/24  Reason for visit: leukocytosis, lumbar back pain  Current Status: no complaints regarding back pain, here to follow up on his white blood  count.     Denies fever, lymphadenopathy, dysuria, abdominal pain, dysphagia, weight loss, sweats, cough, dyspnea.     Hyperlipidemia Follow-Up    Are you regularly taking any medication or supplement to lower your cholesterol?   Yes- statin  Are you having muscle aches or other side effects that you think could be caused by your cholesterol lowering medication?  No  How many servings of fruits and vegetables do you eat daily?  0-1  On average, how many sweetened beverages do you drink each day (Examples: soda, juice, sweet tea, etc.  Do NOT count diet or artificially sweetened beverages)?   3  How many days per week do you exercise enough to make your heart beat faster? 3 or less  How many minutes a day do you exercise enough to make your heart beat faster? 9 or less  How many days per week do you miss taking your medication? 0    Depression   How are you doing with your depression since your last visit? Worsened   Are you having other symptoms that might be associated with depression? No  Have you had a significant life event?  No   Are you feeling anxious or having panic attacks?   No  Do you have any concerns with your use of alcohol or other drugs? No    Social History     Tobacco Use    Smoking status: Every Day     Current packs/day: 1.00     Average packs/day: 1 pack/day for 59.6 years (59.6 ttl pk-yrs)     Types: Cigarettes     Start date: 1965    Smokeless tobacco: Former   Vaping Use    Vaping status: Former    Substances: THC    Devices: Refillable tank   Substance Use Topics    Alcohol use: No    Drug use: Yes     Types: Marijuana     Comment: vaping marijuana since 7/2019 for medicinal purposes, buys from unauthorized seller. recommended cessation in light of lung injury/illness associated with vaping.         5/17/2024     8:12 AM 6/7/2024    10:11 AM 8/8/2024     7:46 AM   PHQ   PHQ-9 Total Score 13 16 13   Q9: Thoughts of better off dead/self-harm past 2 weeks Not at all Not at all Not at all         " 3/13/2024    10:04 AM 4/10/2024    10:07 AM 6/7/2024    10:11 AM   EVGENY-7 SCORE   Total Score 12 12 14         Suicide Assessment Five-step Evaluation and Treatment (SAFE-T)      Review of Systems  Constitutional, HEENT, cardiovascular, pulmonary, gi and gu systems are negative, except as otherwise noted.      Objective    /72   Pulse 65   Temp 97.9  F (36.6  C) (Tympanic)   Resp 16   Ht 1.803 m (5' 11\")   Wt 99.7 kg (219 lb 12.8 oz)   SpO2 92%   BMI 30.66 kg/m    Body mass index is 30.66 kg/m .  Physical Exam   GENERAL: alert and no distress  EYES: Eyes grossly normal to inspection and conjunctivae and sclerae normal  HENT: normal cephalic/atraumatic, oropharynx clear, and oral mucous membranes moist  NECK: no adenopathy, no asymmetry, masses, or scars  RESP: lungs clear to auscultation - no rales, rhonchi or wheezes  CV: regular rate and rhythm, normal S1 S2, no S3 or S4, no murmur, click or rub, no peripheral edema  ABDOMEN: soft, nontender and no palpable or pulsatile masses  MS: no gross musculoskeletal defects noted, no edema  SKIN: no suspicious lesions or rashes  NEURO: Normal strength and tone, mentation intact and speech normal  PSYCH: mentation appears normal, affect flat, and judgement and insight intact            Signed Electronically by: KONSTANTIN Sethi CNP    "

## 2024-08-21 ENCOUNTER — FCC EXTENDED DOCUMENTATION (OUTPATIENT)
Dept: PSYCHOLOGY | Facility: CLINIC | Age: 69
End: 2024-08-21
Payer: COMMERCIAL

## 2024-08-21 NOTE — PROGRESS NOTES
Discharge Summary  Multiple Sessions    Client Name: Melquiades Morales MRN#: [id number] YOB: 1955      Intake / Discharge Date: 8/21/24      DSM5 Diagnoses: (Sustained by DSM5 Criteria Listed Above)  Diagnoses: 309.81 (F43.10) Posttraumatic Stress Disorder (includes Posttraumatic Stress Disorder for Children 6 Years and Younger)  Without dissociative symptoms  Psychosocial & Contextual Factors: trauma experiences.  WHODAS 2.0 (12 item) Score:            Presenting Concern:  Coping with life stressors.      Reason for Discharge:  Client did not return      Disposition at Time of Last Encounter:   Comments:         Risk Management:   Client has had a history of suicide attempts: attempt of hanging self many years ago.  A safety and risk management plan has been developed including: Patient consented to co-developed safety plan on 4/10/24.  Safety and risk management plan was reviewed.   Patient agreed to use safety plan should any safety concerns arise.  A copy was made available to the patient.      Referred To:  PCP notified of no shows and that I was going to discharge patient. Patient may return for new episode of care in the future.        Kleber Pollack, [facility]   8/21/2024

## 2024-08-22 ENCOUNTER — PATIENT OUTREACH (OUTPATIENT)
Dept: CARE COORDINATION | Facility: CLINIC | Age: 69
End: 2024-08-22
Payer: COMMERCIAL

## 2024-09-05 ENCOUNTER — PATIENT OUTREACH (OUTPATIENT)
Dept: CARE COORDINATION | Facility: CLINIC | Age: 69
End: 2024-09-05
Payer: COMMERCIAL

## 2024-09-09 ENCOUNTER — TELEPHONE (OUTPATIENT)
Dept: FAMILY MEDICINE | Facility: CLINIC | Age: 69
End: 2024-09-09
Payer: COMMERCIAL

## 2024-09-09 DIAGNOSIS — F33.1 MAJOR DEPRESSIVE DISORDER, RECURRENT EPISODE, MODERATE (H): Primary | ICD-10-CM

## 2024-09-09 RX ORDER — LURASIDONE HYDROCHLORIDE 20 MG/1
20 TABLET, FILM COATED ORAL
Qty: 90 TABLET | Refills: 0 | Status: SHIPPED | OUTPATIENT
Start: 2024-09-09

## 2024-09-09 NOTE — TELEPHONE ENCOUNTER
Please let him know that I sent Latuda to the pharmacy he can try. Continue all his other meds (except Wellbutrin). Take med with largest meal of the day.

## 2024-09-09 NOTE — TELEPHONE ENCOUNTER
Jeffrey started bupropion a month ago in addition to duloxetine, he says shortly after he started it he began having tremors and involuntary jerking movements of his arms and legs. He says yesterday he actually fell when at his sons house, no injury. He stopped taking it today and wanted you to know this. He says it did not help with his depression symptoms anyway. Do you want to try different rx?

## 2024-10-26 ENCOUNTER — HEALTH MAINTENANCE LETTER (OUTPATIENT)
Age: 69
End: 2024-10-26

## 2024-12-09 DIAGNOSIS — E78.5 HYPERLIPIDEMIA LDL GOAL <130: Chronic | ICD-10-CM

## 2024-12-09 DIAGNOSIS — F33.1 MAJOR DEPRESSIVE DISORDER, RECURRENT EPISODE, MODERATE (H): ICD-10-CM

## 2024-12-10 RX ORDER — SIMVASTATIN 80 MG
80 TABLET ORAL DAILY
Qty: 90 TABLET | Refills: 4 | Status: SHIPPED | OUTPATIENT
Start: 2024-12-10

## 2024-12-10 RX ORDER — TRAZODONE HYDROCHLORIDE 100 MG/1
TABLET ORAL
Qty: 180 TABLET | Refills: 3 | Status: SHIPPED | OUTPATIENT
Start: 2024-12-10

## 2024-12-10 RX ORDER — TRAZODONE HYDROCHLORIDE 50 MG/1
TABLET, FILM COATED ORAL
Qty: 90 TABLET | Refills: 4 | Status: SHIPPED | OUTPATIENT
Start: 2024-12-10

## 2024-12-10 NOTE — TELEPHONE ENCOUNTER
Requested Prescriptions   Pending Prescriptions Disp Refills    traZODone (DESYREL) 100 MG tablet [Pharmacy Med Name: TRAZODONE HCL 100MG TABS] 180 tablet 3     Sig: TAKE TWO TABLETS BY MOUTH ONCE DAILY AT BEDTIME IN ADDITION TO THE 50MG TABLET FOR A TOTAL OF 250MG PER DAY       Serotonin Modulators Failed - 12/10/2024  9:50 AM        Failed - PHQ-9 score less than 5 in past 6 months.     Please review last PHQ-9 score.           Passed - Medication is active on med list        Passed - Recent (12 mo) or future (90 days) visit within the authorizing provider's specialty     The patient must have completed an in-person or virtual visit within the past 12 months or has a future visit scheduled within the next 90 days with the authorizing provider s specialty.  Urgent care and e-visits do not qualify as an office visit for this protocol.          Passed - Medication indicated for associated diagnosis     Medication is associated with one or more of the following diagnoses:     Depression   Insomnia          Passed - Patient is age 18 or older          traZODone (DESYREL) 50 MG tablet [Pharmacy Med Name: TRAZODONE HCL 50MG TABS] 90 tablet 4     Sig: TAKE ONE TABLET BY MOUTH ONCE DAILY AT BEDTIME TAKE ALONG WITH THE  MG TABLETS FOR TOTAL DOSE  MG       Serotonin Modulators Failed - 12/10/2024  9:50 AM        Failed - PHQ-9 score less than 5 in past 6 months.         5/17/2024     8:12 AM 6/7/2024    10:11 AM 8/8/2024     7:46 AM   PHQ   PHQ-9 Total Score 13 16 13   Q9: Thoughts of better off dead/self-harm past 2 weeks Not at all  Not at all Not at all        Patient-reported               Passed - Medication is active on med list        Passed - Recent (12 mo) or future (90 days) visit within the authorizing provider's specialty     The patient must have completed an in-person or virtual visit within the past 12 months or has a future visit scheduled within the next 90 days with the authorizing provider s  specialty.  Urgent care and e-visits do not qualify as an office visit for this protocol.          Passed - Medication indicated for associated diagnosis     Medication is associated with one or more of the following diagnoses:     Depression   Insomnia          Passed - Patient is age 18 or older          simvastatin (ZOCOR) 80 MG tablet [Pharmacy Med Name: SIMVASTATIN 80MG TABS] 90 tablet 4     Sig: TAKE ONE TABLET BY MOUTH ONCE DAILY       Antihyperlipidemic agents Passed - 12/10/2024  9:50 AM        Passed - LDL on file in the past 12 months        Passed - Medication is active on med list        Passed - Recent (12 mo) or future (90 days) visit within the authorizing provider's specialty     The patient must have completed an in-person or virtual visit within the past 12 months or has a future visit scheduled within the next 90 days with the authorizing provider s specialty.  Urgent care and e-visits do not qualify as an office visit for this protocol.          Passed - Patient is age 18 years or older

## 2024-12-12 DIAGNOSIS — F33.1 MAJOR DEPRESSIVE DISORDER, RECURRENT EPISODE, MODERATE (H): ICD-10-CM

## 2024-12-12 RX ORDER — LURASIDONE HYDROCHLORIDE 20 MG/1
20 TABLET, FILM COATED ORAL
Qty: 90 TABLET | Refills: 0 | Status: SHIPPED | OUTPATIENT
Start: 2024-12-12

## 2025-01-31 ENCOUNTER — APPOINTMENT (OUTPATIENT)
Dept: CT IMAGING | Facility: CLINIC | Age: 70
DRG: 865 | End: 2025-01-31
Attending: EMERGENCY MEDICINE
Payer: COMMERCIAL

## 2025-01-31 ENCOUNTER — HOSPITAL ENCOUNTER (INPATIENT)
Facility: CLINIC | Age: 70
LOS: 2 days | Discharge: NURSING, REHAB OR LONG-TERM CARE | DRG: 865 | End: 2025-02-02
Attending: EMERGENCY MEDICINE | Admitting: INTERNAL MEDICINE
Payer: COMMERCIAL

## 2025-01-31 DIAGNOSIS — J96.01 ACUTE RESPIRATORY FAILURE WITH HYPOXIA AND HYPERCAPNIA (H): Primary | ICD-10-CM

## 2025-01-31 DIAGNOSIS — K20.90 ESOPHAGITIS: ICD-10-CM

## 2025-01-31 DIAGNOSIS — J01.10 ACUTE FRONTAL SINUSITIS, RECURRENCE NOT SPECIFIED: ICD-10-CM

## 2025-01-31 DIAGNOSIS — J44.1 COPD EXACERBATION (H): ICD-10-CM

## 2025-01-31 DIAGNOSIS — J10.1 INFLUENZA A: ICD-10-CM

## 2025-01-31 DIAGNOSIS — J96.02 ACUTE RESPIRATORY FAILURE WITH HYPOXIA AND HYPERCAPNIA (H): Primary | ICD-10-CM
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
## 2025-01-31 PROBLEM — F33.1 MAJOR DEPRESSIVE DISORDER, RECURRENT EPISODE, MODERATE (H): Status: ACTIVE | Noted: 2022-11-10

## 2025-01-31 PROBLEM — F11.20 UNCOMPLICATED OPIOID DEPENDENCE (H): Status: ACTIVE | Noted: 2024-08-08

## 2025-01-31 PROBLEM — R73.03 PREDIABETES: Status: ACTIVE | Noted: 2021-07-19

## 2025-01-31 LAB
ALBUMIN SERPL BCG-MCNC: 3.7 G/DL (ref 3.5–5.2)
ALBUMIN UR-MCNC: 30 MG/DL
ALP SERPL-CCNC: 56 U/L (ref 40–150)
ALT SERPL W P-5'-P-CCNC: 20 U/L (ref 0–70)
ANION GAP SERPL CALCULATED.3IONS-SCNC: 7 MMOL/L (ref 7–15)
APPEARANCE UR: CLEAR
AST SERPL W P-5'-P-CCNC: 31 U/L (ref 0–45)
BASE EXCESS BLDV CALC-SCNC: 5.6 MMOL/L (ref -3–3)
BASE EXCESS BLDV CALC-SCNC: 6.4 MMOL/L (ref -3–3)
BASE EXCESS BLDV CALC-SCNC: 7 MMOL/L (ref -3–3)
BASOPHILS # BLD AUTO: 0 10E3/UL (ref 0–0.2)
BASOPHILS NFR BLD AUTO: 0 %
BILIRUB SERPL-MCNC: 0.3 MG/DL
BILIRUB UR QL STRIP: NEGATIVE
BUN SERPL-MCNC: 7.3 MG/DL (ref 8–23)
CALCIUM SERPL-MCNC: 8.6 MG/DL (ref 8.8–10.4)
CAOX CRY #/AREA URNS HPF: ABNORMAL /HPF
CHLORIDE SERPL-SCNC: 101 MMOL/L (ref 98–107)
COLOR UR AUTO: YELLOW
CREAT SERPL-MCNC: 0.76 MG/DL (ref 0.67–1.17)
EGFRCR SERPLBLD CKD-EPI 2021: >90 ML/MIN/1.73M2
EOSINOPHIL # BLD AUTO: 0 10E3/UL (ref 0–0.7)
EOSINOPHIL NFR BLD AUTO: 0 %
ERYTHROCYTE [DISTWIDTH] IN BLOOD BY AUTOMATED COUNT: 13.2 % (ref 10–15)
ETHANOL SERPL-MCNC: <0.01 G/DL
FLUAV RNA SPEC QL NAA+PROBE: POSITIVE
FLUBV RNA RESP QL NAA+PROBE: NEGATIVE
GLUCOSE BLDC GLUCOMTR-MCNC: 100 MG/DL (ref 70–99)
GLUCOSE BLDC GLUCOMTR-MCNC: 129 MG/DL (ref 70–99)
GLUCOSE SERPL-MCNC: 137 MG/DL (ref 70–99)
GLUCOSE UR STRIP-MCNC: NEGATIVE MG/DL
HCO3 BLDV-SCNC: 35 MMOL/L (ref 21–28)
HCO3 BLDV-SCNC: 36 MMOL/L (ref 21–28)
HCO3 BLDV-SCNC: 37 MMOL/L (ref 21–28)
HCO3 SERPL-SCNC: 33 MMOL/L (ref 22–29)
HCT VFR BLD AUTO: 46.1 % (ref 40–53)
HGB BLD-MCNC: 14.7 G/DL (ref 13.3–17.7)
HGB UR QL STRIP: NEGATIVE
HOLD SPECIMEN: NORMAL
HYALINE CASTS: 1 /LPF
IMM GRANULOCYTES # BLD: 0 10E3/UL
IMM GRANULOCYTES NFR BLD: 0 %
KETONES UR STRIP-MCNC: NEGATIVE MG/DL
LACTATE SERPL-SCNC: 0.7 MMOL/L (ref 0.7–2)
LEUKOCYTE ESTERASE UR QL STRIP: NEGATIVE
LYMPHOCYTES # BLD AUTO: 1 10E3/UL (ref 0.8–5.3)
LYMPHOCYTES NFR BLD AUTO: 15 %
MCH RBC QN AUTO: 32.5 PG (ref 26.5–33)
MCHC RBC AUTO-ENTMCNC: 31.9 G/DL (ref 31.5–36.5)
MCV RBC AUTO: 102 FL (ref 78–100)
MONOCYTES # BLD AUTO: 1 10E3/UL (ref 0–1.3)
MONOCYTES NFR BLD AUTO: 14 %
MUCOUS THREADS #/AREA URNS LPF: PRESENT /LPF
NEUTROPHILS # BLD AUTO: 4.8 10E3/UL (ref 1.6–8.3)
NEUTROPHILS NFR BLD AUTO: 71 %
NITRATE UR QL: NEGATIVE
NRBC # BLD AUTO: 0 10E3/UL
NRBC BLD AUTO-RTO: 0 /100
O2/TOTAL GAS SETTING VFR VENT: 21 %
O2/TOTAL GAS SETTING VFR VENT: 3 %
O2/TOTAL GAS SETTING VFR VENT: 52 %
OXYHGB MFR BLDV: 15 % (ref 70–75)
OXYHGB MFR BLDV: 24 % (ref 70–75)
OXYHGB MFR BLDV: 57 % (ref 70–75)
PCO2 BLDV: 73 MM HG (ref 40–50)
PCO2 BLDV: 74 MM HG (ref 40–50)
PCO2 BLDV: 76 MM HG (ref 40–50)
PH BLDV: 7.29 [PH] (ref 7.32–7.43)
PH BLDV: 7.29 [PH] (ref 7.32–7.43)
PH BLDV: 7.3 [PH] (ref 7.32–7.43)
PH UR STRIP: 5.5 [PH] (ref 5–7)
PLATELET # BLD AUTO: 152 10E3/UL (ref 150–450)
PO2 BLDV: 18 MM HG (ref 25–47)
PO2 BLDV: 32 MM HG (ref 25–47)
PO2 BLDV: 46 MM HG (ref 25–47)
POTASSIUM SERPL-SCNC: 3.6 MMOL/L (ref 3.4–5.3)
PROCALCITONIN SERPL IA-MCNC: 0.07 NG/ML
PROT SERPL-MCNC: 6.2 G/DL (ref 6.4–8.3)
RBC # BLD AUTO: 4.53 10E6/UL (ref 4.4–5.9)
RBC URINE: 1 /HPF
RSV RNA SPEC NAA+PROBE: NEGATIVE
SAO2 % BLDV: 24.9 % (ref 70–75)
SAO2 % BLDV: 58.5 % (ref 70–75)
SAO2 % BLDV: 78.5 % (ref 70–75)
SARS-COV-2 RNA RESP QL NAA+PROBE: NEGATIVE
SODIUM SERPL-SCNC: 141 MMOL/L (ref 135–145)
SP GR UR STRIP: 1.02 (ref 1–1.03)
SQUAMOUS EPITHELIAL: <1 /HPF
UROBILINOGEN UR STRIP-MCNC: NORMAL MG/DL
WBC # BLD AUTO: 6.8 10E3/UL (ref 4–11)
WBC URINE: 1 /HPF

## 2025-01-31 PROCEDURE — 70450 CT HEAD/BRAIN W/O DYE: CPT

## 2025-01-31 PROCEDURE — 93005 ELECTROCARDIOGRAM TRACING: CPT | Performed by: EMERGENCY MEDICINE

## 2025-01-31 PROCEDURE — 83605 ASSAY OF LACTIC ACID: CPT | Performed by: EMERGENCY MEDICINE

## 2025-01-31 PROCEDURE — 87637 SARSCOV2&INF A&B&RSV AMP PRB: CPT | Performed by: EMERGENCY MEDICINE

## 2025-01-31 PROCEDURE — 258N000003 HC RX IP 258 OP 636: Performed by: EMERGENCY MEDICINE

## 2025-01-31 PROCEDURE — 93010 ELECTROCARDIOGRAM REPORT: CPT | Performed by: EMERGENCY MEDICINE

## 2025-01-31 PROCEDURE — 82962 GLUCOSE BLOOD TEST: CPT

## 2025-01-31 PROCEDURE — 87040 BLOOD CULTURE FOR BACTERIA: CPT | Performed by: EMERGENCY MEDICINE

## 2025-01-31 PROCEDURE — 96367 TX/PROPH/DG ADDL SEQ IV INF: CPT | Performed by: EMERGENCY MEDICINE

## 2025-01-31 PROCEDURE — 82435 ASSAY OF BLOOD CHLORIDE: CPT | Performed by: EMERGENCY MEDICINE

## 2025-01-31 PROCEDURE — 36415 COLL VENOUS BLD VENIPUNCTURE: CPT | Performed by: EMERGENCY MEDICINE

## 2025-01-31 PROCEDURE — 84295 ASSAY OF SERUM SODIUM: CPT | Performed by: EMERGENCY MEDICINE

## 2025-01-31 PROCEDURE — 250N000011 HC RX IP 250 OP 636: Performed by: EMERGENCY MEDICINE

## 2025-01-31 PROCEDURE — 250N000009 HC RX 250: Performed by: PHYSICIAN ASSISTANT

## 2025-01-31 PROCEDURE — 96366 THER/PROPH/DIAG IV INF ADDON: CPT | Performed by: EMERGENCY MEDICINE

## 2025-01-31 PROCEDURE — 82805 BLOOD GASES W/O2 SATURATION: CPT | Performed by: EMERGENCY MEDICINE

## 2025-01-31 PROCEDURE — 94640 AIRWAY INHALATION TREATMENT: CPT

## 2025-01-31 PROCEDURE — 999N000185 HC STATISTIC TRANSPORT TIME EA 15 MIN

## 2025-01-31 PROCEDURE — 82805 BLOOD GASES W/O2 SATURATION: CPT | Performed by: PHYSICIAN ASSISTANT

## 2025-01-31 PROCEDURE — 250N000011 HC RX IP 250 OP 636: Performed by: PHYSICIAN ASSISTANT

## 2025-01-31 PROCEDURE — 82565 ASSAY OF CREATININE: CPT | Performed by: EMERGENCY MEDICINE

## 2025-01-31 PROCEDURE — 96365 THER/PROPH/DIAG IV INF INIT: CPT | Performed by: EMERGENCY MEDICINE

## 2025-01-31 PROCEDURE — 999N000157 HC STATISTIC RCP TIME EA 10 MIN

## 2025-01-31 PROCEDURE — 5A09357 ASSISTANCE WITH RESPIRATORY VENTILATION, LESS THAN 24 CONSECUTIVE HOURS, CONTINUOUS POSITIVE AIRWAY PRESSURE: ICD-10-PCS | Performed by: EMERGENCY MEDICINE

## 2025-01-31 PROCEDURE — 84145 PROCALCITONIN (PCT): CPT | Performed by: PHYSICIAN ASSISTANT

## 2025-01-31 PROCEDURE — 99285 EMERGENCY DEPT VISIT HI MDM: CPT | Performed by: EMERGENCY MEDICINE

## 2025-01-31 PROCEDURE — 96375 TX/PRO/DX INJ NEW DRUG ADDON: CPT | Performed by: EMERGENCY MEDICINE

## 2025-01-31 PROCEDURE — 99418 PROLNG IP/OBS E/M EA 15 MIN: CPT | Performed by: PHYSICIAN ASSISTANT

## 2025-01-31 PROCEDURE — 250N000013 HC RX MED GY IP 250 OP 250 PS 637: Performed by: FAMILY MEDICINE

## 2025-01-31 PROCEDURE — 85025 COMPLETE CBC W/AUTO DIFF WBC: CPT | Performed by: EMERGENCY MEDICINE

## 2025-01-31 PROCEDURE — 120N000013 HC R&B IMCU

## 2025-01-31 PROCEDURE — 99285 EMERGENCY DEPT VISIT HI MDM: CPT | Mod: 25 | Performed by: EMERGENCY MEDICINE

## 2025-01-31 PROCEDURE — 81001 URINALYSIS AUTO W/SCOPE: CPT | Performed by: FAMILY MEDICINE

## 2025-01-31 PROCEDURE — 94660 CPAP INITIATION&MGMT: CPT

## 2025-01-31 PROCEDURE — 71250 CT THORAX DX C-: CPT

## 2025-01-31 PROCEDURE — 36415 COLL VENOUS BLD VENIPUNCTURE: CPT | Performed by: PHYSICIAN ASSISTANT

## 2025-01-31 PROCEDURE — 82077 ASSAY SPEC XCP UR&BREATH IA: CPT | Performed by: EMERGENCY MEDICINE

## 2025-01-31 PROCEDURE — 99223 1ST HOSP IP/OBS HIGH 75: CPT | Performed by: PHYSICIAN ASSISTANT

## 2025-01-31 PROCEDURE — 250N000013 HC RX MED GY IP 250 OP 250 PS 637: Performed by: PHYSICIAN ASSISTANT

## 2025-01-31 RX ORDER — MORPHINE SULFATE 15 MG/1
60 TABLET, FILM COATED, EXTENDED RELEASE ORAL EVERY MORNING
Status: DISCONTINUED | OUTPATIENT
Start: 2025-02-01 | End: 2025-01-31 | Stop reason: DRUGHIGH

## 2025-01-31 RX ORDER — MORPHINE SULFATE 15 MG/1
30 TABLET, FILM COATED, EXTENDED RELEASE ORAL AT BEDTIME
Status: DISCONTINUED | OUTPATIENT
Start: 2025-01-31 | End: 2025-01-31 | Stop reason: DRUGHIGH

## 2025-01-31 RX ORDER — ALBUTEROL SULFATE 0.83 MG/ML
2.5 SOLUTION RESPIRATORY (INHALATION)
Status: DISCONTINUED | OUTPATIENT
Start: 2025-01-31 | End: 2025-02-02 | Stop reason: HOSPADM

## 2025-01-31 RX ORDER — ACETAMINOPHEN 650 MG/1
650 SUPPOSITORY RECTAL EVERY 4 HOURS PRN
Status: DISCONTINUED | OUTPATIENT
Start: 2025-01-31 | End: 2025-02-02 | Stop reason: HOSPADM

## 2025-01-31 RX ORDER — NALOXONE HYDROCHLORIDE 0.4 MG/ML
0.2 INJECTION, SOLUTION INTRAMUSCULAR; INTRAVENOUS; SUBCUTANEOUS
Status: DISCONTINUED | OUTPATIENT
Start: 2025-01-31 | End: 2025-02-02 | Stop reason: HOSPADM

## 2025-01-31 RX ORDER — OXYCODONE AND ACETAMINOPHEN 5; 325 MG/1; MG/1
2 TABLET ORAL 2 TIMES DAILY
Status: ON HOLD | COMMUNITY
End: 2025-02-01

## 2025-01-31 RX ORDER — SALIVA STIMULANT COMB. NO.3
1 SPRAY, NON-AEROSOL (ML) MUCOUS MEMBRANE 4 TIMES DAILY PRN
Status: DISCONTINUED | OUTPATIENT
Start: 2025-01-31 | End: 2025-02-02 | Stop reason: HOSPADM

## 2025-01-31 RX ORDER — OXYCODONE HYDROCHLORIDE 5 MG/1
10 TABLET ORAL 3 TIMES DAILY PRN
Status: DISCONTINUED | OUTPATIENT
Start: 2025-01-31 | End: 2025-02-02 | Stop reason: HOSPADM

## 2025-01-31 RX ORDER — LIDOCAINE 40 MG/G
CREAM TOPICAL
Status: DISCONTINUED | OUTPATIENT
Start: 2025-01-31 | End: 2025-02-02

## 2025-01-31 RX ORDER — ONDANSETRON 4 MG/1
4 TABLET, ORALLY DISINTEGRATING ORAL EVERY 6 HOURS PRN
Status: DISCONTINUED | OUTPATIENT
Start: 2025-01-31 | End: 2025-02-02 | Stop reason: HOSPADM

## 2025-01-31 RX ORDER — PANTOPRAZOLE SODIUM 40 MG/1
40 TABLET, DELAYED RELEASE ORAL
Status: DISCONTINUED | OUTPATIENT
Start: 2025-02-01 | End: 2025-02-02 | Stop reason: HOSPADM

## 2025-01-31 RX ORDER — NALOXONE HYDROCHLORIDE 0.4 MG/ML
0.2 INJECTION, SOLUTION INTRAMUSCULAR; INTRAVENOUS; SUBCUTANEOUS
Status: DISCONTINUED | OUTPATIENT
Start: 2025-01-31 | End: 2025-01-31

## 2025-01-31 RX ORDER — NALOXONE HYDROCHLORIDE 0.4 MG/ML
0.4 INJECTION, SOLUTION INTRAMUSCULAR; INTRAVENOUS; SUBCUTANEOUS
Status: DISCONTINUED | OUTPATIENT
Start: 2025-01-31 | End: 2025-02-02 | Stop reason: HOSPADM

## 2025-01-31 RX ORDER — BENZONATATE 100 MG/1
100 CAPSULE ORAL 3 TIMES DAILY PRN
Status: DISCONTINUED | OUTPATIENT
Start: 2025-01-31 | End: 2025-02-02 | Stop reason: HOSPADM

## 2025-01-31 RX ORDER — ACETAMINOPHEN 325 MG/1
650 TABLET ORAL EVERY 4 HOURS PRN
Status: DISCONTINUED | OUTPATIENT
Start: 2025-01-31 | End: 2025-02-02 | Stop reason: HOSPADM

## 2025-01-31 RX ORDER — IPRATROPIUM BROMIDE AND ALBUTEROL SULFATE 2.5; .5 MG/3ML; MG/3ML
3 SOLUTION RESPIRATORY (INHALATION)
Status: DISCONTINUED | OUTPATIENT
Start: 2025-01-31 | End: 2025-02-02 | Stop reason: HOSPADM

## 2025-01-31 RX ORDER — AMOXICILLIN 250 MG
1 CAPSULE ORAL 2 TIMES DAILY PRN
Status: DISCONTINUED | OUTPATIENT
Start: 2025-01-31 | End: 2025-02-02 | Stop reason: HOSPADM

## 2025-01-31 RX ORDER — OSELTAMIVIR PHOSPHATE 75 MG/1
75 CAPSULE ORAL 2 TIMES DAILY
Status: DISCONTINUED | OUTPATIENT
Start: 2025-01-31 | End: 2025-02-02 | Stop reason: HOSPADM

## 2025-01-31 RX ORDER — AMOXICILLIN 250 MG
2 CAPSULE ORAL 2 TIMES DAILY PRN
Status: DISCONTINUED | OUTPATIENT
Start: 2025-01-31 | End: 2025-02-02 | Stop reason: HOSPADM

## 2025-01-31 RX ORDER — MORPHINE SULFATE 15 MG/1
30 TABLET, FILM COATED, EXTENDED RELEASE ORAL 3 TIMES DAILY
Status: DISCONTINUED | OUTPATIENT
Start: 2025-01-31 | End: 2025-02-02 | Stop reason: HOSPADM

## 2025-01-31 RX ORDER — SIMVASTATIN 40 MG
80 TABLET ORAL EVERY EVENING
Status: DISCONTINUED | OUTPATIENT
Start: 2025-01-31 | End: 2025-02-02 | Stop reason: HOSPADM

## 2025-01-31 RX ORDER — NALOXONE HYDROCHLORIDE 0.4 MG/ML
0.4 INJECTION, SOLUTION INTRAMUSCULAR; INTRAVENOUS; SUBCUTANEOUS
Status: DISCONTINUED | OUTPATIENT
Start: 2025-01-31 | End: 2025-01-31

## 2025-01-31 RX ORDER — CALCIUM CARBONATE 500 MG/1
1000 TABLET, CHEWABLE ORAL 4 TIMES DAILY PRN
Status: DISCONTINUED | OUTPATIENT
Start: 2025-01-31 | End: 2025-02-02 | Stop reason: HOSPADM

## 2025-01-31 RX ORDER — PIPERACILLIN SODIUM, TAZOBACTAM SODIUM 4; .5 G/20ML; G/20ML
4.5 INJECTION, POWDER, LYOPHILIZED, FOR SOLUTION INTRAVENOUS EVERY 6 HOURS
Status: DISCONTINUED | OUTPATIENT
Start: 2025-01-31 | End: 2025-01-31

## 2025-01-31 RX ORDER — LIDOCAINE 40 MG/G
CREAM TOPICAL
Status: DISCONTINUED | OUTPATIENT
Start: 2025-01-31 | End: 2025-02-02 | Stop reason: HOSPADM

## 2025-01-31 RX ORDER — DULOXETIN HYDROCHLORIDE 30 MG/1
120 CAPSULE, DELAYED RELEASE ORAL EVERY EVENING
Status: DISCONTINUED | OUTPATIENT
Start: 2025-01-31 | End: 2025-02-02 | Stop reason: HOSPADM

## 2025-01-31 RX ORDER — PIPERACILLIN SODIUM, TAZOBACTAM SODIUM 3; .375 G/15ML; G/15ML
3.38 INJECTION, POWDER, LYOPHILIZED, FOR SOLUTION INTRAVENOUS EVERY 6 HOURS
Status: DISCONTINUED | OUTPATIENT
Start: 2025-01-31 | End: 2025-01-31

## 2025-01-31 RX ORDER — ONDANSETRON 2 MG/ML
4 INJECTION INTRAMUSCULAR; INTRAVENOUS EVERY 6 HOURS PRN
Status: DISCONTINUED | OUTPATIENT
Start: 2025-01-31 | End: 2025-02-02 | Stop reason: HOSPADM

## 2025-01-31 RX ORDER — LURASIDONE HYDROCHLORIDE 20 MG/1
20 TABLET, FILM COATED ORAL
Status: DISCONTINUED | OUTPATIENT
Start: 2025-01-31 | End: 2025-01-31

## 2025-01-31 RX ORDER — NAPROXEN 500 MG/1
500 TABLET ORAL 2 TIMES DAILY PRN
Status: DISCONTINUED | OUTPATIENT
Start: 2025-01-31 | End: 2025-02-02 | Stop reason: HOSPADM

## 2025-01-31 RX ORDER — CARBOXYMETHYLCELLULOSE SODIUM 5 MG/ML
1 SOLUTION/ DROPS OPHTHALMIC
Status: DISCONTINUED | OUTPATIENT
Start: 2025-01-31 | End: 2025-02-02 | Stop reason: HOSPADM

## 2025-01-31 RX ORDER — PREDNISONE 20 MG/1
40 TABLET ORAL DAILY
Status: DISCONTINUED | OUTPATIENT
Start: 2025-01-31 | End: 2025-02-02 | Stop reason: HOSPADM

## 2025-01-31 RX ORDER — PROPRANOLOL HYDROCHLORIDE 60 MG/1
120 CAPSULE, EXTENDED RELEASE ORAL DAILY
Status: DISCONTINUED | OUTPATIENT
Start: 2025-02-01 | End: 2025-02-02 | Stop reason: HOSPADM

## 2025-01-31 RX ORDER — PROCHLORPERAZINE MALEATE 5 MG/1
5 TABLET ORAL EVERY 6 HOURS PRN
Status: DISCONTINUED | OUTPATIENT
Start: 2025-01-31 | End: 2025-02-02 | Stop reason: HOSPADM

## 2025-01-31 RX ORDER — PREGABALIN 75 MG/1
150 CAPSULE ORAL 2 TIMES DAILY
Status: DISCONTINUED | OUTPATIENT
Start: 2025-01-31 | End: 2025-02-02 | Stop reason: HOSPADM

## 2025-01-31 RX ORDER — ENOXAPARIN SODIUM 100 MG/ML
40 INJECTION SUBCUTANEOUS EVERY 24 HOURS
Status: DISCONTINUED | OUTPATIENT
Start: 2025-01-31 | End: 2025-02-02 | Stop reason: HOSPADM

## 2025-01-31 RX ADMIN — ENOXAPARIN SODIUM 40 MG: 40 INJECTION SUBCUTANEOUS at 23:59

## 2025-01-31 RX ADMIN — PIPERACILLIN AND TAZOBACTAM 4.5 G: 4; .5 INJECTION, POWDER, FOR SOLUTION INTRAVENOUS at 15:50

## 2025-01-31 RX ADMIN — OSELTAMAVIR PHOSPHATE 75 MG: 75 CAPSULE ORAL at 23:59

## 2025-01-31 RX ADMIN — SODIUM CHLORIDE 1750 MG: 9 INJECTION, SOLUTION INTRAVENOUS at 16:26

## 2025-01-31 RX ADMIN — NALOXONE HYDROCHLORIDE 0.4 MG: 0.4 INJECTION, SOLUTION INTRAMUSCULAR; INTRAVENOUS; SUBCUTANEOUS at 15:47

## 2025-01-31 RX ADMIN — IPRATROPIUM BROMIDE AND ALBUTEROL SULFATE 3 ML: .5; 3 SOLUTION RESPIRATORY (INHALATION) at 22:17

## 2025-01-31 RX ADMIN — MORPHINE SULFATE 30 MG: 15 TABLET, EXTENDED RELEASE ORAL at 23:59

## 2025-01-31 ASSESSMENT — ACTIVITIES OF DAILY LIVING (ADL)
ADLS_ACUITY_SCORE: 62
ADLS_ACUITY_SCORE: 62
ADLS_ACUITY_SCORE: 53

## 2025-01-31 NOTE — PROGRESS NOTES
Provider spoke with patient about possible Lp, declined at this time.    Told provider we were available if he changes his mind.

## 2025-01-31 NOTE — PHARMACY-VANCOMYCIN DOSING SERVICE
"Pharmacy Vancomycin Initial Note  Date of Service 2025  Patient's  1955  69 year old, male    Indication: Sepsis    Current estimated CrCl = Estimated Creatinine Clearance: 113.5 mL/min (based on SCr of 0.76 mg/dL).    Creatinine for last 3 days  2025:  2:23 PM Creatinine 0.76 mg/dL    Recent Vancomycin Level(s) for last 3 days  No results found for requested labs within last 3 days.      Vancomycin IV Administrations (past 72 hours)        No vancomycin orders with administrations in past 72 hours.                    Nephrotoxins and other renal medications (From now, onward)      Start     Dose/Rate Route Frequency Ordered Stop    25 0400  vancomycin (VANCOCIN) 1,250 mg in 0.9% NaCl 262.5 mL intermittent infusion         1,250 mg  over 90 Minutes Intravenous EVERY 12 HOURS 25 1520      25 1525  vancomycin (VANCOCIN) 1,750 mg in 0.9% NaCl 517.5 mL intermittent infusion         1,750 mg  over 2 Hours Intravenous ONCE 25 1520      25 1520  piperacillin-tazobactam (ZOSYN) 4.5 g vial to attach to  mL bag        Note to Pharmacy: For [facility], [facility] and [facility]: For Zosyn-naive patients, use the \"Zosyn initial dose + extended infusion\" order panel.    4.5 g  over 30 Minutes Intravenous EVERY 6 HOURS 25 1515              Contrast Orders - past 72 hours (72h ago, onward)      None            [employer] Prediction of Planned Initial Vancomycin Regimen  Loading dose: 1750 mg at 16:00 2025.  Regimen: 1250 mg IV every 12 hours.  Start time: 00:00 on 2025  Exposure target: AUC24 (range)400-600 mg/L.hr   AUC24,ss: 485 mg/L.hr  Probability of AUC24 > 400: 70 %  Ctrough,ss: 15.3 mg/L  Probability of Ctrough,ss > 20: 28 %  Probability of nephrotoxicity (Lodise SUSHMA ): 11 %          Plan:  Start vancomycin  1750 mg IV once then 1250mg IV q12h.   Vancomycin monitoring method: AUC  Vancomycin therapeutic monitoring goal: 400-600 mg*h/L  Pharmacy will check " vancomycin levels as appropriate in 1-3 Days.    Serum creatinine levels will be ordered every 48 hours.      Eliane Kelley RP

## 2025-01-31 NOTE — ED NOTES
PCO2 78  Critical lab result   Update to ILDEFONSO Zamora DO at this time as called by lab. Aware and awaiting further orders.
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
## 2025-01-31 NOTE — ED PROVIDER NOTES
[facility]  Emergency Department Visit Note    PATIENT:  Melquiades Morales     69 year old     male      [id number]    Chief complaint:  Chief Complaint   Patient presents with    Altered Mental Status        History of present illness:  Patient is a 69 year old male with a medical history significant for chronic maxillary sinusitis, migraines, tobacco use disorder, COPD, history of chronic pain with opioid dependence, prediabetes presenting for evaluation of altered mental status and being found down.  Reportedly patient lives at a group home.  He was found to be on the ground next to the toilet in his group home.  This was witnessed by the HVAC worker at the home.  Absolutely no understanding of how long the patient had been there.  None of the staff at the home are able to say when he was last seen.  They were unable to say whether it had been days or not.  They note that he otherwise is typically very responsive.  No recent infections at the group home.  Very limited history considering limited group home history per EMS.  EMS states that the patient was febrile for them en route to the hospital.  He was hypoxic so they did put him on oxygen otherwise vitally stable.  No tachycardia.  They did not see any evidence of trauma on exam.    Review of Systems:  As in HPI above    /84   Pulse 73   Temp 100.5  F (38.1  C) (Axillary)   Resp 19   SpO2 95%     Physical Exam  Constitutional: laying in hospital bed, in no apparent distress, and speaking but arousable with painful stimuli, answering questions appropriately when aroused, will eventually fall asleep after about 30 seconds of conversation.  HEENT: normocephalic, atraumatic, pupils 3mm, equal, round, and reactive to light, sclerae anicteric, extraocular motions intact, and moist mucous membranes  Neck: able to fully range, no midline tenderness, and no stridor  Cardiovascular: regular rate and rhythm  Pulmonary: breathing  comfortably on room air and lungs clear to auscultation bilaterally  Abdominal: soft, non-tender, non-distended  Genitourinary: deferred  Extremities/MSK: no peripheral edema and no cyanosis   Skin: warm, dry and non-diaphoretic  Neurologic: moves all four extremities spontaneously and GCS 10 eye 2 verbal 4 motor 4   Psychiatric: unable to assess, too fatigued    Midline spine tenderness palpation, no bony tenderness palpation along shoulders, buttocks, tailbone, knees, ankles, wrists or other bony areas.     MDM:  Patient is a 69 year old male with above history presenting for evaluation of altered mental status..    Vitals notable for fever with a temperature 100.5.  SpO2 of 90% on room air.  Does not appear the patient is on oxygen chronically.  Normal respiratory rate and pulse.  Blood pressures are within normal limits. Exam reveals a somnolent but arousable gentleman.  No respiratory distress despite mild hypoxia.  He is able to follow commands and answer questions in response to painful stimuli but is otherwise sleeping.  Differential at this point for altered mental status and fever includes but is not limited to encephalitis, meningitis, viral URI, pneumonia, the flu, bacteremia, UTI, opioid overdose, IV drug use, endocarditis, cellulitis..    Considering patient's elevated temperature and no tachycardia, lower concern for serotonin syndrome or neuroleptic malignant syndrome.    Considering no skin abnormalities aside from a wound overlying the right shin that does appear chronic in nature, I am concerned about toxic versus septic encephalopathy.  Plan to initiate broad-spectrum antibiotics, obtain blood cultures, lactic acid.  Refraining from giving large-volume fluid resuscitation considering the patient does appear to be euvolemic.  Also the setting of his respiratory status and requiring oxygen support, higher risk with giving fluids as this could possibly render him in a worse respiratory condition.   At this point I am planning to defer intubation considering he is protecting his airway and does appear to be arousable.     ABG shows a CO2 of 74.  This is likely in the setting of decreased respiratory drive.  He does have an elevated bicarb of 35 which does appear compensatory.  However despite this his pH is 7.29.  Try a small dose of Narcan to see whether or not this helps the patient's encephalopathy.  Broad-spectrum antibiotics ordered.  Initial lactic acid is reassuring.    No evidence of trauma or open wounds.    Plan for CT chest considering hypoxia.  CT head is reassuring with no abnormalities.  Imaging was independently reviewed by myself.    Blood sugar is within normal limits    Also plan for a lumbar puncture.     Remainder of ED course below.    ED COURSE:  ED Course as of 02/01/25 0605 Fri Jan 31, 2025   1426 ECG:  - Ventricular rate 70 bpm, regular  - WV, QRS, QT intervals normal  - Axis normal  - no ST segment or T wave changes concerning for acute ischemia  - Comparison to prior ECGs: no changes  - My independent interpretation: overall reassuring in this context     1512 Head CT w/o contrast  IMPRESSION:  1.  No acute intracranial process.     2.  When compared to 8/6/2022, there is unchanged appearance of complete opacification of the frontal sinuses and left ethmoid air cells. Previously nearly complete opacification of the left maxillary sinus is now completely opacified. The overall   findings could relate to chronic fungal sinusitis, although a sinonasal mass is not excluded. Consider ENT consultation and direct visualization, if not already performed.     1512 Chest CT w/o contrast  IMPRESSION:   1.  Mild lung emphysematous changes without acute airspace opacity.   2.  Stable left lung scarring.  3.  Mid esophageal wall thickening, suggesting esophagitis.     1600 Influenza A positive however this does not explain his encephalopathy.  I did give him 0.4 mg of Narcan which dramatically  "improved his mental status.  Patient is now awake and disagreeable.  Discussed an LP and he declines.  Discussed the risks and benefits of not getting an LP especially in the setting of his chronic sinus disease and he continues to decline.  Considering this planning to continue broad-spectrum antibiotics and admit the patient considering his oxygen requirements in the setting of his influenza A.  I suspect that there is a component of opioid overdose as well.  I asked the patient to use any opioids today and he states \"yes, like every day \".   1601 Patient is receiving broad-spectrum antibiotics, blood cultures pending, awaiting admission to the hospital medicine team.       Encounter Diagnoses:  Final diagnoses:   Influenza A   COPD exacerbation (H)       Final disposition: [facility] admission    Esthela Zamora DO  EM Physician  [facility]       Esthela Zamora DO  02/01/25 0606    "

## 2025-02-01 LAB
ANION GAP SERPL CALCULATED.3IONS-SCNC: 8 MMOL/L (ref 7–15)
BASE EXCESS BLDV CALC-SCNC: 3.9 MMOL/L (ref -3–3)
BUN SERPL-MCNC: 7.4 MG/DL (ref 8–23)
CALCIUM SERPL-MCNC: 8.4 MG/DL (ref 8.8–10.4)
CHLORIDE SERPL-SCNC: 102 MMOL/L (ref 98–107)
CREAT SERPL-MCNC: 0.64 MG/DL (ref 0.67–1.17)
EGFRCR SERPLBLD CKD-EPI 2021: >90 ML/MIN/1.73M2
ERYTHROCYTE [DISTWIDTH] IN BLOOD BY AUTOMATED COUNT: 13.2 % (ref 10–15)
GLUCOSE SERPL-MCNC: 124 MG/DL (ref 70–99)
HCO3 BLDV-SCNC: 33 MMOL/L (ref 21–28)
HCO3 SERPL-SCNC: 30 MMOL/L (ref 22–29)
HCT VFR BLD AUTO: 44 % (ref 40–53)
HGB BLD-MCNC: 13.9 G/DL (ref 13.3–17.7)
MCH RBC QN AUTO: 31.7 PG (ref 26.5–33)
MCHC RBC AUTO-ENTMCNC: 31.6 G/DL (ref 31.5–36.5)
MCV RBC AUTO: 101 FL (ref 78–100)
O2/TOTAL GAS SETTING VFR VENT: 45 %
OXYHGB MFR BLDV: 90 % (ref 70–75)
PCO2 BLDV: 65 MM HG (ref 40–50)
PH BLDV: 7.32 [PH] (ref 7.32–7.43)
PLATELET # BLD AUTO: 148 10E3/UL (ref 150–450)
PO2 BLDV: 63 MM HG (ref 25–47)
POTASSIUM SERPL-SCNC: 3.9 MMOL/L (ref 3.4–5.3)
RBC # BLD AUTO: 4.38 10E6/UL (ref 4.4–5.9)
SAO2 % BLDV: 91.4 % (ref 70–75)
SODIUM SERPL-SCNC: 140 MMOL/L (ref 135–145)
WBC # BLD AUTO: 4.2 10E3/UL (ref 4–11)

## 2025-02-01 PROCEDURE — 80048 BASIC METABOLIC PNL TOTAL CA: CPT | Performed by: PHYSICIAN ASSISTANT

## 2025-02-01 PROCEDURE — 85027 COMPLETE CBC AUTOMATED: CPT | Performed by: PHYSICIAN ASSISTANT

## 2025-02-01 PROCEDURE — 250N000013 HC RX MED GY IP 250 OP 250 PS 637: Performed by: STUDENT IN AN ORGANIZED HEALTH CARE EDUCATION/TRAINING PROGRAM

## 2025-02-01 PROCEDURE — 99232 SBSQ HOSP IP/OBS MODERATE 35: CPT | Performed by: STUDENT IN AN ORGANIZED HEALTH CARE EDUCATION/TRAINING PROGRAM

## 2025-02-01 PROCEDURE — 250N000012 HC RX MED GY IP 250 OP 636 PS 637: Performed by: PHYSICIAN ASSISTANT

## 2025-02-01 PROCEDURE — 250N000011 HC RX IP 250 OP 636: Performed by: PHYSICIAN ASSISTANT

## 2025-02-01 PROCEDURE — 94640 AIRWAY INHALATION TREATMENT: CPT

## 2025-02-01 PROCEDURE — 36415 COLL VENOUS BLD VENIPUNCTURE: CPT | Performed by: PHYSICIAN ASSISTANT

## 2025-02-01 PROCEDURE — 250N000013 HC RX MED GY IP 250 OP 250 PS 637: Performed by: PHYSICIAN ASSISTANT

## 2025-02-01 PROCEDURE — 250N000009 HC RX 250: Performed by: PHYSICIAN ASSISTANT

## 2025-02-01 PROCEDURE — 120N000013 HC R&B IMCU

## 2025-02-01 PROCEDURE — 82805 BLOOD GASES W/O2 SATURATION: CPT | Performed by: PHYSICIAN ASSISTANT

## 2025-02-01 PROCEDURE — 94640 AIRWAY INHALATION TREATMENT: CPT | Mod: 76

## 2025-02-01 PROCEDURE — 250N000013 HC RX MED GY IP 250 OP 250 PS 637: Performed by: FAMILY MEDICINE

## 2025-02-01 RX ORDER — LURASIDONE HYDROCHLORIDE 20 MG/1
20 TABLET, FILM COATED ORAL
Status: DISCONTINUED | OUTPATIENT
Start: 2025-02-01 | End: 2025-02-02 | Stop reason: HOSPADM

## 2025-02-01 RX ADMIN — MORPHINE SULFATE 30 MG: 15 TABLET, EXTENDED RELEASE ORAL at 17:05

## 2025-02-01 RX ADMIN — PREGABALIN 150 MG: 75 CAPSULE ORAL at 00:00

## 2025-02-01 RX ADMIN — DULOXETINE HYDROCHLORIDE 120 MG: 30 CAPSULE, DELAYED RELEASE ORAL at 00:00

## 2025-02-01 RX ADMIN — IPRATROPIUM BROMIDE AND ALBUTEROL SULFATE 3 ML: .5; 3 SOLUTION RESPIRATORY (INHALATION) at 08:20

## 2025-02-01 RX ADMIN — PREDNISONE 40 MG: 20 TABLET ORAL at 08:39

## 2025-02-01 RX ADMIN — OXYCODONE HYDROCHLORIDE 10 MG: 5 TABLET ORAL at 20:12

## 2025-02-01 RX ADMIN — PREGABALIN 150 MG: 75 CAPSULE ORAL at 08:41

## 2025-02-01 RX ADMIN — OSELTAMAVIR PHOSPHATE 75 MG: 75 CAPSULE ORAL at 08:40

## 2025-02-01 RX ADMIN — OSELTAMAVIR PHOSPHATE 75 MG: 75 CAPSULE ORAL at 17:06

## 2025-02-01 RX ADMIN — PANTOPRAZOLE SODIUM 40 MG: 40 TABLET, DELAYED RELEASE ORAL at 06:14

## 2025-02-01 RX ADMIN — ENOXAPARIN SODIUM 40 MG: 40 INJECTION SUBCUTANEOUS at 20:05

## 2025-02-01 RX ADMIN — IPRATROPIUM BROMIDE AND ALBUTEROL SULFATE 3 ML: .5; 3 SOLUTION RESPIRATORY (INHALATION) at 15:42

## 2025-02-01 RX ADMIN — MORPHINE SULFATE 30 MG: 15 TABLET, EXTENDED RELEASE ORAL at 11:59

## 2025-02-01 RX ADMIN — LURASIDONE HYDROCHLORIDE 20 MG: 20 TABLET, FILM COATED ORAL at 17:15

## 2025-02-01 RX ADMIN — PREGABALIN 150 MG: 75 CAPSULE ORAL at 20:03

## 2025-02-01 RX ADMIN — IPRATROPIUM BROMIDE AND ALBUTEROL SULFATE 3 ML: .5; 3 SOLUTION RESPIRATORY (INHALATION) at 19:02

## 2025-02-01 RX ADMIN — SIMVASTATIN 80 MG: 40 TABLET, FILM COATED ORAL at 17:06

## 2025-02-01 RX ADMIN — DULOXETINE HYDROCHLORIDE 120 MG: 30 CAPSULE, DELAYED RELEASE ORAL at 17:07

## 2025-02-01 RX ADMIN — ACETAMINOPHEN 650 MG: 325 TABLET, FILM COATED ORAL at 20:12

## 2025-02-01 RX ADMIN — IPRATROPIUM BROMIDE AND ALBUTEROL SULFATE 3 ML: .5; 3 SOLUTION RESPIRATORY (INHALATION) at 11:51

## 2025-02-01 RX ADMIN — PREDNISONE 40 MG: 20 TABLET ORAL at 00:00

## 2025-02-01 RX ADMIN — SIMVASTATIN 80 MG: 40 TABLET, FILM COATED ORAL at 00:00

## 2025-02-01 ASSESSMENT — ACTIVITIES OF DAILY LIVING (ADL)
ADLS_ACUITY_SCORE: 47
ADLS_ACUITY_SCORE: 47
ADLS_ACUITY_SCORE: 62
ADLS_ACUITY_SCORE: 47
ADLS_ACUITY_SCORE: 45
ADLS_ACUITY_SCORE: 47
ADLS_ACUITY_SCORE: 62
ADLS_ACUITY_SCORE: 45
ADLS_ACUITY_SCORE: 47
ADLS_ACUITY_SCORE: 47
ADLS_ACUITY_SCORE: 45
DEPENDENT_IADLS:: CLEANING;COOKING;LAUNDRY;SHOPPING;MEAL PREPARATION
ADLS_ACUITY_SCORE: 62
ADLS_ACUITY_SCORE: 47
ADLS_ACUITY_SCORE: 62
ADLS_ACUITY_SCORE: 47
ADLS_ACUITY_SCORE: 45

## 2025-02-01 NOTE — PLAN OF CARE
End Of Shift Note      Neuro: alert and orientated x 4.  Neuro checks WNL    Cardiac: tele-SR    Resp: bipap removed at 0730 per patient request.  Was able to keep on 3 lpm nasal cannula this shift.  Dropped saturation to mid 80's upon return to bathroom.  Did find patient had removed oxygen x 1 and saturation 84% on room air.  No further episodes    GI/: continent    MSK: assist of one and transfer belt.  He reports not using a walker at home currently.  Weak and unsteady    Skin: intact, refuses chair or bathing cares    LDAs: PIV x 1    Problem: Adult Inpatient Plan of Care  Goal: Plan of Care Review  Description: The Plan of Care Review/Shift note should be completed every shift.  The Outcome Evaluation is a brief statement about your assessment that the patient is improving, declining, or no change.  This information will be displayed automatically on your shift  note.  Outcome: Progressing  Flowsheets (Taken 2/1/2025 1419)  Plan of Care Reviewed With: patient  Overall Patient Progress: improving   Goal Outcome Evaluation:      Plan of Care Reviewed With: patient    Overall Patient Progress: improvingOverall Patient Progress: improving

## 2025-02-01 NOTE — PROGRESS NOTES
Pt has Bipap on standby in room, on NC 3 lpm tolerating well given X3 duoneb during shift Spo2 90-94%,will continue to monitor respiratory status.

## 2025-02-01 NOTE — PROGRESS NOTES
[facility]    Medicine Progress Note - Hospitalist Service    Date of Admission:  1/31/2025    Assessment & Plan      Melquiades Morales is a 69 year old male admitted on 1/31/2025. He has a past medical history significant for chronic pain with chronic opioid use, COPD, dyslipidemia, anxiety and depression, and tobacco use disorder who presented to the emergency department for evaluation after being found down with altered mental status in the bathroom, was found to have acute respiratory acidosis and acute respiratory failure with hypoxia and hypercarbia due to influenza A leading to COPD exacerbation.     # Acute respiratory failure with hypoxia and hypercapnia  # Acute respiratory acidosis, improved   # Influenza A  Per EMS, initial O2 sat 70% on room air, has new 3L supplemental O2 requirement. VBG with acute respiratory acidosis (pH 7.29 / pCO2 74 / bicarb 35), although it does appear that he has some chronic hypercarbia / compensated respiratory acidosis at baseline with pCO2 in 50-60's. Ct without contrast showed emphysematous changes without airspace opacity. Influenza A PCR positive. Febrile, no leukocytosis, lactic acid WNL.Does not meet SIRS criteria at time of admission.Due to hypercarbia with altered mental status, patient started on BiPAP in the emergency department.   - Trial off BiPAP and will titrate off oxygen as able   - Continued Tamiflu 75 mg bid started 1/31/2025   - Supportive cares, antitussives available   - Address COPD below    # Mild encephalopathy, improved   Diminished responsiveness in the emergency department, able to wake and answer simple questions, but quickly falls asleep again. Responses seem mostly coherent but at times are contradictory, is oriented x3 but patient is not alert. Patient was found down (see below) and possibly hit his head. But Ct without acute pathology. VBG with hypercarbia as noted above. Patient is on chronic narcotics and was  given narcan 0.4 mg x1 dose in the emergency department with some initial increased alertness but not sustained. Emergency department also started empiric antibiotics due to concern for possible encephalitis / meningitis and pursued an LP but patient declined. Clinical picture is most consistent with mild encephalopathy due to hypercarbia from influenza infection, likely compounded by extreme fatigue.   - Address hypercarbia with BiPAP as noted above and monitor for improvement  - Stopped empiric antibiotics (Zosyn and vancomycin) 1/31   - Blood culture x1 pending  - Procalcitonin pending     # COPD (chronic obstructive pulmonary disease)  # Emphysema   Known COPD and findings of emphysema noted on CT chest, but no prior PFT's on file. Managed prior to admission with DuoNeb and prn albuterol. Hypercarbic as noted above at time of admission. Does have mild wheezing and prolonged expiratory phase on admission exam. Suspect patient has a mild COPD exacerbation due to influenza.   - Continued Prednisone 40 mg daily   - Scheduled DuoNebs  - Prn albuterol nebs  - Patient would benefit from outpatient PFT's once he returns to baseline    # Fall   # Head trauma  Patient was found down in the bathroom at his group home due to unwitnessed fall. Patient does recall falling and hitting his head, but doesn't know why he fell. Denies loss of consciousness. Head CT without acute intracranial abnormalities as noted above. Patient denies any new or acute pain after his fall.     # Esophagitis  Incidental finding of esophageal thickening noted on CT Chest, suggestive of esophagitis.  - Continued Oral Protonix daily     # Sinus opacifications  # History of inverted papilloma, s/p resection   History of inverted papilloma and left sinus mass, s/p left endoscopic resection of inverted papilloma in 2015. Had some noted sinus opacifications on CT imaging in 2022. Now with incidental finding of new bilateral frontal and left ethmoid sinus  opacifications, and worsening opacification of left maxillary sinus. He had known prior opacifications, although they now appear more severe.   - Plan for ENT referral at discharge (not ordered)    # Spinal stenosis in cervical region  # Lumbar radiculopathy  # Chronic pain syndrome  # Peripheral neuropathy  # Uncomplicated opioid dependence   Patient has chronic back pain managed prior to admission with MS Contin 30 mg tid, oxycodone 10 mg (frequency from MAR unclear), Lyrica 150 mg bid, and naproxen 500 mg bid prn. Patient was given narcan x1 in the emergency department.  - Resume home MS Contin  - Oxycodone 10 mg available tid prn  - Continue home Lyrica  - Prn naproxen available    # Prediabetes  Recent HgbA1c 5.9. Admission glucose 137. Glucose will likely be elevated while on steroids.  - Check glucose on daily BMP, if persistently elevated then would start sliding scale insulin     # Hyperlipidemia LDL goal <130  Managed prior to admission with Zocor 80 mg daily, continued.     # Tremor   Has been prescribed propranolol  mg daily, but per group home MAR, has not been taking recently, although the prescription was filled in December 2024. Patient finds this helpful and would like to take.   - Continued PTA Propranolol     # Sleep disturbance  Managed prior to admission with trazodone 250 mg q hs.  -  Trazodone on hold while somnolent, resume as able    # Generalized anxiety disorder  # Major depressive disorder  Managed prior to admission with Cymbalta 120 mg daily, continued.    # Tobacco use disorder  Patient declines nicotine replacement.        Diet: Regular Diet Adult    DVT Prophylaxis: Enoxaparin (Lovenox) SQ  Gaona Catheter: Not present  Lines: None     Cardiac Monitoring: ACTIVE order. Indication: ICU  Code Status: Full Code      Clinically Significant Risk Factors Present on Admission           # Hypocalcemia: Lowest Ca = 8.4 mg/dL in last 2 days, will monitor and replace as appropriate          "    # Acute Hypoxic Respiratory Failure: Documented O2 saturation < 90%. Continue supplemental oxygen as needed  # Acute Hypercapnic Respiratory Failure: based on venous blood gas results.  Continue supplemental oxygen and ventilatory support as indicated.        # Overweight: Estimated body mass index is 29.1 kg/m  as calculated from the following:    Height as of this encounter: 1.803 m (5' 10.98\").    Weight as of this encounter: 94.6 kg (208 lb 8.9 oz).              Social Drivers of Health    Depression: At risk (8/8/2024)    PHQ-2     PHQ-2 Score: 6   Tobacco Use: High Risk (8/8/2024)    Patient History     Smoking Tobacco Use: Every Day     Smokeless Tobacco Use: Former   Physical Activity: Insufficiently Active (5/17/2024)    Exercise Vital Sign     Days of Exercise per Week: 1 day     Minutes of Exercise per Session: 10 min   Stress: Stress Concern Present (5/17/2024)    [employer] - Occupational Stress Questionnaire     Feeling of Stress : To some extent   Social Connections: Unknown (5/17/2024)    Social Connection and Isolation Panel [NHANES]     Frequency of Social Gatherings with Friends and Family: Once a week          Disposition Plan     Medically Ready for Discharge: Anticipated Tomorrow       Jay Fox MD  Hospitalist Service  [facility]  Securely message with [employer] (more info)  Text page via [employer] Paging/Directory   ______________________________________________________________________    Interval History   Admitted yesterday, no acute events overnight. On BiPAP with transition to nasal canula. Reports that his breathing feels fine. He denies having any fevers or chills. A bit disappointed that he needs to stay in the hospital.      Physical Exam   Vital Signs: Temp: 98  F (36.7  C) Temp src: Oral BP: 120/64 Pulse: 64   Resp: 12 SpO2: 93 % O2 Device: Nasal cannula Oxygen Delivery: 3 LPM  Weight: 208 lbs 8.88 oz    General " Appearance: Wake and alert, sitting up in bed, in no acute distress  Respiratory: Breathing easily on room air, lungs tight to ascultation with some wheezing appreciated this morning  Cardiovascular: Regular rate and rhythm, extremities warm and well perfused   GI: Abdomen soft, non-tender, and non-distended   Other: Appropriate mood and affect      Medical Decision Making       45 MINUTES SPENT BY ME on the date of service doing chart review, history, exam, documentation & further activities per the note.      Data     I have personally reviewed the following data over the past 24 hrs:    4.2  \   13.9   / 148 (L)     140 102 7.4 (L) /  124 (H)   3.9 30 (H) 0.64 (L) \     ALT: 20 AST: 31 AP: 56 TBILI: 0.3   ALB: 3.7 TOT PROTEIN: 6.2 (L) LIPASE: N/A     Procal: 0.07 CRP: N/A Lactic Acid: 0.7         Imaging results reviewed over the past 24 hrs:   Recent Results (from the past 24 hours)   Head CT w/o contrast    Narrative    EXAM: CT HEAD W/O CONTRAST  LOCATION: [facility]  DATE: 1/31/2025    INDICATION: AMS, found down confusion  COMPARISON: CT head 8/6/2022  TECHNIQUE: Routine CT Head without IV contrast. Multiplanar reformats. Dose reduction techniques were used.    FINDINGS:  INTRACRANIAL CONTENTS: No intracranial hemorrhage, extraaxial collection, or mass effect.  No CT evidence of acute infarct. Normal parenchymal attenuation. Unchanged mild generalized volume loss. No hydrocephalus.     VISUALIZED ORBITS/SINUSES/MASTOIDS: No intraorbital abnormality. Redemonstration of complete opacification of the frontal sinuses and left ethmoid air cells. There is now complete, previously near complete opacification of the left maxillary sinus. There   is hyperdense material within the maxillary sinus and the frontal sinuses. No middle ear or mastoid effusion.    BONES/SOFT TISSUES: No acute abnormality.      Impression    IMPRESSION:  1.  No acute intracranial process.    2.  When compared to  8/6/2022, there is unchanged appearance of complete opacification of the frontal sinuses and left ethmoid air cells. Previously nearly complete opacification of the left maxillary sinus is now completely opacified. The overall   findings could relate to chronic fungal sinusitis, although a sinonasal mass is not excluded. Consider ENT consultation and direct visualization, if not already performed.   Chest CT w/o contrast    Narrative    EXAM: CT CHEST W/O CONTRAST  LOCATION: [facility]  DATE: 1/31/2025    INDICATION: hypoxia, concerns for pulmonary disease with hx of COPD  COMPARISON: 8/6/2022, 8/5/2020  TECHNIQUE: CT chest without IV contrast. Multiplanar reformats were obtained. Dose reduction techniques were used.  CONTRAST: None.    FINDINGS: Slightly limited exam due to motion.    LUNGS AND PLEURA: Patent central airways. A left perihilar consolidative is favored to represent a scar and has not significantly changed since at least 8/2020. Stable lingular scarring and mild upper lobe ground glass opacification along the major   fissures. Mild lung emphysematous changes without acute airspace opacity, new nodule, or pleural effusion. No pneumothorax.    MEDIASTINUM/AXILLAE: Normal heart size without pericardial effusion. No thoracic aorta aneurysm. Mid esophageal wall thickening. No thoracic lymphadenopathy by size criteria. Stably prominent 9 mm right paratracheal lymph node.    CORONARY ARTERY CALCIFICATION: Moderate.    UPPER ABDOMEN: No actionable finding.    MUSCULOSKELETAL: No aggressive osseous lesion.        Impression    IMPRESSION:   1.  Mild lung emphysematous changes without acute airspace opacity.   2.  Stable left lung scarring.  3.  Mid esophageal wall thickening, suggesting esophagitis.

## 2025-02-01 NOTE — PLAN OF CARE
End Of Shift Note    Situation: Pt found on floor of group home with unknown down time with lethargy. O2sat 70% RA responded well to nonrebreather by EMS. + Influenza A     Plan: Con't to support resp needs with Bipap and oxymask.     Subjective/Objective:    Neuro: A/Ox3, tremors present in hands. Pt states this is normal for him. Neuro intact.     Cardiac: NSR, VSS.     Resp: Loose, congested cough present. Needs 2-4 L/min O2 to maintain O2sat. Bipap worn most of night except from 10 p - 2 AM. Pt was sleeping hard after medications at 12AM and needing inc oxygen - placed on Bipap again and did well with this.     GI/: WNL - assistance x1 using urinal at bedside.     Endo: BS wnl     MSK: generalized weakness, tremors present in hands.     Skin: WNL    LDAs: PIV x1 SL.

## 2025-02-01 NOTE — ED NOTES
Yonny Ríos would like to be contacted with any changes or questions, new temporary phone number for this visit only. [phone]

## 2025-02-01 NOTE — PROGRESS NOTES
"[facility] ADMISSION NOTE    Patient admitted to room ICU 1 at approximately 2030 via cart from emergency room. Patient was accompanied by nurse.     Verbal SBAR report received from Brady GUILLAUME prior to patient arrival.     Patient ambulated to bed with stand-by assist. Patient alert and oriented X 3. The patient is not having any pain.  . Admission vital signs: Blood pressure 133/83, pulse 97, temperature 99.6  F (37.6  C), temperature source Oral, resp. rate 19, height 1.778 m (5' 10\"), weight 108 kg (238 lb 1.6 oz), SpO2 98 %. Patient was oriented to plan of care, call light, bed controls, tv, telephone, bathroom, visiting hours.     Risk Assessment    The following safety risks were identified during admission: fall. Yellow risk band applied: YES.     Skin Initial Assessment    This writer admitted this patient and completed a full skin assessment and Jeremiah score in the Adult PCS flowsheet. Appropriate interventions initiated as needed.     Skin clear w/o sores, abrassion, rashes, or bruising/redness.   Secondary skin check completed by Naina GUILLAUME .    Jeremiah Risk Assessment  Sensory Perception: 4-->no impairment  Moisture: 4-->rarely moist  Activity: 3-->walks occasionally  Mobility: 4-->no limitation  Nutrition: 3-->adequate  Friction and Shear: 3-->no apparent problem  Jeremiah Score: 21    Education    Patient has a Isabela to Observation order: No  Observation education completed and documented: N/A      "

## 2025-02-02 VITALS
HEART RATE: 83 BPM | HEIGHT: 71 IN | RESPIRATION RATE: 8 BRPM | SYSTOLIC BLOOD PRESSURE: 153 MMHG | TEMPERATURE: 97.8 F | WEIGHT: 210.1 LBS | BODY MASS INDEX: 29.41 KG/M2 | OXYGEN SATURATION: 92 % | DIASTOLIC BLOOD PRESSURE: 82 MMHG

## 2025-02-02 LAB
ANION GAP SERPL CALCULATED.3IONS-SCNC: 9 MMOL/L (ref 7–15)
ATRIAL RATE - MUSE: 70 BPM
BUN SERPL-MCNC: 10.7 MG/DL (ref 8–23)
CALCIUM SERPL-MCNC: 8.5 MG/DL (ref 8.8–10.4)
CHLORIDE SERPL-SCNC: 100 MMOL/L (ref 98–107)
CREAT SERPL-MCNC: 0.63 MG/DL (ref 0.67–1.17)
DIASTOLIC BLOOD PRESSURE - MUSE: NORMAL MMHG
EGFRCR SERPLBLD CKD-EPI 2021: >90 ML/MIN/1.73M2
ERYTHROCYTE [DISTWIDTH] IN BLOOD BY AUTOMATED COUNT: 12.6 % (ref 10–15)
GLUCOSE SERPL-MCNC: 98 MG/DL (ref 70–99)
HCO3 SERPL-SCNC: 31 MMOL/L (ref 22–29)
HCT VFR BLD AUTO: 44.9 % (ref 40–53)
HGB BLD-MCNC: 14.7 G/DL (ref 13.3–17.7)
INTERPRETATION ECG - MUSE: NORMAL
MCH RBC QN AUTO: 32.4 PG (ref 26.5–33)
MCHC RBC AUTO-ENTMCNC: 32.7 G/DL (ref 31.5–36.5)
MCV RBC AUTO: 99 FL (ref 78–100)
P AXIS - MUSE: 91 DEGREES
PLATELET # BLD AUTO: 143 10E3/UL (ref 150–450)
POTASSIUM SERPL-SCNC: 3.9 MMOL/L (ref 3.4–5.3)
PR INTERVAL - MUSE: 186 MS
QRS DURATION - MUSE: 100 MS
QT - MUSE: 386 MS
QTC - MUSE: 416 MS
R AXIS - MUSE: 65 DEGREES
RBC # BLD AUTO: 4.54 10E6/UL (ref 4.4–5.9)
SODIUM SERPL-SCNC: 140 MMOL/L (ref 135–145)
SYSTOLIC BLOOD PRESSURE - MUSE: NORMAL MMHG
T AXIS - MUSE: 41 DEGREES
VENTRICULAR RATE- MUSE: 70 BPM
WBC # BLD AUTO: 7.2 10E3/UL (ref 4–11)

## 2025-02-02 PROCEDURE — 250N000013 HC RX MED GY IP 250 OP 250 PS 637: Performed by: FAMILY MEDICINE

## 2025-02-02 PROCEDURE — 250N000009 HC RX 250: Performed by: PHYSICIAN ASSISTANT

## 2025-02-02 PROCEDURE — 80048 BASIC METABOLIC PNL TOTAL CA: CPT | Performed by: STUDENT IN AN ORGANIZED HEALTH CARE EDUCATION/TRAINING PROGRAM

## 2025-02-02 PROCEDURE — 99239 HOSP IP/OBS DSCHRG MGMT >30: CPT | Performed by: STUDENT IN AN ORGANIZED HEALTH CARE EDUCATION/TRAINING PROGRAM

## 2025-02-02 PROCEDURE — 85027 COMPLETE CBC AUTOMATED: CPT | Performed by: STUDENT IN AN ORGANIZED HEALTH CARE EDUCATION/TRAINING PROGRAM

## 2025-02-02 PROCEDURE — 36415 COLL VENOUS BLD VENIPUNCTURE: CPT | Performed by: STUDENT IN AN ORGANIZED HEALTH CARE EDUCATION/TRAINING PROGRAM

## 2025-02-02 PROCEDURE — 250N000013 HC RX MED GY IP 250 OP 250 PS 637: Performed by: PHYSICIAN ASSISTANT

## 2025-02-02 PROCEDURE — 250N000013 HC RX MED GY IP 250 OP 250 PS 637: Performed by: STUDENT IN AN ORGANIZED HEALTH CARE EDUCATION/TRAINING PROGRAM

## 2025-02-02 PROCEDURE — 94640 AIRWAY INHALATION TREATMENT: CPT

## 2025-02-02 PROCEDURE — 82310 ASSAY OF CALCIUM: CPT | Performed by: STUDENT IN AN ORGANIZED HEALTH CARE EDUCATION/TRAINING PROGRAM

## 2025-02-02 PROCEDURE — 82565 ASSAY OF CREATININE: CPT | Performed by: STUDENT IN AN ORGANIZED HEALTH CARE EDUCATION/TRAINING PROGRAM

## 2025-02-02 PROCEDURE — 94640 AIRWAY INHALATION TREATMENT: CPT | Mod: 76

## 2025-02-02 PROCEDURE — 250N000012 HC RX MED GY IP 250 OP 636 PS 637: Performed by: PHYSICIAN ASSISTANT

## 2025-02-02 RX ORDER — OSELTAMIVIR PHOSPHATE 75 MG/1
75 CAPSULE ORAL 2 TIMES DAILY
Qty: 6 CAPSULE | Refills: 0 | Status: SHIPPED | OUTPATIENT
Start: 2025-02-02

## 2025-02-02 RX ORDER — PREDNISONE 20 MG/1
40 TABLET ORAL DAILY
Qty: 6 TABLET | Refills: 0 | Status: SHIPPED | OUTPATIENT
Start: 2025-02-03

## 2025-02-02 RX ORDER — PANTOPRAZOLE SODIUM 40 MG/1
40 TABLET, DELAYED RELEASE ORAL
Qty: 30 TABLET | Refills: 0 | Status: SHIPPED | OUTPATIENT
Start: 2025-02-03

## 2025-02-02 RX ORDER — BENZONATATE 100 MG/1
100 CAPSULE ORAL 3 TIMES DAILY PRN
Qty: 30 CAPSULE | Refills: 0 | Status: SHIPPED | OUTPATIENT
Start: 2025-02-02

## 2025-02-02 RX ORDER — MORPHINE SULFATE 30 MG/1
30 TABLET, FILM COATED, EXTENDED RELEASE ORAL 3 TIMES DAILY
COMMUNITY
Start: 2025-02-02

## 2025-02-02 RX ADMIN — MORPHINE SULFATE 30 MG: 15 TABLET, EXTENDED RELEASE ORAL at 12:51

## 2025-02-02 RX ADMIN — PANTOPRAZOLE SODIUM 40 MG: 40 TABLET, DELAYED RELEASE ORAL at 06:35

## 2025-02-02 RX ADMIN — PREDNISONE 40 MG: 20 TABLET ORAL at 08:09

## 2025-02-02 RX ADMIN — MORPHINE SULFATE 30 MG: 15 TABLET, EXTENDED RELEASE ORAL at 06:35

## 2025-02-02 RX ADMIN — IPRATROPIUM BROMIDE AND ALBUTEROL SULFATE 3 ML: .5; 3 SOLUTION RESPIRATORY (INHALATION) at 12:33

## 2025-02-02 RX ADMIN — OSELTAMAVIR PHOSPHATE 75 MG: 75 CAPSULE ORAL at 08:09

## 2025-02-02 RX ADMIN — PROPRANOLOL HYDROCHLORIDE 120 MG: 60 CAPSULE, EXTENDED RELEASE ORAL at 08:46

## 2025-02-02 RX ADMIN — PREGABALIN 150 MG: 75 CAPSULE ORAL at 08:09

## 2025-02-02 RX ADMIN — IPRATROPIUM BROMIDE AND ALBUTEROL SULFATE 3 ML: .5; 3 SOLUTION RESPIRATORY (INHALATION) at 07:55

## 2025-02-02 ASSESSMENT — ACTIVITIES OF DAILY LIVING (ADL)
ADLS_ACUITY_SCORE: 45

## 2025-02-02 NOTE — CONSULTS
Care Management Initial Consult    General Information  Assessment completed with: Patient, Children, Other ([facility] tele:[phone]),    Type of CM/SW Visit: Initial Assessment    Primary Care Provider verified and updated as needed: Yes ([facility])   Readmission within the last 30 days: no previous admission in last 30 days      Reason for Consult: discharge planning  Advance Care Planning: Advance Care Planning Reviewed: no concerns identified          Communication Assessment  Patient's communication style: spoken language (English or Bilingual)    Hearing Difficulty or Deaf: no   Wear Glasses or Blind: yes    Cognitive  Cognitive/Neuro/Behavioral: WDL  Level of Consciousness: alert  Arousal Level: opens eyes spontaneously  Orientation: oriented x 4  Mood/Behavior: calm, cooperative  Best Language: 0 - No aphasia  Speech: clear, spontaneous, logical    Living Environment:   People in home: facility resident     Current living Arrangements: foster care      Able to return to prior arrangements: yes       Family/Social Support:  Care provided by: self, other (see comments) ([facility] staff)  Provides care for: no one  Marital Status:   Support system: Children, Facility resident(s)/Staff          Description of Support System: Supportive, Involved    Support Assessment: Adequate social supports, Adequate family and caregiver support    Current Resources:   Patient receiving home care services: No        Community Resources: County Worker  Equipment currently used at home: other (see comments) (Mobility scooter)  Supplies currently used at home:      Employment/Financial:  Employment Status: disabled        Financial Concerns: none   Referral to Financial Worker: No       Does the patient's insurance plan have a 3 day qualifying hospital stay waiver?  Yes     Which insurance plan 3 day waiver is available? Alternative insurance waiver    Will the waiver be used for  post-acute placement? No    Lifestyle & Psychosocial Needs:  Social Drivers of Health     Food Insecurity: Low Risk  (2/1/2025)    Food Insecurity     Within the past 12 months, did you worry that your food would run out before you got money to buy more?: No     Within the past 12 months, did the food you bought just not last and you didn t have money to get more?: No   Depression: At risk (8/8/2024)    PHQ-2     PHQ-2 Score: 6   Housing Stability: Low Risk  (2/1/2025)    Housing Stability     Do you have housing? : Yes     Are you worried about losing your housing?: No   Tobacco Use: High Risk (8/8/2024)    Patient History     Smoking Tobacco Use: Every Day     Smokeless Tobacco Use: Former     Passive Exposure: Not on file   Financial Resource Strain: Low Risk  (2/1/2025)    Financial Resource Strain     Within the past 12 months, have you or your family members you live with been unable to get utilities (heat, electricity) when it was really needed?: No   Alcohol Use: Not on file   Transportation Needs: High Risk (2/1/2025)    Transportation Needs     Within the past 12 months, has lack of transportation kept you from medical appointments, getting your medicines, non-medical meetings or appointments, work, or from getting things that you need?: Yes   Physical Activity: Insufficiently Active (5/17/2024)    Exercise Vital Sign     Days of Exercise per Week: 1 day     Minutes of Exercise per Session: 10 min   Interpersonal Safety: Low Risk  (2/1/2025)    Interpersonal Safety     Do you feel physically and emotionally safe where you currently live?: Yes     Within the past 12 months, have you been hit, slapped, kicked or otherwise physically hurt by someone?: No     Within the past 12 months, have you been humiliated or emotionally abused in other ways by your partner or ex-partner?: No   Stress: Stress Concern Present (5/17/2024)    [employer] - Occupational Stress Questionnaire      Feeling of Stress : To some extent   Social Connections: Unknown (5/17/2024)    Social Connection and Isolation Panel [NHANES]     Frequency of Communication with Friends and Family: Not on file     Frequency of Social Gatherings with Friends and Family: Once a week     Attends Latter day Services: Not on file     Active Member of Clubs or Organizations: Not on file     Attends Club or Organization Meetings: Not on file     Marital Status: Not on file   Health Literacy: Not on file       Functional Status:  Prior to admission patient needed assistance:   Dependent ADLs:: Dressing, Bathing (Mobility scooter)  Dependent IADLs:: Cleaning, Cooking, Laundry, Shopping, Meal Preparation  Assessment of Functional Status: Not at  functional baseline    Mental Health Status:  Mental Health Status: No Current Concerns       Chemical Dependency Status:  Chemical Dependency Status: No Current Concerns             Values/Beliefs:  Spiritual, Cultural Beliefs, Latter day Practices, Values that affect care: Yes               Discussed  Partnership in Safe Discharge Planning  document with patient/family: Yes: Patient    Additional Information:    Reviewed pt's chart. Attempted to follow up with pt several times today. Writer called sonMichoacano at [phone] and left message to return call. Called pt's other sonTc at tele:[phone] and relayed pt's disposition. Pt lives at [facility] (tele:[phone]). Writer called and spoke with Neil at [facility]. Pt receives meals and assistance with showering and  dressing daily. Pt sets up his own medications and drives. Writer spoke to pt. Completed AIDET. Confirmed received services at [facility]. Pt plans to return home tomorrow. Pt is currently on 02/Bipap. Per Neil at [facility], staff can provide transportation to pt when ready to discharge.    Next Steps:     Pt to return home when ready. [facility] staff to provide  transportation.      Jessica Balderrama   Care Transitions   [facility]  Tele:[phone]

## 2025-02-02 NOTE — PROGRESS NOTES
Attempted to call RN report, voicemail left.     Narcan entry on discharge appears unreviewed/ask doctor.  Clarified with Dr. Fox that patient is to continue, and it is listed as reviewed.  Discharge papers manually hand written and patient aware to continue as prior to admission.    Detail Level: Zone Detail Level: Generalized

## 2025-02-02 NOTE — PROGRESS NOTES
Care Management Discharge Note    Discharge Date: 02/02/2025       Discharge Disposition: Group Home    Discharge Services: None    Discharge DME: Oxygen    Discharge Transportation: other (see comments) ([facility])    Private pay costs discussed: transportation costs    Does the patient's insurance plan have a 3 day qualifying hospital stay waiver?  Yes     Which insurance plan 3 day waiver is available? Alternative insurance waiver    Will the waiver be used for post-acute placement? No    PAS Confirmation Code:    Patient/family educated on Medicare website which has current facility and service quality ratings: no    Education Provided on the Discharge Plan: Yes  Persons Notified of Discharge Plans: LUIS E Alarcon Manager  Patient/Family in Agreement with the Plan: yes    Handoff Referral Completed: N/A    Additional Information:    Plan:  Return to [facility].  Transportation provided by [facility] wheelchair.      Anyi Mccullough RN

## 2025-02-02 NOTE — PLAN OF CARE
Pt slept well most of the night. He remains on O2 @ 3-4 L/min with resp rate 7-12 with shallow respirations and O2sat's 92-94%.   LS diminished throughout. Occas loose, congested cough that is nonproductive.   He remains neuro intact.   He did have some increased back pain at the beginning of the shift, Oxycodone/Tylenol given.

## 2025-02-02 NOTE — PLAN OF CARE
Goal Outcome Evaluation:    [place] [facility] [place]    Patient discharged to home at 2:22 PM via wheel chair. Accompanied by other:transport services in wheelchair. Discharge instructions reviewed with patient, opportunity offered to ask questions. Prescriptions filled and sent with patient upon discharge. All belongings sent with patient.    Second call place to group home, no answer and voicemail left.  Transport services taking patient to [facility] pharmacy for prescription .  Patient reports he normally drives him self to his pharmacy and self manages all medications.     Waleska Carrera RN  Problem: Adult Inpatient Plan of Care  Goal: Absence of Hospital-Acquired Illness or Injury  Intervention: Identify and Manage Fall Risk  Recent Flowsheet Documentation  Taken 2/2/2025 0800 by Waleska Carrera, RN  Safety Promotion/Fall Prevention:   activity supervised   nonskid shoes/slippers when out of bed   patient and family education         Plan of Care Reviewed With: patient    Overall Patient Progress: improvingOverall Patient Progress: improving

## 2025-02-02 NOTE — DISCHARGE SUMMARY
"[facility]  Hospitalist Discharge Summary      Date of Admission:  1/31/2025  Date of Discharge:  2/2/2025  Discharging Provider: Jay Fox MD  Discharge Service: Hospitalist Service    Discharge Diagnoses   # Acute respiratory failure with hypoxia and hypercapnia  # Acute respiratory acidosis, improved   # Influenza A  # Mild encephalopathy, improved   # COPD (chronic obstructive pulmonary disease)  # Emphysema   # Fall   # Head trauma  # Esophagitis  # Sinus opacifications  # History of inverted papilloma, s/p resection   # Spinal stenosis in cervical region  # Lumbar radiculopathy  # Chronic pain syndrome  # Peripheral neuropathy  # Uncomplicated opioid dependence   # Prediabetes  # Hyperlipidemia LDL goal <130  # Tremor   # Sleep disturbance  # Generalized anxiety disorder  # Major depressive disorder  # Tobacco use disorder    Clinically Significant Risk Factors     # Overweight: Estimated body mass index is 29.32 kg/m  as calculated from the following:    Height as of this encounter: 1.803 m (5' 10.98\").    Weight as of this encounter: 95.3 kg (210 lb 1.6 oz).       Follow-ups Needed After Discharge   Follow-up Appointments       Follow-up and recommended labs and tests       Follow up with primary care provider, Provider Not In System, within 7-14 days to evaluate medication change and for hospital follow- up.                Unresulted Labs Ordered in the Past 30 Days of this Admission       Date and Time Order Name Status Description    1/31/2025  3:12 PM Blood Culture Peripheral Blood Preliminary     1/31/2025  3:12 PM Blood Culture Peripheral Blood Preliminary         These results will be followed up by Jay Fox MD      Discharge Disposition   Discharged back to patients group home   Condition at discharge: Stable    Hospital Course   Melquiades Morales is a 69 year old male admitted on 1/31/2025. He has a past medical history significant for chronic pain " with chronic opioid use, COPD, dyslipidemia, anxiety and depression, and tobacco use disorder who presented to the emergency department for evaluation after being found down with altered mental status in the bathroom, was found to have acute respiratory acidosis and acute respiratory failure with hypoxia and hypercarbia due to influenza A leading to COPD exacerbation. Able to to wean off oxygen prior to discharge.     # Acute respiratory failure with hypoxia and hypercapnia  # Acute respiratory acidosis, improved   # Influenza A  Per EMS, initial O2 sat 70% on room air, has new 3L supplemental O2 requirement. VBG with acute respiratory acidosis (pH 7.29 / pCO2 74 / bicarb 35), although it does appear that he has some chronic hypercarbia / compensated respiratory acidosis at baseline with pCO2 in 50-60's. Ct without contrast showed emphysematous changes without airspace opacity. Influenza A PCR positive. Febrile, no leukocytosis, lactic acid WNL.Does not meet SIRS criteria at time of admission.Due to hypercarbia with altered mental status, patient started on BiPAP in the emergency department. Required oxygen throughout day 2/1/2025 but able to wean off prior to discharge.   - Able to wean of oxygen prior to discharge   - Completing Tamiflu 75 mg bid outpatient     # Mild encephalopathy, improved   Diminished responsiveness in the emergency department, able to wake and answer simple questions, but quickly falls asleep again. Responses seem mostly coherent but at times are contradictory, is oriented x3 but patient is not alert. Patient was found down (see below) and possibly hit his head. But Ct without acute pathology. VBG with hypercarbia as noted above. Patient is on chronic narcotics and was given narcan 0.4 mg x1 dose in the emergency department with some initial increased alertness but not sustained. Emergency department also started empiric antibiotics due to concern for possible encephalitis / meningitis and  pursued an LP but patient declined. Clinical picture is most consistent with mild encephalopathy due to hypercarbia from influenza infection, likely compounded by extreme fatigue. Stopped empiric antibiotics (Zosyn and vancomycin) 1/31   - Blood culture NGTD    # COPD (chronic obstructive pulmonary disease)  # Emphysema   Known COPD and findings of emphysema noted on CT chest, but no prior PFT's on file. Managed prior to admission with DuoNeb and prn albuterol. Hypercarbic as noted above at time of admission. Does have mild wheezing and prolonged expiratory phase on admission exam. Suspect patient has a mild COPD exacerbation due to influenza.   - Completing steroid burst outpatient   - Consider PFT's outpatient     # Fall   # Head trauma  Patient was found down in the bathroom at his group home due to unwitnessed fall. Patient does recall falling and hitting his head, but doesn't know why he fell. Denies loss of consciousness. Head CT without acute intracranial abnormalities as noted above. Patient denies any new or acute pain after his fall.     # Esophagitis  Incidental finding of esophageal thickening noted on CT Chest, suggestive of esophagitis.  - Started Oral Protonix daily     # Sinus opacifications  # History of inverted papilloma, s/p resection   History of inverted papilloma and left sinus mass, s/p left endoscopic resection of inverted papilloma in 2015. Had some noted sinus opacifications on CT imaging in 2022. Now with incidental finding of new bilateral frontal and left ethmoid sinus opacifications, and worsening opacification of left maxillary sinus. He had known prior opacifications, although they now appear more severe. Patient has strong preference to discharge from the hospital today and follow-up outpatient.  - Plan for ENT referral at discharge (not ordered)    # Spinal stenosis in cervical region  # Lumbar radiculopathy  # Chronic pain syndrome  # Peripheral neuropathy  # Uncomplicated opioid  dependence   Patient has chronic back pain managed prior to admission with MS Contin 30 mg tid, oxycodone 10 mg (frequency from MAR unclear), Lyrica 150 mg bid, and naproxen 500 mg bid prn. Patient was given narcan x1 in the emergency department.  Suspect that hypoxia from patient's influenza was the primary  and him being found down. Will resume his PTA opiate medications.  - Resume home MS Contin  - Oxycodone 10 mg available tid prn  - Continue home Lyrica    # Prediabetes  Recent HgbA1c 5.9. Admission glucose 137. Glucose will likely be elevated while on steroids.    # Hyperlipidemia LDL goal <130  Managed prior to admission with Zocor 80 mg daily, continued.     # Tremor   Has been prescribed propranolol  mg daily, but per group home MAR, has not been taking recently, although the prescription was filled in December 2024. Patient finds this helpful and would like to take.   - Continued PTA Propranolol     # Sleep disturbance  Managed prior to admission with trazodone 250 mg q hs. Continued.    # Generalized anxiety disorder  # Major depressive disorder  Managed prior to admission with Cymbalta 120 mg daily, continued.    # Tobacco use disorder  Patient declines nicotine replacement.    Consultations This Hospital Stay   PHARMACY TO DOSE VANCO  CARE MANAGEMENT / SOCIAL WORK IP CONSULT  CARE MANAGEMENT / SOCIAL WORK IP CONSULT    Code Status   Full Code    Time Spent on this Encounter   I, Jay Fox MD, personally saw the patient today and spent greater than 30 minutes discharging this patient.       Jay Fox MD  [facility] INTENSIVE [facility]ARE  [address] [address]  Phone: [phone]  Fax: [phone]  ______________________________________________________________________    Physical Exam   Vital Signs: Temp: 97.8  F (36.6  C) Temp src: Oral BP: (!) 153/82 Pulse: 83   Resp: (!) 8 SpO2: (!) 90 % O2 Device: None (Room air) Oxygen Delivery: 2  LPM  Weight: 210 lbs 1.57 oz  General Appearance: Awake and alert, laying back in bed, in no acute distress  Respiratory: Breathing easily on room air, lungs clear to auscultation bilaterally  Cardiovascular: Regular rate and rhythm, extremities warm and well-perfused  GI: Abdomen soft, nontender, non-distended  Other: Appropriate mood and affect       Primary Care Physician   Provider Not In System    Discharge Orders      Reason for your hospital stay    Hospitalized with influenza leading to a COPD exacerbation. You improved and you are being discharged back to your group home.     Follow-up and recommended labs and tests     Follow up with primary care provider, Provider Not In System, within 7-14 days to evaluate medication change and for hospital follow- up.     Activity    Your activity upon discharge: activity as tolerated     Diet    Follow this diet upon discharge: Current Diet:Orders Placed This Encounter      Regular Diet Adult       Significant Results and Procedures   Results for orders placed or performed during the hospital encounter of 01/31/25   Head CT w/o contrast    Narrative    EXAM: CT HEAD W/O CONTRAST  LOCATION: [facility]  DATE: 1/31/2025    INDICATION: AMS, found down confusion  COMPARISON: CT head 8/6/2022  TECHNIQUE: Routine CT Head without IV contrast. Multiplanar reformats. Dose reduction techniques were used.    FINDINGS:  INTRACRANIAL CONTENTS: No intracranial hemorrhage, extraaxial collection, or mass effect.  No CT evidence of acute infarct. Normal parenchymal attenuation. Unchanged mild generalized volume loss. No hydrocephalus.     VISUALIZED ORBITS/SINUSES/MASTOIDS: No intraorbital abnormality. Redemonstration of complete opacification of the frontal sinuses and left ethmoid air cells. There is now complete, previously near complete opacification of the left maxillary sinus. There   is hyperdense material within the maxillary sinus and the frontal  sinuses. No middle ear or mastoid effusion.    BONES/SOFT TISSUES: No acute abnormality.      Impression    IMPRESSION:  1.  No acute intracranial process.    2.  When compared to 8/6/2022, there is unchanged appearance of complete opacification of the frontal sinuses and left ethmoid air cells. Previously nearly complete opacification of the left maxillary sinus is now completely opacified. The overall   findings could relate to chronic fungal sinusitis, although a sinonasal mass is not excluded. Consider ENT consultation and direct visualization, if not already performed.   Chest CT w/o contrast    Narrative    EXAM: CT CHEST W/O CONTRAST  LOCATION: [facility]  DATE: 1/31/2025    INDICATION: hypoxia, concerns for pulmonary disease with hx of COPD  COMPARISON: 8/6/2022, 8/5/2020  TECHNIQUE: CT chest without IV contrast. Multiplanar reformats were obtained. Dose reduction techniques were used.  CONTRAST: None.    FINDINGS: Slightly limited exam due to motion.    LUNGS AND PLEURA: Patent central airways. A left perihilar consolidative is favored to represent a scar and has not significantly changed since at least 8/2020. Stable lingular scarring and mild upper lobe ground glass opacification along the major   fissures. Mild lung emphysematous changes without acute airspace opacity, new nodule, or pleural effusion. No pneumothorax.    MEDIASTINUM/AXILLAE: Normal heart size without pericardial effusion. No thoracic aorta aneurysm. Mid esophageal wall thickening. No thoracic lymphadenopathy by size criteria. Stably prominent 9 mm right paratracheal lymph node.    CORONARY ARTERY CALCIFICATION: Moderate.    UPPER ABDOMEN: No actionable finding.    MUSCULOSKELETAL: No aggressive osseous lesion.        Impression    IMPRESSION:   1.  Mild lung emphysematous changes without acute airspace opacity.   2.  Stable left lung scarring.  3.  Mid esophageal wall thickening, suggesting esophagitis.        *Note: Due to a large number of results and/or encounters for the requested time period, some results have not been displayed. A complete set of results can be found in Results Review.       Discharge Medications   Current Discharge Medication List        START taking these medications    Details   benzonatate (TESSALON) 100 MG capsule Take 1 capsule (100 mg) by mouth 3 times daily as needed for cough.  Qty: 30 capsule, Refills: 0    Associated Diagnoses: Acute respiratory failure with hypoxia and hypercapnia (H)      oseltamivir (TAMIFLU) 75 MG capsule Take 1 capsule (75 mg) by mouth 2 times daily.  Qty: 6 capsule, Refills: 0    Associated Diagnoses: Acute respiratory failure with hypoxia and hypercapnia (H)      predniSONE (DELTASONE) 20 MG tablet Take 2 tablets (40 mg) by mouth daily.  Qty: 6 tablet, Refills: 0    Associated Diagnoses: Acute respiratory failure with hypoxia and hypercapnia (H)           CONTINUE these medications which have NOT CHANGED    Details   albuterol (PROAIR HFA) 108 (90 Base) MCG/ACT inhaler Inhale 2 puffs into the lungs every 4 hours as needed for shortness of breath or wheezing  Qty: 18 g, Refills: 3    Comments: Pharmacy may dispense brand covered by insurance (Proair, or proventil or ventolin or generic albuterol inhaler)  Associated Diagnoses: Chronic obstructive pulmonary disease, unspecified COPD type (H)      Calcium Carbonate Antacid 200 MG TBDP Take 1 chew tab by mouth daily.      DULoxetine (CYMBALTA) 60 MG capsule Take 2 capsules (120 mg) by mouth daily  Qty: 180 capsule, Refills: 3    Associated Diagnoses: Major depressive disorder, recurrent episode, moderate (H)      lurasidone (LATUDA) 20 MG TABS tablet TAKE 1 TABLET BY MOUTH DAILY WITH DINNER  Qty: 90 tablet, Refills: 0    Associated Diagnoses: Major depressive disorder, recurrent episode, moderate (H)      morphine (MS CONTIN) 30 MG CR tablet Take by mouth every 12 hours. 6 AM   12 pm   5 pm  Refills: 0       naproxen (NAPROSYN) 500 MG tablet Take 1 tablet (500 mg) by mouth 2 times daily as needed for headaches  Qty: 60 tablet, Refills: 3    Associated Diagnoses: Chronic pain syndrome      oxyCODONE IR (ROXICODONE) 10 MG tablet Take 1 tablet by mouth 3 times daily      pregabalin (LYRICA) 150 MG capsule Take 150 mg by mouth 2 times daily       propranolol ER (INDERAL LA) 120 MG 24 hr capsule Take 1 capsule (120 mg) by mouth daily  Qty: 90 capsule, Refills: 3    Associated Diagnoses: Tremor      simvastatin (ZOCOR) 80 MG tablet TAKE ONE TABLET BY MOUTH ONCE DAILY  Qty: 90 tablet, Refills: 4    Associated Diagnoses: Hyperlipidemia LDL goal <130      !! traZODone (DESYREL) 100 MG tablet TAKE TWO TABLETS BY MOUTH ONCE DAILY AT BEDTIME IN ADDITION TO THE 50MG TABLET FOR A TOTAL OF 250MG PER DAY  Qty: 180 tablet, Refills: 3    Associated Diagnoses: Major depressive disorder, recurrent episode, moderate (H)      !! traZODone (DESYREL) 50 MG tablet TAKE ONE TABLET BY MOUTH ONCE DAILY AT BEDTIME TAKE ALONG WITH THE  MG TABLETS FOR TOTAL DOSE  MG  Qty: 90 tablet, Refills: 4    Associated Diagnoses: Major depressive disorder, recurrent episode, moderate (H)      naloxone (NARCAN) 4 MG/0.1ML nasal spray Spray 4 mg into one nostril alternating nostrils as needed for opioid reversal every 2-3 minutes until assistance arrives  Qty: 0.2 mL, Refills: 0      !! order for DME Equipment being ordered: Nebulizer.    Diagnosis: chronic obstructive bronchitis (COPD).    Duration of need:  99 months.  Qty: 1 each, Refills: 0    Associated Diagnoses: Chronic bronchitis, unspecified chronic bronchitis type (H)      !! order for DME Equipment being ordered: Hospital Bed and mattress.  Qty: 1 each, Refills: 0    Associated Diagnoses: Chronic pain syndrome; Lumbosacral radiculitis; Chronic obstructive pulmonary disease, unspecified COPD type (H); Obese; Lumbar radiculopathy; Encephalopathy; Spinal stenosis in cervical region      !!  order for DME Equipment being ordered: Lift Chair  Qty: 1 each, Refills: 0    Associated Diagnoses: Chronic pain syndrome; Lumbosacral radiculitis; Chronic obstructive pulmonary disease, unspecified COPD type (H); Obese; Lumbar radiculopathy; Encephalopathy; Spinal stenosis in cervical region; Unable to ambulate      !! ORDER FOR DME Semi electric hospital bed with side rails and mattress. Length of bed is for lifetime  Qty: 1 Device, Refills: 0    Associated Diagnoses: Other unspecified back disorder; Obese; Lumbosacral radiculitis; COPD (chronic obstructive pulmonary disease) (H)      OVER-THE-COUNTER nereva Plus 1 tablet daily       !! - Potential duplicate medications found. Please discuss with provider.        Allergies   Allergies   Allergen Reactions    Abilify [Aripiprazole] Other (See Comments)     Altered metal status.  Was admitted to hospital 3/17/2015.    Asa [Aspirin] GI Disturbance     Upset stomach    Black Pepper [Piper] Swelling     Tongue swells up    Caffeine Other (See Comments)     Comment: GI problems, Description:     Robitussin Cough-Cold D Other (See Comments)     Bad dreams about killing people     Varenicline Other (See Comments)     Vivid dreams and suicidal thoughts

## 2025-02-03 ENCOUNTER — PATIENT OUTREACH (OUTPATIENT)
Dept: CARE COORDINATION | Facility: CLINIC | Age: 70
End: 2025-02-03
Payer: COMMERCIAL

## 2025-02-03 NOTE — PROGRESS NOTES
Connected Care Resource Center: [facility]    Background: Transitional Care Management program identified per system criteria and reviewed by [facility] [place] team for possible outreach.    Assessment: Upon chart review, [facility] Team member will not proceed with patient outreach related to this episode of Transitional Care Management program due to reason below:    Patient has discharged to a Memory Care, Long-term Care, Assisted Living or Group Home where patient is receiving on-site support with their daily cares, including support with hospital follow up plan.    Plan: Transitional Care Management episode addressed appropriately per reason noted above.      JORDAN Conklin  Connected [facility] [place], [facility]    *Connected Care Resource Team does NOT follow patient ongoing. Referrals are identified based on internal discharge reports and the outreach is to ensure patient has an understanding of their discharge instructions.

## 2025-02-05 LAB
BACTERIA BLD CULT: NO GROWTH
BACTERIA BLD CULT: NO GROWTH

## 2025-03-07 PROBLEM — J96.02 ACUTE RESPIRATORY ACIDOSIS (H): Status: RESOLVED | Noted: 2020-08-05 | Resolved: 2025-03-07

## 2025-03-07 PROBLEM — J96.02 ACUTE RESPIRATORY FAILURE WITH HYPOXIA AND HYPERCAPNIA (H): Status: RESOLVED | Noted: 2019-10-29 | Resolved: 2025-03-07

## 2025-03-07 PROBLEM — J96.01 ACUTE RESPIRATORY FAILURE WITH HYPOXIA AND HYPERCAPNIA (H): Status: RESOLVED | Noted: 2019-10-29 | Resolved: 2025-03-07

## 2025-03-07 PROBLEM — R25.1 TREMOR: Status: ACTIVE | Noted: 2025-03-07

## 2025-03-07 PROBLEM — R73.01 IMPAIRED FASTING GLUCOSE: Status: ACTIVE | Noted: 2025-03-07

## 2025-03-10 ENCOUNTER — THERAPY VISIT (OUTPATIENT)
Dept: PHYSICAL THERAPY | Facility: CLINIC | Age: 70
End: 2025-03-10
Attending: NURSE PRACTITIONER
Payer: COMMERCIAL

## 2025-03-10 DIAGNOSIS — G89.4 CHRONIC PAIN SYNDROME: Primary | Chronic | ICD-10-CM

## 2025-03-10 PROCEDURE — 97542 WHEELCHAIR MNGMENT TRAINING: CPT | Mod: GP | Performed by: PHYSICAL THERAPIST
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
## 2025-03-25 NOTE — PROGRESS NOTES
"    [facility]                                     Progress Note    Patient Name: Melquiades Morales  Date: 5/26/22         Service Type: Individual      Session Start Time:  9 am  Session End Time: 9:45 am     Session Length: 45 min     Session #: 9    Attendees: Client attended alone.    Service Modality:  Phone Visit:                  Provider verified identity through the following two step process.  Patient provided:  Patient is known previously to provider     The patient has been notified of the following:      \"We have found that certain health care needs can be provided without the need for a face to face visit.  This service lets us provide the care you need with a phone conversation.       I will have full access to your [facility] medical record during this entire phone call.   I will be taking notes for your medical record.      Since this is like an office visit, we will bill your insurance company for this service.       There are potential benefits and risks of telephone visits (e.g. limits to patient confidentiality) that differ from in-person visits.?Confidentiality still applies for telephone services, and nobody will record the visit.  It is important to be in a quiet, private space that is free of distractions (including cell phone or other devices) during the visit.??      If during the course of the call I believe a telephone visit is not appropriate, you will not be charged for this service\"     Consent has been obtained for this service by care team member: Yes      DATA  Interactive Complexity: No  Crisis: No        Progress Since Last Session (Related to Symptoms / Goals / Homework):   Symptoms: improvement.    Homework: Partially completed- use a healthy coping idea. He has the grounding ideas handout.     Episode of Care Goals: Minimal progress - ACTION (Actively working towards change); Intervened by reinforcing change plan / affirming steps taken.    Current / " Upon review of the In Basket request and the patient's chart, initial outreach has been made via fax to facility. Please see Contacts section for details.     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   He was evicted from his home and now has 4 days to find another place to live. He has 24 days to sale his trailer home. His trailer sold. He now lives in a nursing home. It is taking him time to get used to it.       Treatment Objective(s) Addressed in This Session:   Use a healthy coping idea as needed.      Intervention:  Assessed functioning and for safety. Reviewed the phq and shailesh. Processed his feelings about living in assisted living, financial concerns, relationships with his stepsons. Reviewed healthy coping ideas.          Assessments completed prior to visit:  The following assessments were completed by patient for this visit:  PHQ9:   PHQ-9 SCORE 1/28/2022 1/28/2022 2/7/2022 3/1/2022 3/18/2022 4/25/2022 5/12/2022   PHQ-9 Total Score - - - - - - -   PHQ-9 Total Score MyChart - 14 (Moderate depression) - - - - -   PHQ-9 Total Score 14 14 14 16 16 17 15     GAD7:   SHAILESH-7 SCORE 3/19/2021 1/28/2022 2/7/2022 3/1/2022 3/18/2022 4/25/2022 5/12/2022   Total Score - - - - - - -   Total Score - 13 (moderate anxiety) - - - - -   Total Score 16 13 10 16 10 19 13     CAGE-AID:   CAGE-AID Total Score 1/28/2022   Total Score 0   Total Score MyChart 0 (A total score of 2 or greater is considered clinically significant)     PROMIS 10-Global Health (all questions and answers displayed):   PROMIS 10 1/28/2022 5/12/2022   In general, would you say your health is: Poor -   In general, would you say your quality of life is: Fair -   In general, how would you rate your physical health? Poor -   In general, how would you rate your mental health, including your mood and your ability to think? Fair -   In general, how would you rate your satisfaction with your social activities and relationships? Fair -   In general, please rate how well you carry out your usual social activities and roles Poor -   To what extent are you  able to carry out your everyday physical activities such as walking, climbing stairs, carrying groceries, or moving a chair? A little -   How often have you been bothered by emotional problems such as feeling anxious, depressed or irritable? Often -   How would you rate your fatigue on average? Moderate -   How would you rate your pain on average?   0 = No Pain  to  10 = Worst Imaginable Pain 5 -   In general, would you say your health is: 1 3   In general, would you say your quality of life is: 2 2   In general, how would you rate your physical health? 1 2   In general, how would you rate your mental health, including your mood and your ability to think? 2 3   In general, how would you rate your satisfaction with your social activities and relationships? 2 2   In general, please rate how well you carry out your usual social activities and roles. (This includes activities at home, at work and in your community, and responsibilities as a parent, child, spouse, employee, friend, etc.) 1 2   To what extent are you able to carry out your everyday physical activities such as walking, climbing stairs, carrying groceries, or moving a chair? 2 1   In the past 7 days, how often have you been bothered by emotional problems such as feeling anxious, depressed, or irritable? 4 3   In the past 7 days, how would you rate your fatigue on average? 3 3   In the past 7 days, how would you rate your pain on average, where 0 means no pain, and 10 means worst imaginable pain? 5 6   Global Mental Health Score 8 10   Global Physical Health Score 9 9   PROMIS TOTAL - SUBSCORES 17 19   Some recent data might be hidden     Bland Suicide Severity Rating Scale (Lifetime/Recent)  Bland Suicide Severity Rating (Lifetime/Recent) 9/8/2018 1/28/2022   Wish to be Dead (Lifetime) - Yes   Non-Specific Active Suicidal Thoughts (Lifetime) - Yes   Most Severe Ideation Rating (Lifetime) - 5   Frequency (Lifetime) - 3   Duration (Lifetime) - 2    Controllability (Lifetime) - 5   Protective Factors  (Lifetime) - 5   Reasons for Ideation (Lifetime) - 4   RETIRED: 1. Wish to be Dead (Recent) - No   RETIRED: 2. Non-Specific Active Suicidal Thoughts (Recent) - No   3. Active Suicidal Ideation with any Methods (Not Plan) Without Intent to Act (Lifetime) - Yes   RETIRED: 3. Active Suicidal Ideation with any Methods (Not Plan) Without Intent to Act (Recent) - No   RETIRE: 4. Active Suicidal Ideation with Some Intent to Act, Without Specific Plan (Lifetime) - Yes   4. Active Suicidal Ideation with Some Intent to Act, Without Specific Plan (Recent) - No   RETIRE: 5. Active Suicidal Ideation with Specific Plan and Intent (Lifetime) - Yes   RETIRED: 5. Active Suicidal Ideation with Specific Plan and Intent (Recent) - No   Most Severe Ideation Rating (Past Month) NA NA   Frequency (Past Month) - NA   Duration (Past Month) - NA   Controllability (Past Month) - NA   Protective Factors (Past Month) - NA   Reasons for Ideation (Past Month) - NA   Actual Attempt (Lifetime) - Yes   Actual Attempt Description (Lifetime) - 3 attempts with last one 15 years ago   Total Number of Actual Attempts (Lifetime) - 3   Actual Attempt (Past 3 Months) - No   Has subject engaged in non-suicidal self-injurious behavior? (Lifetime) - No   Has subject engaged in non-suicidal self-injurious behavior? (Past 3 Months) - No   Interrupted Attempts (Lifetime) - No   Interrupted Attempts (Past 3 Months) - No   Aborted or Self-Interrupted Attempt (Lifetime) - No   Aborted or Self-Interrupted Attempt (Past 3 Months) - No   Preparatory Acts or Behavior (Lifetime) - No   Preparatory Acts or Behavior (Past 3 Months) - No   Most Recent Attempt Actual Lethality Code - 3   Most Lethal Attempt Actual Lethality Code - 2   Initial/First Attempt Actual Lethality Code - 2         ASSESSMENT: Current Emotional / Mental Status (status of significant symptoms):   Risk status (Self / Other harm or suicidal  ideation)   Patient denies current fears or concerns for personal safety.   Patient denies current or recent suicidal ideation or behaviors.     Patient denies current or recent homicidal ideation or behaviors.   Patient denies current or recent self injurious behavior or ideation.   Patient denies other safety concerns.   Patient reports there has been no change in risk factors since their last session.     Patient reports there has been no change in protective factors since their last session.     a safety plan was completed several years ago.      Appearance:   Unable to assess   Eye Contact:   Unable to assess   Psychomotor Behavior: Unable to assess   Attitude:   Cooperative    Orientation:   All   Speech    Rate / Production: Normal     Volume:  Normal    Mood:    Depressed   Affect:    Appropriate    Thought Content:  Clear    Thought Form:  Coherent  Logical    Insight:    Good      Medication Review:   No changes to current psychiatric medication(s). PCP Dr. Eliane Quinn prescribing cymbalta 60 mg and trazadone.     Medication Compliance:   Yes     Changes in Health Issues:   None reported     Chemical Use Review:   Substance Use: Chemical use reviewed, no active concerns identified      Tobacco Use: No change in amount of tobacco use since last session.  Contemplation    Diagnosis:  MDD; EVGENY; PTSD.    Collateral Reports Completed:   Routed note to PCP    PLAN: (Patient Tasks / Therapist Tasks / Other)  Biweekly.   Follow safety plan. Use a healthy coping idea as needed.       Goals due 8/12/22      SHAVONNE Yao                                                         ______________________________________________________________________    Individual Treatment Plan    Patient's Name: Melquiades Morales  YOB: 1955    Date of Creation: 1/28/22  Date Treatment Plan Last Reviewed/Revised: 5/12/22    DSM5 Diagnoses: 296.32 (F33.1) Major Depressive Disorder, Recurrent Episode, Moderate  _, 300.02 (F41.1) Generalized Anxiety Disorder or 309.81 (F43.10) Posttraumatic Stress Disorder (includes Posttraumatic Stress Disorder for Children 6 Years and Younger)  With dissociative symptoms.  Psychosocial / Contextual Factors: recent loss of wife.  PROMIS (reviewed every 90 days):  5/12/22    Referral / Collaboration:  Referral to another professional/service is not indicated at this time.    Anticipated number of session for this episode of care: 10+  Anticipation frequency of session: Weekly  Anticipated Duration of each session: 38-52 minutes.  Treatment plan will be reviewed in 90 days or when goals have been changed.       MeasurableTreatment Goal(s) related to diagnosis / functional impairment(s)  Goal 1: Patient will report coping well with daily stressors.     I will know I've met my goal when each goal that we accomplish and I am having less of a problem in that area I know that you have helped me.       Objective #A (Patient Action)                          Patient will continue to follow his Safety plan.  Status: New - Date: 1/28/22; 5/12/22      Intervention(s)  Therapist will monitor for safety each visit and encourage use of safety plan.     Objective #B  Patient will use a healthy coping idea as needed 100% of trials for 1 week.  Status: New - Date: 1/28/22; 5/12/22  IDEAS: napping; petting Snippy (pet dog); watching tv.  Intervention(s)  Therapist will provide ideas and encouragement to use them.     Objective #C  Patient will process the loss of his wife as needed.  Status: New - Date: 1/28/22; 5/12/22      Intervention(s)  Therapist will consider EMDR.              Patient has reviewed and agreed to the above plan.        Kleber Pollack, [facility]                January 28, 2022

## 2025-04-01 ENCOUNTER — MEDICAL CORRESPONDENCE (OUTPATIENT)
Dept: HEALTH INFORMATION MANAGEMENT | Facility: CLINIC | Age: 70
End: 2025-04-01
Payer: COMMERCIAL

## 2025-04-08 ENCOUNTER — MEDICAL CORRESPONDENCE (OUTPATIENT)
Dept: HEALTH INFORMATION MANAGEMENT | Facility: CLINIC | Age: 70
End: 2025-04-08
Payer: COMMERCIAL

## 2025-04-10 ENCOUNTER — APPOINTMENT (OUTPATIENT)
Dept: CT IMAGING | Facility: CLINIC | Age: 70
DRG: 190 | End: 2025-04-10
Attending: NURSE PRACTITIONER
Payer: COMMERCIAL

## 2025-04-10 ENCOUNTER — APPOINTMENT (OUTPATIENT)
Dept: CT IMAGING | Facility: CLINIC | Age: 70
DRG: 190 | End: 2025-04-10
Attending: EMERGENCY MEDICINE
Payer: COMMERCIAL

## 2025-04-10 ENCOUNTER — HOSPITAL ENCOUNTER (INPATIENT)
Facility: CLINIC | Age: 70
DRG: 190 | End: 2025-04-10
Attending: NURSE PRACTITIONER | Admitting: FAMILY MEDICINE
Payer: COMMERCIAL

## 2025-04-10 ENCOUNTER — APPOINTMENT (OUTPATIENT)
Dept: GENERAL RADIOLOGY | Facility: CLINIC | Age: 70
DRG: 190 | End: 2025-04-10
Attending: NURSE PRACTITIONER
Payer: COMMERCIAL

## 2025-04-10 DIAGNOSIS — G89.4 CHRONIC PAIN SYNDROME: Chronic | ICD-10-CM

## 2025-04-10 DIAGNOSIS — F33.1 MAJOR DEPRESSIVE DISORDER, RECURRENT EPISODE, MODERATE (H): ICD-10-CM

## 2025-04-10 DIAGNOSIS — R09.02 HYPOXIA: ICD-10-CM

## 2025-04-10 DIAGNOSIS — F11.20 UNCOMPLICATED OPIOID DEPENDENCE (H): ICD-10-CM

## 2025-04-10 DIAGNOSIS — S02.2XXA CLOSED FRACTURE OF NASAL BONE, INITIAL ENCOUNTER: ICD-10-CM

## 2025-04-10 DIAGNOSIS — R29.6 RECURRENT FALLS: Primary | ICD-10-CM
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
## 2025-04-10 DIAGNOSIS — Z91.148 NON COMPLIANCE W MEDICATION REGIMEN: Chronic | ICD-10-CM

## 2025-04-10 DIAGNOSIS — G93.40 ACUTE ENCEPHALOPATHY: ICD-10-CM

## 2025-04-10 DIAGNOSIS — J96.01 ACUTE RESPIRATORY FAILURE WITH HYPOXIA (H): ICD-10-CM

## 2025-04-10 DIAGNOSIS — S02.2XXA NASAL SEPTUM FRACTURE, CLOSED, INITIAL ENCOUNTER: ICD-10-CM

## 2025-04-10 DIAGNOSIS — J44.1 COPD EXACERBATION (H): ICD-10-CM

## 2025-04-10 LAB
ALBUMIN SERPL BCG-MCNC: 4.4 G/DL (ref 3.5–5.2)
ALBUMIN UR-MCNC: NEGATIVE MG/DL
ALP SERPL-CCNC: 68 U/L (ref 40–150)
ALT SERPL W P-5'-P-CCNC: 15 U/L (ref 0–70)
AMMONIA PLAS-SCNC: 23 UMOL/L (ref 16–60)
AMPHETAMINES UR QL SCN: ABNORMAL
ANION GAP SERPL CALCULATED.3IONS-SCNC: 11 MMOL/L (ref 7–15)
APPEARANCE UR: CLEAR
AST SERPL W P-5'-P-CCNC: 24 U/L (ref 0–45)
ATRIAL RATE - MUSE: 66 BPM
BARBITURATES UR QL SCN: ABNORMAL
BASE EXCESS BLDV CALC-SCNC: 7.9 MMOL/L (ref -3–3)
BASOPHILS # BLD AUTO: 0 10E3/UL (ref 0–0.2)
BASOPHILS NFR BLD AUTO: 0 %
BENZODIAZ UR QL SCN: ABNORMAL
BILIRUB SERPL-MCNC: 0.7 MG/DL
BILIRUB UR QL STRIP: NEGATIVE
BUN SERPL-MCNC: 5.8 MG/DL (ref 8–23)
BZE UR QL SCN: ABNORMAL
CALCIUM SERPL-MCNC: 10 MG/DL (ref 8.8–10.4)
CANNABINOIDS UR QL SCN: ABNORMAL
CHLORIDE SERPL-SCNC: 103 MMOL/L (ref 98–107)
COLOR UR AUTO: ABNORMAL
CREAT SERPL-MCNC: 0.84 MG/DL (ref 0.67–1.17)
CRP SERPL-MCNC: 4.03 MG/L
D DIMER PPP FEU-MCNC: 0.97 UG/ML FEU (ref 0–0.5)
DIASTOLIC BLOOD PRESSURE - MUSE: NORMAL MMHG
EGFRCR SERPLBLD CKD-EPI 2021: >90 ML/MIN/1.73M2
EOSINOPHIL # BLD AUTO: 0.1 10E3/UL (ref 0–0.7)
EOSINOPHIL NFR BLD AUTO: 1 %
ERYTHROCYTE [DISTWIDTH] IN BLOOD BY AUTOMATED COUNT: 12.9 % (ref 10–15)
FENTANYL UR QL: ABNORMAL
FLUAV RNA SPEC QL NAA+PROBE: NEGATIVE
FLUBV RNA RESP QL NAA+PROBE: NEGATIVE
GLUCOSE SERPL-MCNC: 113 MG/DL (ref 70–99)
GLUCOSE UR STRIP-MCNC: NEGATIVE MG/DL
HCO3 BLDV-SCNC: 32 MMOL/L (ref 21–28)
HCO3 SERPL-SCNC: 33 MMOL/L (ref 22–29)
HCT VFR BLD AUTO: 50.7 % (ref 40–53)
HGB BLD-MCNC: 16.6 G/DL (ref 13.3–17.7)
HGB UR QL STRIP: NEGATIVE
HOLD SPECIMEN: NORMAL
IMM GRANULOCYTES # BLD: 0 10E3/UL
IMM GRANULOCYTES NFR BLD: 0 %
INTERPRETATION ECG - MUSE: NORMAL
KETONES UR STRIP-MCNC: NEGATIVE MG/DL
LACTATE SERPL-SCNC: 1.1 MMOL/L (ref 0.7–2)
LEUKOCYTE ESTERASE UR QL STRIP: NEGATIVE
LYMPHOCYTES # BLD AUTO: 2.7 10E3/UL (ref 0.8–5.3)
LYMPHOCYTES NFR BLD AUTO: 22 %
MAGNESIUM SERPL-MCNC: 2.3 MG/DL (ref 1.7–2.3)
MCH RBC QN AUTO: 32.2 PG (ref 26.5–33)
MCHC RBC AUTO-ENTMCNC: 32.7 G/DL (ref 31.5–36.5)
MCV RBC AUTO: 98 FL (ref 78–100)
MONOCYTES # BLD AUTO: 1.1 10E3/UL (ref 0–1.3)
MONOCYTES NFR BLD AUTO: 9 %
NEUTROPHILS # BLD AUTO: 8.4 10E3/UL (ref 1.6–8.3)
NEUTROPHILS NFR BLD AUTO: 68 %
NITRATE UR QL: NEGATIVE
NRBC # BLD AUTO: 0 10E3/UL
NRBC BLD AUTO-RTO: 0 /100
O2/TOTAL GAS SETTING VFR VENT: 32 %
OPIATES UR QL SCN: ABNORMAL
OXYHGB MFR BLDV: 91 % (ref 70–75)
P AXIS - MUSE: -16 DEGREES
PCO2 BLDV: 43 MM HG (ref 40–50)
PCP QUAL URINE (ROCHE): ABNORMAL
PH BLDV: 7.48 [PH] (ref 7.32–7.43)
PH UR STRIP: 7.5 [PH] (ref 5–7)
PLATELET # BLD AUTO: 235 10E3/UL (ref 150–450)
PO2 BLDV: 57 MM HG (ref 25–47)
POTASSIUM SERPL-SCNC: 3.8 MMOL/L (ref 3.4–5.3)
PR INTERVAL - MUSE: 182 MS
PROT SERPL-MCNC: 7.6 G/DL (ref 6.4–8.3)
QRS DURATION - MUSE: 100 MS
QT - MUSE: 412 MS
QTC - MUSE: 431 MS
R AXIS - MUSE: 36 DEGREES
RADIOLOGIST FLAGS: NORMAL
RBC # BLD AUTO: 5.16 10E6/UL (ref 4.4–5.9)
RBC URINE: <1 /HPF
RSV RNA SPEC NAA+PROBE: NEGATIVE
SAO2 % BLDV: 93.2 % (ref 70–75)
SARS-COV-2 RNA RESP QL NAA+PROBE: NEGATIVE
SODIUM SERPL-SCNC: 147 MMOL/L (ref 135–145)
SP GR UR STRIP: 1.01 (ref 1–1.03)
SYSTOLIC BLOOD PRESSURE - MUSE: NORMAL MMHG
T AXIS - MUSE: 21 DEGREES
TSH SERPL DL<=0.005 MIU/L-ACNC: 0.58 UIU/ML (ref 0.3–4.2)
UROBILINOGEN UR STRIP-MCNC: NORMAL MG/DL
VENTRICULAR RATE- MUSE: 66 BPM
WBC # BLD AUTO: 12.4 10E3/UL (ref 4–11)
WBC URINE: 1 /HPF

## 2025-04-10 PROCEDURE — 84450 TRANSFERASE (AST) (SGOT): CPT | Performed by: NURSE PRACTITIONER

## 2025-04-10 PROCEDURE — 85379 FIBRIN DEGRADATION QUANT: CPT | Performed by: NURSE PRACTITIONER

## 2025-04-10 PROCEDURE — 71250 CT THORAX DX C-: CPT

## 2025-04-10 PROCEDURE — 72125 CT NECK SPINE W/O DYE: CPT

## 2025-04-10 PROCEDURE — 70486 CT MAXILLOFACIAL W/O DYE: CPT

## 2025-04-10 PROCEDURE — 85041 AUTOMATED RBC COUNT: CPT | Performed by: NURSE PRACTITIONER

## 2025-04-10 PROCEDURE — 80307 DRUG TEST PRSMV CHEM ANLYZR: CPT

## 2025-04-10 PROCEDURE — 70496 CT ANGIOGRAPHY HEAD: CPT

## 2025-04-10 PROCEDURE — 83605 ASSAY OF LACTIC ACID: CPT | Performed by: NURSE PRACTITIONER

## 2025-04-10 PROCEDURE — 250N000011 HC RX IP 250 OP 636: Performed by: EMERGENCY MEDICINE

## 2025-04-10 PROCEDURE — 250N000011 HC RX IP 250 OP 636: Performed by: NURSE PRACTITIONER

## 2025-04-10 PROCEDURE — 82140 ASSAY OF AMMONIA: CPT | Performed by: NURSE PRACTITIONER

## 2025-04-10 PROCEDURE — 96361 HYDRATE IV INFUSION ADD-ON: CPT

## 2025-04-10 PROCEDURE — 87086 URINE CULTURE/COLONY COUNT: CPT | Performed by: NURSE PRACTITIONER

## 2025-04-10 PROCEDURE — 36415 COLL VENOUS BLD VENIPUNCTURE: CPT | Performed by: NURSE PRACTITIONER

## 2025-04-10 PROCEDURE — 99285 EMERGENCY DEPT VISIT HI MDM: CPT | Mod: 25

## 2025-04-10 PROCEDURE — 250N000009 HC RX 250: Performed by: EMERGENCY MEDICINE

## 2025-04-10 PROCEDURE — 73610 X-RAY EXAM OF ANKLE: CPT | Mod: LT

## 2025-04-10 PROCEDURE — 93005 ELECTROCARDIOGRAM TRACING: CPT

## 2025-04-10 PROCEDURE — 82805 BLOOD GASES W/O2 SATURATION: CPT | Performed by: NURSE PRACTITIONER

## 2025-04-10 PROCEDURE — 99285 EMERGENCY DEPT VISIT HI MDM: CPT | Mod: 25 | Performed by: EMERGENCY MEDICINE

## 2025-04-10 PROCEDURE — 83735 ASSAY OF MAGNESIUM: CPT | Performed by: NURSE PRACTITIONER

## 2025-04-10 PROCEDURE — 71275 CT ANGIOGRAPHY CHEST: CPT

## 2025-04-10 PROCEDURE — 84075 ASSAY ALKALINE PHOSPHATASE: CPT | Performed by: NURSE PRACTITIONER

## 2025-04-10 PROCEDURE — 93010 ELECTROCARDIOGRAM REPORT: CPT | Performed by: EMERGENCY MEDICINE

## 2025-04-10 PROCEDURE — 250N000009 HC RX 250: Performed by: NURSE PRACTITIONER

## 2025-04-10 PROCEDURE — 81001 URINALYSIS AUTO W/SCOPE: CPT | Performed by: NURSE PRACTITIONER

## 2025-04-10 PROCEDURE — 85004 AUTOMATED DIFF WBC COUNT: CPT | Performed by: NURSE PRACTITIONER

## 2025-04-10 PROCEDURE — 84443 ASSAY THYROID STIM HORMONE: CPT | Performed by: NURSE PRACTITIONER

## 2025-04-10 PROCEDURE — 96374 THER/PROPH/DIAG INJ IV PUSH: CPT | Mod: 59

## 2025-04-10 PROCEDURE — 258N000003 HC RX IP 258 OP 636: Performed by: NURSE PRACTITIONER

## 2025-04-10 PROCEDURE — 70450 CT HEAD/BRAIN W/O DYE: CPT

## 2025-04-10 PROCEDURE — 87637 SARSCOV2&INF A&B&RSV AMP PRB: CPT | Performed by: NURSE PRACTITIONER

## 2025-04-10 PROCEDURE — 86140 C-REACTIVE PROTEIN: CPT | Performed by: NURSE PRACTITIONER

## 2025-04-10 PROCEDURE — 84155 ASSAY OF PROTEIN SERUM: CPT | Performed by: NURSE PRACTITIONER

## 2025-04-10 RX ORDER — IOPAMIDOL 755 MG/ML
80 INJECTION, SOLUTION INTRAVASCULAR ONCE
Status: COMPLETED | OUTPATIENT
Start: 2025-04-10 | End: 2025-04-10

## 2025-04-10 RX ORDER — NALOXONE HYDROCHLORIDE 1 MG/ML
4 INJECTION INTRAMUSCULAR; INTRAVENOUS; SUBCUTANEOUS ONCE
Status: COMPLETED | OUTPATIENT
Start: 2025-04-10 | End: 2025-04-10

## 2025-04-10 RX ORDER — IPRATROPIUM BROMIDE AND ALBUTEROL SULFATE 2.5; .5 MG/3ML; MG/3ML
3 SOLUTION RESPIRATORY (INHALATION) ONCE
Status: COMPLETED | OUTPATIENT
Start: 2025-04-10 | End: 2025-04-10

## 2025-04-10 RX ORDER — IOPAMIDOL 755 MG/ML
67 INJECTION, SOLUTION INTRAVASCULAR ONCE
Status: COMPLETED | OUTPATIENT
Start: 2025-04-10 | End: 2025-04-10

## 2025-04-10 RX ORDER — METHYLPREDNISOLONE SODIUM SUCCINATE 125 MG/2ML
125 INJECTION INTRAMUSCULAR; INTRAVENOUS ONCE
Status: COMPLETED | OUTPATIENT
Start: 2025-04-10 | End: 2025-04-10

## 2025-04-10 RX ADMIN — IPRATROPIUM BROMIDE AND ALBUTEROL SULFATE 3 ML: .5; 3 SOLUTION RESPIRATORY (INHALATION) at 20:29

## 2025-04-10 RX ADMIN — IOPAMIDOL 80 ML: 755 INJECTION, SOLUTION INTRAVENOUS at 22:48

## 2025-04-10 RX ADMIN — SODIUM CHLORIDE 100 ML: 9 INJECTION, SOLUTION INTRAVENOUS at 22:48

## 2025-04-10 RX ADMIN — METHYLPREDNISOLONE SODIUM SUCCINATE 125 MG: 125 INJECTION, POWDER, FOR SOLUTION INTRAMUSCULAR; INTRAVENOUS at 20:56

## 2025-04-10 RX ADMIN — SODIUM CHLORIDE 100 ML: 9 INJECTION, SOLUTION INTRAVENOUS at 12:31

## 2025-04-10 RX ADMIN — IOPAMIDOL 67 ML: 755 INJECTION, SOLUTION INTRAVENOUS at 12:27

## 2025-04-10 RX ADMIN — SODIUM CHLORIDE, SODIUM LACTATE, POTASSIUM CHLORIDE, AND CALCIUM CHLORIDE 1000 ML: .6; .31; .03; .02 INJECTION, SOLUTION INTRAVENOUS at 13:25

## 2025-04-10 RX ADMIN — NALOXONE HYDROCHLORIDE 4 MG: 1 INJECTION PARENTERAL at 20:23

## 2025-04-10 ASSESSMENT — ACTIVITIES OF DAILY LIVING (ADL)
ADLS_ACUITY_SCORE: 56

## 2025-04-10 ASSESSMENT — COLUMBIA-SUICIDE SEVERITY RATING SCALE - C-SSRS
6. HAVE YOU EVER DONE ANYTHING, STARTED TO DO ANYTHING, OR PREPARED TO DO ANYTHING TO END YOUR LIFE?: NO
2. HAVE YOU ACTUALLY HAD ANY THOUGHTS OF KILLING YOURSELF IN THE PAST MONTH?: NO
1. IN THE PAST MONTH, HAVE YOU WISHED YOU WERE DEAD OR WISHED YOU COULD GO TO SLEEP AND NOT WAKE UP?: NO

## 2025-04-10 NOTE — ED PROVIDER NOTES
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 CONTRAST, CT CERVICAL SPINE W/O CONTRAST  LOCATION: [facility]  DATE: 4/10/2025    INDICATION: Fall X2, visible facial trauma, poor historian, ? confusion, unwitnessed per reports, lives in group home.  COMPARISON: Head CT 1/31/2025.  TECHNIQUE:   1) Routine CT Head without IV contrast. Multiplanar reformats. Dose reduction techniques were used.  2) Routine CT Facial Bones without IV contrast. Multiplanar reformats. Dose reduction techniques were used.  3) Routine CT Cervical Spine without IV contrast. Multiplanar reformats. Dose reduction techniques were used.    FINDINGS:  HEAD CT:   INTRACRANIAL CONTENTS: Images are mildly degraded by motion artifact particularly at the level of the skull base. No acute intracranial hemorrhage appreciated. No mass effect or midline shift. Ventricular size is within normal limits without evidence of   hydrocephalus. Mild diffuse parenchymal volume loss. Patchy periventricular white matter hypodensities are nonspecific, but most likely related to chronic microvascular ischemic disease.    OSSEOUS STRUCTURES/SOFT TISSUES: No significant abnormality.     FACIAL BONE CT:  OSSEOUS STRUCTURES/SOFT TISSUES: Soft tissue swelling over the anterior face particularly over the nose. Comminuted bilateral nasal bone fractures  which appear to be deviated to the right. These are new since 1/31/2025 and are favored to be acute.   Comminuted fracture through the bony nasal septum which is deviated to the right. No definite fracture through the maxillary sinus wall. Subtle cortical step-off of the left lamina papyracea although there is no adjacent orbital hematoma. This could   potentially represent a subtle fracture through the medial left orbital wall. The left vertical and horizontal lamella are not well visualized although this may be due to sinus opacification which was also present on 1/31/2025. The teeth are all absent.    SINUSES: Chronic opacification of the left frontal sinus, left ethmoid air cells, and left maxillary sinus with opacification of the left middle meatus, this was also present on 1/31/2025.    CERVICAL SPINE CT:   VERTEBRA: Convex left curvature of the cervical spine. No acute lucent fracture lines. No significant loss of vertebral body height. Marked degenerative endplate changes and loss of disc height at C5-C6 with disc bulge and endplate osteophytic spurring.    CANAL/FORAMINA: Moderate spinal canal narrowing at C5-C6. Moderate to severe bilateral neural foraminal narrowing at C5-C6.    PARASPINAL: No extraspinal abnormality. Visualized lung fields are clear.      Impression    IMPRESSION:  HEAD CT:  1.  Images are mildly degraded by motion artifact which limits evaluation, particularly near the skull base.  2.  No definite acute intracranial hemorrhage, mass, or herniation.  3.  Mild diffuse parenchymal volume loss and white matter changes which are most likely due to chronic microvascular ischemic disease.    FACIAL BONE CT:  1.  Acute comminuted bilateral nasal bone fractures which are deviated to the right.  2.  Acute fracture through the anterior bony nasal septum, deviated to the right.  3.  Subtle cortical step-off of the medial left orbital wall/lamina papyracea. This could represent subtle fracture. No  orbital hematoma.  4.  Chronic opacification of the left frontal sinus, ethmoid air cells, and left maxillary sinus which was also present on 1/31/2025. This makes evaluation for subtle fractures in these areas as well as sinus hemorrhage limited.    CERVICAL SPINE CT:  1.  No fracture or posttraumatic subluxation.  2.  Degenerative changes in the cervical spine, most pronounced at C5-C6.   CT Cervical Spine w/o Contrast    Narrative    EXAM: CT HEAD W/O CONTRAST, CT FACIAL BONES WITHOUT CONTRAST, CT CERVICAL SPINE W/O CONTRAST  LOCATION: [facility]  DATE: 4/10/2025    INDICATION: Fall X2, visible facial trauma, poor historian, ? confusion, unwitnessed per reports, lives in group home.  COMPARISON: Head CT 1/31/2025.  TECHNIQUE:   1) Routine CT Head without IV contrast. Multiplanar reformats. Dose reduction techniques were used.  2) Routine CT Facial Bones without IV contrast. Multiplanar reformats. Dose reduction techniques were used.  3) Routine CT Cervical Spine without IV contrast. Multiplanar reformats. Dose reduction techniques were used.    FINDINGS:  HEAD CT:   INTRACRANIAL CONTENTS: Images are mildly degraded by motion artifact particularly at the level of the skull base. No acute intracranial hemorrhage appreciated. No mass effect or midline shift. Ventricular size is within normal limits without evidence of   hydrocephalus. Mild diffuse parenchymal volume loss. Patchy periventricular white matter hypodensities are nonspecific, but most likely related to chronic microvascular ischemic disease.    OSSEOUS STRUCTURES/SOFT TISSUES: No significant abnormality.     FACIAL BONE CT:  OSSEOUS STRUCTURES/SOFT TISSUES: Soft tissue swelling over the anterior face particularly over the nose. Comminuted bilateral nasal bone fractures which appear to be deviated to the right. These are new since 1/31/2025 and are favored to be acute.   Comminuted fracture through the bony nasal septum which  is deviated to the right. No definite fracture through the maxillary sinus wall. Subtle cortical step-off of the left lamina papyracea although there is no adjacent orbital hematoma. This could   potentially represent a subtle fracture through the medial left orbital wall. The left vertical and horizontal lamella are not well visualized although this may be due to sinus opacification which was also present on 1/31/2025. The teeth are all absent.    SINUSES: Chronic opacification of the left frontal sinus, left ethmoid air cells, and left maxillary sinus with opacification of the left middle meatus, this was also present on 1/31/2025.    CERVICAL SPINE CT:   VERTEBRA: Convex left curvature of the cervical spine. No acute lucent fracture lines. No significant loss of vertebral body height. Marked degenerative endplate changes and loss of disc height at C5-C6 with disc bulge and endplate osteophytic spurring.    CANAL/FORAMINA: Moderate spinal canal narrowing at C5-C6. Moderate to severe bilateral neural foraminal narrowing at C5-C6.    PARASPINAL: No extraspinal abnormality. Visualized lung fields are clear.      Impression    IMPRESSION:  HEAD CT:  1.  Images are mildly degraded by motion artifact which limits evaluation, particularly near the skull base.  2.  No definite acute intracranial hemorrhage, mass, or herniation.  3.  Mild diffuse parenchymal volume loss and white matter changes which are most likely due to chronic microvascular ischemic disease.    FACIAL BONE CT:  1.  Acute comminuted bilateral nasal bone fractures which are deviated to the right.  2.  Acute fracture through the anterior bony nasal septum, deviated to the right.  3.  Subtle cortical step-off of the medial left orbital wall/lamina papyracea. This could represent subtle fracture. No orbital hematoma.  4.  Chronic opacification of the left frontal sinus, ethmoid air cells, and left maxillary sinus which was also present on 1/31/2025. This  makes evaluation for subtle fractures in these areas as well as sinus hemorrhage limited.    CERVICAL SPINE CT:  1.  No fracture or posttraumatic subluxation.  2.  Degenerative changes in the cervical spine, most pronounced at C5-C6.   CTA Head Neck with Contrast    Narrative    EXAM: CTA HEAD NECK W CONTRAST  LOCATION: [facility]  DATE: 4/10/2025    INDICATION: Fall x2, visible facial trauma, poor historian, ? confusion, unwitnessed per reports, lives in group home, LKW yesterday 1600   COMPARISON: None.  CONTRAST: 67 mL Isovue 370  TECHNIQUE: Head and neck CT angiogram with IV contrast. Axial helical CT images of the head and neck vessels obtained during the arterial phase of intravenous contrast administration. Axial 2D reconstructed images and multiplanar 3D MIP reconstructed   images of the head and neck vessels were performed by the technologist. Dose reduction techniques were used. All stenosis measurements made according to NASCET criteria unless otherwise specified.    FINDINGS:   HEAD CTA:  ANTERIOR CIRCULATION: No stenosis/occlusion, aneurysm, or high flow vascular malformation. Fetal origin of the right posterior cerebral artery from the anterior circulation.    POSTERIOR CIRCULATION: No stenosis/occlusion, aneurysm, or high flow vascular malformation. Balanced vertebral arteries supply a normal basilar artery.     DURAL VENOUS SINUSES: Expected enhancement of the major dural venous sinuses.    NECK CTA:  RIGHT CAROTID: Atherosclerotic plaque results in less than 50% stenosis in the right ICA. No dissection.    LEFT CAROTID: Atherosclerotic plaque results in less than 50% stenosis in the left ICA. No dissection.    VERTEBRAL ARTERIES: No focal stenosis or dissection. Balanced vertebral arteries.    AORTIC ARCH: Classic aortic arch anatomy with no significant stenosis at the origin of the great vessels.    NONVASCULAR STRUCTURES: Mild emphysematous changes at the visualized  lung apices. There are degenerative changes in the spine.      Impression    IMPRESSION:   HEAD CTA:   1.  Normal CTA [place] [facility] [place].    NECK CTA:  1.  Patent arteries in the neck without evidence of dissection.  2.  Mild atherosclerotic disease along the carotid arteries without significant stenosis.   UA with Microscopic reflex to Culture    Specimen: Urine, Catheter   Result Value Ref Range    Color Urine Light Yellow Colorless, Straw, Light Yellow, Yellow    Appearance Urine Clear Clear    Glucose Urine Negative Negative mg/dL    Bilirubin Urine Negative Negative    Ketones Urine Negative Negative mg/dL    Specific Gravity Urine 1.008 1.003 - 1.035    Blood Urine Negative Negative    pH Urine 7.5 (H) 5.0 - 7.0    Protein Albumin Urine Negative Negative mg/dL    Urobilinogen Urine Normal Normal mg/dL    Nitrite Urine Negative Negative    Leukocyte Esterase Urine Negative Negative    RBC Urine <1 <=2 /HPF    WBC Urine 1 <=5 /HPF    Narrative    Urine Culture not indicated   Urine Drug Screen    Narrative    The following orders were created for panel order Urine Drug Screen.  Procedure                               Abnormality         Status                     ---------                               -----------         ------                     Urine Drug Screen Panel[[id number]]     Abnormal            Final result                 Please view results for these tests on the individual orders.   Urine Drug Screen Panel   Result Value Ref Range    Amphetamines Urine Screen Negative Screen Negative    Barbituates Urine Screen Negative Screen Negative    Benzodiazepine Urine Screen Negative Screen Negative    Cannabinoids Urine Screen Positive (A) Screen Negative    Cocaine Urine Screen Negative Screen Negative    Fentanyl Qual Urine Screen Negative Screen Negative    Opiates Urine Screen Positive (A) Screen Negative    PCP Urine Screen Negative Screen Negative   XR Ankle Left G/E 3 Views    Narrative    EXAM: XR  ANKLE LEFT G/E 3 VIEWS  LOCATION: [facility]  DATE: 4/10/2025    INDICATION: fell twice at group home residence now has left lateral malleolus ankle pain  COMPARISON: None.      Impression    IMPRESSION: Demineralization without displaced fracture. Spurring of the lateral malleolus. Small plantar calcaneal spur.   Chest CT w/o contrast   Result Value Ref Range    Radiologist flags Gallbladder ultrasound.     Narrative    EXAM: CT CHEST W/O CONTRAST  LOCATION: [facility]  DATE: 4/10/2025    INDICATION: Hypoxia.  COMPARISON: 1/31/2025.  TECHNIQUE: CT chest without IV contrast. Multiplanar reformats were obtained. Dose reduction techniques were used.  CONTRAST: None.    FINDINGS: Absence of intravenous contrast limits the sensitivity of this examination for detection of infectious/inflammatory change, post traumatic abnormalities, vascular abnormalities, and visceral lesions.    LUNGS AND PLEURA: Mild tracheobronchial debris and mild diffuse bronchial wall thickening. Acute airspace consolidation. Unchanged scarring within the anterior central left lower lobe and the adjacent lingula. Additional scattered areas of mild linear   atelectatic change and scarring.    Mild paraseptal emphysema, apical predominance. Scattered tiny calcified granulomas. There are several sub-4 mm noncalcified pulmonary nodules, unchanged from 8/6/2022; no further follow-up recommended.    No pneumothorax.    No pleural effusion.     MEDIASTINUM/AXILLAE: Subcentimeter mediastinal lymph nodes are stable from 8/6/2022; no further follow-up recommended.      Mild diffuse wall thickening of the mid and distal esophagus, unchanged from 2022, compatible with a chronic esophagitis.    No axillary lymphadenopathy.    Ovoid subcutaneous nodule of the posterior right back, measuring 3.5 cm, likely a sebaceous cyst, series 2 image 6.    No thoracic aortic aneurysm. Mild atherosclerotic  calcifications.    Heart is normal in size. No pericardial effusion.    CORONARY ARTERY CALCIFICATION: Mild.    UPPER ABDOMEN: Cholelithiasis. Nodular soft tissue opacity at the gallbladder fundus, favoring adenomyomatosis.    MUSCULOSKELETAL: No suspicious abnormality.    OTHER: No additionally suspicious abnormality.      Impression    IMPRESSION:  1.  No acute CT abnormality of the chest.  2.  Mild paraseptal emphysema.  3.  Chronic mid to distal esophagitis.  4.  Cholelithiasis.  5.  Nodular soft tissue thickening of the gallbladder fundus, favoring adenomyomatosis. Nonemergent gallbladder sonography follow-up is recommended.      [Recommend Follow Up: Gallbladder ultrasound.]    This report will be copied to the [facility] to ensure a provider acknowledges the finding.        *Note: Due to a large number of results and/or encounters for the requested time period, some results have not been displayed. A complete set of results can be found in Results Review.       Medications   iopamidol (ISOVUE-370) solution 67 mL (67 mLs Intravenous $Given 4/10/25 1227)   sodium chloride 0.9 % bag for CT scan flush (100 mLs As instructed $Given 4/10/25 1231)   lactated ringers BOLUS 1,000 mL (0 mLs Intravenous Stopped 4/10/25 1536)   ipratropium - albuterol 0.5 mg/2.5 mg/3 mL (DUONEB) neb solution 3 mL (3 mLs Nebulization $Given 4/10/25 2029)   naloxone (NARCAN) injection 4 mg (4 mg Nasal $Given 4/10/25 2023)   methylPREDNISolone Na Suc (solu-MEDROL) injection 125 mg (125 mg Intravenous $Given 4/10/25 2056)       Assessments & Plan (with Medical Decision Making)     I have reviewed the nursing notes.    I have reviewed the findings, diagnosis, plan and need for follow up with the patient.       ED to Inpatient Handoff:    Discussed with Willie Roberts at 1920  Patient not accepted until results of D dimer, VBG available and narcan administered.  Pt signed out to Dr Brady Perez at 2100  Code Status: DNR/DNI              Medical Decision Making  The patient's presentation was of high complexity (an acute health issue posing potential threat to life or bodily function).  Patient presents with fall x 2 from assisted living, hypoxia, somnolence-lethargy from assisted living.  The patient's evaluation involved:  Review of previous-most recent admission patient had similar presentation.  This includes workup that was previously done, admission status.  Consultation with assisted living home manager on duty who reports patient refuses to provide them with his medication list and is noncompliant.  Due to patient's fall today differential diagnosis includes subdural hematoma, subarachnoid hemorrhage, facial trauma to include fractures, vertebral fracture, patient reporting ankle pain so ankle fracture versus dislocation versus contusion, increased somnolence-altered mental status.  Per chart review patient on chronic narcotics.  Did not administer Narcan but did consider it, patient is hypoxic and oxygen was administered.  Workup revealing nasal fracture nasal septum fracture and left infraorbital possible fracture.  Consultation with ENT recommends early follow-up with no specific management for infra orbital possible fracture.  Recommendation to notify nurse of the ENT team-Yosi Dunaway and did notify her via messaging.  Tried all times to wean the patient on oxygen and he intermittently was not hypoxic but when he would stop that he would become hypoxic or roll over to his side he would become hypoxic.  Ordered CT of the chest to evaluate for possible hypoxia considered PE study however patient was not tachycardic and was not coughing and denied any chest pain or shortness of breath.  Deferred this test at this time.  Patient selective and sometimes would respond and sometimes would not respond does not seem to be altered as he can respond appropriately depending upon the question and/or the individual.  Due to the ongoing  hypoxia I consulted with hospitalist and requested admission for hypoxia uncertain etiology possible COPD exacerbation with this in mind, ordered DuoNeb.  Consulted with hospitalist Willie Roberts and see notes above -additional orders placed for D-dimer, VBG, Narcan for potential narcotic withdrawal.  I also ordered Solu-Medrol for potential COPD exacerbation.  Chart signed out to Dr. Brady Perez at 2100    New Prescriptions    No medications on file       Final diagnoses:   Closed fracture of nasal bone, initial encounter   Nasal septum fracture, closed, initial encounter   Acute encephalopathy   Hypoxia       4/10/2025   [facility] EMERGENCY DEPT       Jayden, Kaycee Nicole, APRN CNP  04/10/25 0510

## 2025-04-10 NOTE — ED TRIAGE NOTES
Patient fell at [facility] at 0300 and refused to have EMS transport to hospital. He was up to bathroom at 0900 and fell, hitting head/ face. EMS reports O2 sat at 85% on room air upon arrival and temp 99.7; vitals otherwise stable. Staff reports patient being more lethargic and shaky.     Triage Assessment (Adult)       Row Name 04/10/25 1041          Triage Assessment    Airway WDL WDL     Additional Documentation Breath Sounds (Group)        Respiratory WDL    Respiratory WDL X;rhythm/pattern     Rhythm/Pattern, Respiratory shallow        Breath Sounds    Breath Sounds All Fields     All Lung Fields Breath Sounds Anterior:;clear;diminished        Skin Circulation/Temperature WDL    Skin Circulation/Temperature WDL X;temperature     Skin Temperature cool        Cardiac WDL    Cardiac WDL WDL        Peripheral/Neurovascular WDL    Peripheral Neurovascular WDL X  pale        Cognitive/Neuro/Behavioral WDL    Cognitive/Neuro/Behavioral WDL X     Level of Consciousness lethargic     Arousal Level opens eyes spontaneously     Orientation oriented x 4     Speech slurred;logical;spontaneous     Mood/Behavior cooperative        Pupils (CN II)    Pupil PERRLA yes     Pupil Size Left 2 mm     Pupil Size Right 2 mm        [place] Coma Scale    Best Eye Response 4-->(E4) spontaneous     Best Motor Response 6-->(M6) obeys commands     Best Verbal Response 5-->(V5) oriented     [place] Coma Scale Score 15

## 2025-04-11 ENCOUNTER — APPOINTMENT (OUTPATIENT)
Dept: OCCUPATIONAL THERAPY | Facility: CLINIC | Age: 70
DRG: 190 | End: 2025-04-11
Payer: COMMERCIAL

## 2025-04-11 ENCOUNTER — APPOINTMENT (OUTPATIENT)
Dept: CARDIOLOGY | Facility: CLINIC | Age: 70
DRG: 190 | End: 2025-04-11
Attending: PHYSICIAN ASSISTANT
Payer: COMMERCIAL

## 2025-04-11 ENCOUNTER — APPOINTMENT (OUTPATIENT)
Dept: PHYSICAL THERAPY | Facility: CLINIC | Age: 70
DRG: 190 | End: 2025-04-11
Attending: PHYSICIAN ASSISTANT
Payer: COMMERCIAL

## 2025-04-11 VITALS
BODY MASS INDEX: 29.29 KG/M2 | OXYGEN SATURATION: 93 % | WEIGHT: 210 LBS | TEMPERATURE: 99.3 F | DIASTOLIC BLOOD PRESSURE: 53 MMHG | RESPIRATION RATE: 18 BRPM | HEART RATE: 79 BPM | SYSTOLIC BLOOD PRESSURE: 138 MMHG

## 2025-04-11 PROBLEM — G93.40 ACUTE ENCEPHALOPATHY: Status: ACTIVE | Noted: 2025-04-11

## 2025-04-11 PROBLEM — R09.02 HYPOXIA: Status: ACTIVE | Noted: 2025-04-11

## 2025-04-11 PROBLEM — Z91.148 NON COMPLIANCE W MEDICATION REGIMEN: Chronic | Status: ACTIVE | Noted: 2025-04-11

## 2025-04-11 PROBLEM — S02.2XXA NASAL SEPTUM FRACTURE, CLOSED, INITIAL ENCOUNTER: Status: ACTIVE | Noted: 2025-04-11

## 2025-04-11 PROBLEM — J10.1 INFLUENZA A: Status: RESOLVED | Noted: 2025-01-31 | Resolved: 2025-04-11

## 2025-04-11 PROBLEM — S02.2XXA CLOSED FRACTURE OF NASAL BONE, INITIAL ENCOUNTER: Status: ACTIVE | Noted: 2025-04-11

## 2025-04-11 PROBLEM — J96.01 ACUTE RESPIRATORY FAILURE WITH HYPOXIA (H): Status: ACTIVE | Noted: 2025-04-11

## 2025-04-11 PROBLEM — F43.10 POSTTRAUMATIC STRESS DISORDER: Status: ACTIVE | Noted: 2022-11-10

## 2025-04-11 PROBLEM — R29.6 RECURRENT FALLS: Status: ACTIVE | Noted: 2025-04-11

## 2025-04-11 LAB
ANION GAP SERPL CALCULATED.3IONS-SCNC: 15 MMOL/L (ref 7–15)
BASOPHILS # BLD AUTO: 0 10E3/UL (ref 0–0.2)
BASOPHILS NFR BLD AUTO: 0 %
BUN SERPL-MCNC: 8.3 MG/DL (ref 8–23)
CALCIUM SERPL-MCNC: 9.7 MG/DL (ref 8.8–10.4)
CHLORIDE SERPL-SCNC: 102 MMOL/L (ref 98–107)
CREAT SERPL-MCNC: 0.65 MG/DL (ref 0.67–1.17)
EGFRCR SERPLBLD CKD-EPI 2021: >90 ML/MIN/1.73M2
EOSINOPHIL # BLD AUTO: 0 10E3/UL (ref 0–0.7)
EOSINOPHIL NFR BLD AUTO: 0 %
ERYTHROCYTE [DISTWIDTH] IN BLOOD BY AUTOMATED COUNT: 12.8 % (ref 10–15)
GLUCOSE SERPL-MCNC: 158 MG/DL (ref 70–99)
HCO3 SERPL-SCNC: 26 MMOL/L (ref 22–29)
HCT VFR BLD AUTO: 48.3 % (ref 40–53)
HGB BLD-MCNC: 16.2 G/DL (ref 13.3–17.7)
IMM GRANULOCYTES # BLD: 0 10E3/UL
IMM GRANULOCYTES NFR BLD: 0 %
LVEF ECHO: NORMAL
LYMPHOCYTES # BLD AUTO: 2.8 10E3/UL (ref 0.8–5.3)
LYMPHOCYTES NFR BLD AUTO: 22 %
MCH RBC QN AUTO: 31.9 PG (ref 26.5–33)
MCHC RBC AUTO-ENTMCNC: 33.5 G/DL (ref 31.5–36.5)
MCV RBC AUTO: 95 FL (ref 78–100)
MONOCYTES # BLD AUTO: 0.3 10E3/UL (ref 0–1.3)
MONOCYTES NFR BLD AUTO: 2 %
NEUTROPHILS # BLD AUTO: 9.4 10E3/UL (ref 1.6–8.3)
NEUTROPHILS NFR BLD AUTO: 76 %
NRBC # BLD AUTO: 0 10E3/UL
NRBC BLD AUTO-RTO: 0 /100
PLATELET # BLD AUTO: 252 10E3/UL (ref 150–450)
POTASSIUM SERPL-SCNC: 3.9 MMOL/L (ref 3.4–5.3)
RBC # BLD AUTO: 5.08 10E6/UL (ref 4.4–5.9)
SODIUM SERPL-SCNC: 143 MMOL/L (ref 135–145)
WBC # BLD AUTO: 12.5 10E3/UL (ref 4–11)

## 2025-04-11 PROCEDURE — 97161 PT EVAL LOW COMPLEX 20 MIN: CPT | Mod: GP | Performed by: PHYSICAL THERAPIST

## 2025-04-11 PROCEDURE — 94640 AIRWAY INHALATION TREATMENT: CPT

## 2025-04-11 PROCEDURE — 250N000009 HC RX 250: Performed by: PHYSICIAN ASSISTANT

## 2025-04-11 PROCEDURE — 99223 1ST HOSP IP/OBS HIGH 75: CPT | Mod: FS | Performed by: PHYSICIAN ASSISTANT

## 2025-04-11 PROCEDURE — 999N000157 HC STATISTIC RCP TIME EA 10 MIN

## 2025-04-11 PROCEDURE — 85025 COMPLETE CBC W/AUTO DIFF WBC: CPT | Performed by: EMERGENCY MEDICINE

## 2025-04-11 PROCEDURE — 97165 OT EVAL LOW COMPLEX 30 MIN: CPT | Mod: GO

## 2025-04-11 PROCEDURE — 99207 PR APP CREDIT; MD BILLING SHARED VISIT: CPT | Performed by: FAMILY MEDICINE

## 2025-04-11 PROCEDURE — 97110 THERAPEUTIC EXERCISES: CPT | Mod: GO

## 2025-04-11 PROCEDURE — 250N000013 HC RX MED GY IP 250 OP 250 PS 637: Performed by: FAMILY MEDICINE

## 2025-04-11 PROCEDURE — 99418 PROLNG IP/OBS E/M EA 15 MIN: CPT | Mod: FS | Performed by: PHYSICIAN ASSISTANT

## 2025-04-11 PROCEDURE — 93306 TTE W/DOPPLER COMPLETE: CPT | Mod: 26 | Performed by: INTERNAL MEDICINE

## 2025-04-11 PROCEDURE — 255N000002 HC RX 255 OP 636: Performed by: PHYSICIAN ASSISTANT

## 2025-04-11 PROCEDURE — 250N000013 HC RX MED GY IP 250 OP 250 PS 637: Performed by: PHYSICIAN ASSISTANT

## 2025-04-11 PROCEDURE — 250N000013 HC RX MED GY IP 250 OP 250 PS 637: Performed by: EMERGENCY MEDICINE

## 2025-04-11 PROCEDURE — 36415 COLL VENOUS BLD VENIPUNCTURE: CPT | Performed by: EMERGENCY MEDICINE

## 2025-04-11 PROCEDURE — 80048 BASIC METABOLIC PNL TOTAL CA: CPT | Performed by: EMERGENCY MEDICINE

## 2025-04-11 PROCEDURE — 999N000208 ECHOCARDIOGRAM COMPLETE

## 2025-04-11 PROCEDURE — 250N000012 HC RX MED GY IP 250 OP 636 PS 637: Performed by: PHYSICIAN ASSISTANT

## 2025-04-11 PROCEDURE — 120N000001 HC R&B MED SURG/OB

## 2025-04-11 RX ORDER — NICOTINE 21 MG/24HR
1 PATCH, TRANSDERMAL 24 HOURS TRANSDERMAL DAILY
Status: DISCONTINUED | OUTPATIENT
Start: 2025-04-11 | End: 2025-04-13 | Stop reason: HOSPADM

## 2025-04-11 RX ORDER — MORPHINE SULFATE 15 MG/1
30 TABLET, FILM COATED, EXTENDED RELEASE ORAL 3 TIMES DAILY
Status: DISCONTINUED | OUTPATIENT
Start: 2025-04-11 | End: 2025-04-13 | Stop reason: HOSPADM

## 2025-04-11 RX ORDER — AMOXICILLIN 250 MG
1 CAPSULE ORAL 2 TIMES DAILY PRN
Status: DISCONTINUED | OUTPATIENT
Start: 2025-04-11 | End: 2025-04-13 | Stop reason: HOSPADM

## 2025-04-11 RX ORDER — PROPRANOLOL HYDROCHLORIDE 60 MG/1
120 CAPSULE, EXTENDED RELEASE ORAL DAILY
Status: DISCONTINUED | OUTPATIENT
Start: 2025-04-11 | End: 2025-04-13 | Stop reason: HOSPADM

## 2025-04-11 RX ORDER — SIMVASTATIN 40 MG
80 TABLET ORAL DAILY
Status: DISCONTINUED | OUTPATIENT
Start: 2025-04-11 | End: 2025-04-13 | Stop reason: HOSPADM

## 2025-04-11 RX ORDER — AMOXICILLIN 250 MG
2 CAPSULE ORAL 2 TIMES DAILY PRN
Status: DISCONTINUED | OUTPATIENT
Start: 2025-04-11 | End: 2025-04-13 | Stop reason: HOSPADM

## 2025-04-11 RX ORDER — PREGABALIN 75 MG/1
150 CAPSULE ORAL 2 TIMES DAILY
Status: DISCONTINUED | OUTPATIENT
Start: 2025-04-11 | End: 2025-04-13 | Stop reason: HOSPADM

## 2025-04-11 RX ORDER — NALOXONE HYDROCHLORIDE 0.4 MG/ML
0.2 INJECTION, SOLUTION INTRAMUSCULAR; INTRAVENOUS; SUBCUTANEOUS
Status: DISCONTINUED | OUTPATIENT
Start: 2025-04-11 | End: 2025-04-13 | Stop reason: HOSPADM

## 2025-04-11 RX ORDER — ONDANSETRON 4 MG/1
4 TABLET, ORALLY DISINTEGRATING ORAL EVERY 6 HOURS PRN
Status: DISCONTINUED | OUTPATIENT
Start: 2025-04-11 | End: 2025-04-13 | Stop reason: HOSPADM

## 2025-04-11 RX ORDER — ALBUTEROL SULFATE 5 MG/ML
2.5 SOLUTION RESPIRATORY (INHALATION) EVERY 4 HOURS PRN
Status: DISCONTINUED | OUTPATIENT
Start: 2025-04-11 | End: 2025-04-13 | Stop reason: HOSPADM

## 2025-04-11 RX ORDER — LURASIDONE HYDROCHLORIDE 20 MG/1
20 TABLET, FILM COATED ORAL
Status: DISCONTINUED | OUTPATIENT
Start: 2025-04-11 | End: 2025-04-13 | Stop reason: HOSPADM

## 2025-04-11 RX ORDER — IPRATROPIUM BROMIDE AND ALBUTEROL SULFATE 2.5; .5 MG/3ML; MG/3ML
3 SOLUTION RESPIRATORY (INHALATION) ONCE
Status: DISCONTINUED | OUTPATIENT
Start: 2025-04-11 | End: 2025-04-12

## 2025-04-11 RX ORDER — PROCHLORPERAZINE MALEATE 5 MG/1
5 TABLET ORAL EVERY 6 HOURS PRN
Status: DISCONTINUED | OUTPATIENT
Start: 2025-04-11 | End: 2025-04-13 | Stop reason: HOSPADM

## 2025-04-11 RX ORDER — OXYCODONE HYDROCHLORIDE 5 MG/1
10 TABLET ORAL EVERY 4 HOURS PRN
Status: DISCONTINUED | OUTPATIENT
Start: 2025-04-11 | End: 2025-04-13 | Stop reason: HOSPADM

## 2025-04-11 RX ORDER — DULOXETIN HYDROCHLORIDE 30 MG/1
120 CAPSULE, DELAYED RELEASE ORAL DAILY
Status: DISCONTINUED | OUTPATIENT
Start: 2025-04-11 | End: 2025-04-13 | Stop reason: HOSPADM

## 2025-04-11 RX ORDER — NAPROXEN 250 MG/1
500 TABLET ORAL 2 TIMES DAILY PRN
Status: DISCONTINUED | OUTPATIENT
Start: 2025-04-11 | End: 2025-04-11

## 2025-04-11 RX ORDER — PREDNISONE 20 MG/1
40 TABLET ORAL DAILY
Status: DISCONTINUED | OUTPATIENT
Start: 2025-04-11 | End: 2025-04-13 | Stop reason: HOSPADM

## 2025-04-11 RX ORDER — NALOXONE HYDROCHLORIDE 0.4 MG/ML
0.4 INJECTION, SOLUTION INTRAMUSCULAR; INTRAVENOUS; SUBCUTANEOUS
Status: DISCONTINUED | OUTPATIENT
Start: 2025-04-11 | End: 2025-04-13 | Stop reason: HOSPADM

## 2025-04-11 RX ORDER — CALCIUM CARBONATE 500 MG/1
1000 TABLET, CHEWABLE ORAL 4 TIMES DAILY PRN
Status: DISCONTINUED | OUTPATIENT
Start: 2025-04-11 | End: 2025-04-13 | Stop reason: HOSPADM

## 2025-04-11 RX ORDER — ONDANSETRON 2 MG/ML
4 INJECTION INTRAMUSCULAR; INTRAVENOUS EVERY 6 HOURS PRN
Status: DISCONTINUED | OUTPATIENT
Start: 2025-04-11 | End: 2025-04-13 | Stop reason: HOSPADM

## 2025-04-11 RX ORDER — OXYCODONE HYDROCHLORIDE 5 MG/1
5 TABLET ORAL EVERY 4 HOURS PRN
Status: DISCONTINUED | OUTPATIENT
Start: 2025-04-11 | End: 2025-04-13 | Stop reason: HOSPADM

## 2025-04-11 RX ORDER — OXYCODONE HYDROCHLORIDE 5 MG/1
10 TABLET ORAL 3 TIMES DAILY
Status: DISCONTINUED | OUTPATIENT
Start: 2025-04-11 | End: 2025-04-11

## 2025-04-11 RX ORDER — HYDROCODONE BITARTRATE AND ACETAMINOPHEN 5; 325 MG/1; MG/1
1 TABLET ORAL EVERY 8 HOURS PRN
Status: DISCONTINUED | OUTPATIENT
Start: 2025-04-11 | End: 2025-04-11

## 2025-04-11 RX ORDER — SALIVA STIMULANT COMB. NO.3
1 SPRAY, NON-AEROSOL (ML) MUCOUS MEMBRANE 4 TIMES DAILY PRN
Status: DISCONTINUED | OUTPATIENT
Start: 2025-04-11 | End: 2025-04-13 | Stop reason: HOSPADM

## 2025-04-11 RX ORDER — LIDOCAINE 40 MG/G
CREAM TOPICAL
Status: DISCONTINUED | OUTPATIENT
Start: 2025-04-11 | End: 2025-04-13 | Stop reason: HOSPADM

## 2025-04-11 RX ORDER — PANTOPRAZOLE SODIUM 40 MG/1
40 TABLET, DELAYED RELEASE ORAL
Status: DISCONTINUED | OUTPATIENT
Start: 2025-04-12 | End: 2025-04-13 | Stop reason: HOSPADM

## 2025-04-11 RX ORDER — SODIUM CHLORIDE 9 MG/ML
INJECTION, SOLUTION INTRAVENOUS CONTINUOUS
Status: DISCONTINUED | OUTPATIENT
Start: 2025-04-11 | End: 2025-04-11

## 2025-04-11 RX ORDER — IPRATROPIUM BROMIDE AND ALBUTEROL SULFATE 2.5; .5 MG/3ML; MG/3ML
3 SOLUTION RESPIRATORY (INHALATION)
Status: DISCONTINUED | OUTPATIENT
Start: 2025-04-11 | End: 2025-04-13 | Stop reason: HOSPADM

## 2025-04-11 RX ORDER — ACETAMINOPHEN 325 MG/1
650 TABLET ORAL EVERY 4 HOURS PRN
Status: DISCONTINUED | OUTPATIENT
Start: 2025-04-11 | End: 2025-04-13 | Stop reason: HOSPADM

## 2025-04-11 RX ORDER — ACETAMINOPHEN 325 MG/1
975 TABLET ORAL 3 TIMES DAILY
Status: DISCONTINUED | OUTPATIENT
Start: 2025-04-11 | End: 2025-04-13 | Stop reason: HOSPADM

## 2025-04-11 RX ORDER — OXYCODONE HYDROCHLORIDE 5 MG/1
5 TABLET ORAL 3 TIMES DAILY
Status: DISCONTINUED | OUTPATIENT
Start: 2025-04-11 | End: 2025-04-13 | Stop reason: HOSPADM

## 2025-04-11 RX ADMIN — HUMAN ALBUMIN MICROSPHERES AND PERFLUTREN 2 ML: 10; .22 INJECTION, SOLUTION INTRAVENOUS at 14:03

## 2025-04-11 RX ADMIN — OXYCODONE 10 MG: 5 TABLET ORAL at 14:11

## 2025-04-11 RX ADMIN — ACETAMINOPHEN 975 MG: 325 TABLET ORAL at 10:31

## 2025-04-11 RX ADMIN — LURASIDONE HYDROCHLORIDE 20 MG: 20 TABLET, FILM COATED ORAL at 17:02

## 2025-04-11 RX ADMIN — OXYCODONE HYDROCHLORIDE 5 MG: 5 TABLET ORAL at 20:13

## 2025-04-11 RX ADMIN — PREGABALIN 150 MG: 75 CAPSULE ORAL at 11:00

## 2025-04-11 RX ADMIN — ACETAMINOPHEN 975 MG: 325 TABLET ORAL at 14:11

## 2025-04-11 RX ADMIN — MORPHINE SULFATE 30 MG: 15 TABLET, FILM COATED, EXTENDED RELEASE ORAL at 17:02

## 2025-04-11 RX ADMIN — HYDROCODONE BITARTRATE AND ACETAMINOPHEN 1 TABLET: 5; 325 TABLET ORAL at 03:31

## 2025-04-11 RX ADMIN — SIMVASTATIN 80 MG: 40 TABLET, FILM COATED ORAL at 11:01

## 2025-04-11 RX ADMIN — PREDNISONE 40 MG: 20 TABLET ORAL at 11:00

## 2025-04-11 RX ADMIN — IPRATROPIUM BROMIDE AND ALBUTEROL SULFATE 3 ML: 2.5; .5 SOLUTION RESPIRATORY (INHALATION) at 14:44

## 2025-04-11 RX ADMIN — DULOXETINE HYDROCHLORIDE 120 MG: 30 CAPSULE, DELAYED RELEASE ORAL at 10:31

## 2025-04-11 RX ADMIN — MORPHINE SULFATE 30 MG: 15 TABLET, FILM COATED, EXTENDED RELEASE ORAL at 11:00

## 2025-04-11 RX ADMIN — TRAZODONE HYDROCHLORIDE 250 MG: 100 TABLET ORAL at 20:13

## 2025-04-11 RX ADMIN — PREGABALIN 150 MG: 75 CAPSULE ORAL at 20:13

## 2025-04-11 ASSESSMENT — ACTIVITIES OF DAILY LIVING (ADL)
ADLS_ACUITY_SCORE: 51
DRESSING/BATHING: BATHING DIFFICULTY, ASSISTANCE 1 PERSON;DRESSING DIFFICULTY, ASSISTANCE 1 PERSON
CHANGE_IN_FUNCTIONAL_STATUS_SINCE_ONSET_OF_CURRENT_ILLNESS/INJURY: YES
CONCENTRATING,_REMEMBERING_OR_MAKING_DECISIONS_DIFFICULTY: NO
WALKING_OR_CLIMBING_STAIRS: TRANSFERRING DIFFICULTY, REQUIRES EQUIPMENT;STAIR CLIMBING DIFFICULTY, REQUIRES EQUIPMENT;AMBULATION DIFFICULTY, REQUIRES EQUIPMENT
FALL_HISTORY_WITHIN_LAST_SIX_MONTHS: YES
ADLS_ACUITY_SCORE: 51
ADLS_ACUITY_SCORE: 56
ADLS_ACUITY_SCORE: 51
ADLS_ACUITY_SCORE: 56
ADLS_ACUITY_SCORE: 51
ADLS_ACUITY_SCORE: 56
WALKING_OR_CLIMBING_STAIRS_DIFFICULTY: YES
ADLS_ACUITY_SCORE: 51
DIFFICULTY_COMMUNICATING: NO
ADLS_ACUITY_SCORE: 56
ADLS_ACUITY_SCORE: 51
ADLS_ACUITY_SCORE: 56
ADLS_ACUITY_SCORE: 56
ADLS_ACUITY_SCORE: 51
DOING_ERRANDS_INDEPENDENTLY_DIFFICULTY: YES
ADLS_ACUITY_SCORE: 56
ADLS_ACUITY_SCORE: 51
WEAR_GLASSES_OR_BLIND: NO
TOILETING_ISSUES: NO
ADLS_ACUITY_SCORE: 51
ADLS_ACUITY_SCORE: 56
ADLS_ACUITY_SCORE: 51
NUMBER_OF_TIMES_PATIENT_HAS_FALLEN_WITHIN_LAST_SIX_MONTHS: 4
ADLS_ACUITY_SCORE: 51
HEARING_DIFFICULTY_OR_DEAF: NO
ADLS_ACUITY_SCORE: 51
DIFFICULTY_EATING/SWALLOWING: NO
DRESSING/BATHING_DIFFICULTY: YES
ADLS_ACUITY_SCORE: 56

## 2025-04-11 NOTE — PROGRESS NOTES
04/11/25 1114   Appointment Info   Signing Clinician's Name / Credentials (PT) Yosvany Roman, PT, DPT, OCS   General Information   Onset of Illness/Injury or Date of Surgery 04/10/25   Referring Physician Dr. Mendoza   Patient/Family Therapy Goals Statement (PT) Return home   Pertinent History of Current Problem (include personal factors and/or comorbidities that impact the POC) From MD note: Melquiades Morales is a 70 year old male who presents to the ED via EMS following falls noted at patient's residence.  Per report patient fell at 0300 when he was using the restroom and hit his face and refused transport at that time.  Patient fell again at 9 AM and hit his head and face.  He agreed to go to the emergency room at that time.  Per assisted living facility his O2 saturation was noted to be 70% at that time prior to transfer.  Per EMS patient was noted to be at 85% on room air with temp of 99.7.  Staff reports patient is more lethargic and shaky.   Weight-Bearing Status - LUE full weight-bearing   Weight-Bearing Status - RUE full weight-bearing   Weight-Bearing Status - LLE full weight-bearing   Weight-Bearing Status - RLE full weight-bearing   General Observations Tremor in B UE's and upper body throughout session.   Cognition   Affect/Mental Status (Cognition) flat/blunted affect   Orientation Status (Cognition) person;oriented to;place   Follows Commands (Cognition) follows one-step commands;50-74% accuracy   Safety Deficit (Cognition) impulsivity;safety precautions awareness   Integumentary/Edema   Integumentary/Edema Comments bruising at nose and several abrasions around his body.   Posture    Posture Forward head position   Range of Motion (ROM)   Range of Motion ROM is WFL   Strength (Manual Muscle Testing)   Strength (Manual Muscle Testing) strength is WFL   Bed Mobility   Bed Mobility supine-sit   Transfers   Transfers sit-stand transfer   Sit-Stand Transfer   Sit-Stand [place] (Transfers) contact  guard   Assistive Device (Sit-Stand Transfers) walker, front-wheeled   Gait/Stairs (Locomotion)   [place] Level (Gait) supervision   Assistive Device (Gait) walker, front-wheeled   Distance in Feet (Gait) 60   Pattern (Gait) step-through   Deviations/Abnormal Patterns (Gait) gait speed decreased   Balance   Balance Comments Supervision in standing with 2WW.   Sensory Examination   Sensory Perception patient reports no sensory changes   Coordination   Coordination no deficits were identified   Muscle Tone   Muscle Tone no deficits were identified   Clinical Impression   Criteria for Skilled Therapeutic Intervention Yes, treatment indicated   PT Diagnosis (PT) generalized weakness   Influenced by the following impairments weakness, impaired balance, safety awareness   Functional limitations due to impairments bed mobility, transfers, ambulation   Clinical Presentation (PT Evaluation Complexity) stable   Clinical Presentation Rationale clinical judgement   Clinical Decision Making (Complexity) low complexity   Planned Therapy Interventions (PT) balance training;bed mobility training;gait training;home exercise program;neuromuscular re-education;patient/family education;ROM (range of motion);stair training;strengthening;stretching;transfer training;wheelchair management/propulsion training   Risk & Benefits of therapy have been explained evaluation/treatment results reviewed;care plan/treatment goals reviewed;risks/benefits reviewed;current/potential barriers reviewed;participants voiced agreement with care plan;participants included;patient   PT Total Evaluation Time   PT Eval, Low Complexity Minutes (48564) 15   Physical Therapy Goals   PT Frequency 3x/week   PT Predicted Duration/Target Date for Goal Attainment 04/18/25   PT Goals Gait;Transfers   PT: Transfers Supervision/stand-by assist;Sit to/from stand   PT: Gait Supervision/stand-by assist;100 feet   PT Discharge Planning   PT Plan Fri 1/3.  Transfers with  correct hand positioning, ambulation.   PT Discharge Recommendation (DC Rec) home with home care physical therapy   PT Rationale for DC Rec Pt from [facility]/group home at baseline.  Pt demonstrated appropriate mobility to return home with recommendation for homecare to improve strength and mobility.   PT Brief overview of current status CGA with transfer, supervision with ambulation with 2WW.   PT Total Distance Amb During Session (feet) 60   Physical Therapy Time and Intention   Total Session Time (sum of timed and untimed services) 15

## 2025-04-11 NOTE — MEDICATION SCRIBE - ADMISSION MEDICATION HISTORY
Medication Scribe Admission Medication History    Admission medication history is complete. The information provided in this note is only as accurate as the sources available at the time of the update.    Information Source(s): Patient via in-person    Pertinent Information: pt does not take calcium, nereva, protonix     Changes made to PTA medication list:  Added: None  Deleted: calcium, nereva, protonix  Changed: None    Allergies reviewed with patient and updates made in EHR: yes    Medication History Completed By: Raven Gilbert 4/11/2025 10:28 AM    PTA Med List   Medication Sig Last Dose/Taking    albuterol (PROAIR HFA) 108 (90 Base) MCG/ACT inhaler Inhale 2 puffs into the lungs every 4 hours as needed for shortness of breath or wheezing. Past Month    DULoxetine (CYMBALTA) 60 MG capsule Take 2 capsules (120 mg) by mouth daily. 4/9/2025 Morning    lurasidone (LATUDA) 20 MG TABS tablet Take 1 tablet (20 mg) by mouth daily (with dinner). 4/9/2025 Evening    morphine (MS CONTIN) 30 MG CR tablet Take 1 tablet (30 mg) by mouth 3 times daily. 6 AM   12 pm   5 pm 4/9/2025 Evening    naproxen (NAPROSYN) 500 MG tablet Take 1 tablet (500 mg) by mouth 2 times daily as needed for headaches Past Week    oxyCODONE IR (ROXICODONE) 10 MG tablet Take 1 tablet by mouth 3 times daily 4/9/2025 Bedtime    pregabalin (LYRICA) 150 MG capsule Take 1 capsule by mouth 2 times daily. 4/9/2025 Evening    propranolol ER (INDERAL LA) 120 MG 24 hr capsule Take 1 capsule (120 mg) by mouth daily. 4/9/2025 Morning    simvastatin (ZOCOR) 80 MG tablet Take 1 tablet (80 mg) by mouth daily. 4/9/2025 Morning    traZODone (DESYREL) 100 MG tablet TAKE TWO TABLETS BY MOUTH ONCE DAILY AT BEDTIME IN ADDITION TO THE 50MG TABLET FOR A TOTAL OF 250MG PER DAY 4/9/2025 Bedtime    traZODone (DESYREL) 50 MG tablet TAKE ONE TABLET BY MOUTH ONCE DAILY AT BEDTIME TAKE ALONG WITH THE  MG TABLETS FOR TOTAL DOSE  MG 4/9/2025 Bedtime

## 2025-04-11 NOTE — PROGRESS NOTES
04/11/25 1100   Appointment Info   Signing Clinician's Name / Credentials (OT) Elma Singh, OTR/L   Living Environment   People in Home alone   Current Living Arrangements assisted living   Home Accessibility no concerns;wheelchair accessible   Self-Care   Usual Activity Tolerance fair   Current Activity Tolerance poor   Equipment Currently Used at Home wheelchair, power;tub bench;raised toilet seat;grab bar, tub/shower;grab bar, toilet   Fall history within last six months yes   Number of times patient has fallen within last six months 4   Activity/Exercise/Self-Care Comment Pt completes ADLs at baseline.   Instrumental Activities of Daily Living (IADL)   IADL Comments Pt receives assist for IADLs.   General Information   Onset of Illness/Injury or Date of Surgery 04/10/25   Referring Physician Dr. Ruba Andino   Patient/Family Therapy Goal Statement (OT) to return home   Additional Occupational Profile Info/Pertinent History of Current Problem Melquiades Morales is a 70 year old male who presented to the ED via EMS following falls noted at patient's residence.  Per report patient fell at 0300 when he was using the restroom and hit his face and refused transport at that time.  Patient fell again at 9 AM and hit his head and face.  He agreed to go to the emergency room at that time.  Per assisted living facility his O2 saturation was noted to be 70% at that time prior to transfer.  Per EMS patient was noted to be at 85% on room air with temp of 99.7.  Staff reports patient is more lethargic and shaky.     Patient tells me that he got up to use the restroom and fell and hit his face.  Patient states he went back to bed and got up and fell again.  When I asked the patient the same questions approximately an hour later he states he fell but does not remember going to the bathroom.   Existing Precautions/Restrictions fall   General Observations and Info Communication limited to 1-word responses   Cognitive  Status Examination   Follows Commands follows one-step commands   Safety Deficit insight into deficits/self-awareness   Cognitive Status Comments Pt able to identify what equipment and supports he has at home.   Visual Perception   Visual Impairment/Limitations WFL   Sensory   Sensory Quick Adds sensation intact   Pain Assessment   Patient Currently in Pain No  (No pain behaviors noted.)   Posture   Posture forward head position   Range of Motion Comprehensive   General Range of Motion no range of motion deficits identified   Bed Mobility   Bed Mobility supine-sit   Supine-Sit [place] (Bed Mobility) supervision;verbal cues   Assistive Device (Bed Mobility) bed rails   Transfers   Transfers sit-stand transfer   Sit-Stand Transfer   Sit-Stand [place] (Transfers) contact guard;1 person to manage equipment   Assistive Device (Sit-Stand Transfers) walker, front-wheeled   Balance   Balance Assessment standing dynamic balance   Standing Balance: Dynamic contact guard;1-person assist   Position/Device Used, Standing Balance walker, standard   Clinical Impression   Criteria for Skilled Therapeutic Interventions Met (OT) Yes, treatment indicated   OT Diagnosis Decreased activity tolerance needed to complete ADLs   Influenced by the following impairments decreased activity tolerance   OT Problem List-Impairments impacting ADL problems related to;activity tolerance impaired;balance   Assessment of Occupational Performance 1-3 Performance Deficits   Identified Performance Deficits reduced activity tolerance for ADLs   Planned Therapy Interventions (OT) ADL retraining;progressive activity/exercise   Intervention Comments Educated on IP OT role and POC, evaluation completed and treatment initiated.   Clinical Decision Making Complexity (OT) problem focused assessment/low complexity   Risk & Benefits of therapy have been explained evaluation/treatment results reviewed;care plan/treatment goals reviewed;risks/benefits  reviewed;current/potential barriers reviewed;participants voiced agreement with care plan;participants included;patient   Clinical Impression Comments Pt w/ reduced activity tolerance resulting in reduced independence with ADLs. Pt would benefit from skilled OT to progress activity tolerance and safety needed to perform ADLs at discharge.   OT Total Evaluation Time   OT Eval, Low Complexity Minutes (38946) 15   OT Goals   Therapy Frequency (OT) 3 times/week   OT Predicted Duration/Target Date for Goal Attainment 04/18/25   OT Goals Lower Body Dressing;Toilet Transfer/Toileting;Hygiene/Grooming   OT: Hygiene/Grooming supervision/stand-by assist;while standing   OT: Lower Body Dressing Supervision/stand-by assist;including set-up/clothing retrieval   OT: Toilet Transfer/Toileting Supervision/stand-by assist   Interventions   Interventions Quick Adds Therapeutic Procedures/Exercise   Therapeutic Procedures/Exercise   Therapeutic Procedure: strength, endurance, ROM, flexibillity minutes (95258) 8   Treatment Detail/Skilled Intervention OT supported increasing pt's activity tolerance through getting pt out of bed, ambulating in the hallway, and performing an additional sit-to-stand tx from bed to chair; Pt required VC and SBA for bed mobility, pt performed sit-to-stand tx from EOB with CGAx2 and 2WW and required VC for increased safety with hand placement on bed. Pt ambulated with 2WW and CGAx1 for 2 minutes. Pt performed stand-to-sit and sit-to-stand tx to EOB and back to standing with CGAx1 and 2WW. Provided VC, pt able to pivot and take side steps to perform stand-to-sit tx to chair with CGAx1 and VC for safety. OT educated pt on importance for sitting upright throughout the day and pt verbally agreed. Pt left in chair with needs in reach.   OT Discharge Planning   OT Plan POC Fri 1/3; increase activity tolerance for ADLs   OT Discharge Recommendation (DC Rec) home with home care occupational therapy   OT Rationale  for DC Rec Pt receives good assistance at home, pt would benefit from skilled home OT to progress activity tolerance, safety and independence in ADLs.   OT Brief overview of current status CGAx1 mobility/transfers, per clinical judgement max A for ADLs   OT Total Distance Amb During Session (feet) 30   Total Session Time   Timed Code Treatment Minutes 8   Total Session Time (sum of timed and untimed services) 23

## 2025-04-11 NOTE — PROGRESS NOTES
[facility] [place]    Patient admitted to room 2401 at approximately 0840 via cart from emergency room. Patient was accompanied by transport tech.     Verbal SBAR report received from Waleska GUILLAUME prior to patient arrival.     Patient trasferred to bed via air karen. Patient alert and oriented X 3. Patient is experiencing chronic pain, provider aware and just ordered home pain medication regiment  . Admission vital signs: Blood pressure (!) 142/63, pulse 85, temperature 98  F (36.7  C), temperature source Oral, resp. rate 18, weight 98.1 kg (216 lb 4.3 oz), SpO2 95%. Patient was oriented to plan of care, call light, bed controls, tv, telephone, bathroom, and visiting hours.     Risk Assessment    The following safety risks were identified during admission: fall. Yellow risk band applied: YES.     Skin Initial Assessment    This writer admitted this patient and completed a full skin assessment and Jeremiah score in the Adult PCS flowsheet.   Photo documentation of skin problem and/or wound competed via [employer] application (located under Media):  N/A    Appropriate interventions initiated as needed.     Secondary skin check completed by Armaan.         Education    Patient has a [place] to Observation order: No  Observation education completed and documented: N/A      ACOSTA BURLESON, RN

## 2025-04-11 NOTE — H&P
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 Routine CT Head without IV contrast. Multiplanar reformats. Dose reduction techniques were used.  2) Routine CT Facial Bones without IV contrast. Multiplanar reformats. Dose reduction techniques were used.  3) Routine CT Cervical Spine without IV contrast. Multiplanar reformats. Dose reduction techniques were used.    FINDINGS:  HEAD CT:   INTRACRANIAL CONTENTS: Images are mildly degraded by motion artifact particularly at the level of the skull base. No acute intracranial hemorrhage appreciated. No mass effect or midline shift. Ventricular size is within normal limits without evidence of   hydrocephalus. Mild diffuse parenchymal volume loss. Patchy periventricular white matter hypodensities are nonspecific, but most likely related to chronic microvascular ischemic disease.    OSSEOUS STRUCTURES/SOFT TISSUES: No significant abnormality.     FACIAL BONE CT:  OSSEOUS STRUCTURES/SOFT TISSUES: Soft tissue swelling over the anterior face particularly over the nose. Comminuted bilateral nasal bone fractures which appear to be deviated to the right. These are new since 1/31/2025 and are favored to be acute.   Comminuted fracture through the bony nasal septum which is deviated to the right. No definite fracture through the maxillary  sinus wall. Subtle cortical step-off of the left lamina papyracea although there is no adjacent orbital hematoma. This could   potentially represent a subtle fracture through the medial left orbital wall. The left vertical and horizontal lamella are not well visualized although this may be due to sinus opacification which was also present on 1/31/2025. The teeth are all absent.    SINUSES: Chronic opacification of the left frontal sinus, left ethmoid air cells, and left maxillary sinus with opacification of the left middle meatus, this was also present on 1/31/2025.    CERVICAL SPINE CT:   VERTEBRA: Convex left curvature of the cervical spine. No acute lucent fracture lines. No significant loss of vertebral body height. Marked degenerative endplate changes and loss of disc height at C5-C6 with disc bulge and endplate osteophytic spurring.    CANAL/FORAMINA: Moderate spinal canal narrowing at C5-C6. Moderate to severe bilateral neural foraminal narrowing at C5-C6.    PARASPINAL: No extraspinal abnormality. Visualized lung fields are clear.      Impression    IMPRESSION:  HEAD CT:  1.  Images are mildly degraded by motion artifact which limits evaluation, particularly near the skull base.  2.  No definite acute intracranial hemorrhage, mass, or herniation.  3.  Mild diffuse parenchymal volume loss and white matter changes which are most likely due to chronic microvascular ischemic disease.    FACIAL BONE CT:  1.  Acute comminuted bilateral nasal bone fractures which are deviated to the right.  2.  Acute fracture through the anterior bony nasal septum, deviated to the right.  3.  Subtle cortical step-off of the medial left orbital wall/lamina papyracea. This could represent subtle fracture. No orbital hematoma.  4.  Chronic opacification of the left frontal sinus, ethmoid air cells, and left maxillary sinus which was also present on 1/31/2025. This makes evaluation for subtle fractures in these areas as well as sinus  hemorrhage limited.    CERVICAL SPINE CT:  1.  No fracture or posttraumatic subluxation.  2.  Degenerative changes in the cervical spine, most pronounced at C5-C6.   CT Cervical Spine w/o Contrast    Narrative    EXAM: CT HEAD W/O CONTRAST, CT FACIAL BONES WITHOUT CONTRAST, CT CERVICAL SPINE W/O CONTRAST  LOCATION: [facility]  DATE: 4/10/2025    INDICATION: Fall X2, visible facial trauma, poor historian, ? confusion, unwitnessed per reports, lives in group home.  COMPARISON: Head CT 1/31/2025.  TECHNIQUE:   1) Routine CT Head without IV contrast. Multiplanar reformats. Dose reduction techniques were used.  2) Routine CT Facial Bones without IV contrast. Multiplanar reformats. Dose reduction techniques were used.  3) Routine CT Cervical Spine without IV contrast. Multiplanar reformats. Dose reduction techniques were used.    FINDINGS:  HEAD CT:   INTRACRANIAL CONTENTS: Images are mildly degraded by motion artifact particularly at the level of the skull base. No acute intracranial hemorrhage appreciated. No mass effect or midline shift. Ventricular size is within normal limits without evidence of   hydrocephalus. Mild diffuse parenchymal volume loss. Patchy periventricular white matter hypodensities are nonspecific, but most likely related to chronic microvascular ischemic disease.    OSSEOUS STRUCTURES/SOFT TISSUES: No significant abnormality.     FACIAL BONE CT:  OSSEOUS STRUCTURES/SOFT TISSUES: Soft tissue swelling over the anterior face particularly over the nose. Comminuted bilateral nasal bone fractures which appear to be deviated to the right. These are new since 1/31/2025 and are favored to be acute.   Comminuted fracture through the bony nasal septum which is deviated to the right. No definite fracture through the maxillary sinus wall. Subtle cortical step-off of the left lamina papyracea although there is no adjacent orbital hematoma. This could   potentially represent a subtle  fracture through the medial left orbital wall. The left vertical and horizontal lamella are not well visualized although this may be due to sinus opacification which was also present on 1/31/2025. The teeth are all absent.    SINUSES: Chronic opacification of the left frontal sinus, left ethmoid air cells, and left maxillary sinus with opacification of the left middle meatus, this was also present on 1/31/2025.    CERVICAL SPINE CT:   VERTEBRA: Convex left curvature of the cervical spine. No acute lucent fracture lines. No significant loss of vertebral body height. Marked degenerative endplate changes and loss of disc height at C5-C6 with disc bulge and endplate osteophytic spurring.    CANAL/FORAMINA: Moderate spinal canal narrowing at C5-C6. Moderate to severe bilateral neural foraminal narrowing at C5-C6.    PARASPINAL: No extraspinal abnormality. Visualized lung fields are clear.      Impression    IMPRESSION:  HEAD CT:  1.  Images are mildly degraded by motion artifact which limits evaluation, particularly near the skull base.  2.  No definite acute intracranial hemorrhage, mass, or herniation.  3.  Mild diffuse parenchymal volume loss and white matter changes which are most likely due to chronic microvascular ischemic disease.    FACIAL BONE CT:  1.  Acute comminuted bilateral nasal bone fractures which are deviated to the right.  2.  Acute fracture through the anterior bony nasal septum, deviated to the right.  3.  Subtle cortical step-off of the medial left orbital wall/lamina papyracea. This could represent subtle fracture. No orbital hematoma.  4.  Chronic opacification of the left frontal sinus, ethmoid air cells, and left maxillary sinus which was also present on 1/31/2025. This makes evaluation for subtle fractures in these areas as well as sinus hemorrhage limited.    CERVICAL SPINE CT:  1.  No fracture or posttraumatic subluxation.  2.  Degenerative changes in the cervical spine, most pronounced at  C5-C6.   CTA Head Neck with Contrast    Narrative    EXAM: CTA HEAD NECK W CONTRAST  LOCATION: [facility]  DATE: 4/10/2025    INDICATION: Fall x2, visible facial trauma, poor historian, ? confusion, unwitnessed per reports, lives in group home, LKW yesterday 1600   COMPARISON: None.  CONTRAST: 67 mL Isovue 370  TECHNIQUE: Head and neck CT angiogram with IV contrast. Axial helical CT images of the head and neck vessels obtained during the arterial phase of intravenous contrast administration. Axial 2D reconstructed images and multiplanar 3D MIP reconstructed   images of the head and neck vessels were performed by the technologist. Dose reduction techniques were used. All stenosis measurements made according to NASCET criteria unless otherwise specified.    FINDINGS:   HEAD CTA:  ANTERIOR CIRCULATION: No stenosis/occlusion, aneurysm, or high flow vascular malformation. Fetal origin of the right posterior cerebral artery from the anterior circulation.    POSTERIOR CIRCULATION: No stenosis/occlusion, aneurysm, or high flow vascular malformation. Balanced vertebral arteries supply a normal basilar artery.     DURAL VENOUS SINUSES: Expected enhancement of the major dural venous sinuses.    NECK CTA:  RIGHT CAROTID: Atherosclerotic plaque results in less than 50% stenosis in the right ICA. No dissection.    LEFT CAROTID: Atherosclerotic plaque results in less than 50% stenosis in the left ICA. No dissection.    VERTEBRAL ARTERIES: No focal stenosis or dissection. Balanced vertebral arteries.    AORTIC ARCH: Classic aortic arch anatomy with no significant stenosis at the origin of the great vessels.    NONVASCULAR STRUCTURES: Mild emphysematous changes at the visualized lung apices. There are degenerative changes in the spine.      Impression    IMPRESSION:   HEAD CTA:   1.  Normal CTA [place].    NECK CTA:  1.  Patent arteries in the neck without evidence of dissection.  2.  Mild  atherosclerotic disease along the carotid arteries without significant stenosis.   XR Ankle Left G/E 3 Views    Narrative    EXAM: XR ANKLE LEFT G/E 3 VIEWS  LOCATION: [facility]  DATE: 4/10/2025    INDICATION: fell twice at group home residence now has left lateral malleolus ankle pain  COMPARISON: None.      Impression    IMPRESSION: Demineralization without displaced fracture. Spurring of the lateral malleolus. Small plantar calcaneal spur.   Chest CT w/o contrast   Result Value    Radiologist flags Gallbladder ultrasound.    Narrative    EXAM: CT CHEST W/O CONTRAST  LOCATION: [facility]  DATE: 4/10/2025    INDICATION: Hypoxia.  COMPARISON: 1/31/2025.  TECHNIQUE: CT chest without IV contrast. Multiplanar reformats were obtained. Dose reduction techniques were used.  CONTRAST: None.    FINDINGS: Absence of intravenous contrast limits the sensitivity of this examination for detection of infectious/inflammatory change, post traumatic abnormalities, vascular abnormalities, and visceral lesions.    LUNGS AND PLEURA: Mild tracheobronchial debris and mild diffuse bronchial wall thickening. Acute airspace consolidation. Unchanged scarring within the anterior central left lower lobe and the adjacent lingula. Additional scattered areas of mild linear   atelectatic change and scarring.    Mild paraseptal emphysema, apical predominance. Scattered tiny calcified granulomas. There are several sub-4 mm noncalcified pulmonary nodules, unchanged from 8/6/2022; no further follow-up recommended.    No pneumothorax.    No pleural effusion.     MEDIASTINUM/AXILLAE: Subcentimeter mediastinal lymph nodes are stable from 8/6/2022; no further follow-up recommended.      Mild diffuse wall thickening of the mid and distal esophagus, unchanged from 2022, compatible with a chronic esophagitis.    No axillary lymphadenopathy.    Ovoid subcutaneous nodule of the posterior right back,  "measuring 3.5 cm, likely a sebaceous cyst, series 2 image 6.    No thoracic aortic aneurysm. Mild atherosclerotic calcifications.    Heart is normal in size. No pericardial effusion.    CORONARY ARTERY CALCIFICATION: Mild.    UPPER ABDOMEN: Cholelithiasis. Nodular soft tissue opacity at the gallbladder fundus, favoring adenomyomatosis.    MUSCULOSKELETAL: No suspicious abnormality.    OTHER: No additionally suspicious abnormality.      Impression    IMPRESSION:  1.  No acute CT abnormality of the chest.  2.  Mild paraseptal emphysema.  3.  Chronic mid to distal esophagitis.  4.  Cholelithiasis.  5.  Nodular soft tissue thickening of the gallbladder fundus, favoring adenomyomatosis. Nonemergent gallbladder sonography follow-up is recommended.      [Recommend Follow Up: Gallbladder ultrasound.]    This report will be copied to the [facility] to ensure a provider acknowledges the finding.      CT Chest Pulmonary Embolism w Contrast    Narrative    EXAM: CT CHEST PULMONARY EMBOLISM W CONTRAST  LOCATION: [facility]  DATE: 4/10/2025    INDICATION: Elevated d-dimer; r/o PE.  COMPARISON: Noncontrast chest CT from hours earlier.  TECHNIQUE: CT chest pulmonary angiogram during arterial phase injection of IV contrast. Multiplanar reformats and MIP reconstructions were performed. Dose reduction techniques were used.   CONTRAST: 80 mL Isovue-370.    FINDINGS:  ANGIOGRAM CHEST: No pulmonary embolism (within the limitation of breathing motion artifact) normal heart size without pericardial effusion. Coronary artery disease in the distal left main, proximal LAD, and proximal left circumflex. Thoracic aortic   atherosclerosis without aneurysm or dissection.    LUNGS AND PLEURA: Emphysema with mild bilateral \"platelike\" atelectasis/scarring. No pneumonia or pulmonary edema. No pleural effusion or pneumothorax.    MEDIASTINUM/AXILLAE: Circumferentially thickened entire esophagus, most notably in " the mid and lower esophagus without significant esophageal luminal distention. No posterior mediastinal fluid collection or pneumomediastinum. Mildly enlarged right lower   paratracheal lymph node.    UPPER ABDOMEN: Normal.    MUSCULOSKELETAL: Roughly 3 cm skin-based sebaceous cyst in the right upper back. No acute or aggressive osseous abnormalities.      Impression    IMPRESSION:  1.  No pulmonary embolism identified.  2.  Emphysema without pneumonia, pulmonary edema, pleural effusion, or pneumothorax.  3.  Multivessel coronary artery disease.  4.  Long segment esophageal wall thickening. This could represent vomiting, GERD, or primary esophagitis, among other possibilities.

## 2025-04-11 NOTE — ED PROVIDER NOTES
Emergency Department Patient Sign-out       Brief HPI:  This is a 70 year old male signed out to me by Kaycee Washington NP.  See initial ED Provider note for details of the presentation.     The patient's evaluation involved:  Review of previous-most recent admission patient had similar presentation.  This includes workup that was previously done, admission status.  Consultation with assisted living home manager on duty who reports patient refuses to provide them with his medication list and is noncompliant.  Due to patient's fall today differential diagnosis includes subdural hematoma, subarachnoid hemorrhage, facial trauma to include fractures, vertebral fracture, patient reporting ankle pain so ankle fracture versus dislocation versus contusion, increased somnolence-altered mental status.  Per chart review patient on chronic narcotics.  Did not administer Narcan but did consider it, patient is hypoxic and oxygen was administered.  Workup revealing nasal fracture nasal septum fracture and left infraorbital possible fracture.  Consultation with ENT recommends early follow-up with no specific management for infra orbital possible fracture.  Recommendation to notify nurse of the ENT team-Yosi Dunaway and did notify her via messaging.  Tried all times to wean the patient on oxygen and he intermittently was not hypoxic but when he would stop that he would become hypoxic or roll over to his side he would become hypoxic.  Ordered CT of the chest to evaluate for possible hypoxia considered PE study however patient was not tachycardic and was not coughing and denied any chest pain or shortness of breath.  Deferred this test at this time.  Patient selective and sometimes would respond and sometimes would not respond does not seem to be altered as he can respond appropriately depending upon the question and/or the individual.  Due to the ongoing hypoxia I consulted with hospitalist and requested admission for hypoxia  uncertain etiology possible COPD exacerbation with this in mind, ordered DuoNeb.  Consulted with hospitalist Willie Roberts and see notes above -additional orders placed for D-dimer, VBG, Narcan for potential narcotic withdrawal.  I also ordered Solu-Medrol for potential COPD exacerbation.  Chart signed out to Dr. Brady Perez at 2100     Patient was signed out to me for follow-up with venous blood gas and follow-up of CT PE protocol after D-dimer was positive.  pH of the venous blood gas was 7.48 with a CO2 43 O2 57 bicarb 32 with an oxyhemoglobin 91%.  2 L nasal cannula.  D-dimer was 0.97 and therefore CT PE protocol was obtained.  This revealed no pulmonary embolism.  Emphysema without pneumonia pulmonary edema pleural effusion or pneumothorax.  There is multivessel coronary artery disease.  Had long segment as esophageal wall thickening which could represent GERD primary esophagitis.  Case was discussed with TOM Stock with hospitalist service and he is in agreement with admitting the patient for further evaluation and care.  Orders were placed.      Exam:   Patient Vitals for the past 24 hrs:   BP Temp Temp src Pulse Resp SpO2 Weight   04/10/25 2100 -- -- -- -- -- 94 % --   04/10/25 2030 (!) 161/92 -- -- 97 -- -- --   04/10/25 2000 (!) 143/76 -- -- 72 -- -- --   04/10/25 1930 (!) 155/74 -- -- 68 -- -- --   04/10/25 1900 (!) 144/90 -- -- 79 -- (!) 91 % --   04/10/25 1850 (!) 149/68 -- -- -- -- (!) 90 % --   04/10/25 1800 -- -- -- -- -- 92 % --   04/10/25 1740 -- -- -- -- -- (!) 91 % --   04/10/25 1640 -- -- -- -- -- 97 % --   04/10/25 1630 -- -- -- -- -- 98 % --   04/10/25 1615 -- -- -- -- -- 94 % --   04/10/25 1535 -- -- -- -- -- 94 % --   04/10/25 1526 -- -- -- -- -- (!) 91 % --   04/10/25 1525 -- -- -- -- -- (!) 85 % --   04/10/25 1500 (!) 148/80 -- -- 58 -- 96 % --   04/10/25 1430 129/67 -- -- 60 -- 96 % --   04/10/25 1400 (!) 148/69 -- -- 62 -- 97 % --   04/10/25 1330 (!) 154/80 -- -- 61 -- 96 % --    04/10/25 1300 138/77 -- -- 78 -- 94 % --   04/10/25 1200 (!) 143/117 -- -- 55 18 94 % --   04/10/25 1100 (!) 158/85 -- -- 51 20 92 % --   04/10/25 1041 -- -- -- -- -- (!) 87 % --   04/10/25 1037 (!) 179/80 99.3  F (37.4  C) Oral 58 16 -- 95.3 kg (210 lb)           ED RESULTS:   Results for orders placed or performed during the hospital encounter of 04/10/25 (from the past 24 hours)   EKG 12 lead     Status: None (Preliminary result)    Collection Time: 04/10/25 10:50 AM   Result Value Ref Range    Systolic Blood Pressure  mmHg    Diastolic Blood Pressure  mmHg    Ventricular Rate 66 BPM    Atrial Rate 66 BPM    IL Interval 182 ms    QRS Duration 100 ms     ms    QTc 431 ms    P Axis -16 degrees    R AXIS 36 degrees    T Axis 21 degrees    Interpretation ECG       Sinus rhythm  Nonspecific T wave abnormality  Abnormal ECG  When compared with ECG of 31-Jan-2025 14:24,  No significant change was found     [place] Draw     Status: None    Collection Time: 04/10/25 10:59 AM    Narrative    The following orders were created for panel order [place] Draw.  Procedure                               Abnormality         Status                     ---------                               -----------         ------                     Extra Blood Culture Bottle[[id number]]                      Final result               Extra Blue Top Tube[[id number]]                             Final result               Extra Red Top Tube[[id number]]                              Final result               Extra Green Top (Lithiu...[[id number]]                      Final result               Extra Purple Top Tube[[id number]]                           Final result               Extra Green Top (Lithiu...[[id number]]                      Final result                 Please view results for these tests on the individual orders.   Extra Blood Culture Bottle     Status: None    Collection Time: 04/10/25 10:59 AM   Result Value Ref Range    Hold  Specimen JIC    Extra Blue Top Tube     Status: None    Collection Time: 04/10/25 10:59 AM   Result Value Ref Range    Hold Specimen JIC    Extra Red Top Tube     Status: None    Collection Time: 04/10/25 10:59 AM   Result Value Ref Range    Hold Specimen JIC    Extra Green Top (Lithium Heparin) Tube     Status: None    Collection Time: 04/10/25 10:59 AM   Result Value Ref Range    Hold Specimen JIC    Extra Purple Top Tube     Status: None    Collection Time: 04/10/25 10:59 AM   Result Value Ref Range    Hold Specimen JIC    Extra Green Top (Lithium Heparin) ON ICE     Status: None    Collection Time: 04/10/25 10:59 AM   Result Value Ref Range    Hold Specimen JIC    CBC with platelets differential     Status: Abnormal    Collection Time: 04/10/25 10:59 AM    Narrative    The following orders were created for panel order CBC with platelets differential.  Procedure                               Abnormality         Status                     ---------                               -----------         ------                     CBC with platelets and ...[[id number]]  Abnormal            Final result                 Please view results for these tests on the individual orders.   Comprehensive metabolic panel     Status: Abnormal    Collection Time: 04/10/25 10:59 AM   Result Value Ref Range    Sodium 147 (H) 135 - 145 mmol/L    Potassium 3.8 3.4 - 5.3 mmol/L    Carbon Dioxide (CO2) 33 (H) 22 - 29 mmol/L    Anion Gap 11 7 - 15 mmol/L    Urea Nitrogen 5.8 (L) 8.0 - 23.0 mg/dL    Creatinine 0.84 0.67 - 1.17 mg/dL    GFR Estimate >90 >60 mL/min/1.73m2    Calcium 10.0 8.8 - 10.4 mg/dL    Chloride 103 98 - 107 mmol/L    Glucose 113 (H) 70 - 99 mg/dL    Alkaline Phosphatase 68 40 - 150 U/L    AST 24 0 - 45 U/L    ALT 15 0 - 70 U/L    Protein Total 7.6 6.4 - 8.3 g/dL    Albumin 4.4 3.5 - 5.2 g/dL    Bilirubin Total 0.7 <=1.2 mg/dL   Lactic acid whole blood with 1x repeat in 2 hr when >2     Status: Normal    Collection Time:  04/10/25 10:59 AM   Result Value Ref Range    Lactic Acid, Initial 1.1 0.7 - 2.0 mmol/L   Magnesium     Status: Normal    Collection Time: 04/10/25 10:59 AM   Result Value Ref Range    Magnesium 2.3 1.7 - 2.3 mg/dL   TSH with free T4 reflex     Status: Normal    Collection Time: 04/10/25 10:59 AM   Result Value Ref Range    TSH 0.58 0.30 - 4.20 uIU/mL   CRP inflammation     Status: Normal    Collection Time: 04/10/25 10:59 AM   Result Value Ref Range    CRP Inflammation 4.03 <5.00 mg/L   CBC with platelets and differential     Status: Abnormal    Collection Time: 04/10/25 10:59 AM   Result Value Ref Range    WBC Count 12.4 (H) 4.0 - 11.0 10e3/uL    RBC Count 5.16 4.40 - 5.90 10e6/uL    Hemoglobin 16.6 13.3 - 17.7 g/dL    Hematocrit 50.7 40.0 - 53.0 %    MCV 98 78 - 100 fL    MCH 32.2 26.5 - 33.0 pg    MCHC 32.7 31.5 - 36.5 g/dL    RDW 12.9 10.0 - 15.0 %    Platelet Count 235 150 - 450 10e3/uL    % Neutrophils 68 %    % Lymphocytes 22 %    % Monocytes 9 %    % Eosinophils 1 %    % Basophils 0 %    % Immature Granulocytes 0 %    NRBCs per 100 WBC 0 <1 /100    Absolute Neutrophils 8.4 (H) 1.6 - 8.3 10e3/uL    Absolute Lymphocytes 2.7 0.8 - 5.3 10e3/uL    Absolute Monocytes 1.1 0.0 - 1.3 10e3/uL    Absolute Eosinophils 0.1 0.0 - 0.7 10e3/uL    Absolute Basophils 0.0 0.0 - 0.2 10e3/uL    Absolute Immature Granulocytes 0.0 <=0.4 10e3/uL    Absolute NRBCs 0.0 10e3/uL   Influenza A/B, RSV and SARS-CoV2 PCR (COVID-19) Nasopharyngeal     Status: Normal    Collection Time: 04/10/25 11:02 AM    Specimen: Nasopharyngeal; Swab   Result Value Ref Range    Influenza A PCR Negative Negative    Influenza B PCR Negative Negative    RSV PCR Negative Negative    SARS CoV2 PCR Negative Negative    Narrative    Testing was performed using the [employer] Xpress CoV2/Flu/RSV Assay on the Cepheid GeneXpert Instrument. This test should be ordered for the detection of SARS-CoV2, influenza, and RSV viruses in individuals with signs and symptoms of  respiratory tract infection. This test is for in vitro diagnostic use under the [place] FDA for laboratories certified under CLIA to perform high or moderate complexity testing. This test has been [place] FDA cleared. A negative result does not rule out the presence of PCR inhibitors in the specimen or target RNA in concentration below the limit of detection for the assay. If only one viral target is positive but coinfection with multiple targets is suspected, the sample should be re-tested with another FDA cleared, approved, or authorized test, if coninfection would change clinical management. This test was validated by the [facility]. These laboratories are certified under the Clinical Laboratory Improvement Amendments of 1988 (CLIA-88) as qualified to perfom high complexity laboratory testing.   Ammonia     Status: Normal    Collection Time: 04/10/25 12:23 PM   Result Value Ref Range    Ammonia 23 16 - 60 umol/L   CT Head w/o Contrast     Status: None    Collection Time: 04/10/25 12:43 PM    Narrative    EXAM: CT HEAD W/O CONTRAST, CT FACIAL BONES WITHOUT CONTRAST, CT CERVICAL SPINE W/O CONTRAST  LOCATION: [facility]  DATE: 4/10/2025    INDICATION: Fall X2, visible facial trauma, poor historian, ? confusion, unwitnessed per reports, lives in group home.  COMPARISON: Head CT 1/31/2025.  TECHNIQUE:   1) Routine CT Head without IV contrast. Multiplanar reformats. Dose reduction techniques were used.  2) Routine CT Facial Bones without IV contrast. Multiplanar reformats. Dose reduction techniques were used.  3) Routine CT Cervical Spine without IV contrast. Multiplanar reformats. Dose reduction techniques were used.    FINDINGS:  HEAD CT:   INTRACRANIAL CONTENTS: Images are mildly degraded by motion artifact particularly at the level of the skull base. No acute intracranial hemorrhage appreciated. No mass effect or midline shift. Ventricular size is within normal limits without  evidence of   hydrocephalus. Mild diffuse parenchymal volume loss. Patchy periventricular white matter hypodensities are nonspecific, but most likely related to chronic microvascular ischemic disease.    OSSEOUS STRUCTURES/SOFT TISSUES: No significant abnormality.     FACIAL BONE CT:  OSSEOUS STRUCTURES/SOFT TISSUES: Soft tissue swelling over the anterior face particularly over the nose. Comminuted bilateral nasal bone fractures which appear to be deviated to the right. These are new since 1/31/2025 and are favored to be acute.   Comminuted fracture through the bony nasal septum which is deviated to the right. No definite fracture through the maxillary sinus wall. Subtle cortical step-off of the left lamina papyracea although there is no adjacent orbital hematoma. This could   potentially represent a subtle fracture through the medial left orbital wall. The left vertical and horizontal lamella are not well visualized although this may be due to sinus opacification which was also present on 1/31/2025. The teeth are all absent.    SINUSES: Chronic opacification of the left frontal sinus, left ethmoid air cells, and left maxillary sinus with opacification of the left middle meatus, this was also present on 1/31/2025.    CERVICAL SPINE CT:   VERTEBRA: Convex left curvature of the cervical spine. No acute lucent fracture lines. No significant loss of vertebral body height. Marked degenerative endplate changes and loss of disc height at C5-C6 with disc bulge and endplate osteophytic spurring.    CANAL/FORAMINA: Moderate spinal canal narrowing at C5-C6. Moderate to severe bilateral neural foraminal narrowing at C5-C6.    PARASPINAL: No extraspinal abnormality. Visualized lung fields are clear.      Impression    IMPRESSION:  HEAD CT:  1.  Images are mildly degraded by motion artifact which limits evaluation, particularly near the skull base.  2.  No definite acute intracranial hemorrhage, mass, or herniation.  3.  Mild  diffuse parenchymal volume loss and white matter changes which are most likely due to chronic microvascular ischemic disease.    FACIAL BONE CT:  1.  Acute comminuted bilateral nasal bone fractures which are deviated to the right.  2.  Acute fracture through the anterior bony nasal septum, deviated to the right.  3.  Subtle cortical step-off of the medial left orbital wall/lamina papyracea. This could represent subtle fracture. No orbital hematoma.  4.  Chronic opacification of the left frontal sinus, ethmoid air cells, and left maxillary sinus which was also present on 1/31/2025. This makes evaluation for subtle fractures in these areas as well as sinus hemorrhage limited.    CERVICAL SPINE CT:  1.  No fracture or posttraumatic subluxation.  2.  Degenerative changes in the cervical spine, most pronounced at C5-C6.   CT Facial Bones without Contrast     Status: None    Collection Time: 04/10/25 12:49 PM    Narrative    EXAM: CT HEAD W/O CONTRAST, CT FACIAL BONES WITHOUT CONTRAST, CT CERVICAL SPINE W/O CONTRAST  LOCATION: [facility]  DATE: 4/10/2025    INDICATION: Fall X2, visible facial trauma, poor historian, ? confusion, unwitnessed per reports, lives in group home.  COMPARISON: Head CT 1/31/2025.  TECHNIQUE:   1) Routine CT Head without IV contrast. Multiplanar reformats. Dose reduction techniques were used.  2) Routine CT Facial Bones without IV contrast. Multiplanar reformats. Dose reduction techniques were used.  3) Routine CT Cervical Spine without IV contrast. Multiplanar reformats. Dose reduction techniques were used.    FINDINGS:  HEAD CT:   INTRACRANIAL CONTENTS: Images are mildly degraded by motion artifact particularly at the level of the skull base. No acute intracranial hemorrhage appreciated. No mass effect or midline shift. Ventricular size is within normal limits without evidence of   hydrocephalus. Mild diffuse parenchymal volume loss. Patchy periventricular white  matter hypodensities are nonspecific, but most likely related to chronic microvascular ischemic disease.    OSSEOUS STRUCTURES/SOFT TISSUES: No significant abnormality.     FACIAL BONE CT:  OSSEOUS STRUCTURES/SOFT TISSUES: Soft tissue swelling over the anterior face particularly over the nose. Comminuted bilateral nasal bone fractures which appear to be deviated to the right. These are new since 1/31/2025 and are favored to be acute.   Comminuted fracture through the bony nasal septum which is deviated to the right. No definite fracture through the maxillary sinus wall. Subtle cortical step-off of the left lamina papyracea although there is no adjacent orbital hematoma. This could   potentially represent a subtle fracture through the medial left orbital wall. The left vertical and horizontal lamella are not well visualized although this may be due to sinus opacification which was also present on 1/31/2025. The teeth are all absent.    SINUSES: Chronic opacification of the left frontal sinus, left ethmoid air cells, and left maxillary sinus with opacification of the left middle meatus, this was also present on 1/31/2025.    CERVICAL SPINE CT:   VERTEBRA: Convex left curvature of the cervical spine. No acute lucent fracture lines. No significant loss of vertebral body height. Marked degenerative endplate changes and loss of disc height at C5-C6 with disc bulge and endplate osteophytic spurring.    CANAL/FORAMINA: Moderate spinal canal narrowing at C5-C6. Moderate to severe bilateral neural foraminal narrowing at C5-C6.    PARASPINAL: No extraspinal abnormality. Visualized lung fields are clear.      Impression    IMPRESSION:  HEAD CT:  1.  Images are mildly degraded by motion artifact which limits evaluation, particularly near the skull base.  2.  No definite acute intracranial hemorrhage, mass, or herniation.  3.  Mild diffuse parenchymal volume loss and white matter changes which are most likely due to chronic  microvascular ischemic disease.    FACIAL BONE CT:  1.  Acute comminuted bilateral nasal bone fractures which are deviated to the right.  2.  Acute fracture through the anterior bony nasal septum, deviated to the right.  3.  Subtle cortical step-off of the medial left orbital wall/lamina papyracea. This could represent subtle fracture. No orbital hematoma.  4.  Chronic opacification of the left frontal sinus, ethmoid air cells, and left maxillary sinus which was also present on 1/31/2025. This makes evaluation for subtle fractures in these areas as well as sinus hemorrhage limited.    CERVICAL SPINE CT:  1.  No fracture or posttraumatic subluxation.  2.  Degenerative changes in the cervical spine, most pronounced at C5-C6.   CT Cervical Spine w/o Contrast     Status: None    Collection Time: 04/10/25 12:50 PM    Narrative    EXAM: CT HEAD W/O CONTRAST, CT FACIAL BONES WITHOUT CONTRAST, CT CERVICAL SPINE W/O CONTRAST  LOCATION: [facility]  DATE: 4/10/2025    INDICATION: Fall X2, visible facial trauma, poor historian, ? confusion, unwitnessed per reports, lives in group home.  COMPARISON: Head CT 1/31/2025.  TECHNIQUE:   1) Routine CT Head without IV contrast. Multiplanar reformats. Dose reduction techniques were used.  2) Routine CT Facial Bones without IV contrast. Multiplanar reformats. Dose reduction techniques were used.  3) Routine CT Cervical Spine without IV contrast. Multiplanar reformats. Dose reduction techniques were used.    FINDINGS:  HEAD CT:   INTRACRANIAL CONTENTS: Images are mildly degraded by motion artifact particularly at the level of the skull base. No acute intracranial hemorrhage appreciated. No mass effect or midline shift. Ventricular size is within normal limits without evidence of   hydrocephalus. Mild diffuse parenchymal volume loss. Patchy periventricular white matter hypodensities are nonspecific, but most likely related to chronic microvascular ischemic  disease.    OSSEOUS STRUCTURES/SOFT TISSUES: No significant abnormality.     FACIAL BONE CT:  OSSEOUS STRUCTURES/SOFT TISSUES: Soft tissue swelling over the anterior face particularly over the nose. Comminuted bilateral nasal bone fractures which appear to be deviated to the right. These are new since 1/31/2025 and are favored to be acute.   Comminuted fracture through the bony nasal septum which is deviated to the right. No definite fracture through the maxillary sinus wall. Subtle cortical step-off of the left lamina papyracea although there is no adjacent orbital hematoma. This could   potentially represent a subtle fracture through the medial left orbital wall. The left vertical and horizontal lamella are not well visualized although this may be due to sinus opacification which was also present on 1/31/2025. The teeth are all absent.    SINUSES: Chronic opacification of the left frontal sinus, left ethmoid air cells, and left maxillary sinus with opacification of the left middle meatus, this was also present on 1/31/2025.    CERVICAL SPINE CT:   VERTEBRA: Convex left curvature of the cervical spine. No acute lucent fracture lines. No significant loss of vertebral body height. Marked degenerative endplate changes and loss of disc height at C5-C6 with disc bulge and endplate osteophytic spurring.    CANAL/FORAMINA: Moderate spinal canal narrowing at C5-C6. Moderate to severe bilateral neural foraminal narrowing at C5-C6.    PARASPINAL: No extraspinal abnormality. Visualized lung fields are clear.      Impression    IMPRESSION:  HEAD CT:  1.  Images are mildly degraded by motion artifact which limits evaluation, particularly near the skull base.  2.  No definite acute intracranial hemorrhage, mass, or herniation.  3.  Mild diffuse parenchymal volume loss and white matter changes which are most likely due to chronic microvascular ischemic disease.    FACIAL BONE CT:  1.  Acute comminuted bilateral nasal bone  fractures which are deviated to the right.  2.  Acute fracture through the anterior bony nasal septum, deviated to the right.  3.  Subtle cortical step-off of the medial left orbital wall/lamina papyracea. This could represent subtle fracture. No orbital hematoma.  4.  Chronic opacification of the left frontal sinus, ethmoid air cells, and left maxillary sinus which was also present on 1/31/2025. This makes evaluation for subtle fractures in these areas as well as sinus hemorrhage limited.    CERVICAL SPINE CT:  1.  No fracture or posttraumatic subluxation.  2.  Degenerative changes in the cervical spine, most pronounced at C5-C6.   CTA Head Neck with Contrast     Status: None    Collection Time: 04/10/25 12:53 PM    Narrative    EXAM: CTA HEAD NECK W CONTRAST  LOCATION: [facility]  DATE: 4/10/2025    INDICATION: Fall x2, visible facial trauma, poor historian, ? confusion, unwitnessed per reports, lives in group home, LKW yesterday 1600   COMPARISON: None.  CONTRAST: 67 mL Isovue 370  TECHNIQUE: Head and neck CT angiogram with IV contrast. Axial helical CT images of the head and neck vessels obtained during the arterial phase of intravenous contrast administration. Axial 2D reconstructed images and multiplanar 3D MIP reconstructed   images of the head and neck vessels were performed by the technologist. Dose reduction techniques were used. All stenosis measurements made according to NASCET criteria unless otherwise specified.    FINDINGS:   HEAD CTA:  ANTERIOR CIRCULATION: No stenosis/occlusion, aneurysm, or high flow vascular malformation. Fetal origin of the right posterior cerebral artery from the anterior circulation.    POSTERIOR CIRCULATION: No stenosis/occlusion, aneurysm, or high flow vascular malformation. Balanced vertebral arteries supply a normal basilar artery.     DURAL VENOUS SINUSES: Expected enhancement of the major dural venous sinuses.    NECK CTA:  RIGHT CAROTID:  Atherosclerotic plaque results in less than 50% stenosis in the right ICA. No dissection.    LEFT CAROTID: Atherosclerotic plaque results in less than 50% stenosis in the left ICA. No dissection.    VERTEBRAL ARTERIES: No focal stenosis or dissection. Balanced vertebral arteries.    AORTIC ARCH: Classic aortic arch anatomy with no significant stenosis at the origin of the great vessels.    NONVASCULAR STRUCTURES: Mild emphysematous changes at the visualized lung apices. There are degenerative changes in the spine.      Impression    IMPRESSION:   HEAD CTA:   1.  Normal CTA [place].    NECK CTA:  1.  Patent arteries in the neck without evidence of dissection.  2.  Mild atherosclerotic disease along the carotid arteries without significant stenosis.   UA with Microscopic reflex to Culture     Status: Abnormal    Collection Time: 04/10/25  1:05 PM    Specimen: Urine, Catheter   Result Value Ref Range    Color Urine Light Yellow Colorless, Straw, Light Yellow, Yellow    Appearance Urine Clear Clear    Glucose Urine Negative Negative mg/dL    Bilirubin Urine Negative Negative    Ketones Urine Negative Negative mg/dL    Specific Gravity Urine 1.008 1.003 - 1.035    Blood Urine Negative Negative    pH Urine 7.5 (H) 5.0 - 7.0    Protein Albumin Urine Negative Negative mg/dL    Urobilinogen Urine Normal Normal mg/dL    Nitrite Urine Negative Negative    Leukocyte Esterase Urine Negative Negative    RBC Urine <1 <=2 /HPF    WBC Urine 1 <=5 /HPF    Narrative    Urine Culture not indicated   Urine Drug Screen     Status: Abnormal    Collection Time: 04/10/25  1:05 PM    Narrative    The following orders were created for panel order Urine Drug Screen.  Procedure                               Abnormality         Status                     ---------                               -----------         ------                     Urine Drug Screen Panel[[id number]]     Abnormal            Final result                 Please  view results for these tests on the individual orders.   Urine Drug Screen Panel     Status: Abnormal    Collection Time: 04/10/25  1:05 PM   Result Value Ref Range    Amphetamines Urine Screen Negative Screen Negative    Barbituates Urine Screen Negative Screen Negative    Benzodiazepine Urine Screen Negative Screen Negative    Cannabinoids Urine Screen Positive (A) Screen Negative    Cocaine Urine Screen Negative Screen Negative    Fentanyl Qual Urine Screen Negative Screen Negative    Opiates Urine Screen Positive (A) Screen Negative    PCP Urine Screen Negative Screen Negative   XR Ankle Left G/E 3 Views     Status: None    Collection Time: 04/10/25  3:17 PM    Narrative    EXAM: XR ANKLE LEFT G/E 3 VIEWS  LOCATION: [facility]  DATE: 4/10/2025    INDICATION: fell twice at group home residence now has left lateral malleolus ankle pain  COMPARISON: None.      Impression    IMPRESSION: Demineralization without displaced fracture. Spurring of the lateral malleolus. Small plantar calcaneal spur.   Chest CT w/o contrast     Status: None    Collection Time: 04/10/25  4:20 PM   Result Value Ref Range    Radiologist flags Gallbladder ultrasound.     Narrative    EXAM: CT CHEST W/O CONTRAST  LOCATION: [facility]  DATE: 4/10/2025    INDICATION: Hypoxia.  COMPARISON: 1/31/2025.  TECHNIQUE: CT chest without IV contrast. Multiplanar reformats were obtained. Dose reduction techniques were used.  CONTRAST: None.    FINDINGS: Absence of intravenous contrast limits the sensitivity of this examination for detection of infectious/inflammatory change, post traumatic abnormalities, vascular abnormalities, and visceral lesions.    LUNGS AND PLEURA: Mild tracheobronchial debris and mild diffuse bronchial wall thickening. Acute airspace consolidation. Unchanged scarring within the anterior central left lower lobe and the adjacent lingula. Additional scattered areas of mild linear    atelectatic change and scarring.    Mild paraseptal emphysema, apical predominance. Scattered tiny calcified granulomas. There are several sub-4 mm noncalcified pulmonary nodules, unchanged from 8/6/2022; no further follow-up recommended.    No pneumothorax.    No pleural effusion.     MEDIASTINUM/AXILLAE: Subcentimeter mediastinal lymph nodes are stable from 8/6/2022; no further follow-up recommended.      Mild diffuse wall thickening of the mid and distal esophagus, unchanged from 2022, compatible with a chronic esophagitis.    No axillary lymphadenopathy.    Ovoid subcutaneous nodule of the posterior right back, measuring 3.5 cm, likely a sebaceous cyst, series 2 image 6.    No thoracic aortic aneurysm. Mild atherosclerotic calcifications.    Heart is normal in size. No pericardial effusion.    CORONARY ARTERY CALCIFICATION: Mild.    UPPER ABDOMEN: Cholelithiasis. Nodular soft tissue opacity at the gallbladder fundus, favoring adenomyomatosis.    MUSCULOSKELETAL: No suspicious abnormality.    OTHER: No additionally suspicious abnormality.      Impression    IMPRESSION:  1.  No acute CT abnormality of the chest.  2.  Mild paraseptal emphysema.  3.  Chronic mid to distal esophagitis.  4.  Cholelithiasis.  5.  Nodular soft tissue thickening of the gallbladder fundus, favoring adenomyomatosis. Nonemergent gallbladder sonography follow-up is recommended.      [Recommend Follow Up: Gallbladder ultrasound.]    This report will be copied to the [facility] to ensure a provider acknowledges the finding.      Blood gas venous     Status: Abnormal    Collection Time: 04/10/25  9:24 PM   Result Value Ref Range    pH Venous 7.48 (H) 7.32 - 7.43    pCO2 Venous 43 40 - 50 mm Hg    pO2 Venous 57 (H) 25 - 47 mm Hg    Bicarbonate Venous 32 (H) 21 - 28 mmol/L    Base Excess/Deficit Venous 7.9 (H) -3.0 - 3.0 mmol/L    FIO2 32     Oxyhemoglobin Venous 91 (H) 70 - 75 %    O2 Sat, Venous 93.2 (H) 70.0 - 75.0 %    Narrative     In healthy individuals, oxyhemoglobin (O2Hb) and oxygen saturation (SO2) are approximately equal. In the presence of dyshemoglobins, oxyhemoglobin can be considerably lower than oxygen saturation.   D dimer quantitative     Status: Abnormal    Collection Time: 04/10/25  9:24 PM   Result Value Ref Range    D-Dimer Quantitative 0.97 (H) 0.00 - 0.50 ug/mL FEU    Narrative    This D-dimer assay is intended for use in conjunction with a clinical pretest probability assessment model to exclude pulmonary embolism (PE) and deep venous thrombosis (DVT) in outpatients suspected of PE or DVT. The cut-off value is 0.50 ug/mL FEU.    For patients 50 years of age or older, the application of age-adjusted cut-off values for D-Dimer may increase the specificity without significant effect on sensitivity. The literature suggested calculation age adjusted cut-off in ug/L = age in years x 10 ug/L. The results in this laboratory are reported as ug/mL rather than ug/L. The calculation for age adjusted cut off in ug/mL= age in years x 0.01 ug/mL. For example, the cut off for a 76 year old male is 78 x 0.01 ug/mL = 0.76 ug/mL (760 ug/L).    M Keli et al. Age adjusted D-dimer cut-off levels to rule out pulmonary embolism: The ADJUST-PE Study. ESTELA 2014;311:7369-0183.; HJ Jazmín et al. Diagnostic accuracy of conventional or age adjusted D-dimer cutoff values in older patients with suspected venous thromboembolism. Systemic review and meta-analysis. BMJ 2013:346:f2492.       ED MEDICATIONS:   Medications   iopamidol (ISOVUE-370) solution 67 mL (67 mLs Intravenous $Given 4/10/25 1227)   sodium chloride 0.9 % bag for CT scan flush (100 mLs As instructed $Given 4/10/25 1231)   lactated ringers BOLUS 1,000 mL (0 mLs Intravenous Stopped 4/10/25 1536)   ipratropium - albuterol 0.5 mg/2.5 mg/3 mL (DUONEB) neb solution 3 mL (3 mLs Nebulization $Given 4/10/25 2029)   naloxone (NARCAN) injection 4 mg (4 mg Nasal $Given 4/10/25 2023)    methylPREDNISolone Na Suc (solu-MEDROL) injection 125 mg (125 mg Intravenous $Given 4/10/25 2056)         Impression:    ICD-10-CM    1. Closed fracture of nasal bone, initial encounter  S02.2XXA       2. Nasal septum fracture, closed, initial encounter  S02.2XXA       3. Acute encephalopathy  G93.40       4. Hypoxia  R09.02           Plan:    Admit to hospital for further evaluation and care.      MD Chris Elizondo David Charles, MD  04/11/25 0056

## 2025-04-11 NOTE — CONSULTS
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 and left maxillary sinus which was also present on 1/31/2025. This makes evaluation for subtle fractures in these areas as well as sinus hemorrhage limited.    CERVICAL SPINE CT:  1.  No fracture or posttraumatic subluxation.  2.  Degenerative changes in the cervical spine, most pronounced at C5-C6.   CTA Head Neck with Contrast    Narrative    EXAM: CTA HEAD NECK W CONTRAST  LOCATION: [facility]  DATE: 4/10/2025    INDICATION: Fall x2, visible facial trauma, poor historian, ? confusion, unwitnessed per reports, lives in group home, LKW yesterday 1600   COMPARISON: None.  CONTRAST: 67 mL Isovue 370  TECHNIQUE: Head and neck CT angiogram with IV contrast. Axial helical CT images of the head and  neck vessels obtained during the arterial phase of intravenous contrast administration. Axial 2D reconstructed images and multiplanar 3D MIP reconstructed   images of the head and neck vessels were performed by the technologist. Dose reduction techniques were used. All stenosis measurements made according to NASCET criteria unless otherwise specified.    FINDINGS:   HEAD CTA:  ANTERIOR CIRCULATION: No stenosis/occlusion, aneurysm, or high flow vascular malformation. Fetal origin of the right posterior cerebral artery from the anterior circulation.    POSTERIOR CIRCULATION: No stenosis/occlusion, aneurysm, or high flow vascular malformation. Balanced vertebral arteries supply a normal basilar artery.     DURAL VENOUS SINUSES: Expected enhancement of the major dural venous sinuses.    NECK CTA:  RIGHT CAROTID: Atherosclerotic plaque results in less than 50% stenosis in the right ICA. No dissection.    LEFT CAROTID: Atherosclerotic plaque results in less than 50% stenosis in the left ICA. No dissection.    VERTEBRAL ARTERIES: No focal stenosis or dissection. Balanced vertebral arteries.    AORTIC ARCH: Classic aortic arch anatomy with no significant stenosis at the origin of the great vessels.    NONVASCULAR STRUCTURES: Mild emphysematous changes at the visualized lung apices. There are degenerative changes in the spine.      Impression    IMPRESSION:   HEAD CTA:   1.  Normal CTA [place].    NECK CTA:  1.  Patent arteries in the neck without evidence of dissection.  2.  Mild atherosclerotic disease along the carotid arteries without significant stenosis.   XR Ankle Left G/E 3 Views    Narrative    EXAM: XR ANKLE LEFT G/E 3 VIEWS  LOCATION: [facility]  DATE: 4/10/2025    INDICATION: fell twice at group home residence now has left lateral malleolus ankle pain  COMPARISON: None.      Impression    IMPRESSION: Demineralization without displaced fracture. Spurring of the lateral  malleolus. Small plantar calcaneal spur.   Chest CT w/o contrast     Value    Radiologist flags Gallbladder ultrasound.    Narrative    EXAM: CT CHEST W/O CONTRAST  LOCATION: [facility]  DATE: 4/10/2025    INDICATION: Hypoxia.  COMPARISON: 1/31/2025.  TECHNIQUE: CT chest without IV contrast. Multiplanar reformats were obtained. Dose reduction techniques were used.  CONTRAST: None.    FINDINGS: Absence of intravenous contrast limits the sensitivity of this examination for detection of infectious/inflammatory change, post traumatic abnormalities, vascular abnormalities, and visceral lesions.    LUNGS AND PLEURA: Mild tracheobronchial debris and mild diffuse bronchial wall thickening. Acute airspace consolidation. Unchanged scarring within the anterior central left lower lobe and the adjacent lingula. Additional scattered areas of mild linear   atelectatic change and scarring.    Mild paraseptal emphysema, apical predominance. Scattered tiny calcified granulomas. There are several sub-4 mm noncalcified pulmonary nodules, unchanged from 8/6/2022; no further follow-up recommended.    No pneumothorax.    No pleural effusion.     MEDIASTINUM/AXILLAE: Subcentimeter mediastinal lymph nodes are stable from 8/6/2022; no further follow-up recommended.      Mild diffuse wall thickening of the mid and distal esophagus, unchanged from 2022, compatible with a chronic esophagitis.    No axillary lymphadenopathy.    Ovoid subcutaneous nodule of the posterior right back, measuring 3.5 cm, likely a sebaceous cyst, series 2 image 6.    No thoracic aortic aneurysm. Mild atherosclerotic calcifications.    Heart is normal in size. No pericardial effusion.    CORONARY ARTERY CALCIFICATION: Mild.    UPPER ABDOMEN: Cholelithiasis. Nodular soft tissue opacity at the gallbladder fundus, favoring adenomyomatosis.    MUSCULOSKELETAL: No suspicious abnormality.    OTHER: No additionally suspicious abnormality.       "Impression    IMPRESSION:  1.  No acute CT abnormality of the chest.  2.  Mild paraseptal emphysema.  3.  Chronic mid to distal esophagitis.  4.  Cholelithiasis.  5.  Nodular soft tissue thickening of the gallbladder fundus, favoring adenomyomatosis. Nonemergent gallbladder sonography follow-up is recommended.      [Recommend Follow Up: Gallbladder ultrasound.]    This report will be copied to the [facility] to ensure a provider acknowledges the finding.      CT Chest Pulmonary Embolism w Contrast    Narrative    EXAM: CT CHEST PULMONARY EMBOLISM W CONTRAST  LOCATION: [facility]  DATE: 4/10/2025    INDICATION: Elevated d-dimer; r/o PE.  COMPARISON: Noncontrast chest CT from hours earlier.  TECHNIQUE: CT chest pulmonary angiogram during arterial phase injection of IV contrast. Multiplanar reformats and MIP reconstructions were performed. Dose reduction techniques were used.   CONTRAST: 80 mL Isovue-370.    FINDINGS:  ANGIOGRAM CHEST: No pulmonary embolism (within the limitation of breathing motion artifact) normal heart size without pericardial effusion. Coronary artery disease in the distal left main, proximal LAD, and proximal left circumflex. Thoracic aortic   atherosclerosis without aneurysm or dissection.    LUNGS AND PLEURA: Emphysema with mild bilateral \"platelike\" atelectasis/scarring. No pneumonia or pulmonary edema. No pleural effusion or pneumothorax.    MEDIASTINUM/AXILLAE: Circumferentially thickened entire esophagus, most notably in the mid and lower esophagus without significant esophageal luminal distention. No posterior mediastinal fluid collection or pneumomediastinum. Mildly enlarged right lower   paratracheal lymph node.    UPPER ABDOMEN: Normal.    MUSCULOSKELETAL: Roughly 3 cm skin-based sebaceous cyst in the right upper back. No acute or aggressive osseous abnormalities.      Impression    IMPRESSION:  1.  No pulmonary embolism identified.  2.  Emphysema " without pneumonia, pulmonary edema, pleural effusion, or pneumothorax.  3.  Multivessel coronary artery disease.  4.  Long segment esophageal wall thickening. This could represent vomiting, GERD, or primary esophagitis, among other possibilities.     *Note: Due to a large number of results and/or encounters for the requested time period, some results have not been displayed. A complete set of results can be found in Results Review.       This note was created using voice recognition software. Undetected word substitutions or other errors may have occurred.     MICHAEL HARKINS PA-C    Time spent with the patient, reviewing the EMR, reviewing laboratory and imaging studies, more than 50% of which was counseling and coordinating care:  35 minutes.

## 2025-04-11 NOTE — PLAN OF CARE
Problem: Fall Injury Risk  Goal: Absence of Fall and Fall-Related Injury  Outcome: Progressing  Intervention: Promote Injury-Free Environment  Recent Flowsheet Documentation  Taken 4/11/2025 0912 by Alida Norton RN  Safety Promotion/Fall Prevention:   activity supervised   assistive device/personal items within reach   clutter free environment maintained   increased rounding and observation   mobility aid in reach   nonskid shoes/slippers when out of bed   patient and family education   Goal Outcome Evaluation:      Plan of Care Reviewed With: patient    Overall Patient Progress: no changeOverall Patient Progress: no change       Patient is alert and oriented.  Patient is withdrawn and has a flat affect.  Patient will occasionally answer questions and then the next question will not answer.  Patient repositioning independently in the bed frequently.  IV saline locked.  Patient requiring 2L of oxygen to maintain oxygen saturations greater than 90%.  Patient reporting chronic back pain and facial pain and was given his scheduled MS contin and oxycodone.  Patient has becoming drowsy after administration but reports that his pain is relieved.  Up with 1 assist and a walker.  Patient does not use call light appropriately and will set bed alarm off.

## 2025-04-12 LAB
ANION GAP SERPL CALCULATED.3IONS-SCNC: 12 MMOL/L (ref 7–15)
BACTERIA UR CULT: NO GROWTH
BUN SERPL-MCNC: 13.9 MG/DL (ref 8–23)
CALCIUM SERPL-MCNC: 9.1 MG/DL (ref 8.8–10.4)
CHLORIDE SERPL-SCNC: 102 MMOL/L (ref 98–107)
CREAT SERPL-MCNC: 0.68 MG/DL (ref 0.67–1.17)
EGFRCR SERPLBLD CKD-EPI 2021: >90 ML/MIN/1.73M2
ERYTHROCYTE [DISTWIDTH] IN BLOOD BY AUTOMATED COUNT: 13.2 % (ref 10–15)
GLUCOSE SERPL-MCNC: 130 MG/DL (ref 70–99)
HCO3 SERPL-SCNC: 26 MMOL/L (ref 22–29)
HCT VFR BLD AUTO: 41.7 % (ref 40–53)
HGB BLD-MCNC: 14 G/DL (ref 13.3–17.7)
MCH RBC QN AUTO: 32 PG (ref 26.5–33)
MCHC RBC AUTO-ENTMCNC: 33.6 G/DL (ref 31.5–36.5)
MCV RBC AUTO: 95 FL (ref 78–100)
PLATELET # BLD AUTO: 210 10E3/UL (ref 150–450)
POTASSIUM SERPL-SCNC: 3.9 MMOL/L (ref 3.4–5.3)
RBC # BLD AUTO: 4.37 10E6/UL (ref 4.4–5.9)
SODIUM SERPL-SCNC: 140 MMOL/L (ref 135–145)
WBC # BLD AUTO: 14.7 10E3/UL (ref 4–11)

## 2025-04-12 PROCEDURE — 94640 AIRWAY INHALATION TREATMENT: CPT | Mod: 76

## 2025-04-12 PROCEDURE — 36415 COLL VENOUS BLD VENIPUNCTURE: CPT | Performed by: PHYSICIAN ASSISTANT

## 2025-04-12 PROCEDURE — 250N000013 HC RX MED GY IP 250 OP 250 PS 637: Performed by: FAMILY MEDICINE

## 2025-04-12 PROCEDURE — 94640 AIRWAY INHALATION TREATMENT: CPT

## 2025-04-12 PROCEDURE — 250N000009 HC RX 250: Performed by: PHYSICIAN ASSISTANT

## 2025-04-12 PROCEDURE — 85041 AUTOMATED RBC COUNT: CPT | Performed by: PHYSICIAN ASSISTANT

## 2025-04-12 PROCEDURE — 120N000001 HC R&B MED SURG/OB

## 2025-04-12 PROCEDURE — 85014 HEMATOCRIT: CPT | Performed by: PHYSICIAN ASSISTANT

## 2025-04-12 PROCEDURE — 80048 BASIC METABOLIC PNL TOTAL CA: CPT | Performed by: PHYSICIAN ASSISTANT

## 2025-04-12 PROCEDURE — 250N000013 HC RX MED GY IP 250 OP 250 PS 637: Performed by: PHYSICIAN ASSISTANT

## 2025-04-12 PROCEDURE — 250N000012 HC RX MED GY IP 250 OP 636 PS 637: Performed by: PHYSICIAN ASSISTANT

## 2025-04-12 PROCEDURE — 99232 SBSQ HOSP IP/OBS MODERATE 35: CPT | Performed by: INTERNAL MEDICINE

## 2025-04-12 RX ADMIN — PANTOPRAZOLE SODIUM 40 MG: 40 TABLET, DELAYED RELEASE ORAL at 05:53

## 2025-04-12 RX ADMIN — ACETAMINOPHEN 975 MG: 325 TABLET ORAL at 20:34

## 2025-04-12 RX ADMIN — MORPHINE SULFATE 30 MG: 15 TABLET, FILM COATED, EXTENDED RELEASE ORAL at 05:52

## 2025-04-12 RX ADMIN — OXYCODONE HYDROCHLORIDE 5 MG: 5 TABLET ORAL at 20:35

## 2025-04-12 RX ADMIN — IPRATROPIUM BROMIDE AND ALBUTEROL SULFATE 3 ML: 2.5; .5 SOLUTION RESPIRATORY (INHALATION) at 16:32

## 2025-04-12 RX ADMIN — OXYCODONE HYDROCHLORIDE 5 MG: 5 TABLET ORAL at 13:47

## 2025-04-12 RX ADMIN — PREGABALIN 150 MG: 75 CAPSULE ORAL at 20:35

## 2025-04-12 RX ADMIN — LURASIDONE HYDROCHLORIDE 20 MG: 20 TABLET, FILM COATED ORAL at 17:30

## 2025-04-12 RX ADMIN — TRAZODONE HYDROCHLORIDE 250 MG: 100 TABLET ORAL at 20:35

## 2025-04-12 RX ADMIN — SIMVASTATIN 80 MG: 40 TABLET, FILM COATED ORAL at 08:26

## 2025-04-12 RX ADMIN — IPRATROPIUM BROMIDE AND ALBUTEROL SULFATE 3 ML: 2.5; .5 SOLUTION RESPIRATORY (INHALATION) at 12:34

## 2025-04-12 RX ADMIN — MORPHINE SULFATE 30 MG: 15 TABLET, FILM COATED, EXTENDED RELEASE ORAL at 17:30

## 2025-04-12 RX ADMIN — ACETAMINOPHEN 975 MG: 325 TABLET ORAL at 08:25

## 2025-04-12 RX ADMIN — DULOXETINE HYDROCHLORIDE 120 MG: 30 CAPSULE, DELAYED RELEASE ORAL at 08:26

## 2025-04-12 RX ADMIN — ACETAMINOPHEN 975 MG: 325 TABLET ORAL at 17:30

## 2025-04-12 RX ADMIN — OXYCODONE HYDROCHLORIDE 5 MG: 5 TABLET ORAL at 08:25

## 2025-04-12 RX ADMIN — MORPHINE SULFATE 30 MG: 15 TABLET, FILM COATED, EXTENDED RELEASE ORAL at 12:00

## 2025-04-12 RX ADMIN — PREGABALIN 150 MG: 75 CAPSULE ORAL at 08:26

## 2025-04-12 RX ADMIN — ACETAMINOPHEN 975 MG: 325 TABLET ORAL at 22:12

## 2025-04-12 RX ADMIN — PREDNISONE 40 MG: 20 TABLET ORAL at 08:26

## 2025-04-12 RX ADMIN — PROPRANOLOL HYDROCHLORIDE 120 MG: 60 CAPSULE, EXTENDED RELEASE ORAL at 08:26

## 2025-04-12 RX ADMIN — IPRATROPIUM BROMIDE AND ALBUTEROL SULFATE 3 ML: 2.5; .5 SOLUTION RESPIRATORY (INHALATION) at 19:46

## 2025-04-12 RX ADMIN — IPRATROPIUM BROMIDE AND ALBUTEROL SULFATE 3 ML: 2.5; .5 SOLUTION RESPIRATORY (INHALATION) at 09:37

## 2025-04-12 ASSESSMENT — ACTIVITIES OF DAILY LIVING (ADL)
ADLS_ACUITY_SCORE: 51

## 2025-04-12 NOTE — PLAN OF CARE
"Goal Outcome Evaluation:      Plan of Care Reviewed With: patient    Overall Patient Progress: no changeOverall Patient Progress: no change    Outcome Evaluation: Pt in bed did not want to get out of bed. Per report pt states he is wheel chair bound but he has been trying to get up out of bed. PT/OT to evaluate.      Problem: Adult Inpatient Plan of Care  Goal: Plan of Care Review  Description: The Plan of Care Review/Shift note should be completed every shift.  The Outcome Evaluation is a brief statement about your assessment that the patient is improving, declining, or no change.  This information will be displayed automatically on your shiftnote.  Outcome: Not Progressing  Flowsheets (Taken 4/12/2025 0020)  Outcome Evaluation: Pt in bed did not want to get out of bed. Per report pt states he is wheel chair bound but he has been trying to get up out of bed. PT/OT to evaluate.  Plan of Care Reviewed With: patient  Overall Patient Progress: no change  Goal: Patient-Specific Goal (Individualized)  Description: You can add care plan individualizations to a care plan. Examples of Individualization might be:  \"Parent requests to be called daily at 9am for status\", \"I have a hard time hearing out of my right ear\", or \"Do not touch me to wake me up as it startlesme\".  Outcome: Not Progressing  Goal: Absence of Hospital-Acquired Illness or Injury  Outcome: Not Progressing  Intervention: Prevent and Manage VTE (Venous Thromboembolism) Risk  Recent Flowsheet Documentation  Taken 4/12/2025 0015 by Parent, Eliane CASTELLANOS RN  VTE Prevention/Management:   patient refused intervention   SCDs off (sequential compression devices)  Goal: Optimal Comfort and Wellbeing  Outcome: Not Progressing  Goal: Readiness for Transition of Care  Outcome: Not Progressing     Problem: Delirium  Goal: Optimal Coping  Outcome: Not Progressing  Goal: Improved Behavioral Control  Outcome: Not Progressing  Goal: Improved Attention and Thought " Clarity  Outcome: Not Progressing  Goal: Improved Sleep  Outcome: Not Progressing     Problem: Fall Injury Risk  Goal: Absence of Fall and Fall-Related Injury  Outcome: Not Progressing

## 2025-04-12 NOTE — PROGRESS NOTES
"[facility] Medicine Progress Note  Date of Service: 04/12/2025    Assessment & Plan      Melquiades Morales is a 70 year old male admitted on 4/10/2025. He has a past medical history significant for chronic pain on chronic narcotics, COPD, pre diabetes, dyslipidemia, esophagitis, tremor, anxiety / depression / PTSD, sleep disturbance, and nasal polyps who presented to the emergency department for evaluation of facial injury after two falls, was found to have nasal bone fractures as well as acute hypoxic respiratory failure likely due to COPD exacerbation.     Acute respiratory failure with hypoxia  COPD exacerbation  Known COPD but no recent PFT's on file, not on supplemental O2 at baseline. Uses prn albuterol but not on any controller medications prior to admission. Was hypoxic in the 80's in the emergency department, has a new 2L supplemental O2 requirement.     CT CHEST - \"1.  No acute CT abnormality of the chest.  2.  Mild paraseptal emphysema.  3.  Chronic mid to distal esophagitis.  4.  Cholelithiasis.  5.  Nodular soft tissue thickening of the gallbladder fundus, favoring adenomyomatosis. Nonemergent gallbladder sonography follow-up is recommended.\"    CT CHEST PE - \"1.  No pulmonary embolism identified.  2.  Emphysema without pneumonia, pulmonary edema, pleural effusion, or pneumothorax.  3.  Multivessel coronary artery disease.  4.  Long segment esophageal wall thickening. This could represent vomiting, GERD, or primary esophagitis, among other possibilities.\"    COVID / influenza / RSV PCR negative. D-dimer mildly elevated (0.97), but no PE on CT imaging. No orthopnea, crackles, or evidence of heart failure by imaging or exam. Most likely cause for hypoxemia is due to COPD exacerbation vs decreased respiratory drive from narcotics. Was given 125 mg SoluMedrol and a DuoNeb in the emergency department.   - Continue supplemental O2 to maintain sats > 91%, wean as able  - " "Prednisone 40 mg daily  - Scheduled DuoNebs, prn albuterol nebs  - Would benefit from PFT's as outpatient once improved (this has been recommended in the past but has not yet happened)  - Narcan considered, but deferred at this time (respiratory rate WNL, pupils not pin point), available prn if needed    Patient's O2 dropped after taking oxycodone (but not after the morphine). It seems to be that oxycodone precipitates worsening respiratory failure.      Seems to be improving with treatment for COPD exacerbation.   - Weaning O2 as able  - Adjusting narcotics, see below  - Continue with COPD treatments     Acute encephalopathy    Patient slightly altered but A&O x3, cooperative, responding appropriately to questions at time of admission. Head CT unremarkable for acute intracranial findings (although does have facial bone fractures, see below). Utox positive for opiates (which he is prescribed) and cannabis. UA unremarkable. No hypercarbia. Ammonia WNL (23). No obvious infectious findings so low concern for meningitis / encephalitis.     Patient has had encephalopathy in the past without obvious findings, thought to be related to narcotics and/or infections present at the time. Suspect current presentation is due to narcotics.     Resolved 4/12    Recurrent falls  Traumatic injuries  Presented due to unwitnessed fall x 2 at group home, patient hit his head and face during one or both falls. Patient does not clearly remember why or how he fell. Chart review of prior admissions show that he has fallen for unclear reasons in the past as well (1/31/25, 9/29/22).    EKG - sinus rhythm     HEAD CT: \"1.  Images are mildly degraded by motion artifact which limits evaluation, particularly near the skull base.  2.  No definite acute intracranial hemorrhage, mass, or herniation.  3.  Mild diffuse parenchymal volume loss and white matter changes which are most likely due to chronic microvascular ischemic disease.\"     FACIAL BONE " "CT: \"1.  Acute comminuted bilateral nasal bone fractures which are deviated to the right.  2.  Acute fracture through the anterior bony nasal septum, deviated to the right.  3.  Subtle cortical step-off of the medial left orbital wall/lamina papyracea. This could represent subtle fracture. No orbital hematoma.  4.  Chronic opacification of the left frontal sinus, ethmoid air cells, and left maxillary sinus which was also present on 1/31/2025. This makes evaluation for subtle fractures in these areas as well as sinus hemorrhage limited.\"     CERVICAL SPINE CT: \"1.  No fracture or posttraumatic subluxation.  2.  Degenerative changes in the cervical spine, most pronounced at C5-C6.\"    CTA HEAD: \"1.  Normal CTA [place].\"     NECK CTA: \"1.  Patent arteries in the neck without evidence of dissection.  2.  Mild atherosclerotic disease along the carotid arteries without significant stenosis.\"    XR LEFT ANKLE: \"Demineralization without displaced fracture. Spurring of the lateral malleolus. Small plantar calcaneal spur.\"     CT CHEST / PE - see above    Unclear cause of falls, possibly related to narcotics. However, patient has had recurrent falls in the past and cardiogenic causes have not been fully ruled out (no echo on file since 2010).   - Fall precautions  - General Surgery team consulted for trauma evaluation  - Address facial fractures below  - PT/OT evaluations  - Care Management consult     Closed fracture of nasal bone, initial encounter  Nasal septum fracture, closed, initial encounter  Chronic maxillary sinusitis  Inverted papilloma of nasal cavity  Patient has known pre-existing nasal polyps and chronic maxillary sinusitis, has a future ENT appointment to address (ENT Dr. Houston on 5/1/25). However, now has new facial fractures after fall (see imaging above). Case was discussed between emergency department and on-call ENT, who did not feel that any emergent intervention was indicated, no antibiotics " recommended.   - Patient has new ENT follow-up appointment with Dr. Chilango Mart on 4/17/25  - Analgesia: resume home pain regimen (see below)    Chronic pain syndrome  Spinal stenosis in cervical region  Uncomplicated opioid dependence  Known history of chronic back pain, follows with pain specialist Ludmila Pennington NP. Is prescribed MS Contin 30 mg tid, oxycodone 10 mg tid, and Lyrica 150 mg bid (verified by [facility]), stable dosing for many months. Per group home staff (see emergency department note regarding discussion with staff), patient is in charge of his own medications, and they are unable to verify medication dose/timing. As noted above, there is concern for possible narcotic overdose as cause to his recurrent falls and encephalopathy.  - Continue with home regimen of MS Contin 30 mg tid and oxycodone 10 mg tid and monitor closely for evidence of overdose  - Continue home Lyrica  - Continuous pulse ox  - Narcan available if needed    After receiving his typical home dose of oxycodone 10 mg, patient became more lethargic and respiratory status worsened (but not after morphine).   - Contnue morphine at prior dose  - Decrease oxycodone to 5 mg tid, continue to observe closely for respiratory and cognitive changes after receiving meds; may need further adjustments if continuing to be symptomatic    Hyperlipidemia LDL goal <130  Managed prior to admission with Zocor 80 mg daily, continue.     Prediabetes  Recent HgbA1c 6.3. Diet controlled, not on any pharmacotherapy prior to admission. Admission glucose 113.   - Consistent carbohydrate diet  - Glucose checks with daily BMP; if persistently elevated then change to qid    Major depressive disorder, recurrent episode, moderate  Generalized anxiety disorder  Posttraumatic stress disorder  Managed prior to admission with Cymbalta 120 mg daily and Latuda 20 mg q pm.  - Continue prior to admission medications     Sleep disturbance  Managed prior to admission with  trazodone 250 mg q hs.  - Home trazodone ordered as prn medication, hold if already somnolent or lethargic     Esophagitis  Previously diagnosed, but findings of esophagitis again noted on CT chest. Managed prior to admission with Protonix 40 mg daily, continue.     Tremor  Previously diagnosed. Managed prior to admission with propranolol  mg daily.  - Continue propranolol with holding parameters    Tobacco use disorder    Still smokes 1/2PPD.     Refuses luisito replacement.     O2 use would be problematic if continues to smoke.     Clinically Significant Risk Factors                                  # Financial/Environmental Concerns: none         Diet: Combination Diet Moderate Consistent Carb (60 g CHO per Meal) Diet; Low Saturated Fat Na <2400mg Diet, No Caffeine Diet    DVT Prophylaxis: Low Risk/Ambulatory with no VTE prophylaxis indicated  [place] Catheter: Not present  Lines: None     Cardiac Monitoring: ACTIVE order. Indication: Syncope- low cardiac risk (24 hours)  Code Status: Full Code      Discussion/Disposition: Improving. May be stably off O2 supplementation tomorrow.     Medically Ready for Discharge: Anticipated Tomorrow         Medical Decision Making       40 MINUTES SPENT BY ME on the date of service doing chart review, history, exam, documentation & further activities per the note.        Ant Bautista MD  [facility]  Securely message with [employer] (more info)  Text page via [employer] Paging/Directory     Interval History   Doesn't feel different.  No apparent confusion today.  Patient reports that he smokes half pack per day and does not plan to quit.  Also he uses marijuana daily for pain control.  Is not happy with the thought of going home with oxygen.  He is unsure if he needs to.    Physical Exam   Temp:  [97.3  F (36.3  C)-98.4  F (36.9  C)] 98.4  F (36.9  C)  Pulse:  [60-92] 63  Resp:  [16-21] 21  BP: (110-143)/(56-68) 121/57  SpO2:  [92 %-100 %]  96 %    Weights:   Vitals:    04/10/25 1037 04/11/25 0839 04/12/25 0720   Weight: 95.3 kg (210 lb) 98.1 kg (216 lb 4.3 oz) 99 kg (218 lb 4.1 oz)    Body mass index is 30.44 kg/m .    Constitutional: Alert and oriented x 4, cooperative, a little irritable but no acute distress and nontoxic-appearing  CV: Regular, no edema  Respiratory: Decent air movement, possible mildly prolonged expiratory phase but no audible wheezes and otherwise CTA bilaterally  GI: Soft, non-tender, bowel sounds normal  Skin: Warm and dry      Data   Recent Labs   Lab 04/12/25  0511 04/11/25  0915 04/10/25  1059   WBC 14.7* 12.5* 12.4*   HGB 14.0 16.2 16.6   MCV 95 95 98    252 235    143 147*   POTASSIUM 3.9 3.9 3.8   CHLORIDE 102 102 103   CO2 26 26 33*   BUN 13.9 8.3 5.8*   CR 0.68 0.65* 0.84   ANIONGAP 12 15 11   JESSEE 9.1 9.7 10.0   * 158* 113*   ALBUMIN  --   --  4.4   PROTTOTAL  --   --  7.6   BILITOTAL  --   --  0.7   ALKPHOS  --   --  68   ALT  --   --  15   AST  --   --  24       Recent Labs   Lab 04/12/25  0511 04/11/25  0915 04/10/25  1059   * 158* 113*        Unresulted Labs Ordered in the Past 30 Days of this Admission       No orders found from 3/11/2025 to 4/11/2025.             Imaging: No results found for this or any previous visit (from the past 24 hours).     I reviewed all new labs and imaging results over the last 24 hours. I personally reviewed no images or EKG's today.    Medications   Current Facility-Administered Medications   Medication Dose Route Frequency Provider Last Rate Last Admin     Current Facility-Administered Medications   Medication Dose Route Frequency Provider Last Rate Last Admin    acetaminophen (TYLENOL) tablet 975 mg  975 mg Oral TID Ruba Andino PA-C   975 mg at 04/12/25 1730    DULoxetine (CYMBALTA) DR capsule 120 mg  120 mg Oral Daily Ruba Andino PA-C   120 mg at 04/12/25 0826    ipratropium - albuterol 0.5 mg/2.5 mg/3 mL (DUONEB) neb solution 3 mL  3  mL Nebulization Q4H While awake Ruba Andino PA-C   3 mL at 04/12/25 1632    lurasidone (LATUDA) tablet 20 mg  20 mg Oral Daily with supper Ruba Andino PA-C   20 mg at 04/12/25 1730    morphine (MS CONTIN) 12 hr tablet 30 mg  30 mg Oral TID Ruba Andino PA-C   30 mg at 04/12/25 1730    nicotine (NICODERM CQ) 14 MG/24HR 24 hr patch 1 patch  1 patch Transdermal Daily Ruba Andino PA-C        oxyCODONE (ROXICODONE) tablet 5 mg  5 mg Oral TID Gregorio Mendoza MD   5 mg at 04/12/25 1347    pantoprazole (PROTONIX) EC tablet 40 mg  40 mg Oral QAM AC Ruba Andino PA-C   40 mg at 04/12/25 0553    predniSONE (DELTASONE) tablet 40 mg  40 mg Oral Daily Ruba Andino PA-C   40 mg at 04/12/25 0826    pregabalin (LYRICA) capsule 150 mg  150 mg Oral BID Ruba Andino PA-C   150 mg at 04/12/25 0826    propranolol ER (INDERAL LA) 24 hr capsule 120 mg  120 mg Oral Daily Ruab Andino PA-C   120 mg at 04/12/25 0826    simvastatin (ZOCOR) tablet 80 mg  80 mg Oral Daily Ruba Andino PA-C   80 mg at 04/12/25 0826    sodium chloride (PF) 0.9% PF flush 3 mL  3 mL Intracatheter Q8H [facility] Ruba Andino PA-C   3 mL at 04/12/25 1348       Ant Bautista MD  [facility]

## 2025-04-12 NOTE — PLAN OF CARE
Problem: Adult Inpatient Plan of Care  Goal: Plan of Care Review  Description: The Plan of Care Review/Shift note should be completed every shift.  The Outcome Evaluation is a brief statement about your assessment that the patient is improving, declining, or no change.  This information will be displayed automatically on your shiftnote.  Flowsheets (Taken 4/12/2025 1509)  Plan of Care Reviewed With: patient  Goal: Absence of Hospital-Acquired Illness or Injury  Intervention: Identify and Manage Fall Risk  Recent Flowsheet Documentation  Taken 4/12/2025 1214 by Cierra Haji RN  Safety Promotion/Fall Prevention:   activity supervised   clutter free environment maintained   mobility aid in reach   nonskid shoes/slippers when out of bed   patient and family education  Intervention: Prevent and Manage VTE (Venous Thromboembolism) Risk  Recent Flowsheet Documentation  Taken 4/12/2025 1214 by Cierra Haji RN  VTE Prevention/Management:   patient refused intervention   SCDs off (sequential compression devices)  Goal: Optimal Comfort and Wellbeing  Intervention: Provide Person-Centered Care  Recent Flowsheet Documentation  Taken 4/12/2025 1214 by Cierra Haji RN  Trust Relationship/Rapport:   care explained   choices provided   questions answered   questions encouraged     Problem: Delirium  Goal: Improved Behavioral Control  Intervention: Minimize Safety Risk  Recent Flowsheet Documentation  Taken 4/12/2025 1214 by Cierra Haji RN  Enhanced Safety Measures: room near unit station  Trust Relationship/Rapport:   care explained   choices provided   questions answered   questions encouraged     Problem: Fall Injury Risk  Goal: Absence of Fall and Fall-Related Injury  Intervention: Identify and Manage Contributors  Recent Flowsheet Documentation  Taken 4/12/2025 1214 by Cierra Haji, RN  Medication Review/Management: medications reviewed  Intervention: Promote Injury-Free Environment  Recent Flowsheet  Documentation  Taken 4/12/2025 1214 by Cierra Haji, RN  Safety Promotion/Fall Prevention:   activity supervised   clutter free environment maintained   mobility aid in reach   nonskid shoes/slippers when out of bed   patient and family education   Pt is A&O. A1 with a gait belt and walker. Pt declined getting OOB today. Neuros intact. Tremors to BUE at baseline. Pt is able to utilize call light to make needs known. Will continue with current plan of care.

## 2025-04-12 NOTE — PLAN OF CARE
Problem: Adult Inpatient Plan of Care  Goal: Plan of Care Review  Description: The Plan of Care Review/Shift note should be completed every shift.  The Outcome Evaluation is a brief statement about your assessment that the patient is improving, declining, or no change.  This information will be displayed automatically on your shiftnote.  Outcome: Not Progressing  Flowsheets (Taken 4/11/2025 8041)  Overall Patient Progress: no change   Goal Outcome Evaluation:           Overall Patient Progress: no changeOverall Patient Progress: no change    Patient A/Ox4, lethargic and withdrawn. able to make needs known. Pain is severe and generalized all over pts body. Assist of SBA but does not call appropriately. 02 stable on 2L. PIV SL. Pt resting most of shift but declining most cares/medications.
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
## 2025-04-13 VITALS
SYSTOLIC BLOOD PRESSURE: 125 MMHG | WEIGHT: 218.26 LBS | BODY MASS INDEX: 30.44 KG/M2 | OXYGEN SATURATION: 90 % | TEMPERATURE: 98 F | DIASTOLIC BLOOD PRESSURE: 62 MMHG | RESPIRATION RATE: 16 BRPM | HEART RATE: 56 BPM

## 2025-04-13 LAB
BASOPHILS # BLD AUTO: 0 10E3/UL (ref 0–0.2)
BASOPHILS NFR BLD AUTO: 0 %
CRP SERPL-MCNC: 3.55 MG/L
EOSINOPHIL # BLD AUTO: 0 10E3/UL (ref 0–0.7)
EOSINOPHIL NFR BLD AUTO: 0 %
ERYTHROCYTE [DISTWIDTH] IN BLOOD BY AUTOMATED COUNT: 13.2 % (ref 10–15)
HCT VFR BLD AUTO: 40.1 % (ref 40–53)
HGB BLD-MCNC: 13.4 G/DL (ref 13.3–17.7)
IMM GRANULOCYTES # BLD: 0 10E3/UL
IMM GRANULOCYTES NFR BLD: 0 %
LYMPHOCYTES # BLD AUTO: 4.2 10E3/UL (ref 0.8–5.3)
LYMPHOCYTES NFR BLD AUTO: 36 %
MCH RBC QN AUTO: 32.1 PG (ref 26.5–33)
MCHC RBC AUTO-ENTMCNC: 33.4 G/DL (ref 31.5–36.5)
MCV RBC AUTO: 96 FL (ref 78–100)
MONOCYTES # BLD AUTO: 0.8 10E3/UL (ref 0–1.3)
MONOCYTES NFR BLD AUTO: 7 %
NEUTROPHILS # BLD AUTO: 6.8 10E3/UL (ref 1.6–8.3)
NEUTROPHILS NFR BLD AUTO: 57 %
NRBC # BLD AUTO: 0 10E3/UL
NRBC BLD AUTO-RTO: 0 /100
PLATELET # BLD AUTO: 185 10E3/UL (ref 150–450)
RBC # BLD AUTO: 4.18 10E6/UL (ref 4.4–5.9)
WBC # BLD AUTO: 11.9 10E3/UL (ref 4–11)

## 2025-04-13 PROCEDURE — 250N000012 HC RX MED GY IP 250 OP 636 PS 637: Performed by: PHYSICIAN ASSISTANT

## 2025-04-13 PROCEDURE — 86140 C-REACTIVE PROTEIN: CPT | Performed by: INTERNAL MEDICINE

## 2025-04-13 PROCEDURE — 94640 AIRWAY INHALATION TREATMENT: CPT | Mod: 76

## 2025-04-13 PROCEDURE — 250N000013 HC RX MED GY IP 250 OP 250 PS 637: Performed by: FAMILY MEDICINE

## 2025-04-13 PROCEDURE — 999N000157 HC STATISTIC RCP TIME EA 10 MIN

## 2025-04-13 PROCEDURE — 85025 COMPLETE CBC W/AUTO DIFF WBC: CPT | Performed by: INTERNAL MEDICINE

## 2025-04-13 PROCEDURE — 94640 AIRWAY INHALATION TREATMENT: CPT

## 2025-04-13 PROCEDURE — 250N000013 HC RX MED GY IP 250 OP 250 PS 637: Performed by: PHYSICIAN ASSISTANT

## 2025-04-13 PROCEDURE — 250N000009 HC RX 250: Performed by: PHYSICIAN ASSISTANT

## 2025-04-13 PROCEDURE — 36415 COLL VENOUS BLD VENIPUNCTURE: CPT | Performed by: INTERNAL MEDICINE

## 2025-04-13 RX ORDER — ACETAMINOPHEN 500 MG
1000 TABLET ORAL 3 TIMES DAILY
COMMUNITY
Start: 2025-04-13

## 2025-04-13 RX ORDER — PREDNISONE 20 MG/1
TABLET ORAL
Qty: 10 TABLET | Refills: 0 | Status: SHIPPED | OUTPATIENT
Start: 2025-04-14 | End: 2025-04-24

## 2025-04-13 RX ADMIN — OXYCODONE HYDROCHLORIDE 5 MG: 5 TABLET ORAL at 08:26

## 2025-04-13 RX ADMIN — MORPHINE SULFATE 30 MG: 15 TABLET, FILM COATED, EXTENDED RELEASE ORAL at 12:40

## 2025-04-13 RX ADMIN — MORPHINE SULFATE 30 MG: 15 TABLET, FILM COATED, EXTENDED RELEASE ORAL at 06:10

## 2025-04-13 RX ADMIN — SIMVASTATIN 80 MG: 40 TABLET, FILM COATED ORAL at 08:25

## 2025-04-13 RX ADMIN — PREDNISONE 40 MG: 20 TABLET ORAL at 08:26

## 2025-04-13 RX ADMIN — PANTOPRAZOLE SODIUM 40 MG: 40 TABLET, DELAYED RELEASE ORAL at 06:10

## 2025-04-13 RX ADMIN — PREGABALIN 150 MG: 75 CAPSULE ORAL at 08:26

## 2025-04-13 RX ADMIN — DULOXETINE HYDROCHLORIDE 120 MG: 30 CAPSULE, DELAYED RELEASE ORAL at 08:25

## 2025-04-13 RX ADMIN — OXYCODONE HYDROCHLORIDE 5 MG: 5 TABLET ORAL at 13:48

## 2025-04-13 RX ADMIN — ACETAMINOPHEN 975 MG: 325 TABLET ORAL at 08:25

## 2025-04-13 RX ADMIN — PROPRANOLOL HYDROCHLORIDE 120 MG: 60 CAPSULE, EXTENDED RELEASE ORAL at 08:25

## 2025-04-13 RX ADMIN — IPRATROPIUM BROMIDE AND ALBUTEROL SULFATE 3 ML: 2.5; .5 SOLUTION RESPIRATORY (INHALATION) at 12:16

## 2025-04-13 RX ADMIN — IPRATROPIUM BROMIDE AND ALBUTEROL SULFATE 3 ML: 2.5; .5 SOLUTION RESPIRATORY (INHALATION) at 08:13

## 2025-04-13 ASSESSMENT — ACTIVITIES OF DAILY LIVING (ADL)
ADLS_ACUITY_SCORE: 50
ADLS_ACUITY_SCORE: 51
ADLS_ACUITY_SCORE: 50

## 2025-04-13 NOTE — PROGRESS NOTES
Care Management Discharge Note    Discharge Date: 04/13/2025  Discharge Disposition: Home Care, Group Home  Discharge Services: Home Care  Discharge DME:  NA  Discharge Transportation: other (see comments) ( will provide transport)    Private pay costs discussed: Not applicable  Does the patient's insurance plan have a 3 day qualifying hospital stay waiver?  Yes   Which insurance plan 3 day waiver is available? Alternative insurance waiver  Will the waiver be used for post-acute placement? No    Patient/family educated on Medicare website which has current facility and service quality ratings: yes    Education Provided on the Discharge Plan: Yes  Persons Notified of Discharge Plans: patient, Elvin-  staff  Patient/Family in Agreement with the Plan: yes    Handoff Referral Completed: Yes, [facility] PCP: Internal handoff referral completed    Additional Information:    Per IDT rounds, MD team states that patient is medically ready for discharge today.     Writer called and spoke with Elvin at [facility]. Per Elvin, patient can return to the  today. She will provide transport around 2pm.    PLAN: Return to  with home care RN/PT/OT       ROLANDO MORGAN RN

## 2025-04-13 NOTE — PLAN OF CARE
[facility] DISCHARGE NOTE    Patient discharged to home at 2:18PM via wheel chair. Accompanied by other: friend and staff. Discharge instructions reviewed with patient and caregiver, opportunity offered to ask questions. Prescriptions sent to patients preferred pharmacy. All belongings sent with patient.    Natali Pabon RN

## 2025-04-13 NOTE — DISCHARGE SUMMARY
[facility]  Discharge Summary  Hospital Medicine       Date of Admission:  4/10/2025  Date of Discharge:  4/13/2025  Discharging Provider: Ant Bautista MD      Identification and Chief Compaint: Melquiades Morales is a 70 year old male [facility] resident who presented on 4/10/2025 with complaint of of facial injury after two falls.    Discharge Diagnoses   Acute respiratory failure with hypoxia  COPD exacerbation, suspect  Acute toxic encephalopathy   Recurrent falls  Nasal bone fractures, acute  Possible subtle left orbital wall fracture     Follow-ups Needed After Discharge   Follow-up Appointments       Hospital Follow-up with Existing Primary Care Provider (PCP)          Schedule Primary Care visit within: 7 Days             Hospital Course   Melquiades Morales is a 70 year old male admitted on 4/10/2025. He has a past medical history significant for chronic pain on chronic narcotics, COPD, pre diabetes, dyslipidemia, esophagitis, tremor, anxiety / depression / PTSD, sleep disturbance, and nasal polyps who presented to the emergency department for evaluation of facial injury after two falls, was found to have nasal bone fractures as well as acute hypoxic respiratory failure likely due to COPD exacerbation.     Acute respiratory failure with hypoxia  COPD exacerbation, suspect  Known COPD but no recent PFT's on file, not on supplemental O2 at baseline. Uses prn albuterol but not on any controller medications prior to admission. Was hypoxic in the 80's in the emergency department, has a new 2L supplemental O2 requirement.     CT CHEST - No acute findings.  CT CHEST PE - No PE      COVID / influenza / RSV PCR negative. D-dimer mildly elevated (0.97), but no PE on CT imaging. No orthopnea, crackles, or evidence of heart failure by imaging or exam. Most likely cause for hypoxemia is due to COPD exacerbation. Was given 125 mg SoluMedrol and a DuoNeb in the emergency department. Continued  steroid burst with prednisone 40 mg daily. Scheduled nebulized albuterol and ipratropium. Intermittently on O2 last couple of days, dropping into mid-80's at times when sleeping/resting but comes back up. This seems stable to go home without supplemental O2. Given his ongoing smoking, I'm reticent to send home on O2 unless he has a very strong indication.     Acute toxic encephalopathy     Patient slightly altered but A&O x3, cooperative, responding appropriately to questions at time of admission. Head CT unremarkable for acute intracranial findings (although does have facial bone fractures, see below). Utox positive for opiates (which he is prescribed) and cannabis. UA unremarkable. No hypercarbia. Ammonia WNL (23). No obvious infectious findings so low concern for meningitis / encephalitis.     Patient has had encephalopathy in the past without obvious findings, thought to be related to narcotics and/or infections present at the time. Suspect current presentation may be due to narcotics. Although he has been on them for a long time, he manages his own medications, and it is not clear if he takes them as prescribed.     Resolved 4/12 on home narcotics.     Recurrent falls  Nasal bone fractures, acute  Possible subtle left orbital wall fracture    Presented due to unwitnessed fall x 2 at group home, patient hit his head and face during one or both falls. Patient does not clearly remember why or how he fell. Chart review of prior admissions show that he has fallen for unclear reasons in the past as well (1/31/25, 9/29/22).    HEAD CT: No definite acute intracranial hemorrhage, mass, or herniation.    FACIAL BONE CT: Acute comminuted bilateral nasal bone fractures which are deviated to the right. Acute fracture through the anterior bony nasal septum, deviated to the right. Subtle cortical step-off of the medial left orbital wall/lamina papyracea. This could represent subtle fracture. No orbital hematoma.    CERVICAL  SPINE CT: No fracture or posttraumatic subluxation.    CTA HEAD: Normal    NECK CTA: Mild atherosclerotic disease along the carotid arteries, otherwise negative.     XR LEFT ANKLE: no fracture    CT CHEST / PE - No fractures    Falls likely related to his altered mental status as above. As the falls were unwitnessed, can't be sure if there was loss of consciousness with the episodes. ECG without concerning findings. Echocardiogram this admission is normal. No arrhythmia on telemetry this hospitalization. Syncope due to cardiac arrhythmia seems unlikely.     Patient has done well with physical therapy and can return to his [facility].     Closed fracture of nasal bone, initial encounter  Nasal septum fracture, closed, initial encounter  Chronic maxillary sinusitis  Inverted papilloma of nasal cavity    Patient has known pre-existing nasal polyps and chronic maxillary sinusitis, has a future ENT appointment to address (ENT Dr. Houston on 5/1/25). However, now has new facial fractures after fall (see imaging above). Case was discussed between emergency department and on-call ENT, who did not feel that any emergent intervention was indicated, no antibiotics recommended.     Patient has new ENT follow-up appointment with Dr. Chilango Mart on 4/17/25    Chronic pain syndrome  Spinal stenosis in cervical region  Uncomplicated opioid dependence    Known history of chronic back pain, follows with pain specialist Ludmila Pennington NP. Is prescribed MS Contin 30 mg tid, oxycodone 10 mg tid, and Lyrica 150 mg bid (verified by [facility]), stable dosing for many months. Per group home staff (see emergency department note regarding discussion with staff), patient is in charge of his own medications, and they are unable to verify medication dose/timing. As noted above, there is concern for possible narcotic overdose as cause to his recurrent falls and encephalopathy.    Continued with home regimen of MS Contin 30 mg tid and oxycodone 10 mg  tid initially. However, after receiving his typical home dose of oxycodone 10 mg, patient became more lethargic and respiratory status worsened. Patient did well with lower dose oxycodone 5 mg. On discharge, patient is much more alert as well as oriented, and if he takes his 10 mg oxycodone and MS contin as prescribed, he should be ok to resume his prior dose as he has been on that for a long time. He was counseled to take them as prescribed or decrease the dose if he is feeling groggy - he expressed understanding but really doubts he will have any problem.     Hyperlipidemia LDL goal <130    Managed prior to admission with Zocor 80 mg daily, continue.     Prediabetes    Recent HgbA1c 6.3. Diet controlled, not on any pharmacotherapy prior to admission. Admission glucose 113. No new medications. Outpatient follow-up with PCP.     Major depressive disorder, recurrent episode, moderate  Generalized anxiety disorder  Posttraumatic stress disorder    Managed prior to admission with Cymbalta 120 mg daily and Latuda 20 mg q pm.    Sleep disturbance    Managed prior to admission with trazodone 250 mg q hs.    Esophagitis    Previously diagnosed, but findings of esophagitis again noted on CT chest. Managed prior to admission with Protonix 40 mg daily, continue.     Tremor    Previously diagnosed. Managed prior to admission with propranolol  mg daily.    Tobacco use disorder    Still smokes 1/2PPD and does not intend to stop.    O2 use would be problematic if continues to smoke.          Consultations This Hospital Stay   SURGERY GENERAL IP CONSULT  PHYSICAL THERAPY ADULT IP CONSULT  OCCUPATIONAL THERAPY ADULT IP CONSULT  CARE MANAGEMENT / SOCIAL WORK IP CONSULT  CARE MANAGEMENT / SOCIAL WORK IP CONSULT    Code Status   Full Code    The discharge plan was discussed with the patient, and he expressed understanding.     Time Spent on this Encounter   Total time on this discharge was < 30 minutes.       Ant Cordon  MD Lily  [facility]  ______________________________________________________________________    Physical Exam   Vital Signs: Temp: 98  F (36.7  C) Temp src: Oral BP: 125/62 Pulse: 56   Resp: 16 SpO2: (!) 90 % O2 Device: None (Room air)   SpO2 92% on RA during visit  Weight: 218 lbs 4.09 oz  Constitutional: alert and oriented, NAD, nontoxic  CV: Regular, no edema  Respiratory: a little course breath sounds, no wheezes, a few right base crackles otherwise CTA bilaterally  GI: Soft, nontender  Skin: Warm and dry       Primary Care Physician   Krupa Breen  [id number] [facility] [id number]     Discharge Disposition   Discharged to home - [facility]  Condition at discharge: Stable    Significant Results and Procedures   Results for orders placed or performed during the hospital encounter of 04/10/25   CT Head w/o Contrast    Narrative    EXAM: CT HEAD W/O CONTRAST, CT FACIAL BONES WITHOUT CONTRAST, CT CERVICAL SPINE W/O CONTRAST  LOCATION: [facility]  DATE: 4/10/2025    INDICATION: Fall X2, visible facial trauma, poor historian, ? confusion, unwitnessed per reports, lives in group home.  COMPARISON: Head CT 1/31/2025.  TECHNIQUE:   1) Routine CT Head without IV contrast. Multiplanar reformats. Dose reduction techniques were used.  2) Routine CT Facial Bones without IV contrast. Multiplanar reformats. Dose reduction techniques were used.  3) Routine CT Cervical Spine without IV contrast. Multiplanar reformats. Dose reduction techniques were used.    FINDINGS:  HEAD CT:   INTRACRANIAL CONTENTS: Images are mildly degraded by motion artifact particularly at the level of the skull base. No acute intracranial hemorrhage appreciated. No mass effect or midline shift. Ventricular size is within normal limits without evidence of   hydrocephalus. Mild diffuse parenchymal volume loss. Patchy periventricular white matter hypodensities are nonspecific, but most likely  related to chronic microvascular ischemic disease.    OSSEOUS STRUCTURES/SOFT TISSUES: No significant abnormality.     FACIAL BONE CT:  OSSEOUS STRUCTURES/SOFT TISSUES: Soft tissue swelling over the anterior face particularly over the nose. Comminuted bilateral nasal bone fractures which appear to be deviated to the right. These are new since 1/31/2025 and are favored to be acute.   Comminuted fracture through the bony nasal septum which is deviated to the right. No definite fracture through the maxillary sinus wall. Subtle cortical step-off of the left lamina papyracea although there is no adjacent orbital hematoma. This could   potentially represent a subtle fracture through the medial left orbital wall. The left vertical and horizontal lamella are not well visualized although this may be due to sinus opacification which was also present on 1/31/2025. The teeth are all absent.    SINUSES: Chronic opacification of the left frontal sinus, left ethmoid air cells, and left maxillary sinus with opacification of the left middle meatus, this was also present on 1/31/2025.    CERVICAL SPINE CT:   VERTEBRA: Convex left curvature of the cervical spine. No acute lucent fracture lines. No significant loss of vertebral body height. Marked degenerative endplate changes and loss of disc height at C5-C6 with disc bulge and endplate osteophytic spurring.    CANAL/FORAMINA: Moderate spinal canal narrowing at C5-C6. Moderate to severe bilateral neural foraminal narrowing at C5-C6.    PARASPINAL: No extraspinal abnormality. Visualized lung fields are clear.      Impression    IMPRESSION:  HEAD CT:  1.  Images are mildly degraded by motion artifact which limits evaluation, particularly near the skull base.  2.  No definite acute intracranial hemorrhage, mass, or herniation.  3.  Mild diffuse parenchymal volume loss and white matter changes which are most likely due to chronic microvascular ischemic disease.    FACIAL BONE CT:  1.   Acute comminuted bilateral nasal bone fractures which are deviated to the right.  2.  Acute fracture through the anterior bony nasal septum, deviated to the right.  3.  Subtle cortical step-off of the medial left orbital wall/lamina papyracea. This could represent subtle fracture. No orbital hematoma.  4.  Chronic opacification of the left frontal sinus, ethmoid air cells, and left maxillary sinus which was also present on 1/31/2025. This makes evaluation for subtle fractures in these areas as well as sinus hemorrhage limited.    CERVICAL SPINE CT:  1.  No fracture or posttraumatic subluxation.  2.  Degenerative changes in the cervical spine, most pronounced at C5-C6.   CT Cervical Spine w/o Contrast    Narrative    EXAM: CT HEAD W/O CONTRAST, CT FACIAL BONES WITHOUT CONTRAST, CT CERVICAL SPINE W/O CONTRAST  LOCATION: [facility]  DATE: 4/10/2025    INDICATION: Fall X2, visible facial trauma, poor historian, ? confusion, unwitnessed per reports, lives in group home.  COMPARISON: Head CT 1/31/2025.  TECHNIQUE:   1) Routine CT Head without IV contrast. Multiplanar reformats. Dose reduction techniques were used.  2) Routine CT Facial Bones without IV contrast. Multiplanar reformats. Dose reduction techniques were used.  3) Routine CT Cervical Spine without IV contrast. Multiplanar reformats. Dose reduction techniques were used.    FINDINGS:  HEAD CT:   INTRACRANIAL CONTENTS: Images are mildly degraded by motion artifact particularly at the level of the skull base. No acute intracranial hemorrhage appreciated. No mass effect or midline shift. Ventricular size is within normal limits without evidence of   hydrocephalus. Mild diffuse parenchymal volume loss. Patchy periventricular white matter hypodensities are nonspecific, but most likely related to chronic microvascular ischemic disease.    OSSEOUS STRUCTURES/SOFT TISSUES: No significant abnormality.     FACIAL BONE CT:  OSSEOUS  STRUCTURES/SOFT TISSUES: Soft tissue swelling over the anterior face particularly over the nose. Comminuted bilateral nasal bone fractures which appear to be deviated to the right. These are new since 1/31/2025 and are favored to be acute.   Comminuted fracture through the bony nasal septum which is deviated to the right. No definite fracture through the maxillary sinus wall. Subtle cortical step-off of the left lamina papyracea although there is no adjacent orbital hematoma. This could   potentially represent a subtle fracture through the medial left orbital wall. The left vertical and horizontal lamella are not well visualized although this may be due to sinus opacification which was also present on 1/31/2025. The teeth are all absent.    SINUSES: Chronic opacification of the left frontal sinus, left ethmoid air cells, and left maxillary sinus with opacification of the left middle meatus, this was also present on 1/31/2025.    CERVICAL SPINE CT:   VERTEBRA: Convex left curvature of the cervical spine. No acute lucent fracture lines. No significant loss of vertebral body height. Marked degenerative endplate changes and loss of disc height at C5-C6 with disc bulge and endplate osteophytic spurring.    CANAL/FORAMINA: Moderate spinal canal narrowing at C5-C6. Moderate to severe bilateral neural foraminal narrowing at C5-C6.    PARASPINAL: No extraspinal abnormality. Visualized lung fields are clear.      Impression    IMPRESSION:  HEAD CT:  1.  Images are mildly degraded by motion artifact which limits evaluation, particularly near the skull base.  2.  No definite acute intracranial hemorrhage, mass, or herniation.  3.  Mild diffuse parenchymal volume loss and white matter changes which are most likely due to chronic microvascular ischemic disease.    FACIAL BONE CT:  1.  Acute comminuted bilateral nasal bone fractures which are deviated to the right.  2.  Acute fracture through the anterior bony nasal septum,  deviated to the right.  3.  Subtle cortical step-off of the medial left orbital wall/lamina papyracea. This could represent subtle fracture. No orbital hematoma.  4.  Chronic opacification of the left frontal sinus, ethmoid air cells, and left maxillary sinus which was also present on 1/31/2025. This makes evaluation for subtle fractures in these areas as well as sinus hemorrhage limited.    CERVICAL SPINE CT:  1.  No fracture or posttraumatic subluxation.  2.  Degenerative changes in the cervical spine, most pronounced at C5-C6.   CT Facial Bones without Contrast    Narrative    EXAM: CT HEAD W/O CONTRAST, CT FACIAL BONES WITHOUT CONTRAST, CT CERVICAL SPINE W/O CONTRAST  LOCATION: [facility]  DATE: 4/10/2025    INDICATION: Fall X2, visible facial trauma, poor historian, ? confusion, unwitnessed per reports, lives in group home.  COMPARISON: Head CT 1/31/2025.  TECHNIQUE:   1) Routine CT Head without IV contrast. Multiplanar reformats. Dose reduction techniques were used.  2) Routine CT Facial Bones without IV contrast. Multiplanar reformats. Dose reduction techniques were used.  3) Routine CT Cervical Spine without IV contrast. Multiplanar reformats. Dose reduction techniques were used.    FINDINGS:  HEAD CT:   INTRACRANIAL CONTENTS: Images are mildly degraded by motion artifact particularly at the level of the skull base. No acute intracranial hemorrhage appreciated. No mass effect or midline shift. Ventricular size is within normal limits without evidence of   hydrocephalus. Mild diffuse parenchymal volume loss. Patchy periventricular white matter hypodensities are nonspecific, but most likely related to chronic microvascular ischemic disease.    OSSEOUS STRUCTURES/SOFT TISSUES: No significant abnormality.     FACIAL BONE CT:  OSSEOUS STRUCTURES/SOFT TISSUES: Soft tissue swelling over the anterior face particularly over the nose. Comminuted bilateral nasal bone fractures which appear  to be deviated to the right. These are new since 1/31/2025 and are favored to be acute.   Comminuted fracture through the bony nasal septum which is deviated to the right. No definite fracture through the maxillary sinus wall. Subtle cortical step-off of the left lamina papyracea although there is no adjacent orbital hematoma. This could   potentially represent a subtle fracture through the medial left orbital wall. The left vertical and horizontal lamella are not well visualized although this may be due to sinus opacification which was also present on 1/31/2025. The teeth are all absent.    SINUSES: Chronic opacification of the left frontal sinus, left ethmoid air cells, and left maxillary sinus with opacification of the left middle meatus, this was also present on 1/31/2025.    CERVICAL SPINE CT:   VERTEBRA: Convex left curvature of the cervical spine. No acute lucent fracture lines. No significant loss of vertebral body height. Marked degenerative endplate changes and loss of disc height at C5-C6 with disc bulge and endplate osteophytic spurring.    CANAL/FORAMINA: Moderate spinal canal narrowing at C5-C6. Moderate to severe bilateral neural foraminal narrowing at C5-C6.    PARASPINAL: No extraspinal abnormality. Visualized lung fields are clear.      Impression    IMPRESSION:  HEAD CT:  1.  Images are mildly degraded by motion artifact which limits evaluation, particularly near the skull base.  2.  No definite acute intracranial hemorrhage, mass, or herniation.  3.  Mild diffuse parenchymal volume loss and white matter changes which are most likely due to chronic microvascular ischemic disease.    FACIAL BONE CT:  1.  Acute comminuted bilateral nasal bone fractures which are deviated to the right.  2.  Acute fracture through the anterior bony nasal septum, deviated to the right.  3.  Subtle cortical step-off of the medial left orbital wall/lamina papyracea. This could represent subtle fracture. No orbital  hematoma.  4.  Chronic opacification of the left frontal sinus, ethmoid air cells, and left maxillary sinus which was also present on 1/31/2025. This makes evaluation for subtle fractures in these areas as well as sinus hemorrhage limited.    CERVICAL SPINE CT:  1.  No fracture or posttraumatic subluxation.  2.  Degenerative changes in the cervical spine, most pronounced at C5-C6.   CTA Head Neck with Contrast    Narrative    EXAM: CTA HEAD NECK W CONTRAST  LOCATION: [facility]  DATE: 4/10/2025    INDICATION: Fall x2, visible facial trauma, poor historian, ? confusion, unwitnessed per reports, lives in group home, LKW yesterday 1600   COMPARISON: None.  CONTRAST: 67 mL Isovue 370  TECHNIQUE: Head and neck CT angiogram with IV contrast. Axial helical CT images of the head and neck vessels obtained during the arterial phase of intravenous contrast administration. Axial 2D reconstructed images and multiplanar 3D MIP reconstructed   images of the head and neck vessels were performed by the technologist. Dose reduction techniques were used. All stenosis measurements made according to NASCET criteria unless otherwise specified.    FINDINGS:   HEAD CTA:  ANTERIOR CIRCULATION: No stenosis/occlusion, aneurysm, or high flow vascular malformation. Fetal origin of the right posterior cerebral artery from the anterior circulation.    POSTERIOR CIRCULATION: No stenosis/occlusion, aneurysm, or high flow vascular malformation. Balanced vertebral arteries supply a normal basilar artery.     DURAL VENOUS SINUSES: Expected enhancement of the major dural venous sinuses.    NECK CTA:  RIGHT CAROTID: Atherosclerotic plaque results in less than 50% stenosis in the right ICA. No dissection.    LEFT CAROTID: Atherosclerotic plaque results in less than 50% stenosis in the left ICA. No dissection.    VERTEBRAL ARTERIES: No focal stenosis or dissection. Balanced vertebral arteries.    AORTIC ARCH: Classic aortic  arch anatomy with no significant stenosis at the origin of the great vessels.    NONVASCULAR STRUCTURES: Mild emphysematous changes at the visualized lung apices. There are degenerative changes in the spine.      Impression    IMPRESSION:   HEAD CTA:   1.  Normal CTA [place].    NECK CTA:  1.  Patent arteries in the neck without evidence of dissection.  2.  Mild atherosclerotic disease along the carotid arteries without significant stenosis.   XR Ankle Left G/E 3 Views    Narrative    EXAM: XR ANKLE LEFT G/E 3 VIEWS  LOCATION: [facility]  DATE: 4/10/2025    INDICATION: fell twice at group home residence now has left lateral malleolus ankle pain  COMPARISON: None.      Impression    IMPRESSION: Demineralization without displaced fracture. Spurring of the lateral malleolus. Small plantar calcaneal spur.   Chest CT w/o contrast     Value    Radiologist flags Gallbladder ultrasound.    Narrative    EXAM: CT CHEST W/O CONTRAST  LOCATION: [facility]  DATE: 4/10/2025    INDICATION: Hypoxia.  COMPARISON: 1/31/2025.  TECHNIQUE: CT chest without IV contrast. Multiplanar reformats were obtained. Dose reduction techniques were used.  CONTRAST: None.    FINDINGS: Absence of intravenous contrast limits the sensitivity of this examination for detection of infectious/inflammatory change, post traumatic abnormalities, vascular abnormalities, and visceral lesions.    LUNGS AND PLEURA: Mild tracheobronchial debris and mild diffuse bronchial wall thickening. Acute airspace consolidation. Unchanged scarring within the anterior central left lower lobe and the adjacent lingula. Additional scattered areas of mild linear   atelectatic change and scarring.    Mild paraseptal emphysema, apical predominance. Scattered tiny calcified granulomas. There are several sub-4 mm noncalcified pulmonary nodules, unchanged from 8/6/2022; no further follow-up recommended.    No  pneumothorax.    No pleural effusion.     MEDIASTINUM/AXILLAE: Subcentimeter mediastinal lymph nodes are stable from 8/6/2022; no further follow-up recommended.      Mild diffuse wall thickening of the mid and distal esophagus, unchanged from 2022, compatible with a chronic esophagitis.    No axillary lymphadenopathy.    Ovoid subcutaneous nodule of the posterior right back, measuring 3.5 cm, likely a sebaceous cyst, series 2 image 6.    No thoracic aortic aneurysm. Mild atherosclerotic calcifications.    Heart is normal in size. No pericardial effusion.    CORONARY ARTERY CALCIFICATION: Mild.    UPPER ABDOMEN: Cholelithiasis. Nodular soft tissue opacity at the gallbladder fundus, favoring adenomyomatosis.    MUSCULOSKELETAL: No suspicious abnormality.    OTHER: No additionally suspicious abnormality.      Impression    IMPRESSION:  1.  No acute CT abnormality of the chest.  2.  Mild paraseptal emphysema.  3.  Chronic mid to distal esophagitis.  4.  Cholelithiasis.  5.  Nodular soft tissue thickening of the gallbladder fundus, favoring adenomyomatosis. Nonemergent gallbladder sonography follow-up is recommended.      [Recommend Follow Up: Gallbladder ultrasound.]    This report will be copied to the [facility] to ensure a provider acknowledges the finding.      CT Chest Pulmonary Embolism w Contrast    Narrative    EXAM: CT CHEST PULMONARY EMBOLISM W CONTRAST  LOCATION: [facility]  DATE: 4/10/2025    INDICATION: Elevated d-dimer; r/o PE.  COMPARISON: Noncontrast chest CT from hours earlier.  TECHNIQUE: CT chest pulmonary angiogram during arterial phase injection of IV contrast. Multiplanar reformats and MIP reconstructions were performed. Dose reduction techniques were used.   CONTRAST: 80 mL Isovue-370.    FINDINGS:  ANGIOGRAM CHEST: No pulmonary embolism (within the limitation of breathing motion artifact) normal heart size without pericardial effusion. Coronary artery  "disease in the distal left main, proximal LAD, and proximal left circumflex. Thoracic aortic   atherosclerosis without aneurysm or dissection.    LUNGS AND PLEURA: Emphysema with mild bilateral \"platelike\" atelectasis/scarring. No pneumonia or pulmonary edema. No pleural effusion or pneumothorax.    MEDIASTINUM/AXILLAE: Circumferentially thickened entire esophagus, most notably in the mid and lower esophagus without significant esophageal luminal distention. No posterior mediastinal fluid collection or pneumomediastinum. Mildly enlarged right lower   paratracheal lymph node.    UPPER ABDOMEN: Normal.    MUSCULOSKELETAL: Roughly 3 cm skin-based sebaceous cyst in the right upper back. No acute or aggressive osseous abnormalities.      Impression    IMPRESSION:  1.  No pulmonary embolism identified.  2.  Emphysema without pneumonia, pulmonary edema, pleural effusion, or pneumothorax.  3.  Multivessel coronary artery disease.  4.  Long segment esophageal wall thickening. This could represent vomiting, GERD, or primary esophagitis, among other possibilities.   Echocardiogram Complete     Value    LVEF  60-65%    Narrative    [id number]  [id number]  [id number]  [id number]^SOPHIA^GLADIS^ALVARO     [facility]  Echocardiography Laboratory  [facility] [place] [address]     Name: DIOGO MCCLELLAN  MRN: [id number]  : 1955  Study Date: 2025 01:34 PM  Age: 70 yrs  Gender: Male  Patient Location: [facility]  Reason For Study: Syncope  Ordering Physician: GLADIS CORTES  Referring Physician: Krupa Breen  Performed By: Krystal Smith RDCS     BSA: 2.2 m2  Height: 72 in  Weight: 216 lb  HR: 82  BP: 135/51 mmHg  ______________________________________________________________________________  Procedure  Echocardiogram with two-dimensional, color and spectral Doppler. Optison (NDC  #[id number]) given " intravenously.  ______________________________________________________________________________  Interpretation Summary     1. The left ventricle is normal in size. There is normal left ventricular wall  thickness. Left ventricular systolic function is normal. The visual ejection  fraction is 60-65%. Diastolic Doppler findings (E/E' ratio and/or other  parameters) suggest left ventricular filling pressures are normal. No regional  wall motion abnormalities noted. There is no thrombus seen in the left  ventricle.  2. The right ventricle is normal size. The right ventricular systolic function  is normal.  3. Trace mitral and tricuspid regurgitation.  4. No pericardial effusion.  5. In comparison to the previous report dated 12/01/2009, the findings are  similar.  ______________________________________________________________________________  Left Ventricle  The left ventricle is normal in size. There is normal left ventricular wall  thickness. Left ventricular systolic function is normal. The visual ejection  fraction is 60-65%. Diastolic Doppler findings (E/E' ratio and/or other  parameters) suggest left ventricular filling pressures are normal. No regional  wall motion abnormalities noted. There is no thrombus seen in the left  ventricle.     Right Ventricle  The right ventricle is normal size. The right ventricular systolic function is  normal.     Atria  Normal left atrial size. Right atrial size is normal. There is no color  Doppler evidence of an atrial shunt.     Mitral Valve  There is mild mitral annular calcification. There is trace mitral  regurgitation.     Tricuspid Valve  There is trace tricuspid regurgitation. Right ventricular systolic pressure  could not be approximated due to inadequate tricuspid regurgitation.     Aortic Valve  No aortic regurgitation is present. No aortic stenosis is present.     Pulmonic Valve  There is no pulmonic valvular stenosis.     Vessels  The aortic root is normal size.  Normal size ascending aorta. The inferior vena  cava is normal.     Pericardium  There is no pericardial effusion.     Rhythm  Sinus rhythm was noted.  ______________________________________________________________________________  MMode/2D Measurements & Calculations  IVSd: 0.84 cm     LVIDd: 4.7 cm  LVIDs: 2.8 cm  LVPWd: 0.83 cm  FS: 40.3 %  LV mass(C)d: 128.7 grams  LV mass(C)dI: 58.5 grams/m2  Ao root diam: 3.6 cm  LA dimension: 3.0 cm  asc Aorta Diam: 3.2 cm  LA/Ao: 0.84  Ao root diam index Ht(cm/m): 1.9  Ao root diam index BSA (cm/m2): 1.6  Asc Ao diam index BSA (cm/m2): 1.4  Asc Ao diam index Ht(cm/m): 1.7  LA Volume (BP): 47.3 ml     LA Volume Index (BP): 21.5 ml/m2  RV Base: 3.5 cm  RWT: 0.36  TAPSE: 2.1 cm     Doppler Measurements & Calculations  MV E max flako: 72.5 cm/sec  MV A max flako: 107.4 cm/sec  MV E/A: 0.68  MV dec slope: 234.0 cm/sec2  MV dec time: 0.31 sec  PA acc time: 0.13 sec  E/E' av.3  Lateral E/e': 7.0  Medial E/e': 9.7  RV S Flako: 17.7 cm/sec     ______________________________________________________________________________  Report approved by: Jayy Guzman MD on 2025 02:35 PM           *Note: Due to a large number of results and/or encounters for the requested time period, some results have not been displayed. A complete set of results can be found in Results Review.     Procedures  None    Discharge Orders      Medication Therapy Management Referral      Primary Care - Care Coordination Referral      Reason for your hospital stay    Suspect COPD exacerbation with hypoxia, improved.     Activity    Your activity upon discharge: activity as tolerated     Diet    Follow this diet upon discharge: Resume previous diet     Hospital Follow-up with Existing Primary Care Provider (PCP)          Discharge Medications   Current Discharge Medication List        START taking these medications    Details   acetaminophen (TYLENOL) 500 MG tablet Take 2 tablets (1,000 mg) by mouth 3 times daily.     Associated Diagnoses: Chronic pain syndrome      predniSONE (DELTASONE) 20 MG tablet Take 2 tablets (40 mg) by mouth daily for 2 days, THEN 1 tablet (20 mg) daily for 4 days, THEN 0.5 tablets (10 mg) daily for 4 days.  Qty: 10 tablet, Refills: 0    Associated Diagnoses: COPD exacerbation (H)           CONTINUE these medications which have NOT CHANGED    Details   albuterol (PROAIR HFA) 108 (90 Base) MCG/ACT inhaler Inhale 2 puffs into the lungs every 4 hours as needed for shortness of breath or wheezing.  Qty: 18 g, Refills: 3    Comments: Pharmacy may dispense brand covered by insurance (Proair, or proventil or ventolin or generic albuterol inhaler)  Associated Diagnoses: Chronic obstructive pulmonary disease, unspecified COPD type (H)      DULoxetine (CYMBALTA) 60 MG capsule Take 2 capsules (120 mg) by mouth daily.  Qty: 180 capsule, Refills: 3    Associated Diagnoses: Major depressive disorder, recurrent episode, moderate (H)      lurasidone (LATUDA) 20 MG TABS tablet Take 1 tablet (20 mg) by mouth daily (with dinner).  Qty: 90 tablet, Refills: 3    Associated Diagnoses: Major depressive disorder, recurrent episode, moderate (H)      morphine (MS CONTIN) 30 MG CR tablet Take 1 tablet (30 mg) by mouth 3 times daily. 6 AM   12 pm   5 pm      naproxen (NAPROSYN) 500 MG tablet Take 1 tablet (500 mg) by mouth 2 times daily as needed for headaches  Qty: 60 tablet, Refills: 3    Associated Diagnoses: Chronic pain syndrome      oxyCODONE IR (ROXICODONE) 10 MG tablet Take 1 tablet by mouth 3 times daily      pregabalin (LYRICA) 150 MG capsule Take 1 capsule by mouth 2 times daily.      propranolol ER (INDERAL LA) 120 MG 24 hr capsule Take 1 capsule (120 mg) by mouth daily.  Qty: 90 capsule, Refills: 3    Associated Diagnoses: Tremor      simvastatin (ZOCOR) 80 MG tablet Take 1 tablet (80 mg) by mouth daily.  Qty: 90 tablet, Refills: 3    Associated Diagnoses: Hyperlipidemia LDL goal <130      !! traZODone (DESYREL) 100 MG  tablet TAKE TWO TABLETS BY MOUTH ONCE DAILY AT BEDTIME IN ADDITION TO THE 50MG TABLET FOR A TOTAL OF 250MG PER DAY  Qty: 180 tablet, Refills: 3    Associated Diagnoses: Major depressive disorder, recurrent episode, moderate (H)      !! traZODone (DESYREL) 50 MG tablet TAKE ONE TABLET BY MOUTH ONCE DAILY AT BEDTIME TAKE ALONG WITH THE  MG TABLETS FOR TOTAL DOSE  MG  Qty: 90 tablet, Refills: 4    Associated Diagnoses: Major depressive disorder, recurrent episode, moderate (H)      naloxone (NARCAN) 4 MG/0.1ML nasal spray Spray 4 mg into one nostril alternating nostrils as needed for opioid reversal every 2-3 minutes until assistance arrives  Qty: 0.2 mL, Refills: 0      !! order for DME Equipment being ordered: Nebulizer.    Diagnosis: chronic obstructive bronchitis (COPD).    Duration of need:  99 months.  Qty: 1 each, Refills: 0    Associated Diagnoses: Chronic bronchitis, unspecified chronic bronchitis type (H)      !! order for DME Equipment being ordered: Hospital Bed and mattress.  Qty: 1 each, Refills: 0    Associated Diagnoses: Chronic pain syndrome; Lumbosacral radiculitis; Chronic obstructive pulmonary disease, unspecified COPD type (H); Obese; Lumbar radiculopathy; Encephalopathy; Spinal stenosis in cervical region      !! order for DME Equipment being ordered: Lift Chair  Qty: 1 each, Refills: 0    Associated Diagnoses: Chronic pain syndrome; Lumbosacral radiculitis; Chronic obstructive pulmonary disease, unspecified COPD type (H); Obese; Lumbar radiculopathy; Encephalopathy; Spinal stenosis in cervical region; Unable to ambulate      !! ORDER FOR DME Semi electric hospital bed with side rails and mattress. Length of bed is for lifetime  Qty: 1 Device, Refills: 0    Associated Diagnoses: Other unspecified back disorder; Obese; Lumbosacral radiculitis; COPD (chronic obstructive pulmonary disease) (H)       !! - Potential duplicate medications found. Please discuss with provider.        STOP  taking these medications       pantoprazole (PROTONIX) 40 MG EC tablet Comments:   Reason for Stopping:             Allergies   Allergies   Allergen Reactions    Abilify [Aripiprazole] Other (See Comments)     Altered metal status.  Was admitted to hospital 3/17/2015.    Asa [Aspirin] GI Disturbance     Upset stomach    Black Pepper [Piper] Swelling     Tongue swells up    Caffeine Other (See Comments)     Comment: GI problems, Description:     Robitussin Cough-Cold D Other (See Comments)     Bad dreams about killing people     Varenicline Other (See Comments)     Vivid dreams and suicidal thoughts

## 2025-04-14 ENCOUNTER — PATIENT OUTREACH (OUTPATIENT)
Dept: CARE COORDINATION | Facility: CLINIC | Age: 70
End: 2025-04-14
Payer: COMMERCIAL

## 2025-04-14 NOTE — PROGRESS NOTES
Physical Therapy Discharge Summary    Reason for therapy discharge:    Discharged to home with home therapy.    Progress towards therapy goal(s). See goals on Care Plan in [facility] electronic health record for goal details.  Goals met    Therapy recommendation(s):    Continued therapy is recommended.  Rationale/Recommendations:  activity tolerance.

## 2025-04-14 NOTE — PROGRESS NOTES
Clinic Care Coordination Contact  Care Coordination Clinician Chart Review    Situation: Patient chart reviewed by care coordinator.    Background: Clinic Care Coordination Referral received from inpatient care team for transition handoff communication following hospital admission.    Assessment: Upon chart review, patient is not a candidate for Primary Care Clinic Care Coordination enrollment due to reason stated below:  Patient is receiving duplicative services from [facility] Phone #[phone].    Plan/Recommendations: Clinic Care Coordination Referral/order cancelled. RN/DILAN CC will perform no further monitoring/outreaches at this time and will remain available as needed. If new needs arise, a new Care Coordination Referral may be placed.    [facility]   Maria Del Carmen Eisenberg RN, Care Coordinator   [facility]'s   E-mail [email or web address]   [phone]

## 2025-04-15 ENCOUNTER — OFFICE VISIT (OUTPATIENT)
Dept: FAMILY MEDICINE | Facility: CLINIC | Age: 70
End: 2025-04-15
Payer: COMMERCIAL

## 2025-04-15 VITALS
WEIGHT: 213 LBS | DIASTOLIC BLOOD PRESSURE: 76 MMHG | HEIGHT: 71 IN | BODY MASS INDEX: 29.82 KG/M2 | TEMPERATURE: 97.9 F | RESPIRATION RATE: 20 BRPM | HEART RATE: 78 BPM | OXYGEN SATURATION: 93 % | SYSTOLIC BLOOD PRESSURE: 152 MMHG

## 2025-04-15 DIAGNOSIS — M48.02 SPINAL STENOSIS IN CERVICAL REGION: Chronic | ICD-10-CM

## 2025-04-15 DIAGNOSIS — M54.16 LUMBAR RADICULOPATHY: Chronic | ICD-10-CM

## 2025-04-15 DIAGNOSIS — R29.6 RECURRENT FALLS: ICD-10-CM

## 2025-04-15 DIAGNOSIS — J44.1 COPD EXACERBATION (H): ICD-10-CM

## 2025-04-15 DIAGNOSIS — G89.4 CHRONIC PAIN SYNDROME: Primary | Chronic | ICD-10-CM

## 2025-04-15 PROBLEM — Z91.148 NON COMPLIANCE W MEDICATION REGIMEN: Chronic | Status: RESOLVED | Noted: 2025-04-11 | Resolved: 2025-04-15

## 2025-04-15 PROCEDURE — 3078F DIAST BP <80 MM HG: CPT | Performed by: NURSE PRACTITIONER

## 2025-04-15 PROCEDURE — 99214 OFFICE O/P EST MOD 30 MIN: CPT | Performed by: NURSE PRACTITIONER

## 2025-04-15 PROCEDURE — 3077F SYST BP >= 140 MM HG: CPT | Performed by: NURSE PRACTITIONER

## 2025-04-15 PROCEDURE — G2211 COMPLEX E/M VISIT ADD ON: HCPCS | Performed by: NURSE PRACTITIONER

## 2025-04-15 PROCEDURE — 1111F DSCHRG MED/CURRENT MED MERGE: CPT | Performed by: NURSE PRACTITIONER

## 2025-04-15 ASSESSMENT — PATIENT HEALTH QUESTIONNAIRE - PHQ9
SUM OF ALL RESPONSES TO PHQ QUESTIONS 1-9: 11
SUM OF ALL RESPONSES TO PHQ QUESTIONS 1-9: 11
10. IF YOU CHECKED OFF ANY PROBLEMS, HOW DIFFICULT HAVE THESE PROBLEMS MADE IT FOR YOU TO DO YOUR WORK, TAKE CARE OF THINGS AT HOME, OR GET ALONG WITH OTHER PEOPLE: SOMEWHAT DIFFICULT

## 2025-04-15 NOTE — PROGRESS NOTES
DME (Durable Medical Equipment) Orders and Documentation  Orders Placed This Encounter   Procedures    Wheelchair Order        The patient was assessed and it was determined the patient is in need of the following listed DME Supplies/Equipment. Please complete supporting documentation below to demonstrate medical necessity.      Patient with hx of recurrent falls, lumbar radiculopathy and COPD requiring a power wheelchair for mobility outside of his apartment to prevent falls and injury secondary to pain, legs giving out, shortness of breath. Due to chronic back pain he needs a chair that reclines as well as has elevated legs and adjustable arms. He does use this chair outside after dark and will need to have heavy duty wheels and lights. Current power chair has a broken frame that is hanging off presenting a tripping and tipping hazard. Both the battery and tires appear worn with no life left to them presenting a hazard in our [place] [facility] for [place] using this chair outdoors.

## 2025-04-15 NOTE — PROGRESS NOTES
Assessment & Plan     Chronic pain syndrome    - Wheelchair Order    Lumbar radiculopathy    - Wheelchair Order    Recurrent falls    - Wheelchair Order    COPD exacerbation (H)    - General PFT Lab (Please always keep checked); Future  - Pulmonary Function Test; Future  - Wheelchair Order    Spinal stenosis in cervical region    - Wheelchair Order        MED REC REQUIRED  Post Medication Reconciliation Status: discharge medications reconciled, continue medications without change    FURTHER TESTING:       - PFT's    FUTURE APPOINTMENTS:       - Follow-up visit in 3 months, sooner PRN    Time spent in chart review in preparation to see patient, time with patient for interview/exam, ordering medications/tests/and/or procedures, and time spent in charting and coordinating care: 30 minutes.       Carlos Arnett is a 70 year old, presenting for the following health issues:  Hospital F/U        4/15/2025     3:00 PM   Additional Questions   Roomed by Naima LOWRY          Hospital Follow-up Visit:    Hospital/Nursing Home/IP Rehab Facility: [facility]  Most Recent Admission Date: 4/10/2025   Most Recent Admission Diagnosis: Hypoxia - R09.02  COPD exacerbation (H) - J44.1  Acute encephalopathy - G93.40  Closed fracture of nasal bone, initial encounter - S02.2XXA  Nasal septum fracture, closed, initial encounter - S02.2XXA     Most Recent Discharge Date: 4/13/2025   Most Recent Discharge Diagnosis: Closed fracture of nasal bone, initial encounter - S02.2XXA  Nasal septum fracture, closed, initial encounter - S02.2XXA  Acute encephalopathy - G93.40  Hypoxia - R09.02  COPD exacerbation (H) - J44.1  Recurrent falls - R29.6  Uncomplicated opioid dependence (H) - F11.20  Major depressive disorder, recurrent episode, moderate (H) - F33.1  Non compliance w medication regimen - Z91.148  Acute respiratory failure with hypoxia (H) - J96.01  Chronic pain syndrome - G89.4  Fall, initial encounter  - W19.XXXA   Was the patient in the ICU or did the patient experience delirium during hospitalization?  No  Do you have any other stressors you would like to discuss with your provider? No    Problems taking medications regularly:  None  Medication changes since discharge: added prednisone  Problems adhering to non-medication therapy:  None    Summary of hospitalization:  [facility] discharge summary reviewed and is incomplete at time of this appt.  Diagnostic Tests/Treatments reviewed.  Follow up needed: PFT's, DME equipment  Other Healthcare Providers Involved in Patient s Care:         None  Update since discharge: stable.         Plan of care communicated with patient             Patient reports his legs giving out and he fell. Denies ongoing dyspnea or wheezing/coughing while in his room at his living facility. He will use a power scooter outside of his room. We did discuss the Propranolol he takes for his tremor and he does not endorse ongoing respiratory difficulties and does not want to discontinue it. He does have ongoing pain and difficulty with his legs giving out. He is not interested in any surgeries or physical therapy. He has a pain team he works with.     Patient with hx of recurrent falls, lumbar radiculopathy, cervical stenosis and COPD requiring a power wheelchair for mobility outside of his apartment to prevent falls and injury secondary to pain, legs giving out, shortness of breath. Due to chronic back pain he needs a chair that reclines as well as has elevated legs and adjustable arms. He does use this chair outside after dark and will need to have heavy duty wheels and lights. Current power chair has a broken frame that is hanging off presenting a tripping and tipping hazard. Both the battery and tires appear worn with no life left to them presenting a hazard in our [place] [facility] [place] using this chair outdoors.       Review of Systems  Constitutional, HEENT,  "cardiovascular, pulmonary, gi and gu systems are negative, except as otherwise noted.      Objective    BP (!) 152/76   Pulse 78   Temp 97.9  F (36.6  C) (Tympanic)   Resp 20   Ht 1.803 m (5' 11\")   Wt 96.6 kg (213 lb)   SpO2 93%   BMI 29.71 kg/m    Body mass index is 29.71 kg/m .  Physical Exam   GENERAL: alert and no distress  EYES: Eyes grossly normal to inspection and conjunctivae and sclerae normal  RESP: lungs clear to auscultation - no rales, rhonchi or wheezes  CV: regular rate and rhythm, normal S1 S2, no S3 or S4, no murmur, click or rub, no peripheral edema  SKIN: ecchymosis around eyes and bridge of nose, no nasal drainage  PSYCH: mentation appears normal, affect normal/bright            Signed Electronically by: KONSTANTIN Sethi CNP    "

## 2025-04-15 NOTE — PATIENT INSTRUCTIONS
Please call Respiratory Therapy in [place] to schedule your Pulmonary Function Test [phone]. Leave a message if they don't answer and they will call you back.   Hold inhalers 6 hours prior to appointment.

## 2025-04-16 ENCOUNTER — TELEPHONE (OUTPATIENT)
Dept: FAMILY MEDICINE | Facility: CLINIC | Age: 70
End: 2025-04-16
Payer: COMMERCIAL

## 2025-04-16 LAB
ATRIAL RATE - MUSE: 66 BPM
DIASTOLIC BLOOD PRESSURE - MUSE: NORMAL MMHG
INTERPRETATION ECG - MUSE: NORMAL
P AXIS - MUSE: -16 DEGREES
PR INTERVAL - MUSE: 182 MS
QRS DURATION - MUSE: 100 MS
QT - MUSE: 412 MS
QTC - MUSE: 431 MS
R AXIS - MUSE: 36 DEGREES
SYSTOLIC BLOOD PRESSURE - MUSE: NORMAL MMHG
T AXIS - MUSE: 21 DEGREES
VENTRICULAR RATE- MUSE: 66 BPM

## 2025-04-16 NOTE — TELEPHONE ENCOUNTER
Home Care is calling regarding an established patient with [facility].  Requesting orders from: Krupa Breen  RN APPROVED: RN able to provide verbal orders.  Home Care will send orders for signature.  RN will close encounter.  Is this a request for a temporary pause in the home care episode?  No    Orders Requested    Skilled Nursing  Request for initial certification (first set of orders)   Frequency: 1 x wk x 3 wks then every other wk x 5 wks  RN gave verbal order: Yes      Physical Therapy  Request for initial evaluation and treatment (one time)   RN gave verbal order: Yes      Phone number Home Care can be reached at: [phone]   Okay to leave a detailed message?: N/A    Contacts       Contact Date/Time Type Contact Phone/Fax    04/16/2025 02:51 PM CDT Phone (Incoming) Lina nurse with [facility] (Home Care) [phone]          Kami Craig RN    
no

## 2025-05-07 ENCOUNTER — RESULTS FOLLOW-UP (OUTPATIENT)
Dept: FAMILY MEDICINE | Facility: CLINIC | Age: 70
End: 2025-05-07

## 2025-05-07 ENCOUNTER — NURSE TRIAGE (OUTPATIENT)
Dept: FAMILY MEDICINE | Facility: CLINIC | Age: 70
End: 2025-05-07
Payer: COMMERCIAL

## 2025-05-07 NOTE — RESULT ENCOUNTER NOTE
Jefferson Arnett    Your lung study does not indicate advanced lung disease. Discuss the low oxygen episode with your pain clinic provider.  Please let us know if you have any questions.     Take care,    KONSTANTIN Tompkins CNP

## 2025-05-07 NOTE — TELEPHONE ENCOUNTER
Jocy ALBERTS from [place] [facility] calling regarding patient  States he admitted at a visit today that he overused his pain medications and Jocy states he should not have been driving  He then drove away from their clinic against medical advice  Jocy states they will no longer prescribe pain medication for him    FYI to Krupa Sandra [phone] Cell #    Kami Roman RN on 5/7/2025 at 10:59 AM

## 2025-05-08 DIAGNOSIS — Z53.9 DIAGNOSIS NOT YET DEFINED: Primary | ICD-10-CM

## 2025-05-08 PROCEDURE — G0180 MD CERTIFICATION HHA PATIENT: HCPCS | Performed by: NURSE PRACTITIONER

## 2025-05-26 ENCOUNTER — HOSPITAL ENCOUNTER (INPATIENT)
Facility: CLINIC | Age: 70
LOS: 2 days | Discharge: HOME | DRG: 917 | End: 2025-05-29
Attending: EMERGENCY MEDICINE | Admitting: STUDENT IN AN ORGANIZED HEALTH CARE EDUCATION/TRAINING PROGRAM
Payer: COMMERCIAL

## 2025-05-26 ENCOUNTER — APPOINTMENT (OUTPATIENT)
Dept: GENERAL RADIOLOGY | Facility: CLINIC | Age: 70
DRG: 917 | End: 2025-05-26
Attending: EMERGENCY MEDICINE
Payer: COMMERCIAL

## 2025-05-26 ENCOUNTER — APPOINTMENT (OUTPATIENT)
Dept: CT IMAGING | Facility: CLINIC | Age: 70
DRG: 917 | End: 2025-05-26
Attending: EMERGENCY MEDICINE
Payer: COMMERCIAL

## 2025-05-26 DIAGNOSIS — J96.01 ACUTE RESPIRATORY FAILURE WITH HYPOXIA (H): ICD-10-CM

## 2025-05-26 DIAGNOSIS — Z87.09 HISTORY OF COPD: ICD-10-CM

## 2025-05-26 DIAGNOSIS — Z71.89 OTHER SPECIFIED COUNSELING: Chronic | ICD-10-CM

## 2025-05-26 DIAGNOSIS — F11.20 OPIOID USE DISORDER, SEVERE, DEPENDENCE (H): ICD-10-CM
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
## 2025-05-26 LAB
ALBUMIN SERPL BCG-MCNC: 4 G/DL (ref 3.5–5.2)
ALBUMIN UR-MCNC: 20 MG/DL
ALP SERPL-CCNC: 68 U/L (ref 40–150)
ALT SERPL W P-5'-P-CCNC: 14 U/L (ref 0–70)
ANION GAP SERPL CALCULATED.3IONS-SCNC: 12 MMOL/L (ref 7–15)
APPEARANCE UR: CLEAR
AST SERPL W P-5'-P-CCNC: 28 U/L (ref 0–45)
BASE EXCESS BLDV CALC-SCNC: 8.2 MMOL/L (ref -3–3)
BASOPHILS # BLD AUTO: 0 10E3/UL (ref 0–0.2)
BASOPHILS NFR BLD AUTO: 0 %
BILIRUB SERPL-MCNC: 0.4 MG/DL
BILIRUB UR QL STRIP: NEGATIVE
BUN SERPL-MCNC: 6 MG/DL (ref 8–23)
CALCIUM SERPL-MCNC: 9.8 MG/DL (ref 8.8–10.4)
CHLORIDE SERPL-SCNC: 99 MMOL/L (ref 98–107)
COLOR UR AUTO: YELLOW
CREAT SERPL-MCNC: 0.65 MG/DL (ref 0.67–1.17)
CRP SERPL-MCNC: 9.62 MG/L
EGFRCR SERPLBLD CKD-EPI 2021: >90 ML/MIN/1.73M2
EOSINOPHIL # BLD AUTO: 0.1 10E3/UL (ref 0–0.7)
EOSINOPHIL NFR BLD AUTO: 1 %
ERYTHROCYTE [DISTWIDTH] IN BLOOD BY AUTOMATED COUNT: 12.8 % (ref 10–15)
GLUCOSE SERPL-MCNC: 114 MG/DL (ref 70–99)
GLUCOSE UR STRIP-MCNC: NEGATIVE MG/DL
HCO3 BLDV-SCNC: 36 MMOL/L (ref 21–28)
HCO3 SERPL-SCNC: 30 MMOL/L (ref 22–29)
HCT VFR BLD AUTO: 50.1 % (ref 40–53)
HGB BLD-MCNC: 16.7 G/DL (ref 13.3–17.7)
HGB UR QL STRIP: NEGATIVE
HYALINE CASTS: 4 /LPF
IMM GRANULOCYTES # BLD: 0 10E3/UL
IMM GRANULOCYTES NFR BLD: 0 %
KETONES UR STRIP-MCNC: ABNORMAL MG/DL
LEUKOCYTE ESTERASE UR QL STRIP: NEGATIVE
LYMPHOCYTES # BLD AUTO: 5.8 10E3/UL (ref 0.8–5.3)
LYMPHOCYTES NFR BLD AUTO: 45 %
MCH RBC QN AUTO: 32.3 PG (ref 26.5–33)
MCHC RBC AUTO-ENTMCNC: 33.3 G/DL (ref 31.5–36.5)
MCV RBC AUTO: 97 FL (ref 78–100)
MONOCYTES # BLD AUTO: 1.2 10E3/UL (ref 0–1.3)
MONOCYTES NFR BLD AUTO: 9 %
MUCOUS THREADS #/AREA URNS LPF: PRESENT /LPF
NEUTROPHILS # BLD AUTO: 5.8 10E3/UL (ref 1.6–8.3)
NEUTROPHILS NFR BLD AUTO: 45 %
NITRATE UR QL: NEGATIVE
NRBC # BLD AUTO: 0 10E3/UL
NRBC BLD AUTO-RTO: 0 /100
NT-PROBNP SERPL-MCNC: 3973 PG/ML (ref 0–229)
O2/TOTAL GAS SETTING VFR VENT: 2 %
OXYHGB MFR BLDV: 84 % (ref 70–75)
PCO2 BLDV: 59 MM HG (ref 40–50)
PH BLDV: 7.39 [PH] (ref 7.32–7.43)
PH UR STRIP: 6 [PH] (ref 5–7)
PLAT MORPH BLD: ABNORMAL
PLATELET # BLD AUTO: 200 10E3/UL (ref 150–450)
PO2 BLDV: 54 MM HG (ref 25–47)
POTASSIUM SERPL-SCNC: 3.7 MMOL/L (ref 3.4–5.3)
PROT SERPL-MCNC: 6.9 G/DL (ref 6.4–8.3)
RBC # BLD AUTO: 5.17 10E6/UL (ref 4.4–5.9)
RBC MORPH BLD: ABNORMAL
RBC URINE: 2 /HPF
SAO2 % BLDV: 90.1 % (ref 70–75)
SODIUM SERPL-SCNC: 141 MMOL/L (ref 135–145)
SP GR UR STRIP: 1.02 (ref 1–1.03)
SQUAMOUS EPITHELIAL: <1 /HPF
UROBILINOGEN UR STRIP-MCNC: 2 MG/DL
VARIANT LYMPHS BLD QL SMEAR: PRESENT
WBC # BLD AUTO: 12.9 10E3/UL (ref 4–11)
WBC URINE: 3 /HPF

## 2025-05-26 PROCEDURE — 99222 1ST HOSP IP/OBS MODERATE 55: CPT

## 2025-05-26 PROCEDURE — 250N000011 HC RX IP 250 OP 636

## 2025-05-26 PROCEDURE — 71046 X-RAY EXAM CHEST 2 VIEWS: CPT

## 2025-05-26 PROCEDURE — 250N000009 HC RX 250: Performed by: EMERGENCY MEDICINE

## 2025-05-26 PROCEDURE — 250N000012 HC RX MED GY IP 250 OP 636 PS 637: Performed by: EMERGENCY MEDICINE

## 2025-05-26 PROCEDURE — G0378 HOSPITAL OBSERVATION PER HR: HCPCS

## 2025-05-26 PROCEDURE — 86140 C-REACTIVE PROTEIN: CPT | Performed by: EMERGENCY MEDICINE

## 2025-05-26 PROCEDURE — 83880 ASSAY OF NATRIURETIC PEPTIDE: CPT | Performed by: EMERGENCY MEDICINE

## 2025-05-26 PROCEDURE — 250N000012 HC RX MED GY IP 250 OP 636 PS 637

## 2025-05-26 PROCEDURE — 94640 AIRWAY INHALATION TREATMENT: CPT

## 2025-05-26 PROCEDURE — 70450 CT HEAD/BRAIN W/O DYE: CPT

## 2025-05-26 PROCEDURE — 36415 COLL VENOUS BLD VENIPUNCTURE: CPT | Performed by: EMERGENCY MEDICINE

## 2025-05-26 PROCEDURE — 82805 BLOOD GASES W/O2 SATURATION: CPT | Performed by: EMERGENCY MEDICINE

## 2025-05-26 PROCEDURE — 99285 EMERGENCY DEPT VISIT HI MDM: CPT | Performed by: EMERGENCY MEDICINE

## 2025-05-26 PROCEDURE — 85018 HEMOGLOBIN: CPT | Performed by: EMERGENCY MEDICINE

## 2025-05-26 PROCEDURE — 81001 URINALYSIS AUTO W/SCOPE: CPT | Performed by: EMERGENCY MEDICINE

## 2025-05-26 PROCEDURE — 250N000013 HC RX MED GY IP 250 OP 250 PS 637

## 2025-05-26 PROCEDURE — 96374 THER/PROPH/DIAG INJ IV PUSH: CPT

## 2025-05-26 PROCEDURE — 82435 ASSAY OF BLOOD CHLORIDE: CPT | Performed by: EMERGENCY MEDICINE

## 2025-05-26 PROCEDURE — 99291 CRITICAL CARE FIRST HOUR: CPT | Mod: 25

## 2025-05-26 RX ORDER — ACETAMINOPHEN 325 MG/1
650 TABLET ORAL EVERY 4 HOURS PRN
Status: DISCONTINUED | OUTPATIENT
Start: 2025-05-26 | End: 2025-05-26

## 2025-05-26 RX ORDER — NALOXONE HYDROCHLORIDE 0.4 MG/ML
0.4 INJECTION, SOLUTION INTRAMUSCULAR; INTRAVENOUS; SUBCUTANEOUS
Status: DISCONTINUED | OUTPATIENT
Start: 2025-05-26 | End: 2025-05-29 | Stop reason: HOSPADM

## 2025-05-26 RX ORDER — BUPRENORPHINE AND NALOXONE 4; 1 MG/1; MG/1
1 FILM, SOLUBLE BUCCAL; SUBLINGUAL 2 TIMES DAILY
Status: DISCONTINUED | OUTPATIENT
Start: 2025-05-26 | End: 2025-05-27

## 2025-05-26 RX ORDER — CALCIUM CARBONATE 500 MG/1
1000 TABLET, CHEWABLE ORAL 4 TIMES DAILY PRN
Status: DISCONTINUED | OUTPATIENT
Start: 2025-05-26 | End: 2025-05-29 | Stop reason: HOSPADM

## 2025-05-26 RX ORDER — PREGABALIN 75 MG/1
150 CAPSULE ORAL 2 TIMES DAILY
Status: DISCONTINUED | OUTPATIENT
Start: 2025-05-26 | End: 2025-05-29 | Stop reason: HOSPADM

## 2025-05-26 RX ORDER — BUPRENORPHINE AND NALOXONE 4; 1 MG/1; MG/1
1 FILM, SOLUBLE BUCCAL; SUBLINGUAL 2 TIMES DAILY
Status: ON HOLD | COMMUNITY
Start: 2025-05-21 | End: 2025-05-29

## 2025-05-26 RX ORDER — NALOXONE HYDROCHLORIDE 0.4 MG/ML
0.04 INJECTION, SOLUTION INTRAMUSCULAR; INTRAVENOUS; SUBCUTANEOUS
Status: DISCONTINUED | OUTPATIENT
Start: 2025-05-26 | End: 2025-05-27

## 2025-05-26 RX ORDER — PROCHLORPERAZINE MALEATE 5 MG/1
5 TABLET ORAL EVERY 6 HOURS PRN
Status: DISCONTINUED | OUTPATIENT
Start: 2025-05-26 | End: 2025-05-29 | Stop reason: HOSPADM

## 2025-05-26 RX ORDER — ONDANSETRON 4 MG/1
4 TABLET, ORALLY DISINTEGRATING ORAL EVERY 6 HOURS PRN
Status: DISCONTINUED | OUTPATIENT
Start: 2025-05-26 | End: 2025-05-29 | Stop reason: HOSPADM

## 2025-05-26 RX ORDER — HALOPERIDOL 5 MG/ML
2 INJECTION INTRAMUSCULAR EVERY 6 HOURS PRN
Status: DISCONTINUED | OUTPATIENT
Start: 2025-05-26 | End: 2025-05-29 | Stop reason: HOSPADM

## 2025-05-26 RX ORDER — NALOXONE HYDROCHLORIDE 0.4 MG/ML
0.04 INJECTION, SOLUTION INTRAMUSCULAR; INTRAVENOUS; SUBCUTANEOUS ONCE
Status: COMPLETED | OUTPATIENT
Start: 2025-05-26 | End: 2025-05-26

## 2025-05-26 RX ORDER — AMOXICILLIN 250 MG
1 CAPSULE ORAL 2 TIMES DAILY PRN
Status: DISCONTINUED | OUTPATIENT
Start: 2025-05-26 | End: 2025-05-29 | Stop reason: HOSPADM

## 2025-05-26 RX ORDER — LURASIDONE HYDROCHLORIDE 20 MG/1
20 TABLET, FILM COATED ORAL
Status: DISCONTINUED | OUTPATIENT
Start: 2025-05-26 | End: 2025-05-29 | Stop reason: HOSPADM

## 2025-05-26 RX ORDER — BUPRENORPHINE 2 MG/1
2 TABLET SUBLINGUAL
Status: DISCONTINUED | OUTPATIENT
Start: 2025-05-26 | End: 2025-05-27

## 2025-05-26 RX ORDER — ACETAMINOPHEN 500 MG
1000 TABLET ORAL 3 TIMES DAILY
Status: DISCONTINUED | OUTPATIENT
Start: 2025-05-27 | End: 2025-05-29 | Stop reason: HOSPADM

## 2025-05-26 RX ORDER — LOPERAMIDE HYDROCHLORIDE 2 MG/1
2 CAPSULE ORAL EVERY 4 HOURS PRN
Status: DISCONTINUED | OUTPATIENT
Start: 2025-05-26 | End: 2025-05-29 | Stop reason: HOSPADM

## 2025-05-26 RX ORDER — NAPROXEN 250 MG/1
500 TABLET ORAL 2 TIMES DAILY PRN
Status: DISCONTINUED | OUTPATIENT
Start: 2025-05-26 | End: 2025-05-29 | Stop reason: HOSPADM

## 2025-05-26 RX ORDER — PROPRANOLOL HYDROCHLORIDE 60 MG/1
120 CAPSULE, EXTENDED RELEASE ORAL DAILY
Status: DISCONTINUED | OUTPATIENT
Start: 2025-05-27 | End: 2025-05-29 | Stop reason: HOSPADM

## 2025-05-26 RX ORDER — BUPRENORPHINE 2 MG/1
2 TABLET SUBLINGUAL 4 TIMES DAILY
Status: DISCONTINUED | OUTPATIENT
Start: 2025-05-27 | End: 2025-05-27

## 2025-05-26 RX ORDER — BUPRENORPHINE 2 MG/1
2 TABLET SUBLINGUAL 4 TIMES DAILY
Status: DISCONTINUED | OUTPATIENT
Start: 2025-05-27 | End: 2025-05-26

## 2025-05-26 RX ORDER — NALOXONE HYDROCHLORIDE 0.4 MG/ML
0.2 INJECTION, SOLUTION INTRAMUSCULAR; INTRAVENOUS; SUBCUTANEOUS
Status: DISCONTINUED | OUTPATIENT
Start: 2025-05-26 | End: 2025-05-29 | Stop reason: HOSPADM

## 2025-05-26 RX ORDER — DIAZEPAM 10 MG/2ML
5 INJECTION, SOLUTION INTRAMUSCULAR; INTRAVENOUS EVERY 4 HOURS PRN
Status: DISCONTINUED | OUTPATIENT
Start: 2025-05-26 | End: 2025-05-29 | Stop reason: HOSPADM

## 2025-05-26 RX ORDER — SIMVASTATIN 40 MG
80 TABLET ORAL DAILY
Status: DISCONTINUED | OUTPATIENT
Start: 2025-05-27 | End: 2025-05-29 | Stop reason: HOSPADM

## 2025-05-26 RX ORDER — AMOXICILLIN 250 MG
2 CAPSULE ORAL 2 TIMES DAILY PRN
Status: DISCONTINUED | OUTPATIENT
Start: 2025-05-26 | End: 2025-05-29 | Stop reason: HOSPADM

## 2025-05-26 RX ORDER — METHOCARBAMOL 500 MG/1
500 TABLET, FILM COATED ORAL 3 TIMES DAILY PRN
Status: DISCONTINUED | OUTPATIENT
Start: 2025-05-26 | End: 2025-05-29 | Stop reason: HOSPADM

## 2025-05-26 RX ORDER — CALCIUM CARBONATE 500 MG/1
1000 TABLET, CHEWABLE ORAL 4 TIMES DAILY PRN
Status: DISCONTINUED | OUTPATIENT
Start: 2025-05-26 | End: 2025-05-26

## 2025-05-26 RX ORDER — CLONIDINE HYDROCHLORIDE 0.1 MG/1
0.1 TABLET ORAL EVERY 6 HOURS PRN
Status: DISCONTINUED | OUTPATIENT
Start: 2025-05-26 | End: 2025-05-29 | Stop reason: HOSPADM

## 2025-05-26 RX ORDER — DULOXETIN HYDROCHLORIDE 30 MG/1
120 CAPSULE, DELAYED RELEASE ORAL DAILY
Status: DISCONTINUED | OUTPATIENT
Start: 2025-05-27 | End: 2025-05-29 | Stop reason: HOSPADM

## 2025-05-26 RX ORDER — LIDOCAINE 40 MG/G
CREAM TOPICAL
Status: DISCONTINUED | OUTPATIENT
Start: 2025-05-26 | End: 2025-05-29 | Stop reason: HOSPADM

## 2025-05-26 RX ORDER — BUPRENORPHINE 2 MG/1
2 TABLET SUBLINGUAL
Status: DISCONTINUED | OUTPATIENT
Start: 2025-05-26 | End: 2025-05-26

## 2025-05-26 RX ORDER — IPRATROPIUM BROMIDE AND ALBUTEROL SULFATE 2.5; .5 MG/3ML; MG/3ML
3 SOLUTION RESPIRATORY (INHALATION)
Status: DISCONTINUED | OUTPATIENT
Start: 2025-05-26 | End: 2025-05-28

## 2025-05-26 RX ORDER — HYDROXYZINE HYDROCHLORIDE 25 MG/1
25 TABLET, FILM COATED ORAL EVERY 6 HOURS PRN
Status: DISCONTINUED | OUTPATIENT
Start: 2025-05-26 | End: 2025-05-29 | Stop reason: HOSPADM

## 2025-05-26 RX ORDER — ONDANSETRON 4 MG/1
1 TABLET, ORALLY DISINTEGRATING ORAL EVERY 8 HOURS PRN
COMMUNITY
Start: 2025-05-21

## 2025-05-26 RX ORDER — ONDANSETRON 2 MG/ML
4 INJECTION INTRAMUSCULAR; INTRAVENOUS EVERY 6 HOURS PRN
Status: DISCONTINUED | OUTPATIENT
Start: 2025-05-26 | End: 2025-05-29 | Stop reason: HOSPADM

## 2025-05-26 RX ADMIN — BUPRENORPHINE AND NALOXONE 1 FILM: 4; 1 FILM BUCCAL; SUBLINGUAL at 23:12

## 2025-05-26 RX ADMIN — IPRATROPIUM BROMIDE AND ALBUTEROL SULFATE 3 ML: .5; 3 SOLUTION RESPIRATORY (INHALATION) at 16:53

## 2025-05-26 RX ADMIN — TRAZODONE HYDROCHLORIDE 150 MG: 100 TABLET ORAL at 23:10

## 2025-05-26 RX ADMIN — BUPRENORPHINE 2 MG: 2 TABLET SUBLINGUAL at 17:52

## 2025-05-26 RX ADMIN — BUPRENORPHINE 2 MG: 2 TABLET SUBLINGUAL at 20:24

## 2025-05-26 RX ADMIN — NALOXONE HYDROCHLORIDE 0.04 MG: 0.4 INJECTION, SOLUTION INTRAMUSCULAR; INTRAVENOUS; SUBCUTANEOUS at 22:51

## 2025-05-26 RX ADMIN — PREGABALIN 150 MG: 75 CAPSULE ORAL at 23:10

## 2025-05-26 RX ADMIN — ONDANSETRON 4 MG: 4 TABLET, ORALLY DISINTEGRATING ORAL at 21:58

## 2025-05-26 ASSESSMENT — LIFESTYLE VARIABLES
TOTAL_SCORE: 5
TOTAL_SCORE: 7

## 2025-05-26 ASSESSMENT — COLUMBIA-SUICIDE SEVERITY RATING SCALE - C-SSRS
2. HAVE YOU ACTUALLY HAD ANY THOUGHTS OF KILLING YOURSELF IN THE PAST MONTH?: NO
6. HAVE YOU EVER DONE ANYTHING, STARTED TO DO ANYTHING, OR PREPARED TO DO ANYTHING TO END YOUR LIFE?: NO
1. IN THE PAST MONTH, HAVE YOU WISHED YOU WERE DEAD OR WISHED YOU COULD GO TO SLEEP AND NOT WAKE UP?: NO

## 2025-05-26 ASSESSMENT — ACTIVITIES OF DAILY LIVING (ADL)
ADLS_ACUITY_SCORE: 55

## 2025-05-26 ASSESSMENT — ENCOUNTER SYMPTOMS
EYES NEGATIVE: 1
CONSTITUTIONAL NEGATIVE: 1
GASTROINTESTINAL NEGATIVE: 1
CONFUSION: 1
MUSCULOSKELETAL NEGATIVE: 1
HEMATOLOGIC/LYMPHATIC NEGATIVE: 1
ENDOCRINE NEGATIVE: 1
ALLERGIC/IMMUNOLOGIC NEGATIVE: 1
SHORTNESS OF BREATH: 1
NEUROLOGICAL NEGATIVE: 1
CARDIOVASCULAR NEGATIVE: 1
COUGH: 1

## 2025-05-26 NOTE — ED NOTES
"Patient arrived via EMS from [facility].  Patient stopped taking morphine three days ago and has been vomiting and per roommates, \"patient isn't acting like himself\".  Patient had been on morphine for chronic back pain for the past 15-25 years.  Patient was put on suboxone and zofran.  Patient oriented x4 in ER.  "
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
## 2025-05-27 LAB
ANION GAP SERPL CALCULATED.3IONS-SCNC: 13 MMOL/L (ref 7–15)
BASE EXCESS BLDV CALC-SCNC: 11.7 MMOL/L (ref -3–3)
BASE EXCESS BLDV CALC-SCNC: 9.5 MMOL/L (ref -3–3)
BUN SERPL-MCNC: 6.8 MG/DL (ref 8–23)
CALCIUM SERPL-MCNC: 9.2 MG/DL (ref 8.8–10.4)
CHLORIDE SERPL-SCNC: 102 MMOL/L (ref 98–107)
CREAT SERPL-MCNC: 0.68 MG/DL (ref 0.67–1.17)
EGFRCR SERPLBLD CKD-EPI 2021: >90 ML/MIN/1.73M2
ERYTHROCYTE [DISTWIDTH] IN BLOOD BY AUTOMATED COUNT: 13 % (ref 10–15)
GLUCOSE SERPL-MCNC: 107 MG/DL (ref 70–99)
HCO3 BLDV-SCNC: 38 MMOL/L (ref 21–28)
HCO3 BLDV-SCNC: 40 MMOL/L (ref 21–28)
HCO3 SERPL-SCNC: 30 MMOL/L (ref 22–29)
HCT VFR BLD AUTO: 47 % (ref 40–53)
HGB BLD-MCNC: 15.4 G/DL (ref 13.3–17.7)
HOLD SPECIMEN: NORMAL
MCH RBC QN AUTO: 31.9 PG (ref 26.5–33)
MCHC RBC AUTO-ENTMCNC: 32.8 G/DL (ref 31.5–36.5)
MCV RBC AUTO: 97 FL (ref 78–100)
O2/TOTAL GAS SETTING VFR VENT: 0 %
O2/TOTAL GAS SETTING VFR VENT: 44 %
OXYHGB MFR BLDV: 85 % (ref 70–75)
OXYHGB MFR BLDV: 86 % (ref 70–75)
PCO2 BLDV: 66 MM HG (ref 40–50)
PCO2 BLDV: 67 MM HG (ref 40–50)
PH BLDV: 7.36 [PH] (ref 7.32–7.43)
PH BLDV: 7.39 [PH] (ref 7.32–7.43)
PLATELET # BLD AUTO: 171 10E3/UL (ref 150–450)
PO2 BLDV: 52 MM HG (ref 25–47)
PO2 BLDV: 53 MM HG (ref 25–47)
POTASSIUM SERPL-SCNC: 3.5 MMOL/L (ref 3.4–5.3)
RBC # BLD AUTO: 4.83 10E6/UL (ref 4.4–5.9)
SAO2 % BLDV: 87 % (ref 70–75)
SAO2 % BLDV: 87.7 % (ref 70–75)
SODIUM SERPL-SCNC: 145 MMOL/L (ref 135–145)
WBC # BLD AUTO: 9.2 10E3/UL (ref 4–11)

## 2025-05-27 PROCEDURE — 36415 COLL VENOUS BLD VENIPUNCTURE: CPT

## 2025-05-27 PROCEDURE — 99232 SBSQ HOSP IP/OBS MODERATE 35: CPT | Performed by: STUDENT IN AN ORGANIZED HEALTH CARE EDUCATION/TRAINING PROGRAM

## 2025-05-27 PROCEDURE — 82805 BLOOD GASES W/O2 SATURATION: CPT | Performed by: STUDENT IN AN ORGANIZED HEALTH CARE EDUCATION/TRAINING PROGRAM

## 2025-05-27 PROCEDURE — 36415 COLL VENOUS BLD VENIPUNCTURE: CPT | Performed by: STUDENT IN AN ORGANIZED HEALTH CARE EDUCATION/TRAINING PROGRAM

## 2025-05-27 PROCEDURE — 120N000001 HC R&B MED SURG/OB

## 2025-05-27 PROCEDURE — 85018 HEMOGLOBIN: CPT

## 2025-05-27 PROCEDURE — 80048 BASIC METABOLIC PNL TOTAL CA: CPT

## 2025-05-27 PROCEDURE — 82805 BLOOD GASES W/O2 SATURATION: CPT

## 2025-05-27 PROCEDURE — 250N000013 HC RX MED GY IP 250 OP 250 PS 637

## 2025-05-27 PROCEDURE — 250N000012 HC RX MED GY IP 250 OP 636 PS 637: Performed by: STUDENT IN AN ORGANIZED HEALTH CARE EDUCATION/TRAINING PROGRAM

## 2025-05-27 RX ORDER — BUPRENORPHINE 2 MG/1
2 TABLET SUBLINGUAL
Status: DISCONTINUED | OUTPATIENT
Start: 2025-05-27 | End: 2025-05-29 | Stop reason: HOSPADM

## 2025-05-27 RX ORDER — BUPRENORPHINE AND NALOXONE 2; .5 MG/1; MG/1
1 FILM, SOLUBLE BUCCAL; SUBLINGUAL DAILY
Status: DISCONTINUED | OUTPATIENT
Start: 2025-05-27 | End: 2025-05-29 | Stop reason: HOSPADM

## 2025-05-27 RX ORDER — NALOXONE HYDROCHLORIDE 0.4 MG/ML
0.04 INJECTION, SOLUTION INTRAMUSCULAR; INTRAVENOUS; SUBCUTANEOUS
Status: DISCONTINUED | OUTPATIENT
Start: 2025-05-27 | End: 2025-05-29 | Stop reason: HOSPADM

## 2025-05-27 RX ADMIN — ACETAMINOPHEN 1000 MG: 500 TABLET, FILM COATED ORAL at 08:16

## 2025-05-27 RX ADMIN — ACETAMINOPHEN 1000 MG: 500 TABLET, FILM COATED ORAL at 20:13

## 2025-05-27 RX ADMIN — DULOXETINE HYDROCHLORIDE 120 MG: 30 CAPSULE, DELAYED RELEASE ORAL at 08:17

## 2025-05-27 RX ADMIN — PROPRANOLOL HYDROCHLORIDE 120 MG: 60 CAPSULE, EXTENDED RELEASE ORAL at 08:23

## 2025-05-27 RX ADMIN — ACETAMINOPHEN 1000 MG: 500 TABLET, FILM COATED ORAL at 13:28

## 2025-05-27 RX ADMIN — BUPRENORPHINE AND NALOXONE 1 FILM: 2; .5 FILM BUCCAL; SUBLINGUAL at 09:58

## 2025-05-27 RX ADMIN — PREGABALIN 150 MG: 75 CAPSULE ORAL at 08:19

## 2025-05-27 RX ADMIN — SIMVASTATIN 80 MG: 40 TABLET, FILM COATED ORAL at 08:20

## 2025-05-27 RX ADMIN — PREGABALIN 150 MG: 75 CAPSULE ORAL at 20:13

## 2025-05-27 RX ADMIN — LURASIDONE HYDROCHLORIDE 20 MG: 20 TABLET, FILM COATED ORAL at 17:43

## 2025-05-27 ASSESSMENT — ACTIVITIES OF DAILY LIVING (ADL)
ADLS_ACUITY_SCORE: 55
ADLS_ACUITY_SCORE: 49
ADLS_ACUITY_SCORE: 38
ADLS_ACUITY_SCORE: 32
ADLS_ACUITY_SCORE: 49
ADLS_ACUITY_SCORE: 38
ADLS_ACUITY_SCORE: 38
ADLS_ACUITY_SCORE: 49
ADLS_ACUITY_SCORE: 38
ADLS_ACUITY_SCORE: 32
ADLS_ACUITY_SCORE: 32
ADLS_ACUITY_SCORE: 49
ADLS_ACUITY_SCORE: 32
ADLS_ACUITY_SCORE: 49
ADLS_ACUITY_SCORE: 32
ADLS_ACUITY_SCORE: 38
ADLS_ACUITY_SCORE: 38
ADLS_ACUITY_SCORE: 49
ADLS_ACUITY_SCORE: 38
ADLS_ACUITY_SCORE: 38
ADLS_ACUITY_SCORE: 55
ADLS_ACUITY_SCORE: 38
ADLS_ACUITY_SCORE: 49

## 2025-05-27 NOTE — UTILIZATION REVIEW
Admission Status; Secondary Review Determination    Under the authority of the Utilization Management Committee, the utilization review process indicated a secondary review on the above patient. The review outcome is based on review of the medical records, discussions with staff, and applying clinical experience noted on the date of the review.    (x) Inpatient Status Appropriate - This patient's medical care is consistent with medical management for inpatient care and reasonable inpatient medical practice.    RATIONALE FOR DETERMINATION:  70-year-old male with history of opioid use disorder on morphine for 30 years recently placed on Suboxone on 5/23/2025.  Since that time patient has developed progressive dyspnea and lethargy.  Patient has comorbidities of COPD, global anxiety disorder, major depressive disorder.  Patient found to have acute hypoxic, hypercapnic, hypoventilatory respiratory failure.  Patient treated with several doses of IV Narcan and initiated on buprenorphine.  On hospital day 2 patient remains significantly hypoxic with a morning venous blood gas showing a PCO2 of 67 and requiring 4 to 6 L of oxygen with a persistent respiratory rate of 8.  Due to the persistence of patient's hypoventilatory, hypoxic respiratory failure patient is unsafe for discharge tonight and thus appropriate for inpatient management.    At the time of admission with the information available to the attending physician more than 2 nights Hospital complex care was anticipated, based on patient risk of adverse outcome if treated as outpatient and complex care required. Inpatient admission is appropriate based on the Medicare guidelines.    This document was produced using voice recognition software    The information on this document is developed by the utilization review team in order for the business office to ensure compliance. This only denotes the appropriateness of proper admission status and does not reflect the  quality of care rendered.    The definitions of Inpatient Status and Observation Status used in making the determination above are those provided in the CMS Coverage Manual, Chapter 1 and Chapter 6, section 70.4.    Sincerely,    Mitch Rowan MD  Utilization Review  Physician Advisor  [facility].

## 2025-05-27 NOTE — CONSULTS
Care Management Initial Consult    General Information  Assessment completed with: Patient ( staff), Jeffrey  Type of CM/SW Visit: Offer D/C Planning    Primary Care Provider verified and updated as needed: Yes   Readmission within the last 30 days:  No    4/10/2025 - 4/13/2025 (3 days)  [facility]      Reason for Consult: discharge planning    Advance Care Planning: Advance Care Planning Reviewed: concerns discussed  Pt is a Full Code.   shelter voiced concerns- asking Code status be discussed     Communication Assessment  Patient's communication style: spoken language (English or Bilingual)    Hearing Difficulty or Deaf: yes   Wear Glasses or Blind: no    Cognitive  Cognitive/Neuro/Behavioral: .WDL except, arousability  Level of Consciousness: lethargic  Arousal Level: arouses to voice  Orientation: disoriented to, situation  Mood/Behavior: flat affect, hypoactive (quiet, withdrawn)     Speech: spontaneous    Living Environment:   People in home: facility resident   -Ángela Izaguirre   Current living Arrangements: group home      Able to return to prior arrangements: yes    Living Arrangement Comments: Lives in [place]    Family/Social Support:  Care provided by: self,  staff  Provides care for: no one  Marital Status: Single  Support system: Children - 3 sons : NaderMichoacano Jake     Pt stated he would elect his son Nader as his medical decision maker            Description of Support System: Supportive    Support Assessment: Caregiver experiencing high stress, Complicated family dynamics    Current Resources:   Patient receiving home care services: No  Community Resources: [facility] Worker-- Wil Samson- Pt does not know if his  is through [facility] or [facility]     Equipment currently used at home: wheelchair, power  Supplies currently used at home:     Patient with hx of recurrent falls, lumbar radiculopathy and COPD requiring a power wheelchair for mobility  outside of his apartment to prevent falls and injury secondary to pain, legs giving out, shortness of breath. Due to chronic back pain he needs a chair that reclines as well as has elevated legs and adjustable arms. He does use this chair outside after dark and will need to have heavy duty wheels and lights. Current power chair has a broken frame that is hanging off presenting a tripping and tipping hazard. Both the battery and tires appear worn with no life left to them presenting a hazard in our [place] nova for Jeffrey using this chair outdoors.         Employment/Financial:  Employment Status: disabled        Financial Concerns: none , on Medical Assistance      Does the patient's insurance plan have a 3 day qualifying hospital stay waiver?  Yes     Which insurance plan 3 day waiver is available? Alternative insurance waiver    Will the waiver be used for post-acute placement? No    Lifestyle & Psychosocial Needs:  Social Drivers of Health     Food Insecurity: Low Risk  (5/27/2025)    Food Insecurity     Within the past 12 months, did you worry that your food would run out before you got money to buy more?: No     Within the past 12 months, did the food you bought just not last and you didn t have money to get more?: No   Depression: At risk (4/15/2025)    PHQ-2     PHQ-2 Score: 4   Housing Stability: Low Risk  (5/27/2025)    Housing Stability     Do you have housing? : Yes     Are you worried about losing your housing?: No   Tobacco Use: High Risk (4/15/2025)    Patient History     Smoking Tobacco Use: Every Day     Smokeless Tobacco Use: Former     Passive Exposure: Not on file   Financial Resource Strain: Low Risk  (5/27/2025)    Financial Resource Strain     Within the past 12 months, have you or your family members you live with been unable to get utilities (heat, electricity) when it was really needed?: No   Alcohol Use: Not on file   Transportation Needs: Low Risk  (5/27/2025)    Transportation Needs      Within the past 12 months, has lack of transportation kept you from medical appointments, getting your medicines, non-medical meetings or appointments, work, or from getting things that you need?: No   Physical Activity: Unknown (3/7/2025)    Exercise Vital Sign     Days of Exercise per Week: 1 day     Minutes of Exercise per Session: Not on file   Recent Concern: Physical Activity - Insufficiently Active (3/7/2025)    Exercise Vital Sign     Days of Exercise per Week: 1 day     Minutes of Exercise per Session: 10 min   Interpersonal Safety: Low Risk  (5/27/2025)    Interpersonal Safety     Do you feel physically and emotionally safe where you currently live?: Yes     Within the past 12 months, have you been hit, slapped, kicked or otherwise physically hurt by someone?: No     Within the past 12 months, have you been humiliated or emotionally abused in other ways by your partner or ex-partner?: No   Recent Concern: Interpersonal Safety - High Risk (4/11/2025)    Interpersonal Safety     Do you feel physically and emotionally safe where you currently live?: Yes     Within the past 12 months, have you been hit, slapped, kicked or otherwise physically hurt by someone?: No     Within the past 12 months, have you been humiliated or emotionally abused in other ways by your partner or ex-partner?: Yes   Stress: Stress Concern Present (3/7/2025)    [employer] [place] [employer] - Occupational Stress Questionnaire     Feeling of Stress : To some extent   Social Connections: Unknown (3/7/2025)    Social Connection and Isolation Panel [NHANES]     Frequency of Communication with Friends and Family: Not on file     Frequency of Social Gatherings with Friends and Family: Once a week     Attends Denominational Services: Not on file     Active Member of Clubs or Organizations: Not on file     Attends Club or Organization Meetings: Not on file     Marital Status: Not on file   Health Literacy: Not on file       Functional  "Status:  Prior to admission patient needed assistance:   Dependent ADLs:: Ambulation-walker  Dependent IADLs:: Cleaning, Shopping, Cooking, Laundry, Meal Preparation, Transportation, Money Management  Assesssment of Functional Status: Needs assistance with medications (Patient has refused to allow [facility] staff to assist)    Mental Health Status:      Depression, Posttraumatic stress disorder, Anxiety    Chemical Dependency Status:      Chronic Pain syndrome, Opioid use disorder  Attends a Pain Clinic           Values/Beliefs:  Spiritual, Cultural Beliefs, Alevism Practices, Values that affect care: no               Discussed  Partnership in Safe Discharge Planning  document with patient/family: Yes    Additional Information:  Referral received to assist with discharge planning and services.     Patient resides at [facility] in [place].   CM is familiar with the Patient has he has had several hospital stays     At baseline the Patient manages his own medications because he REFUSES to allow the [facility] staff to administer his meds.     Patient has been a significant safety concern for the Owners :Marisela Roland and his wife Marilu ( Nurse Practitioner) due to his non-compliance.   Found several times unresponsive. 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     Facility has found the patient on several occasions unresponsive, Found in his vehicle parked in the driveway. Police, EMS have been called however many times the patient refuses to go in for help.     The [facility] has attempted to offer increased services and supportive  cares to the patient on multiple occasions, however since he has been able to make his own decisions he directs his own care and refuses to allow anyone \"into his business.\"     --Patient has continued to drive, ambulate with a walker at times.   --Uses a power wheelchair for long distance travel     Patient has a [facility]  :   Wil " Long  # [phone]  --Left  to discuss Pt's care needs. longterm Owners voicing concerns accepting the Patient at time of discharge with his on-going refusal medication administration    Recently discharged from [facility]- 05/13, as Pt was not Home Bound.       Attends [facility]  Provider- Krupa #[phone]   It had been clearly communicated that the patient should no longer be driving however - he left the clinic AMA and drove away.       Next Step: CM working to coordinate with the Pt's:  Karla . Anticipated Patient will be required to comply with Medication Administration or will be evicted from residence due to on-going safety concerns.     [employer]  [address] [place] [address]    Linden Roland, Director  Marilu- Nurse Practioner   Main: [phone]  Fax: [phone]    Email: [email or web address]    Transportation : [facility] via wheelchair      KAROLINA See  [facility] [phone]   [facility]  [phone]

## 2025-05-27 NOTE — PLAN OF CARE
Patient has not voided on day shift states he doesn't feel the urge bladder scanned for 356 will update next shift

## 2025-05-27 NOTE — PROGRESS NOTES
Called by MS Charge nurse stating that floor nurse for patient had found marijuana in his belongings along with home suboxone film. MS Charge called security Rey and writer went to pt room while belongings were searched with patient permission. Security explained confiscating marijuana and pt stated understanding. Pharmacy [facility] called and present to take Suboxone into lockbox in pharmacy.

## 2025-05-27 NOTE — PROGRESS NOTES
"PRIMARY DIAGNOSIS:   Acute Respiratory Failure with hypoxia, withdrawal observation/assessments  OUTPATIENT/OBSERVATION GOALS TO BE MET BEFORE DISCHARGE:  ADLs back to baseline: ZIGGY, patient lethargic most of shift, answers yes/no questions only.    Activity and level of assistance: Up with standby assistance, with transfer from ED, patient ambulated with SBA of 1 to bed, patient on bedrest this shift.      Pain status: Patient c/o nausea and abdominal pain when arriving from ED, PRN Zofran administered with relief.  Patient received scheduled suboxone film x1.      Return to near baseline physical activity: ZIGGY     Discharge Planner Nurse     Barriers to discharge: Yes, patient requiring oxygen support overnight, titrated down from oxymask 8L to 6L.  Social work consult planned for later today.         Entered by: Colette Blake RN 05/27/2025 7:21 AM     Patient is alert to self and \"hospital\", flat, in responses, answers mostly yes/no to questions.  Patient's breathing shallow, encouraged deep breathing with little response/improvement in rate.  Patient incontinent of urine.  Patient sleepy/lethargic most of shift.  Admission completed but question accuracy of patient's responses.  Home medications brought by patient sent to pharmacy.  Patient states, \"I have a cell phone with me.\"  Not found in patient's belongings, nor in ED.  Patient's other belongings when clothing searched for his phone including marijuana, pipe, pack of cigarettes and knifes sent with security-patient verbalized understanding of need for all to be in security.  Bed alarm on, call light within reach, bed in low position.    "

## 2025-05-27 NOTE — PROGRESS NOTES
[facility]    Medicine Progress Note - Hospitalist Service    Date of Admission:  5/26/2025    Assessment & Plan    Melquiades Morales is a 70 year old male with past medical hx of opioid use, COPD, prediabetes, EVGENY, MDD, HLD admitted on 5/26/2025. He presented for dyspnea and lethargy since starting Suboxone on 5/23.    Opioid use disorder with mild overdose  Acute respiratory failure with hypoxia and hypercapnia   Toxic Encephalopathy due to Morphine & Suboxone     Per [facility] staff, he's been lethargic, slow to respond, and dyspneic for 3 days PTA since starting Suboxone. He was previously on Morphine for 30 years, and told his pain provider he was misusing it. They then started him on Suboxone which he reports using, but is also still taking Morphine.    He's admitted on 2L NC. No wheezes to suggest COPD exacerbation. VBG compensated with pCO2 59. CRP 10. BNP 3900 but appears euvolemic and last echo 4/10 without CHF.  - IV Narcan PRN for opiate overdose  - Opioid withdrawal orderset used- Buprenorphine 2mg Q2h PRN for 24hrs, then Buprenorphine 2mg QID  - Reduced PTA Suboxone 4-1mg BID to 2-0.5 mg BID  - PRNs available for impending opioid withdrawal  - SW consulted for eventual return to facility    Leukocytosis  WBC 12.9 on admission. No s/s of infection. CXR clear. CRP 9.    COPD  PFT 5/5 with restrictive lung physiology. No wheezes on admit.    Cannabis use disorder  Reports daily cannabis use.     Chronic pain syndrome  Spinal stenosis in cervical region  Known history of chronic back pain, follows with pain specialist Ludmila Pennington NP.   - See above for opioid misuse and management  - Continued PTA Pregabalin 150mg BID, Tylenol 1g TID     Hyperlipidemia LDL goal <130  - Continued PTA Simvastatin 80mg      Prediabetes  A1c 6.3% on 3/7. No need for sliding scale insulin.     Major depressive disorder, recurrent episode, moderate  Generalized anxiety disorder  Posttraumatic  stress disorder  - Continued PTA Duloxetine 60mg and Lurasidone 20mg     Sleep disturbance  - Continued PTA Trazodone 250mg    Tremor, chronic  - Continued PTA Propranolol 120mg     Tobacco use disorder  Still smokes 1/2PPD and does not intend to stop. O2 use would be problematic if continues to smoke.              Diet: Combination Diet Moderate Consistent Carb (60 g CHO per Meal) Diet; Low Saturated Fat Na <2400mg Diet    DVT Prophylaxis: Pneumatic Compression Devices  Gaona Catheter: Not present  Lines: None     Cardiac Monitoring: None  Code Status: Full Code      Clinically Significant Risk Factors Present on Admission                             # Overweight: Estimated body mass index is 26.55 kg/m  as calculated from the following:    Height as of this encounter: 1.829 m (6').    Weight as of this encounter: 88.8 kg (195 lb 12.3 oz).       # Financial/Environmental Concerns:           Social Drivers of Health    Depression: At risk (4/15/2025)    PHQ-2     PHQ-2 Score: 4   Tobacco Use: High Risk (4/15/2025)    Patient History     Smoking Tobacco Use: Every Day     Smokeless Tobacco Use: Former   Physical Activity: Unknown (3/7/2025)    Exercise Vital Sign     Days of Exercise per Week: 1 day   Recent Concern: Physical Activity - Insufficiently Active (3/7/2025)    Exercise Vital Sign     Days of Exercise per Week: 1 day     Minutes of Exercise per Session: 10 min   Interpersonal Safety: Low Risk  (5/27/2025)    Interpersonal Safety     Do you feel physically and emotionally safe where you currently live?: Yes     Within the past 12 months, have you been hit, slapped, kicked or otherwise physically hurt by someone?: No     Within the past 12 months, have you been humiliated or emotionally abused in other ways by your partner or ex-partner?: No   Recent Concern: Interpersonal Safety - High Risk (4/11/2025)    Interpersonal Safety     Do you feel physically and emotionally safe where you currently live?: Yes      Within the past 12 months, have you been hit, slapped, kicked or otherwise physically hurt by someone?: No     Within the past 12 months, have you been humiliated or emotionally abused in other ways by your partner or ex-partner?: Yes   Stress: Stress Concern Present (3/7/2025)    [employer] [place] [employer]ational [employer] - Occupational Stress Questionnaire     Feeling of Stress : To some extent   Social Connections: Unknown (3/7/2025)    Social Connection and Isolation Panel [NHANES]     Frequency of Social Gatherings with Friends and Family: Once a week          Disposition Plan     Medically Ready for Discharge: Anticipated Tomorrow             Romaine Naranjo DO  Hospitalist Service  [facility]  Securely message with [employer] (more info)  Text page via [employer] Paging/Directory   ______________________________________________________________________    Interval History   No acute events overnight. Patient still somnolent this morning, but arousable to voice. Finished all of his lunch. Titrating suboxone to try to reduce somnolence.     Physical Exam   Vital Signs: Temp: 98.5  F (36.9  C) Temp src: Oral BP: (!) 145/75 Pulse: 73   Resp: (!) 8 SpO2: 94 % O2 Device: Oxymask Oxygen Delivery: 5 LPM  Weight: 195 lbs 12.3 oz    Constitutional: Somnolent, no acute distress  Respiratory: No increased work of breathing, good air exchange, clear to auscultation bilaterally, no crackles or wheezing  Cardiovascular: Normal apical impulse, regular rate and rhythm, normal S1 and S2, no S3 or S4, and no murmur noted  GI: No scars, normal bowel sounds, soft, non-distended, non-tender, no masses palpated, no hepatosplenomegally  Skin: normal skin color, texture, turgor  Musculoskeletal: There is no redness, warmth, or swelling of the joints.  Full range of motion noted.  Motor strength is 5 out of 5 all extremities bilaterally.  Tone is normal.    Medical Decision Making       35 MINUTES SPENT BY ME on the date  of service doing chart review, history, exam, documentation & further activities per the note.      Data     I have personally reviewed the following data over the past 24 hrs:    9.2  \   15.4   / 171     145 102 6.8 (L) /  107 (H)   3.5 30 (H) 0.68 \     ALT: 14 AST: 28 AP: 68 TBILI: 0.4   ALB: 4.0 TOT PROTEIN: 6.9 LIPASE: N/A     Trop: N/A BNP: 3,973 (H)     Procal: N/A CRP: 9.62 (H) Lactic Acid: N/A         Imaging results reviewed over the past 24 hrs:   Recent Results (from the past 24 hours)   Chest XR,  PA & LAT    Narrative    EXAM: XR CHEST 2 VIEWS  LOCATION: [facility]  DATE: 5/26/2025    INDICATION: Hx of COPD and opioiod dependence. Concern for mental status changes. Evaluate for pneumonia vs other acute cardiopulmonary process.  COMPARISON: 9/30/2022       Impression    IMPRESSION: Calcified aortic atherosclerosis. Mild degenerative change thoracic spine. Chest otherwise negative. No change from prior.    Head CT w/o contrast    Narrative    EXAM: CT HEAD W/O CONTRAST  LOCATION: [facility]  DATE: 5/26/2025    INDICATION: Hx of recurring falls. Concern for confusion mental status changes. Opioid dependence and substance use disorder. Last imaging on 4 10 25. Evaluate for acute intracranial process  COMPARISON: None.  TECHNIQUE: Routine CT Head without IV contrast. Multiplanar reformats. Dose reduction techniques were used.    FINDINGS:  INTRACRANIAL CONTENTS: No evidence of acute intracranial hemorrhage or mass effect. Scattered foci of decreased attenuation within the cerebral hemispheric white matter which are nonspecific, though most commonly ascribed to chronic small vessel ischemic   disease. The ventricles and sulci are prominent consistent with mild brain parenchymal volume loss. Gray-white matter differentiation is maintained. The basilar cisterns are patent.    VISUALIZED ORBITS/SINUSES/MASTOIDS: The globes are unremarkable. Complete  opacification of the left maxillary and ethmoid sinuses. The partially imaged paranasal sinuses are otherwise unremarkable.    BONES/SOFT TISSUES: The visualized skull base and calvarium are unremarkable.      Impression    IMPRESSION:    1.  No evidence of acute intracranial hemorrhage or mass effect.

## 2025-05-27 NOTE — PROGRESS NOTES
Reviewing chart due to high probability of admit to Med/Surg unit.     Scott Brewer RN on 5/26/2025 at 8:49 PM
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
## 2025-05-27 NOTE — MEDICATION SCRIBE - ADMISSION MEDICATION HISTORY
Medication Scribe Admission Medication History    Admission medication history is complete. The information provided in this note is only as accurate as the sources available at the time of the update.    Information Source(s): Facility ([facility] medication list/MAR and dispensing report via phone and online    Pertinent Information: PTA medications reviewed with list from [facility], [phone] and by phone with staff. Pt takes his own meds, but currently has an altered state of mind. He will not tell the staff what he takes. I have used the dispensing report and have checked off the meds being prescribed with last fill date and days supply. Pt has been taken off of morphine as of last week due to overuse and is currently prescribed suboxone. e      Changes made to PTA medication list:  Added: suboxone, ondansetron  Deleted: morphine, oxycodone  Changed: None    Allergies reviewed with patient and updates made in EHR: yes    Medication History Completed By: Raven Gilbert 5/26/2025 9:19 PM    PTA Med List   Medication Sig Note Last Dose/Taking    acetaminophen (TYLENOL) 500 MG tablet Take 2 tablets (1,000 mg) by mouth 3 times daily.  Unknown    albuterol (PROAIR HFA) 108 (90 Base) MCG/ACT inhaler Inhale 2 puffs into the lungs every 4 hours as needed for shortness of breath or wheezing.  Unknown    buprenorphine HCl-naloxone HCl (SUBOXONE) 4-1 MG per film Place 1 Film under the tongue 2 times daily. 5/26/2025: Last filled 5/21/2025, 5 day supply Unknown    Calcium Carbonate Antacid 200 MG TBDP Take 1 tablet by mouth daily.  Unknown    DULoxetine (CYMBALTA) 60 MG capsule Take 2 capsules (120 mg) by mouth daily. 5/26/2025: Lasts filled 3/7/2025, 90 day supply Unknown    lurasidone (LATUDA) 20 MG TABS tablet Take 1 tablet (20 mg) by mouth daily (with dinner). 5/26/2025: Last filled 3/8/2025, 60 day supply Unknown    naloxone (NARCAN) 4 MG/0.1ML nasal spray Spray 4 mg into one nostril alternating  nostrils as needed for opioid reversal. every 2-3 minutes until assistance arrives 5/26/2025: Staff has on hand Taking As Needed    naproxen (NAPROSYN) 500 MG tablet Take 1 tablet (500 mg) by mouth 2 times daily as needed for headaches  Unknown    ondansetron (ZOFRAN ODT) 4 MG ODT tab Take 1 tablet by mouth every 8 hours as needed. 5/26/2025: Last filled 5/21/2025, 7 day supply Unknown    pregabalin (LYRICA) 150 MG capsule Take 1 capsule by mouth 2 times daily. 5/26/2025: Last filled 5/12/2025, 30 day supply  Unknown    propranolol ER (INDERAL LA) 120 MG 24 hr capsule Take 1 capsule (120 mg) by mouth daily. 5/26/2025: Last filled 3/7/2025, 90 day supply Unknown    simvastatin (ZOCOR) 80 MG tablet Take 1 tablet (80 mg) by mouth daily. 5/26/2025: Last filled 3/7/2025, 90 day supply Unknown    traZODone (DESYREL) 100 MG tablet TAKE TWO TABLETS BY MOUTH ONCE DAILY AT BEDTIME IN ADDITION TO THE 50MG TABLET FOR A TOTAL OF 250MG PER DAY 5/26/2025: Last filled 3/8/2025, 90 day supply Unknown    traZODone (DESYREL) 50 MG tablet TAKE ONE TABLET BY MOUTH ONCE DAILY AT BEDTIME TAKE ALONG WITH THE  MG TABLETS FOR TOTAL DOSE  MG 5/26/2025: Last filled 3/8/2025, 90 day supply   Unknown

## 2025-05-27 NOTE — PLAN OF CARE
Problem: Adult Inpatient Plan of Care  Goal: Optimal Comfort and Wellbeing  Outcome: Progressing     Problem: Adult Inpatient Plan of Care  Goal: Readiness for Transition of Care  Outcome: Progressing     Patient alert to self, place and time. He has been more alert and less somnolent this afternoon, opens eyes spontaneously. Patient woke up and was up to the bathroom with assist 1, voided once. His respiratory rate continues to maintain 8-10 breaths/minute. SpO2 maintaining on 2 L NC.

## 2025-05-27 NOTE — PROGRESS NOTES
[place] [facility] [place]    Patient admitted to room 2205 at approximately 21:31 via cart from emergency room. Patient was accompanied by transport tech.     Verbal SBAR report received from Milly ED RN prior to patient arrival.     Patient ambulated to bed with stand-by assist. Patient alert and oriented X 2. Pain is difficult to assess, c/o abdominal pain and nausea.  Medication(s) being used: Zofran.   Admission vital signs: Blood pressure (!) 168/86, pulse 83, temperature 97.9  F (36.6  C), temperature source Oral, resp. rate 13, height 1.829 m (6'), weight 88.8 kg (195 lb 12.3 oz), SpO2 (!) 87%. Patient was oriented to plan of care, call light, bed controls, tv, telephone, bathroom, and visiting hours.     Risk Assessment    The following safety risks were identified during admission: fall. Yellow risk band applied: YES.     Skin Initial Assessment    This writer admitted this patient and completed a full skin assessment and Jeremiah score in the Adult PCS flowsheet.   Photo documentation of skin problem and/or wound competed via S-cubism application (located under Media):  N/A    Appropriate interventions initiated as needed.     Secondary skin check completed by Angie Fox RN.         Education    Patient has a Kerby to Observation order: Yes  Observation education completed and documented: Yes noted in ED RN note.      Colette Blake, AUNDREA

## 2025-05-27 NOTE — PLAN OF CARE
Problem: Delirium  Goal: Optimal Coping  Outcome: Progressing     Problem: Delirium  Goal: Improved Behavioral Control  Intervention: Minimize Safety Risk  Recent Flowsheet Documentation  Taken 5/27/2025 0800 by Jaziel Nova RN  Enhanced Safety Measures: pain management  Trust Relationship/Rapport: care explained     Problem: Delirium  Goal: Improved Attention and Thought Clarity  Outcome: Progressing  Intervention: Maximize Cognitive Function  Recent Flowsheet Documentation  Taken 5/27/2025 0800 by Jaziel Nova RN  Sensory Stimulation Regulation: auditory stimulation minimized  Reorientation Measures: calendar in view   Goal Outcome Evaluation:      Plan of Care Reviewed With: patient      Patient on 5 liter mask to keep saturations above 92% respiratory rate 10-12 patient arouses to voice rounding MD updated on status

## 2025-05-28 LAB
ANION GAP SERPL CALCULATED.3IONS-SCNC: 12 MMOL/L (ref 7–15)
BASE EXCESS BLDV CALC-SCNC: 11.7 MMOL/L (ref -3–3)
BASE EXCESS BLDV CALC-SCNC: 14.2 MMOL/L (ref -3–3)
BUN SERPL-MCNC: 10 MG/DL (ref 8–23)
CALCIUM SERPL-MCNC: 9.1 MG/DL (ref 8.8–10.4)
CHLORIDE SERPL-SCNC: 102 MMOL/L (ref 98–107)
CREAT SERPL-MCNC: 0.69 MG/DL (ref 0.67–1.17)
EGFRCR SERPLBLD CKD-EPI 2021: >90 ML/MIN/1.73M2
GLUCOSE BLDC GLUCOMTR-MCNC: 102 MG/DL (ref 70–99)
GLUCOSE SERPL-MCNC: 96 MG/DL (ref 70–99)
HCO3 BLDV-SCNC: 40 MMOL/L (ref 21–28)
HCO3 BLDV-SCNC: 42 MMOL/L (ref 21–28)
HCO3 SERPL-SCNC: 31 MMOL/L (ref 22–29)
HOLD SPECIMEN: NORMAL
O2/TOTAL GAS SETTING VFR VENT: 21 %
O2/TOTAL GAS SETTING VFR VENT: 28 %
OXYHGB MFR BLDV: 76 % (ref 70–75)
OXYHGB MFR BLDV: 84 % (ref 70–75)
PCO2 BLDV: 60 MM HG (ref 40–50)
PCO2 BLDV: 65 MM HG (ref 40–50)
PH BLDV: 7.4 [PH] (ref 7.32–7.43)
PH BLDV: 7.45 [PH] (ref 7.32–7.43)
PO2 BLDV: 41 MM HG (ref 25–47)
PO2 BLDV: 51 MM HG (ref 25–47)
POTASSIUM SERPL-SCNC: 3.9 MMOL/L (ref 3.4–5.3)
SAO2 % BLDV: 77.6 % (ref 70–75)
SAO2 % BLDV: 85.4 % (ref 70–75)
SODIUM SERPL-SCNC: 145 MMOL/L (ref 135–145)

## 2025-05-28 PROCEDURE — 250N000009 HC RX 250: Performed by: STUDENT IN AN ORGANIZED HEALTH CARE EDUCATION/TRAINING PROGRAM

## 2025-05-28 PROCEDURE — 93005 ELECTROCARDIOGRAM TRACING: CPT

## 2025-05-28 PROCEDURE — 94640 AIRWAY INHALATION TREATMENT: CPT | Mod: 76

## 2025-05-28 PROCEDURE — 36415 COLL VENOUS BLD VENIPUNCTURE: CPT | Performed by: STUDENT IN AN ORGANIZED HEALTH CARE EDUCATION/TRAINING PROGRAM

## 2025-05-28 PROCEDURE — 94640 AIRWAY INHALATION TREATMENT: CPT

## 2025-05-28 PROCEDURE — 250N000012 HC RX MED GY IP 250 OP 636 PS 637: Performed by: STUDENT IN AN ORGANIZED HEALTH CARE EDUCATION/TRAINING PROGRAM

## 2025-05-28 PROCEDURE — 82805 BLOOD GASES W/O2 SATURATION: CPT | Performed by: STUDENT IN AN ORGANIZED HEALTH CARE EDUCATION/TRAINING PROGRAM

## 2025-05-28 PROCEDURE — 250N000011 HC RX IP 250 OP 636: Performed by: STUDENT IN AN ORGANIZED HEALTH CARE EDUCATION/TRAINING PROGRAM

## 2025-05-28 PROCEDURE — 82310 ASSAY OF CALCIUM: CPT | Performed by: STUDENT IN AN ORGANIZED HEALTH CARE EDUCATION/TRAINING PROGRAM

## 2025-05-28 PROCEDURE — 999N000157 HC STATISTIC RCP TIME EA 10 MIN

## 2025-05-28 PROCEDURE — 120N000001 HC R&B MED SURG/OB

## 2025-05-28 PROCEDURE — 99232 SBSQ HOSP IP/OBS MODERATE 35: CPT | Performed by: STUDENT IN AN ORGANIZED HEALTH CARE EDUCATION/TRAINING PROGRAM

## 2025-05-28 PROCEDURE — 250N000013 HC RX MED GY IP 250 OP 250 PS 637

## 2025-05-28 RX ORDER — IPRATROPIUM BROMIDE AND ALBUTEROL SULFATE 2.5; .5 MG/3ML; MG/3ML
3 SOLUTION RESPIRATORY (INHALATION)
Status: DISCONTINUED | OUTPATIENT
Start: 2025-05-28 | End: 2025-05-29 | Stop reason: HOSPADM

## 2025-05-28 RX ORDER — FUROSEMIDE 10 MG/ML
40 INJECTION INTRAMUSCULAR; INTRAVENOUS ONCE
Status: COMPLETED | OUTPATIENT
Start: 2025-05-28 | End: 2025-05-28

## 2025-05-28 RX ADMIN — SIMVASTATIN 80 MG: 40 TABLET, FILM COATED ORAL at 07:37

## 2025-05-28 RX ADMIN — ACETAMINOPHEN 1000 MG: 500 TABLET, FILM COATED ORAL at 13:34

## 2025-05-28 RX ADMIN — ACETAMINOPHEN 1000 MG: 500 TABLET, FILM COATED ORAL at 07:34

## 2025-05-28 RX ADMIN — FUROSEMIDE 40 MG: 10 INJECTION, SOLUTION INTRAVENOUS at 11:17

## 2025-05-28 RX ADMIN — PREGABALIN 150 MG: 75 CAPSULE ORAL at 07:36

## 2025-05-28 RX ADMIN — PROPRANOLOL HYDROCHLORIDE 120 MG: 60 CAPSULE, EXTENDED RELEASE ORAL at 07:38

## 2025-05-28 RX ADMIN — LURASIDONE HYDROCHLORIDE 20 MG: 20 TABLET, FILM COATED ORAL at 17:26

## 2025-05-28 RX ADMIN — ACETAMINOPHEN 1000 MG: 500 TABLET, FILM COATED ORAL at 19:45

## 2025-05-28 RX ADMIN — IPRATROPIUM BROMIDE AND ALBUTEROL SULFATE 3 ML: .5; 3 SOLUTION RESPIRATORY (INHALATION) at 20:06

## 2025-05-28 RX ADMIN — BUPRENORPHINE AND NALOXONE 1 FILM: 2; .5 FILM BUCCAL; SUBLINGUAL at 07:35

## 2025-05-28 RX ADMIN — DULOXETINE HYDROCHLORIDE 120 MG: 30 CAPSULE, DELAYED RELEASE ORAL at 07:35

## 2025-05-28 RX ADMIN — IPRATROPIUM BROMIDE AND ALBUTEROL SULFATE 3 ML: .5; 3 SOLUTION RESPIRATORY (INHALATION) at 15:26

## 2025-05-28 RX ADMIN — TRAZODONE HYDROCHLORIDE 150 MG: 100 TABLET ORAL at 21:19

## 2025-05-28 RX ADMIN — IPRATROPIUM BROMIDE AND ALBUTEROL SULFATE 3 ML: .5; 3 SOLUTION RESPIRATORY (INHALATION) at 13:25

## 2025-05-28 RX ADMIN — PREGABALIN 150 MG: 75 CAPSULE ORAL at 19:45

## 2025-05-28 ASSESSMENT — ACTIVITIES OF DAILY LIVING (ADL)
ADLS_ACUITY_SCORE: 49

## 2025-05-28 NOTE — PLAN OF CARE
Problem: Adult Inpatient Plan of Care  Goal: Optimal Comfort and Wellbeing  Outcome: Progressing  Intervention: Provide Person-Centered Care  Recent Flowsheet Documentation  Taken 5/27/2025 2200 by Anila Rubio RN  Trust Relationship/Rapport:   care explained   choices provided     Problem: Delirium  Goal: Improved Behavioral Control  Intervention: Minimize Safety Risk  Recent Flowsheet Documentation  Taken 5/27/2025 2200 by Anila Rubio RN  Trust Relationship/Rapport:   care explained   choices provided   Goal Outcome Evaluation:  Arouses to light stimulation, oriented to self, month and place. RR 8-10, sats maintained on 2 L o2 NC, COWS =2

## 2025-05-28 NOTE — PROGRESS NOTES
"Care Management Follow Up    Length of Stay (days): 1    Expected Discharge Date: 05/29/2025     Concerns to be Addressed: discharge planning      Wil Long : GREGORIO   # [phone]    Patient plan of care discussed at interdisciplinary rounds: Yes    Anticipated Discharge Disposition:  Return to [facility]    Anticipated Discharge Services: County Worker  Wil Long : GREGORIO   # [phone]    Anticipated Discharge DME:      Patient/family educated on Medicare website which has current facility and service quality ratings: yes    Education Provided on the Discharge Plan: Yes  Patient/Family in Agreement with the Plan: yes    Referrals Placed by CM/SW: Internal Clinic Care Coordination, Homecare, Community Resources    Private pay costs discussed: Not applicable    Discussed  Partnership in Safe Discharge Planning  document with patient/family: Yes    Handoff Completed: Yes, [facility] PCP: Internal handoff referral completed    Additional Information:  Update received during IDT rounds. Informed Pt is not medically stable for discharge today.     CM spoke : Wil Long : GREGORIO   # [phone]  - Informed Wil of the safety concerns raised by the [facility]    Today the Patient verbalized his desire to return back to [facility]. Stated he appreciated the care he received from the staff and Marilu- Director. Expressed that he would not want to leave the facility or move to another location.     CM informed the patient that the [facility] is having significant concerns with his ability to safely manage his medications.   Patient acknowledged that they have found him \"passed out, unresponsive\" on several accounts however denied that this had anything to do with his overuse or abuse of his medications.     The patient laughed when informed that the [facility] is requested he relinquish his medication administration over to the Staff at [facility]. Pt could not give CM a concrete reason why but said " "\"they just can't\" Stating he felt he had the ability to continue to give himself his own meds on discharge.       --Patient has continued to drive himself and other [facility] residents around the community  MICHAEL provided the [place] with the [place] Request of Examination of  form.     --Uses a power wheelchair for long distance travel   Pt has a new power wheelchair that is ready for delivery by [employer].         Barriers to Discharge back to [facility]  [address] [place] [address]     Linden Roland, Director Michel- Nurse Practioner   Main: [phone]  Fax: [phone]  Email: [email or web address]      1) Pt's non-compliance/misuse with medications  Waiting for return call from Wil Long : GREGORIO   # [phone]   and Directors at [facility] will need to discuss current concerns and plan moving forward if [facility] is needing to force eviction with Pt's non-compliance.      2) Current goes outside to smoke. Does not currently have home O2.  If required on discharge- Pt would need a new Home O2 assessment and be compliant with not going outside to smoke per Marilu- Director     MICHAEL placed a preliminary Home Care Referral.   -awaiting acceptance      3) Transportation : [facility] via wheelchair  [facility] staff unable to provide assistance                 KAROLINA See  [facility] [phone]   [facility]  [phone]              "

## 2025-05-28 NOTE — PLAN OF CARE
Problem: Delirium  Goal: Improved Attention and Thought Clarity  Outcome: Progressing   Goal Outcome Evaluation:      Plan of Care Reviewed With: patient      Patient is more alert today no complains of pain up with assist of 1 unsteady on his feet

## 2025-05-28 NOTE — PROGRESS NOTES
[facility]    Medicine Progress Note - Hospitalist Service    Date of Admission:  5/26/2025    Assessment & Plan    Melquiades Morales is a 70 year old male with past medical hx of opioid use, COPD, prediabetes, EVGENY, MDD, HLD admitted on 5/26/2025. He presented for dyspnea and lethargy since starting Suboxone on 5/23.    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 He was previously on Morphine for 30 years, and told his pain provider he was misusing it. They then started him on Suboxone which he reports using, but is also still taking Morphine.    He's admitted on 2L NC. No wheezes to suggest COPD exacerbation. VBG compensated with pCO2 59. CRP 10. BNP 3900 but appears euvolemic and last echo 4/10 without CHF. Patient more awake this morning and able to be weaned down from 6 L ? 2 L. Likely component of atelectasis given prolonged somnolence and decreased respirations.   - IV lasix 40 mg once, incentive spirometry, and DuoNebs q4h WA  - IV Narcan PRN for opiate overdose  - Opioid withdrawal orderset used  - Buprenorphine 2mg Q2h PRN   - Reduced PTA Suboxone 4-1mg BID to 2-0.5 mg BID  - PRNs available for impending opioid withdrawal  - SW consulted for eventual return to facility    Leukocytosis  WBC 12.9 on admission. No s/s of infection. CXR clear. CRP 9.    COPD  PFT 5/5 with restrictive lung physiology. No wheezes on admit.    Cannabis use disorder  Reports daily cannabis use.     Chronic pain syndrome  Spinal stenosis in cervical region  Known history of chronic back pain, follows with pain specialist Ludmila Pennington NP.   - See above for opioid misuse and management  - Continued PTA Pregabalin 150mg BID, Tylenol 1g TID     Hyperlipidemia LDL goal <130  - Continued PTA  Simvastatin 80mg      Prediabetes  A1c 6.3% on 3/7. No need for sliding scale insulin.     Major depressive disorder, recurrent episode, moderate  Generalized anxiety disorder  Posttraumatic stress disorder  - Continued PTA Duloxetine 60mg and Lurasidone 20mg     Sleep disturbance  - Continued PTA Trazodone 250mg    Tremor, chronic  - Continued PTA Propranolol 120mg     Tobacco use disorder  Still smokes 1/2PPD and does not intend to stop. O2 use would be problematic if continues to smoke.              Diet: Combination Diet Moderate Consistent Carb (60 g CHO per Meal) Diet; Low Saturated Fat Na <2400mg Diet    DVT Prophylaxis: Pneumatic Compression Devices  Gaona Catheter: Not present  Lines: None     Cardiac Monitoring: None  Code Status: Full Code      Clinically Significant Risk Factors Present on Admission                             # Overweight: Estimated body mass index is 26.55 kg/m  as calculated from the following:    Height as of this encounter: 1.829 m (6').    Weight as of this encounter: 88.8 kg (195 lb 12.3 oz).         # Financial/Environmental Concerns: none         Social Drivers of Health    Depression: At risk (4/15/2025)    PHQ-2     PHQ-2 Score: 4   Tobacco Use: High Risk (4/15/2025)    Patient History     Smoking Tobacco Use: Every Day     Smokeless Tobacco Use: Former   Physical Activity: Unknown (3/7/2025)    Exercise Vital Sign     Days of Exercise per Week: 1 day   Recent Concern: Physical Activity - Insufficiently Active (3/7/2025)    Exercise Vital Sign     Days of Exercise per Week: 1 day     Minutes of Exercise per Session: 10 min   Interpersonal Safety: Low Risk  (5/27/2025)    Interpersonal Safety     Do you feel physically and emotionally safe where you currently live?: Yes     Within the past 12 months, have you been hit, slapped, kicked or otherwise physically hurt by someone?: No     Within the past 12 months, have you been humiliated or emotionally abused in other ways by your  partner or ex-partner?: No   Recent Concern: Interpersonal Safety - High Risk (4/11/2025)    Interpersonal Safety     Do you feel physically and emotionally safe where you currently live?: Yes     Within the past 12 months, have you been hit, slapped, kicked or otherwise physically hurt by someone?: No     Within the past 12 months, have you been humiliated or emotionally abused in other ways by your partner or ex-partner?: Yes   Stress: Stress Concern Present (3/7/2025)    [employer] - Occupational Stress Questionnaire     Feeling of Stress : To some extent   Social Connections: Unknown (3/7/2025)    Social Connection and Isolation Panel [NHANES]     Frequency of Social Gatherings with Friends and Family: Once a week          Disposition Plan     Medically Ready for Discharge: Anticipated Tomorrow             Romaine Naranjo DO  Hospitalist Service  [facility]  Securely message with [employer] (more info)  Text page via [employer] Paging/Directory   ______________________________________________________________________    Interval History   No acute events overnight. Patient more awake this morning and able to have a conversation. Denies any excessive pain or uncontrolled symptoms. Still requiring 2 L NC so working to wean to room air prior to discharging to [facility]. He denies any difficulty breathing or cough at this time.     Physical Exam   Vital Signs: Temp: 97.7  F (36.5  C) Temp src: Oral BP: (!) 129/97 Pulse: 61   Resp: 16 SpO2: 95 % O2 Device: Nasal cannula Oxygen Delivery: 2 LPM  Weight: 195 lbs 12.3 oz    Constitutional: Somnolent, no acute distress  Respiratory: No increased work of breathing, good air exchange, clear to auscultation bilaterally, no crackles or wheezing  Cardiovascular: Normal apical impulse, regular rate and rhythm, normal S1 and S2, no S3 or S4, and no murmur noted  GI: No scars, normal bowel sounds, soft, non-distended, non-tender, no  masses palpated, no hepatosplenomegally      Medical Decision Making       40 MINUTES SPENT BY ME on the date of service doing chart review, history, exam, documentation & further activities per the note.      Data     I have personally reviewed the following data over the past 24 hrs:    N/A  \   N/A   / N/A     145 102 10.0 /  102 (H)   3.9 31 (H) 0.69 \       Imaging results reviewed over the past 24 hrs:   No results found for this or any previous visit (from the past 24 hours).

## 2025-05-29 VITALS
DIASTOLIC BLOOD PRESSURE: 80 MMHG | HEART RATE: 63 BPM | TEMPERATURE: 96.8 F | SYSTOLIC BLOOD PRESSURE: 116 MMHG | BODY MASS INDEX: 26.52 KG/M2 | RESPIRATION RATE: 16 BRPM | OXYGEN SATURATION: 92 % | HEIGHT: 72 IN | WEIGHT: 195.77 LBS

## 2025-05-29 LAB
ANION GAP SERPL CALCULATED.3IONS-SCNC: 15 MMOL/L (ref 7–15)
BASE EXCESS BLDV CALC-SCNC: 13.4 MMOL/L (ref -3–3)
BUN SERPL-MCNC: 13.7 MG/DL (ref 8–23)
CALCIUM SERPL-MCNC: 9.4 MG/DL (ref 8.8–10.4)
CHLORIDE SERPL-SCNC: 98 MMOL/L (ref 98–107)
CREAT SERPL-MCNC: 0.72 MG/DL (ref 0.67–1.17)
EGFRCR SERPLBLD CKD-EPI 2021: >90 ML/MIN/1.73M2
ERYTHROCYTE [DISTWIDTH] IN BLOOD BY AUTOMATED COUNT: 12.7 % (ref 10–15)
GLUCOSE SERPL-MCNC: 93 MG/DL (ref 70–99)
HCO3 BLDV-SCNC: 41 MMOL/L (ref 21–28)
HCO3 SERPL-SCNC: 28 MMOL/L (ref 22–29)
HCT VFR BLD AUTO: 47.5 % (ref 40–53)
HGB BLD-MCNC: 15.5 G/DL (ref 13.3–17.7)
MAGNESIUM SERPL-MCNC: 1.9 MG/DL (ref 1.7–2.3)
MCH RBC QN AUTO: 32 PG (ref 26.5–33)
MCHC RBC AUTO-ENTMCNC: 32.6 G/DL (ref 31.5–36.5)
MCV RBC AUTO: 98 FL (ref 78–100)
NT-PROBNP SERPL-MCNC: 723 PG/ML (ref 0–229)
O2/TOTAL GAS SETTING VFR VENT: 32 %
OXYHGB MFR BLDV: 86 % (ref 70–75)
PCO2 BLDV: 59 MM HG (ref 40–50)
PH BLDV: 7.45 [PH] (ref 7.32–7.43)
PLATELET # BLD AUTO: 210 10E3/UL (ref 150–450)
PO2 BLDV: 53 MM HG (ref 25–47)
POTASSIUM SERPL-SCNC: 3.7 MMOL/L (ref 3.4–5.3)
RBC # BLD AUTO: 4.84 10E6/UL (ref 4.4–5.9)
SAO2 % BLDV: 87.7 % (ref 70–75)
SODIUM SERPL-SCNC: 141 MMOL/L (ref 135–145)
WBC # BLD AUTO: 9.2 10E3/UL (ref 4–11)

## 2025-05-29 PROCEDURE — 250N000009 HC RX 250: Performed by: STUDENT IN AN ORGANIZED HEALTH CARE EDUCATION/TRAINING PROGRAM

## 2025-05-29 PROCEDURE — 250N000013 HC RX MED GY IP 250 OP 250 PS 637

## 2025-05-29 PROCEDURE — G0378 HOSPITAL OBSERVATION PER HR: HCPCS

## 2025-05-29 PROCEDURE — 80048 BASIC METABOLIC PNL TOTAL CA: CPT | Performed by: STUDENT IN AN ORGANIZED HEALTH CARE EDUCATION/TRAINING PROGRAM

## 2025-05-29 PROCEDURE — 36415 COLL VENOUS BLD VENIPUNCTURE: CPT | Performed by: STUDENT IN AN ORGANIZED HEALTH CARE EDUCATION/TRAINING PROGRAM

## 2025-05-29 PROCEDURE — 250N000012 HC RX MED GY IP 250 OP 636 PS 637: Performed by: STUDENT IN AN ORGANIZED HEALTH CARE EDUCATION/TRAINING PROGRAM

## 2025-05-29 PROCEDURE — 250N000011 HC RX IP 250 OP 636: Performed by: STUDENT IN AN ORGANIZED HEALTH CARE EDUCATION/TRAINING PROGRAM

## 2025-05-29 PROCEDURE — 82805 BLOOD GASES W/O2 SATURATION: CPT | Performed by: STUDENT IN AN ORGANIZED HEALTH CARE EDUCATION/TRAINING PROGRAM

## 2025-05-29 PROCEDURE — 85027 COMPLETE CBC AUTOMATED: CPT | Performed by: STUDENT IN AN ORGANIZED HEALTH CARE EDUCATION/TRAINING PROGRAM

## 2025-05-29 PROCEDURE — 83880 ASSAY OF NATRIURETIC PEPTIDE: CPT | Performed by: STUDENT IN AN ORGANIZED HEALTH CARE EDUCATION/TRAINING PROGRAM

## 2025-05-29 PROCEDURE — 83735 ASSAY OF MAGNESIUM: CPT | Performed by: STUDENT IN AN ORGANIZED HEALTH CARE EDUCATION/TRAINING PROGRAM

## 2025-05-29 PROCEDURE — 94640 AIRWAY INHALATION TREATMENT: CPT | Mod: 76

## 2025-05-29 PROCEDURE — 99239 HOSP IP/OBS DSCHRG MGMT >30: CPT | Performed by: STUDENT IN AN ORGANIZED HEALTH CARE EDUCATION/TRAINING PROGRAM

## 2025-05-29 RX ORDER — FUROSEMIDE 10 MG/ML
40 INJECTION INTRAMUSCULAR; INTRAVENOUS ONCE
Status: COMPLETED | OUTPATIENT
Start: 2025-05-29 | End: 2025-05-29

## 2025-05-29 RX ORDER — BUPRENORPHINE AND NALOXONE 2; .5 MG/1; MG/1
1 FILM, SOLUBLE BUCCAL; SUBLINGUAL DAILY
Qty: 10 FILM | Refills: 0 | Status: SHIPPED | OUTPATIENT
Start: 2025-05-30

## 2025-05-29 RX ADMIN — FUROSEMIDE 40 MG: 10 INJECTION, SOLUTION INTRAMUSCULAR; INTRAVENOUS at 08:30

## 2025-05-29 RX ADMIN — BUPRENORPHINE AND NALOXONE 1 FILM: 2; .5 FILM BUCCAL; SUBLINGUAL at 08:15

## 2025-05-29 RX ADMIN — DULOXETINE HYDROCHLORIDE 120 MG: 30 CAPSULE, DELAYED RELEASE ORAL at 08:14

## 2025-05-29 RX ADMIN — PROPRANOLOL HYDROCHLORIDE 120 MG: 60 CAPSULE, EXTENDED RELEASE ORAL at 08:15

## 2025-05-29 RX ADMIN — IPRATROPIUM BROMIDE AND ALBUTEROL SULFATE 3 ML: .5; 3 SOLUTION RESPIRATORY (INHALATION) at 07:36

## 2025-05-29 RX ADMIN — SIMVASTATIN 80 MG: 40 TABLET, FILM COATED ORAL at 08:15

## 2025-05-29 RX ADMIN — PREGABALIN 150 MG: 75 CAPSULE ORAL at 08:14

## 2025-05-29 RX ADMIN — ACETAMINOPHEN 1000 MG: 500 TABLET, FILM COATED ORAL at 08:14

## 2025-05-29 ASSESSMENT — ACTIVITIES OF DAILY LIVING (ADL)
ADLS_ACUITY_SCORE: 42
ADLS_ACUITY_SCORE: 49
ADLS_ACUITY_SCORE: 42
ADLS_ACUITY_SCORE: 42

## 2025-05-29 NOTE — PROGRESS NOTES
"CLINICAL NUTRITION SERVICES - ASSESSMENT NOTE    RECOMMENDATIONS FOR MDs/PROVIDERS TO ORDER:  None at this time    Registered Dietitian Interventions:  ***    Future/Additional Recommendations:  ***     REASON FOR ASSESSMENT  Positive admission nutrition risk screen    INFORMATION OBTAINED  {RDNSubjectiveinformation:376852}    NUTRITION HISTORY  Melquiades Morales is a 70 year old male who presents with opoid use disorder with mild overdose, acute respiratory failure with hypoxia and hypercapnia, toxic encephalopathy d/t morphine and suboxone, leukocytosis, COPD,cannabis use disorder, chronic pain syndrome, spinal stenosis in cervical region, HLD, prediabetes, MDD, PTSD, EVGENY, sleep disturbance, tremor, tobacco use disorder      CURRENT NUTRITION ORDERS  Diet: Orders Placed This Encounter      Combination Diet Moderate Consistent Carb (60 g CHO per Meal) Diet; Low Saturated Fat Na <2400mg Diet      Snacks/Supplements: None currently ordered      CURRENT INTAKE/TOLERANCE  100% of recorded meal intakes noted.    LABS  Nutrition-relevant labs: Reviewed    MEDICATIONS  Nutrition-relevant medications: Reviewed    ANTHROPOMETRICS  Height: 182.9 cm (6' 0\")  Admission Weight: 92.9 kg (204 lb 14.4 oz) (05/26/25 1622)   Most Recent Weight: 88.8 kg (195 lb 12.3 oz) (05/26/25 2137)  IBW: 80.9 kg  BMI: Body mass index is 26.55 kg/m .   Weight History:   Wt Readings from Last 15 Encounters:   05/26/25 88.8 kg (195 lb 12.3 oz)   04/15/25 96.6 kg (213 lb)   04/13/25 99 kg (218 lb 4.1 oz)   03/07/25 96 kg (211 lb 9.6 oz)   02/02/25 95.3 kg (210 lb 1.6 oz)   08/08/24 99.7 kg (219 lb 12.8 oz)   07/11/24 96.6 kg (213 lb)   05/17/24 96.8 kg (213 lb 6.4 oz)   02/27/24 98.9 kg (218 lb)   09/21/23 98.9 kg (218 lb)   10/07/22 110.7 kg (244 lb)   09/29/22 102.1 kg (225 lb 1.4 oz)   08/06/22 92.4 kg (203 lb 11.3 oz)   07/26/22 113.4 kg (250 lb)   06/13/22 112.5 kg (248 lb)   10.6% sig weight loss noted within 3 months.     Dosing Weight: 88.8 " "kg, based on actual wt    ASSESSED NUTRITION NEEDS  Estimated Energy Needs: 2,220-2,664 kcals/day (25 - 30 kcals/kg)  Justification: Maintenance  Estimated Protein Needs:  grams protein/day (1.1 - 1.4 grams of pro/kg)  Justification: Increased needs  Estimated Fluid Needs:  2,220-2,664  mL/day (1 mL/kcal)  Justification: Per provider pending fluid status    SYSTEM AND PHYSICAL FINDINGS    GI symptoms: Reviewed; WDL  Skin/wounds: Reviewed; No PI noted.    MALNUTRITION  % Intake: No decreased intake noted  % Weight Loss: > 5% in 1 month (severe)   Subcutaneous Fat Loss: {RDNMalnutritionsubcutaneousfatloss:397892}  Muscle Loss: {RDNMalnutritionmuscleloss:519710}  Fluid Accumulation/Edema: None noted  Malnutrition Diagnosis: {RDNMalnutritiondiagnosis:757671}  Malnutrition Present on Admission: {RDNMalnutritionpresentonadmit:854342}    NUTRITION DIAGNOSIS  {RDNNutritiondiagnosis:337635} related to *** as evidenced by ***    INTERVENTIONS  {RDNInterventions:732596}    GOALS  Patient to consume % of nutritionally adequate meal trays TID, or the equivalent with supplements/snacks.     MONITORING/EVALUATION  Progress toward goals will be monitored and evaluated per policy.    Marisela Baer RDN, LD  Clinical Dietitian  Office: [phone]  Hours: M-F 8-3pm   Weekend/Holiday WILLEM Vasquez - \"Weekend Clinical Dietitian\" (No longer using pager)    "

## 2025-05-29 NOTE — PROGRESS NOTES
This author spoke to Neelam at [facility] to give updates regarding patient return back to facility. All questions answered appropriately.

## 2025-05-29 NOTE — PROGRESS NOTES
[place] [facility] DISCHARGE NOTE    Patient discharged to long term care facility at 1440 via wheel chair. Accompanied by other:transport and staff. Discharge instructions reviewed with patient and staff at [facility], opportunity offered to ask questions. Prescriptions filled and sent with patient upon discharge. All belongings sent with patient.    Yodit Campbell RN

## 2025-05-29 NOTE — PLAN OF CARE
Problem: Delirium  Goal: Optimal Coping  Outcome: Progressing     Problem: Delirium  Goal: Improved Attention and Thought Clarity  Intervention: Maximize Cognitive Function  Recent Flowsheet Documentation  Taken 5/29/2025 0130 by Schirmers, Mary, RN  Reorientation Measures: clock in view   Goal Outcome Evaluation:  Patient more alert today then 24 hrs previously  Patient making needs known  Using call light appropriately  COW score remains at 2

## 2025-05-29 NOTE — PLAN OF CARE
Problem: Delirium  Goal: Optimal Coping  Outcome: Progressing     Patient alert and oriented, easily aroused. Up with assist 1 due to unsteady gait, using call light appropriately. Encouraging use of incentive spirometer, pulling up to 1750. Lung sounds diminished with expiratory wheezes, continues to require supplemental oxygen, weaned down to 1L.

## 2025-05-29 NOTE — DISCHARGE SUMMARY
[facility]  Hospitalist Discharge Summary      Date of Admission:  5/26/2025  Date of Discharge:  5/29/2025  Discharging Provider: Romaine Naranjo DO  Discharge Service: Hospitalist Service    Discharge Diagnoses   Opioid use disorder with mild overdose  Acute respiratory failure with hypoxia and hypercapnia   Toxic Encephalopathy due to Morphine & Suboxone   Leukocytosis   COPD   Cannabis use disorder   Chronic pain syndrome  Spinal stenosis in cervical region  Hyperlipidemia LDL goal <130   Prediabetes   Major depressive disorder, recurrent episode, moderate  Generalized anxiety disorder  Posttraumatic stress disorder  Sleep disturbance   Tremor, chronic   Tobacco use disorder     Clinically Significant Risk Factors     # Overweight: Estimated body mass index is 26.55 kg/m  as calculated from the following:    Height as of this encounter: 1.829 m (6').    Weight as of this encounter: 88.8 kg (195 lb 12.3 oz).       Follow-ups Needed After Discharge   Follow-up Appointments       Hospital Follow-up with Existing Primary Care Provider (PCP)          Schedule Primary Care visit within: 7 Days             Discharge Disposition   Discharged to assisted living  Condition at discharge: Stable    Hospital Course   Melquiades Morales is a 70 year old male with past medical hx of opioid use, COPD, prediabetes, EVGENY, MDD, HLD admitted on 5/26/2025. He presented for dyspnea and lethargy since starting Suboxone on 5/23.    Opioid use disorder with mild overdose  Acute respiratory failure with hypoxia and hypercapnia   Toxic Encephalopathy due to Morphine & Suboxone     Per [facility] staff, he's been lethargic, slow to respond, and dyspneic for 3 days PTA since starting Suboxone. He was previously on Morphine for 30 years, and told his pain provider he was misusing it. They then started him on Suboxone which he reports using, but is also still taking Morphine.    He's admitted on 2L NC. No wheezes to  suggest COPD exacerbation. VBG compensated with pCO2 59. CRP 10. BNP 3900 but appears euvolemic and last echo 4/10 without CHF. Patient more awake this morning and able to be weaned down from 6 L ? 2 L. Likely component of atelectasis given prolonged somnolence and decreased respirations. Patient weaned down to room air with SPO2 90-92%, patient on previous admissions has been low 90's after recovery from any acute illness. Discussed with patient about dangers of oxygen and smoking, but patient will continue to smoke so decision to forgo oxygen was made from risk/benefits of fire hazard. Likely will progress to requiring oxygen at some point given normal VBG with decreased SPO2. Possible component of CHF, but diuresed well with repeat .   - Continue outpatient follow up with pain provider  - Reduced PTA Suboxone 4-1mg BID to 2-0.5 mg BID  - No current indication for home oxygen, recommend continued smoking cessation discussions at follow up  - Recommended to discontinue PTA morphine, may need higher doses of Suboxone outpatient but discussed with him that he will need close follow up for further management     Leukocytosis  WBC 12.9 on admission. No s/s of infection. CXR clear. CRP 9.    COPD  PFT 5/5 with restrictive lung physiology. No wheezes on admit.    Cannabis use disorder  Reports daily cannabis use.     Chronic pain syndrome  Spinal stenosis in cervical region  Known history of chronic back pain, follows with pain specialist Ludmila Pennington NP.   - See above for opioid misuse and management  - Continued PTA Pregabalin 150mg BID, Tylenol 1g TID     Hyperlipidemia LDL goal <130  - Continued PTA Simvastatin 80mg      Prediabetes  A1c 6.3% on 3/7. No need for sliding scale insulin.     Major depressive disorder, recurrent episode, moderate  Generalized anxiety disorder  Posttraumatic stress disorder  - Continued PTA Duloxetine 60mg and Lurasidone 20mg     Sleep disturbance  - Continued PTA Trazodone  250mg    Tremor, chronic  - Continued PTA Propranolol 120mg     Tobacco use disorder  Still smokes 1/2PPD and does not intend to stop. O2 use would be problematic if continues to smoke.      Consultations This Hospital Stay   CARE MANAGEMENT / SOCIAL WORK IP CONSULT    Code Status   Full Code    Time Spent on this Encounter   Romaine GUILLEN DO, personally saw the patient today and spent greater than 30 minutes discharging this patient.       Romaine Naranjo DO  [facility] SURGICAL  [address] [address]  Phone: [phone]  Fax: [phone]  ______________________________________________________________________    Physical Exam   Vital Signs: Temp: 96.8  F (36  C) Temp src: Axillary BP: 116/80 Pulse: 63   Resp: 16 SpO2: 92 % O2 Device: None (Room air) Oxygen Delivery: 2 LPM  Weight: 195 lbs 12.3 oz  Constitutional: awake, alert, cooperative, no apparent distress, and appears stated age  Respiratory: No increased work of breathing, good air exchange, clear to auscultation bilaterally, no crackles or wheezing  Cardiovascular: Normal apical impulse, regular rate and rhythm, normal S1 and S2, no S3 or S4, and no murmur noted  GI: No scars, normal bowel sounds, soft, non-distended, non-tender, no masses palpated, no hepatosplenomegally  Skin: normal skin color, texture, turgor  Musculoskeletal: There is no redness, warmth, or swelling of the joints.  Full range of motion noted.  Motor strength is 5 out of 5 all extremities bilaterally.  Tone is normal.       Primary Care Physician   Krupa Breen    Discharge Orders      Primary Care - Care Coordination Referral      Home Care Referral      Reason for your hospital stay    Opiate overdose with decreased respirations, toxic metabolic encephalopathy, acute hypoxic respiratory failure     Activity    Your activity upon discharge: activity as tolerated     Diet    Follow this diet upon discharge:    Combination Diet Moderate  Consistent Carb (60 g CHO per Meal) Diet; Low Saturated Fat Na <2400mg Diet     Hospital Follow-up with Existing Primary Care Provider (PCP)            Significant Results and Procedures   Most Recent 3 CBC's:  Recent Labs   Lab Test 05/29/25  0606 05/27/25  0545 05/26/25  1642   WBC 9.2 9.2 12.9*   HGB 15.5 15.4 16.7   MCV 98 97 97    171 200     Most Recent 3 BMP's:  Recent Labs   Lab Test 05/29/25  0606 05/28/25  0724 05/28/25  0540 05/27/25  0545     --  145 145   POTASSIUM 3.7  --  3.9 3.5   CHLORIDE 98  --  102 102   CO2 28  --  31* 30*   BUN 13.7  --  10.0 6.8*   CR 0.72  --  0.69 0.68   ANIONGAP 15  --  12 13   JESSEE 9.4  --  9.1 9.2   GLC 93 102* 96 107*   ,   Results for orders placed or performed during the hospital encounter of 05/26/25   Chest XR,  PA & LAT    Narrative    EXAM: XR CHEST 2 VIEWS  LOCATION: [facility]  DATE: 5/26/2025    INDICATION: Hx of COPD and opioiod dependence. Concern for mental status changes. Evaluate for pneumonia vs other acute cardiopulmonary process.  COMPARISON: 9/30/2022       Impression    IMPRESSION: Calcified aortic atherosclerosis. Mild degenerative change thoracic spine. Chest otherwise negative. No change from prior.    Head CT w/o contrast    Narrative    EXAM: CT HEAD W/O CONTRAST  LOCATION: [facility]  DATE: 5/26/2025    INDICATION: Hx of recurring falls. Concern for confusion mental status changes. Opioid dependence and substance use disorder. Last imaging on 4 10 25. Evaluate for acute intracranial process  COMPARISON: None.  TECHNIQUE: Routine CT Head without IV contrast. Multiplanar reformats. Dose reduction techniques were used.    FINDINGS:  INTRACRANIAL CONTENTS: No evidence of acute intracranial hemorrhage or mass effect. Scattered foci of decreased attenuation within the cerebral hemispheric white matter which are nonspecific, though most commonly ascribed to chronic small vessel  ischemic   disease. The ventricles and sulci are prominent consistent with mild brain parenchymal volume loss. Gray-white matter differentiation is maintained. The basilar cisterns are patent.    VISUALIZED ORBITS/SINUSES/MASTOIDS: The globes are unremarkable. Complete opacification of the left maxillary and ethmoid sinuses. The partially imaged paranasal sinuses are otherwise unremarkable.    BONES/SOFT TISSUES: The visualized skull base and calvarium are unremarkable.      Impression    IMPRESSION:    1.  No evidence of acute intracranial hemorrhage or mass effect.     *Note: Due to a large number of results and/or encounters for the requested time period, some results have not been displayed. A complete set of results can be found in Results Review.       Discharge Medications   Current Discharge Medication List        START taking these medications    Details   buprenorphine HCl-naloxone HCl (SUBOXONE) 2-0.5 MG per film Place 1 Film under the tongue daily.  Qty: 10 Film, Refills: 0    Associated Diagnoses: Opioid use disorder, severe, dependence (H)           CONTINUE these medications which have NOT CHANGED    Details   acetaminophen (TYLENOL) 500 MG tablet Take 2 tablets (1,000 mg) by mouth 3 times daily.    Associated Diagnoses: Chronic pain syndrome      albuterol (PROAIR HFA) 108 (90 Base) MCG/ACT inhaler Inhale 2 puffs into the lungs every 4 hours as needed for shortness of breath or wheezing.  Qty: 18 g, Refills: 3    Comments: Pharmacy may dispense brand covered by insurance (Proair, or proventil or ventolin or generic albuterol inhaler)  Associated Diagnoses: Chronic obstructive pulmonary disease, unspecified COPD type (H)      Calcium Carbonate Antacid 200 MG TBDP Take 1 tablet by mouth daily.      DULoxetine (CYMBALTA) 60 MG capsule Take 2 capsules (120 mg) by mouth daily.  Qty: 180 capsule, Refills: 3    Associated Diagnoses: Major depressive disorder, recurrent episode, moderate (H)      lurasidone  (LATUDA) 20 MG TABS tablet Take 1 tablet (20 mg) by mouth daily (with dinner).  Qty: 90 tablet, Refills: 3    Associated Diagnoses: Major depressive disorder, recurrent episode, moderate (H)      naloxone (NARCAN) 4 MG/0.1ML nasal spray Spray 4 mg into one nostril alternating nostrils as needed for opioid reversal. every 2-3 minutes until assistance arrives  Qty: 0.2 mL, Refills: 0      naproxen (NAPROSYN) 500 MG tablet Take 1 tablet (500 mg) by mouth 2 times daily as needed for headaches  Qty: 60 tablet, Refills: 3    Associated Diagnoses: Chronic pain syndrome      ondansetron (ZOFRAN ODT) 4 MG ODT tab Take 1 tablet by mouth every 8 hours as needed.      pregabalin (LYRICA) 150 MG capsule Take 1 capsule by mouth 2 times daily.      propranolol ER (INDERAL LA) 120 MG 24 hr capsule Take 1 capsule (120 mg) by mouth daily.  Qty: 90 capsule, Refills: 3    Associated Diagnoses: Tremor      simvastatin (ZOCOR) 80 MG tablet Take 1 tablet (80 mg) by mouth daily.  Qty: 90 tablet, Refills: 3    Associated Diagnoses: Hyperlipidemia LDL goal <130      !! traZODone (DESYREL) 100 MG tablet TAKE TWO TABLETS BY MOUTH ONCE DAILY AT BEDTIME IN ADDITION TO THE 50MG TABLET FOR A TOTAL OF 250MG PER DAY  Qty: 180 tablet, Refills: 3    Associated Diagnoses: Major depressive disorder, recurrent episode, moderate (H)      !! traZODone (DESYREL) 50 MG tablet TAKE ONE TABLET BY MOUTH ONCE DAILY AT BEDTIME TAKE ALONG WITH THE  MG TABLETS FOR TOTAL DOSE  MG  Qty: 90 tablet, Refills: 4    Associated Diagnoses: Major depressive disorder, recurrent episode, moderate (H)      !! order for DME Equipment being ordered: Nebulizer.    Diagnosis: chronic obstructive bronchitis (COPD).    Duration of need:  99 months.  Qty: 1 each, Refills: 0    Associated Diagnoses: Chronic bronchitis, unspecified chronic bronchitis type (H)      !! order for DME Equipment being ordered: Hospital Bed and mattress.  Qty: 1 each, Refills: 0    Associated  Diagnoses: Chronic pain syndrome; Lumbosacral radiculitis; Chronic obstructive pulmonary disease, unspecified COPD type (H); Obese; Lumbar radiculopathy; Encephalopathy; Spinal stenosis in cervical region      !! order for DME Equipment being ordered: Lift Chair  Qty: 1 each, Refills: 0    Associated Diagnoses: Chronic pain syndrome; Lumbosacral radiculitis; Chronic obstructive pulmonary disease, unspecified COPD type (H); Obese; Lumbar radiculopathy; Encephalopathy; Spinal stenosis in cervical region; Unable to ambulate      !! ORDER FOR DME Semi electric hospital bed with side rails and mattress. Length of bed is for lifetime  Qty: 1 Device, Refills: 0    Associated Diagnoses: Other unspecified back disorder; Obese; Lumbosacral radiculitis; COPD (chronic obstructive pulmonary disease) (H)       !! - Potential duplicate medications found. Please discuss with provider.        STOP taking these medications       buprenorphine HCl-naloxone HCl (SUBOXONE) 4-1 MG per film Comments:   Reason for Stopping:             Allergies   Allergies   Allergen Reactions    Abilify [Aripiprazole] Other (See Comments)     Altered metal status.  Was admitted to hospital 3/17/2015.    Asa [Aspirin] GI Disturbance     Upset stomach    Black Pepper [Piper] Swelling     Tongue swells up    Caffeine Other (See Comments)     Comment: GI problems, Description:     Robitussin Cough-Cold D Other (See Comments)     Bad dreams about killing people     Varenicline Other (See Comments)     Vivid dreams and suicidal thoughts

## (undated) DEVICE — ENDO FORCEP ENDOJAW BIOPSY 3.7MMX230CM FB-222U

## (undated) DEVICE — ENDO SNARE EXACTO COLD 9MM LOOP 2.4MMX230CM 00711115

## (undated) RX ORDER — PROPOFOL 10 MG/ML
INJECTION, EMULSION INTRAVENOUS
Status: DISPENSED
Start: 2024-02-27